# Patient Record
Sex: FEMALE | Race: WHITE | Employment: OTHER | URBAN - METROPOLITAN AREA
[De-identification: names, ages, dates, MRNs, and addresses within clinical notes are randomized per-mention and may not be internally consistent; named-entity substitution may affect disease eponyms.]

---

## 2017-09-11 ENCOUNTER — HOSPITAL ENCOUNTER (OUTPATIENT)
Age: 67
Setting detail: SPECIMEN
Discharge: HOME | End: 2017-09-11
Payer: COMMERCIAL

## 2017-09-11 LAB
ANION GAP SERPL CALCULATED.3IONS-SCNC: 12 MMOL/L (ref 9–17)
BUN BLDV-MCNC: 16 MG/DL (ref 8–23)
BUN/CREAT BLD: ABNORMAL (ref 9–20)
CALCIUM SERPL-MCNC: 8.6 MG/DL (ref 8.6–10.4)
CHLORIDE BLD-SCNC: 98 MMOL/L (ref 98–107)
CO2: 31 MMOL/L (ref 20–31)
CREAT SERPL-MCNC: 0.68 MG/DL (ref 0.5–0.9)
GFR AFRICAN AMERICAN: >60 ML/MIN
GFR NON-AFRICAN AMERICAN: >60 ML/MIN
GFR SERPL CREATININE-BSD FRML MDRD: ABNORMAL ML/MIN/{1.73_M2}
GFR SERPL CREATININE-BSD FRML MDRD: ABNORMAL ML/MIN/{1.73_M2}
GLUCOSE BLD-MCNC: 136 MG/DL (ref 70–99)
HCT VFR BLD CALC: 30.9 % (ref 36–46)
HEMOGLOBIN: 10.2 G/DL (ref 12–16)
MAGNESIUM: 2.3 MG/DL (ref 1.6–2.6)
MCH RBC QN AUTO: 29.7 PG (ref 26–34)
MCHC RBC AUTO-ENTMCNC: 33 G/DL (ref 31–37)
MCV RBC AUTO: 90 FL (ref 80–100)
PDW BLD-RTO: 14.9 % (ref 12.5–15.4)
PLATELET # BLD: 185 K/UL (ref 140–450)
PMV BLD AUTO: 9.4 FL (ref 6–12)
POTASSIUM SERPL-SCNC: 3.4 MMOL/L (ref 3.7–5.3)
PREALBUMIN: 16.1 MG/DL (ref 20–40)
RBC # BLD: 3.43 M/UL (ref 4–5.2)
SODIUM BLD-SCNC: 141 MMOL/L (ref 135–144)
VITAMIN D 25-HYDROXY: 35 NG/ML (ref 30–100)
WBC # BLD: 6.3 K/UL (ref 3.5–11)

## 2017-09-11 PROCEDURE — 84134 ASSAY OF PREALBUMIN: CPT

## 2017-09-11 PROCEDURE — 83735 ASSAY OF MAGNESIUM: CPT

## 2017-09-11 PROCEDURE — 80048 BASIC METABOLIC PNL TOTAL CA: CPT

## 2017-09-11 PROCEDURE — 85027 COMPLETE CBC AUTOMATED: CPT

## 2017-09-11 PROCEDURE — P9603 ONE-WAY ALLOW PRORATED MILES: HCPCS

## 2017-09-11 PROCEDURE — 36415 COLL VENOUS BLD VENIPUNCTURE: CPT

## 2017-09-11 PROCEDURE — 82306 VITAMIN D 25 HYDROXY: CPT

## 2018-06-26 ENCOUNTER — HOSPITAL ENCOUNTER (OUTPATIENT)
Age: 68
Setting detail: SPECIMEN
Discharge: HOME | End: 2018-06-26
Payer: COMMERCIAL

## 2018-06-26 LAB
ALBUMIN SERPL-MCNC: 4.1 G/DL (ref 3.5–5.2)
ALBUMIN/GLOBULIN RATIO: 1.4 (ref 1–2.5)
ALP BLD-CCNC: 70 U/L (ref 35–104)
ALT SERPL-CCNC: 37 U/L (ref 5–33)
ANION GAP SERPL CALCULATED.3IONS-SCNC: 11 MMOL/L (ref 9–17)
AST SERPL-CCNC: 24 U/L
BILIRUB SERPL-MCNC: 0.37 MG/DL (ref 0.3–1.2)
BUN BLDV-MCNC: 22 MG/DL (ref 8–23)
BUN/CREAT BLD: ABNORMAL (ref 9–20)
CALCIUM SERPL-MCNC: 9.1 MG/DL (ref 8.6–10.4)
CHLORIDE BLD-SCNC: 99 MMOL/L (ref 98–107)
CO2: 26 MMOL/L (ref 20–31)
CREAT SERPL-MCNC: 0.8 MG/DL (ref 0.5–0.9)
GFR AFRICAN AMERICAN: >60 ML/MIN
GFR NON-AFRICAN AMERICAN: >60 ML/MIN
GFR SERPL CREATININE-BSD FRML MDRD: ABNORMAL ML/MIN/{1.73_M2}
GFR SERPL CREATININE-BSD FRML MDRD: ABNORMAL ML/MIN/{1.73_M2}
GLUCOSE BLD-MCNC: 230 MG/DL (ref 70–99)
POTASSIUM SERPL-SCNC: 3.8 MMOL/L (ref 3.7–5.3)
SODIUM BLD-SCNC: 136 MMOL/L (ref 135–144)
TOTAL PROTEIN: 7 G/DL (ref 6.4–8.3)

## 2018-06-27 LAB
ESTIMATED AVERAGE GLUCOSE: 235 MG/DL
HBA1C MFR BLD: 9.8 % (ref 4–6)

## 2019-03-20 ENCOUNTER — OFFICE VISIT (OUTPATIENT)
Dept: PRIMARY CARE CLINIC | Age: 69
End: 2019-03-20
Payer: MEDICARE

## 2019-03-20 VITALS
RESPIRATION RATE: 16 BRPM | HEIGHT: 64 IN | OXYGEN SATURATION: 95 % | SYSTOLIC BLOOD PRESSURE: 110 MMHG | BODY MASS INDEX: 34.04 KG/M2 | DIASTOLIC BLOOD PRESSURE: 74 MMHG | HEART RATE: 95 BPM | WEIGHT: 199.4 LBS

## 2019-03-20 DIAGNOSIS — E13.8 OTHER SPECIFIED DIABETES MELLITUS WITH COMPLICATION, WITH LONG-TERM CURRENT USE OF INSULIN: Primary | ICD-10-CM

## 2019-03-20 DIAGNOSIS — Z79.4 OTHER SPECIFIED DIABETES MELLITUS WITH COMPLICATION, WITH LONG-TERM CURRENT USE OF INSULIN: Primary | ICD-10-CM

## 2019-03-20 DIAGNOSIS — E78.5 HYPERLIPIDEMIA, UNSPECIFIED HYPERLIPIDEMIA TYPE: ICD-10-CM

## 2019-03-20 DIAGNOSIS — G62.9 NEUROPATHY: ICD-10-CM

## 2019-03-20 DIAGNOSIS — R42 LIGHTHEADED: ICD-10-CM

## 2019-03-20 DIAGNOSIS — R10.13 EPIGASTRIC PAIN: ICD-10-CM

## 2019-03-20 DIAGNOSIS — K21.9 GASTROESOPHAGEAL REFLUX DISEASE, ESOPHAGITIS PRESENCE NOT SPECIFIED: ICD-10-CM

## 2019-03-20 LAB — HBA1C MFR BLD: 8.4 %

## 2019-03-20 PROCEDURE — 99204 OFFICE O/P NEW MOD 45 MIN: CPT | Performed by: FAMILY MEDICINE

## 2019-03-20 PROCEDURE — 1101F PT FALLS ASSESS-DOCD LE1/YR: CPT | Performed by: FAMILY MEDICINE

## 2019-03-20 PROCEDURE — 83036 HEMOGLOBIN GLYCOSYLATED A1C: CPT | Performed by: FAMILY MEDICINE

## 2019-03-20 PROCEDURE — 3045F PR MOST RECENT HEMOGLOBIN A1C LEVEL 7.0-9.0%: CPT | Performed by: FAMILY MEDICINE

## 2019-03-20 PROCEDURE — G8427 DOCREV CUR MEDS BY ELIG CLIN: HCPCS | Performed by: FAMILY MEDICINE

## 2019-03-20 PROCEDURE — 2022F DILAT RTA XM EVC RTNOPTHY: CPT | Performed by: FAMILY MEDICINE

## 2019-03-20 PROCEDURE — G8400 PT W/DXA NO RESULTS DOC: HCPCS | Performed by: FAMILY MEDICINE

## 2019-03-20 PROCEDURE — 1036F TOBACCO NON-USER: CPT | Performed by: FAMILY MEDICINE

## 2019-03-20 PROCEDURE — 1090F PRES/ABSN URINE INCON ASSESS: CPT | Performed by: FAMILY MEDICINE

## 2019-03-20 PROCEDURE — G8484 FLU IMMUNIZE NO ADMIN: HCPCS | Performed by: FAMILY MEDICINE

## 2019-03-20 PROCEDURE — G8417 CALC BMI ABV UP PARAM F/U: HCPCS | Performed by: FAMILY MEDICINE

## 2019-03-20 PROCEDURE — 4040F PNEUMOC VAC/ADMIN/RCVD: CPT | Performed by: FAMILY MEDICINE

## 2019-03-20 PROCEDURE — 1123F ACP DISCUSS/DSCN MKR DOCD: CPT | Performed by: FAMILY MEDICINE

## 2019-03-20 PROCEDURE — G0444 DEPRESSION SCREEN ANNUAL: HCPCS | Performed by: FAMILY MEDICINE

## 2019-03-20 PROCEDURE — 3017F COLORECTAL CA SCREEN DOC REV: CPT | Performed by: FAMILY MEDICINE

## 2019-03-20 RX ORDER — ATORVASTATIN CALCIUM 40 MG/1
40 TABLET, FILM COATED ORAL DAILY
COMMUNITY
End: 2019-06-19 | Stop reason: SDUPTHER

## 2019-03-20 RX ORDER — AMLODIPINE BESYLATE AND ATORVASTATIN CALCIUM 5; 10 MG/1; MG/1
1 TABLET, FILM COATED ORAL DAILY
COMMUNITY
End: 2020-02-14

## 2019-03-20 RX ORDER — TAMSULOSIN HYDROCHLORIDE 0.4 MG/1
0.4 CAPSULE ORAL DAILY
COMMUNITY
End: 2020-06-10 | Stop reason: SDUPTHER

## 2019-03-20 RX ORDER — DULOXETIN HYDROCHLORIDE 60 MG/1
60 CAPSULE, DELAYED RELEASE ORAL DAILY
COMMUNITY
End: 2019-11-05 | Stop reason: SDUPTHER

## 2019-03-20 RX ORDER — FERROUS SULFATE 325(65) MG
325 TABLET ORAL
COMMUNITY
End: 2022-07-06 | Stop reason: ALTCHOICE

## 2019-03-20 RX ORDER — ALENDRONATE SODIUM 70 MG/1
70 TABLET ORAL
COMMUNITY
End: 2020-04-13 | Stop reason: SDUPTHER

## 2019-03-20 RX ORDER — FEXOFENADINE HYDROCHLORIDE 60 MG/1
60 TABLET, FILM COATED ORAL DAILY
COMMUNITY

## 2019-03-20 RX ORDER — OMEPRAZOLE 20 MG/1
20 CAPSULE, DELAYED RELEASE ORAL DAILY
COMMUNITY
End: 2019-11-05 | Stop reason: SDUPTHER

## 2019-03-20 RX ORDER — POTASSIUM CHLORIDE 7.45 MG/ML
10 INJECTION INTRAVENOUS ONCE
COMMUNITY
End: 2019-04-08

## 2019-03-20 RX ORDER — PHENOL 1.4 %
20 AEROSOL, SPRAY (ML) MUCOUS MEMBRANE DAILY PRN
COMMUNITY
End: 2021-11-09

## 2019-03-20 RX ORDER — MAGNESIUM OXIDE 400 MG/1
400 TABLET ORAL DAILY
COMMUNITY

## 2019-03-20 ASSESSMENT — PATIENT HEALTH QUESTIONNAIRE - PHQ9
SUM OF ALL RESPONSES TO PHQ QUESTIONS 1-9: 13
2. FEELING DOWN, DEPRESSED OR HOPELESS: 2
9. THOUGHTS THAT YOU WOULD BE BETTER OFF DEAD, OR OF HURTING YOURSELF: 0
1. LITTLE INTEREST OR PLEASURE IN DOING THINGS: 2
6. FEELING BAD ABOUT YOURSELF - OR THAT YOU ARE A FAILURE OR HAVE LET YOURSELF OR YOUR FAMILY DOWN: 2
5. POOR APPETITE OR OVEREATING: 3
7. TROUBLE CONCENTRATING ON THINGS, SUCH AS READING THE NEWSPAPER OR WATCHING TELEVISION: 0
8. MOVING OR SPEAKING SO SLOWLY THAT OTHER PEOPLE COULD HAVE NOTICED. OR THE OPPOSITE, BEING SO FIGETY OR RESTLESS THAT YOU HAVE BEEN MOVING AROUND A LOT MORE THAN USUAL: 0
3. TROUBLE FALLING OR STAYING ASLEEP: 1
SUM OF ALL RESPONSES TO PHQ9 QUESTIONS 1 & 2: 4
4. FEELING TIRED OR HAVING LITTLE ENERGY: 3
10. IF YOU CHECKED OFF ANY PROBLEMS, HOW DIFFICULT HAVE THESE PROBLEMS MADE IT FOR YOU TO DO YOUR WORK, TAKE CARE OF THINGS AT HOME, OR GET ALONG WITH OTHER PEOPLE: 1
SUM OF ALL RESPONSES TO PHQ QUESTIONS 1-9: 13

## 2019-03-22 ASSESSMENT — ENCOUNTER SYMPTOMS
CONSTIPATION: 0
DIARRHEA: 0
VOMITING: 0
BLOOD IN STOOL: 0
NAUSEA: 0
ABDOMINAL PAIN: 1
SHORTNESS OF BREATH: 0

## 2019-04-07 LAB
ALBUMIN SERPL-MCNC: 4 G/DL (ref 3.2–5.3)
ALK PHOSPHATASE: 67 U/L (ref 39–130)
ALT SERPL-CCNC: 33 U/L (ref 0–31)
ANION GAP SERPL CALCULATED.3IONS-SCNC: 10 MMOL/L (ref 4–12)
AST SERPL-CCNC: 23 U/L (ref 0–41)
BILIRUB SERPL-MCNC: 0.6 MG/DL (ref 0.3–1.2)
BUN BLDV-MCNC: 17 MG/DL (ref 5–27)
CALCIUM SERPL-MCNC: 9.6 MG/DL (ref 8.5–10.5)
CHLORIDE BLD-SCNC: 101 MMOL/L (ref 98–109)
CHOLESTEROL/HDL RATIO: 3 (ref 1–5)
CHOLESTEROL: 111 MG/DL (ref 150–200)
CO2: 33 MMOL/L (ref 22–32)
CREAT SERPL-MCNC: 0.99 MG/DL (ref 0.4–1)
EGFR AFRICAN AMERICAN: >60 ML/MIN/1.73SQ.M
EGFR IF NONAFRICAN AMERICAN: 56 ML/MIN/1.73SQ.M
GLUCOSE: 104 MG/DL (ref 65–99)
HDLC SERPL-MCNC: 37 MG/DL
LDL CHOLESTEROL CALCULATED: 47 MG/DL
LDL/HDL RATIO: 1.3
POTASSIUM SERPL-SCNC: 3.1 MMOL/L (ref 3.5–5)
SODIUM BLD-SCNC: 144 MMOL/L (ref 134–146)
TOTAL PROTEIN: 6.4 G/DL (ref 6–8)
TRIGL SERPL-MCNC: 137 MG/DL (ref 27–150)
VLDLC SERPL CALC-MCNC: 27 MG/DL (ref 0–30)

## 2019-04-08 ENCOUNTER — TELEPHONE (OUTPATIENT)
Dept: PRIMARY CARE CLINIC | Age: 69
End: 2019-04-08

## 2019-04-08 DIAGNOSIS — E87.6 HYPOKALEMIA: Primary | ICD-10-CM

## 2019-04-08 RX ORDER — POTASSIUM CHLORIDE 20 MEQ/1
20 TABLET, EXTENDED RELEASE ORAL DAILY
Qty: 10 TABLET | Refills: 0 | Status: SHIPPED | OUTPATIENT
Start: 2019-04-08 | End: 2020-02-20

## 2019-04-09 NOTE — TELEPHONE ENCOUNTER
Discussed labs, K low so recommend three days of K 20 meq and then can return to 10 meq. Check bmp again.

## 2019-04-10 ENCOUNTER — TELEPHONE (OUTPATIENT)
Dept: PRIMARY CARE CLINIC | Age: 69
End: 2019-04-10

## 2019-04-10 NOTE — TELEPHONE ENCOUNTER
Patient called this morning, and stated you had sent in a new prescription for potassium, 20mg. She is already taking 20mg. Daily. Please advise.

## 2019-04-12 ENCOUNTER — HOSPITAL ENCOUNTER (OUTPATIENT)
Age: 69
Discharge: HOME | End: 2019-04-12
Payer: MEDICARE

## 2019-04-12 DIAGNOSIS — E87.6 HYPOKALEMIA: ICD-10-CM

## 2019-04-12 LAB
ANION GAP SERPL CALCULATED.3IONS-SCNC: 11 MMOL/L (ref 9–17)
BUN BLDV-MCNC: 17 MG/DL (ref 8–23)
BUN/CREAT BLD: ABNORMAL (ref 9–20)
CALCIUM SERPL-MCNC: 9.1 MG/DL (ref 8.6–10.4)
CHLORIDE BLD-SCNC: 101 MMOL/L (ref 98–107)
CO2: 29 MMOL/L (ref 20–31)
CREAT SERPL-MCNC: 0.91 MG/DL (ref 0.5–0.9)
GFR AFRICAN AMERICAN: >60 ML/MIN
GFR NON-AFRICAN AMERICAN: >60 ML/MIN
GFR SERPL CREATININE-BSD FRML MDRD: ABNORMAL ML/MIN/{1.73_M2}
GFR SERPL CREATININE-BSD FRML MDRD: ABNORMAL ML/MIN/{1.73_M2}
GLUCOSE BLD-MCNC: 253 MG/DL (ref 70–99)
POTASSIUM SERPL-SCNC: 3.6 MMOL/L (ref 3.7–5.3)
SODIUM BLD-SCNC: 141 MMOL/L (ref 135–144)

## 2019-04-12 PROCEDURE — 80048 BASIC METABOLIC PNL TOTAL CA: CPT

## 2019-04-12 PROCEDURE — 36415 COLL VENOUS BLD VENIPUNCTURE: CPT

## 2019-04-17 ENCOUNTER — OFFICE VISIT (OUTPATIENT)
Dept: PRIMARY CARE CLINIC | Age: 69
End: 2019-04-17
Payer: MEDICARE

## 2019-04-17 ENCOUNTER — TELEPHONE (OUTPATIENT)
Dept: GASTROENTEROLOGY | Age: 69
End: 2019-04-17

## 2019-04-17 VITALS
SYSTOLIC BLOOD PRESSURE: 92 MMHG | OXYGEN SATURATION: 98 % | DIASTOLIC BLOOD PRESSURE: 70 MMHG | BODY MASS INDEX: 38.6 KG/M2 | WEIGHT: 226.1 LBS | RESPIRATION RATE: 16 BRPM | HEIGHT: 64 IN | HEART RATE: 84 BPM

## 2019-04-17 DIAGNOSIS — R42 LIGHTHEADEDNESS: ICD-10-CM

## 2019-04-17 DIAGNOSIS — R10.10 PAIN OF UPPER ABDOMEN: ICD-10-CM

## 2019-04-17 DIAGNOSIS — M54.50 BILATERAL LOW BACK PAIN WITHOUT SCIATICA, UNSPECIFIED CHRONICITY: Primary | ICD-10-CM

## 2019-04-17 DIAGNOSIS — E13.8 OTHER SPECIFIED DIABETES MELLITUS WITH COMPLICATION, WITH LONG-TERM CURRENT USE OF INSULIN: ICD-10-CM

## 2019-04-17 DIAGNOSIS — Z79.4 OTHER SPECIFIED DIABETES MELLITUS WITH COMPLICATION, WITH LONG-TERM CURRENT USE OF INSULIN: ICD-10-CM

## 2019-04-17 PROCEDURE — 99214 OFFICE O/P EST MOD 30 MIN: CPT | Performed by: FAMILY MEDICINE

## 2019-04-17 PROCEDURE — 2022F DILAT RTA XM EVC RTNOPTHY: CPT | Performed by: FAMILY MEDICINE

## 2019-04-17 PROCEDURE — 1090F PRES/ABSN URINE INCON ASSESS: CPT | Performed by: FAMILY MEDICINE

## 2019-04-17 PROCEDURE — 3045F PR MOST RECENT HEMOGLOBIN A1C LEVEL 7.0-9.0%: CPT | Performed by: FAMILY MEDICINE

## 2019-04-17 PROCEDURE — G8400 PT W/DXA NO RESULTS DOC: HCPCS | Performed by: FAMILY MEDICINE

## 2019-04-17 PROCEDURE — 1123F ACP DISCUSS/DSCN MKR DOCD: CPT | Performed by: FAMILY MEDICINE

## 2019-04-17 PROCEDURE — 3017F COLORECTAL CA SCREEN DOC REV: CPT | Performed by: FAMILY MEDICINE

## 2019-04-17 PROCEDURE — G8427 DOCREV CUR MEDS BY ELIG CLIN: HCPCS | Performed by: FAMILY MEDICINE

## 2019-04-17 PROCEDURE — G8417 CALC BMI ABV UP PARAM F/U: HCPCS | Performed by: FAMILY MEDICINE

## 2019-04-17 PROCEDURE — 4040F PNEUMOC VAC/ADMIN/RCVD: CPT | Performed by: FAMILY MEDICINE

## 2019-04-17 PROCEDURE — 1036F TOBACCO NON-USER: CPT | Performed by: FAMILY MEDICINE

## 2019-04-17 ASSESSMENT — ENCOUNTER SYMPTOMS
VOMITING: 0
SHORTNESS OF BREATH: 0
NAUSEA: 0
DIARRHEA: 0
ABDOMINAL PAIN: 1
CONSTIPATION: 0

## 2019-04-17 NOTE — PROGRESS NOTES
[facility]  [address] [place] [address] [address]  [address] [facility] [address]  [place] [address]  Dept: [phone]  Dept Fax: [phone]    Alex Jesus is a 80 y.o. female who presents today for her medical conditions/complaints as noted below. Alex Jesus is c/o of  Chief Complaint   Patient presents with    1 Month Follow-Up     needs order for mammogram       HPI:     HPI     Pt is a 79 y/o female here for follow up. Pt injects 55 units lantus nightly and states her fasting sugars range 120-130 range. After meal sugars have been good as well, except was high today. Uses sliding scale of humalog for meals. Sugars sometimes higher at end of day. Pt also diagnosed with sleep apnea and states that she will be getting cpap machine supplies soon. Pt gets dizziness when standing up too fast and sometimes without doing that. States that has been going on for many years. So she takes her time getting up. Bp lower today. Left sided upper abdominal pain that is constantly there over past few months. States it is usually at a 5/10. Takes tylenol but doesn't help. Has mid epigastric discomfort too and takes ppi for  this, has appt in may to see GI for consult. Has low back pain too, hx of compression fracture in 2015 but did not require surgery at that time. States feels like over time has gotten worse and is painful when she stands for some time and has to sit down. No sciatica or weakness, or urinary or bowel incontinence.            Hemoglobin A1C (%)   Date Value   03/20/2019 8.4   06/26/2018 9.8 (H)   06/15/2016 5.4             ( goal A1C is < 7)   No results found for: LABMICR  LDL Cholesterol (mg/dL)   Date Value   11/02/2015 86   10/06/2014 86     LDL Calculated (mg/dL)   Date Value   04/06/2019 47       (goal LDL is <100)   AST (U/L)   Date Value   04/06/2019 23     ALT (U/L)   Date Value   04/06/2019 33 (H)     BUN (mg/dL)   Date Value   04/12/2019 17     BP Readings from Last 3 Encounters:   04/17/19 92/70   03/20/19 110/74   05/07/15 155/82          (goal 120/80)    Past Medical History:   Diagnosis Date    Bowel obstruction (HCC)     Cancer (HCC)     uterine stage 2    Diabetes mellitus ([facility] [address]     History of anesthesia complications 2827'B    was told after gallbladder surgery to never have anesthesia again \"since it was hard for me to come out of it\"    Hypertension     Right arm pain     Right shoulder pain     Sleep apnea     uses CPAP nightly    TIA (transient ischemic attack)       Past Surgical History:   Procedure Laterality Date    APPENDECTOMY      CHOLECYSTECTOMY      HAND SURGERY Right     repair tendon    HYSTERECTOMY  2/2014    chris with bso    KNEE ARTHROSCOPY Right     OTHER SURGICAL HISTORY Right 11/11/2014    shoulder manipulation    SHOULDER ARTHROSCOPY Right 02/03/15    SHOULDER SURGERY  11/2014    manipulation    URETER STENT PLACEMENT Right 4/30/2015    pt has blood clot & abscess in her right kidney       No family history on file.     Social History     Tobacco Use    Smoking status: Never Smoker    Smokeless tobacco: Never Used   Substance Use Topics    Alcohol use: No      Current Outpatient Medications   Medication Sig Dispense Refill    potassium chloride (KLOR-CON M) 20 MEQ extended release tablet Take 1 tablet by mouth daily 10 tablet 0    insulin lispro (HUMALOG) 100 UNIT/ML injection vial Inject into the skin 3 times daily (before meals)      SUPER B COMPLEX/C CAPS Take by mouth      tamsulosin (FLOMAX) 0.4 MG capsule Take 0.4 mg by mouth daily      vitamin A 41910 units capsule Take 10,000 Units by mouth daily      Cholecalciferol (VITAMIN D3) 5000 units TABS Take by mouth      rivaroxaban (XARELTO) 20 MG TABS tablet Take 20 mg by mouth      alendronate (FOSAMAX) 70 MG tablet Take 70 mg by mouth every 7 days      amLODIPine-atorvastatatin (CADUET) 5-10 MG per tablet Take 1 tablet by mouth daily      atorvastatin (LIPITOR) 40 MG tablet Take 40 mg by mouth daily      DULoxetine (CYMBALTA) 60 MG extended release capsule Take 60 mg by mouth daily      ferrous sulfate 325 (65 Fe) MG tablet Take 325 mg by mouth daily (with breakfast)      fexofenadine (ALLEGRA) 60 MG tablet Take 60 mg by mouth daily      magnesium oxide (MAG-OX) 400 MG tablet Take 400 mg by mouth daily      melatonin 3 MG TABS tablet Take 3 mg by mouth daily      Thyroid (LEVOTHYROXINE-LIOTHYRONINE) 90 MG TABS Take by mouth      omeprazole (PRILOSEC) 20 MG delayed release capsule Take 20 mg by mouth daily      LORazepam (ATIVAN) 1 MG tablet Take 1 mg by mouth every 6 hours as needed for Anxiety      gabapentin (NEURONTIN) 300 MG capsule Take 300 mg by mouth nightly      losartan-hydrochlorothiazide (HYZAAR) 100-25 MG per tablet Take 1 tablet by mouth daily.  insulin glargine (LANTUS) 100 UNIT/ML injection vial Inject 40 Units into the skin 2 times daily.  metoprolol (LOPRESSOR) 25 MG tablet Take 50 mg by mouth 2 times daily. No current facility-administered medications for this visit.       Allergies   Allergen Reactions    Aspirin Anaphylaxis     Only thing she can take is tylenol    Benadryl [Diphenhydramine] Other (See Comments)     Dystonic movement    Ibuprofen Anaphylaxis    Lidocaine Anaphylaxis     CAN TOLERATE IV ONLY    Penicillins Anaphylaxis    Morphine Nausea And Vomiting       Health Maintenance   Topic Date Due    Hepatitis C screen  1950    Diabetic foot exam  09/14/1961    Diabetic retinal exam  09/14/1961    Diabetic microalbuminuria test  09/14/1969    DTaP/Tdap/Td vaccine (1 - Tdap) 09/14/1970    Breast cancer screen  09/14/2001    Shingles Vaccine (1 of 2) 09/14/2001    Colon cancer screen colonoscopy  09/14/2001    DEXA (modify frequency per FRAX score)  09/14/2016    Pneumococcal 65+ years Vaccine (1 of 2 - PCV13) 04/17/2023 (Originally 9/14/2016)    Flu vaccine (Season Ended) month.  - XR LUMBAR SPINE (2-3 VIEWS); Future    2. Other specified diabetes mellitus with complication, with long-term current use of insulin ([address]  - continue lantus 55 units nightly and sliding scale. Continue to monitor sugars. 3. Pain of upper abdomen  - pt states left sided abdominal pain has been more uncomfortable over time, no diarrhea, nausea, constipation, or vomiting. Pt has GI appt coming up in may, will try to get in sooner. Continue ppi. 4. Lightheadedness  - recommend cutting dose of metoprolol to 25 mg BID. Continue to stay well hydrated and get up slowly. Plan:      Return in about 1 month (around 5/17/2019) for follow up diabetes, abd pain, back pain. Orders Placed This Encounter   Procedures    XR LUMBAR SPINE (2-3 VIEWS)     Standing Status:   Future     Standing Expiration Date:   4/17/2020     Order Specific Question:   Reason for exam:     Answer:   low back pain, history of compression fracture     No orders of the defined types were placed in this encounter.          Electronically signed by Fredrick Deng DO on 4/17/2019 at 4:50 PM

## 2019-04-18 ENCOUNTER — TELEPHONE (OUTPATIENT)
Dept: PRIMARY CARE CLINIC | Age: 69
End: 2019-04-18

## 2019-04-18 DIAGNOSIS — R10.10 PAIN OF UPPER ABDOMEN: Primary | ICD-10-CM

## 2019-04-18 NOTE — TELEPHONE ENCOUNTER
Patient called stating that you wanted her to get into GI sooner than her May 13th appt. She received a call from GI and they cannot get her in any sooner due to the doctor being on vacation. She states you had another plan if she wasn't able to get in sooner, she wanted to know if you wanted her to wait til May 13th or if you had any other ideas.

## 2019-04-22 ENCOUNTER — TELEPHONE (OUTPATIENT)
Dept: PRIMARY CARE CLINIC | Age: 69
End: 2019-04-22

## 2019-04-22 NOTE — TELEPHONE ENCOUNTER
Call to patient regarding colon cancer screening. Patient states she did a fit test in January, and will bring us the results to her next office visit.

## 2019-04-26 ENCOUNTER — HOSPITAL ENCOUNTER (OUTPATIENT)
Dept: CT IMAGING | Age: 69
Discharge: HOME | End: 2019-04-28
Payer: MEDICARE

## 2019-04-26 ENCOUNTER — HOSPITAL ENCOUNTER (OUTPATIENT)
Dept: GENERAL RADIOLOGY | Age: 69
Discharge: HOME | End: 2019-04-28
Payer: MEDICARE

## 2019-04-26 ENCOUNTER — HOSPITAL ENCOUNTER (OUTPATIENT)
Age: 69
Discharge: HOME | End: 2019-04-28
Payer: MEDICARE

## 2019-04-26 DIAGNOSIS — R10.10 PAIN OF UPPER ABDOMEN: ICD-10-CM

## 2019-04-26 DIAGNOSIS — M54.50 BILATERAL LOW BACK PAIN WITHOUT SCIATICA, UNSPECIFIED CHRONICITY: ICD-10-CM

## 2019-04-26 PROCEDURE — 74177 CT ABD & PELVIS W/CONTRAST: CPT

## 2019-04-26 PROCEDURE — 72100 X-RAY EXAM L-S SPINE 2/3 VWS: CPT

## 2019-04-26 PROCEDURE — 2580000003 HC RX 258: Performed by: FAMILY MEDICINE

## 2019-04-26 PROCEDURE — 6360000004 HC RX CONTRAST MEDICATION: Performed by: FAMILY MEDICINE

## 2019-04-26 RX ORDER — 0.9 % SODIUM CHLORIDE 0.9 %
80 INTRAVENOUS SOLUTION INTRAVENOUS ONCE
Status: COMPLETED | OUTPATIENT
Start: 2019-04-26 | End: 2019-04-26

## 2019-04-26 RX ORDER — SODIUM CHLORIDE 0.9 % (FLUSH) 0.9 %
10 SYRINGE (ML) INJECTION PRN
Status: DISCONTINUED | OUTPATIENT
Start: 2019-04-26 | End: 2019-04-29 | Stop reason: HOSPADM

## 2019-04-26 RX ADMIN — SODIUM CHLORIDE 80 ML: 9 INJECTION, SOLUTION INTRAVENOUS at 13:26

## 2019-04-26 RX ADMIN — IOVERSOL 75 ML: 741 INJECTION INTRA-ARTERIAL; INTRAVENOUS at 13:25

## 2019-04-26 RX ADMIN — Medication 10 ML: at 13:26

## 2019-05-07 ENCOUNTER — OFFICE VISIT (OUTPATIENT)
Dept: ORTHOPEDIC SURGERY | Age: 69
End: 2019-05-07
Payer: MEDICARE

## 2019-05-07 DIAGNOSIS — M54.5 ACUTE LOW BACK PAIN, UNSPECIFIED BACK PAIN LATERALITY, WITH SCIATICA PRESENCE UNSPECIFIED: Primary | ICD-10-CM

## 2019-05-07 DIAGNOSIS — S32.040S CLOSED COMPRESSION FRACTURE OF FOURTH LUMBAR VERTEBRA, SEQUELA: ICD-10-CM

## 2019-05-07 DIAGNOSIS — G89.29 CHRONIC MIDLINE LOW BACK PAIN WITH RIGHT-SIDED SCIATICA: ICD-10-CM

## 2019-05-07 DIAGNOSIS — M54.41 CHRONIC MIDLINE LOW BACK PAIN WITH RIGHT-SIDED SCIATICA: ICD-10-CM

## 2019-05-07 DIAGNOSIS — S32.030S CLOSED COMPRESSION FRACTURE OF THIRD LUMBAR VERTEBRA, SEQUELA: ICD-10-CM

## 2019-05-07 PROCEDURE — 3017F COLORECTAL CA SCREEN DOC REV: CPT | Performed by: ORTHOPAEDIC SURGERY

## 2019-05-07 PROCEDURE — 99203 OFFICE O/P NEW LOW 30 MIN: CPT | Performed by: ORTHOPAEDIC SURGERY

## 2019-05-07 PROCEDURE — G8417 CALC BMI ABV UP PARAM F/U: HCPCS | Performed by: ORTHOPAEDIC SURGERY

## 2019-05-07 PROCEDURE — 1036F TOBACCO NON-USER: CPT | Performed by: ORTHOPAEDIC SURGERY

## 2019-05-07 PROCEDURE — G8427 DOCREV CUR MEDS BY ELIG CLIN: HCPCS | Performed by: ORTHOPAEDIC SURGERY

## 2019-05-07 PROCEDURE — G8400 PT W/DXA NO RESULTS DOC: HCPCS | Performed by: ORTHOPAEDIC SURGERY

## 2019-05-07 PROCEDURE — 1123F ACP DISCUSS/DSCN MKR DOCD: CPT | Performed by: ORTHOPAEDIC SURGERY

## 2019-05-07 PROCEDURE — 4040F PNEUMOC VAC/ADMIN/RCVD: CPT | Performed by: ORTHOPAEDIC SURGERY

## 2019-05-07 PROCEDURE — 1090F PRES/ABSN URINE INCON ASSESS: CPT | Performed by: ORTHOPAEDIC SURGERY

## 2019-05-07 ASSESSMENT — ENCOUNTER SYMPTOMS: BACK PAIN: 1

## 2019-05-07 NOTE — PROGRESS NOTES
Subjective:      Patient ID: Aamir Martel is a 80 y.o. female. Back Pain   This is a chronic problem. The current episode started more than 1 year ago. The problem occurs constantly. The problem is unchanged. The pain is present in the lumbar spine and gluteal. The quality of the pain is described as aching. The pain radiates to the right thigh. The pain is moderate. The pain is the same all the time. The symptoms are aggravated by standing, position and bending. Stiffness is present all day. Risk factors include obesity and sedentary lifestyle. The treatment provided no relief. 72-year-old female with history of ovarian cancer and multiple TIAs. Patient spent extended period time in rehabilitation due to TIAs and left hemiparesis. Patient presents with low back pain some right radicular leg pain post symptoms more highly consistent with neurogenic claudication    Patient with recent CT scan showing a new L4 compression fracture with respect to 2015    Patient reports that she is known about this compression fracture for 2 years. Review of Systems   Musculoskeletal: Positive for back pain. All other systems reviewed and are negative. Objective:   Physical Exam   Constitutional: She is oriented to person, place, and time. She appears well-developed and well-nourished. HENT:   Head: Normocephalic and atraumatic. Eyes: Conjunctivae and EOM are normal.   Neck: Normal range of motion. Pulmonary/Chest: Effort normal. No respiratory distress. Neurological: She is alert and oriented to person, place, and time. She has normal strength. No sensory deficit. Normal gait   Skin: Skin is warm and dry. Psychiatric: Her behavior is normal. Thought content normal.   Nursing note and vitals reviewed.     CT scan reviewed compared with prior CT scan 2015 patient was L4 compression fracture appears quite mild and healed this was not present 2015 very mild compression fracture L3 healed was present in 2015  Assessment:      Encounter Diagnoses   Name Primary?  Acute low back pain, unspecified back pain laterality, with sciatica presence unspecified Yes    Closed compression fracture of third lumbar vertebra, sequela     Closed compression fracture of fourth lumbar vertebra, sequela     Chronic midline low back pain with right-sided sciatica      Predominantly low back pain neurogenic claudication with some right radicular pain    Etiology unclear at this time.   Could quite likely have a more central cause as patient's lumbar spine looks fairly reasonable      Plan:      Physical therapy    Follow-up 6 weeks    Consider MRI lumbar spine on follow-up        Anh Murphy MD

## 2019-05-13 ENCOUNTER — OFFICE VISIT (OUTPATIENT)
Dept: GASTROENTEROLOGY | Age: 69
End: 2019-05-13
Payer: MEDICARE

## 2019-05-13 VITALS
HEART RATE: 64 BPM | DIASTOLIC BLOOD PRESSURE: 78 MMHG | BODY MASS INDEX: 39.14 KG/M2 | WEIGHT: 228 LBS | SYSTOLIC BLOOD PRESSURE: 135 MMHG

## 2019-05-13 DIAGNOSIS — R10.13 EPIGASTRIC PAIN: ICD-10-CM

## 2019-05-13 DIAGNOSIS — K21.9 GASTROESOPHAGEAL REFLUX DISEASE, ESOPHAGITIS PRESENCE NOT SPECIFIED: Primary | ICD-10-CM

## 2019-05-13 DIAGNOSIS — K58.2 IRRITABLE BOWEL SYNDROME WITH BOTH CONSTIPATION AND DIARRHEA: ICD-10-CM

## 2019-05-13 DIAGNOSIS — R10.84 ABDOMINAL PAIN, GENERALIZED: ICD-10-CM

## 2019-05-13 PROCEDURE — G8417 CALC BMI ABV UP PARAM F/U: HCPCS | Performed by: INTERNAL MEDICINE

## 2019-05-13 PROCEDURE — 99204 OFFICE O/P NEW MOD 45 MIN: CPT | Performed by: INTERNAL MEDICINE

## 2019-05-13 PROCEDURE — 1090F PRES/ABSN URINE INCON ASSESS: CPT | Performed by: INTERNAL MEDICINE

## 2019-05-13 PROCEDURE — G8427 DOCREV CUR MEDS BY ELIG CLIN: HCPCS | Performed by: INTERNAL MEDICINE

## 2019-05-13 ASSESSMENT — ENCOUNTER SYMPTOMS
BACK PAIN: 1
VOMITING: 0
BLOOD IN STOOL: 0
COUGH: 0
CONSTIPATION: 0
TROUBLE SWALLOWING: 0
EYES NEGATIVE: 1
ANAL BLEEDING: 0
SORE THROAT: 0
NAUSEA: 1
CHOKING: 0
VOICE CHANGE: 0
DIARRHEA: 0
ABDOMINAL DISTENTION: 1
RECTAL PAIN: 0
ABDOMINAL PAIN: 1
WHEEZING: 0
SINUS PRESSURE: 0
RESPIRATORY NEGATIVE: 1
ALLERGIC/IMMUNOLOGIC NEGATIVE: 1

## 2019-05-13 NOTE — PROGRESS NOTES
Subjective:      Patient ID: Jose Walker is a 80 y.o. female. Dr. Len Herbert,  has requested that I see Jose Walker for a consult for   1. Gastroesophageal reflux disease, esophagitis presence not specified    2. Epigastric pain    3. Abdominal pain, generalized    4. Irritable bowel syndrome with both constipation and diarrhea     . This patient is seen in my office for the first time  She has been having some sig abdominal pains  Has GERD  Has been on PPI  On Neurontin  Negative CT scan  Patient has been complaining of some abdominal pains, off and on cramping  Also complains of abdominal bloating and gas  Has off and on nausea without any sig vomiting  Has some alternating constipation and diarrhea  Has no weight loss  Has some anxiety issues  Has obesity  Has no dysphagia  No smoking  No ETOH   No fam hx of colon cancer  Never had GI w/u       Past Medical, Family, and Social History reviewed and does contribute to the patient presenting condition. patient\"s PMH/PSH,SH,PSYCH hx, MEDs, ALLERGIES, and ROS was all reviewed and updated ion the appropriate sections    Gastroesophageal Reflux   She complains of abdominal pain, chest pain and nausea. She reports no choking, no coughing, no sore throat or no wheezing. Pertinent negatives include no fatigue. Review of Systems   Constitutional: Negative. Negative for appetite change, fatigue and unexpected weight change. HENT: Negative. Negative for dental problem, postnasal drip, sinus pressure, sore throat, trouble swallowing and voice change. Eyes: Negative. Negative for visual disturbance. Respiratory: Negative. Negative for cough, choking and wheezing. Cardiovascular: Positive for chest pain and palpitations (off and on). Negative for leg swelling (off and on). Gastrointestinal: Positive for abdominal distention, abdominal pain and nausea.  Negative for anal bleeding, blood in stool, constipation, diarrhea, rectal pain and vomiting. Endocrine: Negative. Genitourinary: Negative. Negative for difficulty urinating. Musculoskeletal: Positive for arthralgias and back pain. Negative for gait problem and myalgias. Skin: Negative. Allergic/Immunologic: Negative. Negative for environmental allergies and food allergies. Neurological: Negative. Negative for dizziness, weakness, light-headedness, numbness and headaches. Hematological: Bruises/bleeds easily. Psychiatric/Behavioral: Positive for sleep disturbance. The patient is nervous/anxious. Reviewed and agree  Objective:   Physical Exam   Constitutional: She is oriented to person, place, and time. She appears well-developed and well-nourished. Anxious   Obesity     HENT:   Head: Normocephalic and atraumatic. Mouth/Throat: Oropharynx is clear and moist.   Eyes: Pupils are equal, round, and reactive to light. Conjunctivae are normal.   Neck: Normal range of motion. Neck supple. Cardiovascular: Normal heart sounds and intact distal pulses. Pulmonary/Chest: Effort normal and breath sounds normal.   Abdominal: Soft. Bowel sounds are normal.   Mod diffuse  TENDER, NON DISTENTED  LIVER SPLEEN +  HERNIAS ARE NOT  PALPABLE  BOWEL SOUNDS ARE POSITIVE   Obese belly     Musculoskeletal: Normal range of motion. Neurological: She is alert and oriented to person, place, and time. Skin: Skin is warm. Psychiatric: Her behavior is normal.   Vitals reviewed.       Assessment:      Patient Active Problem List   Diagnosis    Ovarian mass    Abdominal pain    Partial bowel obstruction (HCC)    Epigastric pain    GERD (gastroesophageal reflux disease)     IBS      Plan:      Plan EGD and Colonoscopy     The Endoscopic procedure was explained to the patient in detail  The prep and NPO were explained  All the Risks, Benefits, and Alternatives were explained  Risk of Bleeding, Perforation and Cardio Respiratory risks were explained  her questions were answered  The procedure has been scheduled with the  in the office  Patient was asked to give us a call for any questions  The patient has verbalized understanding and agreement to this plan. The patient was instructed to start taking some OTC Probiotics products   These are available over the counter at the Pharmacy stores and Grocery stores  He was explained about the beneficial effects they have in the GI track  They will help to establish the good bacterial jeimy and will help with the digestion and bowel movements  The patient has verbalized understanding and agreement to this plan    Pt was advised in detail about some life style and dietary modifications. She was advised about avoidance of caffeine, nicotine and chocolate. Pt was also told to stay away from any kind of fast foods, soda pops. She was also advised to avoid lots of spices, grease and fried food etc.     Instructions were also given about trying to arrange the timing, quality and quantity of food. Instructions were given about using ample amount of fiber including dietary and supplemental fiber either metamucil, bennafiber or citrucell etc.  Pt was advised about drinking ample amount of water without any colors or chemicals. Stress was given about regular exercise. Pt has verbalized understanding and agreement to these modifications. Pt seems to have signs and symptoms consistent with GERD, acid indigestion and heartburns. She was discussed  in detail about some possible life style and dietary modifications. She was stressed about the maintenance  of appropriate weight and effect of obesity contributing to reflux symptoms. Routine exercise was streesed. Avoidance of Caffeine, nicotine and chocolate were explained. Pt was asked to avoid spices grease and fried food. Advices were also given about avoidance of any kind of fast foods, soda pops and high energy drinks.     Pt was advised to place two small block under the head end of the bed which may help with night time reflux. Was advised not to eat any thin at least 2-3 hrs before going to bed and walk especially after dinner    Pt has verbalized understanding and agreement to this plan.

## 2019-05-14 RX ORDER — SODIUM, POTASSIUM,MAG SULFATES 17.5-3.13G
2 SOLUTION, RECONSTITUTED, ORAL ORAL ONCE
Qty: 1 BOTTLE | Refills: 0 | Status: SHIPPED | OUTPATIENT
Start: 2019-05-14 | End: 2019-05-14

## 2019-05-16 ENCOUNTER — HOSPITAL ENCOUNTER (OUTPATIENT)
Dept: PHYSICAL THERAPY | Facility: CLINIC | Age: 69
Setting detail: THERAPIES SERIES
Discharge: HOME | End: 2019-05-16
Payer: MEDICARE

## 2019-05-16 PROCEDURE — 97110 THERAPEUTIC EXERCISES: CPT

## 2019-05-16 PROCEDURE — 97162 PT EVAL MOD COMPLEX 30 MIN: CPT

## 2019-05-16 NOTE — FLOWSHEET NOTE
[facility] Fall Risk Assessment    Patient Name:  Paul Meyer  : 1950    Risk Factor Scale  Score   History of Falls [] Yes  [x] No- x1 isolated 25  0 0   Secondary Diagnosis [x] Yes- neuropathy  [] No 15  0 15   Ambulatory Aid [] Furniture  [x] Crutches/cane/walker  [] None/bedrest/wheelchair/nurse 30  15  0 15   IV/Heparin Lock [] Yes  [x] No 20  0 0   Gait/Transferring [] Impaired  [x] Weak  [] Normal/bedrest/immobile 20  10  0 10   Mental Status [] Forgets limitations  [x] Oriented to own ability 15  0 0      Total: 40     Based on the Assessment score: check the appropriate box.     []  No intervention needed   Low =   Score of 0-24    [x]  Use standard prevention interventions Moderate =  Score of 24-44   [x] Give patient handout and discuss fall prevention strategies   [x] Establish goal of education for patient/family RE: fall prevention strategies    []  Use high risk prevention interventions High = Score of 45 and higher   [] Give patient handout and discuss fall prevention strategies   [] Establish goal of education for patient/family Re: fall prevention strategies   [] Discuss lifeline / other resources    Electronically signed by:   Florentin Perea PT  Date: 2019

## 2019-05-16 NOTE — CONSULTS
[] [facility] [address]  [address]  [phone]  F: [phone] [] [address]  [facility]\[address]   [address]  P: [phone]  F: [phone] [x] [place]  [address] [facility]  P: [phone]  F: [phone] [] [address]  [facility]   [address]  [place]: [phone]  F: [phone]     Physical Therapy Spine Evaluation    Date:  2019  Patient: Jailyn Mark  :   MRN: [id number]  Physician: Dr. Denia Ortiz. Pratima Mcgee MD  Insurance: [facility] Medicare  Medical Diagnosis: M54.5- acute low back pain; S32.030S- closed compression fracture of third lumbar vertebra, sequela; S32.040S- closed compression fracture of fourth lumbar vertebra, sequela; M54.41, G89.29- chronic midline low back pain with right-sided sciatica  Rehab Codes: M54.5; S32.030S; S32.040S; M54.41; G89.29  Onset Date:   Next 's appt.: TBD    Subjective:   CC:  Reports inc LBP in  without SUYAPA or aggravating factors. Began while a patient within rehab facility for impairments in gait, balance, UE strength d/t complications from chemo therapy [history of ovarian cancer]. Referring MD believes compression fractures d/t osteoporosis. Reports symptoms began within R LE only, but now have travelled across her lumbar spine. No therapy or surgery to lumbar spine up until this point. Desires to prevent surgery. Reports donning lumbar spine support brace for a short period of time, but no relief while donning during ambulation, and also improper fitting- despite having been measured etc.    Reports inc difficulty ambulating, standing- to do dishes, d/t inc lumbar spine pain and fatigue. Utilizes rollator for inc distances of ambulation. Denies LOB, falls as of recent.      HPI: 2015    PMHx: [] Unremarkable [x] Diabetes [x] HTN  [] Pacemaker   [] MI/Heart Problems [x] Cancer- ovarian; chemo therapy; B LE, UE neuropathy [x] Arthritis [x] Other: H/o R RTC repair- then TSA; July 2015 fell out of tub- L humerus ORIF; TIA- 3/2018; stomach problems; gland problems; shingles; cataract surgery of both eyes. [] Refer to full medical chart  In EPIC   Tests: [x] X-Ray: [] MRI:  [] Other: Lumbar spine. Impression   1. Age indeterminate compression deformities involving the superior endplates   of L3 and L4.   2. Otherwise, no acute abnormality identified of the lumbar spine. 3. Mild multilevel degenerative change. 4. Marked osteopenia. Medications: [x] Refer to full medical record [] None [] Other: Gabapentin for neuropathy. Tylenol for this pain and symptoms. Allergies:      [x] Refer to full medical record [] None [x] Other: Penicillin; lidocaine. Function:  Hand Dominance  [] Right  [] Left  Working:  [] Normal Duty  [] Light Duty  [] Off D/T Condition  [x] Retired  [] Not Employed                  []  Disability  [] Other: N/A         Return to work: N/A  Job/ADL Description: N/A    Pain:  [x] Yes  [] No Location: lumbar spine- R > L Pain Rating: (0-10 scale) 5/10  Pain altered Tx:  [] Yes  [x] No  Action: N/A  Symptoms:  [] Improving [] Worsening [x] Same  Better:  [] AM    [] PM    [x] Sit    [] Rise/Sit    []Stand    [] Walk    [] Lying    [] Other:  Worse: [] AM    [] PM    [] Sit    [] Rise/Sit    []Stand- x5 minutes  [] Walk- x10 minutes with rollator; x5 minutes without rollator   [] Lying    [] Bend                             [] Valsalva    [] Other: No pattern with AM VS PM. Hospital bed; rollator; cane; grab bars in shower at home. [facility].     Sleep: [x] OK    [] Disturbed     Recent diagnosis of sleep apnea- just rreceived CPAP- relief in AM.   B LE- to just distal of both knees- B UEs (R better than L) N/T d/t neuropathy- constant- but relieved with Gabapentin. Denies DEXA scan. List of Red Flags:     Cervical Myelopathy Normal (-)/Abnormal (+)   Sensory disturbances in hand -   Wasting of hand muscles -   Unsteady gait -   Longoria's reflex -   Hyper-reflexia -   Bladder and bowel problems- incontinence or constipation of urine or fecal matter +; intermittent of bowels- too few to count, and nothing recent    Multi-segmental weakness and/or sensory disturbances -     Neoplastic Conditions Normal (-)/Abnormal (+)   Age > 48 y.o. +   Previous history of cancer +   Unexplained weight loss- >10lb in 1 week -   Constant pain -   No relief of pain with bed rest -   Night pain- not relieved with change in body position -     Upper Cervical Ligamentous Instabilities Normal (-)/Abnormal (+)   Occipital headache and numbness -   Severe limitation during neck ROM in all directions -   Signs of cervical myelopathy -     Vertebral Artery Insufficiencies- VBI Normal (-)/Abnormal (+)   Drop attack -   Dizziness/light headedness related to neck movement +; intermittent- potential d/t chemo and medications   Dysphasia -   Dysarthria -   Diplopia -   Positive cranial nerve signs -     Objective:   STRENGTH  ROM    Left Right     L1-2 Hip Flex 4- 4-     Hip Abd 4+; seated 4+; seated     L3-4 Knee Ext 4+ 4+      L4 Ankle DF 4+ 4      L5 EHL 4+ 4     S1 Plant.  Flex 4+ 4 Lumbar    Abdominals POOR POOR Flexion 75%; inc P R > L   Erector Spinae Unable; palpated in standing Unable; palpated in standing Extension Resting with inc lumbar lordosis- inc P when attempts further extension   HS 4+ 4 Rotation L < 50%; P R R 75%   IR/ER 4+/4+ 4/4- Sidebend L < 50%; P R R 75%      UE/LE                                               TESTS (+/-) LEFT RIGHT Not Tested   SLR [] sit [] supine   []   Hamstring (SLR) + + []   SKTC   [x]   DKTC   [x]   Slump/Dural   []   SI JT   [x]   JORGE   [x]   Joint Mobility Compression fractures Compression fractures [x] Faustino Tests ? Pain ? Pain No Change Not Tested   RFIS [] [] [] []   STEPAN [] [] [] []   RFIL [] [] [] []   REIL [] [] [] []   Rep Prot [] [] [] []   Rep Retract [] [] [] []      Repeated motions not tested d/t no radicular symptoms, only dec sensation and N/T secondary to B LE neuropathy. OBSERVATION No Deficit Deficit Not Tested Comments   Posture       Forward Head [] [x] []    Rounded Shoulders [] [x] []    Kyphosis [] [x] [] Cervical and thoracic- inc.   Lordosis [] [x] [] Inc.   Lateral Shift [x] [] []    Scoliosis [x] [] []    Genu Valgus [] [x] [] Wide SANDRO; B lateral trunk sway; unsteadiness upon sit<>stand. Genu Varus [x] [] []    Genu Recurvatum [x] [] []    Pronation [] [x] []    Supination [] [] []    Leg Length Discrp [] [] [x]    Slumped Sitting [] [] [x]    Palpation [] [] [x]    Sensation [] [x] [] L5, S1 impaired to light touch. Edema [x] [] []    Neurological [x] [] []      FUNCTION Normal Difficult Unable   Sitting [x] [] []   Standing [] [x] []   Ambulation [] [x] []   Groom/Dress [x] [] []   Lift/Carry [] [] [x]   Stairs [] [] [x]   Bending [] [x] []   OH reach [] [x] []   Sit to Stand [] [x] []     Comments: Modified oswestry is 24%    mCSTIB:    Test Position Result   Condition #1: NBOS, EO, firm GOOD- supervision   Condition #2: NBOS, EC, firm FAIR- CGA PT; MOD sway   Condition #3: NBOS, EO, compliant Not tested d/t safety   Condition #4: NBOS, EC, compliant Not tested d/t safety      B LE sensation testing to light touch:  L5, S1- impaired- unable to sense with both LEs    Assessment:  Problems:    [x] ? Back Pain:    [] ? Cervical Pain:    [x] ? ROM:     [x] ? Strength:   [x] ? Function:  [x] Postural Deviations  [x] Gait Deviations  [] Other:     Patient is a 80 y.o. pleasant female who presents to physical therapy initial evaluation with signs and symptoms consistent with L3-4 lumbar compression fractures and chronic lumbar spine pain.  Patient demonstrates condition; significant past medical and surgical history. Domestic Concerns:  [x] No  [] Yes:    Pt. Education:  [x] Plans/Goals, Risks/Benefits discussed  [] Home exercise program  Method of Education: [x] Verbal  [] Demo  [] Written  Comprehension of Education:  [x] Verbalizes understanding. [] Demonstrates understanding. [] Needs Review. [] Demonstrates/verbalizes understanding of HEP/Ed previously given. Educated as detailed below in grid. Treatment Plan:  [x] Therapeutic Exercise    [] Modalities:  [x] Therapeutic Activity    [] Ultrasound  [] Electrical Stimulation  [x] Gait Training     []Massage       [] Lumbar/Cervical Traction  [x] Neuromuscular Re-education [x] Cold/hotpack [] Iontophoresis: 4 mg/mL  [x] Instruction in HEP             Dexamethasone Sodium  [] Manual Therapy             Phosphate  mAmin  [x] Aquatic Therapy   [] Dry Needling [] Other:    []  Medication allergies reviewed for use of    Dexamethasone Sodium Phosphate 4mg/ml     with iontophoresis treatments. Pt is not allergic. Frequency:  2 x/week for 12 visits    Todays Treatment:  Modalities:   Precautions: L3-4 compression fractures d/t potential osteoporosis; B LE, UE neuropathy d/t h/o chemo therapy for ovarian cancer- only loss of sensation within L5-S1; DM; FALL RISK- uses rollator for longer duration ambulation   Exercises: Aquatic then transition to land treatments if able  Exercise Reps/ Time Weight/ Level Comments   Education   Aquatic physical therapy- rationale, expectations; precautions/contraindications; and issued informational hand out- verbalized understanding to all                            Other:    Specific Instructions for next treatment: Begin aquatic therapy treatments with focus upon inc lumbar spine AROM; core activation; and static, dynamic balance.      Evaluation Complexity:  History (Personal factors, comorbidities) [] 0 [x] 1-2 [] 3+   Exam (limitations, restrictions) [] 1-2 [x] 3 [] 4+ Clinical presentation (progression) [] Stable [x] Evolving  [] Unstable   Decision Making [] Low [x] Moderate [] High    [] Low Complexity [x] Moderate Complexity [] High Complexity       Treatment Charges: Mins Units   [x] Evaluation       []  Low       [x]  Moderate       []  High 40 1   []  Modalities     [x]  Ther Exercise 10 1   []  Manual Therapy     []  Ther Activities     []  Aquatics     []  Vasocompression     []  Other 50 2     TOTAL TREATMENT TIME: 50    Time in: 2:30PM     Time out: 3:20PM    Electronically signed by: Tyler Cranker, PT    Physician Signature:________________________________Date:__________________  By signing above or cosigning this note, I have reviewed this plan of care and certify a need for medically necessary rehabilitation services.      *PLEASE SIGN ABOVE AND FAX BACK ALL PAGES*

## 2019-05-17 ENCOUNTER — OFFICE VISIT (OUTPATIENT)
Dept: PRIMARY CARE CLINIC | Age: 69
End: 2019-05-17
Payer: MEDICARE

## 2019-05-17 VITALS
RESPIRATION RATE: 16 BRPM | HEIGHT: 64 IN | DIASTOLIC BLOOD PRESSURE: 78 MMHG | OXYGEN SATURATION: 95 % | SYSTOLIC BLOOD PRESSURE: 116 MMHG | HEART RATE: 95 BPM | WEIGHT: 226 LBS | BODY MASS INDEX: 38.58 KG/M2

## 2019-05-17 DIAGNOSIS — K21.9 GASTROESOPHAGEAL REFLUX DISEASE, ESOPHAGITIS PRESENCE NOT SPECIFIED: ICD-10-CM

## 2019-05-17 DIAGNOSIS — E03.9 HYPOTHYROIDISM, UNSPECIFIED TYPE: ICD-10-CM

## 2019-05-17 DIAGNOSIS — E13.8 OTHER SPECIFIED DIABETES MELLITUS WITH COMPLICATION, WITH LONG-TERM CURRENT USE OF INSULIN: ICD-10-CM

## 2019-05-17 DIAGNOSIS — S32.030D CLOSED COMPRESSION FRACTURE OF L3 LUMBAR VERTEBRA WITH ROUTINE HEALING, SUBSEQUENT ENCOUNTER: ICD-10-CM

## 2019-05-17 DIAGNOSIS — R10.12 LEFT UPPER QUADRANT ABDOMINAL PAIN OF UNKNOWN ETIOLOGY: ICD-10-CM

## 2019-05-17 DIAGNOSIS — Z79.4 OTHER SPECIFIED DIABETES MELLITUS WITH COMPLICATION, WITH LONG-TERM CURRENT USE OF INSULIN: ICD-10-CM

## 2019-05-17 PROCEDURE — G8427 DOCREV CUR MEDS BY ELIG CLIN: HCPCS | Performed by: FAMILY MEDICINE

## 2019-05-17 PROCEDURE — 99214 OFFICE O/P EST MOD 30 MIN: CPT | Performed by: FAMILY MEDICINE

## 2019-05-17 PROCEDURE — 3017F COLORECTAL CA SCREEN DOC REV: CPT | Performed by: FAMILY MEDICINE

## 2019-05-17 PROCEDURE — 1123F ACP DISCUSS/DSCN MKR DOCD: CPT | Performed by: FAMILY MEDICINE

## 2019-05-17 PROCEDURE — G8417 CALC BMI ABV UP PARAM F/U: HCPCS | Performed by: FAMILY MEDICINE

## 2019-05-17 PROCEDURE — 2022F DILAT RTA XM EVC RTNOPTHY: CPT | Performed by: FAMILY MEDICINE

## 2019-05-17 PROCEDURE — 1090F PRES/ABSN URINE INCON ASSESS: CPT | Performed by: FAMILY MEDICINE

## 2019-05-17 PROCEDURE — 4040F PNEUMOC VAC/ADMIN/RCVD: CPT | Performed by: FAMILY MEDICINE

## 2019-05-17 PROCEDURE — 1036F TOBACCO NON-USER: CPT | Performed by: FAMILY MEDICINE

## 2019-05-17 PROCEDURE — G8400 PT W/DXA NO RESULTS DOC: HCPCS | Performed by: FAMILY MEDICINE

## 2019-05-17 PROCEDURE — 3045F PR MOST RECENT HEMOGLOBIN A1C LEVEL 7.0-9.0%: CPT | Performed by: FAMILY MEDICINE

## 2019-05-17 RX ORDER — LEVOTHYROXINE SODIUM 88 UG/1
88 TABLET ORAL DAILY
Qty: 30 TABLET | Refills: 5 | Status: SHIPPED | OUTPATIENT
Start: 2019-05-17 | End: 2019-09-16 | Stop reason: SDUPTHER

## 2019-05-17 ASSESSMENT — ENCOUNTER SYMPTOMS
ABDOMINAL PAIN: 1
NAUSEA: 1
BACK PAIN: 1
SHORTNESS OF BREATH: 0
BLOOD IN STOOL: 0
VOMITING: 0
CONSTIPATION: 0
DIARRHEA: 0

## 2019-05-17 NOTE — PROGRESS NOTES
[facility]  [address] [place] [address] Dr  [address] [facility] [address]  [facility] [address]  Dept: [phone]  Dept Fax: [phone]    Leon Gupta is a 80 y.o. female who presents today for her medical conditions/complaints as noted below. Leon Gupta is c/o of  Chief Complaint   Patient presents with    1 Month Follow-Up         HPI:     HPI    Pt here for follow up. Blood sugars have improved , this Am was 115. Has not needed to use sliding scale humalog except yesterday when it was 180 at the end of the day. Pt started physical therapy  For low back pain and compression fracture, after seeing orthopedics. Saw Gi for her abdominal pain. CT abd/pelvis neg for any acute pathology. Pain is burning in nature on left side of upper abdomen. Has EGD and scope scheduled in July. Does feel bloated as well. Denies any vomiting but does get nausea at times.       Hemoglobin A1C (%)   Date Value   03/20/2019 8.4   06/26/2018 9.8 (H)   06/15/2016 5.4             ( goal A1C is < 7)   No results found for: LABMICR  LDL Cholesterol (mg/dL)   Date Value   11/02/2015 86   10/06/2014 86     LDL Calculated (mg/dL)   Date Value   04/06/2019 47       (goal LDL is <100)   AST (U/L)   Date Value   04/06/2019 23     ALT (U/L)   Date Value   04/06/2019 33 (H)     BUN (mg/dL)   Date Value   04/12/2019 17     BP Readings from Last 3 Encounters:   05/17/19 116/78   05/13/19 135/78   04/17/19 92/70          (goal 120/80)    Past Medical History:   Diagnosis Date    Bowel obstruction (HCC)     Cancer (HCC)     uterine stage 2    Diabetes mellitus (HCC)     Epigastric pain     GERD (gastroesophageal reflux disease)     History of anesthesia complications 5024'F    was told after gallbladder surgery to never have anesthesia again \"since it was hard for me to come out of it\"    Hypertension     Right arm pain     Right shoulder pain     Sleep apnea     uses CPAP nightly    TIA (transient ischemic attack)       Past Surgical History:   Procedure Laterality Date    APPENDECTOMY      CHOLECYSTECTOMY      HAND SURGERY Right     repair tendon    HYSTERECTOMY  2/2014    chris with bso    KNEE ARTHROSCOPY Right     OTHER SURGICAL HISTORY Right 11/11/2014    shoulder manipulation    SHOULDER ARTHROSCOPY Right 02/03/15    SHOULDER SURGERY  11/2014    manipulation    URETER STENT PLACEMENT Right 4/30/2015    pt has blood clot & abscess in her right kidney       No family history on file.     Social History     Tobacco Use    Smoking status: Never Smoker    Smokeless tobacco: Never Used   Substance Use Topics    Alcohol use: No      Current Outpatient Medications   Medication Sig Dispense Refill    levothyroxine (SYNTHROID) 88 MCG tablet Take 1 tablet by mouth daily 30 tablet 5    potassium chloride (KLOR-CON M) 20 MEQ extended release tablet Take 1 tablet by mouth daily 10 tablet 0    insulin lispro (HUMALOG) 100 UNIT/ML injection vial Inject into the skin 3 times daily (before meals)      SUPER B COMPLEX/C CAPS Take by mouth      tamsulosin (FLOMAX) 0.4 MG capsule Take 0.4 mg by mouth daily      vitamin A 41534 units capsule Take 10,000 Units by mouth daily      Cholecalciferol (VITAMIN D3) 5000 units TABS Take by mouth      rivaroxaban (XARELTO) 20 MG TABS tablet Take 20 mg by mouth      alendronate (FOSAMAX) 70 MG tablet Take 70 mg by mouth every 7 days      amLODIPine-atorvastatatin (CADUET) 5-10 MG per tablet Take 1 tablet by mouth daily      atorvastatin (LIPITOR) 40 MG tablet Take 40 mg by mouth daily      DULoxetine (CYMBALTA) 60 MG extended release capsule Take 60 mg by mouth daily      ferrous sulfate 325 (65 Fe) MG tablet Take 325 mg by mouth daily (with breakfast)      fexofenadine (ALLEGRA) 60 MG tablet Take 60 mg by mouth daily      magnesium oxide (MAG-OX) 400 MG tablet Take 400 mg by mouth daily      melatonin 3 MG TABS tablet Take 3 mg by mouth daily      Thyroid (LEVOTHYROXINE-LIOTHYRONINE) 90 MG TABS Take by mouth      omeprazole (PRILOSEC) 20 MG delayed release capsule Take 20 mg by mouth daily      LORazepam (ATIVAN) 1 MG tablet Take 1 mg by mouth every 6 hours as needed for Anxiety      gabapentin (NEURONTIN) 300 MG capsule Take 300 mg by mouth nightly      losartan-hydrochlorothiazide (HYZAAR) 100-25 MG per tablet Take 1 tablet by mouth daily.  insulin glargine (LANTUS) 100 UNIT/ML injection vial Inject 40 Units into the skin 2 times daily. No current facility-administered medications for this visit. Allergies   Allergen Reactions    Aspirin Anaphylaxis     Only thing she can take is tylenol    Benadryl [Diphenhydramine] Other (See Comments)     Dystonic movement    Ibuprofen Anaphylaxis    Lidocaine Anaphylaxis     CAN TOLERATE IV ONLY    Penicillins Anaphylaxis    Morphine Nausea And Vomiting       Health Maintenance   Topic Date Due    Hepatitis C screen  1950    Diabetic foot exam  09/14/1961    Diabetic retinal exam  09/14/1961    Diabetic microalbuminuria test  09/14/1969    DTaP/Tdap/Td vaccine (1 - Tdap) 09/14/1970    Breast cancer screen  09/14/2001    Shingles Vaccine (1 of 2) 09/14/2001    Colon cancer screen colonoscopy  09/14/2001    DEXA (modify frequency per FRAX score)  09/14/2016    Pneumococcal 65+ years Vaccine (1 of 2 - PCV13) 04/17/2023 (Originally 9/14/2016)    Flu vaccine (Season Ended) 09/01/2019    A1C test (Diabetic or Prediabetic)  03/20/2020    Lipid screen  04/06/2020    Potassium monitoring  04/12/2020    Creatinine monitoring  04/12/2020       Subjective:      Review of Systems   Constitutional: Negative for chills and fever. Respiratory: Negative for shortness of breath. Cardiovascular: Negative for chest pain. Gastrointestinal: Positive for abdominal pain and nausea. Negative for blood in stool, constipation, diarrhea and vomiting.         Bloating     Genitourinary: Negative for dysuria. Musculoskeletal: Positive for back pain. Objective:     Physical Exam   Constitutional: She is oriented to person, place, and time. She appears well-developed and well-nourished. HENT:   Head: Normocephalic and atraumatic. Mouth/Throat: Oropharynx is clear and moist. No oropharyngeal exudate. Eyes: Pupils are equal, round, and reactive to light. Neck: Neck supple. Cardiovascular: Normal rate, regular rhythm and normal heart sounds. No murmur heard. Pulmonary/Chest: Effort normal and breath sounds normal. No stridor. No respiratory distress. Neurological: She is alert and oriented to person, place, and time. Skin: Skin is warm and dry. Psychiatric: She has a normal mood and affect. Her behavior is normal.     /78 (Site: Left Upper Arm, Position: Sitting, Cuff Size: Large Adult)   Pulse 95   Resp 16   Ht 5' 4.02\" (1.626 m)   Wt 226 lb (102.5 kg)   SpO2 95%   Breastfeeding? No   BMI 38.77 kg/m²     Assessment:      1. Other specified diabetes mellitus with complication, with long-term current use of insulin ([facility] [address]  - continue current regimen. Follow up three months. 2. Closed compression fracture of L3 lumbar vertebra with routine healing, subsequent encounter  - seen by ortho and PT recommended and pt to follow up with ortho after. 3. Hypothyroidism, unspecified type  - TSH with Reflex; Future    4. Gastroesophageal reflux disease, esophagitis presence not specified  - continue current medications. Has EGD and colonoscopy scheduled in July for her upper abd pain. 5. Left upper quadrant abdominal pain of unknown etiology  - has EGD and colonoscopy scheduled in July. Recommend gas x for her bloating. Plan:      Return in about 3 months (around 8/17/2019) for follow up.     Orders Placed This Encounter   Procedures    TSH with Reflex     Standing Status:   Future     Standing Expiration Date:   5/17/2020     Orders Placed This Encounter   Medications    levothyroxine (SYNTHROID) 88 MCG tablet     Sig: Take 1 tablet by mouth daily     Dispense:  30 tablet     Refill:  5            Electronically signed by Stephane Murillo DO on 5/17/2019 at 4:42 PM

## 2019-05-21 ENCOUNTER — HOSPITAL ENCOUNTER (OUTPATIENT)
Dept: PHYSICAL THERAPY | Facility: CLINIC | Age: 69
Setting detail: THERAPIES SERIES
Discharge: HOME | End: 2019-05-21
Payer: MEDICARE

## 2019-05-21 PROCEDURE — 97113 AQUATIC THERAPY/EXERCISES: CPT

## 2019-05-21 NOTE — FLOWSHEET NOTE
[] [facility]       Physical Therapy [facility]       [address]        [address]       Phone: [phone]       Fax: [phone] [] [facility]       Promotion at [address] [place] [address]       Phone: [phone]       Fax: [phone] [] [employer] [facility] Promotion  [address] [facility]   Phone: [phone]   Fax:  [phone]     Physical Therapy Daily  Aquatic Treatment Note    Date:  2019  Patient Name:  Jose Pain    :  [birth year]  MRN: [id number]  Physician: Dr. Henrietta Li. Gabriela Rosales MD              Insurance: [facility] Medicare  Medical Diagnosis: M54.5- acute low back pain; S32.030S- closed compression fracture of third lumbar vertebra, sequela; S32.040S- closed compression fracture of fourth lumbar vertebra, sequela; M54.41, G89.29- chronic midline low back pain with right-sided sciatica                        Rehab Codes: M54.5; S32.030S; S32.040S; M54.41; G89.29  Onset Date:                    Next 's appt.: TBD        Visit# / total visits:   Cancels/No Shows: 0    Subjective:    Pain:  [x] Yes  [] No Location: Back   Pain Rating: (0-10 scale) 5/10  Pain altered Tx:  [x] No  [] Yes  Action:  Comments: Pt mentioned that she is always in pain.      Objective:     KEY  B = Belt G = Gloves N = Noodle   C = Cuffs K = Kickboard P = Paddles   CC = Cervical Collar L = Laps T = Theratube   DB = Dumbells M = Minutes W = Weights     Exercises/Activities  Precautions: L3-4 compression fractures d/t potential osteoporosis; B LE, UE neuropathy d/t h/o chemo therapy for ovarian cancer- only loss of sensation within L5-S1; DM; FALL RISK- uses rollator for longer duration ambulation   Warm-up/Amb  Dynamic Exercises    Forward 3 laps March    Sideways 3 laps Squat    Backwards 3 laps Retro HS curls      Retro SLR    Stretches  Braiding    Gastroc/Soleus  Heel to Toe amb    Hamstring  Toe amb    Hip flexor

## 2019-05-23 ENCOUNTER — HOSPITAL ENCOUNTER (OUTPATIENT)
Dept: PHYSICAL THERAPY | Facility: CLINIC | Age: 69
Setting detail: THERAPIES SERIES
Discharge: HOME | End: 2019-05-23
Payer: MEDICARE

## 2019-05-23 PROCEDURE — 97113 AQUATIC THERAPY/EXERCISES: CPT

## 2019-05-23 NOTE — FLOWSHEET NOTE
[] [facility]       Physical Therapy M[facility]       Ingrid [address]        [address]       Phone: [phone]       Fax: [phone] [] [facility]       Promotion at [address] [facility]       Phone: [phone]       Fax: [phone] [] Mercedes. [address] [facility] Promotion  [address]   Phone: [phone]   Fax:  [phone]     Physical Therapy Daily  Aquatic Treatment Note    Date:  2019  Patient Name:  Singh Thomas    :  [birth year]  MRN: [id number]  Physician: Dr. Sophia Ching. Charles Lopez MD              Insurance: [place] [employer]  Medical Diagnosis: M54.5- acute low back pain; S32.030S- closed compression fracture of third lumbar vertebra, sequela; S32.040S- closed compression fracture of fourth lumbar vertebra, sequela; M54.41, G89.29- chronic midline low back pain with right-sided sciatica                        Rehab Codes: M54.5; S32.030S; S32.040S; M54.41; G89.29  Onset Date:                    Next 's appt.: TBD        Visit# / total visits: 3/12  Cancels/No Shows: 0    Subjective:    Pain:  [x] Yes  [] No Location: Lower back   Pain Rating: (0-10 scale) 8/10  Pain altered Tx:  [x] No  [] Yes  Action:  Comments: Pt mentioned that she really wants to be able to stand up for longer than 5 minutes without any pain.      Objective:     KEY  B = Belt G = Gloves N = Noodle   C = Cuffs K = Kickboard P = Paddles   CC = Cervical Collar L = Laps T = Theratube   DB = Dumbells M = Minutes W = Weights     Exercises/Activities  Precautions: L3-4 compression fractures d/t potential osteoporosis; B LE, UE neuropathy d/t h/o chemo therapy for ovarian cancer- only loss of sensation within L5-S1; DM; FALL RISK- uses rollator for longer duration ambulation   Warm-up/Amb  Dynamic Exercises     Forward 3L 3L March  3L    Sideways 3L 3L Squat      Backwards 3L 3L Retro HS curls         Retro SLR Stretches   Braiding      Gastroc/Soleus   Heel to Toe amb      Hamstring   Toe amb      Hip flexor   Heel amb      Piriformis         SKTC         Pec Stretch         Post Deltoid   Static Exercises UE         Shoulder flex/ext 15x 15x    Static Exercises LE   Shoulder abd/add 15x 15x    Heel/toe raises 15x 15x Shoulder H.  abd/add 15x 15x    Marches 15x 15x Shoulder IR/ER      Mini-squats 15x 15x Rowing 15x 15x    3-way hip  15x 15x Arm Circles      Hamstring curls 15x 15x UT shrugs/rolls      Hip Circles/Fig 8   Scap squeezes      Ankle ROM   Diagonals 1/2      Lunges    Elbow flex/ext         Pron/Sup      Functional Exercise   Wrist AROM      Step  15x       Wall Push-ups   Deep H20/      SLS   Bike 3m 3'    Breast Stroke on Noodle   Hip abd/add 3m 3'    Noodle Twist   Hip flex/ext 3m 3'    Noodle Push down   Hip IR/ER      Kickboard push/pull   Knee flex/ext         Push/pull on Rail         Hang 5-10m 5-10'    Other:    Specific Instructions for next treatment: core stability     Treatment Charges: Mins Units   []  Modalities     []  Ther Exercise     []  Manual Therapy     []  Ther Activities     [x]  Aquatics 30 2   []  Other       Assessment: [x] Progressing toward goals. [] No change. [x] Other: Cont w/ aquatic program listed above; added dynamic marching & step-ups w/ no c/o increase in symptoms. Pt reports her pain has decreased to an 8/10 upon exiting pool. STG: (to be met in 6 treatments)  1. ? Pain: Patient will report < 5/10 pain with active movement in order to improve QOL. 2. ? ROM: Patient will demo lumbar spine AROM L rotation, SB equal to R and with < 5/10 pain for improved functional mobility. 3. ? Strength: Patient will demonstrate 4+/5 R LE gross strength in order to better match L LE, and improve sit<>stand transfers, ADLs. 4. ? Function: Patient will demo FAIR conditions 3, 4 of mCTSIB in order to indicate improved static standing balance and prevent falls at home.

## 2019-05-24 ENCOUNTER — TELEPHONE (OUTPATIENT)
Dept: PRIMARY CARE CLINIC | Age: 69
End: 2019-05-24

## 2019-05-24 ENCOUNTER — TELEPHONE (OUTPATIENT)
Dept: GASTROENTEROLOGY | Age: 69
End: 2019-05-24

## 2019-05-24 NOTE — TELEPHONE ENCOUNTER
Writer spoke to pt over phone and advised her we received clearance back from [facility] for [address] and she okayed to stop Xarelto 2 days prior to procedure. Pt was instructed to stop Xarelto starting 7/6/19. Pt verbalizes understanding.

## 2019-05-24 NOTE — TELEPHONE ENCOUNTER
Spoke with pt regarding clearance for egd/colonoscopy and can hold xarelto 2 days before procedure. Pt able to go up 1-2 flights of stairs without SOB or chest pain and can walk block without SOB or chest pain. States did well in previous procedures as well. Form to be faxed for GI .

## 2019-05-26 LAB — TSH SERPL DL<=0.05 MIU/L-ACNC: 1.85 UIU/ML (ref 0.49–4.67)

## 2019-05-28 ENCOUNTER — HOSPITAL ENCOUNTER (OUTPATIENT)
Dept: PHYSICAL THERAPY | Facility: CLINIC | Age: 69
Setting detail: THERAPIES SERIES
Discharge: HOME | End: 2019-05-28
Payer: MEDICARE

## 2019-05-28 PROCEDURE — 97113 AQUATIC THERAPY/EXERCISES: CPT

## 2019-05-28 NOTE — FLOWSHEET NOTE
Sideways 3L 3L 3L Squat      Backwards 3L 3L 3L Retro HS curls          Retro SLR      Stretches    Braiding      Gastroc/Soleus    Heel to Toe amb      Hamstring    Toe amb      Hip flexor    Heel amb      Piriformis          SKTC          Pec Stretch          Post Deltoid    Static Exercises UE          Shoulder flex/ext 15x 15x 15x   Static Exercises LE    Shoulder abd/add 15x 15x 15x   Heel/toe raises 15x 15x 15x Shoulder H.  abd/add 15x 15x 15x   Marches 15x 15x 15x Shoulder IR/ER      Mini-squats 15x 15x 15x Rowing 15x 15x 15x   3-way hip  15x 15x 15x Arm Circles      Hamstring curls 15x 15x 15x UT shrugs/rolls      Hip Circles/Fig 8    Scap squeezes      Ankle ROM    Diagonals 1/2      Lunges     Elbow flex/ext          Pron/Sup      Functional Exercise    Wrist AROM      Step  15x 15x       Wall Push-ups    Deep H20/      SLS    Bike 3m 3' 3'   Breast Stroke on Noodle    Hip abd/add 3m 3' 3'   Noodle Twist    Hip flex/ext 3m 3' 3'   Noodle Push down    Hip IR/ER      Kickboard push/pull   15x Knee flex/ext          Push/pull on Rail          Hang 5-10m 5-10'    Other:    Specific Instructions for next treatment: core stability     Treatment Charges: Mins Units   []  Modalities     []  Ther Exercise     []  Manual Therapy     []  Ther Activities     [x]  Aquatics 30 2   []  Other       Assessment: [x] Progressing toward goals. [] No change. [x] Other: Cont w/ aquatic program listed above. VC provided to correct squat mechanics with patient demonstrating improved technique post cueing. Added kickboard push-pull to increase core strength and stability. STG: (to be met in 6 treatments)  1. ? Pain: Patient will report < 5/10 pain with active movement in order to improve QOL. 2. ? ROM: Patient will demo lumbar spine AROM L rotation, SB equal to R and with < 5/10 pain for improved functional mobility.   3. ? Strength: Patient will demonstrate 4+/5 R LE gross strength in order to better match L LE, and improve sit<>stand transfers, ADLs. 4. ? Function: Patient will demo FAIR conditions 3, 4 of mCTSIB in order to indicate improved static standing balance and prevent falls at home. 5. Independent with Home Exercise Programs  6. Demonstrate Knowledge of fall prevention   LTG: (to be met in 12 treatments)  1. Patient will ambulate 10 minutes without rollator; 15 minutes with rollator; and stand 15 minutes with < 5/10 B LBP in order to improve functional mobility within home. 2. Improve modified oswestry to 12%     Patient goals: \"to stop the pain. \"        Pt. Education:  [x] Yes  [] No  [x] Reviewed Prior HEP/Ed  Method of Education: [x] Verbal  [] Demo  [] Written  Comprehension of Education:  [x] Verbalizes understanding. [] Demonstrates understanding. [] Needs review. [] Demonstrates/verbalizes HEP/Ed previously given. Plan: [x] Continue per plan of care.    [] Other:      Time In: 1430           Time Out: 15:10    Electronically signed by:  Chasity Bronson PTA

## 2019-05-30 ENCOUNTER — HOSPITAL ENCOUNTER (OUTPATIENT)
Dept: PHYSICAL THERAPY | Facility: CLINIC | Age: 69
Setting detail: THERAPIES SERIES
Discharge: HOME | End: 2019-05-30
Payer: MEDICARE

## 2019-05-30 PROCEDURE — 97113 AQUATIC THERAPY/EXERCISES: CPT

## 2019-05-30 NOTE — FLOWSHEET NOTE
[] [facility]       Physical Therapy MOB2       Ingrid [address]        [address]       Phone: [phone]       Fax: [phone] [] [facility]       Promotion at [address] [facility]       Phone: [phone]       Fax: [phone] [] [address] Health Promotion  [address] [facility]   Phone: [phone]   Fax:  [phone]     Physical Therapy Daily  Aquatic Treatment Note    Date:  2019  Patient Name:  Mary Grace Benavides    :    MRN: [id number]  Physician: Dr. Priscilla Young. Danita Miller MD              Insurance: [facility] Medicare  Medical Diagnosis: M54.5- acute low back pain; S32.030S- closed compression fracture of third lumbar vertebra, sequela; S32.040S- closed compression fracture of fourth lumbar vertebra, sequela; M54.41, G89.29- chronic midline low back pain with right-sided sciatica                        Rehab Codes: M54.5; S32.030S; S32.040S; M54.41; G89.29  Onset Date:                    Next 's appt.: TBD        Visit# / total visits:   Cancels/No Shows: 0    Subjective:    Pain:  [x] Yes  [] No Location: Lower back   Pain Rating: (0-10 scale) 6/10  Pain altered Tx:  [x] No  [] Yes  Action:  Comments: Pt arrived early stating she is in pain and she thinks it because she did house work and cleaned the floors.      Objective:     KEY  B = Belt G = Gloves N = Noodle   C = Cuffs K = Kickboard P = Paddles   CC = Cervical Collar L = Laps T = Theratube   DB = Dumbells M = Minutes W = Weights     Exercises/Activities  Precautions: L3-4 compression fractures d/t potential osteoporosis; B LE, UE neuropathy d/t h/o chemo therapy for ovarian cancer- only loss of sensation within L5-S1; DM; FALL RISK- uses rollator for longer duration ambulation   Warm-up/Amb  Dynamic Exercises    Forward 3L 3L 3L 3L March  3L 3L 3L   Sideways 3L 3L 3L 3L Squat       Backwards 3L Patient will demonstrate 4+/5 R LE gross strength in order to better match L LE, and improve sit<>stand transfers, ADLs. 4. ? Function: Patient will demo FAIR conditions 3, 4 of mCTSIB in order to indicate improved static standing balance and prevent falls at home. 5. Independent with Home Exercise Programs  6. Demonstrate Knowledge of fall prevention   LTG: (to be met in 12 treatments)  1. Patient will ambulate 10 minutes without rollator; 15 minutes with rollator; and stand 15 minutes with < 5/10 B LBP in order to improve functional mobility within home. 2. Improve modified oswestry to 12%     Patient goals: \"to stop the pain. \"        Pt. Education:  [x] Yes  [] No  [x] Reviewed Prior HEP/Ed  Method of Education: [x] Verbal  [] Demo  [] Written  Comprehension of Education:  [x] Verbalizes understanding. [] Demonstrates understanding. [] Needs review. [] Demonstrates/verbalizes HEP/Ed previously given. Plan: [x] Continue per plan of care.    [] Other:      Time In: 2:15pm           Time Out: 3:15pm    Electronically signed by:  Miriam Villarreal PTA

## 2019-06-04 ENCOUNTER — HOSPITAL ENCOUNTER (OUTPATIENT)
Dept: PHYSICAL THERAPY | Facility: CLINIC | Age: 69
Setting detail: THERAPIES SERIES
Discharge: HOME | End: 2019-06-04
Payer: MEDICARE

## 2019-06-04 PROCEDURE — 97113 AQUATIC THERAPY/EXERCISES: CPT

## 2019-06-04 NOTE — FLOWSHEET NOTE
[] [facility]       Physical Therapy MOB2       [facility] [address]        [address]       Phone: [phone]       Fax: [phone] [] [facility]       Promotion at [address] [facility]       Phone: [phone]       Fax: [phone] [x] Arabella Cast[facility] for [facility] Promotion  [address]   Phone: [phone]   Fax:  [phone]     Physical Therapy Daily  Aquatic Treatment Note    Date:  2019  Patient Name:  Monica Hayden    :    MRN: [id number]  Physician: Dr. Blair Frank.  Alana Mustafa MD              Insurance: [facility] Medicare  Medical Diagnosis: M54.5- acute low back pain; S32.030S- closed compression fracture of third lumbar vertebra, sequela; S32.040S- closed compression fracture of fourth lumbar vertebra, sequela; M54.41, G89.29- chronic midline low back pain with right-sided sciatica                        Rehab Codes: M54.5; S32.030S; S32.040S; M54.41; G89.29  Onset Date:                    Next 's appt.: TBD        Visit# / total visits:   Cancels/No Shows: 0    Subjective:    Pain:  [x] Yes  [] No Location: Lower back   Pain Rating: (0-10 scale) 5/10  Pain altered Tx:  [x] No  [] Yes  Action:  Comments: Pt arrived early, reports no changes since last vs.     Objective:     KEY  B = Belt G = Gloves N = Noodle   C = Cuffs K = Kickboard P = Paddles   CC = Cervical Collar L = Laps T = Theratube   DB = Dumbells M = Minutes W = Weights     Exercises/Activities  Precautions: L3-4 compression fractures d/t potential osteoporosis; B LE, UE neuropathy d/t h/o chemo therapy for ovarian cancer- only loss of sensation within L5-S1; DM; FALL RISK- uses rollator for longer duration ambulation   Warm-up/Amb   Dynamic Exercises  6/    Forward 3L 3L 3L 3L 3L  March  3L 3L 3L 3L    Sideways 3L 3L 3L 3L 3L  Squat         Backwards 3L 3L 3L 3L 3L  Retro HS curls Retro SLR         Stretches       Braiding         Gastroc/Soleus       Heel to Toe amb         Hamstring       Toe amb         Hip flexor       Heel amb         Piriformis                SKTC                Pec Stretch                Post Deltoid       Static Exercises UE    G G           Shoulder flex/ext 15x 15x 15x 15 15    Static Exercises LE       Shoulder abd/add 15x 15x 15x 15 15    Heel/toe raises 15x 15x 15x 15 15  Shoulder H.  abd/add 15x 15x 15x 15 15    Marches 15x 15x 15x 15 15  Shoulder IR/ER     15    Mini-squats 15x 15x 15x 15 15  Rowing 15x 15x 15x 15 15    3-way hip  15x 15x 15x 15 15  Arm Circles: Fwd/Retro    15F/B 15    Hamstring curls 15x 15x 15x 15 15  UT shrugs/rolls         Hip Circles/Fig 8       Scap squeezes         Ankle ROM       Diagonals 1/2         Lunges     15 15  Elbow flex/ext                Pron/Sup         Functional Exercise       Wrist AROM         Step  15x 15x 15 15           Wall Push-ups       Deep H20/N/SCW         SLS       Bike 3m 3' 3' 3' 3'    Breast Stroke on Noodle       Hip abd/add 3m 3' 3' 3' 3'    Noodle Twist       Hip flex/ext 3m 3' 3' 3' 3'    Noodle Push down       Hip IR/ER         Kickboard push/pull   15x 15 15  Knee flex/ext                Push/pull on Rail                Hang 5-10m 5-10'  5-10' 5-10'    Other:    Specific Instructions for next treatment: core stability     Treatment Charges: Mins Units   []  Modalities     []  Ther Exercise     []  Manual Therapy     []  Ther Activities     [x]  Aquatics 30 2   []  Other       Assessment: [x] Progressing toward goals. [] No change. [x] Other: Added shoulder IR/ER w/ gloves for balance and core stability; added small ankle weights to deep water ex. Pt reports she feels the same after aquatic therapy. STG: (to be met in 6 treatments)  1. ? Pain: Patient will report < 5/10 pain with active movement in order to improve QOL.    2. ? ROM: Patient will demo lumbar spine AROM L rotation, SB equal to R and with < 5/10 pain for improved functional mobility. 3. ? Strength: Patient will demonstrate 4+/5 R LE gross strength in order to better match L LE, and improve sit<>stand transfers, ADLs. 4. ? Function: Patient will demo FAIR conditions 3, 4 of mCTSIB in order to indicate improved static standing balance and prevent falls at home. 5. Independent with Home Exercise Programs  6. Demonstrate Knowledge of fall prevention   LTG: (to be met in 12 treatments)  1. Patient will ambulate 10 minutes without rollator; 15 minutes with rollator; and stand 15 minutes with < 5/10 B LBP in order to improve functional mobility within home. 2. Improve modified oswestry to 12%     Patient goals: \"to stop the pain. \"        Pt. Education:  [x] Yes  [] No  [x] Reviewed Prior HEP/Ed  Method of Education: [x] Verbal  [] Demo  [] Written  Comprehension of Education:  [x] Verbalizes understanding. [] Demonstrates understanding. [] Needs review. [] Demonstrates/verbalizes HEP/Ed previously given. Plan: [x] Continue per plan of care.    [] Other:      Time In: 5187           Time Out: 3790    Electronically signed by:  Angelica Aleman PTA

## 2019-06-06 ENCOUNTER — HOSPITAL ENCOUNTER (OUTPATIENT)
Dept: PHYSICAL THERAPY | Facility: CLINIC | Age: 69
Setting detail: THERAPIES SERIES
Discharge: HOME | End: 2019-06-06
Payer: MEDICARE

## 2019-06-06 PROCEDURE — 97113 AQUATIC THERAPY/EXERCISES: CPT

## 2019-06-06 NOTE — FLOWSHEET NOTE
[] [facility]       Physical Therapy MOB2       [facility] [address] [facility] [address]        [address]       Phone: [phone]       Fax: [phone] [] [facility]       Promotion at [address] [facility]       Phone: [phone]       Fax: [phone] [x] Mercedes. [address] [place] [address] for Health Promotion  [address] [place] [address] Street   Phone: [phone]   Fax:  [phone]     Physical Therapy Daily  Aquatic Treatment Note    Date:  2019  Patient Name:  Monica Hayden    :  [age]  MRN: [id number]  Physician: Dr. Blair Frank.  Alana Mustafa MD              Insurance: [facility] Medicare  Medical Diagnosis: M54.5- acute low back pain; S32.030S- closed compression fracture of third lumbar vertebra, sequela; S32.040S- closed compression fracture of fourth lumbar vertebra, sequela; M54.41, G89.29- chronic midline low back pain with right-sided sciatica                        Rehab Codes: M54.5; S32.030S; S32.040S; M54.41; G89.29  Onset Date:                    Next 's appt.: TBD        Visit# / total visits:   Cancels/No Shows: 0    Subjective:    Pain:  [x] Yes  [] No Location: Lower back   Pain Rating: (0-10 scale) 5/10  Pain altered Tx:  [x] No  [] Yes  Action:  Comments: Pt arrived early, reports no changes since last vs.     Objective:     KEY  B = Belt G = Gloves N = Noodle   C = Cuffs K = Kickboard P = Paddles   CC = Cervical Collar L = Laps T = Theratube   DB = Dumbells M = Minutes W = Weights     Exercises/Activities  Precautions: L3-4 compression fractures d/t potential osteoporosis; B LE, UE neuropathy d/t h/o chemo therapy for ovarian cancer- only loss of sensation within L5-S1; DM; FALL RISK- uses rollator for longer duration ambulation   Warm-up/Amb   Dynamic Exercises 5/    Forward 3L 3L 3L 3L 3L 3L  March  3L 3L 3L 3L 3L    Sideways 3L 3L 3L 3L 3L 3L  Squat          Backwards 3L 3L 3L 3L 3L 3L Retro HS curls                  Retro SLR          Stretches        Braiding          Gastroc/Soleus        Heel to Toe amb          Hamstring        Toe amb          Hip flexor        Heel amb          Piriformis                  SKTC                  Pec Stretch                  Post Deltoid        Static Exercises UE    G G G            Shoulder flex/ext 15x 15x 15x 15 15 30    Static Exercises LE      SCW  Shoulder abd/add 15x 15x 15x 15 15 30    Heel/toe raises 15x 15x 15x 15 15 30  Shoulder H.  abd/add 15x 15x 15x 15 15 30    Marches 15x 15x 15x 15 15 30  Shoulder IR/ER     15 30    Mini-squats 15x 15x 15x 15 15 30  Rowing 15x 15x 15x 15 15 30    3-way hip  15x 15x 15x 15 15 30  Arm Circles: Fwd/Retro    15F/B 15 30    Hamstring curls 15x 15x 15x 15 15 30  UT shrugs/rolls          Hip Circles/Fig 8        Scap squeezes          Ankle ROM        Diagonals 1/2          Lunges     15 15 30  Elbow flex/ext                  Pron/Sup          Functional Exercise        Wrist AROM          Step  15x 15x 15 15 30            Wall Push-ups        Deep H20/N/SCW          SLS        Bike 3m 3' 3' 3' 3' 3'    Breast Stroke on Noodle        Hip abd/add 3m 3' 3' 3' 3' 3'    Noodle Twist        Hip flex/ext 3m 3' 3' 3' 3' 3'    Noodle Push down        Hip IR/ER          Kickboard push/pull   15x 15 15 30  Knee flex/ext                  Push/pull on Rail                  Hang 5-10m 5-10'  5-10' 5-10' 5-10'    Other:    Specific Instructions for next treatment:     Treatment Charges: Mins Units   []  Modalities     []  Ther Exercise     []  Manual Therapy     []  Ther Activities     [x]  Aquatics 30 2   []  Other       Assessment: [x] Progressing toward goals. [] No change. [x] Other: Increased reps today and donned small ankle weights at the start of aquatics this date. Pt had no new c/o symptoms this date.  At end of treatment pt reports she has mild pain and feels a lot better however, anticipates soreness later this evening. STG: (to be met in 6 treatments)  1. ? Pain: Patient will report < 5/10 pain with active movement in order to improve QOL. 2. ? ROM: Patient will demo lumbar spine AROM L rotation, SB equal to R and with < 5/10 pain for improved functional mobility. 3. ? Strength: Patient will demonstrate 4+/5 R LE gross strength in order to better match L LE, and improve sit<>stand transfers, ADLs. 4. ? Function: Patient will demo FAIR conditions 3, 4 of mCTSIB in order to indicate improved static standing balance and prevent falls at home. 5. Independent with Home Exercise Programs  6. Demonstrate Knowledge of fall prevention   LTG: (to be met in 12 treatments)  1. Patient will ambulate 10 minutes without rollator; 15 minutes with rollator; and stand 15 minutes with < 5/10 B LBP in order to improve functional mobility within home. 2. Improve modified oswestry to 12%     Patient goals: \"to stop the pain. \"        Pt. Education:  [x] Yes  [] No  [x] Reviewed Prior HEP/Ed  Method of Education: [x] Verbal  [] Demo  [] Written  Comprehension of Education:  [x] Verbalizes understanding. [] Demonstrates understanding. [] Needs review. [] Demonstrates/verbalizes HEP/Ed previously given. Plan: [x] Continue per plan of care.    [] Other:      Time In: 1500          Time Out: 1600    Electronically signed by:  Pao Silva PTA

## 2019-06-11 ENCOUNTER — HOSPITAL ENCOUNTER (OUTPATIENT)
Dept: PHYSICAL THERAPY | Facility: CLINIC | Age: 69
Setting detail: THERAPIES SERIES
Discharge: HOME | End: 2019-06-11
Payer: MEDICARE

## 2019-06-11 PROCEDURE — 97113 AQUATIC THERAPY/EXERCISES: CPT

## 2019-06-11 NOTE — FLOWSHEET NOTE
[] [facility]       Physical Therapy MOB2       Ingrid [address]        [address]       Phone: [phone]       Fax: [phone] [] [facility]       Promotion at [address]       Phone: [phone]       Fax: [phone] [x] Mercedes. [address] [place] [address] Health Promotion  [address] [place] [address]   Phone: [phone]   Fax:  [phone]     Physical Therapy Daily  Aquatic Treatment Note    Date:  2019  Patient Name:  Aamir Martel    :  37[birth year]  MRN: [id number]  Physician: Dr. Mario Wolf.  Romulo Barron MD              Insurance: [facility] Medicare  Medical Diagnosis: M54.5- acute low back pain; S32.030S- closed compression fracture of third lumbar vertebra, sequela; S32.040S- closed compression fracture of fourth lumbar vertebra, sequela; M54.41, G89.29- chronic midline low back pain with right-sided sciatica                        Rehab Codes: M54.5; S32.030S; S32.040S; M54.41; G89.29  Onset Date:                    Next 's appt.: TBD        Visit# / total visits:   Cancels/No Shows: 0    Subjective:    Pain:  [x] Yes  [] No Location: Lower back   Pain Rating: (0-10 scale) 5/10  Pain altered Tx:  [x] No  [] Yes  Action:  Comments: Pt states she is hurting upon arrival..     Objective:     KEY  B = Belt G = Gloves N = Noodle   C = Cuffs K = Kickboard P = Paddles   CC = Cervical Collar L = Laps T = Theratube   DB = Dumbells M = Minutes W = Weights     Exercises/Activities  Precautions: L3-4 compression fractures d/t potential osteoporosis; B LE, UE neuropathy d/t h/o chemo therapy for ovarian cancer- only loss of sensation within L5-S1; DM; FALL RISK- uses rollator for longer duration ambulation   Warm-up/Amb  Dynamic Exercises    Forward 3L 3L 3L 3L 3L 3L 3L March  3L 3L 3L 3L 3L 3L SW   Sideways 3L 3L 3L 3L 3L 3L 3L Squat          Backwards 3L 3L 3L 3L 3L 3L 3L Retro HS curls                  Retro SLR          Stretches        Braiding          Gastroc/Soleus        Heel to Toe amb          Hamstring        Toe amb          Hip flexor        Heel amb          Piriformis                  SKTC                  Pec Stretch                  Post Deltoid        Static Exercises UE    G G G G           Shoulder flex/ext 15x 15x 15x 15 15 30 30   Static Exercises LE      SCW SCW Shoulder abd/add 15x 15x 15x 15 15 30 30   Heel/toe raises 15x 15x 15x 15 15 30 30 Shoulder H.  abd/add 15x 15x 15x 15 15 30 30   Marches 15x 15x 15x 15 15 30 30 Shoulder IR/ER     15 30 30   Mini-squats 15x 15x 15x 15 15 30 30 Rowing 15x 15x 15x 15 15 30 30   3-way hip  15x 15x 15x 15 15 30 30 Arm Circles: Fwd/Retro    15F/B 15 30 30   Hamstring curls 15x 15x 15x 15 15 30 30 UT shrugs/rolls          Hip Circles/Fig 8        Scap squeezes          Ankle ROM        Diagonals 1/2          Lunges     15 15 30 30 Elbow flex/ext                  Pron/Sup          Functional Exercise        Wrist AROM          Step  15x 15x 15 15 30 30           Wall Push-ups        Deep H20/N/SCW       SW   SLS        Bike 3m 3' 3' 3' 3' 3' 3'   Breast Stroke on Noodle        Hip abd/add 3m 3' 3' 3' 3' 3' 3'   Noodle Twist        Hip flex/ext 3m 3' 3' 3' 3' 3' 3'   Noodle Push down        Hip IR/ER          Kickboard push/pull   15x 15 15 30 30 Knee flex/ext                  Push/pull on Rail                  Hang 5-10m 5-10'  5-10' 5-10' 5-10' 5-10m   Other:    Specific Instructions for next treatment:     Treatment Charges: Mins Units   []  Modalities     []  Ther Exercise     []  Manual Therapy     []  Ther Activities     [x]  Aquatics 30 2   []  Other       Assessment: [x] Progressing toward goals. [] No change. [x] Other: Continued with program as noted with progressions held as weight added and reps increased last session.  Patient continues with good tolerance and had no verbalization of increased pain or onset of symptoms. Patient took extra time in deep water with small ankle weights donned to increase distraction through spine, noted relief post.        STG: (to be met in 6 treatments)  1. ? Pain: Patient will report < 5/10 pain with active movement in order to improve QOL. 2. ? ROM: Patient will demo lumbar spine AROM L rotation, SB equal to R and with < 5/10 pain for improved functional mobility. 3. ? Strength: Patient will demonstrate 4+/5 R LE gross strength in order to better match L LE, and improve sit<>stand transfers, ADLs. 4. ? Function: Patient will demo FAIR conditions 3, 4 of mCTSIB in order to indicate improved static standing balance and prevent falls at home. 5. Independent with Home Exercise Programs  6. Demonstrate Knowledge of fall prevention   LTG: (to be met in 12 treatments)  1. Patient will ambulate 10 minutes without rollator; 15 minutes with rollator; and stand 15 minutes with < 5/10 B LBP in order to improve functional mobility within home. 2. Improve modified oswestry to 12%     Patient goals: \"to stop the pain. \"        Pt. Education:  [x] Yes  [] No  [x] Reviewed Prior HEP/Ed  Method of Education: [x] Verbal  [] Demo  [] Written  Comprehension of Education:  [x] Verbalizes understanding. [] Demonstrates understanding. [] Needs review. [] Demonstrates/verbalizes HEP/Ed previously given. Plan: [x] Continue per plan of care.    [] Other:      Time In: 230 pm          Time Out: 330 pm    Electronically signed by:  Elaina Benton PTA

## 2019-06-13 ENCOUNTER — HOSPITAL ENCOUNTER (OUTPATIENT)
Dept: PHYSICAL THERAPY | Facility: CLINIC | Age: 69
Setting detail: THERAPIES SERIES
Discharge: HOME | End: 2019-06-13
Payer: MEDICARE

## 2019-06-13 PROCEDURE — 97113 AQUATIC THERAPY/EXERCISES: CPT

## 2019-06-13 NOTE — FLOWSHEET NOTE
[] [facility]       Physical Therapy MOB2       Ingrid [address]        [address]       Phone: [phone]       Fax: [phone] [] [facility]       Promotion at [address] [facility]       Phone: [phone]       Fax: [phone] [x] Mercedes. [address] [place] [address] for Health Promotion  [address]   Phone: [phone]   Fax:  [phone]     Physical Therapy Daily  Aquatic Treatment Note    Date:  2019  Patient Name:  Minoo Patel    :    MRN: [id number]  Physician: Dr. Edwar Nicole. Vernon Marquez MD              Insurance: [facility] Medicare  Medical Diagnosis: M54.5- acute low back pain; S32.030S- closed compression fracture of third lumbar vertebra, sequela; S32.040S- closed compression fracture of fourth lumbar vertebra, sequela; M54.41, G89.29- chronic midline low back pain with right-sided sciatica                        Rehab Codes: M54.5; S32.030S; S32.040S; M54.41; G89.29  Onset Date:                    Next 's appt.: TBD        Visit# / total visits:   Cancels/No Shows: 0    Subjective:    Pain:  [x] Yes  [] No Location: Lower back   Pain Rating: (0-10 scale) 5/10  Pain altered Tx:  [x] No  [] Yes  Action:  Comments: Pt states it is'nt her back that is hurting it is her stomach and side of her stomach. She reports having testing coming up to get it checked out on .      Objective:     KEY  B = Belt G = Gloves N = Noodle   C = Cuffs K = Kickboard P = Paddles   CC = Cervical Collar L = Laps T = Theratube   DB = Dumbells M = Minutes W = Weights     Exercises/Activities  Precautions: L3-4 compression fractures d/t potential osteoporosis; B LE, UE neuropathy d/t h/o chemo therapy for ovarian cancer- only loss of sensation within L5-S1; DM; FALL RISK- uses rollator for longer duration ambulation   Warm-up/Amb  Dynamic Exercises  Forward 3L 3L 3L 3L 3L 3L 3L 3L March  3L 3L 3L 3L 3L 3L SW 3LSW   Sideways 3L 3L 3L 3L 3L 3L 3L 3L Squat           Backwards 3L 3L 3L 3L 3L 3L 3L 3L Retro HS curls                    Retro SLR           Stretches         Braiding           Gastroc/Soleus         Heel to Toe amb           Hamstring         Toe amb           Hip flexor         Heel amb           Piriformis                    SKTC                    Pec Stretch                    Post Deltoid         Static Exercises UE    G G G G G            Shoulder flex/ext 15x 15x 15x 15 15 30 30 30   Static Exercises LE      SCW SCW  Shoulder abd/add 15x 15x 15x 15 15 30 30 30   Heel/toe raises 15x 15x 15x 15 15 30 30  Shoulder H.  abd/add 15x 15x 15x 15 15 30 30 30   Marches 15x 15x 15x 15 15 30 30  Shoulder IR/ER     15 30 30 30   Mini-squats 15x 15x 15x 15 15 30 30  Rowing 15x 15x 15x 15 15 30 30 30   3-way hip  15x 15x 15x 15 15 30 30  Arm Circles: Fwd/Retro    15F/B 15 30 30 30   Hamstring curls 15x 15x 15x 15 15 30 30  UT shrugs/rolls           Hip Circles/Fig 8         Scap squeezes           Ankle ROM         Diagonals 1/2           Lunges     15 15 30 30  Elbow flex/ext                    Pron/Sup           Functional Exercise         Wrist AROM           Step  15x 15x 15 15 30 30             Wall Push-ups         Deep H20/N/SCW       SW SW   SLS         Bike 3m 3' 3' 3' 3' 3' 3' 3   Breast Stroke on Noodle         Hip abd/add 3m 3' 3' 3' 3' 3' 3' 3   Noodle Twist         Hip flex/ext 3m 3' 3' 3' 3' 3' 3' 3   Noodle Push down         Hip IR/ER           Kickboard push/pull   15x 15 15 30 30  Knee flex/ext                    Push/pull on Rail                    Hang 5-10m 5-10'  5-10' 5-10' 5-10' 5-10m 5-10   Other:    Specific Instructions for next treatment:     Treatment Charges: Mins Units   []  Modalities     []  Ther Exercise     []  Manual Therapy     []  Ther Activities     [x]  Aquatics 30 2   []  Other       Assessment: [x] Progressing toward goals.    [] No change     [x] Other: Pt requires minimal cueing to improve form this visit, no pain throughout treatment, however, needed to end treatment during deep end exercises due to oncoming soreness and pain. STG: (to be met in 6 treatments)  1. ? Pain: Patient will report < 5/10 pain with active movement in order to improve QOL. 2. ? ROM: Patient will demo lumbar spine AROM L rotation, SB equal to R and with < 5/10 pain for improved functional mobility. 3. ? Strength: Patient will demonstrate 4+/5 R LE gross strength in order to better match L LE, and improve sit<>stand transfers, ADLs. 4. ? Function: Patient will demo FAIR conditions 3, 4 of mCTSIB in order to indicate improved static standing balance and prevent falls at home. 5. Independent with Home Exercise Programs  6. Demonstrate Knowledge of fall prevention   LTG: (to be met in 12 treatments)  1. Patient will ambulate 10 minutes without rollator; 15 minutes with rollator; and stand 15 minutes with < 5/10 B LBP in order to improve functional mobility within home. 2. Improve modified oswestry to 12%     Patient goals: \"to stop the pain. \"        Pt. Education:  [x] Yes  [] No  [x] Reviewed Prior HEP/Ed  Method of Education: [x] Verbal  [] Demo  [] Written  Comprehension of Education:  [x] Verbalizes understanding. [] Demonstrates understanding. [] Needs review. [] Demonstrates/verbalizes HEP/Ed previously given. Plan: [x] Continue per plan of care.    [] Other:      Time In: 230 pm          Time Out: 330 pm    Electronically signed by:  Mary Wolf PTA

## 2019-06-18 ENCOUNTER — HOSPITAL ENCOUNTER (OUTPATIENT)
Dept: PHYSICAL THERAPY | Facility: CLINIC | Age: 69
Setting detail: THERAPIES SERIES
Discharge: HOME | End: 2019-06-18
Payer: MEDICARE

## 2019-06-18 PROCEDURE — 97113 AQUATIC THERAPY/EXERCISES: CPT

## 2019-06-18 NOTE — FLOWSHEET NOTE
[] [facility]       Physical Therapy MOB2       [facility] [address]        [address]       Phone: [phone]       Fax: [phone] [] [facility]       Promotion at [address]       Phone: [phone]       Fax: [phone] [x] Mercedes. [address] [place] [address] Health Promotion  [address] [place] [address]   Phone: [phone]   Fax:  [phone]     Physical Therapy Daily  Aquatic Treatment Note    Date:  2019  Patient Name:  Tami Mccormick    :    MRN: [id number]  Physician: Dr. Óscar Adler. [facility] [place]              Insurance: [facility] Medicare  Medical Diagnosis: M54.5- acute low back pain; S32.030S- closed compression fracture of third lumbar vertebra, sequela; S32.040S- closed compression fracture of fourth lumbar vertebra, sequela; M54.41, G89.29- chronic midline low back pain with right-sided sciatica                        Rehab Codes: M54.5; S32.030S; S32.040S; M54.41; G89.29  Onset Date:                    Next 's appt.: TBD        Visit# / total visits:   Cancels/No Shows: 0    Subjective:    Pain:  [x] Yes  [] No Location: Lower back   Pain Rating: (0-10 scale) 6/10  Pain altered Tx:  [x] No  [] Yes  Action:  Comments: Pt states she has increased pain this date and that her side is still hurting her. Reports she thinks her her side what hurts her back d/t her having to sit a certain way for a period of time.    Objective:     KEY  B = Belt G = Gloves N = Noodle   C = Cuffs K = Kickboard P = Paddles   CC = Cervical Collar L = Laps T = Theratube   DB = Dumbells M = Minutes W = Weights     Exercises/Activities  Precautions: L3-4 compression fractures d/t potential osteoporosis; B LE, UE neuropathy d/t h/o chemo therapy for ovarian cancer- only loss of sensation within L5-S1; DM; FALL RISK- uses rollator for longer duration ambulation   Warm-up/Amb  Dynamic Exercises 5/21 5/23 5/28 5/30 6/4 6/6 6/11 6/13 6/18    Forward 3L 3L 3L 3L 3L 3L 3L 3L 3L March  3L 3L 3L 3L 3L 3L SW 3LSW 3L SW    Sideways 3L 3L 3L 3L 3L 3L 3L 3L 3L Squat             Backwards 3L 3L 3L 3L 3L 3L 3L 3L 3L Retro HS curls                       Retro SLR             Stretches          Braiding             Gastroc/Soleus          Heel to Toe amb             Hamstring          Toe amb             Hip flexor          Heel amb             Piriformis                       SKTC                       Pec Stretch                       Post Deltoid          Static Exercises UE    G G G G G G              Shoulder flex/ext 15x 15x 15x 15 15 30 30 30 30    Static Exercises LE      SCW SCW  SCW Shoulder abd/add 15x 15x 15x 15 15 30 30 30 30    Heel/toe raises 15x 15x 15x 15 15 30 30  30 Shoulder H.  abd/add 15x 15x 15x 15 15 30 30 30 30    Marches 15x 15x 15x 15 15 30 30  30 Shoulder IR/ER     15 30 30 30 30    Mini-squats 15x 15x 15x 15 15 30 30  30 Rowing 15x 15x 15x 15 15 30 30 30 30    3-way hip  15x 15x 15x 15 15 30 30  30 Arm Circles: Fwd/Retro    15F/B 15 30 30 30 30    Hamstring curls 15x 15x 15x 15 15 30 30  30 UT shrugs/rolls             Hip Circles/Fig 8          Scap squeezes             Ankle ROM          Diagonals 1/2             Lunges     15 15 30 30  30 Elbow flex/ext                       Pron/Sup             Functional Exercise          Wrist AROM             Step  15x 15x 15 15 30 30  30              Wall Push-ups          Deep H20/N/SCW       SW SW SCW    SLS          Bike 3m 3' 3' 3' 3' 3' 3' 3 3'    Breast Stroke on Noodle          Hip abd/add 3m 3' 3' 3' 3' 3' 3' 3 3'    Noodle Twist          Hip flex/ext 3m 3' 3' 3' 3' 3' 3' 3 3'    Noodle Push down          Hip IR/ER             Kickboard push/pull   15x 15 15 30 30  30 Knee flex/ext         x20              Push/pull on Rail                       Hang 5-10m 5-10'  5-10' 5-10' 5-10' 5-10m 5-10 5-10'    Other:    Specific Instructions for next treatment:     Treatment Charges: Mins Units   []  Modalities     []  Ther Exercise     []  Manual Therapy     []  Ther Activities     [x]  Aquatics 30 2   []  Other       Assessment: [x] Progressing toward goals. [] No change     [x] Other: Cont w/ POC listed above. Added knee flex + hip flex \"pull-ins\" for core activation and a low back stretch in deep end. Pt reports she feels a lot better upon exiting pool, her side is just really bothering her. STG: (to be met in 6 treatments)  1. ? Pain: Patient will report < 5/10 pain with active movement in order to improve QOL. 2. ? ROM: Patient will demo lumbar spine AROM L rotation, SB equal to R and with < 5/10 pain for improved functional mobility. 3. ? Strength: Patient will demonstrate 4+/5 R LE gross strength in order to better match L LE, and improve sit<>stand transfers, ADLs. 4. ? Function: Patient will demo FAIR conditions 3, 4 of mCTSIB in order to indicate improved static standing balance and prevent falls at home. 5. Independent with Home Exercise Programs  6. Demonstrate Knowledge of fall prevention   LTG: (to be met in 12 treatments)  1. Patient will ambulate 10 minutes without rollator; 15 minutes with rollator; and stand 15 minutes with < 5/10 B LBP in order to improve functional mobility within home. 2. Improve modified oswestry to 12%     Patient goals: \"to stop the pain. \"        Pt. Education:  [x] Yes  [] No  [x] Reviewed Prior HEP/Ed  Method of Education: [x] Verbal  [] Demo  [] Written  Comprehension of Education:  [x] Verbalizes understanding. [] Demonstrates understanding. [] Needs review. [] Demonstrates/verbalizes HEP/Ed previously given. Plan: [x] Continue per plan of care.    [] Other:      Time In: 1400          Time Out: 1500    Electronically signed by:  Jennifer Padron PTA

## 2019-06-19 DIAGNOSIS — F41.9 ANXIETY: Primary | ICD-10-CM

## 2019-06-19 RX ORDER — LORAZEPAM 0.5 MG/1
0.5 TABLET ORAL 3 TIMES DAILY PRN
Qty: 90 TABLET | Refills: 0 | Status: SHIPPED | OUTPATIENT
Start: 2019-06-19 | End: 2019-07-01 | Stop reason: SDUPTHER

## 2019-06-20 ENCOUNTER — HOSPITAL ENCOUNTER (OUTPATIENT)
Dept: PHYSICAL THERAPY | Facility: CLINIC | Age: 69
Setting detail: THERAPIES SERIES
Discharge: HOME | End: 2019-06-20
Payer: MEDICARE

## 2019-06-20 PROCEDURE — 97113 AQUATIC THERAPY/EXERCISES: CPT

## 2019-06-20 NOTE — FLOWSHEET NOTE
3L SW 3L SW    Sideways 3L 3L Squat      Backwards 3L 3L Retro HS curls         Retro SLR      Stretches   Braiding      Gastroc/Soleus   Heel to Toe amb      Hamstring   Toe amb      Hip flexor   Heel amb      Piriformis         SKTC         Pec Stretch         Post Deltoid   Static Exercises UE G G       Shoulder flex/ext 30 30    Static Exercises LE SCW SCW Shoulder abd/add 30 30    Heel/toe raises 30 30 Shoulder H.  abd/add 30 30    Marches 30 30 Shoulder IR/ER 30 30    Mini-squats 30 30 Rowing 30 30    3-way hip  30 30 Arm Circles: Fwd/Retro 30 30    Hamstring curls 30 30 UT shrugs/rolls      Hip Circles/Fig 8   Scap squeezes      Ankle ROM   Diagonals 1/2  15    Lunges  30 30 Elbow flex/ext         Pron/Sup      Functional Exercise   Wrist AROM      Step 30 30       Wall Push-ups   Deep H20/N/SCW SCW SCW    SLS   Bike 3' 3'    Breast Stroke on Noodle   Hip abd/add 3' 3'    Noodle Twist   Hip flex/ext 3' 3'    Noodle Push down   Hip IR/ER      Kickboard push/pull 30 30 Knee flex/ext x20 x20       Push/pull on Rail         Hang 5-10' 5-10'    Other:    Specific Instructions for next treatment:     Treatment Charges: Mins Units   []  Modalities     []  Ther Exercise     []  Manual Therapy     []  Ther Activities     [x]  Aquatics 30 2   []  Other       Assessment: [x] Progressing toward goals. [] No change     [x] Other: Cont w/ POC listed above w/ progressions from last session. No new onset symptoms - pt reports mild relief after session. STG: (to be met in 6 treatments)  1. ? Pain: Patient will report < 5/10 pain with active movement in order to improve QOL. 2. ? ROM: Patient will demo lumbar spine AROM L rotation, SB equal to R and with < 5/10 pain for improved functional mobility. 3. ? Strength: Patient will demonstrate 4+/5 R LE gross strength in order to better match L LE, and improve sit<>stand transfers, ADLs.    4. ? Function: Patient will demo FAIR conditions 3, 4 of mCTSIB in order to indicate improved static standing balance and prevent falls at home. 5. Independent with Home Exercise Programs  6. Demonstrate Knowledge of fall prevention   LTG: (to be met in 12 treatments)  1. Patient will ambulate 10 minutes without rollator; 15 minutes with rollator; and stand 15 minutes with < 5/10 B LBP in order to improve functional mobility within home. 2. Improve modified oswestry to 12%     Patient goals: \"to stop the pain. \"        Pt. Education:  [x] Yes  [] No  [x] Reviewed Prior HEP/Ed  Method of Education: [x] Verbal  [] Demo  [] Written  Comprehension of Education:  [x] Verbalizes understanding. [] Demonstrates understanding. [] Needs review. [] Demonstrates/verbalizes HEP/Ed previously given. Plan: [x] Continue per plan of care.    [] Other:      Time In: 1600          Time Out: 1700    Electronically signed by:  Pao Silva PTA

## 2019-06-24 ENCOUNTER — TELEPHONE (OUTPATIENT)
Dept: PRIMARY CARE CLINIC | Age: 69
End: 2019-06-24

## 2019-06-25 ENCOUNTER — HOSPITAL ENCOUNTER (OUTPATIENT)
Dept: PHYSICAL THERAPY | Facility: CLINIC | Age: 69
Setting detail: THERAPIES SERIES
Discharge: HOME | End: 2019-06-25
Payer: MEDICARE

## 2019-06-25 PROCEDURE — 97113 AQUATIC THERAPY/EXERCISES: CPT

## 2019-06-25 NOTE — FLOWSHEET NOTE
[] [facility]       Physical Therapy MOB2       Ingrid [address]        [address]       Phone: [phone]       Fax: [phone] [] [facility]       Promotion at [address] [facility]       Phone: [phone]       Fax: [phone] [x] Arabella [address] [place] [address] Health Promotion  [address] [facility]   Phone: [phone]   Fax:  [phone]     Physical Therapy Daily  Aquatic Treatment Note    Date:  2019  Patient Name:  Akash Chung    :  [age]  MRN: [id number]  Physician: Dr. Jesus Parisi. Tu Danielle MD              Insurance: [facility] Medicare  Medical Diagnosis: M54.5- acute low back pain; S32.030S- closed compression fracture of third lumbar vertebra, sequela; S32.040S- closed compression fracture of fourth lumbar vertebra, sequela; M54.41, G89.29- chronic midline low back pain with right-sided sciatica                        Rehab Codes: M54.5; S32.030S; S32.040S; M54.41; G89.29  Onset Date:                    Next 's appt.: TBD        Visit# / total visits: 10/12  Cancels/No Shows: 0    Subjective:    Pain:  [x] Yes  [] No Location: Lower back   Pain Rating: (0-10 scale) 3/10  Pain altered Tx:  [x] No  [] Yes  Action:  Comments: Pt arrives w/ no new symptoms to report and states her pain is diminishing.     Objective:     KEY  B = Belt G = Gloves N = Noodle   C = Cuffs K = Kickboard P = Paddles   CC = Cervical Collar L = Laps T = Theratube   DB = Dumbells M = Minutes W = Weights     Exercises/Activities  Precautions: L3-4 compression fractures d/t potential osteoporosis; B LE, UE neuropathy d/t h/o chemo therapy for ovarian cancer- only loss of sensation within L5-S1; DM; FALL RISK- uses rollator for longer duration ambulation   Warm-up/Amb   Dynamic Exercises     Forward 3L 3L 3L  March 3L SW 3L SW 3L SW    Sideways 3L 3L 3L  Squat       Backwards 3L 3L 3L  Retro HS curls LE, and improve sit<>stand transfers, ADLs. 4. ? Function: Patient will demo FAIR conditions 3, 4 of mCTSIB in order to indicate improved static standing balance and prevent falls at home. 5. Independent with Home Exercise Programs  6. Demonstrate Knowledge of fall prevention   LTG: (to be met in 12 treatments)  1. Patient will ambulate 10 minutes without rollator; 15 minutes with rollator; and stand 15 minutes with < 5/10 B LBP in order to improve functional mobility within home. 2. Improve modified oswestry to 12%     Patient goals: \"to stop the pain. \"        Pt. Education:  [x] Yes  [] No  [x] Reviewed Prior HEP/Ed  Method of Education: [x] Verbal  [] Demo  [] Written  Comprehension of Education:  [x] Verbalizes understanding. [] Demonstrates understanding. [] Needs review. [] Demonstrates/verbalizes HEP/Ed previously given. Plan: [x] Continue per plan of care.    [] Other:      Time In: 1400        Time Out: 8195    Electronically signed by:  Radha Meadows PTA

## 2019-06-27 ENCOUNTER — HOSPITAL ENCOUNTER (OUTPATIENT)
Dept: PHYSICAL THERAPY | Facility: CLINIC | Age: 69
Setting detail: THERAPIES SERIES
Discharge: HOME | End: 2019-06-27
Payer: MEDICARE

## 2019-06-27 PROCEDURE — 97113 AQUATIC THERAPY/EXERCISES: CPT

## 2019-06-27 PROCEDURE — 97164 PT RE-EVAL EST PLAN CARE: CPT

## 2019-06-27 NOTE — FLOWSHEET NOTE
[] [facility]       Physical Therapy [facility]       [address]        [address]       Phone: [phone]       Fax: [phone] [] [facility]       Promotion at [address] [facility]       Phone: [phone]       Fax: [phone] [x] [facility]ella [facility] Promotion  [address]   Phone: [phone]   Fax:  [phone]     Physical Therapy Daily  Aquatic Treatment Note    Date:  2019  Patient Name:  Romy East    :    MRN: [id number]  Physician: Dr. Alonzo Singer. Humberto Hughes MD              Insurance: [facility] Medicare  Medical Diagnosis: M54.5- acute low back pain; S32.030S- closed compression fracture of third lumbar vertebra, sequela; S32.040S- closed compression fracture of fourth lumbar vertebra, sequela; M54.41, G89.29- chronic midline low back pain with right-sided sciatica                        Rehab Codes: M54.5; S32.030S; S32.040S; M54.41; G89.29  Onset Date:                    Next 's appt.: TBD        Visit# / total visits:   Cancels/No Shows: 0    Subjective:    Pain:  [x] Yes  [] No Location: Lower back   Pain Rating: (0-10 scale) 3/10  Pain altered Tx:  [x] No  [] Yes  Action:  Comments: Pt arrives stating she able to wash dishes and make a meal without needing to sit down and take a break d/t pain. She also was able to clean her floors without needing to sit down from increased pain.     Objective:     KEY  B = Belt G = Gloves N = Noodle   C = Cuffs K = Kickboard P = Paddles   CC = Cervical Collar L = Laps T = Theratube   DB = Dumbells M = Minutes W = Weights     Exercises/Activities  Precautions: L3-4 compression fractures d/t potential osteoporosis; B LE, UE neuropathy d/t h/o chemo therapy for ovarian cancer- only loss of sensation within L5-S1; DM; FALL RISK- uses rollator for longer duration ambulation   Warm-up/Amb   Dynamic Exercises  6/25 6/27    Forward 3L 3L 3L 3L SW  March 3L SW 3L SW 3L SW 3L SW    Sideways 3L 3L 3L 3L SW  Squat        Backwards 3L 3L 3L 3L SW  Retro HS curls              Retro SLR        Stretches      Braiding        Gastroc/Soleus      Heel to Toe amb        Hamstring      Toe amb        Hip flexor      Heel amb        Piriformis              SKTC              Pec Stretch              Post Deltoid      Static Exercises UE G G G G          Shoulder flex/ext 30 30 30 30    Static Exercises LE SCW SCW SCW SCW  Shoulder abd/add 30 30 30 30    Heel/toe raises 30 30 30 30  Shoulder H.  abd/add 30 30 30 30    Marches 30 30 30 30  Shoulder IR/ER 30 30 30 30    Mini-squats 30 30 30 30  Rowing 30 30 30 30    3-way hip  30 30 30 30  Arm Circles: Fwd/Retro 30 30 30 30    Hamstring curls 30 30 30 30  UT shrugs/rolls        Hip Circles/Fig 8      Scap squeezes        Ankle ROM      Diagonals 1/2  15 15 15    Lunges  30 30 30 30  Elbow flex/ext              Pron/Sup        Functional Exercise      Wrist AROM        Step 30 30 30 30          Wall Push-ups      Deep H20/N/SCW SCW SCW SCW SCW    SLS      Bike 3' 3' 3' 3'    Breast Stroke on Noodle      Hip abd/add 3' 3' 3' 3'    Noodle Twist      Hip flex/ext 3' 3' 3' 3'    Noodle Push down      Hip IR/ER        Kickboard push/pull 30 30 30 30  Knee flex/ext x20 x20 x20 x20          Push/pull on Rail              Hang 5-10' 5-10' 5-10' 5-10'    Other:    Specific Instructions for next treatment:     Treatment Charges: Mins Units   []  Modalities     []  Ther Exercise     []  Manual Therapy     []  Ther Activities     [x]  Aquatics 30 2   []  Other       Assessment: [x] Progressing toward goals. [] No change     [x] Other: Donned small ankle weights prior to entering pool. Cont w/ POC listed above. Pt notes going \"too low\" during squats has created discomfort in her low back.  Pt reports a muscle \"spasm\" during HS curls in her low back & posterior RLE that never fully subsided - instructed pt to do her deep water exercises to decrease tension and come back to standing exercises. After bicycling for 3' pt reports decrease in tension. Pt notes low back pain during hip/knee flexion in deep end this date so she's able to complete 5 reps only. Pt returned to static ex and was able to complete her ex program w/ the exception of UE diagonals d/t pain. Pt to cont to land for re-eval w/ primary PT.     STG: (to be met in 6 treatments)  1. ? Pain: Patient will report < 5/10 pain with active movement in order to improve QOL. 2. ? ROM: Patient will demo lumbar spine AROM L rotation, SB equal to R and with < 5/10 pain for improved functional mobility. 3. ? Strength: Patient will demonstrate 4+/5 R LE gross strength in order to better match L LE, and improve sit<>stand transfers, ADLs. 4. ? Function: Patient will demo FAIR conditions 3, 4 of mCTSIB in order to indicate improved static standing balance and prevent falls at home. 5. Independent with Home Exercise Programs  6. Demonstrate Knowledge of fall prevention   LTG: (to be met in 12 treatments)  1. Patient will ambulate 10 minutes without rollator; 15 minutes with rollator; and stand 15 minutes with < 5/10 B LBP in order to improve functional mobility within home. 2. Improve modified oswestry to 12%     Patient goals: \"to stop the pain. \"        Pt. Education:  [x] Yes  [] No  [x] Reviewed Prior HEP/Ed  Method of Education: [x] Verbal  [] Demo  [] Written  Comprehension of Education:  [x] Verbalizes understanding. [] Demonstrates understanding. [] Needs review. [] Demonstrates/verbalizes HEP/Ed previously given. Plan: [x] Continue per plan of care.    [] Other:      Time In: 1500        Time Out: 1600    Electronically signed by:  Jennifer Padron PTA

## 2019-06-27 NOTE — FLOWSHEET NOTE
[] [facility] [address] [facility]  [address]  [phone]  F: [phone] [] [address]  [facility] [address]   [address]  P: [phone]  F: [phone] [x] [address]  [address] [facility]  P: [phone]  F: [phone] [] [address]  [facility]   [address]  [place]: [phone]  F: [phone]     Physical Therapy Re-evaluation     Date:  2019  Patient Name:  Jaziel Henning    :  [birth year]  MRN: [id number]  Physician: Dr. Rose Sky. MD Fuentes              Insurance: [facility] Medicare  Medical Diagnosis: M54.5- acute low back pain; S32.030S- closed compression fracture of third lumbar vertebra, sequela; S32.040S- closed compression fracture of fourth lumbar vertebra, sequela; M54.41, G89.29- chronic midline low back pain with right-sided sciatica                        Rehab Codes: M54.5; S32.030S; S32.040S; M54.41; G89.29  Onset Date:                    Next 's appt.: TBD  Visit# / total visits:  (added 8 visits 19)                 Cancels/No Shows: 0    Subjective:    Pain:  [x] Yes  [] No Location: R low back Pain Rating: (0-10 scale) 6/10  Pain altered Tx:  [x] No  [] Yes  Action:  Comments: Pt arrived reporting she hurt her back in the pool today and is feeling pain in her R low back that is going down into her buttock region. Objective:  HIP:   Flexion: L: 4+/5 R: 4-/5  Abd: 4+/5  Knee:   Extension: 5/5  DF: R 4+/5, L: 5/5    Specific Instructions for next treatment:    Treatment Charges: Mins Units   []  Modalities     []  Ther Exercise     []  Manual Therapy     []  Ther Activities     []  Aquatics     []  Vasocompression     [x]  Other: re-eval 20 1   Total Treatment time 20 1       Assessment: [] Progressing toward goals. [] No change.      [x] Other: Completed re-eval this session with pt wanting to continue in the pool especially after hurting her back today. 8 visits added this session to help improve pt's pain. Pt also wanted to continue in the pool d/t land exercises being very challenging at the moment. ROM not tested this session d/t pt's pain irritability. Pt wincing and groaning with changing positions. Overall, pt with improved strength in LE and has been having decreased pain until today. STG: (to be met in 6 treatments)  1. ? Pain: Patient will report < 5/10 pain with active movement in order to improve QOL.   2. ? ROM: Patient will demo lumbar spine AROM L rotation, SB equal to R and with < 5/10 pain for improved functional mobility. 3. ? Strength: Patient will demonstrate 4+/5 R LE gross strength in order to better match L LE, and improve sit<>stand transfers, ADLs.    4. ? Function: Patient will demo FAIR conditions 3, 4 of mCTSIB in order to indicate improved static standing balance and prevent falls at home.   5. Independent with Home Exercise Programs  6. Demonstrate Knowledge of fall prevention   LTG: (to be met in 12 treatments)  1. Patient will ambulate 10 minutes without rollator; 15 minutes with rollator; and stand 15 minutes with < 5/10 B LBP in order to improve functional mobility within home.   2. Improve modified oswestry to 12%      Pt. Education:  [x] Yes  [] No  [] Reviewed Prior HEP/Ed  Method of Education: [x] Verbal  [] Demo  [] Written  Comprehension of Education:  [x] Verbalizes understanding. [] Demonstrates understanding. [] Needs review. [] Demonstrates/verbalizes HEP/Ed previously given. Plan: [] Continue per plan of care. [x] Other: added 8 visits this session       Time In: 4:02 pm            Time Out: 4:25pm    Electronically signed by:  DMITRY Knott.  This document has been reviewed by overseeing Physical Therapist, Kim Ghotra PT DPT

## 2019-07-01 DIAGNOSIS — F41.9 ANXIETY: ICD-10-CM

## 2019-07-01 RX ORDER — LORAZEPAM 0.5 MG/1
TABLET ORAL
Qty: 90 TABLET | Refills: 0 | Status: SHIPPED | OUTPATIENT
Start: 2019-07-01 | End: 2019-07-10 | Stop reason: SDUPTHER

## 2019-07-02 ENCOUNTER — HOSPITAL ENCOUNTER (OUTPATIENT)
Dept: PHYSICAL THERAPY | Facility: CLINIC | Age: 69
Setting detail: THERAPIES SERIES
Discharge: HOME | End: 2019-07-02
Payer: MEDICARE

## 2019-07-02 PROCEDURE — 97113 AQUATIC THERAPY/EXERCISES: CPT

## 2019-07-02 NOTE — FLOWSHEET NOTE
Squat     Backwards 3L  Retro HS curls        Retro SLR     Stretches   Braiding     Gastroc/Soleus 3x30''  Heel to Toe amb     Hamstring 3x30''  Toe amb     Hip flexor   Heel amb     Piriformis 3x30''       SKTC        Pec Stretch        Post Deltoid   Static Exercises UE G       Shoulder flex/ext 30    Static Exercises LE   Shoulder abd/add 30    Heel/toe raises 30  Shoulder H.  abd/add 30    Marches 30  Shoulder IR/ER 30    Mini-squats 30  Rowing 30    3-way hip  30  Arm Circles: Fwd/Retro 30    Hamstring curls 30  UT shrugs/rolls     Hip Circles/Fig 8   Scap squeezes     Ankle ROM   Diagonals 1/2 15    Lunges  15  Elbow flex/ext        Pron/Sup     Functional Exercise   Wrist AROM     Step 30       Wall Push-ups   Deep H20/N/SCW     SLS   Bike 3'    Breast Stroke on Noodle   Hip abd/add 3'    Noodle Twist   Hip flex/ext 3'    Noodle Push down 30  Hip IR/ER     Kickboard push/pull   Knee flex/ext x20       Push/pull on [facility] 5-10'    Other:    Specific Instructions for next treatment: cont using SCW, skipped 7/2 d/t soreness    Treatment Charges: Mins Units   []  Modalities     []  Ther Exercise     []  Manual Therapy     []  Ther Activities     [x]  Aquatics 30 2   []  Other       Assessment: [x] Progressing toward goals. [] No change     [x] Other: Deferred ankle weights this date d/t pain level. Cont POC listed above. Pt reports spasm shooting from her low back into her R buttock mid lunge, d/c ex and moved on to other static ex. Pt able to complete rest of POC w/ no other symptoms to report. Upon exiting pool pt reports she feels \"okay\". STG: (to be met in 6 treatments)  1. ? Pain: Patient will report < 5/10 pain with active movement in order to improve QOL. 2. ? ROM: Patient will demo lumbar spine AROM L rotation, SB equal to R and with < 5/10 pain for improved functional mobility.   3. ? Strength: Patient will demonstrate 4+/5 R LE gross strength in order to better match L LE, and improve sit<>stand transfers, ADLs. 4. ? Function: Patient will demo FAIR conditions 3, 4 of mCTSIB in order to indicate improved static standing balance and prevent falls at home. 5. Independent with Home Exercise Programs  6. Demonstrate Knowledge of fall prevention   LTG: (to be met in 12 treatments)  1. Patient will ambulate 10 minutes without rollator; 15 minutes with rollator; and stand 15 minutes with < 5/10 B LBP in order to improve functional mobility within home. 2. Improve modified oswestry to 12%     Patient goals: \"to stop the pain. \"        Pt. Education:  [x] Yes  [] No  [x] Reviewed Prior HEP/Ed  Method of Education: [x] Verbal  [] Demo  [] Written  Comprehension of Education:  [x] Verbalizes understanding. [] Demonstrates understanding. [] Needs review. [] Demonstrates/verbalizes HEP/Ed previously given. Plan: [x] Continue per plan of care.    [] Other:      Time In: 0635      Time Out: 1448    Electronically signed by:  Dariela Montoya PTA

## 2019-07-03 ENCOUNTER — TELEPHONE (OUTPATIENT)
Dept: GASTROENTEROLOGY | Age: 69
End: 2019-07-03

## 2019-07-03 RX ORDER — GABAPENTIN 600 MG/1
TABLET ORAL
Qty: 120 TABLET | Refills: 6 | Status: SHIPPED | OUTPATIENT
Start: 2019-07-03 | End: 2019-07-10 | Stop reason: SDUPTHER

## 2019-07-08 ENCOUNTER — HOSPITAL ENCOUNTER (OUTPATIENT)
Age: 69
Setting detail: OUTPATIENT SURGERY
Discharge: HOME | End: 2019-07-08
Attending: INTERNAL MEDICINE | Admitting: INTERNAL MEDICINE
Payer: MEDICARE

## 2019-07-08 ENCOUNTER — ANESTHESIA EVENT (OUTPATIENT)
Dept: OPERATING ROOM | Age: 69
End: 2019-07-08
Payer: MEDICARE

## 2019-07-08 ENCOUNTER — ANESTHESIA (OUTPATIENT)
Dept: OPERATING ROOM | Age: 69
End: 2019-07-08
Payer: MEDICARE

## 2019-07-08 VITALS
WEIGHT: 224.3 LBS | RESPIRATION RATE: 22 BRPM | TEMPERATURE: 97.7 F | HEIGHT: 64 IN | OXYGEN SATURATION: 100 % | DIASTOLIC BLOOD PRESSURE: 67 MMHG | SYSTOLIC BLOOD PRESSURE: 134 MMHG | BODY MASS INDEX: 38.29 KG/M2 | HEART RATE: 74 BPM

## 2019-07-08 VITALS
OXYGEN SATURATION: 93 % | DIASTOLIC BLOOD PRESSURE: 66 MMHG | SYSTOLIC BLOOD PRESSURE: 115 MMHG | RESPIRATION RATE: 16 BRPM

## 2019-07-08 LAB
GLUCOSE BLD-MCNC: 129 MG/DL (ref 65–105)
GLUCOSE BLD-MCNC: 139 MG/DL (ref 65–105)

## 2019-07-08 PROCEDURE — 2580000003 HC RX 258: Performed by: ANESTHESIOLOGY

## 2019-07-08 PROCEDURE — 7100000010 HC PHASE II RECOVERY - FIRST 15 MIN: Performed by: INTERNAL MEDICINE

## 2019-07-08 PROCEDURE — 2709999900 HC NON-CHARGEABLE SUPPLY: Performed by: INTERNAL MEDICINE

## 2019-07-08 PROCEDURE — 7100000011 HC PHASE II RECOVERY - ADDTL 15 MIN: Performed by: INTERNAL MEDICINE

## 2019-07-08 PROCEDURE — 6360000002 HC RX W HCPCS: Performed by: NURSE ANESTHETIST, CERTIFIED REGISTERED

## 2019-07-08 PROCEDURE — 82947 ASSAY GLUCOSE BLOOD QUANT: CPT

## 2019-07-08 PROCEDURE — 88305 TISSUE EXAM BY PATHOLOGIST: CPT

## 2019-07-08 PROCEDURE — 45380 COLONOSCOPY AND BIOPSY: CPT | Performed by: INTERNAL MEDICINE

## 2019-07-08 PROCEDURE — 3700000000 HC ANESTHESIA ATTENDED CARE: Performed by: INTERNAL MEDICINE

## 2019-07-08 PROCEDURE — 43239 EGD BIOPSY SINGLE/MULTIPLE: CPT | Performed by: INTERNAL MEDICINE

## 2019-07-08 PROCEDURE — 3700000001 HC ADD 15 MINUTES (ANESTHESIA): Performed by: INTERNAL MEDICINE

## 2019-07-08 PROCEDURE — 3609010300 HC COLONOSCOPY W/BIOPSY SINGLE/MULTIPLE: Performed by: INTERNAL MEDICINE

## 2019-07-08 PROCEDURE — 3609012400 HC EGD TRANSORAL BIOPSY SINGLE/MULTIPLE: Performed by: INTERNAL MEDICINE

## 2019-07-08 RX ORDER — SODIUM CHLORIDE, SODIUM LACTATE, POTASSIUM CHLORIDE, CALCIUM CHLORIDE 600; 310; 30; 20 MG/100ML; MG/100ML; MG/100ML; MG/100ML
INJECTION, SOLUTION INTRAVENOUS CONTINUOUS
Status: DISCONTINUED | OUTPATIENT
Start: 2019-07-08 | End: 2019-07-08 | Stop reason: HOSPADM

## 2019-07-08 RX ORDER — ONDANSETRON 2 MG/ML
4 INJECTION INTRAMUSCULAR; INTRAVENOUS
Status: DISCONTINUED | OUTPATIENT
Start: 2019-07-08 | End: 2019-07-08 | Stop reason: HOSPADM

## 2019-07-08 RX ORDER — MIDAZOLAM HYDROCHLORIDE 1 MG/ML
INJECTION INTRAMUSCULAR; INTRAVENOUS
Status: DISCONTINUED
Start: 2019-07-08 | End: 2019-07-08 | Stop reason: HOSPADM

## 2019-07-08 RX ORDER — PROPOFOL 10 MG/ML
INJECTION, EMULSION INTRAVENOUS PRN
Status: DISCONTINUED | OUTPATIENT
Start: 2019-07-08 | End: 2019-07-08 | Stop reason: SDUPTHER

## 2019-07-08 RX ORDER — FENTANYL CITRATE 50 UG/ML
50 INJECTION, SOLUTION INTRAMUSCULAR; INTRAVENOUS EVERY 5 MIN PRN
Status: DISCONTINUED | OUTPATIENT
Start: 2019-07-08 | End: 2019-07-08 | Stop reason: HOSPADM

## 2019-07-08 RX ORDER — FENTANYL CITRATE 50 UG/ML
25 INJECTION, SOLUTION INTRAMUSCULAR; INTRAVENOUS EVERY 5 MIN PRN
Status: DISCONTINUED | OUTPATIENT
Start: 2019-07-08 | End: 2019-07-08 | Stop reason: HOSPADM

## 2019-07-08 RX ADMIN — SODIUM CHLORIDE, POTASSIUM CHLORIDE, SODIUM LACTATE AND CALCIUM CHLORIDE: 600; 310; 30; 20 INJECTION, SOLUTION INTRAVENOUS at 09:54

## 2019-07-08 RX ADMIN — PROPOFOL 40 MG: 10 INJECTION, EMULSION INTRAVENOUS at 10:45

## 2019-07-08 RX ADMIN — PROPOFOL 20 MG: 10 INJECTION, EMULSION INTRAVENOUS at 10:49

## 2019-07-08 RX ADMIN — PROPOFOL 20 MG: 10 INJECTION, EMULSION INTRAVENOUS at 10:29

## 2019-07-08 RX ADMIN — PROPOFOL 30 MG: 10 INJECTION, EMULSION INTRAVENOUS at 10:53

## 2019-07-08 RX ADMIN — PROPOFOL 40 MG: 10 INJECTION, EMULSION INTRAVENOUS at 10:36

## 2019-07-08 RX ADMIN — PROPOFOL 30 MG: 10 INJECTION, EMULSION INTRAVENOUS at 10:28

## 2019-07-08 RX ADMIN — PROPOFOL 40 MG: 10 INJECTION, EMULSION INTRAVENOUS at 10:39

## 2019-07-08 RX ADMIN — PROPOFOL 30 MG: 10 INJECTION, EMULSION INTRAVENOUS at 10:32

## 2019-07-08 RX ADMIN — PROPOFOL 30 MG: 10 INJECTION, EMULSION INTRAVENOUS at 10:26

## 2019-07-08 ASSESSMENT — PULMONARY FUNCTION TESTS
PIF_VALUE: 1

## 2019-07-08 ASSESSMENT — PAIN - FUNCTIONAL ASSESSMENT: PAIN_FUNCTIONAL_ASSESSMENT: 0-10

## 2019-07-08 ASSESSMENT — PAIN DESCRIPTION - DESCRIPTORS
DESCRIPTORS: SORE
DESCRIPTORS: CONSTANT

## 2019-07-08 ASSESSMENT — PAIN DESCRIPTION - LOCATION: LOCATION: ABDOMEN

## 2019-07-08 ASSESSMENT — PAIN SCALES - GENERAL
PAINLEVEL_OUTOF10: 5
PAINLEVEL_OUTOF10: 5

## 2019-07-08 ASSESSMENT — PAIN DESCRIPTION - PAIN TYPE: TYPE: CHRONIC PAIN

## 2019-07-08 NOTE — ANESTHESIA POSTPROCEDURE EVALUATION
Department of Anesthesiology  Postprocedure Note    Patient: Beth Barrera  MRN: [id number]  YOB: 1950  Date of evaluation: 7/8/2019  Time:  11:30 AM     Procedure Summary     Date:  07/08/19 Room / Location:  [place] [address] / [facility]    Anesthesia Start:  1025 Anesthesia Stop:  1104    Procedures:       COLONOSCOPY WITH BIOPSY (N/A )      EGD BIOPSY (N/A ) Diagnosis:  (DX GERD, IBS SCREEN)    Surgeon:  Angelique Dowell MD Responsible Provider:  Honorio Metzger MD    Anesthesia Type:  MAC ASA Status:  3          Anesthesia Type: MAC    Quang Phase I:      Quang Phase II: Quang Score: 8    Last vitals: Reviewed and per EMR flowsheets.        Anesthesia Post Evaluation    Patient location during evaluation: PACU  Level of consciousness: awake and alert  Complications: no

## 2019-07-08 NOTE — ANESTHESIA PRE PROCEDURE
Department of Anesthesiology  Preprocedure Note       Name:  Janina Akhtar   Age:  80 y.o.  :  1950                                          MRN:  [id number]         Date:  2019      Surgeon: Allison Goode):  Jerry Odonnell MD    Procedure: COLORECTAL CANCER SCREENING, NOT HIGH RISK (N/A )  EGD ESOPHAGOGASTRODUODENOSCOPY (N/A )    Medications prior to admission:   Prior to Admission medications    Medication Sig Start Date End Date Taking?  Authorizing Provider   gabapentin (NEURONTIN) 600 MG tablet TAKE (1) TABLET BY MOUTH FOUR TIMES A DAY 7/3/19 8/2/19 Yes Simi Medhkour, DO   LORazepam (ATIVAN) 0.5 MG tablet TAKE 1 TABLET BY MOUTH THREE TIMES DAILY AS NEEDED FOR ANXIETY FOR UP TO 30 DAYS 19 Yes Simi Medhkour, DO   amLODIPine (NORVASC) 5 MG tablet TAKE 1 TABLET BY MOUTH ONCE DAILY 19  Yes Simi Medhkour, DO   insulin glargine (LANTUS) 100 UNIT/ML injection vial Inject 55 units nightly 19  Yes Simi Medhkour, DO   insulin lispro (HUMALOG) 100 UNIT/ML injection vial Inject three times daily as directed by sliding scale 19  Yes Simi Medhkour, DO   atorvastatin (LIPITOR) 40 MG tablet TAKE 1 TABLET BY MOUTH EVERY DAY 19  Yes Simi Medhkour, DO   levothyroxine (SYNTHROID) 88 MCG tablet Take 1 tablet by mouth daily 19  Yes Simi Medhkour, DO   potassium chloride (KLOR-CON M) 20 MEQ extended release tablet Take 1 tablet by mouth daily 19  Yes Simi Medhkour, DO   SUPER B COMPLEX/C CAPS Take by mouth   Yes Historical Provider, MD   tamsulosin (FLOMAX) 0.4 MG capsule Take 0.4 mg by mouth daily   Yes Historical Provider, MD   vitamin A 38418 units capsule Take 10,000 Units by mouth daily   Yes Historical Provider, MD   Cholecalciferol (VITAMIN D3) 5000 units TABS Take by mouth   Yes Historical Provider, MD   alendronate (FOSAMAX) 70 MG tablet Take 70 mg by mouth every 7 days   Yes Historical Provider, MD   DULoxetine (CYMBALTA) 60 MG extended release capsule Take 60 mg History of anesthesia complications 3284'Q    was told after gallbladder surgery to never have anesthesia again \"since it was hard for me to come out of it\"    Hypertension     Right arm pain     Right shoulder pain     Sleep apnea     uses CPAP nightly    TIA (transient ischemic attack)        Past Surgical History:        Procedure Laterality Date    APPENDECTOMY      CHOLECYSTECTOMY      HAND SURGERY Right     repair tendon    HYSTERECTOMY  2/2014    chris with bso    KNEE ARTHROSCOPY Right     OTHER SURGICAL HISTORY Right 11/11/2014    shoulder manipulation    SHOULDER ARTHROSCOPY Right 02/03/15    SHOULDER SURGERY  11/2014    manipulation    URETER STENT PLACEMENT Right 4/30/2015    pt has blood clot & abscess in her right kidney       Social History:    Social History     Tobacco Use    Smoking status: Never Smoker    Smokeless tobacco: Never Used   Substance Use Topics    Alcohol use: No                                Counseling given: Not Answered      Vital Signs (Current):   Vitals:    07/08/19 0926 07/08/19 0929   BP:  (!) 148/88   Pulse:  92   Resp:  18   Temp:  98.2 °F (36.8 °C)   TempSrc:  Oral   SpO2:  98%   Weight: 224 lb 4.8 oz (101.7 kg)    Height: 5' 4\" (1.626 m)                                               BP Readings from Last 3 Encounters:   07/08/19 (!) 148/88   05/17/19 116/78   05/13/19 135/78       NPO Status: Time of last liquid consumption: 2300                        Time of last solid consumption: 1800                        Date of last liquid consumption: 07/07/19                        Date of last solid food consumption: 07/06/19    BMI:   Wt Readings from Last 3 Encounters:   07/08/19 224 lb 4.8 oz (101.7 kg)   05/17/19 226 lb (102.5 kg)   05/13/19 228 lb (103.4 kg)     Body mass index is 38.5 kg/m².     CBC:   Lab Results   Component Value Date    WBC 6.3 09/11/2017    RBC 3.43 09/11/2017    RBC 5.33 01/07/2012    HGB 10.2 09/11/2017    HCT 30.9 09/11/2017    MCV

## 2019-07-09 ENCOUNTER — HOSPITAL ENCOUNTER (OUTPATIENT)
Dept: PHYSICAL THERAPY | Facility: CLINIC | Age: 69
Setting detail: THERAPIES SERIES
Discharge: HOME | End: 2019-07-09
Payer: MEDICARE

## 2019-07-09 LAB — SURGICAL PATHOLOGY REPORT: NORMAL

## 2019-07-09 PROCEDURE — 97113 AQUATIC THERAPY/EXERCISES: CPT

## 2019-07-09 NOTE — FLOWSHEET NOTE
[] [facility]       Physical Therapy M[facility]2       [address]        [address]       Phone: [phone]       Fax: [phone] [] [facility]       Promotion at [address] [facility]       Phone: [phone]       Fax: [phone] [x] [facility]. [facility] Promotion  [address]   Phone: [phone]   Fax:  [phone]     Physical Therapy Daily  Aquatic Treatment Note    Date:  2019  Patient Name:  Sugey Bergman    :    MRN: [id number]  Physician: Dr. Jackson Muniz. Deonna Haas MD              Insurance: [facility] Medicare  Medical Diagnosis: M54.5- acute low back pain; S32.030S- closed compression fracture of third lumbar vertebra, sequela; S32.040S- closed compression fracture of fourth lumbar vertebra, sequela; M54.41, G89.29- chronic midline low back pain with right-sided sciatica                        Rehab Codes: M54.5; S32.030S; S32.040S; M54.41; G89.29  Onset Date:                    Next 's appt.: TBD        Visit# / total visits:   Cancels/No Shows: 0    Subjective:    Pain:  [x] Yes  [] No Location: Lower back   Pain Rating: (0-10 scale) 3/10  Pain altered Tx:  [x] No  [] Yes  Action:  Comments: Pt reports minimal pain and that her back has been feeling really good lately.    Objective:     KEY  B = Belt G = Gloves N = Noodle   C = Cuffs K = Kickboard P = Paddles   CC = Cervical Collar L = Laps T = Theratube   DB = Dumbells M = Minutes W = Weights     Exercises/Activities  LCW donned prior to treatment at stairs  Precautions: L3-4 compression fractures d/t potential osteoporosis; B LE, UE neuropathy d/t h/o chemo therapy for ovarian cancer- only loss of sensation within L5-S1; DM; FALL RISK- uses rollator for longer duration ambulation   Warm-up/Amb   Dynamic Exercises     Forward 3L 3L  March 3L 3L    Sideways 3L 3L  Squat      Backwards 3L 3L  Retro HS curls match L LE, and improve sit<>stand transfers, ADLs. 4. ? Function: Patient will demo FAIR conditions 3, 4 of mCTSIB in order to indicate improved static standing balance and prevent falls at home. 5. Independent with Home Exercise Programs  6. Demonstrate Knowledge of fall prevention   LTG: (to be met in 12 treatments)  1. Patient will ambulate 10 minutes without rollator; 15 minutes with rollator; and stand 15 minutes with < 5/10 B LBP in order to improve functional mobility within home. 2. Improve modified oswestry to 12%     Patient goals: \"to stop the pain. \"        Pt. Education:  [x] Yes  [] No  [x] Reviewed Prior HEP/Ed  Method of Education: [x] Verbal  [] Demo  [] Written  Comprehension of Education:  [x] Verbalizes understanding. [] Demonstrates understanding. [] Needs review. [] Demonstrates/verbalizes HEP/Ed previously given. Plan: [x] Continue per plan of care.    [] Other:      Time In: 1500      Time Out: 0040    Electronically signed by:  Terrence Govea PTA

## 2019-07-10 ENCOUNTER — TELEPHONE (OUTPATIENT)
Dept: PRIMARY CARE CLINIC | Age: 69
End: 2019-07-10

## 2019-07-11 ENCOUNTER — HOSPITAL ENCOUNTER (OUTPATIENT)
Dept: PHYSICAL THERAPY | Facility: CLINIC | Age: 69
Setting detail: THERAPIES SERIES
Discharge: HOME | End: 2019-07-11
Payer: MEDICARE

## 2019-07-11 PROCEDURE — 97113 AQUATIC THERAPY/EXERCISES: CPT

## 2019-07-16 ENCOUNTER — HOSPITAL ENCOUNTER (OUTPATIENT)
Dept: PHYSICAL THERAPY | Facility: CLINIC | Age: 69
Setting detail: THERAPIES SERIES
Discharge: HOME | End: 2019-07-16
Payer: MEDICARE

## 2019-07-16 PROCEDURE — 97113 AQUATIC THERAPY/EXERCISES: CPT

## 2019-07-16 NOTE — FLOWSHEET NOTE
[] [facility]       Physical Therapy MOB2       [address]        [address]       Phone: [phone]       Fax: [phone] [] [facility]       Promotion at [address] [place] [address]       Phone: [phone]       Fax: [phone] [x] [facility] Promotion  [address] [facility]   Phone: [phone]   Fax:  [phone]     Physical Therapy Daily  Aquatic Treatment Note    Date:  2019  Patient Name:  Nguyen Valencia    :    MRN: [id number]  Physician: Dr. Wayne Garsia. Yohana Villalpando MD              Insurance: [place] [employer]  Medical Diagnosis: M54.5- acute low back pain; S32.030S- closed compression fracture of third lumbar vertebra, sequela; S32.040S- closed compression fracture of fourth lumbar vertebra, sequela; M54.41, G89.29- chronic midline low back pain with right-sided sciatica                        Rehab Codes: M54.5; S32.030S; S32.040S; M54.41; G89.29  Onset Date:                    Next 's appt.: TBD        Visit# / total visits:   Cancels/No Shows: 0    Subjective:    Pain:  [x] Yes  [] No Location: Lower back   Pain Rating: (0-10 scale) 3/10  Pain altered Tx:  [x] No  [] Yes  Action:  Comments: Pt arrives reporting mild pain and no change this date.    Objective:     KEY  B = Belt G = Gloves N = Noodle   C = Cuffs K = Kickboard P = Paddles   CC = Cervical Collar L = Laps T = Theratube   DB = Dumbells M = Minutes W = Weights     Exercises/Activities  LCW donned prior to treatment at stairs  Precautions: L3-4 compression fractures d/t potential osteoporosis; B LE, UE neuropathy d/t h/o chemo therapy for ovarian cancer- only loss of sensation within L5-S1; DM; FALL RISK- uses rollator for longer duration ambulation   Warm-up/Amb     Dynamic Exercises       Forward 3L 3L 3L 3L    March 3L 3L 3L 3L      Sideways 3L 3L 3L 3L    Squat          Backwards 3L 3L 3L 3L    Retro HS curls                  Retro SLR          Stretches        Braiding          Gastroc/Soleus 3x30'' 3x30'' 3x30'' 3x30''    Heel to Toe amb          Hamstring 3x30'' 3x30'' 3x30'' 3x30''    Toe amb          Hip flexor        Heel amb          Piriformis 3x30'' 3x30'' 3x30'' 3x30''              SKTC                  Pec Stretch                  Post Deltoid        Static Exercises UE G G G G              Shoulder flex/ext 30 30 30 30      Static Exercises LE        Shoulder abd/add 30 30 30 DB 30 DB      Heel/toe raises 30 30 30 30    Shoulder H.  abd/add 30 30 30 30      Marches 30 30 30 30    Shoulder IR/ER 30 30 30 30      Mini-squats 30 30 30 30    Rowing 30 30 30 30      3-way hip  30 30 30 30    Arm Circles: Fwd/Retro 30 30 30 30      Hamstring curls 30 30 30 30    UT shrugs/rolls          Hip Circles/Fig 8        Scap squeezes          Ankle ROM        Diagonals 1/2 15 30 30 30      Lunges  15 15 15 30    Elbow flex/ext                  Pron/Sup          Functional Exercise        Wrist AROM          Step; Fwd & Lateral 30 30 30 30              Wall Push-ups        Deep H20/N/SCW          SLS        Bike 3' 3' 3' 3'      Breast Stroke on Noodle        Hip abd/add 3' 3' 3' 3'      Noodle Twist   30 30    Hip flex/ext 3' 3' 3' 3'      Noodle Push down 30       Hip IR/ER          Kickboard push/pull  30 30 30    Knee flex/ext x20 x20 x20 x20              Push/pull on Rail                  Hang 5-10' 5-10' 5-10' 5-10'      Other:    Specific Instructions for next treatment:     Treatment Charges: Mins Units   []  Modalities     []  Ther Exercise     []  Manual Therapy     []  Ther Activities     [x]  Aquatics 30 2   []  Other       Assessment: [x] Progressing toward goals. [] No change     [x] Other: Cont POC listed above, donned LCW prior to entire aquatic program. Pt had fair recall of ex program w/ minimal direction provided.  Cues given for deeper lunge and deeper squat w/ appr

## 2019-07-16 NOTE — TELEPHONE ENCOUNTER
Called patient lvm to call us back and reschedule appointment for a earlier day and time, before August to see Dr. Lena Christina.

## 2019-07-18 ENCOUNTER — HOSPITAL ENCOUNTER (OUTPATIENT)
Dept: PHYSICAL THERAPY | Facility: CLINIC | Age: 69
Setting detail: THERAPIES SERIES
Discharge: HOME | End: 2019-07-18
Payer: MEDICARE

## 2019-07-18 PROCEDURE — 97113 AQUATIC THERAPY/EXERCISES: CPT

## 2019-07-22 ENCOUNTER — OFFICE VISIT (OUTPATIENT)
Dept: PRIMARY CARE CLINIC | Age: 69
End: 2019-07-22
Payer: MEDICARE

## 2019-07-22 ENCOUNTER — APPOINTMENT (OUTPATIENT)
Dept: PHYSICAL THERAPY | Facility: CLINIC | Age: 69
End: 2019-07-22
Payer: MEDICARE

## 2019-07-22 VITALS
WEIGHT: 224.21 LBS | BODY MASS INDEX: 38.28 KG/M2 | HEART RATE: 106 BPM | HEIGHT: 64 IN | OXYGEN SATURATION: 97 % | SYSTOLIC BLOOD PRESSURE: 122 MMHG | DIASTOLIC BLOOD PRESSURE: 84 MMHG | RESPIRATION RATE: 18 BRPM

## 2019-07-22 DIAGNOSIS — Z85.43 HX OF OVARIAN CANCER: ICD-10-CM

## 2019-07-22 DIAGNOSIS — R91.1 PULMONARY NODULE: ICD-10-CM

## 2019-07-22 DIAGNOSIS — G62.9 NEUROPATHY: ICD-10-CM

## 2019-07-22 DIAGNOSIS — M54.50 BILATERAL LOW BACK PAIN WITHOUT SCIATICA, UNSPECIFIED CHRONICITY: ICD-10-CM

## 2019-07-22 DIAGNOSIS — R07.81 RIB PAIN ON LEFT SIDE: ICD-10-CM

## 2019-07-22 DIAGNOSIS — Z86.711 HISTORY OF PULMONARY EMBOLISM: ICD-10-CM

## 2019-07-22 PROCEDURE — G8400 PT W/DXA NO RESULTS DOC: HCPCS | Performed by: FAMILY MEDICINE

## 2019-07-22 PROCEDURE — G8427 DOCREV CUR MEDS BY ELIG CLIN: HCPCS | Performed by: FAMILY MEDICINE

## 2019-07-22 PROCEDURE — 3017F COLORECTAL CA SCREEN DOC REV: CPT | Performed by: FAMILY MEDICINE

## 2019-07-22 PROCEDURE — G8417 CALC BMI ABV UP PARAM F/U: HCPCS | Performed by: FAMILY MEDICINE

## 2019-07-22 PROCEDURE — 1123F ACP DISCUSS/DSCN MKR DOCD: CPT | Performed by: FAMILY MEDICINE

## 2019-07-22 PROCEDURE — 1036F TOBACCO NON-USER: CPT | Performed by: FAMILY MEDICINE

## 2019-07-22 PROCEDURE — 99214 OFFICE O/P EST MOD 30 MIN: CPT | Performed by: FAMILY MEDICINE

## 2019-07-22 PROCEDURE — 1090F PRES/ABSN URINE INCON ASSESS: CPT | Performed by: FAMILY MEDICINE

## 2019-07-22 PROCEDURE — 4040F PNEUMOC VAC/ADMIN/RCVD: CPT | Performed by: FAMILY MEDICINE

## 2019-07-23 ENCOUNTER — HOSPITAL ENCOUNTER (OUTPATIENT)
Dept: PHYSICAL THERAPY | Facility: CLINIC | Age: 69
Setting detail: THERAPIES SERIES
Discharge: HOME | End: 2019-07-23
Payer: MEDICARE

## 2019-07-23 PROCEDURE — 97113 AQUATIC THERAPY/EXERCISES: CPT

## 2019-07-23 ASSESSMENT — ENCOUNTER SYMPTOMS
CHEST TIGHTNESS: 0
VOMITING: 0
SHORTNESS OF BREATH: 0
BACK PAIN: 1
NAUSEA: 0

## 2019-07-24 ENCOUNTER — APPOINTMENT (OUTPATIENT)
Dept: PHYSICAL THERAPY | Facility: CLINIC | Age: 69
End: 2019-07-24
Payer: MEDICARE

## 2019-07-25 ENCOUNTER — HOSPITAL ENCOUNTER (OUTPATIENT)
Dept: PHYSICAL THERAPY | Facility: CLINIC | Age: 69
Setting detail: THERAPIES SERIES
Discharge: HOME | End: 2019-07-25
Payer: MEDICARE

## 2019-07-25 PROCEDURE — 97113 AQUATIC THERAPY/EXERCISES: CPT

## 2019-07-26 ENCOUNTER — HOSPITAL ENCOUNTER (OUTPATIENT)
Dept: CT IMAGING | Age: 69
Discharge: HOME | End: 2019-07-28
Payer: MEDICARE

## 2019-07-26 DIAGNOSIS — Z85.43 HX OF OVARIAN CANCER: ICD-10-CM

## 2019-07-26 DIAGNOSIS — R91.1 PULMONARY NODULE: ICD-10-CM

## 2019-07-26 DIAGNOSIS — Z86.711 HISTORY OF PULMONARY EMBOLISM: ICD-10-CM

## 2019-07-26 DIAGNOSIS — R07.81 RIB PAIN ON LEFT SIDE: ICD-10-CM

## 2019-07-26 LAB
CREAT SERPL-MCNC: 0.93 MG/DL (ref 0.5–0.9)
GFR AFRICAN AMERICAN: >60 ML/MIN
GFR NON-AFRICAN AMERICAN: >60 ML/MIN
GFR SERPL CREATININE-BSD FRML MDRD: ABNORMAL ML/MIN/{1.73_M2}
GFR SERPL CREATININE-BSD FRML MDRD: ABNORMAL ML/MIN/{1.73_M2}

## 2019-07-26 PROCEDURE — 82565 ASSAY OF CREATININE: CPT

## 2019-07-26 PROCEDURE — 71260 CT THORAX DX C+: CPT

## 2019-07-26 PROCEDURE — 36415 COLL VENOUS BLD VENIPUNCTURE: CPT

## 2019-07-26 PROCEDURE — 6360000004 HC RX CONTRAST MEDICATION: Performed by: FAMILY MEDICINE

## 2019-07-26 PROCEDURE — 2580000003 HC RX 258: Performed by: FAMILY MEDICINE

## 2019-07-26 RX ORDER — SODIUM CHLORIDE 0.9 % (FLUSH) 0.9 %
10 SYRINGE (ML) INJECTION PRN
Status: DISCONTINUED | OUTPATIENT
Start: 2019-07-26 | End: 2019-07-29 | Stop reason: HOSPADM

## 2019-07-26 RX ORDER — 0.9 % SODIUM CHLORIDE 0.9 %
80 INTRAVENOUS SOLUTION INTRAVENOUS ONCE
Status: COMPLETED | OUTPATIENT
Start: 2019-07-26 | End: 2019-07-26

## 2019-07-26 RX ADMIN — IOVERSOL 100 ML: 741 INJECTION INTRA-ARTERIAL; INTRAVENOUS at 15:04

## 2019-07-26 RX ADMIN — SODIUM CHLORIDE 80 ML: 9 INJECTION, SOLUTION INTRAVENOUS at 15:04

## 2019-07-26 RX ADMIN — Medication 10 ML: at 15:04

## 2019-07-30 ENCOUNTER — APPOINTMENT (OUTPATIENT)
Dept: PHYSICAL THERAPY | Facility: CLINIC | Age: 69
End: 2019-07-30
Payer: MEDICARE

## 2019-07-30 ENCOUNTER — TELEPHONE (OUTPATIENT)
Dept: PRIMARY CARE CLINIC | Age: 69
End: 2019-07-30

## 2019-08-01 ENCOUNTER — HOSPITAL ENCOUNTER (OUTPATIENT)
Dept: PHYSICAL THERAPY | Facility: CLINIC | Age: 69
Setting detail: THERAPIES SERIES
Discharge: HOME | End: 2019-08-01
Payer: MEDICARE

## 2019-08-01 PROCEDURE — 97113 AQUATIC THERAPY/EXERCISES: CPT

## 2019-08-01 NOTE — FLOWSHEET NOTE
7/11 7/16 7/18 7/23 7/25 8/1   Forward 3L 3L 3L 3L 3L 3L 3L 3L March 3L 3L 3L 3L 3L 3L 3L 3L   Sideways 3L 3L 3L 3L 3L 3L 3L 3L Squat           Backwards 3L 3L 3L 3L 3L 3L 3L 3L Retro HS curls                    Retro SLR           Stretches         Braiding           Gastroc/Soleus 3x30'' 3x30'' 3x30'' 3x30'' 3x30'' 3x30'' 3x30''  Heel to Toe amb           Hamstring 3x30'' 3x30'' 3x30'' 3x30'' 3x30'' 3x30'' 3x30''  Toe amb           Hip flexor         Heel amb           Piriformis 3x30'' 3x30'' 3x30'' 3x30'' 3x30'' 3x30'' 3x30''             [facility]                    Pec Stretch                    Post Deltoid         Static Exercises UE G G G G G G  G G            Shoulder flex/ext 30 30 30 30 30 30 30 30   Static Exercises LE         Shoulder abd/add 30 30 30 DB 30 DB 30 DB 30 DB 30 DB 30 DB   Heel/toe raises 30 30 30 30 30 30 30 30 Shoulder H.  abd/add 30 30 30 30 30 30 30 30   Marches 30 30 30 30 30 30 30 30 Shoulder IR/ER 30 30 30 30 30 30 30 30   Mini-squats 30 30 30 30 30 30 30 30 Rowing 30 30 30 30 30 30 30 30   3-way hip  30 30 30 30 30 30 30 30 Arm Circles: Fwd/Retro 30 30 30 30 30 30 30 30   Hamstring curls 30 30 30 30 -- --   UT shrugs/rolls           Hip Circles/Fig 8         Scap squeezes           Ankle ROM         Diagonals 1/2 15 30 30 30 30 30 30 30   Lunges  15 15 15 30 30 30 30 30 Elbow flex/ext                    Pron/Sup           Functional Exercise         Wrist AROM           Step; Fwd & Lateral 30 30 30 30 30 30 30 30            Wall Push-ups         Deep H20/N/SCW           SLS         Bike 3' 3' 3' 3' 3' 3' 3' 3'   Breast Stroke on Noodle         Hip abd/add 3' 3' 3' 3' 3' 3' 3' 3'   Noodle Twist   30 30 30 30 30 30 Hip flex/ext 3' 3' 3' 3' 3' 3' 3' 3'   Noodle Push down 30        Hip IR/ER           Kickboard push/pull  30 30 30 30 30 30 30 Knee flex/ext x20 x20 x20 x20 -- x20 x20 x20            Push/pull on Rail                    Hang 5-10' 5-10' 5-10' 5-10' -- 5-10' 15' 10' Other:    Specific Instructions for next treatment:     Treatment Charges: Mins Units   []  Modalities     []  Ther Exercise     []  Manual Therapy     []  Ther Activities     [x]  Aquatics 30 2   []  Other       Assessment: [x] Progressing toward goals. [] No change     [x] Other: Able to complete charted ex without exacerbated symptoms. Pt at this time independent with current program demonstrating good recall and only needing assist with donning/doffing small ankle weights to complete ex. At this time pt to be d/c to HEP. STG: (to be met in 6 treatments)  1. ? Pain: Patient will report < 5/10 pain with active movement in order to improve QOL. 2. ? ROM: Patient will demo lumbar spine AROM L rotation, SB equal to R and with < 5/10 pain for improved functional mobility. 3. ? Strength: Patient will demonstrate 4+/5 R LE gross strength in order to better match L LE, and improve sit<>stand transfers, ADLs. 4. ? Function: Patient will demo FAIR conditions 3, 4 of mCTSIB in order to indicate improved static standing balance and prevent falls at home. 5. Independent with Home Exercise Programs  6. Demonstrate Knowledge of fall prevention   LTG: (to be met in 12 treatments)  1. Patient will ambulate 10 minutes without rollator; 15 minutes with rollator; and stand 15 minutes with < 5/10 B LBP in order to improve functional mobility within home. 2. Improve modified oswestry to 12%    Patient goals: \"to stop the pain. \"        Pt. Education:  [x] Yes  [] No  [x] Reviewed Prior HEP/Ed  Method of Education: [x] Verbal  [] Demo  [] Written  Comprehension of Education:  [x] Verbalizes understanding. [] Demonstrates understanding. [] Needs review. [] Demonstrates/verbalizes HEP/Ed previously given. Plan: [] Continue per plan of care. [x] Other: D/C pt to HEP.       Time In: 1500     Time Out: 1201    Electronically signed by:  Taylor Altamirano PTA

## 2019-08-02 ENCOUNTER — INITIAL CONSULT (OUTPATIENT)
Dept: PAIN MANAGEMENT | Age: 69
End: 2019-08-02
Payer: MEDICARE

## 2019-08-02 VITALS
HEART RATE: 80 BPM | BODY MASS INDEX: 38.07 KG/M2 | SYSTOLIC BLOOD PRESSURE: 121 MMHG | DIASTOLIC BLOOD PRESSURE: 76 MMHG | HEIGHT: 64 IN | WEIGHT: 223 LBS

## 2019-08-02 DIAGNOSIS — G89.29 CHRONIC THORACIC BACK PAIN, UNSPECIFIED BACK PAIN LATERALITY: ICD-10-CM

## 2019-08-02 DIAGNOSIS — M47.817 LUMBOSACRAL SPONDYLOSIS WITHOUT MYELOPATHY: Primary | ICD-10-CM

## 2019-08-02 DIAGNOSIS — R07.81 RIB PAIN ON LEFT SIDE: ICD-10-CM

## 2019-08-02 DIAGNOSIS — M54.6 CHRONIC THORACIC BACK PAIN, UNSPECIFIED BACK PAIN LATERALITY: ICD-10-CM

## 2019-08-02 PROCEDURE — 99204 OFFICE O/P NEW MOD 45 MIN: CPT | Performed by: PAIN MEDICINE

## 2019-08-02 RX ORDER — POTASSIUM CHLORIDE 750 MG/1
TABLET, FILM COATED, EXTENDED RELEASE ORAL
Refills: 11 | COMMUNITY
Start: 2019-07-30 | End: 2019-08-28 | Stop reason: SDUPTHER

## 2019-08-02 ASSESSMENT — ENCOUNTER SYMPTOMS
NAUSEA: 0
BACK PAIN: 1
BLURRED VISION: 0
COUGH: 0

## 2019-08-05 ENCOUNTER — OFFICE VISIT (OUTPATIENT)
Dept: GASTROENTEROLOGY | Age: 69
End: 2019-08-05
Payer: MEDICARE

## 2019-08-05 VITALS
DIASTOLIC BLOOD PRESSURE: 80 MMHG | BODY MASS INDEX: 38.69 KG/M2 | HEART RATE: 83 BPM | SYSTOLIC BLOOD PRESSURE: 135 MMHG | WEIGHT: 225.4 LBS

## 2019-08-05 DIAGNOSIS — E66.8 MODERATE OBESITY: ICD-10-CM

## 2019-08-05 DIAGNOSIS — K21.9 GASTROESOPHAGEAL REFLUX DISEASE, ESOPHAGITIS PRESENCE NOT SPECIFIED: Primary | ICD-10-CM

## 2019-08-05 DIAGNOSIS — K58.8 OTHER IRRITABLE BOWEL SYNDROME: ICD-10-CM

## 2019-08-05 PROCEDURE — G8427 DOCREV CUR MEDS BY ELIG CLIN: HCPCS | Performed by: INTERNAL MEDICINE

## 2019-08-05 PROCEDURE — 4040F PNEUMOC VAC/ADMIN/RCVD: CPT | Performed by: INTERNAL MEDICINE

## 2019-08-05 PROCEDURE — 3017F COLORECTAL CA SCREEN DOC REV: CPT | Performed by: INTERNAL MEDICINE

## 2019-08-05 PROCEDURE — 99214 OFFICE O/P EST MOD 30 MIN: CPT | Performed by: INTERNAL MEDICINE

## 2019-08-05 PROCEDURE — G8417 CALC BMI ABV UP PARAM F/U: HCPCS | Performed by: INTERNAL MEDICINE

## 2019-08-05 PROCEDURE — 1123F ACP DISCUSS/DSCN MKR DOCD: CPT | Performed by: INTERNAL MEDICINE

## 2019-08-05 PROCEDURE — G8400 PT W/DXA NO RESULTS DOC: HCPCS | Performed by: INTERNAL MEDICINE

## 2019-08-05 PROCEDURE — 1090F PRES/ABSN URINE INCON ASSESS: CPT | Performed by: INTERNAL MEDICINE

## 2019-08-05 PROCEDURE — 1036F TOBACCO NON-USER: CPT | Performed by: INTERNAL MEDICINE

## 2019-08-05 ASSESSMENT — ENCOUNTER SYMPTOMS
CHOKING: 0
COUGH: 0
CONSTIPATION: 0
SINUS PRESSURE: 0
EYES NEGATIVE: 1
GASTROINTESTINAL NEGATIVE: 1
WHEEZING: 0
VOICE CHANGE: 0
RESPIRATORY NEGATIVE: 1
ALLERGIC/IMMUNOLOGIC NEGATIVE: 1
SORE THROAT: 0
VOMITING: 0
TROUBLE SWALLOWING: 0
BLOOD IN STOOL: 0
BACK PAIN: 1
NAUSEA: 0
ANAL BLEEDING: 0
ABDOMINAL PAIN: 0
RECTAL PAIN: 0
DIARRHEA: 0
ABDOMINAL DISTENTION: 0

## 2019-08-12 ENCOUNTER — HOSPITAL ENCOUNTER (OUTPATIENT)
Dept: MRI IMAGING | Age: 69
Discharge: HOME | End: 2019-08-14
Payer: MEDICARE

## 2019-08-12 DIAGNOSIS — M54.6 CHRONIC THORACIC BACK PAIN, UNSPECIFIED BACK PAIN LATERALITY: ICD-10-CM

## 2019-08-12 DIAGNOSIS — M47.817 LUMBOSACRAL SPONDYLOSIS WITHOUT MYELOPATHY: ICD-10-CM

## 2019-08-12 DIAGNOSIS — G89.29 CHRONIC THORACIC BACK PAIN, UNSPECIFIED BACK PAIN LATERALITY: ICD-10-CM

## 2019-08-12 PROCEDURE — 72148 MRI LUMBAR SPINE W/O DYE: CPT

## 2019-08-12 PROCEDURE — 72146 MRI CHEST SPINE W/O DYE: CPT

## 2019-08-16 ENCOUNTER — HOSPITAL ENCOUNTER (OUTPATIENT)
Age: 69
Discharge: HOME | End: 2019-08-18
Payer: MEDICARE

## 2019-08-16 ENCOUNTER — HOSPITAL ENCOUNTER (OUTPATIENT)
Dept: GENERAL RADIOLOGY | Age: 69
Discharge: HOME | End: 2019-08-18
Payer: MEDICARE

## 2019-08-16 DIAGNOSIS — R07.81 RIB PAIN ON LEFT SIDE: ICD-10-CM

## 2019-08-16 PROCEDURE — 71100 X-RAY EXAM RIBS UNI 2 VIEWS: CPT

## 2019-08-22 ENCOUNTER — TELEPHONE (OUTPATIENT)
Dept: SURGERY | Age: 69
End: 2019-08-22

## 2019-08-23 ENCOUNTER — OFFICE VISIT (OUTPATIENT)
Dept: PAIN MANAGEMENT | Age: 69
End: 2019-08-23
Payer: MEDICARE

## 2019-08-23 VITALS
SYSTOLIC BLOOD PRESSURE: 132 MMHG | HEART RATE: 88 BPM | DIASTOLIC BLOOD PRESSURE: 75 MMHG | HEIGHT: 64 IN | BODY MASS INDEX: 38.41 KG/M2 | WEIGHT: 225 LBS

## 2019-08-23 DIAGNOSIS — G58.8 INTERCOSTAL NEURALGIA: ICD-10-CM

## 2019-08-23 DIAGNOSIS — Z79.01 CHRONIC ANTICOAGULATION: ICD-10-CM

## 2019-08-23 DIAGNOSIS — M46.1 SACROILIITIS (HCC): Primary | ICD-10-CM

## 2019-08-23 DIAGNOSIS — M47.817 LUMBOSACRAL SPONDYLOSIS WITHOUT MYELOPATHY: ICD-10-CM

## 2019-08-23 PROCEDURE — 99214 OFFICE O/P EST MOD 30 MIN: CPT | Performed by: PAIN MEDICINE

## 2019-08-23 ASSESSMENT — ENCOUNTER SYMPTOMS
BOWEL INCONTINENCE: 0
BACK PAIN: 1
BLURRED VISION: 1
ABDOMINAL PAIN: 0
SHORTNESS OF BREATH: 0

## 2019-08-23 NOTE — PROGRESS NOTES
Vitals reviewed. Tender over the SI joints  Positive Graciela bilaterally  Tender to palpation over left-sided chest wall    Record/Diagnostics Review:    As above, I did review the imaging    Assessment:  1. Sacroiliitis ([address]    2. Lumbosacral spondylosis without myelopathy    3. Intercostal neuralgia    4. Chronic anticoagulation        Treatment Plan:  DISCUSSION: Treatment options discussed with patient and all questions answered to patient's satisfaction. OARRS Review: Reviewed and acceptable for medications prescribed. TREATMENT OPTIONS:     Discussed the importance of continued physical therapy and exercise program as well. Axial low back pain the worst of complaints, has failed conservative measures and the pain continues, pain over the bilateral sacroiliac joints with positive provocative test, may benefit from bilateral sacroiliac joint injections for both diagnostic and therapeutic purposes. Her diagnostic lumbar facets in the future. She is on Xarelto, okay to continue for SI joint and possible facet blocks. Left-sided chest wall pain, consider left-sided intercostal nerve blocks. Richard Quiñones M.D. I have reviewed the chief complaint and history of present illness (including ROS and PFSH) and vital documentation by my staff and I agree with their documentation and have added where applicable.

## 2019-08-28 ENCOUNTER — ANESTHESIA EVENT (OUTPATIENT)
Dept: OPERATING ROOM | Facility: CLINIC | Age: 69
End: 2019-08-28
Payer: MEDICARE

## 2019-08-28 RX ORDER — SODIUM CHLORIDE, SODIUM LACTATE, POTASSIUM CHLORIDE, CALCIUM CHLORIDE 600; 310; 30; 20 MG/100ML; MG/100ML; MG/100ML; MG/100ML
INJECTION, SOLUTION INTRAVENOUS CONTINUOUS
Status: CANCELLED | OUTPATIENT
Start: 2019-08-28

## 2019-08-28 RX ORDER — FENTANYL CITRATE 50 UG/ML
25 INJECTION, SOLUTION INTRAMUSCULAR; INTRAVENOUS EVERY 5 MIN PRN
Status: CANCELLED | OUTPATIENT
Start: 2019-08-28

## 2019-08-28 RX ORDER — POTASSIUM CHLORIDE 750 MG/1
TABLET, FILM COATED, EXTENDED RELEASE ORAL
Qty: 60 TABLET | Refills: 5 | Status: SHIPPED | OUTPATIENT
Start: 2019-08-28 | End: 2020-01-29

## 2019-08-28 RX ORDER — SODIUM CHLORIDE 0.9 % (FLUSH) 0.9 %
10 SYRINGE (ML) INJECTION PRN
Status: CANCELLED | OUTPATIENT
Start: 2019-08-28

## 2019-08-28 RX ORDER — MIDAZOLAM HYDROCHLORIDE 1 MG/ML
1 INJECTION INTRAMUSCULAR; INTRAVENOUS EVERY 10 MIN PRN
Status: CANCELLED | OUTPATIENT
Start: 2019-08-28

## 2019-08-28 RX ORDER — SODIUM CHLORIDE 0.9 % (FLUSH) 0.9 %
10 SYRINGE (ML) INJECTION EVERY 12 HOURS SCHEDULED
Status: CANCELLED | OUTPATIENT
Start: 2019-08-28

## 2019-08-28 NOTE — ANESTHESIA PRE PROCEDURE
amLODIPine-atorvastatatin (CADUET) 5-10 MG per tablet Take 1 tablet by mouth daily    Historical Provider, MD   DULoxetine (CYMBALTA) 60 MG extended release capsule Take 60 mg by mouth daily    Historical Provider, MD   ferrous sulfate 325 (65 Fe) MG tablet Take 325 mg by mouth daily (with breakfast)    Historical Provider, MD   fexofenadine (ALLEGRA) 60 MG tablet Take 60 mg by mouth daily    Historical Provider, MD   magnesium oxide (MAG-OX) 400 MG tablet Take 400 mg by mouth daily    Historical Provider, MD   melatonin 3 MG TABS tablet Take 3 mg by mouth daily    Historical Provider, MD   Thyroid (LEVOTHYROXINE-LIOTHYRONINE) 90 MG TABS Take by mouth    Historical Provider, MD   omeprazole (PRILOSEC) 20 MG delayed release capsule Take 20 mg by mouth daily    Historical Provider, MD   losartan-hydrochlorothiazide (HYZAAR) 100-25 MG per tablet Take 1 tablet by mouth daily. Historical Provider, MD       Current medications:    No current facility-administered medications for this encounter. Current Outpatient Medications   Medication Sig Dispense Refill    potassium chloride (KLOR-CON) 10 MEQ extended release tablet TAKE ONE (1) TABLET BY MOUTH TWICE DAILY  11    gabapentin (NEURONTIN) 600 MG tablet Take 1 tablet by mouth 4 times daily for 90 days.  TAKE ONE TABLET BY MOUTH FOUR TIMES A  tablet 2    amLODIPine (NORVASC) 5 MG tablet TAKE 1 TABLET BY MOUTH ONCE DAILY 90 tablet 2    insulin glargine (LANTUS) 100 UNIT/ML injection vial Inject 55 units nightly 30 mL 10    insulin lispro (HUMALOG) 100 UNIT/ML injection vial Inject three times daily as directed by sliding scale 10 mL 10    atorvastatin (LIPITOR) 40 MG tablet TAKE 1 TABLET BY MOUTH EVERY DAY 90 tablet 2    levothyroxine (SYNTHROID) 88 MCG tablet Take 1 tablet by mouth daily 30 tablet 5    potassium chloride (KLOR-CON M) 20 MEQ extended release tablet Take 1 tablet by mouth daily 10 tablet 0    SUPER B COMPLEX/C CAPS Take by mouth  tamsulosin (FLOMAX) 0.4 MG capsule Take 0.4 mg by mouth daily      vitamin A 24899 units capsule Take 10,000 Units by mouth daily      Cholecalciferol (VITAMIN D3) 5000 units TABS Take by mouth      rivaroxaban (XARELTO) 20 MG TABS tablet Take 20 mg by mouth      alendronate (FOSAMAX) 70 MG tablet Take 70 mg by mouth every 7 days      amLODIPine-atorvastatatin (CADUET) 5-10 MG per tablet Take 1 tablet by mouth daily      DULoxetine (CYMBALTA) 60 MG extended release capsule Take 60 mg by mouth daily      ferrous sulfate 325 (65 Fe) MG tablet Take 325 mg by mouth daily (with breakfast)      fexofenadine (ALLEGRA) 60 MG tablet Take 60 mg by mouth daily      magnesium oxide (MAG-OX) 400 MG tablet Take 400 mg by mouth daily      melatonin 3 MG TABS tablet Take 3 mg by mouth daily      Thyroid (LEVOTHYROXINE-LIOTHYRONINE) 90 MG TABS Take by mouth      omeprazole (PRILOSEC) 20 MG delayed release capsule Take 20 mg by mouth daily      losartan-hydrochlorothiazide (HYZAAR) 100-25 MG per tablet Take 1 tablet by mouth daily. Allergies:     Allergies   Allergen Reactions    Aspirin Anaphylaxis     Only thing she can take is tylenol    Benadryl [Diphenhydramine] Other (See Comments)     Dystonic movement    Ibuprofen Anaphylaxis    Lidocaine Anaphylaxis     CAN TOLERATE IV ONLY    Penicillins Anaphylaxis    Morphine Nausea And Vomiting       Problem List:    Patient Active Problem List   Diagnosis Code    Ovarian mass N83.9    Abdominal pain R10.9    Partial bowel obstruction (HCC) K56.600    Epigastric pain R10.13    GERD (gastroesophageal reflux disease) K21.9       Past Medical History:        Diagnosis Date    [facility]     Cancer ([facility] 2015    ovarian stage 2    Diabetes mellitus ([facility]     Epigastric pain     GERD (gastroesophageal reflux disease)     History of anesthesia complications 7383'[id number]    was told after gallbladder surgery to never have anesthesia again \"since it was hard for me to come out of it\"    Hypertension     Right arm pain     Right shoulder pain     Sleep apnea     uses CPAP nightly    TIA (transient ischemic attack)        Past Surgical History:        Procedure Laterality Date    APPENDECTOMY      CHOLECYSTECTOMY      COLONOSCOPY  07/08/2019    5 yr recall.  COLONOSCOPY N/A 7/8/2019    COLONOSCOPY WITH BIOPSY performed by Heather Randolph MD at [address] [place] [address] Right     repair tendon    HYSTERECTOMY  2/2014    chris with bso    KNEE ARTHROSCOPY Right     OTHER SURGICAL HISTORY Right 11/11/2014    shoulder manipulation    SHOULDER ARTHROSCOPY Right 02/03/15    SHOULDER SURGERY  11/2014    manipulation    UPPER GASTROINTESTINAL ENDOSCOPY  07/08/2019    UPPER GASTROINTESTINAL ENDOSCOPY N/A 7/8/2019    EGD BIOPSY performed by Heather Randolph MD at [place] [address] Right 4/30/2015    pt has blood clot & abscess in her right kidney       Social History:    Social History     Tobacco Use    Smoking status: Never Smoker    Smokeless tobacco: Never Used   Substance Use Topics    Alcohol use: No                                Counseling given: Not Answered      Vital Signs (Current): There were no vitals filed for this visit.                                            BP Readings from Last 3 Encounters:   08/23/19 132/75   08/05/19 135/80   08/02/19 121/76       NPO Status:                                                                                 BMI:   Wt Readings from Last 3 Encounters:   08/23/19 225 lb (102.1 kg)   08/05/19 225 lb 6.4 oz (102.2 kg)   08/02/19 223 lb (101.2 kg)     There is no height or weight on file to calculate BMI.    CBC:   Lab Results   Component Value Date    WBC 6.3 09/11/2017    RBC 3.43 09/11/2017    RBC 5.33 01/07/2012    HGB 10.2 09/11/2017    HCT 30.9 09/11/2017    MCV 90.0 09/11/2017    RDW 14.9 09/11/2017     09/11/2017     01/07/2012       CMP:   Lab Results   Component

## 2019-08-29 ENCOUNTER — ANESTHESIA (OUTPATIENT)
Dept: OPERATING ROOM | Facility: CLINIC | Age: 69
End: 2019-08-29
Payer: MEDICARE

## 2019-08-29 ENCOUNTER — APPOINTMENT (OUTPATIENT)
Dept: GENERAL RADIOLOGY | Age: 69
End: 2019-08-29
Attending: PAIN MEDICINE
Payer: MEDICARE

## 2019-08-29 ENCOUNTER — HOSPITAL ENCOUNTER (OUTPATIENT)
Facility: CLINIC | Age: 69
Setting detail: OUTPATIENT SURGERY
Discharge: HOME | End: 2019-08-29
Attending: PAIN MEDICINE | Admitting: PAIN MEDICINE
Payer: MEDICARE

## 2019-08-29 VITALS
RESPIRATION RATE: 22 BRPM | OXYGEN SATURATION: 90 % | DIASTOLIC BLOOD PRESSURE: 77 MMHG | SYSTOLIC BLOOD PRESSURE: 122 MMHG

## 2019-08-29 VITALS
WEIGHT: 220 LBS | RESPIRATION RATE: 8 BRPM | OXYGEN SATURATION: 92 % | SYSTOLIC BLOOD PRESSURE: 111 MMHG | BODY MASS INDEX: 37.56 KG/M2 | HEIGHT: 64 IN | TEMPERATURE: 97.3 F | DIASTOLIC BLOOD PRESSURE: 70 MMHG | HEART RATE: 71 BPM

## 2019-08-29 LAB — GLUCOSE BLD-MCNC: 133 MG/DL (ref 65–105)

## 2019-08-29 PROCEDURE — 2709999900 HC NON-CHARGEABLE SUPPLY: Performed by: PAIN MEDICINE

## 2019-08-29 PROCEDURE — 7100000010 HC PHASE II RECOVERY - FIRST 15 MIN: Performed by: PAIN MEDICINE

## 2019-08-29 PROCEDURE — 2580000003 HC RX 258: Performed by: SPECIALIST

## 2019-08-29 PROCEDURE — 27096 INJECT SACROILIAC JOINT: CPT | Performed by: PAIN MEDICINE

## 2019-08-29 PROCEDURE — 3600000002 HC SURGERY LEVEL 2 BASE: Performed by: PAIN MEDICINE

## 2019-08-29 PROCEDURE — 6360000002 HC RX W HCPCS: Performed by: PAIN MEDICINE

## 2019-08-29 PROCEDURE — 82947 ASSAY GLUCOSE BLOOD QUANT: CPT

## 2019-08-29 PROCEDURE — 3700000000 HC ANESTHESIA ATTENDED CARE: Performed by: PAIN MEDICINE

## 2019-08-29 PROCEDURE — 6360000002 HC RX W HCPCS: Performed by: SPECIALIST

## 2019-08-29 PROCEDURE — 7100000011 HC PHASE II RECOVERY - ADDTL 15 MIN: Performed by: PAIN MEDICINE

## 2019-08-29 PROCEDURE — 3209999900 FLUORO FOR SURGICAL PROCEDURES

## 2019-08-29 RX ORDER — 0.9 % SODIUM CHLORIDE 0.9 %
VIAL (ML) INJECTION PRN
Status: DISCONTINUED | OUTPATIENT
Start: 2019-08-29 | End: 2019-08-29 | Stop reason: SDUPTHER

## 2019-08-29 RX ORDER — DEXAMETHASONE SODIUM PHOSPHATE 10 MG/ML
INJECTION INTRAMUSCULAR; INTRAVENOUS PRN
Status: DISCONTINUED | OUTPATIENT
Start: 2019-08-29 | End: 2019-08-29 | Stop reason: ALTCHOICE

## 2019-08-29 RX ORDER — MIDAZOLAM HYDROCHLORIDE 1 MG/ML
INJECTION INTRAMUSCULAR; INTRAVENOUS PRN
Status: DISCONTINUED | OUTPATIENT
Start: 2019-08-29 | End: 2019-08-29 | Stop reason: SDUPTHER

## 2019-08-29 RX ORDER — FENTANYL CITRATE 50 UG/ML
INJECTION, SOLUTION INTRAMUSCULAR; INTRAVENOUS PRN
Status: DISCONTINUED | OUTPATIENT
Start: 2019-08-29 | End: 2019-08-29 | Stop reason: SDUPTHER

## 2019-08-29 RX ADMIN — MIDAZOLAM HYDROCHLORIDE 1 MG: 1 INJECTION, SOLUTION INTRAMUSCULAR; INTRAVENOUS at 10:44

## 2019-08-29 RX ADMIN — FENTANYL CITRATE 50 MCG: 50 INJECTION INTRAMUSCULAR; INTRAVENOUS at 10:54

## 2019-08-29 RX ADMIN — FENTANYL CITRATE 50 MCG: 50 INJECTION INTRAMUSCULAR; INTRAVENOUS at 10:47

## 2019-08-29 RX ADMIN — MIDAZOLAM HYDROCHLORIDE 1 MG: 1 INJECTION, SOLUTION INTRAMUSCULAR; INTRAVENOUS at 10:47

## 2019-08-29 RX ADMIN — Medication 10 ML: at 10:55

## 2019-08-29 ASSESSMENT — PULMONARY FUNCTION TESTS
PIF_VALUE: 0
PIF_VALUE: 1
PIF_VALUE: 0
PIF_VALUE: 1
PIF_VALUE: 0

## 2019-08-29 ASSESSMENT — PAIN - FUNCTIONAL ASSESSMENT: PAIN_FUNCTIONAL_ASSESSMENT: 0-10

## 2019-08-29 ASSESSMENT — PAIN SCALES - GENERAL: PAINLEVEL_OUTOF10: 6

## 2019-08-29 NOTE — OP NOTE
Sacro-Iliac Joint Injection:  SURGEON: Evelia Monsalve    PRE-OP DIAGNOSIS: Sacroiliitis (M46.1)    POST-OP DIAGNOSIS: Same. Procedure performed: Bilateral Sacroiliac Joint Injection. Physician confirmed and marked the surgical site. EBL: minimal    CONSENT: Patient has undergone the educational process with this procedure, is aware and fully understands the risks involved: potential damage to any and all body organs including possible bleeding, infection, and nerve injury, allergic reaction and headache. Patient also understands that the procedure will be undertaken in a safe, controlled and monitored setting. Patient recognizes that the benefits may include relief from pain and reduction in the oral use of medications. Patient agreed to proceed. PREP: The patient's back was prepped with chloroprep and draped appropriately. PROCEDURE NOTE: A 25 gauge 3.5 inch spinal needle was advanced to the  Bilateral SI joint under fluoroscopic guidance. Aspiration was negative for blood, CSF and producing pain. 2ml of  PF normal saline was mixed with 5mg Dexamethasone and slowly injected into the joint(s). The needle was withdrawn by the physician and the nurse applied a sterile dressing. The patient tolerated the procedure well. No complications occurred. Patient transferred to the recovery room in satisfactory condition. Appropriate written discharge instructions given to the patient.       [address] [place] [address]

## 2019-08-29 NOTE — H&P
have anesthesia again \"since it was hard for me to come out of it\"    Hypertension      Right arm pain      Right shoulder pain      Sleep apnea       uses CPAP nightly    TIA (transient ischemic attack)              Past Surgical History         Past Surgical History:   Procedure Laterality Date    APPENDECTOMY        CHOLECYSTECTOMY        COLONOSCOPY   07/08/2019     5 yr recall.  COLONOSCOPY N/A 7/8/2019     COLONOSCOPY WITH BIOPSY performed by Margie Zavaleta MD at [address] Right       repair tendon    HYSTERECTOMY   2/2014     chris with bso    KNEE ARTHROSCOPY Right      OTHER SURGICAL HISTORY Right 11/11/2014     shoulder manipulation    SHOULDER ARTHROSCOPY Right 02/03/15    SHOULDER SURGERY   11/2014     manipulation    UPPER GASTROINTESTINAL ENDOSCOPY   07/08/2019    UPPER GASTROINTESTINAL ENDOSCOPY N/A 7/8/2019     EGD BIOPSY performed by Margie Zavaleta MD at [place] [address] Right 4/30/2015     pt has blood clot & abscess in her right kidney                  Allergies   Allergen Reactions    Aspirin Anaphylaxis       Only thing she can take is tylenol    Benadryl [Diphenhydramine] Other (See Comments)       Dystonic movement    Ibuprofen Anaphylaxis    Lidocaine Anaphylaxis       CAN TOLERATE IV ONLY    Penicillins Anaphylaxis    Morphine Nausea And Vomiting           Current Medication      Current Outpatient Medications:     potassium chloride (KLOR-CON) 10 MEQ extended release tablet, TAKE ONE (1) TABLET BY MOUTH TWICE DAILY, Disp: , Rfl: 11    gabapentin (NEURONTIN) 600 MG tablet, Take 1 tablet by mouth 4 times daily for 90 days.  TAKE ONE TABLET BY MOUTH FOUR TIMES A DAY, Disp: 120 tablet, Rfl: 2    amLODIPine (NORVASC) 5 MG tablet, TAKE 1 TABLET BY MOUTH ONCE DAILY, Disp: 90 tablet, Rfl: 2    insulin glargine (LANTUS) 100 UNIT/ML injection vial, Inject 55 units nightly, Disp: 30 mL, Rfl: 10    insulin lispro (HUMALOG) 100 UNIT/ML injection Highest education level: Not on file   Occupational History    Not on file   Social Needs    Financial resource strain: Not on file    Food insecurity:       Worry: Not on file       Inability: Not on file    Transportation needs:       Medical: Not on file       Non-medical: Not on file   Tobacco Use    Smoking status: Never Smoker    Smokeless tobacco: Never Used   Substance and Sexual Activity    Alcohol use: No    Drug use: No    Sexual activity: Not on file   Lifestyle    Physical activity:       Days per week: Not on file       Minutes per session: Not on file    Stress: Not on file   Relationships    Social connections:       Talks on phone: Not on file       Gets together: Not on file       Attends Orthodoxy service: Not on file       Active member of club or organization: Not on file       Attends meetings of clubs or organizations: Not on file       Relationship status: Not on file    Intimate partner violence:       Fear of current or ex partner: Not on file       Emotionally abused: Not on file       Physically abused: Not on file       Forced sexual activity: Not on file   Other Topics Concern    Not on file   Social History Narrative    Not on file            Review of Systems:  Review of Systems   Constitution: Negative for fever and weight loss. HENT: Negative for ear pain and hearing loss. Eyes: Positive for blurred vision and visual disturbance. Cardiovascular: Negative for chest pain. Respiratory: Negative for shortness of breath. Skin: Positive for dry skin. Negative for rash. Musculoskeletal: Positive for back pain. Gastrointestinal: Negative for abdominal pain and bowel incontinence. Genitourinary: Negative for bladder incontinence. Neurological: Positive for tingling and weakness. Negative for headaches and numbness. Psychiatric/Behavioral: Positive for depression. Negative for hallucinations.  The patient has insomnia.          Physical Exam:  /75

## 2019-09-09 ENCOUNTER — OFFICE VISIT (OUTPATIENT)
Dept: PRIMARY CARE CLINIC | Age: 69
End: 2019-09-09
Payer: MEDICARE

## 2019-09-09 VITALS
SYSTOLIC BLOOD PRESSURE: 108 MMHG | HEART RATE: 72 BPM | BODY MASS INDEX: 39.09 KG/M2 | WEIGHT: 220.6 LBS | DIASTOLIC BLOOD PRESSURE: 62 MMHG | HEIGHT: 63 IN | RESPIRATION RATE: 18 BRPM

## 2019-09-09 DIAGNOSIS — Z23 NEED FOR INFLUENZA VACCINATION: Primary | ICD-10-CM

## 2019-09-09 DIAGNOSIS — Z00.00 ROUTINE GENERAL MEDICAL EXAMINATION AT A HEALTH CARE FACILITY: ICD-10-CM

## 2019-09-09 PROCEDURE — G0008 ADMIN INFLUENZA VIRUS VAC: HCPCS | Performed by: FAMILY MEDICINE

## 2019-09-09 PROCEDURE — G0439 PPPS, SUBSEQ VISIT: HCPCS | Performed by: FAMILY MEDICINE

## 2019-09-09 PROCEDURE — 90653 IIV ADJUVANT VACCINE IM: CPT | Performed by: FAMILY MEDICINE

## 2019-09-09 PROCEDURE — 1123F ACP DISCUSS/DSCN MKR DOCD: CPT | Performed by: FAMILY MEDICINE

## 2019-09-09 PROCEDURE — 4040F PNEUMOC VAC/ADMIN/RCVD: CPT | Performed by: FAMILY MEDICINE

## 2019-09-09 PROCEDURE — 3017F COLORECTAL CA SCREEN DOC REV: CPT | Performed by: FAMILY MEDICINE

## 2019-09-09 ASSESSMENT — LIFESTYLE VARIABLES: HOW OFTEN DO YOU HAVE A DRINK CONTAINING ALCOHOL: 0

## 2019-09-09 ASSESSMENT — PATIENT HEALTH QUESTIONNAIRE - PHQ9
SUM OF ALL RESPONSES TO PHQ QUESTIONS 1-9: 2
SUM OF ALL RESPONSES TO PHQ QUESTIONS 1-9: 2

## 2019-09-09 NOTE — PROGRESS NOTES
Vaccine Information Sheet, \"Influenza - Inactivated\"  given to Perry Osman, or parent/legal guardian of  Perry Osman and verbalized understanding. Patient responses:    Have you ever had a reaction to a flu vaccine? No  Are you able to eat eggs without adverse effects? Yes  Do you have any current illness? No  Have you ever had Guillian Dunlap Syndrome? No    Flu vaccine given per order. Please see immunization tab.
slide on for bathing    ADL:  ADLs  In the past 7 days, did you need help from others to perform any of the following everyday activities? Eating, dressing, grooming, bathing, toileting, or walking/balance?: None  In the past 7 days, did you need help from others to take care of any of the following? Laundry, housekeeping, banking/finances, shopping, telephone use, food preparation, transportation, or taking medications?: (!) Transportation  ADL Interventions:  · patient does not drive, [facility] [employer] [facility] to [facility] providers in [facility] she lives    Personalized Preventive Plan   Current Health Maintenance Status  There is no immunization history for the selected administration types on file for this patient. Health Maintenance   Topic Date Due    Hepatitis C screen  1950    Diabetic foot exam  09/14/1961    Diabetic retinal exam  09/14/1961    Diabetic microalbuminuria test  09/14/1969    DTaP/Tdap/Td vaccine (1 - Tdap) 09/14/1970    Breast cancer screen  09/14/2001    Shingles Vaccine (1 of 2) 09/14/2001    Annual Wellness Visit (AWV)  09/14/2014    DEXA (modify frequency per FRAX score)  09/14/2016    Flu vaccine (1) 09/01/2019    Pneumococcal 65+ years Vaccine (1 of 2 - PCV13) 04/17/2023 (Originally 9/14/2016)    A1C test (Diabetic or Prediabetic)  03/20/2020    Lipid screen  04/06/2020    Potassium monitoring  04/12/2020    Creatinine monitoring  07/26/2020    Colon cancer screen colonoscopy  07/08/2029     Recommendations for Preventive Services Due: see orders and patient instructions/AVS.  . Recommended screening schedule for the next 5-10 years is provided to the patient in written form: see Patient Instructions/AVS.    Nnamdi WILKINSON LPN, 7/9/4196, performed the documented evaluation under the direct supervision of the attending physician.

## 2019-09-16 ENCOUNTER — OFFICE VISIT (OUTPATIENT)
Dept: PRIMARY CARE CLINIC | Age: 69
End: 2019-09-16
Payer: MEDICARE

## 2019-09-16 VITALS
DIASTOLIC BLOOD PRESSURE: 78 MMHG | OXYGEN SATURATION: 98 % | HEIGHT: 63 IN | BODY MASS INDEX: 39.1 KG/M2 | SYSTOLIC BLOOD PRESSURE: 118 MMHG | RESPIRATION RATE: 18 BRPM | HEART RATE: 89 BPM | WEIGHT: 220.68 LBS

## 2019-09-16 DIAGNOSIS — F41.9 ANXIETY: ICD-10-CM

## 2019-09-16 DIAGNOSIS — Z79.4 OTHER SPECIFIED DIABETES MELLITUS WITH COMPLICATION, WITH LONG-TERM CURRENT USE OF INSULIN: Primary | ICD-10-CM

## 2019-09-16 DIAGNOSIS — E13.8 OTHER SPECIFIED DIABETES MELLITUS WITH COMPLICATION, WITH LONG-TERM CURRENT USE OF INSULIN: Primary | ICD-10-CM

## 2019-09-16 DIAGNOSIS — I10 ESSENTIAL HYPERTENSION: ICD-10-CM

## 2019-09-16 DIAGNOSIS — E03.9 HYPOTHYROIDISM, UNSPECIFIED TYPE: ICD-10-CM

## 2019-09-16 LAB — HBA1C MFR BLD: 8.6 %

## 2019-09-16 PROCEDURE — 1123F ACP DISCUSS/DSCN MKR DOCD: CPT | Performed by: FAMILY MEDICINE

## 2019-09-16 PROCEDURE — G8417 CALC BMI ABV UP PARAM F/U: HCPCS | Performed by: FAMILY MEDICINE

## 2019-09-16 PROCEDURE — 1090F PRES/ABSN URINE INCON ASSESS: CPT | Performed by: FAMILY MEDICINE

## 2019-09-16 PROCEDURE — 99214 OFFICE O/P EST MOD 30 MIN: CPT | Performed by: FAMILY MEDICINE

## 2019-09-16 PROCEDURE — 83036 HEMOGLOBIN GLYCOSYLATED A1C: CPT | Performed by: FAMILY MEDICINE

## 2019-09-16 PROCEDURE — G8400 PT W/DXA NO RESULTS DOC: HCPCS | Performed by: FAMILY MEDICINE

## 2019-09-16 PROCEDURE — G8427 DOCREV CUR MEDS BY ELIG CLIN: HCPCS | Performed by: FAMILY MEDICINE

## 2019-09-16 PROCEDURE — 2022F DILAT RTA XM EVC RTNOPTHY: CPT | Performed by: FAMILY MEDICINE

## 2019-09-16 PROCEDURE — 1036F TOBACCO NON-USER: CPT | Performed by: FAMILY MEDICINE

## 2019-09-16 PROCEDURE — 3045F PR MOST RECENT HEMOGLOBIN A1C LEVEL 7.0-9.0%: CPT | Performed by: FAMILY MEDICINE

## 2019-09-16 PROCEDURE — 3017F COLORECTAL CA SCREEN DOC REV: CPT | Performed by: FAMILY MEDICINE

## 2019-09-16 PROCEDURE — 4040F PNEUMOC VAC/ADMIN/RCVD: CPT | Performed by: FAMILY MEDICINE

## 2019-09-16 RX ORDER — LEVOTHYROXINE SODIUM 88 UG/1
88 TABLET ORAL DAILY
Qty: 30 TABLET | Refills: 5 | Status: SHIPPED | OUTPATIENT
Start: 2019-09-16 | End: 2020-01-29

## 2019-09-16 NOTE — PROGRESS NOTES
[facility] PRIMARY CARE  [facility] [address]  [address] [address]  Dept: [phone]  Dept Fax: [phone]    Neeru Ritchie is a 77 y.o. female who presents today for her medical conditions/complaints as noted below. Neeru Ritchie is c/o of  Chief Complaint   Patient presents with    Other     2 mon f/u         HPI:     HPI      Pt here for follow up on diabetes, HTN, pain on left side. Uses 55 insulin nightly and sliding scale usually hasnt needed to use sliding scale in a while. Pre- meal sugars have been in 150's range. Has been trying to eat healthier, quit soda and drinking more water. Pt had sacroiliac joint injection- helped for few days and wore off. Will be following up on Friday with pain management for her back and side pain. HTN controlled. Anxiety well controlled, takes one ativan usually at night.     Hemoglobin A1C (%)   Date Value   09/16/2019 8.6   03/20/2019 8.4   06/26/2018 9.8 (H)             ( goal A1C is < 7)   No results found for: LABMICR  LDL Cholesterol (mg/dL)   Date Value   11/02/2015 86   10/06/2014 86     LDL Calculated (mg/dL)   Date Value   04/06/2019 47       (goal LDL is <100)   AST (U/L)   Date Value   04/06/2019 23     ALT (U/L)   Date Value   04/06/2019 33 (H)     BUN (mg/dL)   Date Value   04/12/2019 17     BP Readings from Last 3 Encounters:   09/16/19 118/78   09/09/19 108/62   08/29/19 122/77          (goal 120/80)    Past Medical History:   Diagnosis Date    Bowel obstruction (HCC)     Cancer ([facility] 2015    ovarian stage 2    Diabetes mellitus ([facility]     Epigastric pain     GERD (gastroesophageal reflux disease)     History of anesthesia complications 5925'U    was told after gallbladder surgery to never have anesthesia again \"since it was hard for me to come out of it\"    Hypertension     Right arm pain     Right shoulder pain     Sleep apnea     uses CPAP nightly    TIA (transient ischemic attack)       Past Surgical History:   Procedure Laterality Date    APPENDECTOMY      CHOLECYSTECTOMY      COLONOSCOPY  07/08/2019    5 yr recall.  COLONOSCOPY N/A 7/8/2019    COLONOSCOPY WITH BIOPSY performed by Mk Gibson MD at [address]     repair tendon    [facility] [place], [facility]. INJECTION PROCEDURE FOR SACROILIAC JOINT Bilateral 8/29/2019    SACROILIAC JOINT INJECTION performed by Arnav Drew MD at [address]  2/2014    chris with bso    KNEE ARTHROSCOPY Right     NERVE BLOCK Bilateral 08/29/2019    SI joint injection    OTHER SURGICAL HISTORY Right 11/11/2014    shoulder manipulation    SHOULDER ARTHROSCOPY Right 02/03/15    SHOULDER SURGERY  11/2014    manipulation    UPPER GASTROINTESTINAL ENDOSCOPY  07/08/2019    UPPER GASTROINTESTINAL ENDOSCOPY N/A 7/8/2019    EGD BIOPSY performed by Mk Gibson MD at [facility] Right 4/30/2015    pt has blood clot & abscess in her right kidney       No family history on file. Social History     Tobacco Use    Smoking status: Never Smoker    Smokeless tobacco: Never Used   Substance Use Topics    Alcohol use: No      Current Outpatient Medications   Medication Sig Dispense Refill    LORazepam (ATIVAN) 0.5 MG tablet Take 1 tablet by mouth daily as needed for Anxiety for up to 30 days. 30 tablet 1    levothyroxine (SYNTHROID) 88 MCG tablet Take 1 tablet by mouth daily 30 tablet 5    potassium chloride (KLOR-CON) 10 MEQ extended release tablet TAKE ONE (1) TABLET BY MOUTH TWICE DAILY 60 tablet 5    gabapentin (NEURONTIN) 600 MG tablet Take 1 tablet by mouth 4 times daily for 90 days.  TAKE ONE TABLET BY MOUTH FOUR TIMES A  tablet 2    amLODIPine (NORVASC) 5 MG tablet TAKE 1 TABLET BY MOUTH ONCE DAILY 90 tablet 2    insulin glargine (LANTUS) 100 UNIT/ML injection vial Inject 55 units nightly 30 mL 10    insulin lispro (HUMALOG) 100 UNIT/ML injection vial Inject three times daily as directed by sliding scale 10 mL 10    atorvastatin (LIPITOR) 40 MG tablet TAKE 1 TABLET BY MOUTH EVERY DAY 90 tablet 2    potassium chloride (KLOR-CON M) 20 MEQ extended release tablet Take 1 tablet by mouth daily 10 tablet 0    SUPER B COMPLEX/C CAPS Take by mouth      tamsulosin (FLOMAX) 0.4 MG capsule Take 0.4 mg by mouth daily      vitamin A 10289 units capsule Take 10,000 Units by mouth daily      Cholecalciferol (VITAMIN D3) 5000 units TABS Take by mouth      rivaroxaban (XARELTO) 20 MG TABS tablet Take 20 mg by mouth      alendronate (FOSAMAX) 70 MG tablet Take 70 mg by mouth every 7 days      amLODIPine-atorvastatatin (CADUET) 5-10 MG per tablet Take 1 tablet by mouth daily      DULoxetine (CYMBALTA) 60 MG extended release capsule Take 60 mg by mouth daily      ferrous sulfate 325 (65 Fe) MG tablet Take 325 mg by mouth daily (with breakfast)      fexofenadine (ALLEGRA) 60 MG tablet Take 60 mg by mouth daily      magnesium oxide (MAG-OX) 400 MG tablet Take 400 mg by mouth daily      melatonin 3 MG TABS tablet Take 3 mg by mouth daily      omeprazole (PRILOSEC) 20 MG delayed release capsule Take 20 mg by mouth daily      losartan-hydrochlorothiazide (HYZAAR) 100-25 MG per tablet Take 1 tablet by mouth daily. No current facility-administered medications for this visit.       Allergies   Allergen Reactions    Aspirin Anaphylaxis     Only thing she can take is tylenol    Benadryl [Diphenhydramine] Other (See Comments)     Dystonic movement    Ibuprofen Anaphylaxis    Lidocaine Anaphylaxis     CAN NOT TOLERATE IV    Penicillins Anaphylaxis    Morphine Nausea And Vomiting       Health Maintenance   Topic Date Due    Hepatitis C screen  1950    Diabetic foot exam  09/14/1961    Diabetic retinal exam  09/14/1961    Diabetic microalbuminuria test  09/14/1969    DTaP/Tdap/Td vaccine (1 - Tdap) 09/14/1970    Breast cancer screen  09/14/2001    Shingles Vaccine (1 of 2) 09/14/2001  DEXA (modify frequency per FRAX score)  09/14/2016    Pneumococcal 65+ years Vaccine (1 of 2 - PCV13) 04/17/2023 (Originally 9/14/2016)    Lipid screen  04/06/2020    Potassium monitoring  04/12/2020    Creatinine monitoring  07/26/2020    Annual Wellness Visit (AWV)  09/08/2020    A1C test (Diabetic or Prediabetic)  09/16/2020    Colon cancer screen colonoscopy  07/08/2029    Flu vaccine  Completed       Subjective:      Review of Systems   Constitutional: Negative for appetite change, chills and fever. HENT: Negative for sore throat. Eyes: Negative for visual disturbance. Respiratory: Negative for chest tightness and shortness of breath. Cardiovascular: Negative for chest pain and palpitations. Gastrointestinal: Negative for nausea and vomiting. Left upper abdominal/side pain       Objective:     Physical Exam   Constitutional: She is oriented to person, place, and time. She appears well-developed and well-nourished. HENT:   Head: Normocephalic and atraumatic. Mouth/Throat: Oropharynx is clear and moist.   Eyes: Pupils are equal, round, and reactive to light. Conjunctivae are normal.   Neck: Neck supple. Cardiovascular: Normal rate, regular rhythm and normal heart sounds. Exam reveals no friction rub. No murmur heard. Pulmonary/Chest: Effort normal and breath sounds normal. No stridor. No respiratory distress. Neurological: She is alert and oriented to person, place, and time. She has normal reflexes. Skin: Skin is warm and dry. Psychiatric: She has a normal mood and affect. Her behavior is normal. Thought content normal.     /78 (Site: Left Upper Arm, Position: Sitting, Cuff Size: Large Adult)   Pulse 89   Resp 18   Ht 5' 3\" (1.6 m)   Wt 220 lb 10.9 oz (100.1 kg)   SpO2 98%   Breastfeeding? No   BMI 39.09 kg/m²     Assessment:      1.  Other specified diabetes mellitus with complication, with long-term current use of insulin ([address]  -Recommend increasing

## 2019-09-17 RX ORDER — LORAZEPAM 0.5 MG/1
0.5 TABLET ORAL DAILY PRN
Qty: 30 TABLET | Refills: 1
Start: 2019-09-17 | End: 2019-10-17

## 2019-09-17 ASSESSMENT — ENCOUNTER SYMPTOMS
NAUSEA: 0
SORE THROAT: 0
VOMITING: 0
SHORTNESS OF BREATH: 0
CHEST TIGHTNESS: 0

## 2019-09-20 ENCOUNTER — OFFICE VISIT (OUTPATIENT)
Dept: PAIN MANAGEMENT | Age: 69
End: 2019-09-20
Payer: MEDICARE

## 2019-09-20 VITALS
HEIGHT: 63 IN | DIASTOLIC BLOOD PRESSURE: 74 MMHG | BODY MASS INDEX: 38.98 KG/M2 | SYSTOLIC BLOOD PRESSURE: 138 MMHG | WEIGHT: 220 LBS

## 2019-09-20 DIAGNOSIS — M46.1 SACROILIITIS (HCC): Primary | ICD-10-CM

## 2019-09-20 DIAGNOSIS — M47.817 LUMBOSACRAL SPONDYLOSIS WITHOUT MYELOPATHY: ICD-10-CM

## 2019-09-20 DIAGNOSIS — Z79.01 CHRONIC ANTICOAGULATION: ICD-10-CM

## 2019-09-20 DIAGNOSIS — M54.14 THORACIC NEURITIS: ICD-10-CM

## 2019-09-20 PROCEDURE — 99214 OFFICE O/P EST MOD 30 MIN: CPT | Performed by: PAIN MEDICINE

## 2019-09-20 ASSESSMENT — ENCOUNTER SYMPTOMS
RESPIRATORY NEGATIVE: 1
BACK PAIN: 1
EYES NEGATIVE: 1

## 2019-09-20 NOTE — PROGRESS NOTES
TABLET BY MOUTH EVERY DAY, Disp: 90 tablet, Rfl: 2    potassium chloride (KLOR-CON M) 20 MEQ extended release tablet, Take 1 tablet by mouth daily, Disp: 10 tablet, Rfl: 0    SUPER B COMPLEX/C CAPS, Take by mouth, Disp: , Rfl:     tamsulosin (FLOMAX) 0.4 MG capsule, Take 0.4 mg by mouth daily, Disp: , Rfl:     vitamin A 35368 units capsule, Take 10,000 Units by mouth daily, Disp: , Rfl:     Cholecalciferol (VITAMIN D3) 5000 units TABS, Take by mouth, Disp: , Rfl:     rivaroxaban (XARELTO) 20 MG TABS tablet, Take 20 mg by mouth, Disp: , Rfl:     alendronate (FOSAMAX) 70 MG tablet, Take 70 mg by mouth every 7 days, Disp: , Rfl:     amLODIPine-atorvastatatin (CADUET) 5-10 MG per tablet, Take 1 tablet by mouth daily, Disp: , Rfl:     DULoxetine (CYMBALTA) 60 MG extended release capsule, Take 60 mg by mouth daily, Disp: , Rfl:     ferrous sulfate 325 (65 Fe) MG tablet, Take 325 mg by mouth daily (with breakfast), Disp: , Rfl:     fexofenadine (ALLEGRA) 60 MG tablet, Take 60 mg by mouth daily, Disp: , Rfl:     magnesium oxide (MAG-OX) 400 MG tablet, Take 400 mg by mouth daily, Disp: , Rfl:     melatonin 3 MG TABS tablet, Take 3 mg by mouth daily, Disp: , Rfl:     omeprazole (PRILOSEC) 20 MG delayed release capsule, Take 20 mg by mouth daily, Disp: , Rfl:     losartan-hydrochlorothiazide (HYZAAR) 100-25 MG per tablet, Take 1 tablet by mouth daily. , Disp: , Rfl:     No family history on file.     Social History     Socioeconomic History    Marital status: Single     Spouse name: Not on file    Number of children: Not on file    Years of education: Not on file    Highest education level: Not on file   Occupational History    Not on file   Social Needs    Financial resource strain: Not on file    Food insecurity:     Worry: Not on file     Inability: Not on file    Transportation needs:     Medical: Not on file     Non-medical: Not on file   Tobacco Use    Smoking status: Never Smoker    Smokeless

## 2019-09-26 ENCOUNTER — TELEPHONE (OUTPATIENT)
Dept: PRIMARY CARE CLINIC | Age: 69
End: 2019-09-26

## 2019-09-26 RX ORDER — HYDROCHLOROTHIAZIDE 25 MG/1
25 TABLET ORAL EVERY MORNING
Qty: 90 TABLET | Refills: 1 | Status: SHIPPED | OUTPATIENT
Start: 2019-09-26 | End: 2020-03-03 | Stop reason: SDUPTHER

## 2019-09-26 RX ORDER — LOSARTAN POTASSIUM 100 MG/1
100 TABLET ORAL DAILY
Qty: 90 TABLET | Refills: 1 | Status: SHIPPED | OUTPATIENT
Start: 2019-09-26 | End: 2020-03-03 | Stop reason: SDUPTHER

## 2019-10-03 ENCOUNTER — ANESTHESIA EVENT (OUTPATIENT)
Dept: OPERATING ROOM | Age: 69
End: 2019-10-03
Payer: MEDICARE

## 2019-10-03 RX ORDER — SODIUM CHLORIDE 0.9 % (FLUSH) 0.9 %
10 SYRINGE (ML) INJECTION PRN
Status: CANCELLED | OUTPATIENT
Start: 2019-10-03

## 2019-10-03 RX ORDER — SODIUM CHLORIDE 0.9 % (FLUSH) 0.9 %
10 SYRINGE (ML) INJECTION EVERY 12 HOURS SCHEDULED
Status: CANCELLED | OUTPATIENT
Start: 2019-10-03

## 2019-10-03 RX ORDER — MIDAZOLAM HYDROCHLORIDE 1 MG/ML
2 INJECTION INTRAMUSCULAR; INTRAVENOUS
Status: CANCELLED | OUTPATIENT
Start: 2019-10-03 | End: 2019-10-03

## 2019-10-10 ENCOUNTER — HOSPITAL ENCOUNTER (OUTPATIENT)
Age: 69
Setting detail: OUTPATIENT SURGERY
Discharge: HOME | End: 2019-10-10
Attending: PAIN MEDICINE | Admitting: PAIN MEDICINE
Payer: MEDICARE

## 2019-10-10 ENCOUNTER — ANESTHESIA (OUTPATIENT)
Dept: OPERATING ROOM | Age: 69
End: 2019-10-10
Payer: MEDICARE

## 2019-10-10 ENCOUNTER — APPOINTMENT (OUTPATIENT)
Dept: GENERAL RADIOLOGY | Age: 69
End: 2019-10-10
Attending: PAIN MEDICINE
Payer: MEDICARE

## 2019-10-10 VITALS
SYSTOLIC BLOOD PRESSURE: 120 MMHG | OXYGEN SATURATION: 91 % | DIASTOLIC BLOOD PRESSURE: 84 MMHG | BODY MASS INDEX: 39.41 KG/M2 | RESPIRATION RATE: 14 BRPM | WEIGHT: 222.4 LBS | HEIGHT: 63 IN | TEMPERATURE: 97.2 F | HEART RATE: 81 BPM

## 2019-10-10 VITALS
DIASTOLIC BLOOD PRESSURE: 61 MMHG | SYSTOLIC BLOOD PRESSURE: 133 MMHG | RESPIRATION RATE: 14 BRPM | OXYGEN SATURATION: 82 %

## 2019-10-10 LAB — GLUCOSE BLD-MCNC: 92 MG/DL (ref 65–105)

## 2019-10-10 PROCEDURE — 3209999900 FLUORO FOR SURGICAL PROCEDURES

## 2019-10-10 PROCEDURE — 6360000002 HC RX W HCPCS: Performed by: PAIN MEDICINE

## 2019-10-10 PROCEDURE — 3700000000 HC ANESTHESIA ATTENDED CARE: Performed by: PAIN MEDICINE

## 2019-10-10 PROCEDURE — 6360000002 HC RX W HCPCS: Performed by: NURSE ANESTHETIST, CERTIFIED REGISTERED

## 2019-10-10 PROCEDURE — 3600000002 HC SURGERY LEVEL 2 BASE: Performed by: PAIN MEDICINE

## 2019-10-10 PROCEDURE — 7100000011 HC PHASE II RECOVERY - ADDTL 15 MIN: Performed by: PAIN MEDICINE

## 2019-10-10 PROCEDURE — 7100000010 HC PHASE II RECOVERY - FIRST 15 MIN: Performed by: PAIN MEDICINE

## 2019-10-10 PROCEDURE — 2709999900 HC NON-CHARGEABLE SUPPLY: Performed by: PAIN MEDICINE

## 2019-10-10 PROCEDURE — 27096 INJECT SACROILIAC JOINT: CPT | Performed by: PAIN MEDICINE

## 2019-10-10 PROCEDURE — 72170 X-RAY EXAM OF PELVIS: CPT

## 2019-10-10 PROCEDURE — 2500000003 HC RX 250 WO HCPCS: Performed by: PAIN MEDICINE

## 2019-10-10 PROCEDURE — 82947 ASSAY GLUCOSE BLOOD QUANT: CPT

## 2019-10-10 RX ORDER — FENTANYL CITRATE 50 UG/ML
25 INJECTION, SOLUTION INTRAMUSCULAR; INTRAVENOUS EVERY 5 MIN PRN
Status: DISCONTINUED | OUTPATIENT
Start: 2019-10-10 | End: 2019-10-10 | Stop reason: HOSPADM

## 2019-10-10 RX ORDER — PROMETHAZINE HYDROCHLORIDE 25 MG/ML
6.25 INJECTION, SOLUTION INTRAMUSCULAR; INTRAVENOUS
Status: DISCONTINUED | OUTPATIENT
Start: 2019-10-10 | End: 2019-10-10 | Stop reason: HOSPADM

## 2019-10-10 RX ORDER — HYDROCODONE BITARTRATE AND ACETAMINOPHEN 5; 325 MG/1; MG/1
2 TABLET ORAL PRN
Status: DISCONTINUED | OUTPATIENT
Start: 2019-10-10 | End: 2019-10-10 | Stop reason: HOSPADM

## 2019-10-10 RX ORDER — DEXAMETHASONE SODIUM PHOSPHATE 10 MG/ML
INJECTION INTRAMUSCULAR; INTRAVENOUS PRN
Status: DISCONTINUED | OUTPATIENT
Start: 2019-10-10 | End: 2019-10-10 | Stop reason: ALTCHOICE

## 2019-10-10 RX ORDER — MORPHINE SULFATE 1 MG/ML
1 INJECTION, SOLUTION EPIDURAL; INTRATHECAL; INTRAVENOUS EVERY 5 MIN PRN
Status: DISCONTINUED | OUTPATIENT
Start: 2019-10-10 | End: 2019-10-10 | Stop reason: HOSPADM

## 2019-10-10 RX ORDER — PROPOFOL 10 MG/ML
INJECTION, EMULSION INTRAVENOUS PRN
Status: DISCONTINUED | OUTPATIENT
Start: 2019-10-10 | End: 2019-10-10 | Stop reason: SDUPTHER

## 2019-10-10 RX ORDER — BUPIVACAINE HYDROCHLORIDE 2.5 MG/ML
INJECTION, SOLUTION EPIDURAL; INFILTRATION; INTRACAUDAL PRN
Status: DISCONTINUED | OUTPATIENT
Start: 2019-10-10 | End: 2019-10-10 | Stop reason: ALTCHOICE

## 2019-10-10 RX ORDER — FENTANYL CITRATE 50 UG/ML
INJECTION, SOLUTION INTRAMUSCULAR; INTRAVENOUS PRN
Status: DISCONTINUED | OUTPATIENT
Start: 2019-10-10 | End: 2019-10-10 | Stop reason: SDUPTHER

## 2019-10-10 RX ORDER — HYDRALAZINE HYDROCHLORIDE 20 MG/ML
5 INJECTION INTRAMUSCULAR; INTRAVENOUS EVERY 10 MIN PRN
Status: DISCONTINUED | OUTPATIENT
Start: 2019-10-10 | End: 2019-10-10 | Stop reason: HOSPADM

## 2019-10-10 RX ORDER — MIDAZOLAM HYDROCHLORIDE 1 MG/ML
INJECTION INTRAMUSCULAR; INTRAVENOUS PRN
Status: DISCONTINUED | OUTPATIENT
Start: 2019-10-10 | End: 2019-10-10 | Stop reason: SDUPTHER

## 2019-10-10 RX ORDER — HYDROCODONE BITARTRATE AND ACETAMINOPHEN 5; 325 MG/1; MG/1
1 TABLET ORAL PRN
Status: DISCONTINUED | OUTPATIENT
Start: 2019-10-10 | End: 2019-10-10 | Stop reason: HOSPADM

## 2019-10-10 RX ORDER — CALCIUM CARBONATE 200(500)MG
1 TABLET,CHEWABLE ORAL DAILY PRN
COMMUNITY

## 2019-10-10 RX ORDER — ONDANSETRON 2 MG/ML
4 INJECTION INTRAMUSCULAR; INTRAVENOUS
Status: DISCONTINUED | OUTPATIENT
Start: 2019-10-10 | End: 2019-10-10 | Stop reason: HOSPADM

## 2019-10-10 RX ADMIN — MIDAZOLAM HYDROCHLORIDE 2 MG: 1 INJECTION, SOLUTION INTRAMUSCULAR; INTRAVENOUS at 09:11

## 2019-10-10 RX ADMIN — PROPOFOL 15 MG: 10 INJECTION, EMULSION INTRAVENOUS at 09:16

## 2019-10-10 RX ADMIN — FENTANYL CITRATE 100 MCG: 50 INJECTION INTRAMUSCULAR; INTRAVENOUS at 09:11

## 2019-10-10 ASSESSMENT — PULMONARY FUNCTION TESTS
PIF_VALUE: 0

## 2019-10-10 ASSESSMENT — PAIN - FUNCTIONAL ASSESSMENT: PAIN_FUNCTIONAL_ASSESSMENT: 0-10

## 2019-10-14 ENCOUNTER — OFFICE VISIT (OUTPATIENT)
Dept: PRIMARY CARE CLINIC | Age: 69
End: 2019-10-14
Payer: MEDICARE

## 2019-10-14 VITALS
WEIGHT: 222.44 LBS | OXYGEN SATURATION: 96 % | RESPIRATION RATE: 18 BRPM | BODY MASS INDEX: 39.41 KG/M2 | SYSTOLIC BLOOD PRESSURE: 94 MMHG | HEART RATE: 91 BPM | DIASTOLIC BLOOD PRESSURE: 78 MMHG | HEIGHT: 63 IN

## 2019-10-14 DIAGNOSIS — Z79.4 TYPE 2 DIABETES MELLITUS WITH DIABETIC NEUROPATHY, WITH LONG-TERM CURRENT USE OF INSULIN (HCC): Primary | ICD-10-CM

## 2019-10-14 DIAGNOSIS — E11.40 TYPE 2 DIABETES MELLITUS WITH DIABETIC NEUROPATHY, WITH LONG-TERM CURRENT USE OF INSULIN (HCC): Primary | ICD-10-CM

## 2019-10-14 DIAGNOSIS — Z12.39 SCREENING FOR BREAST CANCER: ICD-10-CM

## 2019-10-14 DIAGNOSIS — R07.81 RIB PAIN ON LEFT SIDE: ICD-10-CM

## 2019-10-14 DIAGNOSIS — I10 ESSENTIAL HYPERTENSION: ICD-10-CM

## 2019-10-14 DIAGNOSIS — F41.9 ANXIETY: ICD-10-CM

## 2019-10-14 DIAGNOSIS — Z12.31 ENCOUNTER FOR SCREENING MAMMOGRAM FOR MALIGNANT NEOPLASM OF BREAST: ICD-10-CM

## 2019-10-14 PROCEDURE — G8427 DOCREV CUR MEDS BY ELIG CLIN: HCPCS | Performed by: FAMILY MEDICINE

## 2019-10-14 PROCEDURE — G8482 FLU IMMUNIZE ORDER/ADMIN: HCPCS | Performed by: FAMILY MEDICINE

## 2019-10-14 PROCEDURE — G8400 PT W/DXA NO RESULTS DOC: HCPCS | Performed by: FAMILY MEDICINE

## 2019-10-14 PROCEDURE — G8417 CALC BMI ABV UP PARAM F/U: HCPCS | Performed by: FAMILY MEDICINE

## 2019-10-14 PROCEDURE — 1123F ACP DISCUSS/DSCN MKR DOCD: CPT | Performed by: FAMILY MEDICINE

## 2019-10-14 PROCEDURE — 2022F DILAT RTA XM EVC RTNOPTHY: CPT | Performed by: FAMILY MEDICINE

## 2019-10-14 PROCEDURE — 1036F TOBACCO NON-USER: CPT | Performed by: FAMILY MEDICINE

## 2019-10-14 PROCEDURE — 3017F COLORECTAL CA SCREEN DOC REV: CPT | Performed by: FAMILY MEDICINE

## 2019-10-14 PROCEDURE — 99214 OFFICE O/P EST MOD 30 MIN: CPT | Performed by: FAMILY MEDICINE

## 2019-10-14 PROCEDURE — 1090F PRES/ABSN URINE INCON ASSESS: CPT | Performed by: FAMILY MEDICINE

## 2019-10-14 PROCEDURE — 4040F PNEUMOC VAC/ADMIN/RCVD: CPT | Performed by: FAMILY MEDICINE

## 2019-10-15 ASSESSMENT — ENCOUNTER SYMPTOMS
BACK PAIN: 1
CHEST TIGHTNESS: 0
VOMITING: 0
NAUSEA: 0
SORE THROAT: 0
SHORTNESS OF BREATH: 0

## 2019-10-18 ENCOUNTER — OFFICE VISIT (OUTPATIENT)
Dept: PAIN MANAGEMENT | Age: 69
End: 2019-10-18
Payer: MEDICARE

## 2019-10-18 VITALS — HEART RATE: 82 BPM | OXYGEN SATURATION: 93 % | DIASTOLIC BLOOD PRESSURE: 62 MMHG | SYSTOLIC BLOOD PRESSURE: 97 MMHG

## 2019-10-18 DIAGNOSIS — M54.6 CHRONIC THORACIC BACK PAIN, UNSPECIFIED BACK PAIN LATERALITY: Primary | ICD-10-CM

## 2019-10-18 DIAGNOSIS — G89.29 CHRONIC THORACIC BACK PAIN, UNSPECIFIED BACK PAIN LATERALITY: Primary | ICD-10-CM

## 2019-10-18 DIAGNOSIS — M47.817 LUMBOSACRAL SPONDYLOSIS WITHOUT MYELOPATHY: ICD-10-CM

## 2019-10-18 DIAGNOSIS — B02.29 POST HERPETIC NEURALGIA: ICD-10-CM

## 2019-10-18 DIAGNOSIS — G58.8 INTERCOSTAL NEURALGIA: ICD-10-CM

## 2019-10-18 PROCEDURE — 99214 OFFICE O/P EST MOD 30 MIN: CPT | Performed by: PAIN MEDICINE

## 2019-10-18 ASSESSMENT — ENCOUNTER SYMPTOMS
BOWEL INCONTINENCE: 0
BACK PAIN: 1

## 2019-10-21 ENCOUNTER — HOSPITAL ENCOUNTER (OUTPATIENT)
Dept: MAMMOGRAPHY | Age: 69
Discharge: HOME | End: 2019-10-23
Payer: MEDICARE

## 2019-10-21 DIAGNOSIS — Z12.39 SCREENING FOR BREAST CANCER: ICD-10-CM

## 2019-10-21 DIAGNOSIS — Z12.31 ENCOUNTER FOR SCREENING MAMMOGRAM FOR MALIGNANT NEOPLASM OF BREAST: ICD-10-CM

## 2019-10-21 PROCEDURE — 77063 BREAST TOMOSYNTHESIS BI: CPT

## 2019-10-23 ENCOUNTER — HOSPITAL ENCOUNTER (OUTPATIENT)
Dept: PHYSICAL THERAPY | Facility: CLINIC | Age: 69
Setting detail: THERAPIES SERIES
Discharge: HOME | End: 2019-10-23
Payer: MEDICARE

## 2019-10-23 PROCEDURE — 97140 MANUAL THERAPY 1/> REGIONS: CPT

## 2019-10-23 PROCEDURE — G0283 ELEC STIM OTHER THAN WOUND: HCPCS

## 2019-10-23 PROCEDURE — 97161 PT EVAL LOW COMPLEX 20 MIN: CPT

## 2019-10-29 ENCOUNTER — HOSPITAL ENCOUNTER (OUTPATIENT)
Dept: PHYSICAL THERAPY | Facility: CLINIC | Age: 69
Setting detail: THERAPIES SERIES
Discharge: HOME | End: 2019-10-29
Payer: MEDICARE

## 2019-10-29 PROCEDURE — 97110 THERAPEUTIC EXERCISES: CPT

## 2019-10-29 PROCEDURE — 97140 MANUAL THERAPY 1/> REGIONS: CPT

## 2019-10-31 ENCOUNTER — HOSPITAL ENCOUNTER (OUTPATIENT)
Dept: PHYSICAL THERAPY | Facility: CLINIC | Age: 69
Setting detail: THERAPIES SERIES
Discharge: HOME | End: 2019-10-31
Payer: MEDICARE

## 2019-10-31 PROCEDURE — 97140 MANUAL THERAPY 1/> REGIONS: CPT

## 2019-11-05 RX ORDER — OMEPRAZOLE 20 MG/1
20 CAPSULE, DELAYED RELEASE ORAL DAILY
Qty: 90 CAPSULE | Refills: 1 | Status: SHIPPED | OUTPATIENT
Start: 2019-11-05 | End: 2020-04-01

## 2019-11-05 RX ORDER — DULOXETIN HYDROCHLORIDE 60 MG/1
60 CAPSULE, DELAYED RELEASE ORAL DAILY
Qty: 90 CAPSULE | Refills: 1 | Status: SHIPPED | OUTPATIENT
Start: 2019-11-05 | End: 2020-03-11

## 2019-11-07 ENCOUNTER — HOSPITAL ENCOUNTER (OUTPATIENT)
Age: 69
Setting detail: OUTPATIENT SURGERY
Discharge: HOME | End: 2019-11-07
Attending: PAIN MEDICINE | Admitting: PAIN MEDICINE
Payer: MEDICARE

## 2019-11-07 ENCOUNTER — APPOINTMENT (OUTPATIENT)
Dept: GENERAL RADIOLOGY | Age: 69
End: 2019-11-07
Attending: PAIN MEDICINE
Payer: MEDICARE

## 2019-11-07 ENCOUNTER — ANESTHESIA EVENT (OUTPATIENT)
Dept: OPERATING ROOM | Age: 69
End: 2019-11-07
Payer: MEDICARE

## 2019-11-07 ENCOUNTER — ANESTHESIA (OUTPATIENT)
Dept: OPERATING ROOM | Age: 69
End: 2019-11-07
Payer: MEDICARE

## 2019-11-07 VITALS
RESPIRATION RATE: 15 BRPM | HEIGHT: 64 IN | TEMPERATURE: 97.5 F | BODY MASS INDEX: 36.91 KG/M2 | DIASTOLIC BLOOD PRESSURE: 67 MMHG | OXYGEN SATURATION: 99 % | WEIGHT: 216.19 LBS | HEART RATE: 77 BPM | SYSTOLIC BLOOD PRESSURE: 107 MMHG

## 2019-11-07 VITALS
DIASTOLIC BLOOD PRESSURE: 55 MMHG | RESPIRATION RATE: 12 BRPM | OXYGEN SATURATION: 93 % | SYSTOLIC BLOOD PRESSURE: 109 MMHG

## 2019-11-07 LAB
GLUCOSE BLD-MCNC: 144 MG/DL (ref 65–105)
GLUCOSE BLD-MCNC: 156 MG/DL (ref 65–105)

## 2019-11-07 PROCEDURE — 3600000002 HC SURGERY LEVEL 2 BASE: Performed by: PAIN MEDICINE

## 2019-11-07 PROCEDURE — 7100000011 HC PHASE II RECOVERY - ADDTL 15 MIN: Performed by: PAIN MEDICINE

## 2019-11-07 PROCEDURE — 2709999900 HC NON-CHARGEABLE SUPPLY: Performed by: PAIN MEDICINE

## 2019-11-07 PROCEDURE — 7100000010 HC PHASE II RECOVERY - FIRST 15 MIN: Performed by: PAIN MEDICINE

## 2019-11-07 PROCEDURE — 6360000002 HC RX W HCPCS: Performed by: NURSE ANESTHETIST, CERTIFIED REGISTERED

## 2019-11-07 PROCEDURE — 64421 NJX AA&/STRD NTRCOST NRV EA: CPT | Performed by: PAIN MEDICINE

## 2019-11-07 PROCEDURE — 3209999900 FLUORO FOR SURGICAL PROCEDURES

## 2019-11-07 PROCEDURE — 3700000000 HC ANESTHESIA ATTENDED CARE: Performed by: PAIN MEDICINE

## 2019-11-07 PROCEDURE — 82947 ASSAY GLUCOSE BLOOD QUANT: CPT

## 2019-11-07 RX ORDER — FENTANYL CITRATE 50 UG/ML
25 INJECTION, SOLUTION INTRAMUSCULAR; INTRAVENOUS EVERY 5 MIN PRN
Status: DISCONTINUED | OUTPATIENT
Start: 2019-11-07 | End: 2019-11-07 | Stop reason: HOSPADM

## 2019-11-07 RX ORDER — FENTANYL CITRATE 50 UG/ML
INJECTION, SOLUTION INTRAMUSCULAR; INTRAVENOUS PRN
Status: DISCONTINUED | OUTPATIENT
Start: 2019-11-07 | End: 2019-11-07 | Stop reason: SDUPTHER

## 2019-11-07 RX ORDER — HYDRALAZINE HYDROCHLORIDE 20 MG/ML
5 INJECTION INTRAMUSCULAR; INTRAVENOUS EVERY 10 MIN PRN
Status: DISCONTINUED | OUTPATIENT
Start: 2019-11-07 | End: 2019-11-07 | Stop reason: HOSPADM

## 2019-11-07 RX ORDER — METOCLOPRAMIDE HYDROCHLORIDE 5 MG/ML
10 INJECTION INTRAMUSCULAR; INTRAVENOUS
Status: DISCONTINUED | OUTPATIENT
Start: 2019-11-07 | End: 2019-11-07 | Stop reason: HOSPADM

## 2019-11-07 RX ORDER — FENTANYL CITRATE 50 UG/ML
50 INJECTION, SOLUTION INTRAMUSCULAR; INTRAVENOUS EVERY 5 MIN PRN
Status: DISCONTINUED | OUTPATIENT
Start: 2019-11-07 | End: 2019-11-07 | Stop reason: HOSPADM

## 2019-11-07 RX ORDER — ONDANSETRON 2 MG/ML
4 INJECTION INTRAMUSCULAR; INTRAVENOUS
Status: DISCONTINUED | OUTPATIENT
Start: 2019-11-07 | End: 2019-11-07 | Stop reason: HOSPADM

## 2019-11-07 RX ORDER — MIDAZOLAM HYDROCHLORIDE 1 MG/ML
INJECTION INTRAMUSCULAR; INTRAVENOUS PRN
Status: DISCONTINUED | OUTPATIENT
Start: 2019-11-07 | End: 2019-11-07 | Stop reason: SDUPTHER

## 2019-11-07 RX ADMIN — MIDAZOLAM HYDROCHLORIDE 2 MG: 1 INJECTION, SOLUTION INTRAMUSCULAR; INTRAVENOUS at 09:32

## 2019-11-07 RX ADMIN — FENTANYL CITRATE 100 MCG: 50 INJECTION INTRAMUSCULAR; INTRAVENOUS at 09:36

## 2019-11-07 ASSESSMENT — PULMONARY FUNCTION TESTS
PIF_VALUE: 0

## 2019-11-07 ASSESSMENT — PAIN SCALES - GENERAL
PAINLEVEL_OUTOF10: 0

## 2019-11-07 ASSESSMENT — PAIN - FUNCTIONAL ASSESSMENT: PAIN_FUNCTIONAL_ASSESSMENT: 0-10

## 2019-11-12 RX ORDER — GLUCOSAMINE HCL/CHONDROITIN SU 500-400 MG
CAPSULE ORAL
Qty: 300 STRIP | Refills: 3 | Status: SHIPPED | OUTPATIENT
Start: 2019-11-12 | End: 2020-10-28

## 2019-11-14 ENCOUNTER — TELEPHONE (OUTPATIENT)
Dept: PRIMARY CARE CLINIC | Age: 69
End: 2019-11-14

## 2019-11-15 ENCOUNTER — OFFICE VISIT (OUTPATIENT)
Dept: PRIMARY CARE CLINIC | Age: 69
End: 2019-11-15
Payer: MEDICARE

## 2019-11-15 VITALS
DIASTOLIC BLOOD PRESSURE: 68 MMHG | OXYGEN SATURATION: 97 % | WEIGHT: 222 LBS | BODY MASS INDEX: 37.9 KG/M2 | HEIGHT: 64 IN | RESPIRATION RATE: 32 BRPM | HEART RATE: 116 BPM | SYSTOLIC BLOOD PRESSURE: 130 MMHG | TEMPERATURE: 98.7 F

## 2019-11-15 DIAGNOSIS — R05.9 COUGH: ICD-10-CM

## 2019-11-15 DIAGNOSIS — J40 BRONCHITIS: Primary | ICD-10-CM

## 2019-11-15 LAB
INFLUENZA A ANTIBODY: NORMAL
INFLUENZA B ANTIBODY: NORMAL

## 2019-11-15 PROCEDURE — 1090F PRES/ABSN URINE INCON ASSESS: CPT | Performed by: FAMILY MEDICINE

## 2019-11-15 PROCEDURE — 1123F ACP DISCUSS/DSCN MKR DOCD: CPT | Performed by: FAMILY MEDICINE

## 2019-11-15 PROCEDURE — G8417 CALC BMI ABV UP PARAM F/U: HCPCS | Performed by: FAMILY MEDICINE

## 2019-11-15 PROCEDURE — 94640 AIRWAY INHALATION TREATMENT: CPT | Performed by: FAMILY MEDICINE

## 2019-11-15 PROCEDURE — 1036F TOBACCO NON-USER: CPT | Performed by: FAMILY MEDICINE

## 2019-11-15 PROCEDURE — G8427 DOCREV CUR MEDS BY ELIG CLIN: HCPCS | Performed by: FAMILY MEDICINE

## 2019-11-15 PROCEDURE — 4040F PNEUMOC VAC/ADMIN/RCVD: CPT | Performed by: FAMILY MEDICINE

## 2019-11-15 PROCEDURE — 3017F COLORECTAL CA SCREEN DOC REV: CPT | Performed by: FAMILY MEDICINE

## 2019-11-15 PROCEDURE — 87804 INFLUENZA ASSAY W/OPTIC: CPT | Performed by: FAMILY MEDICINE

## 2019-11-15 PROCEDURE — G8482 FLU IMMUNIZE ORDER/ADMIN: HCPCS | Performed by: FAMILY MEDICINE

## 2019-11-15 PROCEDURE — 99214 OFFICE O/P EST MOD 30 MIN: CPT | Performed by: FAMILY MEDICINE

## 2019-11-15 PROCEDURE — G8400 PT W/DXA NO RESULTS DOC: HCPCS | Performed by: FAMILY MEDICINE

## 2019-11-15 RX ORDER — FLUTICASONE PROPIONATE 50 MCG
SPRAY, SUSPENSION (ML) NASAL
Refills: 0 | OUTPATIENT
Start: 2019-11-15

## 2019-11-15 RX ORDER — FLUTICASONE PROPIONATE 50 MCG
2 SPRAY, SUSPENSION (ML) NASAL DAILY
Qty: 1 BOTTLE | Refills: 0 | Status: SHIPPED | OUTPATIENT
Start: 2019-11-15 | End: 2019-11-15

## 2019-11-15 RX ORDER — CODEINE PHOSPHATE AND GUAIFENESIN 10; 100 MG/5ML; MG/5ML
10 SOLUTION ORAL 4 TIMES DAILY PRN
Qty: 180 ML | Refills: 0 | Status: SHIPPED | OUTPATIENT
Start: 2019-11-15 | End: 2019-11-20

## 2019-11-15 RX ORDER — BENZONATATE 100 MG/1
100 CAPSULE ORAL 3 TIMES DAILY PRN
Qty: 30 CAPSULE | Refills: 1 | Status: SHIPPED | OUTPATIENT
Start: 2019-11-15 | End: 2020-03-05 | Stop reason: ALTCHOICE

## 2019-11-15 RX ORDER — DOXYCYCLINE HYCLATE 100 MG
100 TABLET ORAL 2 TIMES DAILY
Qty: 20 TABLET | Refills: 0 | Status: SHIPPED | OUTPATIENT
Start: 2019-11-15 | End: 2019-11-25

## 2019-11-15 RX ORDER — ALBUTEROL SULFATE 2.5 MG/3ML
2.5 SOLUTION RESPIRATORY (INHALATION) ONCE
Status: COMPLETED | OUTPATIENT
Start: 2019-11-15 | End: 2019-11-15

## 2019-11-15 RX ADMIN — ALBUTEROL SULFATE 2.5 MG: 2.5 SOLUTION RESPIRATORY (INHALATION) at 15:08

## 2019-11-17 ASSESSMENT — ENCOUNTER SYMPTOMS
NAUSEA: 0
COUGH: 1
SORE THROAT: 0
VOMITING: 0
WHEEZING: 1

## 2019-11-21 ENCOUNTER — HOSPITAL ENCOUNTER (OUTPATIENT)
Age: 69
Discharge: HOME | End: 2019-11-23
Payer: MEDICARE

## 2019-11-21 ENCOUNTER — HOSPITAL ENCOUNTER (OUTPATIENT)
Dept: GENERAL RADIOLOGY | Age: 69
Discharge: HOME | End: 2019-11-23
Payer: MEDICARE

## 2019-11-21 DIAGNOSIS — R05.9 COUGH: ICD-10-CM

## 2019-11-21 PROCEDURE — 71046 X-RAY EXAM CHEST 2 VIEWS: CPT

## 2019-11-22 ENCOUNTER — OFFICE VISIT (OUTPATIENT)
Dept: PAIN MANAGEMENT | Age: 69
End: 2019-11-22
Payer: MEDICARE

## 2019-11-22 VITALS
SYSTOLIC BLOOD PRESSURE: 117 MMHG | WEIGHT: 221 LBS | HEART RATE: 88 BPM | DIASTOLIC BLOOD PRESSURE: 78 MMHG | BODY MASS INDEX: 37.73 KG/M2 | OXYGEN SATURATION: 97 % | HEIGHT: 64 IN

## 2019-11-22 DIAGNOSIS — Z01.818 PRE-OP TESTING: ICD-10-CM

## 2019-11-22 DIAGNOSIS — G89.29 CHRONIC THORACIC BACK PAIN, UNSPECIFIED BACK PAIN LATERALITY: Primary | ICD-10-CM

## 2019-11-22 DIAGNOSIS — M47.817 LUMBOSACRAL SPONDYLOSIS WITHOUT MYELOPATHY: ICD-10-CM

## 2019-11-22 DIAGNOSIS — R07.81 RIB PAIN ON LEFT SIDE: ICD-10-CM

## 2019-11-22 DIAGNOSIS — B02.29 POSTHERPETIC NEURALGIA: ICD-10-CM

## 2019-11-22 DIAGNOSIS — M54.6 CHRONIC THORACIC BACK PAIN, UNSPECIFIED BACK PAIN LATERALITY: Primary | ICD-10-CM

## 2019-11-22 PROCEDURE — 99214 OFFICE O/P EST MOD 30 MIN: CPT | Performed by: PAIN MEDICINE

## 2019-11-22 ASSESSMENT — ENCOUNTER SYMPTOMS
WHEEZING: 1
COUGH: 1
BACK PAIN: 1

## 2019-12-06 ENCOUNTER — OFFICE VISIT (OUTPATIENT)
Dept: NEUROSURGERY | Age: 69
End: 2019-12-06
Payer: MEDICARE

## 2019-12-06 VITALS
OXYGEN SATURATION: 98 % | SYSTOLIC BLOOD PRESSURE: 114 MMHG | BODY MASS INDEX: 38.21 KG/M2 | WEIGHT: 223.8 LBS | DIASTOLIC BLOOD PRESSURE: 74 MMHG | HEART RATE: 74 BPM | HEIGHT: 64 IN

## 2019-12-06 DIAGNOSIS — M54.14 THORACIC NEURITIS: Primary | ICD-10-CM

## 2019-12-06 DIAGNOSIS — M47.816 LUMBAR SPONDYLOSIS: ICD-10-CM

## 2019-12-06 PROCEDURE — G8482 FLU IMMUNIZE ORDER/ADMIN: HCPCS | Performed by: NURSE PRACTITIONER

## 2019-12-06 PROCEDURE — G8427 DOCREV CUR MEDS BY ELIG CLIN: HCPCS | Performed by: NURSE PRACTITIONER

## 2019-12-06 PROCEDURE — G8417 CALC BMI ABV UP PARAM F/U: HCPCS | Performed by: NURSE PRACTITIONER

## 2019-12-06 PROCEDURE — 4040F PNEUMOC VAC/ADMIN/RCVD: CPT | Performed by: NURSE PRACTITIONER

## 2019-12-06 PROCEDURE — 3017F COLORECTAL CA SCREEN DOC REV: CPT | Performed by: NURSE PRACTITIONER

## 2019-12-06 PROCEDURE — G8400 PT W/DXA NO RESULTS DOC: HCPCS | Performed by: NURSE PRACTITIONER

## 2019-12-06 PROCEDURE — 1123F ACP DISCUSS/DSCN MKR DOCD: CPT | Performed by: NURSE PRACTITIONER

## 2019-12-06 PROCEDURE — 99203 OFFICE O/P NEW LOW 30 MIN: CPT | Performed by: NURSE PRACTITIONER

## 2019-12-06 PROCEDURE — 1090F PRES/ABSN URINE INCON ASSESS: CPT | Performed by: NURSE PRACTITIONER

## 2019-12-06 PROCEDURE — 1036F TOBACCO NON-USER: CPT | Performed by: NURSE PRACTITIONER

## 2019-12-06 RX ORDER — EPINEPHRINE 0.3 MG/.3ML
INJECTION SUBCUTANEOUS
COMMUNITY
End: 2021-09-17 | Stop reason: SDUPTHER

## 2020-01-13 ENCOUNTER — OFFICE VISIT (OUTPATIENT)
Dept: PRIMARY CARE CLINIC | Age: 70
End: 2020-01-13
Payer: MEDICARE

## 2020-01-13 VITALS
HEART RATE: 93 BPM | BODY MASS INDEX: 38.07 KG/M2 | RESPIRATION RATE: 18 BRPM | SYSTOLIC BLOOD PRESSURE: 126 MMHG | WEIGHT: 223 LBS | HEIGHT: 64 IN | DIASTOLIC BLOOD PRESSURE: 84 MMHG | OXYGEN SATURATION: 95 %

## 2020-01-13 LAB — HBA1C MFR BLD: 7.8 %

## 2020-01-13 PROCEDURE — G8400 PT W/DXA NO RESULTS DOC: HCPCS | Performed by: FAMILY MEDICINE

## 2020-01-13 PROCEDURE — G0444 DEPRESSION SCREEN ANNUAL: HCPCS | Performed by: FAMILY MEDICINE

## 2020-01-13 PROCEDURE — 1090F PRES/ABSN URINE INCON ASSESS: CPT | Performed by: FAMILY MEDICINE

## 2020-01-13 PROCEDURE — 83036 HEMOGLOBIN GLYCOSYLATED A1C: CPT | Performed by: FAMILY MEDICINE

## 2020-01-13 PROCEDURE — 1123F ACP DISCUSS/DSCN MKR DOCD: CPT | Performed by: FAMILY MEDICINE

## 2020-01-13 PROCEDURE — 3017F COLORECTAL CA SCREEN DOC REV: CPT | Performed by: FAMILY MEDICINE

## 2020-01-13 PROCEDURE — G8482 FLU IMMUNIZE ORDER/ADMIN: HCPCS | Performed by: FAMILY MEDICINE

## 2020-01-13 PROCEDURE — 1036F TOBACCO NON-USER: CPT | Performed by: FAMILY MEDICINE

## 2020-01-13 PROCEDURE — G8417 CALC BMI ABV UP PARAM F/U: HCPCS | Performed by: FAMILY MEDICINE

## 2020-01-13 PROCEDURE — 4040F PNEUMOC VAC/ADMIN/RCVD: CPT | Performed by: FAMILY MEDICINE

## 2020-01-13 PROCEDURE — G8427 DOCREV CUR MEDS BY ELIG CLIN: HCPCS | Performed by: FAMILY MEDICINE

## 2020-01-13 PROCEDURE — 99214 OFFICE O/P EST MOD 30 MIN: CPT | Performed by: FAMILY MEDICINE

## 2020-01-13 PROCEDURE — 2022F DILAT RTA XM EVC RTNOPTHY: CPT | Performed by: FAMILY MEDICINE

## 2020-01-13 ASSESSMENT — PATIENT HEALTH QUESTIONNAIRE - PHQ9
5. POOR APPETITE OR OVEREATING: 3
2. FEELING DOWN, DEPRESSED OR HOPELESS: 3
SUM OF ALL RESPONSES TO PHQ QUESTIONS 1-9: 19
9. THOUGHTS THAT YOU WOULD BE BETTER OFF DEAD, OR OF HURTING YOURSELF: 0
SUM OF ALL RESPONSES TO PHQ QUESTIONS 1-9: 19
3. TROUBLE FALLING OR STAYING ASLEEP: 3
4. FEELING TIRED OR HAVING LITTLE ENERGY: 3
1. LITTLE INTEREST OR PLEASURE IN DOING THINGS: 3
6. FEELING BAD ABOUT YOURSELF - OR THAT YOU ARE A FAILURE OR HAVE LET YOURSELF OR YOUR FAMILY DOWN: 1
7. TROUBLE CONCENTRATING ON THINGS, SUCH AS READING THE NEWSPAPER OR WATCHING TELEVISION: 3
10. IF YOU CHECKED OFF ANY PROBLEMS, HOW DIFFICULT HAVE THESE PROBLEMS MADE IT FOR YOU TO DO YOUR WORK, TAKE CARE OF THINGS AT HOME, OR GET ALONG WITH OTHER PEOPLE: 2
8. MOVING OR SPEAKING SO SLOWLY THAT OTHER PEOPLE COULD HAVE NOTICED. OR THE OPPOSITE, BEING SO FIGETY OR RESTLESS THAT YOU HAVE BEEN MOVING AROUND A LOT MORE THAN USUAL: 0
SUM OF ALL RESPONSES TO PHQ9 QUESTIONS 1 & 2: 6

## 2020-01-13 NOTE — PROGRESS NOTES
clots     lung     Hypertension     Right arm pain     Right shoulder pain     Sleep apnea     uses CPAP nightly    Thyroid disease     TIA (transient ischemic attack)       Past Surgical History:   Procedure Laterality Date    ANESTHESIA NERVE BLOCK Left 11/7/2019    NERVE BLOCK (DEFINE) - INTERCOSTAL NERVE BLOCK performed by Filomena Gomes MD at [address]  07/08/2019    5 yr recall.  COLONOSCOPY N/A 7/8/2019    COLONOSCOPY WITH BIOPSY performed by Gideon Landaverde MD at [address] Right     [facility]. INJECTION PROCEDURE FOR SACROILIAC JOINT Bilateral 8/29/2019    SACROILIAC JOINT INJECTION performed by Filomena Gomes MD at [address] [facility] Bilateral 10/10/2019    SACROILIAC JOINT INJECTION performed by Filomena Gomes MD at [address] [facility] [address]  2/2014    [facility] with bso    KNEE ARTHROSCOPY Right     NERVE BLOCK Bilateral 08/29/2019    SI joint injection    NERVE BLOCK Bilateral 10/10/2019    SI joint    OTHER SURGICAL HISTORY Right 11/11/2014    shoulder manipulation    SHOULDER ARTHROSCOPY Right 02/03/15    SHOULDER SURGERY  11/2014    manipulation    UPPER GASTROINTESTINAL ENDOSCOPY  07/08/2019    UPPER GASTROINTESTINAL ENDOSCOPY N/A 7/8/2019    EGD BIOPSY performed by Gideon Landaverde MD at [facility] [address] Right 4/30/2015    pt has blood clot & abscess in her right kidney    VASCULAR SURGERY      Alesha filter in & removed 1 year later       No family history on file.     Social History     Tobacco Use    Smoking status: Never Smoker    Smokeless tobacco: Never Used   Substance Use Topics    Alcohol use: No      Current Outpatient Medications   Medication Sig Dispense Refill    LORazepam (ATIVAN) 0.5 MG tablet TAKE 1 TABLET BY MOUTH THREE TIMES DAILY AS NEEDED FOR ANXIETY 90 tablet 0    EPINEPHrine (EPIPEN 2-LISA) 0.3 MG/0.3ML SOAJ injection EpiPen 2-Lisa 0.3 mg/0.3 mL injection, auto-injector      benzonatate (TESSALON PERLES) 100 MG capsule Take 1 capsule by mouth 3 times daily as needed for Cough 30 capsule 1    Insulin Syringes, Disposable, U-100 1 ML MISC 1 each by Does not apply route 4 times daily Dx: E11.9 300 each 3    blood glucose monitor strips Test tid times a day & as needed for symptoms of irregular blood glucose.  Dx E11.9 Please fill with compatible strips to glucometer 300 strip 3    omeprazole (PRILOSEC) 20 MG delayed release capsule Take 1 capsule by mouth daily 90 capsule 1    DULoxetine (CYMBALTA) 60 MG extended release capsule Take 1 capsule by mouth daily 90 capsule 1    calcium carbonate (TUMS) 500 MG chewable tablet Take 1 tablet by mouth daily as needed for Heartburn      rivaroxaban (XARELTO) 20 MG TABS tablet Take 1 tablet by mouth daily 90 tablet 3    losartan (COZAAR) 100 MG tablet Take 1 tablet by mouth daily 90 tablet 1    hydrochlorothiazide (HYDRODIURIL) 25 MG tablet Take 1 tablet by mouth every morning 90 tablet 1    levothyroxine (SYNTHROID) 88 MCG tablet Take 1 tablet by mouth daily 30 tablet 5    potassium chloride (KLOR-CON) 10 MEQ extended release tablet TAKE ONE (1) TABLET BY MOUTH TWICE DAILY 60 tablet 5    amLODIPine (NORVASC) 5 MG tablet TAKE 1 TABLET BY MOUTH ONCE DAILY 90 tablet 2    insulin glargine (LANTUS) 100 UNIT/ML injection vial Inject 55 units nightly 30 mL 10    insulin lispro (HUMALOG) 100 UNIT/ML injection vial Inject three times daily as directed by sliding scale 10 mL 10    atorvastatin (LIPITOR) 40 MG tablet TAKE 1 TABLET BY MOUTH EVERY DAY 90 tablet 2    potassium chloride (KLOR-CON M) 20 MEQ extended release tablet Take 1 tablet by mouth daily 10 tablet 0    SUPER B COMPLEX/C CAPS Take by mouth      tamsulosin (FLOMAX) 0.4 MG capsule Take 0.4 mg by mouth daily      vitamin A 08852 units capsule Take 10,000 Units by mouth daily      Negative for chest pain. Gastrointestinal: Negative for nausea and vomiting. Musculoskeletal: Positive for back pain. Psychiatric/Behavioral: Positive for dysphoric mood. Negative for suicidal ideas. Objective:     Physical Exam  Constitutional:       Appearance: She is well-developed. HENT:      Head: Normocephalic and atraumatic. Mouth/Throat:      Mouth: Mucous membranes are moist.      Pharynx: Oropharynx is clear. No oropharyngeal exudate. Eyes:      Conjunctiva/sclera: Conjunctivae normal.      Pupils: Pupils are equal, round, and reactive to light. Neck:      Musculoskeletal: Neck supple. Cardiovascular:      Rate and Rhythm: Normal rate and regular rhythm. Heart sounds: Normal heart sounds. Pulmonary:      Effort: Pulmonary effort is normal.      Breath sounds: Normal breath sounds. Skin:     General: Skin is warm and dry. Neurological:      Mental Status: She is alert and oriented to person, place, and time. Psychiatric:         Mood and Affect: Mood normal.         Behavior: Behavior normal.         Thought Content: Thought content normal.       /84 (Site: Left Upper Arm, Position: Sitting, Cuff Size: Large Adult)   Pulse 93   Resp 18   Ht 5' 4\" (1.626 m)   Wt 223 lb (101.2 kg)   SpO2 95%   BMI 38.28 kg/m²     Assessment:      1. Type 2 diabetes mellitus with diabetic neuropathy, with long-term current use of insulin ([facility])  - A1c improved, recommend increase basal insulin to 65 units and continue sliding scale, continue to monitor. Recommend eat healthy diet as well. F/u 3 months  - POCT glycosylated hemoglobin (Hb A1C)    2. Chronic back pain, unspecified back location, unspecified back pain laterality  - feeling down due to her back pain and family issues as well. In process of getting spinal stimulator to get approved to help with pain. 3. Essential hypertension  - Bp controlled, continue current medication.      4. Anxiety and depression  - denies

## 2020-01-14 ASSESSMENT — ENCOUNTER SYMPTOMS
BACK PAIN: 1
NAUSEA: 0
VOMITING: 0
SHORTNESS OF BREATH: 0

## 2020-01-23 ENCOUNTER — OFFICE VISIT (OUTPATIENT)
Dept: PAIN MANAGEMENT | Age: 70
End: 2020-01-23
Payer: MEDICARE

## 2020-01-23 VITALS
HEART RATE: 93 BPM | HEIGHT: 64 IN | BODY MASS INDEX: 38.07 KG/M2 | SYSTOLIC BLOOD PRESSURE: 115 MMHG | DIASTOLIC BLOOD PRESSURE: 55 MMHG | WEIGHT: 223 LBS

## 2020-01-23 PROCEDURE — 99213 OFFICE O/P EST LOW 20 MIN: CPT | Performed by: PAIN MEDICINE

## 2020-01-23 ASSESSMENT — ENCOUNTER SYMPTOMS
BOWEL INCONTINENCE: 0
ABDOMINAL PAIN: 0
EYE PAIN: 0
SHORTNESS OF BREATH: 0
BACK PAIN: 1

## 2020-01-23 NOTE — PROGRESS NOTES
DAILY, Disp: 90 tablet, Rfl: 2    insulin glargine (LANTUS) 100 UNIT/ML injection vial, Inject 55 units nightly, Disp: 30 mL, Rfl: 10    insulin lispro (HUMALOG) 100 UNIT/ML injection vial, Inject three times daily as directed by sliding scale, Disp: 10 mL, Rfl: 10    atorvastatin (LIPITOR) 40 MG tablet, TAKE 1 TABLET BY MOUTH EVERY DAY, Disp: 90 tablet, Rfl: 2    potassium chloride (KLOR-CON M) 20 MEQ extended release tablet, Take 1 tablet by mouth daily, Disp: 10 tablet, Rfl: 0    SUPER B COMPLEX/C CAPS, Take by mouth, Disp: , Rfl:     tamsulosin (FLOMAX) 0.4 MG capsule, Take 0.4 mg by mouth daily, Disp: , Rfl:     vitamin A 94121 units capsule, Take 10,000 Units by mouth daily, Disp: , Rfl:     Cholecalciferol (VITAMIN D3) 5000 units TABS, Take by mouth, Disp: , Rfl:     alendronate (FOSAMAX) 70 MG tablet, Take 70 mg by mouth every 7 days, Disp: , Rfl:     amLODIPine-atorvastatatin (CADUET) 5-10 MG per tablet, Take 1 tablet by mouth daily, Disp: , Rfl:     ferrous sulfate 325 (65 Fe) MG tablet, Take 325 mg by mouth daily (with breakfast), Disp: , Rfl:     fexofenadine (ALLEGRA) 60 MG tablet, Take 60 mg by mouth daily, Disp: , Rfl:     magnesium oxide (MAG-OX) 400 MG tablet, Take 400 mg by mouth daily, Disp: , Rfl:     melatonin 3 MG TABS tablet, Take 3 mg by mouth daily, Disp: , Rfl:     gabapentin (NEURONTIN) 600 MG tablet, Take 1 tablet by mouth 4 times daily for 90 days. TAKE ONE TABLET BY MOUTH FOUR TIMES A DAY, Disp: 120 tablet, Rfl: 2    No family history on file.     Social History     Socioeconomic History    Marital status: Single     Spouse name: Not on file    Number of children: Not on file    Years of education: Not on file    Highest education level: Not on file   Occupational History    Not on file   Social Needs    Financial resource strain: Not on file    Food insecurity:     Worry: Not on file     Inability: Not on file    Transportation needs:     Medical: Not on file Non-medical: Not on file   Tobacco Use    Smoking status: Never Smoker    Smokeless tobacco: Never Used   Substance and Sexual Activity    Alcohol use: No    Drug use: No    Sexual activity: Not on file   Lifestyle    Physical activity:     Days per week: Not on file     Minutes per session: Not on file    Stress: Not on file   Relationships    Social connections:     Talks on phone: Not on file     Gets together: Not on file     Attends Scientologist service: Not on file     Active member of club or organization: Not on file     Attends meetings of clubs or organizations: Not on file     Relationship status: Not on file    Intimate partner violence:     Fear of current or ex partner: Not on file     Emotionally abused: Not on file     Physically abused: Not on file     Forced sexual activity: Not on file   Other Topics Concern    Not on file   Social History Narrative    Not on file       Review of Systems:  Review of Systems   Constitution: Negative for fever. HENT: Negative for nosebleeds. Eyes: Negative for pain. Cardiovascular: Negative for chest pain. Respiratory: Negative for shortness of breath. Endocrine: Positive for cold intolerance. Hematologic/Lymphatic: Negative for bleeding problem. Skin: Negative for rash. Musculoskeletal: Positive for back pain. Gastrointestinal: Negative for abdominal pain and bowel incontinence. Genitourinary: Negative for bladder incontinence, dysuria and pelvic pain. Neurological: Positive for headaches, numbness, tingling and weakness. Negative for paresthesias. Psychiatric/Behavioral: Positive for depression. Allergic/Immunologic: Positive for environmental allergies. Physical Exam:  BP (!) 115/55   Pulse 93   Ht 5' 4\" (1.626 m)   Wt 223 lb (101.2 kg)   BMI 38.28 kg/m²     Physical Exam  Vitals signs reviewed. Musculoskeletal:      Lumbar back: She exhibits tenderness. She exhibits no edema and no deformity.    Neurological: Mental Status: She is alert and oriented to person, place, and time. Gait: Gait normal.         Record/Diagnostics Review:    As above, I did review the imaging      Assessment:  1. Lumbosacral spondylosis without myelopathy    2. Other chronic pain    3. Chronic bilateral thoracic back pain    4. Chronic anticoagulation        Treatment Plan:  DISCUSSION: Treatment options discussed with patient and all questions answered to patient's satisfaction. OARRS Review: Reviewed and acceptable for medications prescribed. TREATMENT OPTIONS:     Discussed the importance of continued physical therapy and exercise program as well. She wishes to proceed with spinal cord stimulation, I think that is reasonable option. We will go ahead and set her up with spinal cord stimulator trial.  She is on Xarelto has been cleared to hold for trial.  Routine pre-procedure bloodwork. Lobo Kelley M.D. I have reviewed the chief complaint and history of present illness (including ROS and PFSH) and vital documentation by my staff and I agree with their documentation and have added where applicable.

## 2020-01-28 NOTE — TELEPHONE ENCOUNTER
Last OV 1/13/2020  Health Maintenance   Topic Date Due    Hepatitis C screen  1950    Diabetic retinal exam  09/14/1961    DTaP/Tdap/Td vaccine (1 - Tdap) 09/14/1962    Diabetic microalbuminuria test  09/14/1969    Shingles Vaccine (1 of 2) 09/14/2001    DEXA (modify frequency per FRAX score)  09/14/2016    Pneumococcal 65+ years Vaccine (1 of 1 - PPSV23) 04/17/2023 (Originally 9/14/2016)    Lipid screen  04/06/2020    Potassium monitoring  04/12/2020    Creatinine monitoring  07/26/2020    Annual Wellness Visit (AWV)  09/08/2020    Diabetic foot exam  10/15/2020    A1C test (Diabetic or Prediabetic)  01/13/2021    Breast cancer screen  10/21/2021    Colon cancer screen colonoscopy  07/08/2029    Flu vaccine  Completed             (applicable per patient's age: Cancer Screenings, Depression Screening, Fall Risk Screening, Immunizations)    Hemoglobin A1C (%)   Date Value   01/13/2020 7.8   09/16/2019 8.6   03/20/2019 8.4     LDL Cholesterol (mg/dL)   Date Value   11/02/2015 86     LDL Calculated (mg/dL)   Date Value   04/06/2019 47     AST (U/L)   Date Value   04/06/2019 23     ALT (U/L)   Date Value   04/06/2019 33 (H)     BUN (mg/dL)   Date Value   04/12/2019 17      (goal A1C is < 7)   (goal LDL is <100) need 30-50% reduction from baseline     BP Readings from Last 3 Encounters:   01/23/20 (!) 115/55   01/13/20 126/84   12/06/19 114/74    (goal /80)      All Future Testing planned in [facility]:  Lab Frequency Next Occurrence   EGD Once 08/12/2019   COLONOSCOPY W/ OR W/O BIOPSY Once 08/13/2019   CBC Once 11/22/2019   Protime-INR Once 11/22/2019       Next Visit Date:  Future Appointments   Date Time Provider Rodolfo Hyman   2/18/2020 10:00 AM [address] [place] [address], APRN - CNP [facility]  [facility]   4/13/2020  3:00 PM DO Bruce Mancillaurg MARCIN Lee            Patient Active Problem List:     Ovarian mass     Abdominal pain     Partial bowel obstruction (HCC)     Epigastric pain     GERD (gastroesophageal reflux disease)

## 2020-01-29 RX ORDER — LEVOTHYROXINE SODIUM 88 UG/1
TABLET ORAL
Qty: 30 TABLET | Refills: 10 | Status: SHIPPED | OUTPATIENT
Start: 2020-01-29 | End: 2020-12-24

## 2020-01-29 RX ORDER — POTASSIUM CHLORIDE 750 MG/1
TABLET, FILM COATED, EXTENDED RELEASE ORAL
Qty: 60 TABLET | Refills: 10 | Status: SHIPPED | OUTPATIENT
Start: 2020-01-29 | End: 2020-02-20

## 2020-01-29 RX ORDER — GABAPENTIN 600 MG/1
TABLET ORAL
Qty: 120 TABLET | Refills: 10 | Status: SHIPPED | OUTPATIENT
Start: 2020-01-29 | End: 2020-12-24

## 2020-01-29 RX ORDER — LORAZEPAM 0.5 MG/1
TABLET ORAL
Qty: 90 TABLET | Refills: 10 | OUTPATIENT
Start: 2020-01-29 | End: 2020-02-28

## 2020-02-07 RX ORDER — LORAZEPAM 0.5 MG/1
TABLET ORAL
Qty: 90 TABLET | Refills: 2 | Status: SHIPPED | OUTPATIENT
Start: 2020-02-07 | End: 2020-05-01

## 2020-02-12 ENCOUNTER — HOSPITAL ENCOUNTER (OUTPATIENT)
Age: 70
Discharge: HOME | End: 2020-02-12
Payer: MEDICARE

## 2020-02-12 LAB
HCT VFR BLD CALC: 39.1 % (ref 36.3–47.1)
HEMOGLOBIN: 11.8 G/DL (ref 11.9–15.1)
INR BLD: 0.9
MCH RBC QN AUTO: 27.2 PG (ref 25.2–33.5)
MCHC RBC AUTO-ENTMCNC: 30.2 G/DL (ref 28.4–34.8)
MCV RBC AUTO: 90.1 FL (ref 82.6–102.9)
NRBC AUTOMATED: 0 PER 100 WBC
PDW BLD-RTO: 14.2 % (ref 11.8–14.4)
PLATELET # BLD: 245 K/UL (ref 138–453)
PMV BLD AUTO: 12.6 FL (ref 8.1–13.5)
PROTHROMBIN TIME: 9.9 SEC (ref 9.4–12.6)
RBC # BLD: 4.34 M/UL (ref 3.95–5.11)
WBC # BLD: 9.7 K/UL (ref 3.5–11.3)

## 2020-02-12 PROCEDURE — 36415 COLL VENOUS BLD VENIPUNCTURE: CPT

## 2020-02-12 PROCEDURE — 85027 COMPLETE CBC AUTOMATED: CPT

## 2020-02-12 PROCEDURE — 85610 PROTHROMBIN TIME: CPT

## 2020-02-14 ENCOUNTER — APPOINTMENT (OUTPATIENT)
Dept: ULTRASOUND IMAGING | Age: 70
End: 2020-02-14
Payer: MEDICARE

## 2020-02-14 ENCOUNTER — HOSPITAL ENCOUNTER (EMERGENCY)
Age: 70
Discharge: HOME | End: 2020-02-14
Attending: SPECIALIST
Payer: MEDICARE

## 2020-02-14 VITALS
HEIGHT: 64 IN | RESPIRATION RATE: 18 BRPM | OXYGEN SATURATION: 98 % | WEIGHT: 223 LBS | DIASTOLIC BLOOD PRESSURE: 85 MMHG | TEMPERATURE: 98.6 F | SYSTOLIC BLOOD PRESSURE: 138 MMHG | BODY MASS INDEX: 38.07 KG/M2 | HEART RATE: 75 BPM

## 2020-02-14 LAB
ANION GAP SERPL CALCULATED.3IONS-SCNC: 12 MMOL/L (ref 9–17)
BUN BLDV-MCNC: 19 MG/DL (ref 8–23)
BUN/CREAT BLD: ABNORMAL (ref 9–20)
CALCIUM SERPL-MCNC: 8.9 MG/DL (ref 8.6–10.4)
CHLORIDE BLD-SCNC: 100 MMOL/L (ref 98–107)
CO2: 30 MMOL/L (ref 20–31)
CREAT SERPL-MCNC: 0.94 MG/DL (ref 0.5–0.9)
GFR AFRICAN AMERICAN: >60 ML/MIN
GFR NON-AFRICAN AMERICAN: 59 ML/MIN
GFR SERPL CREATININE-BSD FRML MDRD: ABNORMAL ML/MIN/{1.73_M2}
GFR SERPL CREATININE-BSD FRML MDRD: ABNORMAL ML/MIN/{1.73_M2}
GLUCOSE BLD-MCNC: 75 MG/DL (ref 70–99)
MAGNESIUM: 1.9 MG/DL (ref 1.6–2.6)
POTASSIUM SERPL-SCNC: 3.3 MMOL/L (ref 3.7–5.3)
SODIUM BLD-SCNC: 142 MMOL/L (ref 135–144)

## 2020-02-14 PROCEDURE — 6370000000 HC RX 637 (ALT 250 FOR IP): Performed by: SPECIALIST

## 2020-02-14 PROCEDURE — 93971 EXTREMITY STUDY: CPT

## 2020-02-14 PROCEDURE — 83735 ASSAY OF MAGNESIUM: CPT

## 2020-02-14 PROCEDURE — 80048 BASIC METABOLIC PNL TOTAL CA: CPT

## 2020-02-14 PROCEDURE — 36415 COLL VENOUS BLD VENIPUNCTURE: CPT

## 2020-02-14 PROCEDURE — 99283 EMERGENCY DEPT VISIT LOW MDM: CPT

## 2020-02-14 RX ORDER — POTASSIUM CHLORIDE 20 MEQ/1
40 TABLET, EXTENDED RELEASE ORAL ONCE
Status: COMPLETED | OUTPATIENT
Start: 2020-02-14 | End: 2020-02-14

## 2020-02-14 RX ADMIN — POTASSIUM CHLORIDE 40 MEQ: 1500 TABLET, EXTENDED RELEASE ORAL at 17:38

## 2020-02-14 ASSESSMENT — PAIN SCALES - GENERAL: PAINLEVEL_OUTOF10: 6

## 2020-02-14 ASSESSMENT — PAIN DESCRIPTION - DESCRIPTORS: DESCRIPTORS: ACHING;SORE

## 2020-02-14 ASSESSMENT — ENCOUNTER SYMPTOMS
BACK PAIN: 0
COUGH: 0
SHORTNESS OF BREATH: 0
WHEEZING: 0
COLOR CHANGE: 0

## 2020-02-14 ASSESSMENT — PAIN DESCRIPTION - ORIENTATION: ORIENTATION: RIGHT;LEFT

## 2020-02-14 ASSESSMENT — PAIN DESCRIPTION - LOCATION: LOCATION: LEG

## 2020-02-14 ASSESSMENT — PAIN DESCRIPTION - PAIN TYPE: TYPE: ACUTE PAIN

## 2020-02-14 NOTE — ED PROVIDER NOTES
History of anesthesia complications, History of blood transfusion, Hx of blood clots, Hypertension, Right arm pain, Right shoulder pain, Sleep apnea, Thyroid disease, and TIA (transient ischemic attack). SURGICAL HISTORY      has a past surgical history that includes Knee arthroscopy (Right); Hand surgery (Right); Cholecystectomy; other surgical history (Right, 11/11/2014); shoulder surgery (11/2014); Shoulder arthroscopy (Right, 02/03/15); Appendectomy; Ureter stent placement (Right, 4/30/2015); Hysterectomy (2/2014); Colonoscopy (07/08/2019); Upper gastrointestinal endoscopy (07/08/2019); Colonoscopy (N/A, 7/8/2019); Upper gastrointestinal endoscopy (N/A, 7/8/2019); Nerve Block (Bilateral, 08/29/2019); Injection Procedure For Sacroiliac Joint (Bilateral, 8/29/2019); vascular surgery; Nerve Block (Bilateral, 10/10/2019); Injection Procedure For Sacroiliac Joint (Bilateral, 10/10/2019); and Anesthesia Nerve Block (Left, 11/7/2019). CURRENT MEDICATIONS       Previous Medications    ALENDRONATE (FOSAMAX) 70 MG TABLET    Take 70 mg by mouth every 7 days    AMLODIPINE (NORVASC) 5 MG TABLET    TAKE 1 TABLET BY MOUTH ONCE DAILY    ATORVASTATIN (LIPITOR) 40 MG TABLET    TAKE 1 TABLET BY MOUTH EVERY DAY    BENZONATATE (TESSALON PERLES) 100 MG CAPSULE    Take 1 capsule by mouth 3 times daily as needed for Cough    BLOOD GLUCOSE MONITOR STRIPS    Test tid times a day & as needed for symptoms of irregular blood glucose.  Dx E11.9 Please fill with compatible strips to glucometer    CALCIUM CARBONATE (TUMS) 500 MG CHEWABLE TABLET    Take 1 tablet by mouth daily as needed for Heartburn    CHOLECALCIFEROL (VITAMIN D3) 5000 UNITS TABS    Take by mouth    DULOXETINE (CYMBALTA) 60 MG EXTENDED RELEASE CAPSULE    Take 1 capsule by mouth daily    EPINEPHRINE (EPIPEN 2-LISA) 0.3 MG/0.3ML SOAJ INJECTION    EpiPen 2-Lisa 0.3 mg/0.3 mL injection, auto-injector    FERROUS SULFATE 325 (65 FE) MG TABLET    Take 325 mg by mouth daily (with breakfast)    FEXOFENADINE (ALLEGRA) 60 MG TABLET    Take 60 mg by mouth daily    GABAPENTIN (NEURONTIN) 600 MG TABLET    TAKE (1) TABLET BY MOUTH FOUR TIMES A DAY    HYDROCHLOROTHIAZIDE (HYDRODIURIL) 25 MG TABLET    Take 1 tablet by mouth every morning    INSULIN GLARGINE (LANTUS) 100 UNIT/ML INJECTION VIAL    Inject 55 units nightly    INSULIN LISPRO (HUMALOG) 100 UNIT/ML INJECTION VIAL    Inject three times daily as directed by sliding scale    INSULIN SYRINGES, DISPOSABLE, U-100 1 ML MISC    1 each by Does not apply route 4 times daily Dx: E11.9    LEVOTHYROXINE (SYNTHROID) 88 MCG TABLET    TAKE (1) TABLET BY MOUTH DAILY    LORAZEPAM (ATIVAN) 0.5 MG TABLET    TAKE 1 TABLET BY MOUTH THREE TIMES DAILY AS NEEDED FOR ANXIETY    LOSARTAN (COZAAR) 100 MG TABLET    Take 1 tablet by mouth daily    MAGNESIUM OXIDE (MAG-OX) 400 MG TABLET    Take 400 mg by mouth daily    MELATONIN 10 MG TABS    Take 10 mg by mouth daily as needed     OMEPRAZOLE (PRILOSEC) 20 MG DELAYED RELEASE CAPSULE    Take 1 capsule by mouth daily    POTASSIUM CHLORIDE (KLOR-CON M) 20 MEQ EXTENDED RELEASE TABLET    Take 1 tablet by mouth daily    POTASSIUM CHLORIDE (KLOR-CON) 10 MEQ EXTENDED RELEASE TABLET    TAKE 1 TABLET BY MOUTH TWICE DAILY    RIVAROXABAN (XARELTO) 20 MG TABS TABLET    Take 1 tablet by mouth daily    SUPER B COMPLEX/C CAPS    Take by mouth    TAMSULOSIN (FLOMAX) 0.4 MG CAPSULE    Take 0.4 mg by mouth daily    VITAMIN A 05881 UNITS CAPSULE    Take 10,000 Units by mouth daily       ALLERGIES     is allergic to aspirin; benadryl [diphenhydramine]; ibuprofen; lidocaine; penicillins; and morphine. FAMILY HISTORY     has no family status information on file. family history is not on file. SOCIAL HISTORY      reports that she has never smoked. She has never used smokeless tobacco. She reports that she does not drink alcohol or use drugs.     PHYSICAL EXAM     INITIAL VITALS:  height is 5' 4\" (1.626 m) and weight is 101.2 kg ([address]) extended release tablet 40 mEq         During the ED course, patient was given potassium chloride 40 mEq orally. She has been resting comfortably and does not appear to be in any pain or distress. Plan is to discharge the patient with instructions drink plenty of oral fluids, continue current medications, restart Xarelto, take Tylenol and ibuprofen as needed for the pain, follow-up with PCP, return if worse. CONSULTS:  None    PROCEDURES:  None    FINAL IMPRESSION      1. Bilateral leg pain    2. Hypokalemia          DISPOSITION/PLAN       PATIENT REFERRED TO:  Rea Yang, [address] [place] [address].  [phone]    Call in 2 days  For reevaluation of current symptoms    [facility] ED  [address] [facility]. [address] [place], [place] [id number]  [phone]    If symptoms worsen      DISCHARGE MEDICATIONS:  New Prescriptions    No medications on file       (Please note that portions of this note were completed with a voice recognition program.  Efforts were made to edit the dictations but occasionally words are mis-transcribed.)    Lynn MD, F.A.C.E.P.   Attending Emergency Medicine Physician     Teodoro Anguiano MD  02/14/20 [id number]

## 2020-02-14 NOTE — ED NOTES
Pt to room 6 via W/c. C/o bilat lower leg pain. Pt reports onset of bilat lower leg pain a couple days ago with increased muscle spasms. Pt sts she was to have a spinal cord stimulator placed today but when she spoke with physician to tell him of her s/s and he found out pt has been or xarelto x 10 days and was only supposed to be off for 3 prior to procedure he sent her to ED for eval of legs. Pt sts she is on xarelto for bilat PE and has been on med x 5 years. Pt alert and oriented speaking sentences no distress noted. Pt denies any chest pain or SOB with s/s. Pt legs visualized no redness or swelling noted.       Veronica Nunez RN  02/14/20 [id number]

## 2020-02-14 NOTE — ED NOTES
Pt cleared for discharge per MD. Pt discharge instructions explained, No Rx Given. Pt Verbalized understanding of all instructions and all patient questions answered to their satisfaction. Pt departs from ED in stable condition.         Bhavik Ryan RN  02/14/20 [id number]

## 2020-02-18 ENCOUNTER — OFFICE VISIT (OUTPATIENT)
Dept: PAIN MANAGEMENT | Age: 70
End: 2020-02-18
Payer: MEDICARE

## 2020-02-18 VITALS
DIASTOLIC BLOOD PRESSURE: 68 MMHG | SYSTOLIC BLOOD PRESSURE: 123 MMHG | BODY MASS INDEX: 38.66 KG/M2 | WEIGHT: 225.2 LBS | HEART RATE: 93 BPM

## 2020-02-18 PROBLEM — Z79.01 CHRONIC ANTICOAGULATION: Chronic | Status: ACTIVE | Noted: 2020-02-18

## 2020-02-18 PROBLEM — G89.29 CHRONIC BILATERAL THORACIC BACK PAIN: Chronic | Status: ACTIVE | Noted: 2020-02-18

## 2020-02-18 PROBLEM — M54.6 CHRONIC BILATERAL THORACIC BACK PAIN: Chronic | Status: ACTIVE | Noted: 2020-02-18

## 2020-02-18 PROBLEM — M47.817 LUMBOSACRAL SPONDYLOSIS WITHOUT MYELOPATHY: Chronic | Status: ACTIVE | Noted: 2020-02-18

## 2020-02-18 PROCEDURE — 99213 OFFICE O/P EST LOW 20 MIN: CPT | Performed by: NURSE PRACTITIONER

## 2020-02-18 ASSESSMENT — ENCOUNTER SYMPTOMS
CONSTIPATION: 0
COUGH: 0
BACK PAIN: 1
SHORTNESS OF BREATH: 0

## 2020-02-18 NOTE — PROGRESS NOTES
Patient is here today for office visit    Chief Complaint: back pain    PMH: Pt complains of low and mid back pain. She was scheduled for SCS trial last week but procedure was canceled. She had been holding her Xarelto for 10 days and was having muscle cramps in her legs. Dr. Castro Santos was concerned due to hx of DVT and sent her to the ED where US was completed on BLE with no evidence of DVT. She was found to be hypokalemic and given potassium po which relieved her muscle cramps. She would like to reschedule the SCS trial. Discussed with Dr. Castro Santos and he would like her to remain on Xarelto for one month before trial is completed. She should then hold Xarelto for only three days prior to procedure. Back Pain   This is a chronic problem. The current episode started more than 1 year ago. The problem occurs intermittently (when standing and walking). The problem has been gradually worsening since onset. The pain is present in the lumbar spine and thoracic spine. The quality of the pain is described as aching. Radiates to: into ribs on the left. The pain is at a severity of 8/10 (when standing and walking 2/10 when sitting). The pain is moderate. The symptoms are aggravated by position and standing (walking). Pertinent negatives include no chest pain or fever. Pelvic pain:   She has tried analgesics and bed rest for the symptoms. The treatment provided mild relief. Patient denies any new neurological symptoms. No bowel or bladder incontinence, no weakness, and no falling.       Past Medical History:   Diagnosis Date    Arthritis     Bowel obstruction ([address]     Cancer ([address] 2015    ovarian stage 2    Cerebral artery occlusion with cerebral infarction ([address] 03/2018    Mini strokes \"Tia's\"    Diabetes mellitus ([address]     Epigastric pain     GERD (gastroesophageal reflux disease)     History of anesthesia complications 7641'T    was told after gallbladder surgery to never have anesthesia again \"since it was hard for me to come out of it\"    History of blood transfusion     Hx of blood clots     lung     Hypertension     Right arm pain     Right shoulder pain     Sleep apnea     uses CPAP nightly    Thyroid disease     TIA (transient ischemic attack)        Past Surgical History:   Procedure Laterality Date    ANESTHESIA NERVE BLOCK Left 11/7/2019    NERVE BLOCK (DEFINE) - INTERCOSTAL NERVE BLOCK performed by Demetra Snow MD at [address]  07/08/2019    5 yr recall.     COLONOSCOPY N/A 7/8/2019    COLONOSCOPY WITH BIOPSY performed by Jamin Mckinney MD at [address] Right     repair tendon    [facility] [employer] [facility]. INJECTION PROCEDURE FOR SACROILIAC JOINT Bilateral 8/29/2019    SACROILIAC JOINT INJECTION performed by Demetra Snow MD at [address] [facility] Bilateral 10/10/2019    SACROILIAC JOINT INJECTION performed by Demetra Snow MD at [address]  2/2014    [facility] with bso    KNEE ARTHROSCOPY Right     NERVE BLOCK Bilateral 08/29/2019    SI joint injection    NERVE BLOCK Bilateral 10/10/2019    SI joint    OTHER SURGICAL HISTORY Right 11/11/2014    shoulder manipulation    SHOULDER ARTHROSCOPY Right 02/03/15    SHOULDER SURGERY  11/2014    manipulation    UPPER GASTROINTESTINAL ENDOSCOPY  07/08/2019    UPPER GASTROINTESTINAL ENDOSCOPY N/A 7/8/2019    EGD BIOPSY performed by Jamin Mckinney MD at [facility] Right 4/30/2015    pt has blood clot & abscess in her right kidney    VASCULAR SURGERY      [place] filter in & removed 1 year later       Allergies   Allergen Reactions    Aspirin Anaphylaxis     Only thing she can take is tylenol    Benadryl [Diphenhydramine] Other (See Comments)     Dystonic movement    Ibuprofen Anaphylaxis    Lidocaine Anaphylaxis     CAN NOT TOLERATE IV    Penicillins Anaphylaxis    Morphine Nausea And differently: 65 Units Inject 55 units nightly), Disp: 30 mL, Rfl: 10    insulin lispro (HUMALOG) 100 UNIT/ML injection vial, Inject three times daily as directed by sliding scale, Disp: 10 mL, Rfl: 10    atorvastatin (LIPITOR) 40 MG tablet, TAKE 1 TABLET BY MOUTH EVERY DAY, Disp: 90 tablet, Rfl: 2    potassium chloride (KLOR-CON M) 20 MEQ extended release tablet, Take 1 tablet by mouth daily, Disp: 10 tablet, Rfl: 0    SUPER B COMPLEX/C CAPS, Take by mouth, Disp: , Rfl:     tamsulosin (FLOMAX) 0.4 MG capsule, Take 0.4 mg by mouth daily, Disp: , Rfl:     vitamin A 86497 units capsule, Take 10,000 Units by mouth daily, Disp: , Rfl:     Cholecalciferol (VITAMIN D3) 5000 units TABS, Take by mouth, Disp: , Rfl:     alendronate (FOSAMAX) 70 MG tablet, Take 70 mg by mouth every 7 days, Disp: , Rfl:     ferrous sulfate 325 (65 Fe) MG tablet, Take 325 mg by mouth daily (with breakfast), Disp: , Rfl:     fexofenadine (ALLEGRA) 60 MG tablet, Take 60 mg by mouth daily, Disp: , Rfl:     magnesium oxide (MAG-OX) 400 MG tablet, Take 400 mg by mouth daily, Disp: , Rfl:     Melatonin 10 MG TABS, Take 10 mg by mouth daily as needed , Disp: , Rfl:     History reviewed. No pertinent family history.     Social History     Socioeconomic History    Marital status: Single     Spouse name: Not on file    Number of children: Not on file    Years of education: Not on file    Highest education level: Not on file   Occupational History    Not on file   Social Needs    Financial resource strain: Not on file    Food insecurity:     Worry: Not on file     Inability: Not on file    Transportation needs:     Medical: Not on file     Non-medical: Not on file   Tobacco Use    Smoking status: Never Smoker    Smokeless tobacco: Never Used   Substance and Sexual Activity    Alcohol use: No    Drug use: No    Sexual activity: Not on file   Lifestyle    Physical activity:     Days per week: Not on file     Minutes per session: Not on file    Stress: Not on file   Relationships    Social connections:     Talks on phone: Not on file     Gets together: Not on file     Attends Restorationist service: Not on file     Active member of club or organization: Not on file     Attends meetings of clubs or organizations: Not on file     Relationship status: Not on file    Intimate partner violence:     Fear of current or ex partner: Not on file     Emotionally abused: Not on file     Physically abused: Not on file     Forced sexual activity: Not on file   Other Topics Concern    Not on file   Social History Narrative    Not on file       Review of Systems:  Review of Systems   Constitution: Negative for chills and fever. Cardiovascular: Negative for chest pain and palpitations. Respiratory: Negative for cough and shortness of breath. Musculoskeletal: Positive for back pain. Gastrointestinal: Negative for constipation. Genitourinary: Pelvic pain:     Neurological: Negative for disturbances in coordination and loss of balance. Physical Exam:  /68   Pulse 93   Wt 225 lb 3.2 oz (102.2 kg)   BMI 38.66 kg/m²     Physical Exam  HENT:      Head: Normocephalic. Neck:      Musculoskeletal: Normal range of motion. Pulmonary:      Effort: Pulmonary effort is normal.   Musculoskeletal: Normal range of motion. Lumbar back: She exhibits tenderness and pain. Skin:     General: Skin is warm and dry. Neurological:      Mental Status: She is alert and oriented to person, place, and time.          Record/Diagnostics Review:    Mild disc protrusions in the lower lumbar spine without significant spinal   canal stenosis or significant neural foraminal narrowing         Assessment:  Problem List Items Addressed This Visit     Lumbosacral spondylosis without myelopathy (Chronic)    Chronic bilateral thoracic back pain (Chronic)    Chronic anticoagulation (Chronic)             Treatment Plan:  Remain on Xarelto for one month  SCS trial to be

## 2020-02-20 ENCOUNTER — TELEPHONE (OUTPATIENT)
Dept: PRIMARY CARE CLINIC | Age: 70
End: 2020-02-20

## 2020-02-20 ENCOUNTER — OFFICE VISIT (OUTPATIENT)
Dept: PRIMARY CARE CLINIC | Age: 70
End: 2020-02-20
Payer: MEDICARE

## 2020-02-20 VITALS
SYSTOLIC BLOOD PRESSURE: 120 MMHG | RESPIRATION RATE: 16 BRPM | BODY MASS INDEX: 38.48 KG/M2 | DIASTOLIC BLOOD PRESSURE: 82 MMHG | WEIGHT: 225.4 LBS | HEIGHT: 64 IN | HEART RATE: 86 BPM | OXYGEN SATURATION: 96 %

## 2020-02-20 PROCEDURE — 99213 OFFICE O/P EST LOW 20 MIN: CPT | Performed by: NURSE PRACTITIONER

## 2020-02-20 PROCEDURE — G8427 DOCREV CUR MEDS BY ELIG CLIN: HCPCS | Performed by: NURSE PRACTITIONER

## 2020-02-20 PROCEDURE — 1123F ACP DISCUSS/DSCN MKR DOCD: CPT | Performed by: NURSE PRACTITIONER

## 2020-02-20 PROCEDURE — G8417 CALC BMI ABV UP PARAM F/U: HCPCS | Performed by: NURSE PRACTITIONER

## 2020-02-20 PROCEDURE — 4040F PNEUMOC VAC/ADMIN/RCVD: CPT | Performed by: NURSE PRACTITIONER

## 2020-02-20 PROCEDURE — G8482 FLU IMMUNIZE ORDER/ADMIN: HCPCS | Performed by: NURSE PRACTITIONER

## 2020-02-20 PROCEDURE — G8400 PT W/DXA NO RESULTS DOC: HCPCS | Performed by: NURSE PRACTITIONER

## 2020-02-20 PROCEDURE — 3017F COLORECTAL CA SCREEN DOC REV: CPT | Performed by: NURSE PRACTITIONER

## 2020-02-20 PROCEDURE — 1090F PRES/ABSN URINE INCON ASSESS: CPT | Performed by: NURSE PRACTITIONER

## 2020-02-20 PROCEDURE — 1036F TOBACCO NON-USER: CPT | Performed by: NURSE PRACTITIONER

## 2020-02-20 RX ORDER — POTASSIUM CHLORIDE 20 MEQ/1
20 TABLET, EXTENDED RELEASE ORAL 2 TIMES DAILY
Qty: 180 TABLET | Refills: 1 | Status: SHIPPED | OUTPATIENT
Start: 2020-02-20 | End: 2020-07-28

## 2020-02-20 ASSESSMENT — ENCOUNTER SYMPTOMS
CHEST TIGHTNESS: 0
VOMITING: 0
COLOR CHANGE: 0
ABDOMINAL PAIN: 0
SHORTNESS OF BREATH: 0
DIARRHEA: 0
SORE THROAT: 0
NAUSEA: 0
BACK PAIN: 1
RHINORRHEA: 0

## 2020-02-20 NOTE — PROGRESS NOTES
[address] PRIMARY CARE  [address] [facility] [address]  [address] [address]  Dept: [phone]  Dept Fax: [phone]    Joseph Jordan is a 77 y.o. female who presentstoday for her medical conditions/complaints as noted below.   Joseph Jordan is c/o of  Chief Complaint   Patient presents with    [facility] Leg Pain     Bilateral; had leg cramps last night    Other     Hypokalemia; was given potassium by hospital; possible blood work          HPI:     Here to follow up from ED 2020  Was off xarelto for 10 days prior to scheduled spinal cord stimulator 2020, was only supposed to be off for 3 prior (10 days total over duration of procedure)  Was also having leg cramps so she was sent to ED to evaluate for DVT, hx clots  Workup negative but was found to have low potassium, she has history of this, reports that she has required IV potassium at times in past  Is unsure what her potassium dose she is taking daily at home, meds sent through mail   Continues to have some leg discomfort and mild cramping  Is to continue xarelto for 1 month and then reschedule for SCS trial, only to be off xarelto for 3 days prior to procedure          Hemoglobin A1C (%)   Date Value   2020 7.8   2019 8.6   2019 8.4             ( goal A1C is < 7)   No results found for: LABMICR  LDL Cholesterol (mg/dL)   Date Value   2015 86   10/06/2014 86     LDL Calculated (mg/dL)   Date Value   2019 47       (goal LDL is <100)   AST (U/L)   Date Value   2019 23     ALT (U/L)   Date Value   2019 33 (H)     BUN (mg/dL)   Date Value   2020 19     BP Readings from Last 3 Encounters:   20 120/82   20 123/68   20 138/85          ([id number])    Past Medical History:   Diagnosis Date    Arthritis     Bowel obstruction ([address]     Cancer ([address]     ovarian stage 2    Cerebral artery occlusion with cerebral infarction ([address] 03/2018    Mini strokes \"Tia's\"    Diabetes mellitus ([address]     Epigastric pain     GERD (gastroesophageal reflux disease)     History of anesthesia complications 0081'E    was told after gallbladder surgery to never have anesthesia again \"since it was hard for me to come out of it\"    History of blood transfusion     Hx of blood clots     lung     Hypertension     Right arm pain     Right shoulder pain     Sleep apnea     uses CPAP nightly    Thyroid disease     TIA (transient ischemic attack)       Past Surgical History:   Procedure Laterality Date    ANESTHESIA NERVE BLOCK Left 11/7/2019    NERVE BLOCK (DEFINE) - INTERCOSTAL NERVE BLOCK performed by Yani Martínez MD at [address]  07/08/2019    5 yr recall.  COLONOSCOPY N/A 7/8/2019    COLONOSCOPY WITH BIOPSY performed by Lilli Mcneil MD at [address] Right     [facility] INJECTION PROCEDURE FOR SACROILIAC JOINT Bilateral 8/29/2019    SACROILIAC JOINT INJECTION performed by Yani Martínez MD at [address] [facility] Bilateral 10/10/2019    SACROILIAC JOINT INJECTION performed by Yani Martínez MD at [address] [facility] [address]  2/2014    chris with bso    KNEE ARTHROSCOPY Right     NERVE BLOCK Bilateral 08/29/2019    SI joint injection    NERVE BLOCK Bilateral 10/10/2019    SI joint    OTHER SURGICAL HISTORY Right 11/11/2014    shoulder manipulation    SHOULDER ARTHROSCOPY Right 02/03/15    SHOULDER SURGERY  11/2014    manipulation    UPPER GASTROINTESTINAL ENDOSCOPY  07/08/2019    UPPER GASTROINTESTINAL ENDOSCOPY N/A 7/8/2019    EGD BIOPSY performed by Lilli Mcneil MD at [facility] [address] Right 4/30/2015    pt has blood clot & abscess in her right kidney    VASCULAR SURGERY      [place] filter in & removed 1 year later       No family history on file.     Social History Tobacco Use    Smoking status: Never Smoker    Smokeless tobacco: Never Used   Substance Use Topics    Alcohol use: No      Current Outpatient Medications   Medication Sig Dispense Refill    LORazepam (ATIVAN) 0.5 MG tablet TAKE 1 TABLET BY MOUTH THREE TIMES DAILY AS NEEDED FOR ANXIETY 90 tablet 2    potassium chloride (KLOR-CON) 10 MEQ extended release tablet TAKE 1 TABLET BY MOUTH TWICE DAILY 60 tablet 10    gabapentin (NEURONTIN) 600 MG tablet TAKE (1) TABLET BY MOUTH FOUR TIMES A  tablet 10    levothyroxine (SYNTHROID) 88 MCG tablet TAKE (1) TABLET BY MOUTH DAILY 30 tablet 10    EPINEPHrine (EPIPEN 2-LISA) 0.3 MG/0.3ML SOAJ injection EpiPen 2-Lisa 0.3 mg/0.3 mL injection, auto-injector      benzonatate (TESSALON PERLES) 100 MG capsule Take 1 capsule by mouth 3 times daily as needed for Cough 30 capsule 1    Insulin Syringes, Disposable, U-100 1 ML MISC 1 each by Does not apply route 4 times daily Dx: E11.9 300 each 3    blood glucose monitor strips Test tid times a day & as needed for symptoms of irregular blood glucose.  Dx E11.9 Please fill with compatible strips to glucometer 300 strip 3    omeprazole (PRILOSEC) 20 MG delayed release capsule Take 1 capsule by mouth daily 90 capsule 1    DULoxetine (CYMBALTA) 60 MG extended release capsule Take 1 capsule by mouth daily 90 capsule 1    calcium carbonate (TUMS) 500 MG chewable tablet Take 1 tablet by mouth daily as needed for Heartburn      rivaroxaban (XARELTO) 20 MG TABS tablet Take 1 tablet by mouth daily 90 tablet 3    losartan (COZAAR) 100 MG tablet Take 1 tablet by mouth daily 90 tablet 1    hydrochlorothiazide (HYDRODIURIL) 25 MG tablet Take 1 tablet by mouth every morning 90 tablet 1    amLODIPine (NORVASC) 5 MG tablet TAKE 1 TABLET BY MOUTH ONCE DAILY 90 tablet 2    insulin glargine (LANTUS) 100 UNIT/ML injection vial Inject 55 units nightly (Patient taking differently: 65 Units Inject 55 units nightly) 30 mL 10    insulin Creatinine monitoring  02/14/2021    Breast cancer screen  10/21/2021    Colon cancer screen colonoscopy  07/08/2029    Flu vaccine  Completed    Hepatitis A vaccine  Aged Out    Hib vaccine  Aged Out    Meningococcal (ACWY) vaccine  Aged Out       Subjective:      Review of Systems   Constitutional: Negative for activity change, fatigue and fever. HENT: Negative for congestion, rhinorrhea and sore throat. Eyes: Negative for visual disturbance. Respiratory: Negative for chest tightness and shortness of breath. Cardiovascular: Negative for chest pain and palpitations. Gastrointestinal: Negative for abdominal pain, diarrhea, nausea and vomiting. Endocrine: Negative for polydipsia. Genitourinary: Negative for difficulty urinating. Musculoskeletal: Positive for back pain and myalgias. Negative for arthralgias. Leg cramping, bilateral   Skin: Negative for color change. Neurological: Negative for weakness and headaches. Psychiatric/Behavioral: Negative for behavioral problems. The patient is not nervous/anxious. Objective:   /82   Pulse 86   Resp 16   Ht 5' 4\" (1.626 m)   Wt 225 lb 6.4 oz (102.2 kg)   SpO2 96%   Breastfeeding No   BMI 38.69 kg/m²   Physical Exam  Vitals signs reviewed. Constitutional:       General: She is not in acute distress. Appearance: Normal appearance. HENT:      Head: Normocephalic. Eyes:      Pupils: Pupils are equal, round, and reactive to light. Neck:      Musculoskeletal: Normal range of motion. Cardiovascular:      Rate and Rhythm: Normal rate and regular rhythm. Pulses: Normal pulses. Heart sounds: Normal heart sounds. Pulmonary:      Effort: Pulmonary effort is normal.      Breath sounds: Normal breath sounds. Abdominal:      General: Bowel sounds are normal.   Musculoskeletal: Normal range of motion. Right lower leg: She exhibits no swelling. No edema. Left lower leg: She exhibits no swelling.  No

## 2020-03-03 RX ORDER — ATORVASTATIN CALCIUM 40 MG/1
TABLET, FILM COATED ORAL
Qty: 90 TABLET | Refills: 10 | Status: SHIPPED | OUTPATIENT
Start: 2020-03-03 | End: 2021-02-09

## 2020-03-03 RX ORDER — LOSARTAN POTASSIUM 100 MG/1
TABLET ORAL
Qty: 90 TABLET | Refills: 10 | Status: SHIPPED | OUTPATIENT
Start: 2020-03-03 | End: 2021-03-22

## 2020-03-03 RX ORDER — AMLODIPINE BESYLATE 5 MG/1
TABLET ORAL
Qty: 90 TABLET | Refills: 10 | Status: SHIPPED | OUTPATIENT
Start: 2020-03-03 | End: 2021-02-09

## 2020-03-03 RX ORDER — HYDROCHLOROTHIAZIDE 25 MG/1
25 TABLET ORAL EVERY MORNING
Qty: 90 TABLET | Refills: 10 | Status: SHIPPED | OUTPATIENT
Start: 2020-03-03 | End: 2021-02-09

## 2020-03-03 NOTE — TELEPHONE ENCOUNTER
LAST OV 2/20/20    Health Maintenance   Topic Date Due    Hepatitis C screen  1950    Diabetic retinal exam  09/14/1961    DTaP/Tdap/Td vaccine (1 - Tdap) 09/14/1962    Diabetic microalbuminuria test  09/14/1969    Hepatitis B vaccine (1 of 3 - Risk 3-dose series) 09/14/1970    Shingles Vaccine (1 of 2) 09/14/2001    DEXA (modify frequency per FRAX score)  09/14/2016    Pneumococcal 65+ years Vaccine (1 of 1 - PPSV23) 04/17/2023 (Originally 9/14/2016)    Lipid screen  04/06/2020    Annual Wellness Visit (AWV)  09/09/2020    Diabetic foot exam  10/15/2020    A1C test (Diabetic or Prediabetic)  01/13/2021    Potassium monitoring  02/14/2021    Creatinine monitoring  02/14/2021    Breast cancer screen  10/21/2021    Colon cancer screen colonoscopy  07/08/2029    Flu vaccine  Completed    Hepatitis A vaccine  Aged Out    Hib vaccine  Aged Out    Meningococcal (ACWY) vaccine  Aged Out             (applicable per patient's age: Cancer Screenings, Depression Screening, Fall Risk Screening, Immunizations)    Hemoglobin A1C (%)   Date Value   01/13/2020 7.8   09/16/2019 8.6   03/20/2019 8.4     LDL Cholesterol (mg/dL)   Date Value   11/02/2015 86     LDL Calculated (mg/dL)   Date Value   04/06/2019 47     AST (U/L)   Date Value   04/06/2019 23     ALT (U/L)   Date Value   04/06/2019 33 (H)     BUN (mg/dL)   Date Value   02/14/2020 19      (goal A1C is < 7)   (goal LDL is <100) need 30-50% reduction from baseline     BP Readings from Last 3 Encounters:   02/20/20 120/82   02/18/20 123/68   02/14/20 138/85    (goal /80)      All Future Testing planned in [facility]:  Lab Frequency Next Occurrence   EGD Once 08/12/2019   COLONOSCOPY W/ OR W/O BIOPSY Once 08/13/2019   Comprehensive Metabolic Panel Once 38/53/3986       Next Visit Date:  Future Appointments   Date Time Provider Rodolfo Hyman   3/4/2020  3:00 PM DO Raul Martinez [facility]   4/13/2020  3:00 PM Simi Zavala DO [place]            Patient Active Problem List:     Ovarian mass     Abdominal pain     Partial bowel obstruction (HCC)     Epigastric pain     GERD (gastroesophageal reflux disease)     Lumbosacral spondylosis without myelopathy     Chronic bilateral thoracic back pain     Chronic anticoagulation

## 2020-03-04 ENCOUNTER — OFFICE VISIT (OUTPATIENT)
Dept: PRIMARY CARE CLINIC | Age: 70
End: 2020-03-04
Payer: MEDICARE

## 2020-03-04 VITALS
BODY MASS INDEX: 38.86 KG/M2 | OXYGEN SATURATION: 98 % | WEIGHT: 226.4 LBS | SYSTOLIC BLOOD PRESSURE: 112 MMHG | DIASTOLIC BLOOD PRESSURE: 80 MMHG | HEART RATE: 70 BPM | RESPIRATION RATE: 16 BRPM

## 2020-03-04 PROCEDURE — 1090F PRES/ABSN URINE INCON ASSESS: CPT | Performed by: FAMILY MEDICINE

## 2020-03-04 PROCEDURE — 4040F PNEUMOC VAC/ADMIN/RCVD: CPT | Performed by: FAMILY MEDICINE

## 2020-03-04 PROCEDURE — G8417 CALC BMI ABV UP PARAM F/U: HCPCS | Performed by: FAMILY MEDICINE

## 2020-03-04 PROCEDURE — G8427 DOCREV CUR MEDS BY ELIG CLIN: HCPCS | Performed by: FAMILY MEDICINE

## 2020-03-04 PROCEDURE — 3017F COLORECTAL CA SCREEN DOC REV: CPT | Performed by: FAMILY MEDICINE

## 2020-03-04 PROCEDURE — 1036F TOBACCO NON-USER: CPT | Performed by: FAMILY MEDICINE

## 2020-03-04 PROCEDURE — G8482 FLU IMMUNIZE ORDER/ADMIN: HCPCS | Performed by: FAMILY MEDICINE

## 2020-03-04 PROCEDURE — G8400 PT W/DXA NO RESULTS DOC: HCPCS | Performed by: FAMILY MEDICINE

## 2020-03-04 PROCEDURE — 99213 OFFICE O/P EST LOW 20 MIN: CPT | Performed by: FAMILY MEDICINE

## 2020-03-04 PROCEDURE — 1123F ACP DISCUSS/DSCN MKR DOCD: CPT | Performed by: FAMILY MEDICINE

## 2020-03-04 ASSESSMENT — ENCOUNTER SYMPTOMS
VOMITING: 0
BACK PAIN: 1
NAUSEA: 0

## 2020-03-04 NOTE — PROGRESS NOTES
Date    ANESTHESIA NERVE BLOCK Left 11/7/2019    NERVE BLOCK (DEFINE) - INTERCOSTAL NERVE BLOCK performed by Isak Hayden MD at [address]  07/08/2019    5 yr recall.  COLONOSCOPY N/A 7/8/2019    COLONOSCOPY WITH BIOPSY performed by Rip Kelley MD at [address] Right     repair tendon    [facility] ([place], [place]) OF [place], [facility]. INJECTION PROCEDURE FOR SACROILIAC JOINT Bilateral 8/29/2019    SACROILIAC JOINT INJECTION performed by Isak Hayden MD at [address] [facility] Bilateral 10/10/2019    SACROILIAC JOINT INJECTION performed by Isak Hayden MD at [address]  2/2014    chris with bso    KNEE ARTHROSCOPY Right     NERVE BLOCK Bilateral 08/29/2019    SI joint injection    NERVE BLOCK Bilateral 10/10/2019    SI joint    OTHER SURGICAL HISTORY Right 11/11/2014    shoulder manipulation    SHOULDER ARTHROSCOPY Right 02/03/15    SHOULDER SURGERY  11/2014    manipulation    UPPER GASTROINTESTINAL ENDOSCOPY  07/08/2019    UPPER GASTROINTESTINAL ENDOSCOPY N/A 7/8/2019    EGD BIOPSY [facility] Rip Kelley MD at [facility] 4/30/2015    pt has blood clot & abscess in her right kidney    VASCULAR SURGERY      Alesha filter in & removed 1 year later       History reviewed. No pertinent family history.     Social History     Tobacco Use    Smoking status: Never Smoker    Smokeless tobacco: Never Used   Substance Use Topics    Alcohol use: No      Current Outpatient Medications   Medication Sig Dispense Refill    atorvastatin (LIPITOR) 40 MG tablet TAKE (1) TABLET BY MOUTH EVERY DAY 90 tablet 10    amLODIPine (NORVASC) 5 MG tablet TAKE (1) TABLET BY MOUTH ONCE DAILY 90 tablet 10    hydroCHLOROthiazide (HYDRODIURIL) 25 MG tablet TAKE 1 TABLET BY MOUTH EVERY MORNING 90 tablet 10    losartan (COZAAR) 100 MG tablet TAKE 1 TABLET BY MOUTH EVERY DAY 90 tablet 10    potassium chloride (KLOR-CON M) 20 MEQ extended release tablet Take 1 tablet by mouth 2 times daily 180 tablet 1    LORazepam (ATIVAN) 0.5 MG tablet TAKE 1 TABLET BY MOUTH THREE TIMES DAILY AS NEEDED FOR ANXIETY 90 tablet 2    levothyroxine (SYNTHROID) 88 MCG tablet TAKE (1) TABLET BY MOUTH DAILY 30 tablet 10    EPINEPHrine (EPIPEN 2-LISA) 0.3 MG/0.3ML SOAJ injection EpiPen 2-Lisa 0.3 mg/0.3 mL injection, auto-injector      benzonatate (TESSALON PERLES) 100 MG capsule Take 1 capsule by mouth 3 times daily as needed for Cough 30 capsule 1    Insulin Syringes, Disposable, U-100 1 ML MISC 1 each by Does not apply route 4 times daily Dx: E11.9 300 each 3    blood glucose monitor strips Test tid times a day & as needed for symptoms of irregular blood glucose.  Dx E11.9 Please fill with compatible strips to glucometer 300 strip 3    omeprazole (PRILOSEC) 20 MG delayed release capsule Take 1 capsule by mouth daily 90 capsule 1    DULoxetine (CYMBALTA) 60 MG extended release capsule Take 1 capsule by mouth daily 90 capsule 1    calcium carbonate (TUMS) 500 MG chewable tablet Take 1 tablet by mouth daily as needed for Heartburn      rivaroxaban (XARELTO) 20 MG TABS tablet Take 1 tablet by mouth daily 90 tablet 3    insulin glargine (LANTUS) 100 UNIT/ML injection vial Inject 55 units nightly (Patient taking differently: 65 Units Inject 55 units nightly) 30 mL 10    insulin lispro (HUMALOG) 100 UNIT/ML injection vial Inject three times daily as directed by sliding scale 10 mL 10    SUPER B COMPLEX/C CAPS Take by mouth      tamsulosin (FLOMAX) 0.4 MG capsule Take 0.4 mg by mouth daily      vitamin A 07554 units capsule Take 10,000 Units by mouth daily      Cholecalciferol (VITAMIN D3) 5000 units TABS Take by mouth      alendronate (FOSAMAX) 70 MG tablet Take 70 mg by mouth every 7 days      ferrous sulfate 325 (65 Fe) MG tablet Take 325 mg by mouth daily (with breakfast)      fexofenadine (ALLEGRA)

## 2020-03-05 ENCOUNTER — OFFICE VISIT (OUTPATIENT)
Dept: DERMATOLOGY | Age: 70
End: 2020-03-05
Payer: MEDICARE

## 2020-03-05 VITALS
BODY MASS INDEX: 38.68 KG/M2 | SYSTOLIC BLOOD PRESSURE: 107 MMHG | WEIGHT: 226.6 LBS | HEART RATE: 87 BPM | DIASTOLIC BLOOD PRESSURE: 59 MMHG | HEIGHT: 64 IN | OXYGEN SATURATION: 97 %

## 2020-03-05 PROCEDURE — 99202 OFFICE O/P NEW SF 15 MIN: CPT | Performed by: DERMATOLOGY

## 2020-03-05 PROCEDURE — G8400 PT W/DXA NO RESULTS DOC: HCPCS | Performed by: DERMATOLOGY

## 2020-03-05 PROCEDURE — G8482 FLU IMMUNIZE ORDER/ADMIN: HCPCS | Performed by: DERMATOLOGY

## 2020-03-05 PROCEDURE — 1036F TOBACCO NON-USER: CPT | Performed by: DERMATOLOGY

## 2020-03-05 PROCEDURE — 3017F COLORECTAL CA SCREEN DOC REV: CPT | Performed by: DERMATOLOGY

## 2020-03-05 PROCEDURE — 1123F ACP DISCUSS/DSCN MKR DOCD: CPT | Performed by: DERMATOLOGY

## 2020-03-05 PROCEDURE — 4040F PNEUMOC VAC/ADMIN/RCVD: CPT | Performed by: DERMATOLOGY

## 2020-03-05 PROCEDURE — G8427 DOCREV CUR MEDS BY ELIG CLIN: HCPCS | Performed by: DERMATOLOGY

## 2020-03-05 PROCEDURE — 1090F PRES/ABSN URINE INCON ASSESS: CPT | Performed by: DERMATOLOGY

## 2020-03-05 PROCEDURE — G8417 CALC BMI ABV UP PARAM F/U: HCPCS | Performed by: DERMATOLOGY

## 2020-03-05 NOTE — PROGRESS NOTES
3    omeprazole (PRILOSEC) 20 MG delayed release capsule Take 1 capsule by mouth daily 90 capsule 1    DULoxetine (CYMBALTA) 60 MG extended release capsule Take 1 capsule by mouth daily 90 capsule 1    calcium carbonate (TUMS) 500 MG chewable tablet Take 1 tablet by mouth daily as needed for Heartburn      rivaroxaban (XARELTO) 20 MG TABS tablet Take 1 tablet by mouth daily 90 tablet 3    insulin glargine (LANTUS) 100 UNIT/ML injection vial Inject 55 units nightly (Patient taking differently: 65 Units Inject 55 units nightly) 30 mL 10    insulin lispro (HUMALOG) 100 UNIT/ML injection vial Inject three times daily as directed by sliding scale 10 mL 10    SUPER B COMPLEX/C CAPS Take by mouth      tamsulosin (FLOMAX) 0.4 MG capsule Take 0.4 mg by mouth daily      vitamin A 59237 units capsule Take 10,000 Units by mouth daily      Cholecalciferol (VITAMIN D3) 5000 units TABS Take by mouth      alendronate (FOSAMAX) 70 MG tablet Take 70 mg by mouth every 7 days      ferrous sulfate 325 (65 Fe) MG tablet Take 325 mg by mouth daily (with breakfast)      fexofenadine (ALLEGRA) 60 MG tablet Take 60 mg by mouth daily      magnesium oxide (MAG-OX) 400 MG tablet Take 400 mg by mouth daily      Melatonin 10 MG TABS Take 10 mg by mouth daily as needed        No current facility-administered medications for this visit.         ALLERGIES:   Allergies   Allergen Reactions    Aspirin Anaphylaxis     Only thing she can take is tylenol    Benadryl [Diphenhydramine] Other (See Comments)     Dystonic movement    Ibuprofen Anaphylaxis    Lidocaine Anaphylaxis     CAN NOT TOLERATE IV    Penicillins Anaphylaxis    Morphine Nausea And Vomiting       SOCIAL HISTORY:  Social History     Tobacco Use    Smoking status: Never Smoker    Smokeless tobacco: Never Used   Substance Use Topics    Alcohol use: No       REVIEW OF SYSTEMS:  Review of Systems  Skin: Denies any new changing, growing orbleeding lesions or rashes except as described in the HPI   Constitutional: Denies fevers, chills, and malaise. PHYSICAL EXAM:   BP (!) 107/59 (Site: Left Upper Arm, Position: Sitting, Cuff Size: Large Adult)   Pulse 87   Ht 5' 4\" (1.626 m)   Wt 226 lb 9.6 oz (102.8 kg)   SpO2 97%   BMI 38.90 kg/m²     General Exam:  General Appearance: No acute distress, Well nourished     Neuro: Alert and oriented to person, place and time  Psych: Normal affect   Lymph Node: Not performed    Cutaneous Exam: Performed as documented in clinic note below. Waist-up skin, which includes the head/face,neck, both arms, chest, back, abdomen, digits and/or nails, was examined. Pertinent Physical Exam Findings:  Physical Exam  Back with 5 dilated pores with dry keratin, one is tender  Scattered tan to brown homogenous macules and thin papules with regular borders on the face, trunk and extremities    Photo surveillance performed: No    Medical Necessity of Exam Performed:   Distribution of patient concerns    Additional Diagnostic Testing performed during exam: Not performed ,  Not performed    ASSESSMENT:   Diagnosis Orders   1. Dilated pore of Padmini     2. Multiple benign nevi         Plan of Action is as Follows:  Assessment   1. Dilated pores of Padmini  - extraction and curettage with intent of resolution of the most painful one today (mid back)  - the remainder were extracted painlessly with only a comedone extractor  - Therapeutic extraction: after numbing with lidocaine 1% with epinephrine, an 11 blade was used to puncture, then a comedone extractor used to remove the contents of the dilated pore. A curette was used to remove epithelialized lining and aluminum chloride applied for hemostasis. Vaseline and a bandaid were applied and wound care instructions given    2.  Multiple benign nevi  - Clinically and Dermatoscopically Benign on Exam Today  - Reviewed ABCDE of moles and melanoma  - Discussed sunscreen and sun protection - recommend SPF 30 or greater

## 2020-03-10 ENCOUNTER — TELEPHONE (OUTPATIENT)
Dept: PRIMARY CARE CLINIC | Age: 70
End: 2020-03-10

## 2020-03-10 ENCOUNTER — TELEPHONE (OUTPATIENT)
Dept: PAIN MANAGEMENT | Age: 70
End: 2020-03-10

## 2020-03-10 RX ORDER — BLOOD-GLUCOSE METER
1 KIT MISCELLANEOUS DAILY
Qty: 1 KIT | Refills: 0 | Status: SHIPPED | OUTPATIENT
Start: 2020-03-10

## 2020-03-10 NOTE — TELEPHONE ENCOUNTER
PT called and said her meter that detects her sugar 3x a day has stopped working, would like a new one. Please advice, thank you.  Brand is [id number]

## 2020-03-11 RX ORDER — DULOXETIN HYDROCHLORIDE 60 MG/1
60 CAPSULE, DELAYED RELEASE ORAL DAILY
Qty: 90 CAPSULE | Refills: 10 | Status: SHIPPED | OUTPATIENT
Start: 2020-03-11 | End: 2021-03-22

## 2020-03-18 ENCOUNTER — TELEPHONE (OUTPATIENT)
Dept: FAMILY MEDICINE CLINIC | Age: 70
End: 2020-03-18

## 2020-03-18 RX ORDER — ACETAMINOPHEN AND CODEINE PHOSPHATE 300; 30 MG/1; MG/1
1 TABLET ORAL EVERY 8 HOURS PRN
Qty: 15 TABLET | Refills: 0 | Status: SHIPPED | OUTPATIENT
Start: 2020-03-18 | End: 2020-03-23

## 2020-03-18 NOTE — TELEPHONE ENCOUNTER
Patient calling stating that she sees pain management, she was okay without pain medications until now, since Friday she says shes only been able to sleep 2 hours a night due to pain. Pain management cancelled her appointment she had this week with them, and wont give her medication until she is seen. She is asking what she should do in the mean time.

## 2020-03-23 ENCOUNTER — TELEPHONE (OUTPATIENT)
Dept: PRIMARY CARE CLINIC | Age: 70
End: 2020-03-23

## 2020-03-23 RX ORDER — TRAMADOL HYDROCHLORIDE 50 MG/1
50 TABLET ORAL EVERY 8 HOURS PRN
Qty: 21 TABLET | Refills: 0 | Status: SHIPPED | OUTPATIENT
Start: 2020-03-23 | End: 2020-04-01 | Stop reason: SDUPTHER

## 2020-03-23 NOTE — TELEPHONE ENCOUNTER
Patient was prescribed a pain medication for back pain last week (Acetaminophem/Codene Number 3) still feeling back pain as if medication isn't working for her. Wondering if something else can be prescribed.

## 2020-04-01 ENCOUNTER — OFFICE VISIT (OUTPATIENT)
Dept: PRIMARY CARE CLINIC | Age: 70
End: 2020-04-01
Payer: MEDICARE

## 2020-04-01 VITALS
OXYGEN SATURATION: 97 % | HEART RATE: 93 BPM | SYSTOLIC BLOOD PRESSURE: 108 MMHG | DIASTOLIC BLOOD PRESSURE: 68 MMHG | RESPIRATION RATE: 16 BRPM | BODY MASS INDEX: 38.9 KG/M2 | HEIGHT: 64 IN

## 2020-04-01 PROCEDURE — 2022F DILAT RTA XM EVC RTNOPTHY: CPT | Performed by: FAMILY MEDICINE

## 2020-04-01 PROCEDURE — 4040F PNEUMOC VAC/ADMIN/RCVD: CPT | Performed by: FAMILY MEDICINE

## 2020-04-01 PROCEDURE — 3051F HG A1C>EQUAL 7.0%<8.0%: CPT | Performed by: FAMILY MEDICINE

## 2020-04-01 PROCEDURE — 1036F TOBACCO NON-USER: CPT | Performed by: FAMILY MEDICINE

## 2020-04-01 PROCEDURE — 1123F ACP DISCUSS/DSCN MKR DOCD: CPT | Performed by: FAMILY MEDICINE

## 2020-04-01 PROCEDURE — 3017F COLORECTAL CA SCREEN DOC REV: CPT | Performed by: FAMILY MEDICINE

## 2020-04-01 PROCEDURE — G8417 CALC BMI ABV UP PARAM F/U: HCPCS | Performed by: FAMILY MEDICINE

## 2020-04-01 PROCEDURE — 1090F PRES/ABSN URINE INCON ASSESS: CPT | Performed by: FAMILY MEDICINE

## 2020-04-01 PROCEDURE — 99214 OFFICE O/P EST MOD 30 MIN: CPT | Performed by: FAMILY MEDICINE

## 2020-04-01 PROCEDURE — G8400 PT W/DXA NO RESULTS DOC: HCPCS | Performed by: FAMILY MEDICINE

## 2020-04-01 PROCEDURE — G8427 DOCREV CUR MEDS BY ELIG CLIN: HCPCS | Performed by: FAMILY MEDICINE

## 2020-04-01 RX ORDER — MECLIZINE HCL 12.5 MG/1
12.5 TABLET ORAL 3 TIMES DAILY PRN
Qty: 30 TABLET | Refills: 0 | Status: SHIPPED | OUTPATIENT
Start: 2020-04-01 | End: 2020-04-13 | Stop reason: SDUPTHER

## 2020-04-01 RX ORDER — OMEPRAZOLE 20 MG/1
20 CAPSULE, DELAYED RELEASE ORAL DAILY
Qty: 90 CAPSULE | Refills: 1 | Status: SHIPPED | OUTPATIENT
Start: 2020-04-01 | End: 2020-09-22 | Stop reason: SDUPTHER

## 2020-04-01 RX ORDER — TRAMADOL HYDROCHLORIDE 50 MG/1
50 TABLET ORAL 2 TIMES DAILY PRN
Qty: 60 TABLET | Refills: 0 | Status: SHIPPED | OUTPATIENT
Start: 2020-04-01 | End: 2020-05-01

## 2020-04-01 ASSESSMENT — ENCOUNTER SYMPTOMS
VOMITING: 0
NAUSEA: 1
SHORTNESS OF BREATH: 0
BACK PAIN: 1

## 2020-04-01 NOTE — PATIENT INSTRUCTIONS
Patient Education        Vertigo: Exercises  Introduction  Here are some examples of exercises for you to try. The exercises may be suggested for a condition or for rehabilitation. Start each exercise slowly. Ease off the exercises if you start to have pain. You will be told when to start these exercises and which ones will work best for you. How to do the exercises  Exercise 1   1. Stand with a chair in front of you and a wall behind you. If you begin to fall, you may use them for support. 2. Stand with your feet together and your arms at your sides. 3. Move your head up and down 10 times. Exercise 2   1. Move your head side to side 10 times. Exercise 3   1. Move your head diagonally up and down 10 times. Exercise 4   1. Move your head diagonally up and down 10 times on the other side. Follow-up care is a key part of your treatment and safety. Be sure to make and go to all appointments, and call your doctor if you are having problems. It's also a good idea to know your test results and keep a list of the medicines you take. Where can you learn more? Go to [email or web address] org and sign in to your [employer] account. Enter F349 in the [employer] box to learn more about \"Vertigo: Exercises. \"     If you do not have an account, please click on the \"Sign Up Now\" link. Current as of: July 28, 2019Content Version: 12.4  © 0459-9363 Healthwise, Incorporated. Care instructions adapted under license by [employer]. If you have questions about a medical condition or this instruction, always ask your healthcare professional. [address] any warranty or liability for your use of this information.

## 2020-04-01 NOTE — TELEPHONE ENCOUNTER
Health Maintenance   Topic Date Due    Hepatitis C screen  1950    Diabetic retinal exam  09/14/1961    Diabetic microalbuminuria test  09/14/1969    DTaP/Tdap/Td vaccine (1 - Tdap) 09/14/1970    Shingles Vaccine (1 of 2) 09/14/2001    DEXA (modify frequency per FRAX score)  09/14/2006    Lipid screen  04/06/2020    Pneumococcal 65+ years Vaccine (1 of 1 - PPSV23) 04/17/2023 (Originally 9/14/2016)    Annual Wellness Visit (AWV)  09/09/2020    Diabetic foot exam  10/15/2020    A1C test (Diabetic or Prediabetic)  01/13/2021    Potassium monitoring  02/14/2021    Creatinine monitoring  02/14/2021    Breast cancer screen  10/21/2021    Colon cancer screen colonoscopy  07/08/2029    Flu vaccine  Completed    Hepatitis A vaccine  Aged Out    Hib vaccine  Aged Out    Meningococcal (ACWY) vaccine  Aged Out             (applicable per patient's age: Cancer Screenings, Depression Screening, Fall Risk Screening, Immunizations)    Hemoglobin A1C (%)   Date Value   01/13/2020 7.8   09/16/2019 8.6   03/20/2019 8.4     LDL Cholesterol (mg/dL)   Date Value   11/02/2015 86     LDL Calculated (mg/dL)   Date Value   04/06/2019 47     AST (U/L)   Date Value   04/06/2019 23     ALT (U/L)   Date Value   04/06/2019 33 (H)     BUN (mg/dL)   Date Value   02/14/2020 19      (goal A1C is < 7)   (goal LDL is <100) need 30-50% reduction from baseline     BP Readings from Last 3 Encounters:   04/01/20 108/68   03/05/20 (!) 107/59   03/04/20 112/80    (goal /80)      All Future Testing planned in [facility]:  Lab Frequency Next Occurrence   EGD Once 08/12/2019   COLONOSCOPY W/ OR W/O BIOPSY Once 08/13/2019   Comprehensive Metabolic Panel Once 68/99/9190       Next Visit Date:  Future Appointments   Date Time Provider Rodolfo Hyman   4/14/2020  3:40 PM Flor Adler APRN - [facility]   5/15/2020  8:40 AM DO [facility]   6/9/2020 10:00 AM LUPE Isabel - CNP [facility]        Last Visit: 4/1/2020    Patient Active Problem List:     Ovarian mass     Abdominal pain     Partial bowel obstruction (HCC)     Epigastric pain     GERD (gastroesophageal reflux disease)     Lumbosacral spondylosis without myelopathy     Chronic bilateral thoracic back pain     Chronic anticoagulation

## 2020-04-01 NOTE — PROGRESS NOTES
tobacco: Never Used   Substance Use Topics    Alcohol use: No      Current Outpatient Medications   Medication Sig Dispense Refill    traMADol (ULTRAM) 50 MG tablet Take 1 tablet by mouth 2 times daily as needed for Pain for up to 30 days. 60 tablet 0    meclizine (ANTIVERT) 12.5 MG tablet Take 1 tablet by mouth 3 times daily as needed for Dizziness 30 tablet 0    DULoxetine (CYMBALTA) 60 MG extended release capsule TAKE 1 CAPSULE BY MOUTH DAILY 90 capsule 10    glucose monitoring kit (FREESTYLE) monitoring kit 1 kit by Does not apply route daily Please provide accuchek meter for patient, not freestyle 1 kit 0    atorvastatin (LIPITOR) 40 MG tablet TAKE (1) TABLET BY MOUTH EVERY DAY 90 tablet 10    amLODIPine (NORVASC) 5 MG tablet TAKE (1) TABLET BY MOUTH ONCE DAILY 90 tablet 10    hydroCHLOROthiazide (HYDRODIURIL) 25 MG tablet TAKE 1 TABLET BY MOUTH EVERY MORNING 90 tablet 10    losartan (COZAAR) 100 MG tablet TAKE 1 TABLET BY MOUTH EVERY DAY 90 tablet 10    potassium chloride (KLOR-CON M) 20 MEQ extended release tablet Take 1 tablet by mouth 2 times daily 180 tablet 1    gabapentin (NEURONTIN) 600 MG tablet TAKE (1) TABLET BY MOUTH FOUR TIMES A  tablet 10    levothyroxine (SYNTHROID) 88 MCG tablet TAKE (1) TABLET BY MOUTH DAILY 30 tablet 10    EPINEPHrine (EPIPEN 2-LISA) 0.3 MG/0.3ML SOAJ injection EpiPen 2-Lisa 0.3 mg/0.3 mL injection, auto-injector      Insulin Syringes, Disposable, U-100 1 ML MISC 1 each by Does not apply route 4 times daily Dx: E11.9 300 each 3    blood glucose monitor strips Test tid times a day & as needed for symptoms of irregular blood glucose.  Dx E11.9 Please fill with compatible strips to glucometer 300 strip 3    omeprazole (PRILOSEC) 20 MG delayed release capsule Take 1 capsule by mouth daily 90 capsule 1    calcium carbonate (TUMS) 500 MG chewable tablet Take 1 tablet by mouth daily as needed for Heartburn      rivaroxaban (XARELTO) 20 MG TABS tablet Take 1 tablet by mouth daily 90 tablet 3    insulin glargine (LANTUS) 100 UNIT/ML injection vial Inject 55 units nightly (Patient taking differently: 65 Units Inject 55 units nightly) 30 mL 10    insulin lispro (HUMALOG) 100 UNIT/ML injection vial Inject three times daily as directed by sliding scale 10 mL 10    SUPER B COMPLEX/C CAPS Take by mouth      tamsulosin (FLOMAX) 0.4 MG capsule Take 0.4 mg by mouth daily      vitamin A 09336 units capsule Take 10,000 Units by mouth daily      Cholecalciferol (VITAMIN D3) 5000 units TABS Take by mouth      alendronate (FOSAMAX) 70 MG tablet Take 70 mg by mouth every 7 days      ferrous sulfate 325 (65 Fe) MG tablet Take 325 mg by mouth daily (with breakfast)      fexofenadine (ALLEGRA) 60 MG tablet Take 60 mg by mouth daily      magnesium oxide (MAG-OX) 400 MG tablet Take 400 mg by mouth daily      Melatonin 10 MG TABS Take 10 mg by mouth daily as needed        No current facility-administered medications for this visit.       Allergies   Allergen Reactions    Aspirin Anaphylaxis     Only thing she can take is tylenol    Benadryl [Diphenhydramine] Other (See Comments)     Dystonic movement    Ibuprofen Anaphylaxis    Lidocaine Anaphylaxis     CAN NOT TOLERATE IV    Penicillins Anaphylaxis    Morphine Nausea And Vomiting       Health Maintenance   Topic Date Due    Hepatitis C screen  1950    Diabetic retinal exam  09/14/1961    Diabetic microalbuminuria test  09/14/1969    DTaP/Tdap/Td vaccine (1 - Tdap) 09/14/1970    Shingles Vaccine (1 of 2) 09/14/2001    DEXA (modify frequency per FRAX score)  09/14/2006    Lipid screen  04/06/2020    Pneumococcal 65+ years Vaccine (1 of 1 - PPSV23) 04/17/2023 (Originally 9/14/2016)    Annual Wellness Visit (AWV)  09/09/2020    Diabetic foot exam  10/15/2020    A1C test (Diabetic or Prediabetic)  01/13/2021    Potassium monitoring  02/14/2021    Creatinine monitoring  02/14/2021    Breast cancer screen

## 2020-04-13 RX ORDER — ALENDRONATE SODIUM 70 MG/1
70 TABLET ORAL
Qty: 12 TABLET | Refills: 1 | Status: SHIPPED | OUTPATIENT
Start: 2020-04-13 | End: 2020-08-25 | Stop reason: SDUPTHER

## 2020-04-13 RX ORDER — MECLIZINE HCL 12.5 MG/1
12.5 TABLET ORAL 3 TIMES DAILY PRN
Qty: 30 TABLET | Refills: 3 | Status: SHIPPED | OUTPATIENT
Start: 2020-04-13 | End: 2020-04-23

## 2020-04-23 ENCOUNTER — VIRTUAL VISIT (OUTPATIENT)
Dept: PAIN MANAGEMENT | Age: 70
End: 2020-04-23
Payer: MEDICARE

## 2020-04-23 VITALS — WEIGHT: 220 LBS | HEIGHT: 64 IN | BODY MASS INDEX: 37.56 KG/M2

## 2020-04-23 PROCEDURE — 99214 OFFICE O/P EST MOD 30 MIN: CPT | Performed by: PAIN MEDICINE

## 2020-04-23 ASSESSMENT — ENCOUNTER SYMPTOMS
SHORTNESS OF BREATH: 0
BACK PAIN: 1
RESPIRATORY NEGATIVE: 1
CHANGE IN BOWEL HABIT: 0

## 2020-04-23 NOTE — PROGRESS NOTES
HPI:     Back Pain   This is a chronic problem. The current episode started more than 1 year ago. The problem occurs constantly. The problem has been gradually worsening since onset. The pain is present in the sacro-iliac, lumbar spine and gluteal. The quality of the pain is described as aching, cramping, shooting, stabbing and burning. The pain is at a severity of 9/10. The pain is moderate. The pain is worse during the day. The symptoms are aggravated by standing. Risk factors include history of cancer. She has tried analgesics and home exercises for the symptoms. The treatment provided moderate relief. Having significant left-sided chest wall pain that wraps around from her back to the anterior chest below her breast.  CT scan of her chest negative. MRI of the wrist mild multilevel degenerative changes. Also with significant low back pain. Multilevel degenerative changes. She has had SI joint injections as well as intercostal nerve blocks with no improvement. We were considering spinal cord stimulation but rescheduled due to codependent. She feels the pain is been getting worse. Taking Neurontin with mild benefit. Takes tramadol which helps her low back pain but does not seem to help the left-sided chest wall pain. She does have history of shingles. Feels there is pain even with light touch to the chest wall. Has seen a surgeon, nothing surgical.    Patient denies any new neurological symptoms. No bowel or bladder incontinence, no weakness, and no falling. Review of OARRS does not show any aberrant prescription behavior. Medication is helping the patient stay active. Patient denies any side effects and reports adequate analgesia. No sign of misuse/abuse.     Past Medical History:   Diagnosis Date    Arthritis     [facility] obstruction ([facility] [address]     Cancer ([facility] [address] 2015    ovarian stage 2    Cerebral artery occlusion with cerebral infarction ([facility] [address] 03/2018    Mini strokes \"Tia's\"    Diabetes mellitus Test tid times a day & as needed for symptoms of irregular blood glucose. Dx E11.9 Please fill with compatible strips to glucometer, Disp: 300 strip, Rfl: 3    calcium carbonate (TUMS) 500 MG chewable tablet, Take 1 tablet by mouth daily as needed for Heartburn, Disp: , Rfl:     rivaroxaban (XARELTO) 20 MG TABS tablet, Take 1 tablet by mouth daily, Disp: 90 tablet, Rfl: 3    insulin glargine (LANTUS) 100 UNIT/ML injection vial, Inject 55 units nightly (Patient taking differently: 65 Units Inject 55 units nightly), Disp: 30 mL, Rfl: 10    insulin lispro (HUMALOG) 100 UNIT/ML injection vial, Inject three times daily as directed by sliding scale, Disp: 10 mL, Rfl: 10    SUPER B COMPLEX/C CAPS, Take by mouth, Disp: , Rfl:     tamsulosin (FLOMAX) 0.4 MG capsule, Take 0.4 mg by mouth daily, Disp: , Rfl:     vitamin A 69658 units capsule, Take 10,000 Units by mouth daily, Disp: , Rfl:     Cholecalciferol (VITAMIN D3) 5000 units TABS, Take by mouth, Disp: , Rfl:     ferrous sulfate 325 (65 Fe) MG tablet, Take 325 mg by mouth daily (with breakfast), Disp: , Rfl:     fexofenadine (ALLEGRA) 60 MG tablet, Take 60 mg by mouth daily, Disp: , Rfl:     magnesium oxide (MAG-OX) 400 MG tablet, Take 400 mg by mouth daily, Disp: , Rfl:     Melatonin 10 MG TABS, Take 10 mg by mouth daily as needed , Disp: , Rfl:     gabapentin (NEURONTIN) 600 MG tablet, TAKE (1) TABLET BY MOUTH FOUR TIMES A DAY, Disp: 120 tablet, Rfl: 10    No family history on file.     Social History     Socioeconomic History    Marital status: Single     Spouse name: Not on file    Number of children: Not on file    Years of education: Not on file    Highest education level: Not on file   Occupational History    Not on file   Social Needs    Financial resource strain: Not on file    Food insecurity     Worry: Not on file     Inability: Not on file    Transportation needs     Medical: Not on file     Non-medical: Not on file   Tobacco Use    legal guardian's) consent, to reduce the patient's risk of exposure to COVID-19 and provide necessary medical care. The patient (and/or legal guardian) has also been advised to contact this office for worsening conditions or problems, and seek emergency medical treatment and/or call 911 if deemed necessary. Services were provided through a video synchronous discussion virtually to substitute for in-person clinic visit. Patient and provider were located at their individual homes. --Brittney Tai MD on 4/23/2020 at 1:26 PM    An electronic signature was used to authenticate this note.

## 2020-05-01 RX ORDER — LORAZEPAM 0.5 MG/1
TABLET ORAL
Qty: 90 TABLET | Refills: 2 | Status: SHIPPED | OUTPATIENT
Start: 2020-05-01 | End: 2020-07-28

## 2020-05-05 ENCOUNTER — TELEPHONE (OUTPATIENT)
Dept: PAIN MANAGEMENT | Age: 70
End: 2020-05-05

## 2020-05-08 ENCOUNTER — VIRTUAL VISIT (OUTPATIENT)
Dept: PAIN MANAGEMENT | Age: 70
End: 2020-05-08
Payer: MEDICARE

## 2020-05-08 VITALS — BODY MASS INDEX: 38.07 KG/M2 | WEIGHT: 223 LBS | HEIGHT: 64 IN

## 2020-05-08 PROCEDURE — 99213 OFFICE O/P EST LOW 20 MIN: CPT | Performed by: PAIN MEDICINE

## 2020-05-08 ASSESSMENT — ENCOUNTER SYMPTOMS: COUGH: 0

## 2020-05-08 NOTE — PROGRESS NOTES
SCALE. MAX DAILY UNITS OF 40, Disp: 10 mL, Rfl: 2    LORazepam (ATIVAN) 0.5 MG tablet, TAKE 1 TABLET BY MOUTH THREE TIMES DAILY AS NEEDED FOR ANXIETY, Disp: 90 tablet, Rfl: 2    alendronate (FOSAMAX) 70 MG tablet, Take 1 tablet by mouth every 7 days, Disp: 12 tablet, Rfl: 1    omeprazole (PRILOSEC) 20 MG delayed release capsule, TAKE 1 CAPSULE BY MOUTH DAILY, Disp: 90 capsule, Rfl: 1    DULoxetine (CYMBALTA) 60 MG extended release capsule, TAKE 1 CAPSULE BY MOUTH DAILY, Disp: 90 capsule, Rfl: 10    glucose monitoring kit (FREESTYLE) monitoring kit, 1 kit by Does not apply route daily Please provide accuchek meter for patient, not freestyle, Disp: 1 kit, Rfl: 0    atorvastatin (LIPITOR) 40 MG tablet, TAKE (1) TABLET BY MOUTH EVERY DAY, Disp: 90 tablet, Rfl: 10    amLODIPine (NORVASC) 5 MG tablet, TAKE (1) TABLET BY MOUTH ONCE DAILY, Disp: 90 tablet, Rfl: 10    hydroCHLOROthiazide (HYDRODIURIL) 25 MG tablet, TAKE 1 TABLET BY MOUTH EVERY MORNING, Disp: 90 tablet, Rfl: 10    losartan (COZAAR) 100 MG tablet, TAKE 1 TABLET BY MOUTH EVERY DAY, Disp: 90 tablet, Rfl: 10    potassium chloride (KLOR-CON M) 20 MEQ extended release tablet, Take 1 tablet by mouth 2 times daily, Disp: 180 tablet, Rfl: 1    levothyroxine (SYNTHROID) 88 MCG tablet, TAKE (1) TABLET BY MOUTH DAILY, Disp: 30 tablet, Rfl: 10    EPINEPHrine (EPIPEN 2-LISA) 0.3 MG/0.3ML SOAJ injection, EpiPen 2-Lisa 0.3 mg/0.3 mL injection, auto-injector, Disp: , Rfl:     Insulin Syringes, Disposable, U-100 1 ML MISC, 1 each by Does not apply route 4 times daily Dx: E11.9, Disp: 300 each, Rfl: 3    blood glucose monitor strips, Test tid times a day & as needed for symptoms of irregular blood glucose.  Dx E11.9 Please fill with compatible strips to glucometer, Disp: 300 strip, Rfl: 3    calcium carbonate (TUMS) 500 MG chewable tablet, Take 1 tablet by mouth daily as needed for Heartburn, Disp: , Rfl:     rivaroxaban (XARELTO) 20 MG TABS tablet, Take 1 tablet

## 2020-05-11 RX ORDER — TRAMADOL HYDROCHLORIDE 50 MG/1
50 TABLET ORAL 2 TIMES DAILY PRN
Qty: 60 TABLET | Refills: 0 | Status: SHIPPED | OUTPATIENT
Start: 2020-05-11 | End: 2020-06-10

## 2020-05-11 RX ORDER — TRAMADOL HYDROCHLORIDE 50 MG/1
50 TABLET ORAL 2 TIMES DAILY PRN
COMMUNITY
End: 2020-05-11 | Stop reason: SDUPTHER

## 2020-05-11 NOTE — TELEPHONE ENCOUNTER
9:30 AM [facility] [place] [facility]   5/29/2020 10:30 AM [facility] [place] [facility]   6/9/2020 10:00 AM LUPE Santizo - CNP [facility]        Last Visit: 4/1/2020    Patient Active Problem List:     Ovarian mass     Abdominal pain     Partial bowel obstruction (HCC)     Epigastric pain     GERD (gastroesophageal reflux disease)     Lumbosacral spondylosis without myelopathy     Chronic bilateral thoracic back pain     Chronic anticoagulation

## 2020-05-11 NOTE — PROGRESS NOTES
Instructed to take Prilosec, Norvasc,Neurotin, synthroid,Cozaar the morning of surgery with sip of water. Pt states she has to take meds with food and will take that morning.     Xarelto she will be stopping Tuesday evening 5/12 Gricel Winslow's office instructed her to stop it 3 days prior to procedure (5/15)

## 2020-05-13 ENCOUNTER — HOSPITAL ENCOUNTER (OUTPATIENT)
Dept: PREADMISSION TESTING | Age: 70
Setting detail: SPECIMEN
Discharge: HOME | End: 2020-05-17
Payer: MEDICARE

## 2020-05-13 PROCEDURE — U0004 COV-19 TEST NON-CDC HGH THRU: HCPCS

## 2020-05-14 LAB
SARS-COV-2, PCR: NOT DETECTED
SARS-COV-2, RAPID: NORMAL
SARS-COV-2: NORMAL
SOURCE: NORMAL

## 2020-05-15 ENCOUNTER — TELEPHONE (OUTPATIENT)
Dept: PRIMARY CARE CLINIC | Age: 70
End: 2020-05-15

## 2020-05-15 ENCOUNTER — APPOINTMENT (OUTPATIENT)
Dept: GENERAL RADIOLOGY | Age: 70
End: 2020-05-15
Attending: PAIN MEDICINE
Payer: MEDICARE

## 2020-05-15 ENCOUNTER — ANESTHESIA EVENT (OUTPATIENT)
Dept: OPERATING ROOM | Age: 70
End: 2020-05-15
Payer: MEDICARE

## 2020-05-15 ENCOUNTER — ANESTHESIA (OUTPATIENT)
Dept: OPERATING ROOM | Age: 70
End: 2020-05-15
Payer: MEDICARE

## 2020-05-15 ENCOUNTER — HOSPITAL ENCOUNTER (OUTPATIENT)
Age: 70
Setting detail: OUTPATIENT SURGERY
Discharge: HOME | End: 2020-05-15
Attending: PAIN MEDICINE | Admitting: PAIN MEDICINE
Payer: MEDICARE

## 2020-05-15 VITALS
WEIGHT: 220 LBS | OXYGEN SATURATION: 92 % | HEIGHT: 63 IN | RESPIRATION RATE: 16 BRPM | BODY MASS INDEX: 38.98 KG/M2 | SYSTOLIC BLOOD PRESSURE: 141 MMHG | TEMPERATURE: 97.6 F | HEART RATE: 79 BPM | DIASTOLIC BLOOD PRESSURE: 78 MMHG

## 2020-05-15 VITALS — DIASTOLIC BLOOD PRESSURE: 83 MMHG | OXYGEN SATURATION: 95 % | SYSTOLIC BLOOD PRESSURE: 146 MMHG

## 2020-05-15 LAB — GLUCOSE BLD-MCNC: 99 MG/DL (ref 65–105)

## 2020-05-15 PROCEDURE — 3209999900 FLUORO FOR SURGICAL PROCEDURES

## 2020-05-15 PROCEDURE — 3700000000 HC ANESTHESIA ATTENDED CARE: Performed by: PAIN MEDICINE

## 2020-05-15 PROCEDURE — 6360000002 HC RX W HCPCS: Performed by: NURSE ANESTHETIST, CERTIFIED REGISTERED

## 2020-05-15 PROCEDURE — 3600000012 HC SURGERY LEVEL 2 ADDTL 15MIN: Performed by: PAIN MEDICINE

## 2020-05-15 PROCEDURE — 7100000010 HC PHASE II RECOVERY - FIRST 15 MIN: Performed by: PAIN MEDICINE

## 2020-05-15 PROCEDURE — 7100000011 HC PHASE II RECOVERY - ADDTL 15 MIN: Performed by: PAIN MEDICINE

## 2020-05-15 PROCEDURE — 2500000003 HC RX 250 WO HCPCS: Performed by: PAIN MEDICINE

## 2020-05-15 PROCEDURE — 64494 INJ PARAVERT F JNT L/S 2 LEV: CPT | Performed by: PAIN MEDICINE

## 2020-05-15 PROCEDURE — 64493 INJ PARAVERT F JNT L/S 1 LEV: CPT | Performed by: PAIN MEDICINE

## 2020-05-15 PROCEDURE — 2709999900 HC NON-CHARGEABLE SUPPLY: Performed by: PAIN MEDICINE

## 2020-05-15 PROCEDURE — 3600000002 HC SURGERY LEVEL 2 BASE: Performed by: PAIN MEDICINE

## 2020-05-15 PROCEDURE — 82947 ASSAY GLUCOSE BLOOD QUANT: CPT

## 2020-05-15 PROCEDURE — 3700000001 HC ADD 15 MINUTES (ANESTHESIA): Performed by: PAIN MEDICINE

## 2020-05-15 RX ORDER — BUPIVACAINE HYDROCHLORIDE 2.5 MG/ML
INJECTION, SOLUTION INFILTRATION; PERINEURAL PRN
Status: DISCONTINUED | OUTPATIENT
Start: 2020-05-15 | End: 2020-05-15 | Stop reason: ALTCHOICE

## 2020-05-15 RX ORDER — MIDAZOLAM HYDROCHLORIDE 1 MG/ML
INJECTION INTRAMUSCULAR; INTRAVENOUS PRN
Status: DISCONTINUED | OUTPATIENT
Start: 2020-05-15 | End: 2020-05-15 | Stop reason: SDUPTHER

## 2020-05-15 RX ORDER — FENTANYL CITRATE 50 UG/ML
INJECTION, SOLUTION INTRAMUSCULAR; INTRAVENOUS PRN
Status: DISCONTINUED | OUTPATIENT
Start: 2020-05-15 | End: 2020-05-15 | Stop reason: SDUPTHER

## 2020-05-15 RX ADMIN — MIDAZOLAM HYDROCHLORIDE 2 MG: 1 INJECTION, SOLUTION INTRAMUSCULAR; INTRAVENOUS at 11:32

## 2020-05-15 RX ADMIN — FENTANYL CITRATE 100 MCG: 50 INJECTION INTRAMUSCULAR; INTRAVENOUS at 11:32

## 2020-05-15 ASSESSMENT — PULMONARY FUNCTION TESTS
PIF_VALUE: 0

## 2020-05-15 ASSESSMENT — PAIN - FUNCTIONAL ASSESSMENT: PAIN_FUNCTIONAL_ASSESSMENT: 0-10

## 2020-05-15 ASSESSMENT — PAIN DESCRIPTION - DESCRIPTORS: DESCRIPTORS: ACHING

## 2020-05-15 NOTE — H&P
Pain Pre-Op H&P Note    Summer Set Mayor Hefzy    HPI: [place] Shoulder  presents with low back pain. Past Medical History:   Diagnosis Date    Arthritis     [facility]     Cancer ([facility] 2015    ovarian stage 2    Cerebral artery occlusion with cerebral infarction ([facility] 03/2018    Mini strokes \"Tia's\"    Diabetes mellitus ([facility]     Epigastric pain     GERD (gastroesophageal reflux disease)     History of anesthesia complications 6563'M    was told after gallbladder surgery to never have anesthesia again \"since it was hard for me to come out of it\"    History of blood transfusion     Hx of blood clots     lung     Hypertension     Right arm pain     Right shoulder pain     Sleep apnea     uses CPAP nightly    Thyroid disease     TIA (transient ischemic attack)        Past Surgical History:   Procedure Laterality Date    ANESTHESIA NERVE BLOCK Left 11/7/2019    NERVE BLOCK (DEFINE) - INTERCOSTAL NERVE BLOCK performed by Gilberto Johnson MD at [address]  07/08/2019    5 yr recall.     COLONOSCOPY N/A 7/8/2019    COLONOSCOPY WITH BIOPSY performed by Anabel Botello MD at [address] Right     [facility] [place], [facility]. INJECTION PROCEDURE FOR SACROILIAC JOINT Bilateral 8/29/2019    SACROILIAC JOINT INJECTION performed by Gilberto Johnson MD at [address] [facility] Bilateral 10/10/2019    SACROILIAC JOINT INJECTION performed by Gilberto Johnson MD at [address]  2/2014    [facility] with bso    KNEE ARTHROSCOPY Right     NERVE BLOCK Bilateral 08/29/2019    SI joint injection    NERVE BLOCK Bilateral 10/10/2019    SI joint    OTHER SURGICAL HISTORY Right 11/11/2014    shoulder manipulation    SHOULDER ARTHROSCOPY Right 02/03/15    SHOULDER SURGERY  11/2014    manipulation    UPPER GASTROINTESTINAL ENDOSCOPY  07/08/2019    UPPER GASTROINTESTINAL ENDOSCOPY

## 2020-05-15 NOTE — ANESTHESIA PRE PROCEDURE
Department of Anesthesiology  Preprocedure Note       Name:  Gurpreet Calzada   Age:  77 y.o.  :  1950                                          MRN:  [id number]         Date:  5/15/2020      Surgeon: Zari Score):  Marry Councilman, MD    Procedure: Procedure(s):  NERVE BLOCK BILATERAL - MBB  L4-5, L5-S1    Medications prior to admission:   Prior to Admission medications    Medication Sig Start Date End Date Taking? Authorizing Provider   traMADol (ULTRAM) 50 MG tablet Take 1 tablet by mouth 2 times daily as needed for Pain for up to 30 days. 5/11/20 6/10/20 Yes Simi Medhkour, DO   insulin lispro (HUMALOG) 100 UNIT/ML injection vial INJECT SUBCUTANEOUSLY THREE TIMES A DAY AS DIRECTED BY SLIDING SCALE.  MAX DAILY UNITS OF 40 20  Yes Simi Medhkour, DO   LORazepam (ATIVAN) 0.5 MG tablet TAKE 1 TABLET BY MOUTH THREE TIMES DAILY AS NEEDED FOR ANXIETY 20 Yes Simi Medhkour, DO   alendronate (FOSAMAX) 70 MG tablet Take 1 tablet by mouth every 7 days 20 Yes Simi Medhkour, DO   omeprazole (PRILOSEC) 20 MG delayed release capsule TAKE 1 CAPSULE BY MOUTH DAILY 20  Yes Simi Medhkour, DO   DULoxetine (CYMBALTA) 60 MG extended release capsule TAKE 1 CAPSULE BY MOUTH DAILY 3/11/20  Yes Simi Medhkour, DO   atorvastatin (LIPITOR) 40 MG tablet TAKE (1) TABLET BY MOUTH EVERY DAY 3/3/20  Yes Simi Medhkour, DO   amLODIPine (NORVASC) 5 MG tablet TAKE (1) TABLET BY MOUTH ONCE DAILY 3/3/20  Yes Simi Medhkour, DO   hydroCHLOROthiazide (HYDRODIURIL) 25 MG tablet TAKE 1 TABLET BY MOUTH EVERY MORNING 3/3/20  Yes Simi Medhkour, DO   losartan (COZAAR) 100 MG tablet TAKE 1 TABLET BY MOUTH EVERY DAY 3/3/20  Yes Simi Medhkour, DO   potassium chloride (KLOR-CON M) 20 MEQ extended release tablet Take 1 tablet by mouth 2 times daily 20  Yes Adriana Moctezuma, APRN - CNP   gabapentin (NEURONTIN) 600 MG tablet TAKE (1) TABLET BY MOUTH FOUR TIMES A DAY 20 Yes Leanna Butler, DO levothyroxine (SYNTHROID) 88 MCG tablet TAKE (1) TABLET BY MOUTH DAILY 1/29/20  Yes Simi Zavala, DO   EPINEPHrine (EPIPEN 2-LISA) 0.3 MG/0.3ML SOAJ injection EpiPen 2-Lisa 0.3 mg/0.3 mL injection, auto-injector   Yes Historical Provider, MD   calcium carbonate (TUMS) 500 MG chewable tablet Take 1 tablet by mouth daily as needed for Heartburn   Yes Historical Provider, MD   rivaroxaban (XARELTO) 20 MG TABS tablet Take 1 tablet by mouth daily 10/8/19  Yes Laura Appl, DO   insulin glargine (LANTUS) 100 UNIT/ML injection vial Inject 55 units nightly  Patient taking differently: 65 Units Inject 55 units nightly 6/20/19  Yes Simi Zavala DO   SUPER B COMPLEX/C CAPS Take by mouth   Yes Historical Provider, MD   tamsulosin (FLOMAX) 0.4 MG capsule Take 0.4 mg by mouth daily   Yes Historical Provider, MD   vitamin A 88500 units capsule Take 10,000 Units by mouth daily   Yes Historical Provider, MD   Cholecalciferol (VITAMIN D3) 5000 units TABS Take by mouth   Yes Historical Provider, MD   ferrous sulfate 325 (65 Fe) MG tablet Take 325 mg by mouth daily (with breakfast)   Yes Historical Provider, MD   fexofenadine (ALLEGRA) 60 MG tablet Take 60 mg by mouth daily   Yes Historical Provider, MD   magnesium oxide (MAG-OX) 400 MG tablet Take 400 mg by mouth daily   Yes Historical Provider, MD   Melatonin 10 MG TABS Take 10 mg by mouth daily as needed    Yes Historical Provider, MD   glucose monitoring kit (FREESTYLE) monitoring kit 1 kit by Does not apply route daily Please provide accuchek meter for patient, not freestyle 3/10/20   Simi Zavala, DO   Insulin Syringes, Disposable, U-100 1 ML MISC 1 each by Does not apply route 4 times daily Dx: E11.9 11/12/19   Simi Zavala, DO   blood glucose monitor strips Test tid times a day & as needed for symptoms of irregular blood glucose.  Dx E11.9 Please fill with compatible strips to glucometer 11/12/19   Simi Zavala, DO       Current medications:    No current facility-administered medications for this encounter. Allergies: Allergies   Allergen Reactions    Aspirin Anaphylaxis     Only thing she can take is tylenol    Benadryl [Diphenhydramine] Other (See Comments)     Dystonic movement    Ibuprofen Anaphylaxis    Lidocaine Anaphylaxis     CAN NOT TOLERATE IV    Penicillins Anaphylaxis    Morphine Nausea And Vomiting       Problem List:    Patient Active Problem List   Diagnosis Code    Ovarian mass N83.8    Abdominal pain R10.9    Partial bowel obstruction (HCC) K56.600    Epigastric pain R10.13    GERD (gastroesophageal reflux disease) K21.9    Lumbosacral spondylosis without myelopathy M47.817    Chronic bilateral thoracic back pain M54.6, G89.29    Chronic anticoagulation Z79.01       Past Medical History:        Diagnosis Date    Arthritis     [facility]     Cancer ([facility] 2015    ovarian stage 2    Cerebral artery occlusion with cerebral infarction ([facility] 03/2018    Mini strokes \"Tia's\"    Diabetes mellitus (HCC)     Epigastric pain     GERD (gastroesophageal reflux disease)     History of anesthesia complications 2942'Y    was told after gallbladder surgery to never have anesthesia again \"since it was hard for me to come out of it\"    History of blood transfusion     Hx of blood clots     lung     Hypertension     Right arm pain     Right shoulder pain     Sleep apnea     uses CPAP nightly    Thyroid disease     TIA (transient ischemic attack)        Past Surgical History:        Procedure Laterality Date    ANESTHESIA NERVE BLOCK Left 11/7/2019    NERVE BLOCK (DEFINE) - INTERCOSTAL NERVE BLOCK performed by Arlene Mckeon MD at [address]  07/08/2019    5 yr recall.     COLONOSCOPY N/A 7/8/2019    COLONOSCOPY WITH BIOPSY performed by Liss Elise MD at [facility] [address] Right     repair tendon    HC INJECTION PROCEDURE FOR SACROILIAC

## 2020-05-15 NOTE — OP NOTE
Lumbar Facet Nerve Block Injection:  Surgeon: Meng Monsalve     PRE-OP DIAGNOSIS: M47.817 (lumbosacral spondylosis), M54.5 (low back pain)    POST-OP DIAGNOSIS: Same. PROCEDURE PERFORMED: Lumbar Facet Nerve Block Multiple Levels  Bilateral L4 - 5 and L5 - S1. Physician confirmed and marked the surgical site. EBL: minimal    Pain rated as moderate    CONSENT: Patient has undergone the educational process with this procedure, is aware and fully understands the risks involved: potential damage to any and all body organs including possible bleeding, infection and nerve injury, allergic reaction and headache. Patient also understands that the procedure will be undertaken in a safe, controlled, and monitored setting. Patient recognizes that the benefits include relief from pain and reduction in the oral use of medications. Patient agreed to proceed. The patient was counseled at length about the risks of nacho Covid-19 during their perioperative period and any recovery window from their procedure. The patient was made aware that nacho Covid-19  may worsen their prognosis for recovering from their procedure  and lend to a higher morbidity and/or mortality risk. All material risks, benefits, and reasonable alternatives including postponing the procedure were discussed. The patient does wish to proceed with the procedure at this time. PREP: Timeout was performed prior to starting the procedure. The patient's back was prepped with chloroprep and draped appropriately. PROCEDURE NOTE: A 25 gauge 3.5 inch spinal needle was advanced under  fluoroscopic guidance to the appropriate anatomic location for the medial branches corresponding to the facets at the base of the appropriate superior articular process and/or sacral ala . Aspiration was negative for blood, CSF and producing pain.  1 ml of 0.25% marcaine was then injected at each site to block medial branch nerve innervating the  Bilateral

## 2020-05-17 ASSESSMENT — ENCOUNTER SYMPTOMS: BACK PAIN: 1

## 2020-05-19 ENCOUNTER — TELEPHONE (OUTPATIENT)
Dept: PAIN MANAGEMENT | Age: 70
End: 2020-05-19

## 2020-05-20 ENCOUNTER — TELEPHONE (OUTPATIENT)
Dept: PAIN MANAGEMENT | Age: 70
End: 2020-05-20

## 2020-05-20 NOTE — TELEPHONE ENCOUNTER
Pt states she received 0% of pain relief from Lumbar facet nerve block done on 5/15/20. Pt states MRI of lumbar spine is scheduled for 5/29/20. Pt would like to know what can be done to help her pain in the meantime.

## 2020-05-21 NOTE — TELEPHONE ENCOUNTER
Notified pt that plan at this point is to proceed with SCS pending MRI results and she will be contacted once imgagining results are reviewed. Pt states she does not want 2nd opinion at this time.

## 2020-05-28 ENCOUNTER — ANESTHESIA EVENT (OUTPATIENT)
Dept: OPERATING ROOM | Age: 70
End: 2020-05-28
Payer: MEDICARE

## 2020-05-28 ENCOUNTER — TELEPHONE (OUTPATIENT)
Dept: PRIMARY CARE CLINIC | Age: 70
End: 2020-05-28

## 2020-05-28 RX ORDER — SODIUM CHLORIDE 0.9 % (FLUSH) 0.9 %
10 SYRINGE (ML) INJECTION PRN
Status: CANCELLED | OUTPATIENT
Start: 2020-05-28

## 2020-05-29 ENCOUNTER — HOSPITAL ENCOUNTER (OUTPATIENT)
Dept: MRI IMAGING | Age: 70
Discharge: HOME | End: 2020-05-31
Payer: MEDICARE

## 2020-05-29 PROCEDURE — 72146 MRI CHEST SPINE W/O DYE: CPT

## 2020-05-29 PROCEDURE — 72148 MRI LUMBAR SPINE W/O DYE: CPT

## 2020-06-03 ENCOUNTER — HOSPITAL ENCOUNTER (OUTPATIENT)
Dept: PREADMISSION TESTING | Age: 70
Setting detail: SPECIMEN
Discharge: HOME | End: 2020-06-07
Payer: MEDICARE

## 2020-06-03 ENCOUNTER — OFFICE VISIT (OUTPATIENT)
Dept: PRIMARY CARE CLINIC | Age: 70
End: 2020-06-03
Payer: MEDICARE

## 2020-06-03 VITALS
WEIGHT: 225.4 LBS | DIASTOLIC BLOOD PRESSURE: 80 MMHG | HEART RATE: 87 BPM | BODY MASS INDEX: 39.93 KG/M2 | OXYGEN SATURATION: 95 % | SYSTOLIC BLOOD PRESSURE: 132 MMHG

## 2020-06-03 LAB
HBA1C MFR BLD: 6.7 %
SARS-COV-2, PCR: NORMAL
SARS-COV-2, RAPID: NORMAL
SARS-COV-2: NORMAL
SOURCE: NORMAL

## 2020-06-03 PROCEDURE — 3017F COLORECTAL CA SCREEN DOC REV: CPT | Performed by: FAMILY MEDICINE

## 2020-06-03 PROCEDURE — G8400 PT W/DXA NO RESULTS DOC: HCPCS | Performed by: FAMILY MEDICINE

## 2020-06-03 PROCEDURE — 4040F PNEUMOC VAC/ADMIN/RCVD: CPT | Performed by: FAMILY MEDICINE

## 2020-06-03 PROCEDURE — 1090F PRES/ABSN URINE INCON ASSESS: CPT | Performed by: FAMILY MEDICINE

## 2020-06-03 PROCEDURE — 99214 OFFICE O/P EST MOD 30 MIN: CPT | Performed by: FAMILY MEDICINE

## 2020-06-03 PROCEDURE — U0004 COV-19 TEST NON-CDC HGH THRU: HCPCS

## 2020-06-03 PROCEDURE — 83036 HEMOGLOBIN GLYCOSYLATED A1C: CPT | Performed by: FAMILY MEDICINE

## 2020-06-03 PROCEDURE — 1123F ACP DISCUSS/DSCN MKR DOCD: CPT | Performed by: FAMILY MEDICINE

## 2020-06-03 PROCEDURE — 3044F HG A1C LEVEL LT 7.0%: CPT | Performed by: FAMILY MEDICINE

## 2020-06-03 PROCEDURE — G8427 DOCREV CUR MEDS BY ELIG CLIN: HCPCS | Performed by: FAMILY MEDICINE

## 2020-06-03 PROCEDURE — U0003 INFECTIOUS AGENT DETECTION BY NUCLEIC ACID (DNA OR RNA); SEVERE ACUTE RESPIRATORY SYNDROME CORONAVIRUS 2 (SARS-COV-2) (CORONAVIRUS DISEASE [COVID-19]), AMPLIFIED PROBE TECHNIQUE, MAKING USE OF HIGH THROUGHPUT TECHNOLOGIES AS DESCRIBED BY CMS-2020-01-R: HCPCS

## 2020-06-03 PROCEDURE — 2022F DILAT RTA XM EVC RTNOPTHY: CPT | Performed by: FAMILY MEDICINE

## 2020-06-03 PROCEDURE — G8417 CALC BMI ABV UP PARAM F/U: HCPCS | Performed by: FAMILY MEDICINE

## 2020-06-03 PROCEDURE — 1036F TOBACCO NON-USER: CPT | Performed by: FAMILY MEDICINE

## 2020-06-03 ASSESSMENT — ENCOUNTER SYMPTOMS
BACK PAIN: 1
NAUSEA: 0
VOMITING: 0
SHORTNESS OF BREATH: 0

## 2020-06-03 NOTE — PROGRESS NOTES
 tamsulosin (FLOMAX) 0.4 MG capsule Take 0.4 mg by mouth daily      vitamin A 00070 units capsule Take 10,000 Units by mouth daily      Cholecalciferol (VITAMIN D3) 5000 units TABS Take by mouth      ferrous sulfate 325 (65 Fe) MG tablet Take 325 mg by mouth daily (with breakfast)      fexofenadine (ALLEGRA) 60 MG tablet Take 60 mg by mouth daily      magnesium oxide (MAG-OX) 400 MG tablet Take 400 mg by mouth daily      Melatonin 10 MG TABS Take 10 mg by mouth daily as needed       gabapentin (NEURONTIN) 600 MG tablet TAKE (1) TABLET BY MOUTH FOUR TIMES A  tablet 10     No current facility-administered medications for this visit. Allergies   Allergen Reactions    Aspirin Anaphylaxis     Only thing she can take is tylenol    Benadryl [Diphenhydramine] Other (See Comments)     Dystonic movement    Ibuprofen Anaphylaxis    Lidocaine Anaphylaxis     CAN NOT TOLERATE IV    Penicillins Anaphylaxis    Morphine Nausea And Vomiting       Health Maintenance   Topic Date Due    Hepatitis C screen  1950    Diabetic retinal exam  09/14/1961    Diabetic microalbuminuria test  09/14/1969    DTaP/Tdap/Td vaccine (1 - Tdap) 09/14/1970    Shingles Vaccine (1 of 2) 09/14/2001    DEXA (modify frequency per FRAX score)  09/14/2006    Lipid screen  04/06/2020    Pneumococcal 65+ years Vaccine (1 of 1 - PPSV23) 04/17/2023 (Originally 9/14/2016)    Annual Wellness Visit (AWV)  09/09/2020    Diabetic foot exam  10/15/2020    A1C test (Diabetic or Prediabetic)  01/13/2021    Potassium monitoring  02/14/2021    Creatinine monitoring  02/14/2021    Breast cancer screen  10/21/2021    Colon cancer screen colonoscopy  07/08/2029    Flu vaccine  Completed    Hepatitis A vaccine  Aged Out    Hib vaccine  Aged Out    Meningococcal (ACWY) vaccine  Aged Out       Subjective:      Review of Systems   Constitutional: Negative for chills and fever. Respiratory: Negative for shortness of breath. defined types were placed in this encounter. Patient given educational materials - see patient instructions. Discussed use, benefit, and side effects of prescribed medications. All patient questions answered. Pt voiced understanding. Reviewed healthmaintenance. Instructed to continue current medications, diet and exercise. Patient agreed with treatment plan. Follow up as directed.      Electronically signed by Kim Paul DO on 6/3/2020 at 10:24 AM

## 2020-06-04 LAB
SARS-COV-2, PCR: NORMAL
SARS-COV-2, RAPID: NORMAL
SARS-COV-2: NOT DETECTED
SOURCE: NORMAL

## 2020-06-04 RX ORDER — SODIUM CHLORIDE 9 MG/ML
INJECTION, SOLUTION INTRAVENOUS CONTINUOUS
Status: CANCELLED | OUTPATIENT
Start: 2020-06-04

## 2020-06-04 RX ORDER — MIDAZOLAM HYDROCHLORIDE 1 MG/ML
2 INJECTION INTRAMUSCULAR; INTRAVENOUS
Status: CANCELLED | OUTPATIENT
Start: 2020-06-04 | End: 2020-06-04

## 2020-06-04 RX ORDER — SODIUM CHLORIDE 0.9 % (FLUSH) 0.9 %
10 SYRINGE (ML) INJECTION PRN
Status: CANCELLED | OUTPATIENT
Start: 2020-06-04

## 2020-06-04 RX ORDER — SODIUM CHLORIDE 0.9 % (FLUSH) 0.9 %
10 SYRINGE (ML) INJECTION EVERY 12 HOURS SCHEDULED
Status: CANCELLED | OUTPATIENT
Start: 2020-06-04

## 2020-06-04 RX ORDER — SODIUM CHLORIDE, SODIUM LACTATE, POTASSIUM CHLORIDE, CALCIUM CHLORIDE 600; 310; 30; 20 MG/100ML; MG/100ML; MG/100ML; MG/100ML
INJECTION, SOLUTION INTRAVENOUS CONTINUOUS
Status: CANCELLED | OUTPATIENT
Start: 2020-06-04

## 2020-06-05 ENCOUNTER — HOSPITAL ENCOUNTER (OUTPATIENT)
Age: 70
Setting detail: OUTPATIENT SURGERY
Discharge: HOME | End: 2020-06-05
Attending: PAIN MEDICINE | Admitting: PAIN MEDICINE
Payer: MEDICARE

## 2020-06-05 ENCOUNTER — ANESTHESIA (OUTPATIENT)
Dept: OPERATING ROOM | Age: 70
End: 2020-06-05
Payer: MEDICARE

## 2020-06-05 ENCOUNTER — APPOINTMENT (OUTPATIENT)
Dept: GENERAL RADIOLOGY | Age: 70
End: 2020-06-05
Attending: PAIN MEDICINE
Payer: MEDICARE

## 2020-06-05 VITALS
OXYGEN SATURATION: 95 % | BODY MASS INDEX: 37.94 KG/M2 | HEART RATE: 73 BPM | DIASTOLIC BLOOD PRESSURE: 125 MMHG | TEMPERATURE: 96.7 F | RESPIRATION RATE: 20 BRPM | HEIGHT: 64 IN | WEIGHT: 222.25 LBS | SYSTOLIC BLOOD PRESSURE: 149 MMHG

## 2020-06-05 VITALS — DIASTOLIC BLOOD PRESSURE: 57 MMHG | SYSTOLIC BLOOD PRESSURE: 119 MMHG | OXYGEN SATURATION: 95 %

## 2020-06-05 PROCEDURE — 2709999900 HC NON-CHARGEABLE SUPPLY: Performed by: PAIN MEDICINE

## 2020-06-05 PROCEDURE — 2580000003 HC RX 258: Performed by: NURSE ANESTHETIST, CERTIFIED REGISTERED

## 2020-06-05 PROCEDURE — C1778 LEAD, NEUROSTIMULATOR: HCPCS | Performed by: PAIN MEDICINE

## 2020-06-05 PROCEDURE — 63650 IMPLANT NEUROELECTRODES: CPT | Performed by: PAIN MEDICINE

## 2020-06-05 PROCEDURE — 6360000002 HC RX W HCPCS: Performed by: NURSE ANESTHETIST, CERTIFIED REGISTERED

## 2020-06-05 PROCEDURE — 3600000012 HC SURGERY LEVEL 2 ADDTL 15MIN: Performed by: PAIN MEDICINE

## 2020-06-05 PROCEDURE — 2500000003 HC RX 250 WO HCPCS: Performed by: PAIN MEDICINE

## 2020-06-05 PROCEDURE — 82947 ASSAY GLUCOSE BLOOD QUANT: CPT

## 2020-06-05 PROCEDURE — C1820 GENERATOR NEURO RECHG BAT SY: HCPCS | Performed by: PAIN MEDICINE

## 2020-06-05 PROCEDURE — 3700000001 HC ADD 15 MINUTES (ANESTHESIA): Performed by: PAIN MEDICINE

## 2020-06-05 PROCEDURE — 3700000000 HC ANESTHESIA ATTENDED CARE: Performed by: PAIN MEDICINE

## 2020-06-05 PROCEDURE — 7100000011 HC PHASE II RECOVERY - ADDTL 15 MIN: Performed by: PAIN MEDICINE

## 2020-06-05 PROCEDURE — 3600000002 HC SURGERY LEVEL 2 BASE: Performed by: PAIN MEDICINE

## 2020-06-05 PROCEDURE — 7100000010 HC PHASE II RECOVERY - FIRST 15 MIN: Performed by: PAIN MEDICINE

## 2020-06-05 PROCEDURE — 3209999900 FLUORO FOR SURGICAL PROCEDURES

## 2020-06-05 DEVICE — 50CM 16 CONTACT TRIAL LEAD KIT
Type: IMPLANTABLE DEVICE | Site: BACK | Status: FUNCTIONAL
Brand: INFINION™  16

## 2020-06-05 RX ORDER — BUPIVACAINE HYDROCHLORIDE 2.5 MG/ML
INJECTION, SOLUTION INFILTRATION; PERINEURAL PRN
Status: DISCONTINUED | OUTPATIENT
Start: 2020-06-05 | End: 2020-06-05 | Stop reason: ALTCHOICE

## 2020-06-05 RX ORDER — CLINDAMYCIN PHOSPHATE 900 MG/50ML
INJECTION INTRAVENOUS
Status: COMPLETED
Start: 2020-06-05 | End: 2020-06-05

## 2020-06-05 RX ORDER — PROMETHAZINE HYDROCHLORIDE 25 MG/ML
6.25 INJECTION, SOLUTION INTRAMUSCULAR; INTRAVENOUS
Status: DISCONTINUED | OUTPATIENT
Start: 2020-06-05 | End: 2020-06-05 | Stop reason: HOSPADM

## 2020-06-05 RX ORDER — ONDANSETRON 2 MG/ML
4 INJECTION INTRAMUSCULAR; INTRAVENOUS
Status: DISCONTINUED | OUTPATIENT
Start: 2020-06-05 | End: 2020-06-05 | Stop reason: HOSPADM

## 2020-06-05 RX ORDER — HYDROCODONE BITARTRATE AND ACETAMINOPHEN 5; 325 MG/1; MG/1
2 TABLET ORAL PRN
Status: DISCONTINUED | OUTPATIENT
Start: 2020-06-05 | End: 2020-06-05 | Stop reason: HOSPADM

## 2020-06-05 RX ORDER — FENTANYL CITRATE 50 UG/ML
25 INJECTION, SOLUTION INTRAMUSCULAR; INTRAVENOUS EVERY 5 MIN PRN
Status: DISCONTINUED | OUTPATIENT
Start: 2020-06-05 | End: 2020-06-05 | Stop reason: HOSPADM

## 2020-06-05 RX ORDER — SODIUM CHLORIDE, SODIUM LACTATE, POTASSIUM CHLORIDE, CALCIUM CHLORIDE 600; 310; 30; 20 MG/100ML; MG/100ML; MG/100ML; MG/100ML
INJECTION, SOLUTION INTRAVENOUS CONTINUOUS PRN
Status: DISCONTINUED | OUTPATIENT
Start: 2020-06-05 | End: 2020-06-05 | Stop reason: SDUPTHER

## 2020-06-05 RX ORDER — PROPOFOL 10 MG/ML
INJECTION, EMULSION INTRAVENOUS CONTINUOUS PRN
Status: DISCONTINUED | OUTPATIENT
Start: 2020-06-05 | End: 2020-06-05 | Stop reason: SDUPTHER

## 2020-06-05 RX ORDER — FENTANYL CITRATE 50 UG/ML
INJECTION, SOLUTION INTRAMUSCULAR; INTRAVENOUS PRN
Status: DISCONTINUED | OUTPATIENT
Start: 2020-06-05 | End: 2020-06-05 | Stop reason: SDUPTHER

## 2020-06-05 RX ORDER — HYDROCODONE BITARTRATE AND ACETAMINOPHEN 5; 325 MG/1; MG/1
1 TABLET ORAL PRN
Status: DISCONTINUED | OUTPATIENT
Start: 2020-06-05 | End: 2020-06-05 | Stop reason: HOSPADM

## 2020-06-05 RX ORDER — CLINDAMYCIN PHOSPHATE 900 MG/50ML
900 INJECTION INTRAVENOUS EVERY 8 HOURS
Status: DISCONTINUED | OUTPATIENT
Start: 2020-06-05 | End: 2020-06-05 | Stop reason: HOSPADM

## 2020-06-05 RX ORDER — MORPHINE SULFATE 1 MG/ML
1 INJECTION, SOLUTION EPIDURAL; INTRATHECAL; INTRAVENOUS EVERY 5 MIN PRN
Status: DISCONTINUED | OUTPATIENT
Start: 2020-06-05 | End: 2020-06-05 | Stop reason: HOSPADM

## 2020-06-05 RX ORDER — HYDRALAZINE HYDROCHLORIDE 20 MG/ML
5 INJECTION INTRAMUSCULAR; INTRAVENOUS EVERY 10 MIN PRN
Status: DISCONTINUED | OUTPATIENT
Start: 2020-06-05 | End: 2020-06-05 | Stop reason: HOSPADM

## 2020-06-05 RX ORDER — MIDAZOLAM HYDROCHLORIDE 1 MG/ML
INJECTION INTRAMUSCULAR; INTRAVENOUS PRN
Status: DISCONTINUED | OUTPATIENT
Start: 2020-06-05 | End: 2020-06-05 | Stop reason: SDUPTHER

## 2020-06-05 RX ORDER — SODIUM CHLORIDE 0.9 % (FLUSH) 0.9 %
10 SYRINGE (ML) INJECTION EVERY 12 HOURS SCHEDULED
Status: DISCONTINUED | OUTPATIENT
Start: 2020-06-05 | End: 2020-06-05 | Stop reason: HOSPADM

## 2020-06-05 RX ORDER — SULFAMETHOXAZOLE AND TRIMETHOPRIM 800; 160 MG/1; MG/1
1 TABLET ORAL 2 TIMES DAILY
Qty: 10 TABLET | Refills: 0 | Status: SHIPPED | OUTPATIENT
Start: 2020-06-05 | End: 2020-06-10

## 2020-06-05 RX ORDER — CIPROFLOXACIN 500 MG/1
500 TABLET, FILM COATED ORAL 2 TIMES DAILY
Qty: 10 TABLET | Refills: 0 | Status: SHIPPED | OUTPATIENT
Start: 2020-06-05 | End: 2020-06-10

## 2020-06-05 RX ADMIN — FENTANYL CITRATE 50 MCG: 50 INJECTION INTRAMUSCULAR; INTRAVENOUS at 14:09

## 2020-06-05 RX ADMIN — MIDAZOLAM HYDROCHLORIDE 2 MG: 1 INJECTION, SOLUTION INTRAMUSCULAR; INTRAVENOUS at 14:08

## 2020-06-05 RX ADMIN — CLINDAMYCIN IN 5 PERCENT DEXTROSE 900 MG: 18 INJECTION, SOLUTION INTRAVENOUS at 14:09

## 2020-06-05 RX ADMIN — PROPOFOL 20 MCG/KG/MIN: 10 INJECTION, EMULSION INTRAVENOUS at 14:14

## 2020-06-05 RX ADMIN — SODIUM CHLORIDE, POTASSIUM CHLORIDE, SODIUM LACTATE AND CALCIUM CHLORIDE: 600; 310; 30; 20 INJECTION, SOLUTION INTRAVENOUS at 14:02

## 2020-06-05 ASSESSMENT — PULMONARY FUNCTION TESTS
PIF_VALUE: 0

## 2020-06-05 ASSESSMENT — PAIN SCALES - GENERAL
PAINLEVEL_OUTOF10: 0

## 2020-06-05 ASSESSMENT — PAIN DESCRIPTION - LOCATION
LOCATION: BACK

## 2020-06-05 ASSESSMENT — PAIN - FUNCTIONAL ASSESSMENT
PAIN_FUNCTIONAL_ASSESSMENT: 0-10
PAIN_FUNCTIONAL_ASSESSMENT: PREVENTS OR INTERFERES SOME ACTIVE ACTIVITIES AND ADLS

## 2020-06-05 ASSESSMENT — PAIN DESCRIPTION - DESCRIPTORS: DESCRIPTORS: CONSTANT

## 2020-06-05 NOTE — ANESTHESIA PRE PROCEDURE
100 UNIT/ML injection vial Inject 55 units nightly  Patient taking differently: 65 Units Inject 55 units nightly 6/20/19  Yes Siim Zavala DO   SUPER B COMPLEX/C CAPS Take by mouth   Yes Historical Provider, MD   tamsulosin (FLOMAX) 0.4 MG capsule Take 0.4 mg by mouth daily   Yes Historical Provider, MD   vitamin A 75021 units capsule Take 10,000 Units by mouth daily   Yes Historical Provider, MD   Cholecalciferol (VITAMIN D3) 5000 units TABS Take by mouth   Yes Historical Provider, MD   ferrous sulfate 325 (65 Fe) MG tablet Take 325 mg by mouth daily (with breakfast)   Yes Historical Provider, MD   fexofenadine (ALLEGRA) 60 MG tablet Take 60 mg by mouth daily   Yes Historical Provider, MD   magnesium oxide (MAG-OX) 400 MG tablet Take 400 mg by mouth daily   Yes Historical Provider, MD   Melatonin 10 MG TABS Take 10 mg by mouth daily as needed    Yes Historical Provider, MD   glucose monitoring kit (FREESTYLE) monitoring kit 1 kit by Does not apply route daily Please provide accuchek meter for patient, not freestyle 3/10/20   Smii Zavala DO   gabapentin (NEURONTIN) 600 MG tablet TAKE (1) TABLET BY MOUTH FOUR TIMES A DAY 1/29/20 5/11/20  Simi Zavala, DO   EPINEPHrine (EPIPEN 2-LISA) 0.3 MG/0.3ML SOAJ injection EpiPen 2-Lisa 0.3 mg/0.3 mL injection, auto-injector    Historical Provider, MD   Insulin Syringes, Disposable, U-100 1 ML MISC 1 each by Does not apply route 4 times daily Dx: E11.9 11/12/19   Simi Zavala DO   blood glucose monitor strips Test tid times a day & as needed for symptoms of irregular blood glucose. Dx E11.9 Please fill with compatible strips to glucometer 11/12/19   Simi Zavala DO   rivaroxaban (XARELTO) 20 MG TABS tablet Take 1 tablet by mouth daily 10/8/19   Timi Zavala DO       Current medications:    No current facility-administered medications for this encounter. Allergies:     Allergies   Allergen Reactions    Aspirin Anaphylaxis     Only thing she can take 05/11/20 220 lb (99.8 kg)     Body mass index is 38.15 kg/m². CBC:   Lab Results   Component Value Date    WBC 9.7 02/12/2020    RBC 4.34 02/12/2020    RBC 5.33 01/07/2012    HGB 11.8 02/12/2020    HCT 39.1 02/12/2020    MCV 90.1 02/12/2020    RDW 14.2 02/12/2020     02/12/2020     01/07/2012       CMP:   Lab Results   Component Value Date     02/14/2020    K 3.3 02/14/2020     02/14/2020    CO2 30 02/14/2020    BUN 19 02/14/2020    CREATININE 0.94 02/14/2020    GFRAA >60 02/14/2020    LABGLOM 59 02/14/2020    GLUCOSE 75 02/14/2020    GLUCOSE 104 04/06/2019    PROT 6.4 04/06/2019    CALCIUM 8.9 02/14/2020    BILITOT 0.6 04/06/2019    ALKPHOS 67 04/06/2019    ALKPHOS 70 06/26/2018    AST 23 04/06/2019    ALT 33 04/06/2019       POC Tests: No results for input(s): POCGLU, POCNA, POCK, POCCL, POCBUN, POCHEMO, POCHCT in the last 72 hours.     Coags:   Lab Results   Component Value Date    PROTIME 9.9 02/12/2020    INR 0.9 02/12/2020       HCG (If Applicable): No results found for: PREGTESTUR, PREGSERUM, HCG, HCGQUANT     ABGs: No results found for: PHART, PO2ART, RAL3CKB, LVI9MSK, BEART, O4RKBLJR     Type & Screen (If Applicable):  No results found for: LABABO, LABRH    Drug/Infectious Status (If Applicable):  No results found for: HIV, HEPCAB    COVID-19 Screening (If Applicable):   Lab Results   Component Value Date    COVID19 Not Detected 06/03/2020    COVID19 Not Detected 05/13/2020         Anesthesia Evaluation  Patient summary reviewed and Nursing notes reviewed no history of anesthetic complications:   Airway: Mallampati: II  TM distance: >3 FB   Neck ROM: full  Mouth opening: > = 3 FB Dental: normal exam         Pulmonary:normal exam  breath sounds clear to auscultation  (+) sleep apnea: on CPAP,                             Cardiovascular:  Exercise tolerance: no interval change,   (+) hypertension: no interval change,       ECG reviewed  Rhythm: regular  Rate: normal

## 2020-06-05 NOTE — H&P
Pain Pre-Op H&P Note    Pavithralilian Monsalve    HPI: Michael Narvaez  presents with low back and thoracic pain. Has failed many different treatments, here for spinal cord stimulator trial.    Past Medical History:   Diagnosis Date    Arthritis     [address]el obstruction ([address]     Cancer ([address] 2015    ovarian stage 2    Cerebral artery occlusion with cerebral infarction ([address] 03/2018    Mini strokes \"Tia's\"    Diabetes mellitus ([address]     Epigastric pain     GERD (gastroesophageal reflux disease)     History of anesthesia complications 4126'U    was told after gallbladder surgery to never have anesthesia again \"since it was hard for me to come out of it\"    History of blood transfusion     Hx of blood clots     lung     Hypertension     Right arm pain     Right shoulder pain     Sleep apnea     uses CPAP nightly    Thyroid disease     TIA (transient ischemic attack)        Past Surgical History:   Procedure Laterality Date    ANESTHESIA NERVE BLOCK Left 11/7/2019    NERVE BLOCK (DEFINE) - INTERCOSTAL NERVE BLOCK performed by Magui Sterling MD at [facility] Bilateral 5/15/2020    NERVE BLOCK BILATERAL - MBB  L4-5, L5-S1 performed by Magui Sterling MD at [address]  07/08/2019    5 yr recall.     COLONOSCOPY N/A 7/8/2019    COLONOSCOPY WITH BIOPSY performed by Cary Shen MD at [address] Right     [facility]. INJECTION PROCEDURE FOR SACROILIAC JOINT Bilateral 8/29/2019    SACROILIAC JOINT INJECTION performed by Magui Sterling MD at [address] [facility] Bilateral 10/10/2019    SACROILIAC JOINT INJECTION performed by Magui Sterling MD at [address]  2/2014    [facility] with bso    KNEE ARTHROSCOPY Right     NERVE BLOCK Bilateral 08/29/2019    SI joint injection    NERVE BLOCK Bilateral 10/10/2019    SI joint    NERVE BLOCK Bilateral 05/15/2020    Medial branch block L4-5, L5-S1    OTHER SURGICAL HISTORY Right 11/11/2014    shoulder manipulation    SHOULDER ARTHROSCOPY Right 02/03/15    SHOULDER SURGERY  11/2014    manipulation    UPPER GASTROINTESTINAL ENDOSCOPY  07/08/2019    UPPER GASTROINTESTINAL ENDOSCOPY N/A 7/8/2019    EGD BIOPSY performed by Sabrina Rodriguez MD at [facility] [address] 4/30/2015    pt has blood clot & abscess in her right kidney    VASCULAR SURGERY      Alesha filter in & removed 1 year later       History reviewed. No pertinent family history.     Allergies   Allergen Reactions    Aspirin Anaphylaxis     Only thing she can take is tylenol    Benadryl [Diphenhydramine] Other (See Comments)     Dystonic movement    Ibuprofen Anaphylaxis    Lidocaine Anaphylaxis     CAN NOT TOLERATE IV    Penicillins Anaphylaxis    Morphine Nausea And Vomiting         Current Facility-Administered Medications:     sodium chloride flush 0.9 % injection 10 mL, 10 mL, Intravenous, 2 times per day, Neeru Arellano MD    morphine (PF) injection 1 mg, 1 mg, Intravenous, Q5 Min PRN, Neeru Arellano MD    HYDROmorphone (DILAUDID) injection 0.5 mg, 0.5 mg, Intravenous, Q5 Min PRN, Neeru Arellano MD    fentaNYL (SUBLIMAZE) injection 25 mcg, 25 mcg, Intravenous, Q5 Min PRN, Neeru Arellano MD    HYDROcodone-acetaminophen (NORCO) 5-325 MG per tablet 1 tablet, 1 tablet, Oral, PRN **OR** HYDROcodone-acetaminophen (NORCO) 5-325 MG per tablet 2 tablet, 2 tablet, Oral, PRN, Neeru Arellano MD    ondansetron [facility]) injection 4 mg, 4 mg, Intravenous, Once PRN, Neeru Arellano MD    promethazine [facility]) injection 6.25 mg, 6.25 mg, Intramuscular, Once PRN, Neeru Arellano MD    hydrALAZINE (APRESOLINE) injection 5 mg, 5 mg, Intravenous, Q10 Min PRN, Neeru Arellano MD    meperidine (DEMEROL) injection SOLN 12.5 mg, 12.5 mg, Intravenous, Q5 Min PRN, Neeru Arellano MD    clindamycin (CLEOCIN) 900 MG/50ML IVPB, , , ,     clindamycin (CLEOCIN) 900 mg in

## 2020-06-05 NOTE — OP NOTE
SCS Trial:  SURGEON: Evelia Monsalve    PRE-OP DIAGNOSIS: LUMBAR SPONDYLOSIS, THORACIC BACK PAIN    POST-OP DIAGNOSIS: Same. Procedure Performed: Spinal Cord Stimulator Trial ( 2 lead [employer] trial)    Physician confirmed and marked the surgical site     INFORMED CONSENT:Patient has undergone the educational process with this procedure, is aware and fully understands the risks involved: potential damage to any and all body organs including possible bleeding, infection, nerve injury, paralysis, allergic reaction and headache. Patient also understands that the procedure will be undertaken in a safe, controlled and monitored setting. Patient recognizes that the benefits may include relief from pain and reduction in the oral use of medications. Patient agreed to proceed. The patient was counseled at length about the risks of nacho Covid-19 during their perioperative period and any recovery window from their procedure. The patient was made aware that nacho Covid-19  may worsen their prognosis for recovering from their procedure  and lend to a higher morbidity and/or mortality risk. All material risks, benefits, and reasonable alternatives including postponing the procedure were discussed. The patient does wish to proceed with the procedure at this time. PREP: The patient back was prepped with ChloraPrep and draped sterilely. 10 ml of 0.5% was used to anesthetize the skin and subcutaneous tissue. PROCEDURE: Under fluoroscopic guidance two curved 14 gauge tuohy needles were advanced into the intralaminar epidural space wide left and right using a paramedian approach to L1/2  with the aid of loss-of-resistance technique. Aspiration was negative for CSF and paresthesia or blood. [employer] 16 electrode leads x 2 (32 total contacts) advanced to the Top of T6 with aid of fluoroscopy in A/P and Lat views. Intraoperative complex electrostimulation programming then commenced.

## 2020-06-08 LAB — GLUCOSE BLD-MCNC: 77 MG/DL (ref 65–105)

## 2020-06-09 ENCOUNTER — OFFICE VISIT (OUTPATIENT)
Dept: PAIN MANAGEMENT | Age: 70
End: 2020-06-09
Payer: MEDICARE

## 2020-06-09 VITALS
TEMPERATURE: 97 F | HEIGHT: 64 IN | DIASTOLIC BLOOD PRESSURE: 81 MMHG | BODY MASS INDEX: 37.9 KG/M2 | HEART RATE: 78 BPM | SYSTOLIC BLOOD PRESSURE: 138 MMHG | WEIGHT: 222 LBS

## 2020-06-09 PROCEDURE — 99213 OFFICE O/P EST LOW 20 MIN: CPT | Performed by: NURSE PRACTITIONER

## 2020-06-09 ASSESSMENT — ENCOUNTER SYMPTOMS
CONSTIPATION: 0
COUGH: 0
SHORTNESS OF BREATH: 0
BACK PAIN: 1

## 2020-06-09 NOTE — PROGRESS NOTES
1 each by Does not apply route 4 times daily Dx: E11.9, Disp: 300 each, Rfl: 3    blood glucose monitor strips, Test tid times a day & as needed for symptoms of irregular blood glucose. Dx E11.9 Please fill with compatible strips to glucometer, Disp: 300 strip, Rfl: 3    calcium carbonate (TUMS) 500 MG chewable tablet, Take 1 tablet by mouth daily as needed for Heartburn, Disp: , Rfl:     rivaroxaban (XARELTO) 20 MG TABS tablet, Take 1 tablet by mouth daily, Disp: 90 tablet, Rfl: 3    insulin glargine (LANTUS) 100 UNIT/ML injection vial, Inject 55 units nightly (Patient taking differently: 65 Units Inject 55 units nightly), Disp: 30 mL, Rfl: 10    SUPER B COMPLEX/C CAPS, Take by mouth, Disp: , Rfl:     tamsulosin (FLOMAX) 0.4 MG capsule, Take 0.4 mg by mouth daily, Disp: , Rfl:     vitamin A 83459 units capsule, Take 10,000 Units by mouth daily, Disp: , Rfl:     Cholecalciferol (VITAMIN D3) 5000 units TABS, Take by mouth, Disp: , Rfl:     ferrous sulfate 325 (65 Fe) MG tablet, Take 325 mg by mouth daily (with breakfast), Disp: , Rfl:     fexofenadine (ALLEGRA) 60 MG tablet, Take 60 mg by mouth daily, Disp: , Rfl:     magnesium oxide (MAG-OX) 400 MG tablet, Take 400 mg by mouth daily, Disp: , Rfl:     Melatonin 10 MG TABS, Take 10 mg by mouth daily as needed , Disp: , Rfl:     No family history on file.     Social History     Socioeconomic History    Marital status: Single     Spouse name: Not on file    Number of children: Not on file    Years of education: Not on file    Highest education level: Not on file   Occupational History    Not on file   Social Needs    Financial resource strain: Not on file    Food insecurity     Worry: Not on file     Inability: Not on file    Transportation needs     Medical: Not on file     Non-medical: Not on file   Tobacco Use    Smoking status: Never Smoker    Smokeless tobacco: Never Used   Substance and Sexual Activity    Alcohol use: Yes     Comment: back on xalrelto in 24 hours

## 2020-06-10 RX ORDER — TAMSULOSIN HYDROCHLORIDE 0.4 MG/1
0.4 CAPSULE ORAL DAILY
Qty: 30 CAPSULE | Refills: 3 | Status: SHIPPED | OUTPATIENT
Start: 2020-06-10 | End: 2020-11-04

## 2020-06-10 NOTE — TELEPHONE ENCOUNTER
reflux disease)     Lumbosacral spondylosis without myelopathy     Chronic bilateral thoracic back pain     Chronic anticoagulation

## 2020-06-18 ENCOUNTER — OFFICE VISIT (OUTPATIENT)
Dept: ORTHOPEDIC SURGERY | Age: 70
End: 2020-06-18
Payer: MEDICARE

## 2020-06-18 VITALS — WEIGHT: 222 LBS | BODY MASS INDEX: 37.9 KG/M2 | HEIGHT: 64 IN

## 2020-06-18 PROCEDURE — G8427 DOCREV CUR MEDS BY ELIG CLIN: HCPCS | Performed by: ORTHOPAEDIC SURGERY

## 2020-06-18 PROCEDURE — 1090F PRES/ABSN URINE INCON ASSESS: CPT | Performed by: ORTHOPAEDIC SURGERY

## 2020-06-18 PROCEDURE — 4040F PNEUMOC VAC/ADMIN/RCVD: CPT | Performed by: ORTHOPAEDIC SURGERY

## 2020-06-18 PROCEDURE — 1036F TOBACCO NON-USER: CPT | Performed by: ORTHOPAEDIC SURGERY

## 2020-06-18 PROCEDURE — G8417 CALC BMI ABV UP PARAM F/U: HCPCS | Performed by: ORTHOPAEDIC SURGERY

## 2020-06-18 PROCEDURE — 1123F ACP DISCUSS/DSCN MKR DOCD: CPT | Performed by: ORTHOPAEDIC SURGERY

## 2020-06-18 PROCEDURE — 3017F COLORECTAL CA SCREEN DOC REV: CPT | Performed by: ORTHOPAEDIC SURGERY

## 2020-06-18 PROCEDURE — G8400 PT W/DXA NO RESULTS DOC: HCPCS | Performed by: ORTHOPAEDIC SURGERY

## 2020-06-18 PROCEDURE — 99213 OFFICE O/P EST LOW 20 MIN: CPT | Performed by: ORTHOPAEDIC SURGERY

## 2020-06-25 ENCOUNTER — TELEPHONE (OUTPATIENT)
Dept: PRIMARY CARE CLINIC | Age: 70
End: 2020-06-25

## 2020-06-30 RX ORDER — INSULIN GLARGINE 100 [IU]/ML
INJECTION, SOLUTION SUBCUTANEOUS
Qty: 30 ML | Refills: 3 | Status: SHIPPED | OUTPATIENT
Start: 2020-06-30 | End: 2020-08-10 | Stop reason: SDUPTHER

## 2020-07-13 ENCOUNTER — HOSPITAL ENCOUNTER (OUTPATIENT)
Dept: PREADMISSION TESTING | Age: 70
Discharge: HOME | End: 2020-07-17
Payer: MEDICARE

## 2020-07-13 VITALS
SYSTOLIC BLOOD PRESSURE: 131 MMHG | HEIGHT: 64 IN | TEMPERATURE: 97.6 F | BODY MASS INDEX: 38.07 KG/M2 | HEART RATE: 66 BPM | OXYGEN SATURATION: 99 % | DIASTOLIC BLOOD PRESSURE: 73 MMHG | RESPIRATION RATE: 18 BRPM | WEIGHT: 223 LBS

## 2020-07-13 LAB
ABSOLUTE EOS #: 0.3 K/UL (ref 0–0.4)
ABSOLUTE IMMATURE GRANULOCYTE: ABNORMAL K/UL (ref 0–0.3)
ABSOLUTE LYMPH #: 3.1 K/UL (ref 1–4.8)
ABSOLUTE MONO #: 0.5 K/UL (ref 0.1–1.3)
ANION GAP SERPL CALCULATED.3IONS-SCNC: 8 MMOL/L (ref 9–17)
BASOPHILS # BLD: 1 % (ref 0–2)
BASOPHILS ABSOLUTE: 0.1 K/UL (ref 0–0.2)
BUN BLDV-MCNC: 18 MG/DL (ref 8–23)
BUN/CREAT BLD: ABNORMAL (ref 9–20)
CALCIUM SERPL-MCNC: 8.7 MG/DL (ref 8.6–10.4)
CHLORIDE BLD-SCNC: 104 MMOL/L (ref 98–107)
CO2: 28 MMOL/L (ref 20–31)
CREAT SERPL-MCNC: 0.88 MG/DL (ref 0.5–0.9)
DIFFERENTIAL TYPE: ABNORMAL
EOSINOPHILS RELATIVE PERCENT: 4 % (ref 0–4)
GFR AFRICAN AMERICAN: >60 ML/MIN
GFR NON-AFRICAN AMERICAN: >60 ML/MIN
GFR SERPL CREATININE-BSD FRML MDRD: ABNORMAL ML/MIN/{1.73_M2}
GFR SERPL CREATININE-BSD FRML MDRD: ABNORMAL ML/MIN/{1.73_M2}
GLUCOSE BLD-MCNC: 145 MG/DL (ref 70–99)
HCT VFR BLD CALC: 35.3 % (ref 36–46)
HEMOGLOBIN: 11.5 G/DL (ref 12–16)
IMMATURE GRANULOCYTES: ABNORMAL %
LYMPHOCYTES # BLD: 35 % (ref 24–44)
MCH RBC QN AUTO: 26.8 PG (ref 26–34)
MCHC RBC AUTO-ENTMCNC: 32.7 G/DL (ref 31–37)
MCV RBC AUTO: 82 FL (ref 80–100)
MONOCYTES # BLD: 5 % (ref 1–7)
NRBC AUTOMATED: ABNORMAL PER 100 WBC
PDW BLD-RTO: 16.5 % (ref 11.5–14.9)
PLATELET # BLD: 201 K/UL (ref 150–450)
PLATELET ESTIMATE: ABNORMAL
PMV BLD AUTO: 9.8 FL (ref 6–12)
POTASSIUM SERPL-SCNC: 4 MMOL/L (ref 3.7–5.3)
RBC # BLD: 4.3 M/UL (ref 4–5.2)
RBC # BLD: ABNORMAL 10*6/UL
SEG NEUTROPHILS: 55 % (ref 36–66)
SEGMENTED NEUTROPHILS ABSOLUTE COUNT: 4.8 K/UL (ref 1.3–9.1)
SODIUM BLD-SCNC: 140 MMOL/L (ref 135–144)
WBC # BLD: 8.7 K/UL (ref 3.5–11)
WBC # BLD: ABNORMAL 10*3/UL

## 2020-07-13 PROCEDURE — 85025 COMPLETE CBC W/AUTO DIFF WBC: CPT

## 2020-07-13 PROCEDURE — 93005 ELECTROCARDIOGRAM TRACING: CPT | Performed by: ANESTHESIOLOGY

## 2020-07-13 PROCEDURE — 36415 COLL VENOUS BLD VENIPUNCTURE: CPT

## 2020-07-13 PROCEDURE — 80048 BASIC METABOLIC PNL TOTAL CA: CPT

## 2020-07-13 RX ORDER — ASCORBIC ACID 500 MG
500 TABLET ORAL DAILY
COMMUNITY

## 2020-07-13 RX ORDER — TRAMADOL HYDROCHLORIDE 50 MG/1
50 TABLET ORAL EVERY 6 HOURS PRN
Status: ON HOLD | COMMUNITY
End: 2020-07-27 | Stop reason: HOSPADM

## 2020-07-13 NOTE — H&P
HISTORY and Treinta IW[facility] [address]       NAME:  Andres Garnica  MRN: [id number]   YOB: 1950   Date: 7/13/2020   Age: 77 y.o. Gender: female       COMPLAINT AND PRESENT HISTORY:     Andres Garnica is 77 y.o.,  female, undergoing preadmission testing for post laminectomy syndrome with scheduled spinal cord stimulator implant. Pt c/o right lower back pain and left side rib pain. The symptoms started 4 year ago. Denies any injury or trauma to back. Pt does have a history of ovarian cancer with chemotherapy treatment that patient reports exacerbated pain. Completed chemotherapy in September 2015. Pt has history of neuropathy to bilateral lower legs from below knees down to feet/toes. The pain has gradually worsened since onset. Describes pain as aching. Right back pain does radiate into buttocks and sciatic pain but not down leg. Rating back pain 9/10. Rating left rib pain 8-9/10. Takes tramadol with mild relief. Denies any radiation of pain. Standing too long, bending, twisting makes pain worse. Sitting or resting helps alleviate symptoms. Pt had trial spinal cord stimulator in June with significant relief. Stimulator has subsequently been removed. Has tried accupuncture, injections, PT, and NSAIDS with minimal relief. No redness, swelling or rashes. Pt feeling well today. Denies any other somatic complaints including recent URI, nausea, vomiting, diarrhea, constipation, fever or chills. PMHx  PE: On xarelto. HTN: Well controlled. Takes amlodipine, losartan and HCTZ. Denies any chest pain/pressure, palpitations, SOB, headache and dizziness. Anesthesia complications: in 4489'I. Denies any complications since then with anesthesia. SHANNAN with CPAP use. No supplemental O2. TIA: no residual symptoms.      PAST MEDICAL HISTORY     Past Medical History:   Diagnosis Date    Arthritis     [facility]     Cancer ([facility] 2015    ovarian stage 2    Cerebral artery occlusion with cerebral infarction ([facility] 03/2018    Mini strokes \"Tia's\"    Diabetes mellitus ([facility]     Epigastric pain     GERD (gastroesophageal reflux disease)     History of anesthesia complications 9315'D    was told after gallbladder surgery to never have anesthesia again \"since it was hard for me to come out of it\"    History of blood transfusion     Hx of blood clots     lung     Hypertension     Right arm pain     Right shoulder pain     Sleep apnea     uses CPAP nightly    Tachycardia     hx of    Thyroid disease     TIA (transient ischemic attack)      Pt denies any history of heart disease, COPD, Asthma, GERD, HLD, Seizures, Kidney Disease, Hepatitis, TB, Psychiatric Disorders or Substance abuse. SURGICAL HISTORY       Past Surgical History:   Procedure Laterality Date    ANESTHESIA NERVE BLOCK Left 11/7/2019    NERVE BLOCK (DEFINE) - INTERCOSTAL NERVE BLOCK performed by [address], MD at [facility] Bilateral 5/15/2020    NERVE BLOCK BILATERAL - MBB  L4-5, L5-S1 performed by Jovanni Lau MD at [address] Left     ORIF    CHOLECYSTECTOMY      COLONOSCOPY  07/08/2019    5 yr recall.     COLONOSCOPY N/A 7/8/2019    COLONOSCOPY WITH BIOPSY performed by Elias Abraham MD at [address] Right     [facility]. INJECTION PROCEDURE FOR SACROILIAC JOINT Bilateral 8/29/2019    SACROILIAC JOINT INJECTION performed by [address], MD at [address] [place] [address] Bilateral 10/10/2019    SACROILIAC JOINT INJECTION performed by Jovanni Lau MD at [address]  2/2014    chris with bso    JOINT REPLACEMENT Right     shoulder    KNEE ARTHROSCOPY Right     NERVE BLOCK Bilateral 08/29/2019    SI joint injection    NERVE BLOCK Bilateral 10/10/2019    SI joint    NERVE BLOCK Bilateral 05/15/2020    Medial branch block L4-5, L5-S1    NERVE BLOCK  06/05/2020    SPINAL CORD STIMULATOR IMPLANT TRIAL (N/A )    OTHER SURGICAL HISTORY Right 11/11/2014    shoulder manipulation    SHOULDER ARTHROSCOPY Right 02/03/15    SHOULDER SURGERY  11/2014    manipulation    SPINAL CORD STIMULATOR SURGERY N/A 6/5/2020    SPINAL CORD STIMULATOR IMPLANT TRIAL performed by Britton Hsieh MD at [address] [place] [address] ENDOSCOPY  07/08/2019    UPPER GASTROINTESTINAL ENDOSCOPY N/A 7/8/2019    EGD BIOPSY performed by Shanika Joseph MD at [place] [address] Right 4/30/2015    pt has blood clot & abscess in her right kidney    VASCULAR SURGERY      A[place] filter in & removed 1 year later       FAMILY HISTORY     History reviewed. No pertinent family history.     SOCIAL HISTORY       Social History     Socioeconomic History    Marital status: Single     Spouse name: None    Number of children: None    Years of education: None    Highest education level: None   Occupational History    None   Social Needs    Financial resource strain: None    Food insecurity     Worry: None     Inability: None    Transportation needs     Medical: None     Non-medical: None   Tobacco Use    Smoking status: Never Smoker    Smokeless tobacco: Never Used   Substance and Sexual Activity    Alcohol use: Yes     Comment: rare    Drug use: No    Sexual activity: Yes   Lifestyle    Physical activity     Days per week: None     Minutes per session: None    Stress: None   Relationships    Social connections     Talks on phone: None     Gets together: None     Attends Mandaen service: None     Active member of club or organization: None     Attends meetings of clubs or organizations: None     Relationship status: None    Intimate partner violence     Fear of current or ex partner: None     Emotionally abused: None     Physically abused: None     Forced sexual activity: None   Other Topics Concern    None Social History Narrative    None        REVIEW OF SYSTEMS      Allergies   Allergen Reactions    Aspirin Anaphylaxis     Only thing she can take is tylenol    Benadryl [Diphenhydramine] Other (See Comments)     Dystonic movement    Ibuprofen Anaphylaxis    Lidocaine Anaphylaxis     CAN NOT TOLERATE IV    Penicillins Anaphylaxis    Morphine Nausea And Vomiting       Current Outpatient Medications on File Prior to Encounter   Medication Sig Dispense Refill    vitamin C (ASCORBIC ACID) 500 MG tablet Take 500 mg by mouth daily      traMADol (ULTRAM) 50 MG tablet Take 50 mg by mouth every 6 hours as needed for Pain.  insulin glargine (LANTUS) 100 UNIT/ML injection vial Inject 65 units nightly 30 mL 3    tamsulosin (FLOMAX) 0.4 MG capsule Take 1 capsule by mouth daily Take 0.4 mg by mouth daily 30 capsule 3    insulin lispro (HUMALOG) 100 UNIT/ML injection vial INJECT SUBCUTANEOUSLY THREE TIMES A DAY AS DIRECTED BY SLIDING SCALE.  MAX DAILY UNITS OF 40 10 mL 2    alendronate (FOSAMAX) 70 MG tablet Take 1 tablet by mouth every 7 days 12 tablet 1    omeprazole (PRILOSEC) 20 MG delayed release capsule TAKE 1 CAPSULE BY MOUTH DAILY 90 capsule 1    DULoxetine (CYMBALTA) 60 MG extended release capsule TAKE 1 CAPSULE BY MOUTH DAILY 90 capsule 10    atorvastatin (LIPITOR) 40 MG tablet TAKE (1) TABLET BY MOUTH EVERY DAY 90 tablet 10    amLODIPine (NORVASC) 5 MG tablet TAKE (1) TABLET BY MOUTH ONCE DAILY 90 tablet 10    hydroCHLOROthiazide (HYDRODIURIL) 25 MG tablet TAKE 1 TABLET BY MOUTH EVERY MORNING 90 tablet 10    losartan (COZAAR) 100 MG tablet TAKE 1 TABLET BY MOUTH EVERY DAY 90 tablet 10    potassium chloride (KLOR-CON M) 20 MEQ extended release tablet Take 1 tablet by mouth 2 times daily 180 tablet 1    gabapentin (NEURONTIN) 600 MG tablet TAKE (1) TABLET BY MOUTH FOUR TIMES A  tablet 10    levothyroxine (SYNTHROID) 88 MCG tablet TAKE (1) TABLET BY MOUTH DAILY 30 tablet 10    EPINEPHrine (EPIPEN 2-LISA) 0.3 MG/0.3ML SOAJ injection EpiPen 2-Lisa 0.3 mg/0.3 mL injection, auto-injector      calcium carbonate (TUMS) 500 MG chewable tablet Take 1 tablet by mouth daily as needed for Heartburn      rivaroxaban (XARELTO) 20 MG TABS tablet Take 1 tablet by mouth daily 90 tablet 3    SUPER B COMPLEX/C CAPS Take by mouth      Cholecalciferol (VITAMIN D3) 5000 units TABS Take by mouth      ferrous sulfate 325 (65 Fe) MG tablet Take 325 mg by mouth daily (with breakfast)      fexofenadine (ALLEGRA) 60 MG tablet Take 60 mg by mouth daily      magnesium oxide (MAG-OX) 400 MG tablet Take 400 mg by mouth daily      Melatonin 10 MG TABS Take 10 mg by mouth daily as needed       glucose monitoring kit (FREESTYLE) monitoring kit 1 kit by Does not apply route daily Please provide accuchek meter for patient, not freestyle 1 kit 0    Insulin Syringes, Disposable, U-100 1 ML MISC 1 each by Does not apply route 4 times daily Dx: E11.9 300 each 3    blood glucose monitor strips Test tid times a day & as needed for symptoms of irregular blood glucose. Dx E11.9 Please fill with compatible strips to glucometer 300 strip 3     No current facility-administered medications on file prior to encounter. General health:  Fairly good. No fever or chills. Skin:  No itching, redness or rash. HEENT:  No headache, epistaxis or sore throat. Neck:  No pain, stiffness or masses. Cardiovascular/Respiratory system:  No chest pain, palpitation or shortness of breath. Gastrointestinal tract: No abdominal pain, Dysphagia, nausea, vomiting, diarrhea or constipation. Genitourinary:  No burning on micturition. No hesitancy, urgency, frequency or discoloration of urine. Locomotor:  See HPI. Neuropsychiatric:  No referable complaints.                    GENERAL PHYSICAL EXAM:     Vitals: /73   Pulse 66 Temp 97.6 °F (36.4 °C)   Resp 18   Ht 5' 4\" (1.626 m)   Wt 223 lb (101.2 kg)   SpO2 99%   BMI 38.28 kg/m²  Body mass index is 38.28 kg/m². GENERAL APPEARANCE:   Alireza Mitchell is 77 y.o.,  female, moderately obese, nourished, conscious, alert. Does not appear to be in any distress or pain at this time. SKIN:  Warm, dry, no cyanosis or jaundice. HEAD:  Normocephalic, atraumatic. EYES:  Pupils equal, reactive to light. EARS:  No discharge, no marked hearing loss. NOSE:  No rhinorrhea, epistaxis or septal deformity. THROAT:  Mucus membranes moist, no erythema or exudate. NECK:  No stiffness, trachea central.  No palpable masses or L.N.                 CHEST:  Symmetrical and equal on expansion. HEART:  RRR S1 > S2. No audible murmurs or gallops. LUNGS:  Equal on expansion, normal breath sounds. No wheezing, rhonchi or rales. ABDOMEN:  NABS x 4 quads. Soft on palpation. No localized tenderness. No guarding or rigidity. LYMPHATICS:  No palpable cervical lymphadenopathy. LOCOMOTOR, BACK AND SPINE:  Tenderness to right lower back, left ribs. No deformities. EXTREMITIES:  Symmetrical, no pretibial edema. No calf tenderness. No discoloration or ulcerations. NEUROLOGIC:  The patient is conscious, alert, oriented. Speech clear, no facial drooping. No apparent focal sensory or motor deficits.                                                                                      PROVISIONAL DIAGNOSES / SURGERY:      POST LAMINECTOMY SYNDROME    SPINAL CORD STIMULATOR IMPLANT    Patient Active Problem List    Diagnosis Date Noted    BMI 38.0-38.9,adult 06/18/2020    Lumbosacral spondylosis without myelopathy 02/18/2020    Chronic bilateral thoracic back pain 02/18/2020    Chronic anticoagulation 02/18/2020   

## 2020-07-13 NOTE — PROGRESS NOTES
Medical clearance requested. Surgery scheduling will notify Dr. Phillips Beam office who will be responsible for making sure the clearance is obtained and is in the chart for surgery.

## 2020-07-13 NOTE — H&P (VIEW-ONLY)
HISTORY and Treedgar [facility]       NAME:  Josue Salazar  MRN: [id number]   YOB: 1950   Date: 7/13/2020   Age: 77 y.o. Gender: female       COMPLAINT AND PRESENT HISTORY:     Josue Salazar is 77 y.o.,  female, undergoing preadmission testing for post laminectomy syndrome with scheduled spinal cord stimulator implant. Pt c/o right lower back pain and left side rib pain. The symptoms started 4 year ago. Denies any injury or trauma to back. Pt does have a history of ovarian cancer with chemotherapy treatment that patient reports exacerbated pain. Completed chemotherapy in September 2015. Pt has history of neuropathy to bilateral lower legs from below knees down to feet/toes. The pain has gradually worsened since onset. Describes pain as aching. Right back pain does radiate into buttocks and sciatic pain but not down leg. Rating back pain 9/10. Rating left rib pain 8-9/10. Takes tramadol with mild relief. Denies any radiation of pain. Standing too long, bending, twisting makes pain worse. Sitting or resting helps alleviate symptoms. Pt had trial spinal cord stimulator in June with significant relief. Stimulator has subsequently been removed. Has tried accupuncture, injections, PT, and NSAIDS with minimal relief. No redness, swelling or rashes. Pt feeling well today. Denies any other somatic complaints including recent URI, nausea, vomiting, diarrhea, constipation, fever or chills. PMHx  PE: On xarelto. HTN: Well controlled. Takes amlodipine, losartan and HCTZ. Denies any chest pain/pressure, palpitations, SOB, headache and dizziness. Anesthesia complications: in 7125'K. Denies any complications since then with anesthesia. SHANNAN with CPAP use. No supplemental O2. TIA: no residual symptoms.      PAST MEDICAL HISTORY     Past Medical History:   Diagnosis Date    Arthritis     [facility]     Cancer ([facility] 2015    ovarian stage 2    Cerebral artery occlusion with cerebral infarction ([facility] 03/2018    Mini strokes \"Tia's\"    Diabetes mellitus ([facility]     Epigastric pain     GERD (gastroesophageal reflux disease)     History of anesthesia complications 7331'C    was told after gallbladder surgery to never have anesthesia again \"since it was hard for me to come out of it\"    History of blood transfusion     Hx of blood clots     lung     Hypertension     Right arm pain     Right shoulder pain     Sleep apnea     uses CPAP nightly    Tachycardia     hx of    Thyroid disease     TIA (transient ischemic attack)      Pt denies any history of heart disease, COPD, Asthma, GERD, HLD, Seizures, Kidney Disease, Hepatitis, TB, Psychiatric Disorders or Substance abuse. SURGICAL HISTORY       Past Surgical History:   Procedure Laterality Date    ANESTHESIA NERVE BLOCK Left 11/7/2019    NERVE BLOCK (DEFINE) - INTERCOSTAL NERVE BLOCK performed by Carlos Elizondo MD at [facility] Bilateral 5/15/2020    NERVE BLOCK BILATERAL - MBB  L4-5, L5-S1 performed by Carlos Elizondo MD at [address] Left     ORIF    CHOLECYSTECTOMY      COLONOSCOPY  07/08/2019    5 yr recall.     COLONOSCOPY N/A 7/8/2019    COLONOSCOPY WITH BIOPSY performed by Clarisse Rubalcava MD at [address] INJECTION PROCEDURE FOR SACROILIAC JOINT Bilateral 8/29/2019    SACROILIAC JOINT INJECTION performed by Carlos Elizondo MD at [address] Bilateral 10/10/2019    SACROILIAC JOINT INJECTION performed by Carlos Elizondo MD at [address]  2/2014    chris with bso    JOINT REPLACEMENT Right     shoulder    KNEE ARTHROSCOPY Right     NERVE BLOCK Bilateral 08/29/2019    SI joint injection    NERVE BLOCK Bilateral 10/10/2019    SI joint    NERVE BLOCK Bilateral 05/15/2020    Medial branch block L4-5, L5-S1    NERVE BLOCK  06/05/2020    SPINAL CORD STIMULATOR IMPLANT TRIAL (N/A )    OTHER SURGICAL HISTORY Right 11/11/2014    shoulder manipulation    SHOULDER ARTHROSCOPY Right 02/03/15    SHOULDER SURGERY  11/2014    manipulation    SPINAL CORD STIMULATOR SURGERY N/A 6/5/2020    SPINAL CORD STIMULATOR IMPLANT TRIAL performed by Eddie Garvey MD at [address] [place] [address] ENDOSCOPY  07/08/2019    UPPER GASTROINTESTINAL ENDOSCOPY N/A 7/8/2019    EGD BIOPSY performed by Morgan Beaver MD at [place] [address] Right 4/30/2015    pt has blood clot & abscess in her right kidney    VASCULAR SURGERY      [place] filter in & removed 1 year later       FAMILY HISTORY     History reviewed. No pertinent family history.     SOCIAL HISTORY       Social History     Socioeconomic History    Marital status: Single     Spouse name: None    Number of children: None    Years of education: None    Highest education level: None   Occupational History    None   Social Needs    Financial resource strain: None    Food insecurity     Worry: None     Inability: None    Transportation needs     Medical: None     Non-medical: None   Tobacco Use    Smoking status: Never Smoker    Smokeless tobacco: Never Used   Substance and Sexual Activity    Alcohol use: Yes     Comment: rare    Drug use: No    Sexual activity: Yes   Lifestyle    Physical activity     Days per week: None     Minutes per session: None    Stress: None   Relationships    Social connections     Talks on phone: None     Gets together: None     Attends Zoroastrianism service: None     Active member of club or organization: None     Attends meetings of clubs or organizations: None     Relationship status: None    Intimate partner violence     Fear of current or ex partner: None     Emotionally abused: None     Physically abused: None     Forced sexual activity: None   Other Topics Concern    None (EPIPEN 2-LISA) 0.3 MG/0.3ML SOAJ injection EpiPen 2-Lisa 0.3 mg/0.3 mL injection, auto-injector      calcium carbonate (TUMS) 500 MG chewable tablet Take 1 tablet by mouth daily as needed for Heartburn      rivaroxaban (XARELTO) 20 MG TABS tablet Take 1 tablet by mouth daily 90 tablet 3    SUPER B COMPLEX/C CAPS Take by mouth      Cholecalciferol (VITAMIN D3) 5000 units TABS Take by mouth      ferrous sulfate 325 (65 Fe) MG tablet Take 325 mg by mouth daily (with breakfast)      fexofenadine (ALLEGRA) 60 MG tablet Take 60 mg by mouth daily      magnesium oxide (MAG-OX) 400 MG tablet Take 400 mg by mouth daily      Melatonin 10 MG TABS Take 10 mg by mouth daily as needed       glucose monitoring kit (FREESTYLE) monitoring kit 1 kit by Does not apply route daily Please provide accuchek meter for patient, not freestyle 1 kit 0    Insulin Syringes, Disposable, U-100 1 ML MISC 1 each by Does not apply route 4 times daily Dx: E11.9 300 each 3    blood glucose monitor strips Test tid times a day & as needed for symptoms of irregular blood glucose. Dx E11.9 Please fill with compatible strips to glucometer 300 strip 3     No current facility-administered medications on file prior to encounter. General health:  Fairly good. No fever or chills. Skin:  No itching, redness or rash. HEENT:  No headache, epistaxis or sore throat. Neck:  No pain, stiffness or masses. Cardiovascular/Respiratory system:  No chest pain, palpitation or shortness of breath. Gastrointestinal tract: No abdominal pain, Dysphagia, nausea, vomiting, diarrhea or constipation. Genitourinary:  No burning on micturition. No hesitancy, urgency, frequency or discoloration of urine. Locomotor:  See HPI. Neuropsychiatric:  No referable complaints.                    GENERAL PHYSICAL EXAM:     Vitals: /73   Pulse 66 Temp 97.6 °F (36.4 °C)   Resp 18   Ht 5' 4\" (1.626 m)   Wt 223 lb (101.2 kg)   SpO2 99%   BMI 38.28 kg/m²  Body mass index is 38.28 kg/m². GENERAL APPEARANCE:   Aubree Burleson is 77 y.o.,  female, moderately obese, nourished, conscious, alert. Does not appear to be in any distress or pain at this time. SKIN:  Warm, dry, no cyanosis or jaundice. HEAD:  Normocephalic, atraumatic. EYES:  Pupils equal, reactive to light. EARS:  No discharge, no marked hearing loss. NOSE:  No rhinorrhea, epistaxis or septal deformity. THROAT:  Mucus membranes moist, no erythema or exudate. NECK:  No stiffness, trachea central.  No palpable masses or L.N.                 CHEST:  Symmetrical and equal on expansion. HEART:  RRR S1 > S2. No audible murmurs or gallops. LUNGS:  Equal on expansion, normal breath sounds. No wheezing, rhonchi or rales. ABDOMEN:  NABS x 4 quads. Soft on palpation. No localized tenderness. No guarding or rigidity. LYMPHATICS:  No palpable cervical lymphadenopathy. LOCOMOTOR, BACK AND SPINE:  Tenderness to right lower back, left ribs. No deformities. EXTREMITIES:  Symmetrical, no pretibial edema. No calf tenderness. No discoloration or ulcerations. NEUROLOGIC:  The patient is conscious, alert, oriented. Speech clear, no facial drooping. No apparent focal sensory or motor deficits.                                                                                      PROVISIONAL DIAGNOSES / SURGERY:      POST LAMINECTOMY SYNDROME    SPINAL CORD STIMULATOR IMPLANT    Patient Active Problem List    Diagnosis Date Noted    BMI 38.0-38.9,adult 06/18/2020    Lumbosacral spondylosis without myelopathy 02/18/2020    Chronic bilateral thoracic back pain 02/18/2020    Chronic anticoagulation 02/18/2020    Epigastric pain     GERD (gastroesophageal reflux disease)     Ovarian mass     Abdominal pain     Partial bowel obstruction ([facility] [address]            Danita Anna, APRN - CNP on 7/13/2020 at 2:47 PM

## 2020-07-14 ENCOUNTER — ANESTHESIA EVENT (OUTPATIENT)
Dept: OPERATING ROOM | Age: 70
End: 2020-07-14
Payer: MEDICARE

## 2020-07-14 LAB
EKG ATRIAL RATE: 64 BPM
EKG P AXIS: -24 DEGREES
EKG P-R INTERVAL: 140 MS
EKG Q-T INTERVAL: 450 MS
EKG QRS DURATION: 84 MS
EKG QTC CALCULATION (BAZETT): 464 MS
EKG R AXIS: 4 DEGREES
EKG T AXIS: 41 DEGREES
EKG VENTRICULAR RATE: 64 BPM

## 2020-07-14 PROCEDURE — 93010 ELECTROCARDIOGRAM REPORT: CPT | Performed by: INTERNAL MEDICINE

## 2020-07-14 RX ORDER — SODIUM CHLORIDE 0.9 % (FLUSH) 0.9 %
10 SYRINGE (ML) INJECTION EVERY 12 HOURS SCHEDULED
Status: CANCELLED | OUTPATIENT
Start: 2020-07-14

## 2020-07-14 RX ORDER — SODIUM CHLORIDE, SODIUM LACTATE, POTASSIUM CHLORIDE, CALCIUM CHLORIDE 600; 310; 30; 20 MG/100ML; MG/100ML; MG/100ML; MG/100ML
INJECTION, SOLUTION INTRAVENOUS CONTINUOUS
Status: CANCELLED | OUTPATIENT
Start: 2020-07-14

## 2020-07-14 RX ORDER — SODIUM CHLORIDE 0.9 % (FLUSH) 0.9 %
10 SYRINGE (ML) INJECTION PRN
Status: CANCELLED | OUTPATIENT
Start: 2020-07-14

## 2020-07-15 ENCOUNTER — TELEPHONE (OUTPATIENT)
Dept: PRIMARY CARE CLINIC | Age: 70
End: 2020-07-15

## 2020-07-15 NOTE — LETTER
[facility]  [address]  [facility] [address]  Phone: [phone]  Fax: [phone],         July 15, 2020     Patient: Yoselin Barillas   YOB: 1950           To Whom it May Concern:    Lore Bender is cleared to  have her surgery without further workup needed. If you have any questions or concerns, please don't hesitate to call.     Sincerely,         [place]-Wynn Squibb, DO

## 2020-07-15 NOTE — TELEPHONE ENCOUNTER
Spoke with pt regarding upcoming surgery. States has tolerated previous surgeries in the past. Will be holding xarelto five days before procedure. Denies any SOB Or chest pain when walking 1-2 blocks or going up flight of stairs. Letter signed for medical clearance.

## 2020-07-23 ENCOUNTER — HOSPITAL ENCOUNTER (OUTPATIENT)
Dept: PREADMISSION TESTING | Age: 70
Setting detail: SPECIMEN
Discharge: HOME | End: 2020-07-27
Payer: MEDICARE

## 2020-07-23 LAB
SARS-COV-2, PCR: NORMAL
SARS-COV-2, RAPID: NORMAL
SARS-COV-2: NOT DETECTED
SOURCE: NORMAL

## 2020-07-23 PROCEDURE — U0003 INFECTIOUS AGENT DETECTION BY NUCLEIC ACID (DNA OR RNA); SEVERE ACUTE RESPIRATORY SYNDROME CORONAVIRUS 2 (SARS-COV-2) (CORONAVIRUS DISEASE [COVID-19]), AMPLIFIED PROBE TECHNIQUE, MAKING USE OF HIGH THROUGHPUT TECHNOLOGIES AS DESCRIBED BY CMS-2020-01-R: HCPCS

## 2020-07-27 ENCOUNTER — APPOINTMENT (OUTPATIENT)
Dept: GENERAL RADIOLOGY | Age: 70
End: 2020-07-27
Attending: ORTHOPAEDIC SURGERY
Payer: MEDICARE

## 2020-07-27 ENCOUNTER — HOSPITAL ENCOUNTER (OUTPATIENT)
Age: 70
Setting detail: OUTPATIENT SURGERY
Discharge: HOME | End: 2020-07-27
Attending: ORTHOPAEDIC SURGERY | Admitting: ORTHOPAEDIC SURGERY
Payer: MEDICARE

## 2020-07-27 ENCOUNTER — ANESTHESIA (OUTPATIENT)
Dept: OPERATING ROOM | Age: 70
End: 2020-07-27
Payer: MEDICARE

## 2020-07-27 VITALS
OXYGEN SATURATION: 97 % | DIASTOLIC BLOOD PRESSURE: 48 MMHG | WEIGHT: 223 LBS | HEART RATE: 87 BPM | HEIGHT: 64 IN | RESPIRATION RATE: 18 BRPM | TEMPERATURE: 97.8 F | SYSTOLIC BLOOD PRESSURE: 127 MMHG | BODY MASS INDEX: 38.07 KG/M2

## 2020-07-27 VITALS
SYSTOLIC BLOOD PRESSURE: 115 MMHG | RESPIRATION RATE: 17 BRPM | DIASTOLIC BLOOD PRESSURE: 57 MMHG | OXYGEN SATURATION: 100 % | TEMPERATURE: 95.2 F

## 2020-07-27 LAB
GLUCOSE BLD-MCNC: 185 MG/DL (ref 65–105)
GLUCOSE BLD-MCNC: 195 MG/DL (ref 65–105)

## 2020-07-27 PROCEDURE — 2500000003 HC RX 250 WO HCPCS: Performed by: NURSE ANESTHETIST, CERTIFIED REGISTERED

## 2020-07-27 PROCEDURE — 6360000002 HC RX W HCPCS: Performed by: NURSE ANESTHETIST, CERTIFIED REGISTERED

## 2020-07-27 PROCEDURE — 76000 FLUOROSCOPY <1 HR PHYS/QHP: CPT

## 2020-07-27 PROCEDURE — 6360000002 HC RX W HCPCS: Performed by: ANESTHESIOLOGY

## 2020-07-27 PROCEDURE — 82947 ASSAY GLUCOSE BLOOD QUANT: CPT

## 2020-07-27 PROCEDURE — 7100000001 HC PACU RECOVERY - ADDTL 15 MIN: Performed by: ORTHOPAEDIC SURGERY

## 2020-07-27 PROCEDURE — 7100000031 HC ASPR PHASE II RECOVERY - ADDTL 15 MIN: Performed by: ORTHOPAEDIC SURGERY

## 2020-07-27 PROCEDURE — 2580000003 HC RX 258: Performed by: ANESTHESIOLOGY

## 2020-07-27 PROCEDURE — 6360000002 HC RX W HCPCS: Performed by: ORTHOPAEDIC SURGERY

## 2020-07-27 PROCEDURE — 2709999900 HC NON-CHARGEABLE SUPPLY: Performed by: ORTHOPAEDIC SURGERY

## 2020-07-27 PROCEDURE — C1820 GENERATOR NEURO RECHG BAT SY: HCPCS | Performed by: ORTHOPAEDIC SURGERY

## 2020-07-27 PROCEDURE — 72070 X-RAY EXAM THORAC SPINE 2VWS: CPT

## 2020-07-27 PROCEDURE — 3600000012 HC SURGERY LEVEL 2 ADDTL 15MIN: Performed by: ORTHOPAEDIC SURGERY

## 2020-07-27 PROCEDURE — 2720000010 HC SURG SUPPLY STERILE: Performed by: ORTHOPAEDIC SURGERY

## 2020-07-27 PROCEDURE — 7100000010 HC PHASE II RECOVERY - FIRST 15 MIN: Performed by: ORTHOPAEDIC SURGERY

## 2020-07-27 PROCEDURE — 7100000011 HC PHASE II RECOVERY - ADDTL 15 MIN: Performed by: ORTHOPAEDIC SURGERY

## 2020-07-27 PROCEDURE — 7100000030 HC ASPR PHASE II RECOVERY - FIRST 15 MIN: Performed by: ORTHOPAEDIC SURGERY

## 2020-07-27 PROCEDURE — 3700000001 HC ADD 15 MINUTES (ANESTHESIA): Performed by: ORTHOPAEDIC SURGERY

## 2020-07-27 PROCEDURE — 7100000000 HC PACU RECOVERY - FIRST 15 MIN: Performed by: ORTHOPAEDIC SURGERY

## 2020-07-27 PROCEDURE — 2500000003 HC RX 250 WO HCPCS: Performed by: ORTHOPAEDIC SURGERY

## 2020-07-27 PROCEDURE — 3700000000 HC ANESTHESIA ATTENDED CARE: Performed by: ORTHOPAEDIC SURGERY

## 2020-07-27 PROCEDURE — 3600000002 HC SURGERY LEVEL 2 BASE: Performed by: ORTHOPAEDIC SURGERY

## 2020-07-27 PROCEDURE — C1787 PATIENT PROGR, NEUROSTIM: HCPCS | Performed by: ORTHOPAEDIC SURGERY

## 2020-07-27 PROCEDURE — C1778 LEAD, NEUROSTIMULATOR: HCPCS | Performed by: ORTHOPAEDIC SURGERY

## 2020-07-27 DEVICE — IMPLANTABLE PULSE GENERATOR KIT
Type: IMPLANTABLE DEVICE | Site: SPINE LUMBAR | Status: FUNCTIONAL
Brand: PRECISION™ MONTAGE™ MRI

## 2020-07-27 DEVICE — Z DUP USE 2679261 LEAD NEUROSTIMULATOR SPNL CRD PADDLE STIM SYS SURG EQUIP: Type: IMPLANTABLE DEVICE | Site: SPINE LUMBAR | Status: FUNCTIONAL

## 2020-07-27 RX ORDER — SODIUM CHLORIDE 0.9 % (FLUSH) 0.9 %
10 SYRINGE (ML) INJECTION EVERY 12 HOURS SCHEDULED
Status: DISCONTINUED | OUTPATIENT
Start: 2020-07-27 | End: 2020-07-27 | Stop reason: HOSPADM

## 2020-07-27 RX ORDER — SODIUM CHLORIDE, SODIUM LACTATE, POTASSIUM CHLORIDE, CALCIUM CHLORIDE 600; 310; 30; 20 MG/100ML; MG/100ML; MG/100ML; MG/100ML
INJECTION, SOLUTION INTRAVENOUS CONTINUOUS
Status: DISCONTINUED | OUTPATIENT
Start: 2020-07-27 | End: 2020-07-27

## 2020-07-27 RX ORDER — PROPOFOL 10 MG/ML
INJECTION, EMULSION INTRAVENOUS PRN
Status: DISCONTINUED | OUTPATIENT
Start: 2020-07-27 | End: 2020-07-27 | Stop reason: SDUPTHER

## 2020-07-27 RX ORDER — TRANEXAMIC ACID 100 MG/ML
INJECTION, SOLUTION INTRAVENOUS PRN
Status: DISCONTINUED | OUTPATIENT
Start: 2020-07-27 | End: 2020-07-27 | Stop reason: SDUPTHER

## 2020-07-27 RX ORDER — ROCURONIUM BROMIDE 10 MG/ML
INJECTION, SOLUTION INTRAVENOUS PRN
Status: DISCONTINUED | OUTPATIENT
Start: 2020-07-27 | End: 2020-07-27 | Stop reason: SDUPTHER

## 2020-07-27 RX ORDER — FENTANYL CITRATE 50 UG/ML
INJECTION, SOLUTION INTRAMUSCULAR; INTRAVENOUS PRN
Status: DISCONTINUED | OUTPATIENT
Start: 2020-07-27 | End: 2020-07-27 | Stop reason: SDUPTHER

## 2020-07-27 RX ORDER — ONDANSETRON 2 MG/ML
4 INJECTION INTRAMUSCULAR; INTRAVENOUS
Status: COMPLETED | OUTPATIENT
Start: 2020-07-27 | End: 2020-07-27

## 2020-07-27 RX ORDER — OXYCODONE HYDROCHLORIDE AND ACETAMINOPHEN 5; 325 MG/1; MG/1
2 TABLET ORAL PRN
Status: DISCONTINUED | OUTPATIENT
Start: 2020-07-27 | End: 2020-07-27 | Stop reason: HOSPADM

## 2020-07-27 RX ORDER — SODIUM CHLORIDE 0.9 % (FLUSH) 0.9 %
10 SYRINGE (ML) INJECTION PRN
Status: DISCONTINUED | OUTPATIENT
Start: 2020-07-27 | End: 2020-07-27 | Stop reason: HOSPADM

## 2020-07-27 RX ORDER — LABETALOL 20 MG/4 ML (5 MG/ML) INTRAVENOUS SYRINGE
5 EVERY 10 MIN PRN
Status: DISCONTINUED | OUTPATIENT
Start: 2020-07-27 | End: 2020-07-27 | Stop reason: HOSPADM

## 2020-07-27 RX ORDER — ONDANSETRON 2 MG/ML
INJECTION INTRAMUSCULAR; INTRAVENOUS PRN
Status: DISCONTINUED | OUTPATIENT
Start: 2020-07-27 | End: 2020-07-27 | Stop reason: SDUPTHER

## 2020-07-27 RX ORDER — OXYCODONE HYDROCHLORIDE AND ACETAMINOPHEN 5; 325 MG/1; MG/1
1 TABLET ORAL EVERY 6 HOURS PRN
Qty: 28 TABLET | Refills: 0 | Status: SHIPPED | OUTPATIENT
Start: 2020-07-27 | End: 2020-08-03

## 2020-07-27 RX ORDER — DEXAMETHASONE SODIUM PHOSPHATE 4 MG/ML
INJECTION, SOLUTION INTRA-ARTICULAR; INTRALESIONAL; INTRAMUSCULAR; INTRAVENOUS; SOFT TISSUE PRN
Status: DISCONTINUED | OUTPATIENT
Start: 2020-07-27 | End: 2020-07-27 | Stop reason: SDUPTHER

## 2020-07-27 RX ORDER — GLYCOPYRROLATE 1 MG/5 ML
SYRINGE (ML) INTRAVENOUS PRN
Status: DISCONTINUED | OUTPATIENT
Start: 2020-07-27 | End: 2020-07-27 | Stop reason: SDUPTHER

## 2020-07-27 RX ORDER — SODIUM CHLORIDE 9 MG/ML
INJECTION, SOLUTION INTRAVENOUS CONTINUOUS
Status: DISCONTINUED | OUTPATIENT
Start: 2020-07-27 | End: 2020-07-27 | Stop reason: HOSPADM

## 2020-07-27 RX ORDER — BUPIVACAINE HYDROCHLORIDE AND EPINEPHRINE 2.5; 5 MG/ML; UG/ML
INJECTION, SOLUTION EPIDURAL; INFILTRATION; INTRACAUDAL; PERINEURAL PRN
Status: DISCONTINUED | OUTPATIENT
Start: 2020-07-27 | End: 2020-07-27 | Stop reason: ALTCHOICE

## 2020-07-27 RX ORDER — OXYCODONE HYDROCHLORIDE AND ACETAMINOPHEN 5; 325 MG/1; MG/1
1 TABLET ORAL PRN
Status: DISCONTINUED | OUTPATIENT
Start: 2020-07-27 | End: 2020-07-27 | Stop reason: HOSPADM

## 2020-07-27 RX ADMIN — HYDROMORPHONE HYDROCHLORIDE 0.5 MG: 1 INJECTION, SOLUTION INTRAMUSCULAR; INTRAVENOUS; SUBCUTANEOUS at 14:40

## 2020-07-27 RX ADMIN — DEXAMETHASONE SODIUM PHOSPHATE 4 MG: 4 INJECTION, SOLUTION INTRA-ARTICULAR; INTRALESIONAL; INTRAMUSCULAR; INTRAVENOUS; SOFT TISSUE at 13:33

## 2020-07-27 RX ADMIN — PROPOFOL 200 MG: 10 INJECTION, EMULSION INTRAVENOUS at 12:02

## 2020-07-27 RX ADMIN — ROCURONIUM BROMIDE 40 MG: 10 INJECTION INTRAVENOUS at 12:03

## 2020-07-27 RX ADMIN — SODIUM CHLORIDE: 9 INJECTION, SOLUTION INTRAVENOUS at 11:24

## 2020-07-27 RX ADMIN — FENTANYL CITRATE 50 MCG: 50 INJECTION, SOLUTION INTRAMUSCULAR; INTRAVENOUS at 13:52

## 2020-07-27 RX ADMIN — SODIUM CHLORIDE: 9 INJECTION, SOLUTION INTRAVENOUS at 13:09

## 2020-07-27 RX ADMIN — HYDROMORPHONE HYDROCHLORIDE 0.5 MG: 1 INJECTION, SOLUTION INTRAMUSCULAR; INTRAVENOUS; SUBCUTANEOUS at 15:00

## 2020-07-27 RX ADMIN — HYDROMORPHONE HYDROCHLORIDE 0.5 MG: 1 INJECTION, SOLUTION INTRAMUSCULAR; INTRAVENOUS; SUBCUTANEOUS at 14:50

## 2020-07-27 RX ADMIN — CEFAZOLIN 2 G: 10 INJECTION, POWDER, FOR SOLUTION INTRAVENOUS at 11:58

## 2020-07-27 RX ADMIN — Medication 0.2 MG: at 13:33

## 2020-07-27 RX ADMIN — FENTANYL CITRATE 50 MCG: 50 INJECTION, SOLUTION INTRAMUSCULAR; INTRAVENOUS at 12:02

## 2020-07-27 RX ADMIN — HYDROMORPHONE HYDROCHLORIDE 0.5 MG: 1 INJECTION, SOLUTION INTRAMUSCULAR; INTRAVENOUS; SUBCUTANEOUS at 14:28

## 2020-07-27 RX ADMIN — ONDANSETRON 4 MG: 2 INJECTION INTRAMUSCULAR; INTRAVENOUS at 13:33

## 2020-07-27 RX ADMIN — ONDANSETRON 4 MG: 2 INJECTION INTRAMUSCULAR; INTRAVENOUS at 14:28

## 2020-07-27 RX ADMIN — SUGAMMADEX 400 MG: 100 INJECTION, SOLUTION INTRAVENOUS at 13:50

## 2020-07-27 RX ADMIN — TRANEXAMIC ACID 1000 MG: 100 INJECTION, SOLUTION INTRAVENOUS at 12:45

## 2020-07-27 ASSESSMENT — PULMONARY FUNCTION TESTS
PIF_VALUE: 20
PIF_VALUE: 19
PIF_VALUE: 19
PIF_VALUE: 17
PIF_VALUE: 19
PIF_VALUE: 18
PIF_VALUE: 18
PIF_VALUE: 19
PIF_VALUE: 18
PIF_VALUE: 18
PIF_VALUE: 19
PIF_VALUE: 2
PIF_VALUE: 19
PIF_VALUE: 19
PIF_VALUE: 2
PIF_VALUE: 0
PIF_VALUE: 19
PIF_VALUE: 10
PIF_VALUE: 27
PIF_VALUE: 10
PIF_VALUE: 21
PIF_VALUE: 19
PIF_VALUE: 18
PIF_VALUE: 10
PIF_VALUE: 19
PIF_VALUE: 14
PIF_VALUE: 17
PIF_VALUE: 19
PIF_VALUE: 20
PIF_VALUE: 0
PIF_VALUE: 17
PIF_VALUE: 19
PIF_VALUE: 19
PIF_VALUE: 18
PIF_VALUE: 10
PIF_VALUE: 18
PIF_VALUE: 19
PIF_VALUE: 18
PIF_VALUE: 10
PIF_VALUE: 18
PIF_VALUE: 19
PIF_VALUE: 18
PIF_VALUE: 10
PIF_VALUE: 3
PIF_VALUE: 18
PIF_VALUE: 19
PIF_VALUE: 18
PIF_VALUE: 10
PIF_VALUE: 18
PIF_VALUE: 19
PIF_VALUE: 18
PIF_VALUE: 14
PIF_VALUE: 18
PIF_VALUE: 22
PIF_VALUE: 19
PIF_VALUE: 19
PIF_VALUE: 18
PIF_VALUE: 29
PIF_VALUE: 19
PIF_VALUE: 2
PIF_VALUE: 17
PIF_VALUE: 3
PIF_VALUE: 2
PIF_VALUE: 2
PIF_VALUE: 17
PIF_VALUE: 19
PIF_VALUE: 10
PIF_VALUE: 17
PIF_VALUE: 0
PIF_VALUE: 10
PIF_VALUE: 20
PIF_VALUE: 10
PIF_VALUE: 19
PIF_VALUE: 18
PIF_VALUE: 19
PIF_VALUE: 32
PIF_VALUE: 18
PIF_VALUE: 14
PIF_VALUE: 17
PIF_VALUE: 21
PIF_VALUE: 15
PIF_VALUE: 10
PIF_VALUE: 5
PIF_VALUE: 17
PIF_VALUE: 19
PIF_VALUE: 18
PIF_VALUE: 17
PIF_VALUE: 18
PIF_VALUE: 19
PIF_VALUE: 18
PIF_VALUE: 20
PIF_VALUE: 10
PIF_VALUE: 19
PIF_VALUE: 19
PIF_VALUE: 18
PIF_VALUE: 19
PIF_VALUE: 10
PIF_VALUE: 19
PIF_VALUE: 10
PIF_VALUE: 17
PIF_VALUE: 19
PIF_VALUE: 18
PIF_VALUE: 19
PIF_VALUE: 17
PIF_VALUE: 10
PIF_VALUE: 19
PIF_VALUE: 18
PIF_VALUE: 20
PIF_VALUE: 18
PIF_VALUE: 19
PIF_VALUE: 39
PIF_VALUE: 19
PIF_VALUE: 18
PIF_VALUE: 19
PIF_VALUE: 19
PIF_VALUE: 10

## 2020-07-27 ASSESSMENT — PAIN DESCRIPTION - PAIN TYPE
TYPE: SURGICAL PAIN

## 2020-07-27 ASSESSMENT — PAIN DESCRIPTION - ORIENTATION
ORIENTATION: LOWER

## 2020-07-27 ASSESSMENT — PAIN SCALES - GENERAL
PAINLEVEL_OUTOF10: 5
PAINLEVEL_OUTOF10: 4
PAINLEVEL_OUTOF10: 6
PAINLEVEL_OUTOF10: 10
PAINLEVEL_OUTOF10: 7
PAINLEVEL_OUTOF10: 10
PAINLEVEL_OUTOF10: 7
PAINLEVEL_OUTOF10: 4

## 2020-07-27 ASSESSMENT — PAIN DESCRIPTION - LOCATION
LOCATION: BACK

## 2020-07-27 ASSESSMENT — PAIN DESCRIPTION - DESCRIPTORS
DESCRIPTORS: THROBBING
DESCRIPTORS: ACHING;DISCOMFORT
DESCRIPTORS: THROBBING

## 2020-07-27 ASSESSMENT — PAIN - FUNCTIONAL ASSESSMENT: PAIN_FUNCTIONAL_ASSESSMENT: 0-10

## 2020-07-27 NOTE — ANESTHESIA POSTPROCEDURE EVALUATION
POST- ANESTHESIA EVALUATION       Pt Name: Aubree Burleson  MRN: [id number]  YOB: 1950  Date of evaluation: 7/27/2020  Time:  4:04 PM      BP (!) 154/95   Pulse 94   Temp 97.8 °F (36.6 °C)   Resp 20   Ht 5' 4\" (1.626 m)   Wt 223 lb (101.2 kg)   SpO2 100%   BMI 38.28 kg/m²      Consciousness Level  Awake  Cardiopulmonary Status  Stable  Pain Adequately Treated YES  Nausea / Vomiting  NO  Adequate Hydration  YES  Anesthesia Related Complications NONE      Electronically signed by Deanne Braxton MD on 7/27/2020 at 4:04 PM       Department of Anesthesiology  Postprocedure Note    Patient: Aubree Burleson  MRN: [id number]  YOB: 1950  Date of evaluation: 7/27/2020  Time:  4:04 PM     Procedure Summary     Date:  07/27/20 Room / Location:  [address] [place] [address]  01 / [address] [facility]     Anesthesia Start:  7681 Anesthesia Stop:  6273    Procedure:  SPINAL CORD STIMULATOR IMPLANT WITH THORACIC LAMINOTOMY (N/A Spine Lumbar) Diagnosis:  (POST LAMINECTOMY SYNDROME)    Surgeon:  Kori Bobo MD Responsible Provider:  Lena Frederick MD    Anesthesia Type:  MAC, general ASA Status:  3          Anesthesia Type: MAC, general    Quang Phase I: Quang Score: 10    Quang Phase II: Quang Score: 10    Last vitals: Reviewed and per EMR flowsheets.        Anesthesia Post Evaluation

## 2020-07-27 NOTE — ANESTHESIA PRE PROCEDURE
Department of Anesthesiology  Preprocedure Note       Name:  Maria Teresa Wray   Age:  77 y.o.  :  1950                                          MRN:  [id number]         Date:  2020      Surgeon: Kenna Gordon):  Martir Carrasco MD    Procedure: Procedure(s):  SPINAL CORD STIMULATOR IMPLANT     Medications prior to admission:   Prior to Admission medications    Medication Sig Start Date End Date Taking? Authorizing Provider   vitamin C (ASCORBIC ACID) 500 MG tablet Take 500 mg by mouth daily    Historical Provider, MD   traMADol (ULTRAM) 50 MG tablet Take 50 mg by mouth every 6 hours as needed for Pain. Historical Provider, MD   insulin glargine (LANTUS) 100 UNIT/ML injection vial Inject 65 units nightly 20   Simi Medhkour, DO   tamsulosin (FLOMAX) 0.4 MG capsule Take 1 capsule by mouth daily Take 0.4 mg by mouth daily 6/10/20   Simi Medhkour, DO   insulin lispro (HUMALOG) 100 UNIT/ML injection vial INJECT SUBCUTANEOUSLY THREE TIMES A DAY AS DIRECTED BY SLIDING SCALE.  MAX DAILY UNITS OF 40 20   Simi Medhkour, DO   alendronate (FOSAMAX) 70 MG tablet Take 1 tablet by mouth every 7 days 20  Simi Medhkour, DO   omeprazole (PRILOSEC) 20 MG delayed release capsule TAKE 1 CAPSULE BY MOUTH DAILY 20   Simi Medhkour, DO   DULoxetine (CYMBALTA) 60 MG extended release capsule TAKE 1 CAPSULE BY MOUTH DAILY 3/11/20   Simi Medhkour, DO   glucose monitoring kit (FREESTYLE) monitoring kit 1 kit by Does not apply route daily Please provide accuchek meter for patient, not freestyle 3/10/20   Simi Medhkour, DO   atorvastatin (LIPITOR) 40 MG tablet TAKE (1) TABLET BY MOUTH EVERY DAY 3/3/20   Simi Medhkour, DO   amLODIPine (NORVASC) 5 MG tablet TAKE (1) TABLET BY MOUTH ONCE DAILY 3/3/20   Simi Medhkour, DO   hydroCHLOROthiazide (HYDRODIURIL) 25 MG tablet TAKE 1 TABLET BY MOUTH EVERY MORNING 3/3/20   Simi Medhkour, DO   losartan (COZAAR) 100 MG tablet TAKE 1 TABLET BY MOUTH EVERY DAY 3/3/20   [facility]   potassium chloride (KLOR-CON M) 20 MEQ extended release tablet Take 1 tablet by mouth 2 times daily 2/20/20   Adriana Moctezuma, APRN - CNP   gabapentin (NEURONTIN) 600 MG tablet TAKE (1) TABLET BY MOUTH FOUR TIMES A DAY 1/29/20 7/27/20  Simi\[facility]\""molly, DO   levothyroxine (SYNTHROID) 88 MCG tablet TAKE (1) TABLET BY MOUTH DAILY 1/29/20   [facility], DO   EPINEPHrine (EPIPEN 2-FERNANDO) 0.3 MG/0.3ML SOAJ injection EpiPen 2-Fernando 0.3 mg/0.3 mL injection, auto-injector    Historical Provider, MD   Insulin Syringes, Disposable, U-100 1 ML MISC 1 each by Does not apply route 4 times daily Dx: E11.9 11/12/19   [facility], DO   blood glucose monitor strips Test tid times a day & as needed for symptoms of irregular blood glucose. Dx E11.9 Please fill with compatible strips to glucometer 11/12/19   [facility], DO   calcium carbonate (TUMS) 500 MG chewable tablet Take 1 tablet by mouth daily as needed for Heartburn    Historical Provider, MD   rivaroxaban (XARELTO) 20 MG TABS tablet Take 1 tablet by mouth daily 10/8/19   [facility], DO   SUPER B COMPLEX/C CAPS Take by mouth    Historical Provider, MD   Cholecalciferol (VITAMIN D3) 5000 units TABS Take by mouth    Historical Provider, MD   ferrous sulfate 325 (65 Fe) MG tablet Take 325 mg by mouth daily (with breakfast)    Historical Provider, MD   fexofenadine (ALLEGRA) 60 MG tablet Take 60 mg by mouth daily    Historical Provider, MD   magnesium oxide (MAG-OX) 400 MG tablet Take 400 mg by mouth daily    Historical Provider, MD   Melatonin 10 MG TABS Take 10 mg by mouth daily as needed     Historical Provider, MD       Current medications:    No current facility-administered medications for this visit. No current outpatient medications on file.      Facility-Administered Medications Ordered in Other Visits   Medication Dose Route Frequency Provider Last Rate Last Dose    sodium chloride flush 0.9 % injection 10 mL  10 mL Intravenous 2 times per day Lex Rodriguez MD        sodium chloride flush 0.9 % injection 10 mL  10 mL Intravenous PRN Lex Rodriguez MD        0.9 % sodium chloride infusion   Intravenous Continuous Chino MD Astrid 100 mL/hr at 07/27/20 1124      ceFAZolin (ANCEF) 2 g in dextrose 5 % 50 mL IVPB  2 g Intravenous Once Phylicia Chahal MD           Allergies:     Allergies   Allergen Reactions    Aspirin Anaphylaxis     Only thing she can take is tylenol    Benadryl [Diphenhydramine] Other (See Comments)     Dystonic movement    Ibuprofen Anaphylaxis    Lidocaine Anaphylaxis     CAN NOT TOLERATE IV    Penicillins Anaphylaxis    Morphine Nausea And Vomiting       Problem List:    Patient Active Problem List   Diagnosis Code    Ovarian mass N83.8    Abdominal pain R10.9    Partial bowel obstruction (HCC) K56.600    Epigastric pain R10.13    GERD (gastroesophageal reflux disease) K21.9    Lumbosacral spondylosis without myelopathy M47.817    Chronic bilateral thoracic back pain M54.6, G89.29    Chronic anticoagulation Z79.01    BMI 38.0-38.9,adult Z68.38       Past Medical History:        Diagnosis Date    Arthritis     [facility]     Cancer ([facility] 2015    ovarian stage 2    Cerebral artery occlusion with cerebral infarction ([facility] 03/2018    Mini strokes \"Tia's\"    Diabetes mellitus (HCC)     Epigastric pain     GERD (gastroesophageal reflux disease)     History of anesthesia complications 1487'X    was told after gallbladder surgery to never have anesthesia again \"since it was hard for me to come out of it\"    History of blood transfusion     Hx of blood clots     lung     Hypertension     Right arm pain     Right shoulder pain     Sleep apnea     uses CPAP nightly    Tachycardia     hx of    Thyroid disease     TIA (transient ischemic attack)        Past Surgical History:        Procedure Laterality Date    ANESTHESIA NERVE BLOCK Left 11/7/2019    NERVE BLOCK (DEFINE) - use: Yes     Comment: rare                                Counseling given: Not Answered      Vital Signs (Current): There were no vitals filed for this visit.                                            BP Readings from Last 3 Encounters:   07/27/20 (!) 152/78   07/13/20 131/73   06/09/20 138/81       NPO Status:                                                                                 BMI:   Wt Readings from Last 3 Encounters:   07/27/20 223 lb (101.2 kg)   07/13/20 223 lb (101.2 kg)   06/18/20 222 lb 0.1 oz (100.7 kg)     There is no height or weight on file to calculate BMI.    CBC:   Lab Results   Component Value Date    WBC 8.7 07/13/2020    RBC 4.30 07/13/2020    RBC 5.33 01/07/2012    HGB 11.5 07/13/2020    HCT 35.3 07/13/2020    MCV 82.0 07/13/2020    RDW 16.5 07/13/2020     07/13/2020     01/07/2012       CMP:   Lab Results   Component Value Date     07/13/2020    K 4.0 07/13/2020     07/13/2020    CO2 28 07/13/2020    BUN 18 07/13/2020    CREATININE 0.88 07/13/2020    GFRAA >60 07/13/2020    LABGLOM >60 07/13/2020    GLUCOSE 145 07/13/2020    GLUCOSE 104 04/06/2019    PROT 6.4 04/06/2019    CALCIUM 8.7 07/13/2020    BILITOT 0.6 04/06/2019    ALKPHOS 67 04/06/2019    ALKPHOS 70 06/26/2018    AST 23 04/06/2019    ALT 33 04/06/2019       POC Tests:   Recent Labs     07/27/20  1118   POCGLU 195*       Coags:   Lab Results   Component Value Date    PROTIME 9.9 02/12/2020    INR 0.9 02/12/2020       HCG (If Applicable): No results found for: PREGTESTUR, PREGSERUM, HCG, HCGQUANT     ABGs: No results found for: PHART, PO2ART, YGB3ULN, EJG8BDE, BEART, Q7BPAXYB     Type & Screen (If Applicable):  No results found for: LABABO, LABRH    Drug/Infectious Status (If Applicable):  No results found for: HIV, HEPCAB    COVID-19 Screening (If Applicable):   Lab Results   Component Value Date    COVID19 Not Detected 07/23/2020    COVID19 Not Detected 05/13/2020         Anesthesia Evaluation  Patient summary reviewed and Nursing notes reviewed history of anesthetic complications (prolonged emergence): Airway: Mallampati: III  TM distance: >3 FB   Neck ROM: full  Mouth opening: > = 3 FB Dental: normal exam         Pulmonary:normal exam  breath sounds clear to auscultation  (+) sleep apnea: on CPAP,                             Cardiovascular:  Exercise tolerance: no interval change,   (+) hypertension: no interval change,       ECG reviewed  Rhythm: regular  Rate: normal                    Neuro/Psych:   (+) CVA: no interval change, TIA,              ROS comment: SPONDYLOSIS GI/Hepatic/Renal:   (+) GERD: no interval change, morbid obesity          Endo/Other:    (+) DiabetesType II DM, no interval change, , hypothyroidism::., malignancy/cancer (Ovarian cancer ). Abdominal:           Vascular: negative vascular ROS. Anesthesia Plan      MAC and general     ASA 3       Induction: intravenous. MIPS: Postoperative opioids intended and Prophylactic antiemetics administered. Anesthetic plan and risks discussed with patient. Plan discussed with CRNA.           NOTE - LIDOCAINE ALLERGY!!!    Pooja Olvera MD   7/27/2020

## 2020-07-27 NOTE — H&P
----- Message from Shalini Fletcher sent at 8/21/2017 11:05 AM CDT -----  Contact: pre op  Po   Still holding for Clearance   Surgery 08 28 2017  Call back    Fax     attack)        SURGICAL HISTORY       Past Surgical History:   Procedure Laterality Date    ANESTHESIA NERVE BLOCK Left 11/7/2019    NERVE BLOCK (DEFINE) - INTERCOSTAL NERVE BLOCK performed by Jamila Stiles MD at [facility] Bilateral 5/15/2020    NERVE BLOCK BILATERAL - MBB  L4-5, L5-S1 performed by Jamila Stiles MD at [address] Left     ORIF    CHOLECYSTECTOMY      COLONOSCOPY  07/08/2019    5 yr recall.     COLONOSCOPY N/A 7/8/2019    COLONOSCOPY WITH BIOPSY performed by Brittney Kilpatrick MD at [address] Right     repair tendon    [facility] [employer]. INJECTION PROCEDURE FOR SACROILIAC JOINT Bilateral 8/29/2019    SACROILIAC JOINT INJECTION performed by Jamila Stiles MD at [facility] Bilateral 10/10/2019    SACROILIAC JOINT INJECTION performed by Jamila Stiles MD at [address] [facility]  2/2014    [facility] with bso    JOINT REPLACEMENT Right     shoulder    KNEE ARTHROSCOPY Right     NERVE BLOCK Bilateral 08/29/2019    SI joint injection    NERVE BLOCK Bilateral 10/10/2019    SI joint    NERVE BLOCK Bilateral 05/15/2020    Medial branch block L4-5, L5-S1    NERVE BLOCK  06/05/2020    SPINAL CORD STIMULATOR IMPLANT TRIAL (N/A )    OTHER SURGICAL HISTORY Right 11/11/2014    shoulder manipulation    SHOULDER ARTHROSCOPY Right 02/03/15    SHOULDER SURGERY  11/2014    manipulation    SPINAL CORD STIMULATOR SURGERY N/A 6/5/2020    SPINAL CORD STIMULATOR IMPLANT TRIAL performed [address] Jamila Stiles MD [address]  07/08/2019    UPPER GASTROINTESTINAL ENDOSCOPY N/A 7/8/2019    EGD BIOPSY performed by Brittney Kilpatrick MD at [place] [address] Right 4/30/2015    pt has blood clot & abscess in her right kidney    VASCULAR SURGERY      Alesha filter in & removed 1 year later FAMILY HISTORY     History reviewed. No pertinent family history. SOCIAL HISTORY       Social History     Socioeconomic History    Marital status: Single     Spouse name: None    Number of children: None    Years of education: None    Highest education level: None   Occupational History    None   Social Needs    Financial resource strain: None    Food insecurity     Worry: None     Inability: None    Transportation needs     Medical: None     Non-medical: None   Tobacco Use    Smoking status: Never Smoker    Smokeless tobacco: Never Used   Substance and Sexual Activity    Alcohol use: Yes     Comment: rare    Drug use: No    Sexual activity: Yes   Lifestyle    Physical activity     Days per week: None     Minutes per session: None    Stress: None   Relationships    Social connections     Talks on phone: None     Gets together: None     Attends Yazidi service: None     Active member of club or organization: None     Attends meetings of clubs or organizations: None     Relationship status: None    Intimate partner violence     Fear of current or ex partner: None     Emotionally abused: None     Physically abused: None     Forced sexual activity: None   Other Topics Concern    None   Social History Narrative    None           REVIEW OF SYSTEMS      Allergies   Allergen Reactions    Aspirin Anaphylaxis     Only thing she can take is tylenol    Benadryl [Diphenhydramine] Other (See Comments)     Dystonic movement    Ibuprofen Anaphylaxis    Lidocaine Anaphylaxis     CAN NOT TOLERATE IV    Penicillins Anaphylaxis    Morphine Nausea And Vomiting       No current facility-administered medications on file prior to encounter.       Current Outpatient Medications on File Prior to Encounter   Medication Sig Dispense Refill    tamsulosin (FLOMAX) 0.4 MG capsule Take 1 capsule by mouth daily Take 0.4 mg by mouth daily 30 capsule 3    insulin lispro (HUMALOG) 100 UNIT/ML injection vial INJECT SUBCUTANEOUSLY THREE TIMES A DAY AS DIRECTED BY SLIDING SCALE. MAX DAILY UNITS OF 40 10 mL 2    alendronate (FOSAMAX) 70 MG tablet Take 1 tablet by mouth every 7 days 12 tablet 1    omeprazole (PRILOSEC) 20 MG delayed release capsule TAKE 1 CAPSULE BY MOUTH DAILY 90 capsule 1    DULoxetine (CYMBALTA) 60 MG extended release capsule TAKE 1 CAPSULE BY MOUTH DAILY 90 capsule 10    atorvastatin (LIPITOR) 40 MG tablet TAKE (1) TABLET BY MOUTH EVERY DAY 90 tablet 10    amLODIPine (NORVASC) 5 MG tablet TAKE (1) TABLET BY MOUTH ONCE DAILY 90 tablet 10    hydroCHLOROthiazide (HYDRODIURIL) 25 MG tablet TAKE 1 TABLET BY MOUTH EVERY MORNING 90 tablet 10    losartan (COZAAR) 100 MG tablet TAKE 1 TABLET BY MOUTH EVERY DAY 90 tablet 10    potassium chloride (KLOR-CON M) 20 MEQ extended release tablet Take 1 tablet by mouth 2 times daily 180 tablet 1    gabapentin (NEURONTIN) 600 MG tablet TAKE (1) TABLET BY MOUTH FOUR TIMES A  tablet 10    levothyroxine (SYNTHROID) 88 MCG tablet TAKE (1) TABLET BY MOUTH DAILY 30 tablet 10    Cholecalciferol (VITAMIN D3) 5000 units TABS Take by mouth      ferrous sulfate 325 (65 Fe) MG tablet Take 325 mg by mouth daily (with breakfast)      fexofenadine (ALLEGRA) 60 MG tablet Take 60 mg by mouth daily      magnesium oxide (MAG-OX) 400 MG tablet Take 400 mg by mouth daily      Melatonin 10 MG TABS Take 10 mg by mouth daily as needed       glucose monitoring kit (FREESTYLE) monitoring kit 1 kit by Does not apply route daily Please provide accuchek meter for patient, not freestyle 1 kit 0    EPINEPHrine (EPIPEN 2-LISA) 0.3 MG/0.3ML SOAJ injection EpiPen 2-Lisa 0.3 mg/0.3 mL injection, auto-injector      Insulin Syringes, Disposable, U-100 1 ML MISC 1 each by Does not apply route 4 times daily Dx: E11.9 300 each 3    blood glucose monitor strips Test tid times a day & as needed for symptoms of irregular blood glucose.  Dx E11.9 Please fill with compatible strips to glucometer 300 strip 3    calcium carbonate (TUMS) 500 MG chewable tablet Take 1 tablet by mouth daily as needed for Heartburn      rivaroxaban (XARELTO) 20 MG TABS tablet Take 1 tablet by mouth daily 90 tablet 3    SUPER B COMPLEX/C CAPS Take by mouth         Negative except for what is mentioned in the HPI. GENERAL PHYSICAL EXAM     Vitals: BP (!) 152/78   Pulse 78   Temp 97.8 °F (36.6 °C) (Infrared)   Resp 16   Ht 5' 4\" (1.626 m)   Wt 223 lb (101.2 kg)   SpO2 100%   BMI 38.28 kg/m²  Body mass index is 38.28 kg/m². GENERAL APPEARANCE:   Vinnie Lechuga is 77 y.o.,  female, mildly obese, nourished, conscious, alert. Does not appear to be distress or pain at this time. SKIN:  Warm, dry, no cyanosis or jaundice. HEAD:  Normocephalic, atraumatic, no swelling or tenderness. EYES:  Pupils equal, reactive to light. EARS:  No discharge, no marked hearing loss. NOSE:  No rhinorrhea, epistaxis or septal deformity. THROAT:  Not congested. No ulceration bleeding or discharge. NECK:  No stiffness, trachea central.  No palpable masses or L.N.                 CHEST:  Symmetrical and equal on expansion. HEART:  RRR S1 > S2. No audible murmurs or gallops. LUNGS:  Equal on expansion, normal breath sounds. No adventitious sounds. ABDOMEN:  Obese. Soft on palpation. No dysphagia, No localized tenderness. No guarding or rigidity. No palpable hepatosplenomegaly. LYMPHATICS:  No palpable cervical lymphadenopathy. LOCOMOTOR, BACK AND SPINE:  No tenderness or deformities. Normal gait                 EXTREMITIES:  Symmetrical, no pretibial edema. Enocs sign negative. No discoloration or ulcerations. NEUROLOGIC:  The patient is conscious, alert, oriented,Cranial nerve II-XII intact, taste and smell were not examined.  No apparent focal sensory or motor deficits.              PROVISIONAL DIAGNOSES / SURGERY:      LUMBOSACRAL SPONDYLOSIS WITHOUT MYELOPATHY  CHRONIC BILATERAL THORACIC BACK PAIN   BMI 38.0-38.9, ADULT  SPINAL CORD STIMULATOR IMPLANT       Patient Active Problem List    Diagnosis Date Noted    BMI 38.0-38.9,adult 06/18/2020    Lumbosacral spondylosis without myelopathy 02/18/2020    Chronic bilateral thoracic back pain 02/18/2020    Chronic anticoagulation 02/18/2020    Epigastric pain     GERD (gastroesophageal reflux disease)     Ovarian mass     Abdominal pain     Partial bowel obstruction ([facility] [address]            LUPE Melgar CNP on 7/27/2020 at 11:55 AM

## 2020-07-27 NOTE — INTERVAL H&P NOTE
Update History & Physical    The patient's History and Physical of July 13, 2020 was reviewed with the patient and I examined the patient. There was no change. The surgical site was confirmed by the patient and me. Plan: The risks, benefits, expected outcome, and alternative to the recommended procedure have been discussed with the patient. Patient understands and wants to proceed with the procedure. Pt NPO since past midnight, the last time she took  xarelta  5 days ago. No BP am medication today. Electronically signed by LUPE Steven CNP on 7/27/2020 at 10:14 AM       Johnie Mccord [id number]         NAME:  Janay Malcolm  MRN: [id number]   YOB: 1950   Date: 7/13/2020   Age: 77 y.o. Gender: female        COMPLAINT AND PRESENT HISTORY:      Janay Malcolm is 77 y.o.,  female, undergoing preadmission testing for post laminectomy syndrome with scheduled spinal cord stimulator implant. Pt c/o right lower back pain and left side rib pain. The symptoms started 4 year ago. Denies any injury or trauma to back. Pt does have a history of ovarian cancer with chemotherapy treatment that patient reports exacerbated pain. Completed chemotherapy in September 2015. Pt has history of neuropathy to bilateral lower legs from below knees down to feet/toes. The pain has gradually worsened since onset. Describes pain as aching. Right back pain does radiate into buttocks and sciatic pain but not down leg. Rating back pain 9/10. Rating left rib pain 8-9/10. Takes tramadol with mild relief. Denies any radiation of pain. Standing too long, bending, twisting makes pain worse. Sitting or resting helps alleviate symptoms. Pt had trial spinal cord stimulator in June with significant relief. Stimulator has subsequently been removed. Has tried accupuncture, injections, PT, and NSAIDS with minimal relief. No redness, swelling or rashes.     Pt feeling well today.  Denies any other somatic complaints including recent URI, nausea, vomiting, diarrhea, constipation, fever or chills.      PMHx  PE: On xarelto. HTN: Well controlled. Takes amlodipine, losartan and HCTZ. Denies any chest pain/pressure, palpitations, SOB, headache and dizziness. Anesthesia complications: in 0632'R. Denies any complications since then with anesthesia. SHANNAN with CPAP use. No supplemental O2. TIA: no residual symptoms.      PAST MEDICAL HISTORY      Past Medical History        Past Medical History:   Diagnosis Date    Arthritis      Bowel obstruction ([facility]      Cancer ([facility] 2015     ovarian stage 2    Cerebral artery occlusion with cerebral infarction ([facility] 03/2018     Mini strokes \"Tia's\"    Diabetes mellitus (HCC)      Epigastric pain      GERD (gastroesophageal reflux disease)      History of anesthesia complications 9259'W     was told after gallbladder surgery to never have anesthesia again \"since it was hard for me to come out of it\"    History of blood transfusion      Hx of blood clots       lung     Hypertension      Right arm pain      Right shoulder pain      Sleep apnea       uses CPAP nightly    Tachycardia       hx of    Thyroid disease      TIA (transient ischemic attack)          Pt denies any history of heart disease, COPD, Asthma, GERD, HLD, Seizures, Kidney Disease, Hepatitis, TB, Psychiatric Disorders or Substance abuse.     SURGICAL HISTORY        Past Surgical History         Past Surgical History:   Procedure Laterality Date    ANESTHESIA NERVE BLOCK Left 11/7/2019     NERVE BLOCK (DEFINE) - INTERCOSTAL NERVE BLOCK performed by Randal Avalos MD at [facility] Bilateral 5/15/2020     NERVE BLOCK BILATERAL - MBB  L4-5, L5-S1 performed by Randal Avalos MD at [address] [place] [address] Left       ORIF    CHOLECYSTECTOMY        COLONOSCOPY   07/08/2019     5 yr recall.     COLONOSCOPY N/A 7/8/2019     COLONOSCOPY WITH BIOPSY performed by Heber Segovia MD at [address] Right       repair tendon    HC INJECTION PROCEDURE FOR SACROILIAC JOINT Bilateral 8/29/2019     SACROILIAC JOINT INJECTION performed by Marilia Duke MD at [address] [facility] Bilateral 10/10/2019     SACROILIAC JOINT INJECTION performed by Marilia Duke MD at [email or web address]   2/2014     chris with bso    JOINT REPLACEMENT Right       shoulder    KNEE ARTHROSCOPY Right      NERVE BLOCK Bilateral 08/29/2019     SI joint injection    NERVE BLOCK Bilateral 10/10/2019     SI joint    NERVE BLOCK Bilateral 05/15/2020     Medial branch block L4-5, L5-S1    NERVE BLOCK   06/05/2020     SPINAL CORD STIMULATOR IMPLANT TRIAL (N/A )    OTHER SURGICAL HISTORY Right 11/11/2014     shoulder manipulation    SHOULDER ARTHROSCOPY Right 02/03/15    SHOULDER SURGERY   11/2014     manipulation    SPINAL CORD STIMULATOR SURGERY N/A 6/5/2020     SPINAL CORD STIMULATOR IMPLANT TRIAL performed by Marilia Duke MD at [facility] ENDOSCOPY   07/08/2019    UPPER GASTROINTESTINAL ENDOSCOPY N/A 7/8/2019     EGD BIOPSY performed by Heber Segovia MD at [place] [address] Right 4/30/2015     pt has blood clot & abscess in her right kidney    VASCULAR SURGERY         Alesha filter in & removed 1 year later           FAMILY HISTORY     Family History   History reviewed.  No pertinent family history.        SOCIAL HISTORY        Social History   Social History            Socioeconomic History    Marital status: Single       Spouse name: None    Number of children: None    Years of education: None    Highest education level: None   Occupational History    None   Social Needs    Financial resource strain: None    Food insecurity       Worry: None       Inability: None    Transportation needs       Medical: None       Non-medical: None   Tobacco Use    Smoking status: Never Smoker    Smokeless tobacco: Never Used   Substance and Sexual Activity    Alcohol use: Yes       Comment: rare    Drug use: No    Sexual activity: Yes   Lifestyle    Physical activity       Days per week: None       Minutes per session: None    Stress: None   Relationships    Social connections       Talks on phone: None       Gets together: None       Attends Church service: None       Active member of club or organization: None       Attends meetings of clubs or organizations: None       Relationship status: None    Intimate partner violence       Fear of current or ex partner: None       Emotionally abused: None       Physically abused: None       Forced sexual activity: None   Other Topics Concern    None   Social History Narrative    None           REVIEW OF SYSTEMS             Allergies   Allergen Reactions    Aspirin Anaphylaxis       Only thing she can take is tylenol    Benadryl [Diphenhydramine] Other (See Comments)       Dystonic movement    Ibuprofen Anaphylaxis    Lidocaine Anaphylaxis       CAN NOT TOLERATE IV    Penicillins Anaphylaxis    Morphine Nausea And Vomiting               Current Outpatient Medications on File Prior to Encounter   Medication Sig Dispense Refill    vitamin C (ASCORBIC ACID) 500 MG tablet Take 500 mg by mouth daily        traMADol (ULTRAM) 50 MG tablet Take 50 mg by mouth every 6 hours as needed for Pain.        insulin glargine (LANTUS) 100 UNIT/ML injection vial Inject 65 units nightly 30 mL 3    tamsulosin (FLOMAX) 0.4 MG capsule Take 1 capsule by mouth daily Take 0.4 mg by mouth daily 30 capsule 3    insulin lispro (HUMALOG) 100 UNIT/ML injection vial INJECT SUBCUTANEOUSLY THREE TIMES A DAY AS DIRECTED BY SLIDING SCALE.  MAX DAILY UNITS OF 40 10 mL 2    alendronate (FOSAMAX) 70 MG tablet Take 1 tablet by mouth every 7 days 12 tablet 1    omeprazole (PRILOSEC) 20 MG delayed release capsule TAKE 1 CAPSULE BY MOUTH DAILY 90 capsule 1    DULoxetine (CYMBALTA) 60 MG extended release capsule TAKE 1 CAPSULE BY MOUTH DAILY 90 capsule 10    atorvastatin (LIPITOR) 40 MG tablet TAKE (1) TABLET BY MOUTH EVERY DAY 90 tablet 10    amLODIPine (NORVASC) 5 MG tablet TAKE (1) TABLET BY MOUTH ONCE DAILY 90 tablet 10    hydroCHLOROthiazide (HYDRODIURIL) 25 MG tablet TAKE 1 TABLET BY MOUTH EVERY MORNING 90 tablet 10    losartan (COZAAR) 100 MG tablet TAKE 1 TABLET BY MOUTH EVERY DAY 90 tablet 10    potassium chloride (KLOR-CON M) 20 MEQ extended release tablet Take 1 tablet by mouth 2 times daily 180 tablet 1    gabapentin (NEURONTIN) 600 MG tablet TAKE (1) TABLET BY MOUTH FOUR TIMES A  tablet 10    levothyroxine (SYNTHROID) 88 MCG tablet TAKE (1) TABLET BY MOUTH DAILY 30 tablet 10    EPINEPHrine (EPIPEN 2-LISA) 0.3 MG/0.3ML SOAJ injection EpiPen 2-Lisa 0.3 mg/0.3 mL injection, auto-injector        calcium carbonate (TUMS) 500 MG chewable tablet Take 1 tablet by mouth daily as needed for Heartburn        rivaroxaban (XARELTO) 20 MG TABS tablet Take 1 tablet by mouth daily 90 tablet 3    SUPER B COMPLEX/C CAPS Take by mouth        Cholecalciferol (VITAMIN D3) 5000 units TABS Take by mouth        ferrous sulfate 325 (65 Fe) MG tablet Take 325 mg by mouth daily (with breakfast)        fexofenadine (ALLEGRA) 60 MG tablet Take 60 mg by mouth daily        magnesium oxide (MAG-OX) 400 MG tablet Take 400 mg by mouth daily        Melatonin 10 MG TABS Take 10 mg by mouth daily as needed         glucose monitoring kit (FREESTYLE) monitoring kit 1 kit by Does not apply route daily Please provide accuchek meter for patient, not freestyle 1 kit 0    Insulin Syringes, Disposable, U-100 1 ML MISC 1 each by Does not apply route 4 times daily Dx: E11.9 300 each 3    blood glucose monitor strips Test tid times a day & as needed for symptoms of irregular blood glucose.  Dx E11.9 Please fill with compatible strips to glucometer 300 strip 3      No current facility-administered medications on file prior to encounter. General health:  Fairly good. No fever or chills. Skin:  No itching, redness or rash. HEENT:  No headache, epistaxis or sore throat. Neck:  No pain, stiffness or masses. Cardiovascular/Respiratory system:  No chest pain, palpitation or shortness of breath. Gastrointestinal tract: No abdominal pain, Dysphagia, nausea, vomiting, diarrhea or constipation. Genitourinary:  No burning on micturition. No hesitancy, urgency, frequency or discoloration of urine. Locomotor:  See HPI. Neuropsychiatric:  No referable complaints.                     GENERAL PHYSICAL EXAM:      Vitals: /73   Pulse 66   Temp 97.6 °F (36.4 °C)   Resp 18   Ht 5' 4\" (1.626 m)   Wt 223 lb (101.2 kg)   SpO2 99%   BMI 38.28 kg/m²  Body mass index is 38.28 kg/m².         GENERAL APPEARANCE:   Josue Salazar is 77 y.o.,  female, moderately obese, nourished, conscious, alert. Does not appear to be in any distress or pain at this time. SKIN:  Warm, dry, no cyanosis or jaundice. HEAD:  Normocephalic, atraumatic. EYES:  Pupils equal, reactive to light. EARS:  No discharge, no marked hearing loss. NOSE:  No rhinorrhea, epistaxis or septal deformity. THROAT:  Mucus membranes moist, no erythema or exudate. NECK:  No stiffness, trachea central.  No palpable masses or L.N.                 CHEST:  Symmetrical and equal on expansion. HEART:  RRR S1 > S2. No audible murmurs or gallops. LUNGS:  Equal on expansion, normal breath sounds. No wheezing, rhonchi or rales. ABDOMEN:  NABS x 4 quads. Soft on palpation. No localized tenderness. No guarding or rigidity. LYMPHATICS:  No palpable cervical lymphadenopathy.      LOCOMOTOR, BACK AND SPINE:  Tenderness to right lower back, left ribs. No deformities. EXTREMITIES:  Symmetrical, no pretibial edema. No calf tenderness. No discoloration or ulcerations.     NEUROLOGIC:  The patient is conscious, alert, oriented. Speech clear, no facial drooping. No apparent focal sensory or motor deficits.                                                                                      PROVISIONAL DIAGNOSES / SURGERY:       POST LAMINECTOMY SYNDROME     SPINAL CORD STIMULATOR IMPLANT          Patient Active Problem List     Diagnosis Date Noted    BMI 38.0-38.9,adult 06/18/2020    Lumbosacral spondylosis without myelopathy 02/18/2020    Chronic bilateral thoracic back pain 02/18/2020    Chronic anticoagulation 02/18/2020    Epigastric pain      GERD (gastroesophageal reflux disease)      Ovarian mass      Abdominal pain      Partial bowel obstruction [facility])              LUPE White - CNP on 7/13/2020 at 2:47 PM               Cosigned by:  Fe Garcia MD at 7/14/2020  5:21 PM

## 2020-07-28 RX ORDER — POTASSIUM CHLORIDE 20 MEQ/1
20 TABLET, EXTENDED RELEASE ORAL 2 TIMES DAILY
Qty: 180 TABLET | Refills: 2 | Status: SHIPPED | OUTPATIENT
Start: 2020-07-28 | End: 2021-04-19

## 2020-07-28 RX ORDER — LORAZEPAM 0.5 MG/1
TABLET ORAL
Qty: 90 TABLET | Refills: 2 | Status: SHIPPED | OUTPATIENT
Start: 2020-07-28 | End: 2020-10-28

## 2020-07-28 NOTE — OP NOTE
[address]                 [address] [facility], [address]                                OPERATIVE REPORT    PATIENT NAME: Richmond Nance                     :        1950  MED REC NO:   [id number]                              ROOM:  ACCOUNT NO:   [de-identified]                           ADMIT DATE: 2020  PROVIDER:     Zaynab Bowers    DATE OF PROCEDURE:  2020    PREOPERATIVE DIAGNOSIS:  Chronic thoracic back and rib pain. POSTOPERATIVE DIAGNOSIS:  Chronic thoracic back and rib pain. PROCEDURE PERFORMED:  Spinal cord stimulator implant but with thoracic  laminotomy x2 with fluoroscopic assistance. OPERATING SURGEON:  Zaynab Bowers MD    ASSISTANT:  None. ANESTHESIA:  General.    ESTIMATED BLOOD LOSS:  50 mL. COMPLICATIONS RAISED DURING OPERATION:  None noted. IMPLANTS:  [employer] spinal cord stimulator. DRAINS:  None. FINDINGS:  1. The patient with significantly elevated BMI, necessitating general  anesthetic and preventing us from performing complex intraoperative  programming. 2.  [employer] spinal cord stimulator. 3.  The patient with adhesion under the T8 lamina, creating the  necessity of performing a T8 laminotomy in addition to a T9 laminotomy  in order to have the spinal cord stimulator paddle lead remain midline. PROCEDURE IN DETAIL:  The patient was taken to operating room, placed  under general anesthesia, transferred to the Mohamud table, checked for  padding and positioning. Back was prepped and draped in the usual  sterile fashion. Localizing x-rays were made with the plan to put about the second to  third lead over the T6-7 interspace and then fully cover T7 with the  remainder of the paddle. Localizing x-rays were made. At this time, skin incision was made, carried down through skin and  subcutaneous tissue. Paraspinal musculature was elevated laterally.   A  laminotomy of T8 was performed. The patient with significant adhesions within the spinal canal, despite  initially passing the spatula, actually the spatula initially wanted to  track to the right side. Multiple additional passes and then it would track to the left or the  right. We did attempt placing the paddle lead several times and  demonstrated radiographically that it really wanted to go to the left or  the right underneath the T8 lamina. A T8 laminotomy was then performed. At this juncture, with a little bit of minimal control of the paddle, as  it crossed under T8, we were able to maintain the paddle lead midline  and then advanced it quite nicely, so about the second lead was over the  T6-7 disc space with the remainder of the paddle really covering over  the body of T7. This was reviewed with the patient's preoperative imaging and appeared  to be from her spinal cord stimulator trial, found to be very acceptable  in position. Anchors were then placed bilaterally and again fluoroscopic images  verified placement of the paddle lead. The pocket for the IPG was then created. A wire passer was advanced  from the IPG pocket to the thoracic incision and the wire was passed  distally. IPG was connected, checked for impedance. Torx screws were torqued. The IPG was placed in the pocket and again checked for impedance, found  to be acceptable. It should be noted that AP and lateral thoracic x-rays have been placed,  post anchoring showing the position of the laminotomy lead. Deep fascia was now re-approximated with #2 Vicryl suture, subcuticular  layer with 2-0 Vicryl followed by 3-0 nylon skin closure. It should be  noted that prior to fully closing the thoracic wound, a small pocket was  created for the stress-relieving wire loop. Some extra wire was also  behind the IPG in the buttocks wound. Buttocks wound was also re-approximated with 2-0 Vicryl suture and 3-0  nylon skin closure.   Aquacel dressings were applied. The patient was  awakened from anesthesia, taken to recovery room in stable condition.         ANIKA Sidhu    D: 07/27/2020 14:39:12       T: 07/27/2020 14:43:39     [email or web address]  Job#: [id number]     Doc#: [id number]    CC:

## 2020-08-03 ENCOUNTER — TELEPHONE (OUTPATIENT)
Dept: ORTHOPEDIC SURGERY | Age: 70
End: 2020-08-03

## 2020-08-03 NOTE — TELEPHONE ENCOUNTER
Patient called to request a phone call from clinical staff. She states that since having surgery with  [facility], her legs and feet are starting to swell. She'd like to speak to one of the nursing staff about what she's experiencing so she can know if it's normal or if she should go to the ER. Please advise.

## 2020-08-04 ENCOUNTER — APPOINTMENT (OUTPATIENT)
Dept: ULTRASOUND IMAGING | Age: 70
End: 2020-08-04
Payer: MEDICARE

## 2020-08-04 ENCOUNTER — HOSPITAL ENCOUNTER (EMERGENCY)
Age: 70
Discharge: HOME | End: 2020-08-04
Attending: EMERGENCY MEDICINE
Payer: MEDICARE

## 2020-08-04 ENCOUNTER — APPOINTMENT (OUTPATIENT)
Dept: GENERAL RADIOLOGY | Age: 70
End: 2020-08-04
Payer: MEDICARE

## 2020-08-04 VITALS
DIASTOLIC BLOOD PRESSURE: 85 MMHG | RESPIRATION RATE: 24 BRPM | WEIGHT: 223 LBS | OXYGEN SATURATION: 98 % | SYSTOLIC BLOOD PRESSURE: 148 MMHG | HEART RATE: 79 BPM | BODY MASS INDEX: 38.07 KG/M2 | TEMPERATURE: 99 F | HEIGHT: 64 IN

## 2020-08-04 LAB
ABSOLUTE EOS #: 0.2 K/UL (ref 0–0.4)
ABSOLUTE IMMATURE GRANULOCYTE: ABNORMAL K/UL (ref 0–0.3)
ABSOLUTE LYMPH #: 1.8 K/UL (ref 1–4.8)
ABSOLUTE MONO #: 0.4 K/UL (ref 0.1–1.2)
ANION GAP SERPL CALCULATED.3IONS-SCNC: 12 MMOL/L (ref 9–17)
BASOPHILS # BLD: 1 % (ref 0–2)
BASOPHILS ABSOLUTE: 0.1 K/UL (ref 0–0.2)
BNP INTERPRETATION: NORMAL
BUN BLDV-MCNC: 18 MG/DL (ref 8–23)
BUN/CREAT BLD: ABNORMAL (ref 9–20)
CALCIUM SERPL-MCNC: 8.7 MG/DL (ref 8.6–10.4)
CHLORIDE BLD-SCNC: 103 MMOL/L (ref 98–107)
CO2: 25 MMOL/L (ref 20–31)
CREAT SERPL-MCNC: 0.83 MG/DL (ref 0.5–0.9)
D-DIMER QUANTITATIVE: 1.1 MG/L FEU
DIFFERENTIAL TYPE: ABNORMAL
EOSINOPHILS RELATIVE PERCENT: 3 % (ref 1–4)
GFR AFRICAN AMERICAN: >60 ML/MIN
GFR NON-AFRICAN AMERICAN: >60 ML/MIN
GFR SERPL CREATININE-BSD FRML MDRD: ABNORMAL ML/MIN/{1.73_M2}
GFR SERPL CREATININE-BSD FRML MDRD: ABNORMAL ML/MIN/{1.73_M2}
GLUCOSE BLD-MCNC: 386 MG/DL (ref 70–99)
HCT VFR BLD CALC: 33.2 % (ref 36–46)
HEMOGLOBIN: 10.6 G/DL (ref 12–16)
IMMATURE GRANULOCYTES: ABNORMAL %
LYMPHOCYTES # BLD: 25 % (ref 24–44)
MCH RBC QN AUTO: 26.2 PG (ref 26–34)
MCHC RBC AUTO-ENTMCNC: 32.1 G/DL (ref 31–37)
MCV RBC AUTO: 81.7 FL (ref 80–100)
MONOCYTES # BLD: 5 % (ref 2–11)
NRBC AUTOMATED: ABNORMAL PER 100 WBC
PDW BLD-RTO: 16.3 % (ref 12.5–15.4)
PLATELET # BLD: 262 K/UL (ref 140–450)
PLATELET ESTIMATE: ABNORMAL
PMV BLD AUTO: 9.4 FL (ref 6–12)
POC TROPONIN I: 0 NG/ML (ref 0–0.1)
POC TROPONIN INTERP: NORMAL
POTASSIUM SERPL-SCNC: 3.9 MMOL/L (ref 3.7–5.3)
PRO-BNP: 194 PG/ML
RBC # BLD: 4.06 M/UL (ref 4–5.2)
RBC # BLD: ABNORMAL 10*6/UL
SEG NEUTROPHILS: 66 % (ref 36–66)
SEGMENTED NEUTROPHILS ABSOLUTE COUNT: 4.8 K/UL (ref 1.8–7.7)
SODIUM BLD-SCNC: 140 MMOL/L (ref 135–144)
WBC # BLD: 7.3 K/UL (ref 3.5–11)
WBC # BLD: ABNORMAL 10*3/UL

## 2020-08-04 PROCEDURE — 85025 COMPLETE CBC W/AUTO DIFF WBC: CPT

## 2020-08-04 PROCEDURE — 71045 X-RAY EXAM CHEST 1 VIEW: CPT

## 2020-08-04 PROCEDURE — 93005 ELECTROCARDIOGRAM TRACING: CPT | Performed by: EMERGENCY MEDICINE

## 2020-08-04 PROCEDURE — 83880 ASSAY OF NATRIURETIC PEPTIDE: CPT

## 2020-08-04 PROCEDURE — 84484 ASSAY OF TROPONIN QUANT: CPT

## 2020-08-04 PROCEDURE — 36415 COLL VENOUS BLD VENIPUNCTURE: CPT

## 2020-08-04 PROCEDURE — 99284 EMERGENCY DEPT VISIT MOD MDM: CPT

## 2020-08-04 PROCEDURE — 93971 EXTREMITY STUDY: CPT

## 2020-08-04 PROCEDURE — 85379 FIBRIN DEGRADATION QUANT: CPT

## 2020-08-04 PROCEDURE — 80048 BASIC METABOLIC PNL TOTAL CA: CPT

## 2020-08-04 RX ORDER — FUROSEMIDE 20 MG/1
20 TABLET ORAL DAILY
Qty: 5 TABLET | Refills: 0 | Status: SHIPPED | OUTPATIENT
Start: 2020-08-04 | End: 2020-08-10 | Stop reason: SDUPTHER

## 2020-08-04 NOTE — ED NOTES
Pt to exam room 6 via wheelchair with c/o bilateral lower leg swelling and bilateral hand swelling. Pt had low back surgery 7/27/2020 by dr Mike Bustillo at [facility] [place]. Friday she began to notice that her legs and hands were swollen, pt reported that she has been sleeping a lot since the surgery. She denies chest pain or SOB. She denies taking a diuretic or CHF.      Shane Brooks RN  08/04/20 1430

## 2020-08-04 NOTE — ED PROVIDER NOTES
[id number] [facility], [facility] ED  [address] [facility] [place] [address]  Phone: [phone]  Fax: [phone]        Pt Name: Prasad Akhtar  MRN: [id number]  [place] 1950  Date of evaluation: 8/4/20      CHIEF COMPLAINT     Chief Complaint   Patient presents with    Leg Swelling     bilateral          HISTORY OF PRESENT ILLNESS  (Location/Symptom, Timing/Onset, Context/Setting, Quality, Duration, Modifying Factors, Severity.)    Prasad Akhtar is a 77 y.o. female who presents with bilateral leg swelling. The patient states that approximately 1 week ago she had surgery on her back where she had a stimulator placed the patient states that since the surgery she is noted she has swelling of her bilateral lower extremities denies any chest pain no shortness of breath no fever no chills no cough no vomiting no diarrhea she is attempted to elevate her legs but without relief patient also has noted some slight redness on her incision to her left buttocks but denies any fever or chills no drainage      REVIEW OF SYSTEMS    (2-9 systems for level 4, 10 or more for level 5)     Review of Systems   Cardiovascular: Positive for leg swelling. All other systems reviewed and are negative. PAST MEDICAL HISTORY    has a past medical history of Arthritis, Bowel obstruction ([address] Cancer ([address] Cerebral artery occlusion with cerebral infarction ([address] Diabetes mellitus ([address] Epigastric pain, GERD (gastroesophageal reflux disease), History of anesthesia complications, History of blood transfusion, Hx of blood clots, Hypertension, Right arm pain, Right shoulder pain, Sleep apnea, Tachycardia, Thyroid disease, and TIA (transient ischemic attack). SURGICAL HISTORY      has a past surgical history that includes Knee arthroscopy (Right); Hand surgery (Right); Cholecystectomy; other surgical history (Right, 11/11/2014); shoulder surgery (11/2014); Shoulder arthroscopy (Right, 02/03/15);  Appendectomy; Ureter stent placement (Right, 4/30/2015); Hysterectomy (2/2014); Colonoscopy (07/08/2019); Upper gastrointestinal endoscopy (07/08/2019); Colonoscopy (N/A, 7/8/2019); Upper gastrointestinal endoscopy (N/A, 7/8/2019); Nerve Block (Bilateral, 08/29/2019); Injection Procedure For Sacroiliac Joint (Bilateral, 8/29/2019); Nerve Block (Bilateral, 10/10/2019); Injection Procedure For Sacroiliac Joint (Bilateral, 10/10/2019); Anesthesia Nerve Block (Left, 11/7/2019); Nerve Block (Bilateral, 05/15/2020); Anesthesia Nerve Block (Bilateral, 5/15/2020); Nerve Block (06/05/2020); Spinal Cord Stimulator Surgery (N/A, 6/5/2020); vascular surgery; Arm Surgery (Left); joint replacement (Right); Tonsillectomy; and Spinal Cord Stimulator Surgery (N/A, 7/27/2020). CURRENTMEDICATIONS       Previous Medications    ALENDRONATE (FOSAMAX) 70 MG TABLET    Take 1 tablet by mouth every 7 days    AMLODIPINE (NORVASC) 5 MG TABLET    TAKE (1) TABLET BY MOUTH ONCE DAILY    ATORVASTATIN (LIPITOR) 40 MG TABLET    TAKE (1) TABLET BY MOUTH EVERY DAY    BLOOD GLUCOSE MONITOR STRIPS    Test tid times a day & as needed for symptoms of irregular blood glucose.  Dx E11.9 Please fill with compatible strips to glucometer    CALCIUM CARBONATE (TUMS) 500 MG CHEWABLE TABLET    Take 1 tablet by mouth daily as needed for Heartburn    CHOLECALCIFEROL (VITAMIN D3) 5000 UNITS TABS    Take by mouth    DULOXETINE (CYMBALTA) 60 MG EXTENDED RELEASE CAPSULE    TAKE 1 CAPSULE BY MOUTH DAILY    EPINEPHRINE (EPIPEN 2-LISA) 0.3 MG/0.3ML SOAJ INJECTION    EpiPen 2-Lisa 0.3 mg/0.3 mL injection, auto-injector    FERROUS SULFATE 325 (65 FE) MG TABLET    Take 325 mg by mouth daily (with breakfast)    FEXOFENADINE (ALLEGRA) 60 MG TABLET    Take 60 mg by mouth daily    GABAPENTIN (NEURONTIN) 600 MG TABLET    TAKE (1) TABLET BY MOUTH FOUR TIMES A DAY    GLUCOSE MONITORING KIT (FREESTYLE) MONITORING KIT    1 kit by Does not apply route daily Please provide accuchek meter for patient, not freestyle    HYDROCHLOROTHIAZIDE (HYDRODIURIL) 25 MG TABLET    TAKE 1 TABLET BY MOUTH EVERY MORNING    INSULIN GLARGINE (LANTUS) 100 UNIT/ML INJECTION VIAL    Inject 65 units nightly    INSULIN LISPRO (HUMALOG) 100 UNIT/ML INJECTION VIAL    INJECT SUBCUTANEOUSLY THREE TIMES A DAY AS DIRECTED BY SLIDING SCALE *MAX DAILY DOSE 40 UNITS*    INSULIN SYRINGES, DISPOSABLE, U-100 1 ML MISC    1 each by Does not apply route 4 times daily Dx: E11.9    LEVOTHYROXINE (SYNTHROID) 88 MCG TABLET    TAKE (1) TABLET BY MOUTH DAILY    LORAZEPAM (ATIVAN) 0.5 MG TABLET    TAKE 1 TABLET BY MOUTH THREE TIMES DAILY AS NEEDED FOR ANXIETY    LOSARTAN (COZAAR) 100 MG TABLET    TAKE 1 TABLET BY MOUTH EVERY DAY    MAGNESIUM OXIDE (MAG-OX) 400 MG TABLET    Take 400 mg by mouth daily    MELATONIN 10 MG TABS    Take 10 mg by mouth daily as needed     OMEPRAZOLE (PRILOSEC) 20 MG DELAYED RELEASE CAPSULE    TAKE 1 CAPSULE BY MOUTH DAILY    POTASSIUM CHLORIDE (KLOR-CON M) 20 MEQ EXTENDED RELEASE TABLET    TAKE 1 TABLET BY MOUTH 2 TIMES DAILY    RIVAROXABAN (XARELTO) 20 MG TABS TABLET    Take 1 tablet by mouth daily    SUPER B COMPLEX/C CAPS    Take by mouth    TAMSULOSIN (FLOMAX) 0.4 MG CAPSULE    Take 1 capsule by mouth daily Take 0.4 mg by mouth daily    VITAMIN C (ASCORBIC ACID) 500 MG TABLET    Take 500 mg by mouth daily       ALLERGIES     is allergic to aspirin; benadryl [diphenhydramine]; ibuprofen; lidocaine; penicillins; and morphine. FAMILY HISTORY     has no family status information on file. family history is not on file. SOCIAL HISTORY      reports that she has never smoked. She has never used smokeless tobacco. She reports current alcohol use. She reports that she does not use drugs. PHYSICAL EXAM    (up to 7 for level 4, 8 or more for level 5)   INITIAL VITALS:  height is 5' 4\" (1.626 m) and weight is 101.2 kg (223 lb). Her oral temperature is 99 °F (37.2 °C). Her blood pressure is 143/69 (abnormal) and her pulse is 76. Her respiration is 22 and oxygen saturation is 98%. Physical Exam  Vitals signs and nursing note reviewed. Constitutional:       Appearance: Normal appearance. HENT:      Head: Normocephalic and atraumatic. Eyes:      Conjunctiva/sclera: Conjunctivae normal.   Neck:      Musculoskeletal: Normal range of motion and neck supple. Cardiovascular:      Rate and Rhythm: Normal rate and regular rhythm. Pulses: Normal pulses. Heart sounds: Normal heart sounds. Pulmonary:      Effort: Pulmonary effort is normal. No respiratory distress. Breath sounds: Normal breath sounds. No stridor. No wheezing, rhonchi or rales. Abdominal:      General: There is no distension. Palpations: Abdomen is soft. Tenderness: There is no abdominal tenderness. There is no guarding. Musculoskeletal: Normal range of motion. Comments: 2+ edema of the bilateral lower extremities no calf tenderness appreciated   Skin:     Comments: Patient is noted to have a well-healing laceration to the upper back on the left buttocks there is some slight erythema associated with just the incision site itself this appears to be more reactionary than it does to be a cellulitis   Neurological:      General: No focal deficit present. Mental Status: She is alert. DIFFERENTIAL DIAGNOSIS/ MDM:     Patient relates that she has had stopped her Eliquis for the surgery and just recently restarted this I will go ahead and check labs a chest x-ray and EKG    DIAGNOSTIC RESULTS     EKG: All EKG's are interpreted by the Emergency Department Physician who either signs or Co-signs this chart in the [address] a cardiologist.      Interpreted by Juanito Burleson MD     Rhythm: normal sinus   Rate:81  Axis: 1  Ectopy: none  Conduction: normal  ST Segments: no acute change  T Waves: no acute change  Q Waves: none    Clinical Impression: normal sinus rhythm with no acute changes/normal EKG.   No acute infarction/ischemia noted.      RADIOLOGY:  Non-plain film images such as CT, Ultrasound and MRI are read by the radiologist. Xiomy Velez radiographic images are visualized and the radiologist interpretations are reviewed as follows:     US DUP LOWER EXTREMITY RIGHT PAO (Final result)   Result time 08/04/20 13:47:23   Final result by Madhu Mckenna MD (08/04/20 13:47:23)                 Impression:     No evidence of DVT in either lower extremity. Narrative:     EXAMINATION:   DUPLEX VENOUS ULTRASOUND OF THE BILATERAL LOWER EXTREMITIES, 8/4/2020 1:19 pm     TECHNIQUE:   Duplex ultrasound and Doppler images were obtained of the bilateral lower   extremities     COMPARISON:   None. HISTORY:   ORDERING SYSTEM PROVIDED HISTORY: swelling, elevated d-dimer     FINDINGS:   The visualized veins of the bilateral lower extremities are patent and free   of echogenic thrombus. The veins are normally compressible and have normal   phasic flow.                       US DUP LOWER EXTREMITY LEFT PAO (Final result)   Result time 08/04/20 13:47:23   Final result by Madhu Mckenna MD (08/04/20 13:47:23)                 Impression:     No evidence of DVT in either lower extremity. Narrative:     EXAMINATION:   DUPLEX VENOUS ULTRASOUND OF THE BILATERAL LOWER EXTREMITIES, 8/4/2020 1:19 pm     TECHNIQUE:   Duplex ultrasound and Doppler images were obtained of the bilateral lower   extremities     COMPARISON:   None. HISTORY:   ORDERING SYSTEM PROVIDED HISTORY: swelling, elevated d-dimer     FINDINGS:   The visualized veins of the bilateral lower extremities are patent and free   of echogenic thrombus. The veins are normally compressible and have normal   phasic flow.                    Interpretation per the Radiologist below, if available at the time of this note:    XR CHEST PORTABLE (Final result)   Result time 08/04/20 12:54:54   Final result by Tracy Taylor MD (08/04/20 12:54:54)                 Impression:     Mild pulmonary vascular congestion. Narrative:     EXAMINATION:   ONE XRAY VIEW OF THE CHEST     8/4/2020 12:34 pm     COMPARISON:   11/21/2019     HISTORY:   ORDERING SYSTEM PROVIDED HISTORY: leg swelling   TECHNOLOGIST PROVIDED HISTORY:   leg swelling   Reason for Exam: bilateral hands and leg swelling   Acuity: Acute   Type of Exam: Initial     FINDINGS:   Poor inspiration.  Normal heart size.  Mild vascular congestion.  No focal   consolidation, pleural effusion, or pneumothorax.                    LABS:  Results for orders placed or performed during the hospital encounter of 08/04/20   CBC Auto Differential   Result Value Ref Range    WBC 7.3 3.5 - 11.0 k/uL    RBC 4.06 4.0 - 5.2 m/uL    Hemoglobin 10.6 (L) 12.0 - 16.0 g/dL    Hematocrit 33.2 (L) 36 - 46 %    MCV 81.7 80 - 100 fL    MCH 26.2 26 - 34 pg    MCHC 32.1 31 - 37 g/dL    RDW 16.3 (H) 12.5 - 15.4 %    Platelets 089 451 - 501 k/uL    MPV 9.4 6.0 - 12.0 fL    NRBC Automated NOT REPORTED per 100 WBC    Differential Type NOT REPORTED     Seg Neutrophils 66 36 - 66 %    Lymphocytes 25 24 - 44 %    Monocytes 5 2 - 11 %    Eosinophils % 3 1 - 4 %    Basophils 1 0 - 2 %    Immature Granulocytes NOT REPORTED 0 %    Segs Absolute 4.80 1.8 - 7.7 k/uL    Absolute Lymph # 1.80 1.0 - 4.8 k/uL    Absolute Mono # 0.40 0.1 - 1.2 k/uL    Absolute Eos # 0.20 0.0 - 0.4 k/uL    Basophils Absolute 0.10 0.0 - 0.2 k/uL    Absolute Immature Granulocyte NOT REPORTED 0.00 - 0.30 k/uL    WBC Morphology NOT REPORTED     RBC Morphology NOT REPORTED     Platelet Estimate NOT REPORTED    Basic Metabolic Panel   Result Value Ref Range    Glucose 386 (H) 70 - 99 mg/dL    BUN 18 8 - 23 mg/dL    CREATININE 0.83 0.50 - 0.90 mg/dL    Bun/Cre Ratio NOT REPORTED 9 - 20    Calcium 8.7 8.6 - 10.4 mg/dL    Sodium 140 135 - 144 mmol/L    Potassium 3.9 3.7 - 5.3 mmol/L    Chloride 103 98 - 107 mmol/L    CO2 25 20 - 31 mmol/L    Anion Gap 12 9 - 17 mmol/L    GFR Non-African American >60 >60 mL/min GFR African American >60 >60 mL/min    GFR Comment          GFR Staging NOT REPORTED    D-Dimer, Quantitative   Result Value Ref Range    D-Dimer, Quant 1.10 mg/L FEU   Brain Natriuretic Peptide   Result Value Ref Range    Pro- <300 pg/mL    BNP Interpretation Pro-BNP Reference Range:    POCT troponin   Result Value Ref Range    POC Troponin I 0.00 0.00 - 0.10 ng/mL    POC Troponin Interp         EKG 12 Lead   Result Value Ref Range    Ventricular Rate 81 BPM    Atrial Rate 81 BPM    P-R Interval 142 ms    QRS Duration 80 ms    Q-T Interval 394 ms    QTc Calculation (Bazett) 457 ms    P Axis -6 degrees    R Axis 1 degrees    T Axis 40 degrees           EMERGENCY DEPARTMENT COURSE:   Vitals:    Vitals:    08/04/20 1134 08/04/20 1200 08/04/20 1300   BP: (!) 162/87 (!) 157/76 (!) 143/69   Pulse: 87  76   Resp: 16  22   Temp: 99 °F (37.2 °C)     TempSrc: Oral     SpO2: 99% 98%    Weight: 101.2 kg (223 lb)     Height: 5' 4\" (1.626 m)       -------------------------  BP: (!) 143/69, Temp: 99 °F (37.2 °C), Pulse: 76, Resp: 22      RE-EVALUATION:  The patient presents with bilateral lower extremity swelling after having surgery ultrasound showed no DVT chest x-ray did show some mild pulmonary vascular congestion cardiac enzymes and BNP were within normal limits. I will go ahead and write for a low-dose Lasix for a few days to help with the patient's peripheral edema I am recommending that she return to the ER for any further issues or concerns any development of chest pain shortness of breath further wound check and recommending that she return to the ER for increased redness fever drainage otherwise she is to follow-up with her family doctor or with her surgeon within the next few days  At this time the patient is without objective evidence of an acute process requiring hospitalization or inpatient management.  They have remained hemodynamically stable throughout their entire ED visit and are stable for discharge with outpatient follow-up. The patient understands that at this time there is no evidence for a more malignant underlying process, but the patient also understands that early in the process of an illness or injury, an emergency department workup can be falsely reassuring. Routine discharge counseling was given, and the patient understands that worsening, changing or persistent symptoms should prompt an immediate call or follow up with their primary physician or return to the emergency department. The importance of appropriate follow up was also discussed. I have reviewed the disposition diagnosis with the patient and or their family/guardian. I have answered their questions and given discharge instructions. They voiced understanding of these instructions and did not have any further questions or complaints. PROCEDURES:  None    FINAL IMPRESSION      1. Peripheral edema          DISPOSITION/PLAN   DISPOSITION        CONDITION ON DISPOSITION:   Stable    PATIENT REFERRED TO:  DO Jad Vegaben   [phone]    Call in 1 day        DISCHARGE MEDICATIONS:  New Prescriptions    FUROSEMIDE (LASIX) 20 MG TABLET    Take 1 tablet by mouth daily       (Please note that portions of this note were completed with a voicerecognition program.  Efforts were made to edit the dictations but occasionally words are mis-transcribed.)    Oshea MD, F.A.C.E.P.   Attending Emergency Medicine Physician       Nena Bowers MD  08/04/20 [id number]

## 2020-08-05 ENCOUNTER — TELEPHONE (OUTPATIENT)
Dept: PRIMARY CARE CLINIC | Age: 70
End: 2020-08-05

## 2020-08-05 NOTE — TELEPHONE ENCOUNTER
Patient had went to [facility] ED yesterday for hand and feel swelling, testing turned our okay and was told when she had her surgery on 7/27/2020 she may have received too much fluids. Was given Lasix and swelling is better. She was told to follow up with PCP but she already has appt on 9/4/2020 and is asking if okay to keep that appt? Please advise. Patient only needs a return call if she needs to be seen sooner.

## 2020-08-06 LAB
EKG ATRIAL RATE: 81 BPM
EKG P AXIS: -6 DEGREES
EKG P-R INTERVAL: 142 MS
EKG Q-T INTERVAL: 394 MS
EKG QRS DURATION: 80 MS
EKG QTC CALCULATION (BAZETT): 457 MS
EKG R AXIS: 1 DEGREES
EKG T AXIS: 40 DEGREES
EKG VENTRICULAR RATE: 81 BPM

## 2020-08-07 ENCOUNTER — TELEPHONE (OUTPATIENT)
Dept: PRIMARY CARE CLINIC | Age: 70
End: 2020-08-07

## 2020-08-10 ENCOUNTER — OFFICE VISIT (OUTPATIENT)
Dept: PRIMARY CARE CLINIC | Age: 70
End: 2020-08-10
Payer: MEDICARE

## 2020-08-10 VITALS — DIASTOLIC BLOOD PRESSURE: 82 MMHG | HEART RATE: 86 BPM | SYSTOLIC BLOOD PRESSURE: 128 MMHG | OXYGEN SATURATION: 98 %

## 2020-08-10 PROCEDURE — 99214 OFFICE O/P EST MOD 30 MIN: CPT | Performed by: NURSE PRACTITIONER

## 2020-08-10 RX ORDER — FUROSEMIDE 20 MG/1
20 TABLET ORAL DAILY
Qty: 30 TABLET | Refills: 0 | Status: SHIPPED | OUTPATIENT
Start: 2020-08-10 | End: 2021-11-08

## 2020-08-10 RX ORDER — INSULIN GLARGINE 100 [IU]/ML
INJECTION, SOLUTION SUBCUTANEOUS
Qty: 30 ML | Refills: 3 | Status: SHIPPED | OUTPATIENT
Start: 2020-08-10 | End: 2020-12-24

## 2020-08-10 ASSESSMENT — PATIENT HEALTH QUESTIONNAIRE - PHQ9
1. LITTLE INTEREST OR PLEASURE IN DOING THINGS: 3
SUM OF ALL RESPONSES TO PHQ QUESTIONS 1-9: 6
2. FEELING DOWN, DEPRESSED OR HOPELESS: 3
SUM OF ALL RESPONSES TO PHQ QUESTIONS 1-9: 6
SUM OF ALL RESPONSES TO PHQ9 QUESTIONS 1 & 2: 6

## 2020-08-10 ASSESSMENT — ENCOUNTER SYMPTOMS
BACK PAIN: 1
TROUBLE SWALLOWING: 0
EYE REDNESS: 0
VOMITING: 0
CHEST TIGHTNESS: 0
ABDOMINAL PAIN: 0
SHORTNESS OF BREATH: 0
COLOR CHANGE: 1
DIARRHEA: 0
NAUSEA: 0
EYE ITCHING: 0
WHEEZING: 0
SORE THROAT: 0
COUGH: 0
EYE DISCHARGE: 0
SINUS PAIN: 0
SINUS PRESSURE: 0

## 2020-08-10 NOTE — PROGRESS NOTES
[address] PRIMARY CARE  [address] [facility] [address]  [address] [address]  Dept: [phone]  Dept Fax: [phone]    Jay Gu is a 77 y.o. female who presents today for her medical conditions/complaintsas noted below. Jay Gu is c/o of [place] [facility] [place] edema ) and Other (Check stitches on back)        HPI:     Pt presents for an office visit. Pt of dr. Elaina Quick  bp stable      Pt presents with c/o lower leg swelling. She recently had a spinal stimulator placed for her chronic back pain 2 weeks ago. She has noticed bilateral leg swelling since the surgery. After about a week, she went to the ER for evaluation on 20. ER record reviewed. bnp 194. BLE us for DVT negative. ekg unremarkable. Chest xray unremarkable. Dimer lower. She was placed on lasix 20mg daily for 5 days. She felt like it did seem to help with the swelling but then once she stopped it came right back. She continues to deny any cp, sob or palpitations. She has a F/u on Thursday with pain management to get stimulator set up and evaluated. She is also concerned for redness around the insertion site on her L buttocks. No other questions or concerns.        Past Medical History:   Diagnosis Date    Arthritis     Bowel obstruction ([address]     Cancer [facility]     ovarian stage 2    Cerebral artery occlusion with cerebral infarction ([facility] 2018    Mini strokes \"Tia's\"    Diabetes mellitus ([facility]     Epigastric pain     GERD (gastroesophageal reflux disease)     History of anesthesia complications 6150'A    was told after gallbladder surgery to never have anesthesia again \"since it was hard for me to come out of it\"    History of blood transfusion     Hx of blood clots     lung     Hypertension     Right arm pain     Right shoulder pain     Sleep apnea     uses CPAP nightly    Tachycardia     hx of    Thyroid disease     TIA (transient ischemic attack) Past Surgical History:   Procedure Laterality Date    ANESTHESIA NERVE BLOCK Left 11/7/2019    NERVE BLOCK (DEFINE) - INTERCOSTAL NERVE BLOCK performed by Dick Lofton MD at [facility] Bilateral 5/15/2020    NERVE BLOCK BILATERAL - MBB  L4-5, L5-S1 performed by Dick Lofton MD at [address] Left     ORIF    CHOLECYSTECTOMY      COLONOSCOPY  07/08/2019    5 yr recall.     COLONOSCOPY N/A 7/8/2019    COLONOSCOPY WITH BIOPSY performed by Julien Ricci MD at [address] Right     repair tendon    [facility]. INJECTION PROCEDURE FOR SACROILIAC JOINT Bilateral 8/29/2019    SACROILIAC JOINT INJECTION performed by Dick Lofton MD at [address] [facility] Bilateral 10/10/2019    SACROILIAC JOINT INJE[address] Dick Lofton MD [address]  2/2014    chris with bso    JOINT REPLACEMENT Right     shoulder    KNEE ARTHROSCOPY Right     NERVE BLOCK Bilateral 08/29/2019    SI joint injection    NERVE BLOCK Bilateral 10/10/2019    SI joint    NERVE BLOCK Bilateral 05/15/2020    Medial branch block L4-5, L5-S1    NERVE BLOCK  06/05/2020    SPINAL CORD STIMULATOR IMPLANT TRIAL (N/A )    OTHER SURGICAL HISTORY Right 11/11/2014    shoulder manipulation    SHOULDER ARTHROSCOPY Right 02/03/15    SHOULDER SURGERY  11/2014    manipulation    SPINAL CORD STIMULATOR SURGERY N/A 6/5/2020    SPINAL CORD STIMULATOR IMPLANT TRIAL performed by Dick Lofton MD at [facility] N/A 7/27/2020    SPINAL CORD STIMULATOR IMPLANT WITH THORACIC LAMINOTOMY performed by Norman Cronin MD at [address] ENDOSCOPY  07/08/2019    UPPER GASTROINTESTINAL ENDOSCOPY N/A 7/8/2019    EGD BIOPSY performed by Julien Ricci MD at [place] [address] Right 4/30/2015 EPINEPHrine (EPIPEN 2-LISA) 0.3 MG/0.3ML SOAJ injection EpiPen 2-Lisa 0.3 mg/0.3 mL injection, auto-injector      Insulin Syringes, Disposable, U-100 1 ML MISC 1 each by Does not apply route 4 times daily Dx: E11.9 300 each 3    blood glucose monitor strips Test tid times a day & as needed for symptoms of irregular blood glucose. Dx E11.9 Please fill with compatible strips to glucometer 300 strip 3    calcium carbonate (TUMS) 500 MG chewable tablet Take 1 tablet by mouth daily as needed for Heartburn      rivaroxaban (XARELTO) 20 MG TABS tablet Take 1 tablet by mouth daily 90 tablet 3    SUPER B COMPLEX/C CAPS Take by mouth      Cholecalciferol (VITAMIN D3) 5000 units TABS Take by mouth      ferrous sulfate 325 (65 Fe) MG tablet Take 325 mg by mouth daily (with breakfast)      fexofenadine (ALLEGRA) 60 MG tablet Take 60 mg by mouth daily      magnesium oxide (MAG-OX) 400 MG tablet Take 400 mg by mouth daily      Melatonin 10 MG TABS Take 10 mg by mouth daily as needed       alendronate (FOSAMAX) 70 MG tablet Take 1 tablet by mouth every 7 days 12 tablet 1    gabapentin (NEURONTIN) 600 MG tablet TAKE (1) TABLET BY MOUTH FOUR TIMES A  tablet 10     No current facility-administered medications for this visit.       Allergies   Allergen Reactions    Aspirin Anaphylaxis     Only thing she can take is tylenol    Benadryl [Diphenhydramine] Other (See Comments)     Dystonic movement    Ibuprofen Anaphylaxis    Lidocaine Anaphylaxis     CAN NOT TOLERATE IV    Penicillins Anaphylaxis    Morphine Nausea And Vomiting       Health Maintenance   Topic Date Due    Hepatitis C screen  1950    Diabetic retinal exam  09/14/1961    Diabetic microalbuminuria test  09/14/1969    DTaP/Tdap/Td vaccine (1 - Tdap) 09/14/1970    Shingles Vaccine (1 of 2) 09/14/2001    DEXA (modify frequency per FRAX score)  09/14/2006    Lipid screen  04/06/2020    Annual Wellness Visit (AWV)  09/09/2020    Pneumococcal 65+ years Vaccine (1 of 1 - PPSV23) 04/17/2023 (Originally 9/14/2016)    Flu vaccine (1) 09/01/2020    Diabetic foot exam  10/15/2020    A1C test (Diabetic or Prediabetic)  06/03/2021    Potassium monitoring  08/04/2021    Creatinine monitoring  08/04/2021    Breast cancer screen  10/21/2021    Colon cancer screen colonoscopy  07/08/2029    Hepatitis A vaccine  Aged Out    Hib vaccine  Aged Out    Meningococcal (ACWY) vaccine  Aged Out       :     Review of Systems   Constitutional: Negative for chills, fatigue and fever. HENT: Negative for ear discharge, ear pain, sinus pressure, sinus pain, sore throat and trouble swallowing. Eyes: Negative for discharge, redness and itching. Respiratory: Negative for cough, chest tightness, shortness of breath and wheezing. Cardiovascular: Positive for leg swelling. Negative for chest pain and palpitations. Gastrointestinal: Negative for abdominal pain, diarrhea, nausea and vomiting. Genitourinary: Negative for difficulty urinating. Musculoskeletal: Positive for back pain (chronic). Negative for arthralgias and neck pain. Skin: Positive for color change (redness along insertion site to L buttock ). Negative for rash. Neurological: Negative for dizziness, weakness, light-headedness and headaches. All other systems reviewed and are negative. Objective:     Physical Exam  Constitutional:       Appearance: Normal appearance. She is normal weight. HENT:      Head: Normocephalic and atraumatic. Nose: Nose normal.   Eyes:      Extraocular Movements: Extraocular movements intact. Conjunctiva/sclera: Conjunctivae normal.      Pupils: Pupils are equal, round, and reactive to light. Neck:      Musculoskeletal: Neck supple. Cardiovascular:      Rate and Rhythm: Normal rate and regular rhythm. Pulses: Normal pulses. Heart sounds: Normal heart sounds.    Pulmonary:      Effort: Pulmonary effort is normal.      Breath sounds: Normal diet and exercise. Patient agreedwith treatment plan. Follow up as directed.       Electronically signed by LUPE Engle CNP on 8/10/2020at 11:26 AM

## 2020-08-10 NOTE — PATIENT INSTRUCTIONS
Patient Education        Leg and Ankle Edema: Care Instructions  Your Care Instructions  Swelling in the legs, ankles, and feet is called edema. It is common after you sit or stand for a while. Long plane flights or car rides often cause swelling in the legs and feet. You may also have swelling if you have to stand for long periods of time at your job. Problems with the veins in the legs (varicose veins) and changes in hormones can also cause swelling. Sometimes the swelling in the ankles and feet is caused by a more serious problem, such as heart failure, infection, blood clots, or liver or kidney disease. Follow-up care is a key part of your treatment and safety. Be sure to make and go to all appointments, and call your doctor if you are having problems. It's also a good idea to know your test results and keep a list of the medicines you take. How can you care for yourself at home? · If your doctor gave you medicine, take it as prescribed. Call your doctor if you think you are having a problem with your medicine. · Whenever you are resting, raise your legs up. Try to keep the swollen area higher than the level of your heart. · Take breaks from standing or sitting in one position. ? Walk around to increase the blood flow in your lower legs. ? Move your feet and ankles often while you stand, or tighten and relax your leg muscles. · Wear support stockings. Put them on in the morning, before swelling gets worse. · Eat a balanced diet. Lose weight if you need to. · Limit the amount of salt (sodium) in your diet. Salt holds fluid in the body and may increase swelling. When should you call for help? [id number] anytime you think you may need emergency care. For example, call if:  · You have symptoms of a blood clot in your lung (called a pulmonary embolism). These may include:  ? Sudden chest pain. ? Trouble breathing. ? Coughing up blood.   Call your doctor now or seek immediate medical care if:  · You have signs of a blood clot, such as:  ? Pain in your calf, back of the knee, thigh, or groin. ? Redness and swelling in your leg or groin. · You have symptoms of infection, such as:  ? Increased pain, swelling, warmth, or redness. ? Red streaks or pus. ? A fever. Watch closely for changes in your health, and be sure to contact your doctor if:  · Your swelling is getting worse. · You have new or worsening pain in your legs. · You do not get better as expected. Where can you learn more? Go to https://[employer]. org and sign in to your [facility] account. Enter E932 in the Scoreoid box to learn more about \"Leg and Ankle Edema: Care Instructions. \"     If you do not have an account, please click on the \"Sign Up Now\" link. Current as of: June 26, 2019               Content Version: 12.5  © 4541-5733 [employer]wise, Incorporated. Care instructions adapted under license by [facility] ([facility]). If you have questions about a medical condition or this instruction, always ask your healthcare professional. [place] [address] any warranty or liability for your use of this information.

## 2020-08-13 ENCOUNTER — OFFICE VISIT (OUTPATIENT)
Dept: ORTHOPEDIC SURGERY | Age: 70
End: 2020-08-13
Payer: MEDICARE

## 2020-08-13 VITALS — BODY MASS INDEX: 38.28 KG/M2 | WEIGHT: 223 LBS

## 2020-08-13 PROCEDURE — 1036F TOBACCO NON-USER: CPT | Performed by: ORTHOPAEDIC SURGERY

## 2020-08-13 PROCEDURE — G8417 CALC BMI ABV UP PARAM F/U: HCPCS | Performed by: ORTHOPAEDIC SURGERY

## 2020-08-13 PROCEDURE — G8427 DOCREV CUR MEDS BY ELIG CLIN: HCPCS | Performed by: ORTHOPAEDIC SURGERY

## 2020-08-13 PROCEDURE — 3017F COLORECTAL CA SCREEN DOC REV: CPT | Performed by: ORTHOPAEDIC SURGERY

## 2020-08-13 PROCEDURE — G8400 PT W/DXA NO RESULTS DOC: HCPCS | Performed by: ORTHOPAEDIC SURGERY

## 2020-08-13 PROCEDURE — 1090F PRES/ABSN URINE INCON ASSESS: CPT | Performed by: ORTHOPAEDIC SURGERY

## 2020-08-13 PROCEDURE — 4040F PNEUMOC VAC/ADMIN/RCVD: CPT | Performed by: ORTHOPAEDIC SURGERY

## 2020-08-13 PROCEDURE — 99213 OFFICE O/P EST LOW 20 MIN: CPT | Performed by: ORTHOPAEDIC SURGERY

## 2020-08-13 PROCEDURE — 1123F ACP DISCUSS/DSCN MKR DOCD: CPT | Performed by: ORTHOPAEDIC SURGERY

## 2020-08-13 NOTE — PROGRESS NOTES
Patient ID: Isabella Shahid is a 77 y.o. female. Chief Complaint   Patient presents with    Post-Op Check     Spinal coed stim implant DOS 7/27/2020        HPI     2 weeks post spinal cord stimulator placement. Patient reports her back is doing well. Patient reports her back pain is significantly improved with a spinal cord stimulator but she is not getting as much rib relief as she would like    Complex intraoperative programming was performed in clinic today    Past Medical History:   Diagnosis Date    Arthritis     [facility]     Cancer ([facility] 2015    ovarian stage 2    Cerebral artery occlusion with cerebral infarction ([facility] 03/2018    Mini strokes \"Tia's\"    Diabetes mellitus ([facility]     Epigastric pain     GERD (gastroesophageal reflux disease)     History of anesthesia complications 5205'Z    was told after gallbladder surgery to never have anesthesia again \"since it was hard for me to come out of it\"    History of blood transfusion     Hx of blood clots     lung     Hypertension     Right arm pain     Right shoulder pain     Sleep apnea     uses CPAP nightly    Tachycardia     hx of    Thyroid disease     TIA (transient ischemic attack)      Past Surgical History:   Procedure Laterality Date    ANESTHESIA NERVE BLOCK Left 11/7/2019    NERVE BLOCK (DEFINE) - INTERCOSTAL NERVE BLOCK [facility] Vaughn Aldridge [facility] Bilateral 5/15/2020    NERVE BLOCK BILATERAL - MBB  L4-5, L5-S1 performed by Vaughn Aldridge MD at [address] [place] [address] Left     ORIF    CHOLECYSTECTOMY      COLONOSCOPY  07/08/2019    5 yr recall.     COLONOSCOPY N/A 7/8/2019    COLONOSCOPY WITH BIOPSY performed by Mayte Flowers MD at [facility] [address] [place], [facility]. INJECTION PROCEDURE FOR SACROILIAC JOINT Bilateral 8/29/2019    SACROILIAC JOINT INJECTION performed by Vaughn Aldridge MD at [facility]    HC INJECTION PROCEDURE FOR SACROILIAC JOINT Bilateral 10/10/2019    SACROILIAC JOINT INJECTION performed by Jovanni Lau MD at [address]  2/2014    chris with bso    JOINT REPLACEMENT Right     shoulder    KNEE ARTHROSCOPY Right     NERVE BLOCK Bilateral 08/29/2019    SI joint injection    NERVE BLOCK Bilateral 10/10/2019    SI joint    NERVE BLOCK Bilateral 05/15/2020    Medial branch block L4-5, L5-S1    NERVE BLOCK  06/05/2020    SPINAL CORD STIMULATOR IMPLANT TRIAL (N/A )    OTHER SURGICAL HISTORY Right 11/11/2014    shoulder manipulation    SHOULDER ARTHROSCOPY Right 02/03/15    SHOULDER SURGERY  11/2014    manipulation    SPINAL CORD STIMULATOR SURGERY N/A 6/5/2020    SPINAL CORD STIMULATOR IMPLANT TRIAL performed by [address], MD at [address] [facility] N/A 7/27/2020    SPINAL CORD STIMULATOR IMPLANT WITH THORACIC LAMINOTOMY performed by Luther Ries MD at [facility]  ENDOSCOPY  07/08/2019    UPPER GASTROINTESTINAL ENDOSCOPY N/A 7/8/2019    EGD BIOPSY performed by Bishop Too MD at [facility] Right 4/30/2015    pt has blood clot & abscess in her right kidney    VASCULAR SURGERY      Olema filter in & removed 1 year later     No family history on file. Social History     Occupational History    Not on file   Tobacco Use    Smoking status: Never Smoker    Smokeless tobacco: Never Used   Substance and Sexual Activity    Alcohol use: Yes     Comment: rare    Drug use: No    Sexual activity: Yes        Review of Systems         Physical Exam  Vitals signs and nursing note reviewed. Constitutional:       Appearance: She is well-developed. HENT:      Head: Normocephalic and atraumatic.       Nose: Nose normal.   Eyes:      Conjunctiva/sclera: Conjunctivae normal.   Neck:      Musculoskeletal: Normal range of motion and neck supple. Pulmonary:      Effort: Pulmonary effort is normal. No respiratory distress. Musculoskeletal:      Comments: Normal gait     Skin:     General: Skin is warm and dry. Neurological:      Mental Status: She is alert and oriented to person, place, and time. Sensory: No sensory deficit. Psychiatric:         Behavior: Behavior normal.         Thought Content: Thought content normal.       Incisions healing well sutures discontinued  Assessment:     Patient with very good relief of back pain less than ideal relief of rib pain at this point    1. Lumbosacral spondylosis without myelopathy    2. Chronic bilateral thoracic back pain    3. BMI 38.0-38.9,adult        Plan:     Follow-up 6 weeks    No orders of the defined types were placed in this encounter. Martir Carrasco MD    Please note that this chart was generated using voicerecognition Dragon dictation software. Although every effort was made to ensurethe accuracy of this automated transcription, some errors in transcription may haveoccurred.

## 2020-08-25 RX ORDER — RIVAROXABAN 20 MG/1
TABLET, FILM COATED ORAL
Qty: 90 TABLET | Refills: 10 | Status: SHIPPED | OUTPATIENT
Start: 2020-08-25 | End: 2021-08-26

## 2020-08-25 RX ORDER — ALENDRONATE SODIUM 70 MG/1
TABLET ORAL
Qty: 12 TABLET | Refills: 10 | Status: SHIPPED | OUTPATIENT
Start: 2020-08-25 | End: 2021-08-09

## 2020-08-25 NOTE — TELEPHONE ENCOUNTER
Last OV 08/10/2020    Next OV 09/10/2020    Health Maintenance   Topic Date Due    Hepatitis C screen  1950    Diabetic retinal exam  09/14/1961    Diabetic microalbuminuria test  09/14/1969    DTaP/Tdap/Td vaccine (1 - Tdap) 09/14/1970    Shingles Vaccine (1 of 2) 09/14/2001    DEXA (modify frequency per FRAX score)  09/14/2006    Lipid screen  04/06/2020    Annual Wellness Visit (AWV)  09/09/2020    Pneumococcal 65+ years Vaccine (1 of 1 - PPSV23) 04/17/2023 (Originally 9/14/2016)    Flu vaccine (1) 09/01/2020    Diabetic foot exam  10/15/2020    A1C test (Diabetic or Prediabetic)  06/03/2021    Potassium monitoring  08/04/2021    Creatinine monitoring  08/04/2021    Breast cancer screen  10/21/2021    Colon cancer screen colonoscopy  07/08/2029    Hepatitis A vaccine  Aged Out    Hib vaccine  Aged Out    Meningococcal (ACWY) vaccine  Aged Out             (applicable per patient's age: Cancer Screenings, Depression Screening, Fall Risk Screening, Immunizations)    Hemoglobin A1C (%)   Date Value   06/03/2020 6.7   01/13/2020 7.8   09/16/2019 8.6     LDL Cholesterol (mg/dL)   Date Value   11/02/2015 86     LDL Calculated (mg/dL)   Date Value   04/06/2019 47     AST (U/L)   Date Value   04/06/2019 23     ALT (U/L)   Date Value   04/06/2019 33 (H)     BUN (mg/dL)   Date Value   08/04/2020 18      (goal A1C is < 7)   (goal LDL is <100) need 30-50% reduction from baseline     BP Readings from Last 3 Encounters:   08/10/20 128/82   08/04/20 (!) 148/85   07/27/20 (!) 127/48    (goal /80)      All Future Testing planned in [facility]:  Lab Frequency Next Occurrence   Comprehensive Metabolic Panel Once 36/77/7457       Next Visit Date:  Future Appointments   Date Time Provider Rodolfo Hyman   9/10/2020  9:20 AM DO Raul Sanders [facility] [address]   9/24/2020  9:40 AM MD RAUL Santoyo ORT [facility] [address]            Patient Active Problem List:     Ovarian mass     Abdominal pain Partial bowel obstruction (HCC)     Epigastric pain     GERD (gastroesophageal reflux disease)     Lumbosacral spondylosis without myelopathy     Chronic bilateral thoracic back pain     Chronic anticoagulation     BMI 38.0-38.9,adult

## 2020-09-09 ENCOUNTER — TELEPHONE (OUTPATIENT)
Dept: PAIN MANAGEMENT | Age: 70
End: 2020-09-09

## 2020-09-10 ENCOUNTER — OFFICE VISIT (OUTPATIENT)
Dept: PRIMARY CARE CLINIC | Age: 70
End: 2020-09-10
Payer: MEDICARE

## 2020-09-10 ENCOUNTER — OFFICE VISIT (OUTPATIENT)
Dept: PAIN MANAGEMENT | Age: 70
End: 2020-09-10
Payer: MEDICARE

## 2020-09-10 VITALS
SYSTOLIC BLOOD PRESSURE: 128 MMHG | HEIGHT: 64 IN | BODY MASS INDEX: 38.07 KG/M2 | HEART RATE: 88 BPM | DIASTOLIC BLOOD PRESSURE: 80 MMHG | OXYGEN SATURATION: 98 % | WEIGHT: 223 LBS

## 2020-09-10 VITALS
DIASTOLIC BLOOD PRESSURE: 46 MMHG | HEIGHT: 64 IN | TEMPERATURE: 97.3 F | HEART RATE: 78 BPM | WEIGHT: 229 LBS | BODY MASS INDEX: 39.09 KG/M2 | SYSTOLIC BLOOD PRESSURE: 108 MMHG

## 2020-09-10 PROCEDURE — 99214 OFFICE O/P EST MOD 30 MIN: CPT | Performed by: FAMILY MEDICINE

## 2020-09-10 PROCEDURE — G8400 PT W/DXA NO RESULTS DOC: HCPCS | Performed by: FAMILY MEDICINE

## 2020-09-10 PROCEDURE — 1123F ACP DISCUSS/DSCN MKR DOCD: CPT | Performed by: FAMILY MEDICINE

## 2020-09-10 PROCEDURE — 2022F DILAT RTA XM EVC RTNOPTHY: CPT | Performed by: FAMILY MEDICINE

## 2020-09-10 PROCEDURE — G8417 CALC BMI ABV UP PARAM F/U: HCPCS | Performed by: FAMILY MEDICINE

## 2020-09-10 PROCEDURE — 1090F PRES/ABSN URINE INCON ASSESS: CPT | Performed by: FAMILY MEDICINE

## 2020-09-10 PROCEDURE — G0008 ADMIN INFLUENZA VIRUS VAC: HCPCS | Performed by: FAMILY MEDICINE

## 2020-09-10 PROCEDURE — 1036F TOBACCO NON-USER: CPT | Performed by: FAMILY MEDICINE

## 2020-09-10 PROCEDURE — 3044F HG A1C LEVEL LT 7.0%: CPT | Performed by: FAMILY MEDICINE

## 2020-09-10 PROCEDURE — 90694 VACC AIIV4 NO PRSRV 0.5ML IM: CPT | Performed by: FAMILY MEDICINE

## 2020-09-10 PROCEDURE — 99213 OFFICE O/P EST LOW 20 MIN: CPT | Performed by: PAIN MEDICINE

## 2020-09-10 PROCEDURE — G8427 DOCREV CUR MEDS BY ELIG CLIN: HCPCS | Performed by: FAMILY MEDICINE

## 2020-09-10 PROCEDURE — 3017F COLORECTAL CA SCREEN DOC REV: CPT | Performed by: FAMILY MEDICINE

## 2020-09-10 PROCEDURE — 4040F PNEUMOC VAC/ADMIN/RCVD: CPT | Performed by: FAMILY MEDICINE

## 2020-09-10 RX ORDER — TRAZODONE HYDROCHLORIDE 50 MG/1
50 TABLET ORAL NIGHTLY
Qty: 30 TABLET | Refills: 5 | Status: SHIPPED | OUTPATIENT
Start: 2020-09-10 | End: 2021-02-09

## 2020-09-10 RX ORDER — TRAZODONE HYDROCHLORIDE 50 MG/1
50 TABLET ORAL NIGHTLY
Qty: 30 TABLET | Refills: 5 | Status: SHIPPED | OUTPATIENT
Start: 2020-09-10 | End: 2020-09-10 | Stop reason: SDUPTHER

## 2020-09-10 ASSESSMENT — ENCOUNTER SYMPTOMS
SHORTNESS OF BREATH: 0
VOMITING: 0
COUGH: 0
NAUSEA: 0
BOWEL INCONTINENCE: 0
BACK PAIN: 1

## 2020-09-10 NOTE — PROGRESS NOTES
HPI:     Back Pain   This is a chronic problem. The current episode started more than 1 year ago. The problem occurs constantly. The problem is unchanged. The pain is present in the lumbar spine. The quality of the pain is described as aching. The pain is moderate. The pain is the same all the time. The symptoms are aggravated by position. Pertinent negatives include no bowel incontinence, chest pain or fever. Chronic low back and left-sided rib/neuropathic pain is been ongoing for some time. Multiple interventions not benefit. She has done physical therapy without benefit. Was not interested in surgery. Had spinal cord stimulator trial which went very well. She has not had very good success with the implant thus far. She is here today to discuss options and consider reprogramming. Patient denies any new neurological symptoms. Nobowel or bladder incontinence, no weakness, and no falling. Review of OARRS does not show any aberrant prescription behavior.      Past Medical History:   Diagnosis Date    Arthritis     [address]el obstruction ([address]     Cancer ([facility] 2015    ovarian stage 2    Cerebral artery occlusion with cerebral infarction ([address] 03/2018    Mini strokes \"Tia's\"    Diabetes mellitus ([address]     Epigastric pain     GERD (gastroesophageal reflux disease)     History of anesthesia complications 1806'L    was told after gallbladder surgery to never have anesthesia again \"since it was hard for me to come out of it\"    History of blood transfusion     Hx of blood clots     lung     Hypertension     Right arm pain     Right shoulder pain     Sleep apnea     uses CPAP nightly    Tachycardia     hx of    Thyroid disease     TIA (transient ischemic attack)        Past Surgical History:   Procedure Laterality Date    ANESTHESIA NERVE BLOCK Left 11/7/2019    NERVE BLOCK (DEFINE) - INTERCOSTAL NERVE BLOCK performed by [facility] [address], MD at [facility] [Diphenhydramine] Other (See Comments)     Dystonic movement    Ibuprofen Anaphylaxis    Lidocaine Anaphylaxis     CAN NOT TOLERATE IV    Penicillins Anaphylaxis    Hydrocodone-Acetaminophen Other (See Comments)    Oxycodone-Acetaminophen Other (See Comments)    Tramadol Other (See Comments)    Morphine Nausea And Vomiting       Medications: reviewed in EMR    No family history on file. Social History     Socioeconomic History    Marital status: Single     Spouse name: Not on file    Number of children: Not on file    Years of education: Not on file    Highest education level: Not on file   Occupational History    Not on file   Social Needs    Financial resource strain: Not on file    Food insecurity     Worry: Not on file     Inability: Not on file    Transportation needs     Medical: Not on file     Non-medical: Not on file   Tobacco Use    Smoking status: Never Smoker    Smokeless tobacco: Never Used   Substance and Sexual Activity    Alcohol use: Yes     Comment: rare    Drug use: No    Sexual activity: Yes   Lifestyle    Physical activity     Days per week: Not on file     Minutes per session: Not on file    Stress: Not on file   Relationships    Social connections     Talks on phone: Not on file     Gets together: Not on file     Attends [employer] service: Not on file     Active member of club or organization: Not on file     Attends meetings of clubs or organizations: Not on file     Relationship status: Not on file    Intimate partner violence     Fear of current or ex partner: Not on file     Emotionally abused: Not on file     Physically abused: Not on file     Forced sexual activity: Not on file   Other Topics Concern    Not on file   Social History Narrative    Not on file       Review of Systems:  Review of Systems   Constitution: Negative for fever. Cardiovascular: Negative for chest pain. Respiratory: Negative for cough. Musculoskeletal: Positive for back pain.

## 2020-09-10 NOTE — PROGRESS NOTES
[FreeTextEntry1] : Patient is a 75 year year old female with a past medical history as above who presents for non-fasting blood work and general follow-up.\par  of anesthesia complications 3366'T    was told after gallbladder surgery to never have anesthesia again \"since it was hard for me to come out of it\"    History of blood transfusion     Hx of blood clots     lung     Hypertension     Right arm pain     Right shoulder pain     Sleep apnea     uses CPAP nightly    Tachycardia     hx of    Thyroid disease     TIA (transient ischemic attack)       Past Surgical History:   Procedure Laterality Date    ANESTHESIA NERVE BLOCK Left 11/7/2019    NERVE BLOCK (DEFINE) - INTERCOSTAL NERVE BLOCK performed by Damion Moreira MD at [facility] Bilateral 5/15/2020    NERVE BLOCK BILATERAL - MBB  L4-5, L5-S1 performed by Damion Moreira MD at [address] Left     ORIF    CHOLECYSTECTOMY      COLONOSCOPY  07/08/2019    5 yr recall.     COLONOSCOPY N/A 7/8/2019    COLONOSCOPY WITH BIOPSY performed by Oizel Somers MD at [address] OF [place], [facility]. INJECTION PROCEDURE FOR SACROILIAC JOINT Bilateral 8/29/2019    SACROILIAC JOINT INJECTION performed by Damion Moreira MD at [address] Bilateral 10/10/2019    SACROILIAC JOINT INJECTION performed by Damion Moreira MD at [address] [facility]  2/2014    chris with bso    JOINT REPLACEMENT Right     shoulder    KNEE ARTHROSCOPY Right     NERVE BLOCK Bilateral 08/29/2019    SI joint injection    NERVE BLOCK Bilateral 10/10/2019    SI joint    NERVE BLOCK Bilateral 05/15/2020    Medial branch block L4-5, L5-S1    NERVE BLOCK  06/05/2020    SPINAL CORD STIMULATOR IMPLANT TRIAL (N/A )    OTHER SURGICAL HISTORY Right 11/11/2014    shoulder manipulation    SHOULDER ARTHROSCOPY Right 02/03/15    SHOULDER SURGERY  11/2014    manipulation    SPINAL CORD STIMULATOR SURGERY N/A 6/5/2020    SPINAL CORD STIMULATOR IMPLANT TRIAL performed by Gonzales Somers MD G[place] at [facility] N/A 7/27/2020    SPINAL CORD STIMULATOR IMPLANT WITH THORACIC LAMINOTOMY performed by Bimal Ceja MD at [address] ENDOSCOPY  07/08/2019    UPPER GASTROINTESTINAL ENDOSCOPY N/A 7/8/2019    EGD BIOPSY performed by Yony Rosas MD at [address] Right 4/30/2015    pt has blood clot & abscess in her right kidney    VASCULAR SURGERY      Spencer filter in & removed 1 year later       No family history on file.     Social History     Tobacco Use    Smoking status: Never Smoker    Smokeless tobacco: Never Used   Substance Use Topics    Alcohol use: Yes     Comment: rare        Allergies   Allergen Reactions    Aspirin Anaphylaxis     Only thing she can take is tylenol    Benadryl [Diphenhydramine] Other (See Comments)     Dystonic movement    Ibuprofen Anaphylaxis    Lidocaine Anaphylaxis     CAN NOT TOLERATE IV    Penicillins Anaphylaxis    Hydrocodone-Acetaminophen Other (See Comments)    Oxycodone-Acetaminophen Other (See Comments)    Tramadol Other (See Comments)    Morphine Nausea And Vomiting       Health Maintenance   Topic Date Due    Hepatitis C screen  1950    Diabetic retinal exam  09/14/1961    Diabetic microalbuminuria test  09/14/1969    DTaP/Tdap/Td vaccine (1 - Tdap) 09/14/1970    Shingles Vaccine (1 of 2) 09/14/2001    DEXA (modify frequency per FRAX score)  09/14/2006    Annual Wellness Visit (AWV)  05/29/2019    Lipid screen  04/06/2020    Pneumococcal 65+ years Vaccine (1 of 1 - PPSV23) 04/17/2023 (Originally 9/14/2016)    Diabetic foot exam  10/15/2020    A1C test (Diabetic or Prediabetic)  06/03/2021    Potassium monitoring  08/04/2021    Creatinine monitoring  08/04/2021    Breast cancer screen  10/21/2021    Colon cancer screen colonoscopy  07/08/2029    Flu vaccine  Completed    Hepatitis A vaccine  Aged Out    Hib vaccine  Aged Out    Meningococcal (ACWY) vaccine  Aged Out       Subjective:      Review of Systems   Constitutional: Negative for chills and fever. Respiratory: Negative for shortness of breath. Cardiovascular: Negative for chest pain and leg swelling. Gastrointestinal: Negative for nausea and vomiting. Musculoskeletal:        Left rib/side pain       Objective:     Physical Exam  Constitutional:       Appearance: She is well-developed. HENT:      Head: Normocephalic and atraumatic. Eyes:      Conjunctiva/sclera: Conjunctivae normal.      Pupils: Pupils are equal, round, and reactive to light. Neck:      Musculoskeletal: Neck supple. Cardiovascular:      Rate and Rhythm: Normal rate and regular rhythm. Heart sounds: Normal heart sounds. Pulmonary:      Effort: Pulmonary effort is normal.      Breath sounds: Normal breath sounds. Musculoskeletal:      Right lower leg: No edema. Left lower leg: No edema. Skin:     General: Skin is warm and dry. Neurological:      Mental Status: She is alert and oriented to person, place, and time. Psychiatric:         Behavior: Behavior normal.         Thought Content: Thought content normal.       /80   Pulse 88   Ht 5' 4\" (1.626 m)   Wt 223 lb (101.2 kg)   SpO2 98%   BMI 38.28 kg/m²     Assessment:      1. Type 2 diabetes mellitus with diabetic neuropathy, with long-term current use of insulin ([facility])  - sugars have been in good range per pt. Recommend continuing current dose of lantus 65 units nightly and sliding scale  - Lipid Panel; Future  - AST; Future  - ALT; Future  - Hemoglobin A1C; Future    2. Hypothyroidism, unspecified type  - continue levothyroxine.   - TSH with Reflex; Future    3. Insomnia, unspecified type  - recommend trial of trazodone at night. - traZODone (DESYREL) 50 MG tablet; Take 1 tablet by mouth nightly  Dispense: 30 tablet; Refill: 5    4.  Need for influenza vaccination  - INFLUENZA, QUADV, ADJUVANTED, 65 YRS =, IM, PF, PREFILL SYR, 0.5ML (FLUAD)    5. Essential hypertension  - BP controlled, continue current medication.   - leg swelling resolved         Plan:      Return in about 3 months (around 12/10/2020) for follow up. Orders Placed This Encounter   Procedures    INFLUENZA, QUADV, ADJUVANTED, 72 YRS =, IM, PF, PREFILL SYR, 0.5ML (FLUAD)    Lipid Panel     Standing Status:   Future     Standing Expiration Date:   9/10/2021     Order Specific Question:   Is Patient Fasting?/# of Hours     Answer:   10 hours    AST     Standing Status:   Future     Standing Expiration Date:   9/10/2021    ALT     Standing Status:   Future     Standing Expiration Date:   9/10/2021    Hemoglobin A1C     Standing Status:   Future     Standing Expiration Date:   9/10/2021    TSH with Reflex     Standing Status:   Future     Standing Expiration Date:   9/10/2021     Orders Placed This Encounter   Medications    traZODone (DESYREL) 50 MG tablet     Sig: Take 1 tablet by mouth nightly     Dispense:  30 tablet     Refill:  5        Patient given educational materials - see patient instructions. Discussed use, benefit, and side effects of prescribed medications. All patient questions answered. Pt voiced understanding. Reviewed healthmaintenance. Instructed to continue current medications, diet and exercise. Patient agreed with treatment plan. Follow up as directed.      Electronically signed by Rusty Lomax DO on 9/10/2020 at 11:26 AM

## 2020-09-10 NOTE — PROGRESS NOTES
Vaccine Information Sheet, \"Influenza - Inactivated\"  given to Lillie Shirley, or parent/legal guardian of  Lillie Shirley and verbalized understanding. Patient responses:    Have you ever had a reaction to a flu vaccine? No  Are you able to eat eggs without adverse effects? Yes  Do you have any current illness? No  Have you ever had [place] Syndrome? No    Flu vaccine given per order. Please see immunization tab.

## 2020-09-22 RX ORDER — OMEPRAZOLE 20 MG/1
20 CAPSULE, DELAYED RELEASE ORAL DAILY
Qty: 90 CAPSULE | Refills: 3 | Status: SHIPPED | OUTPATIENT
Start: 2020-09-22 | End: 2021-01-25

## 2020-09-22 NOTE — TELEPHONE ENCOUNTER
Mirian Rosa, APRN - CNP [facility]   12/10/2020  8:40 AM Simi Zavala DO [facility]            Patient Active Problem List:     Ovarian mass     Abdominal pain     Partial bowel obstruction (HCC)     Epigastric pain     GERD (gastroesophageal reflux disease)     Lumbosacral spondylosis without myelopathy     Chronic bilateral thoracic back pain     Chronic anticoagulation     BMI 38.0-38.9,adult

## 2020-09-24 ENCOUNTER — OFFICE VISIT (OUTPATIENT)
Dept: ORTHOPEDIC SURGERY | Age: 70
End: 2020-09-24

## 2020-09-24 ENCOUNTER — OFFICE VISIT (OUTPATIENT)
Dept: PAIN MANAGEMENT | Age: 70
End: 2020-09-24
Payer: MEDICARE

## 2020-09-24 VITALS — WEIGHT: 223.6 LBS | BODY MASS INDEX: 38.17 KG/M2 | TEMPERATURE: 97 F | HEIGHT: 64 IN

## 2020-09-24 PROCEDURE — 99212 OFFICE O/P EST SF 10 MIN: CPT | Performed by: NURSE PRACTITIONER

## 2020-09-24 PROCEDURE — 99024 POSTOP FOLLOW-UP VISIT: CPT | Performed by: ORTHOPAEDIC SURGERY

## 2020-09-24 ASSESSMENT — ENCOUNTER SYMPTOMS
COUGH: 0
BACK PAIN: 1
CONSTIPATION: 0
SHORTNESS OF BREATH: 0

## 2020-09-24 NOTE — PROGRESS NOTES
Patient ID: Sammy Menezes is a 72 y.o. female. Chief Complaint   Patient presents with    Post-Op Check     lumbar spinal cord verenice         HPI     Please refer to all of my previous clinic notes    Patient here for second postop follow-up visit post spinal cord stimulator    Initially patient thought she had relief of her back pain. At this point she is thinking that she still has getting some but fairly minimal    Past Medical History:   Diagnosis Date    Arthritis     [facility]     Cancer ([facility] 2015    ovarian stage 2    Cerebral artery occlusion with cerebral infarction ([facility] 03/2018    Mini strokes \"Tia's\"    Diabetes mellitus ([facility]     Epigastric pain     GERD (gastroesophageal reflux disease)     History of anesthesia complications 4514'O    was told after gallbladder surgery to never have anesthesia again \"since it was hard for me to come out of it\"    History of blood transfusion     Hx of blood clots     lung     Hypertension     Right arm pain     Right shoulder pain     Sleep apnea     uses CPAP nightly    Tachycardia     hx of    Thyroid disease     TIA (transient ischemic attack)      Past Surgical History:   Procedure Laterality Date    ANESTHESIA NERVE BLOCK Left 11/7/2019    NERVE BLOCK (DEFINE) - INTERCOSTAL NERVE BLOCK performed by Zora Howell MD at [facility] Bilateral 5/15/2020    NERVE BLOCK BILATERAL - MBB  L4-5, L5-S1 performed by Zora Howell MD at [address] [place] [address] Left     ORIF    CHOLECYSTECTOMY      COLONOSCOPY  07/08/2019    5 yr recall.     COLONOSCOPY N/A 7/8/2019    COLONOSCOPY WITH BIOPSY performed by Kandis Wolf MD at [address] [place] [address], [facility]. INJECTION PROCEDURE FOR SACROILIAC JOINT Bilateral 8/29/2019    SACROILIAC JOINT INJECTION performed by Zora Howell MD at [place] [address] SACROILIAC JOINT Bilateral 10/10/2019    SACROILIAC JOINT INJECTION performed by Jovanni Lau MD at [address]  2/2014    chris with bso    JOINT REPLACEMENT Right     shoulder    KNEE ARTHROSCOPY Right     NERVE BLOCK Bilateral 08/29/2019    SI joint injection    NERVE BLOCK Bilateral 10/10/2019    SI joint    NERVE BLOCK Bilateral 05/15/2020    Medial branch block L4-5, L5-S1    NERVE BLOCK  06/05/2020    SPINAL CORD STIMULATOR IMPLANT TRIAL (N/A )    OTHER SURGICAL HISTORY Right 11/11/2014    shoulder manipulation    SHOULDER ARTHROSCOPY Right 02/03/15    SHOULDER SURGERY  11/2014    manipulation    SPINAL CORD STIMULATOR SURGERY N/A 6/5/2020    SPINAL CORD STIMULATOR IMPLANT TRIAL performed by [address], MD at [address] N/A 7/27/2020    SPINAL CORD STIMULATOR IMPLANT WITH THORACIC LAMINOTOMY performed by Maynor Bustos MD at [address] [facility]  ENDOSCOPY  07/08/2019    UPPER GASTROINTESTINAL ENDOSCOPY N/A 7/8/2019    EGD BIOPSY performed by Heber Segovia MD at [address] Right 4/30/2015    pt has blood clot & abscess in her right kidney    VASCULAR SURGERY      [place] filter in & removed 1 year later     No family history on file. Social History     Occupational History    Not on file   Tobacco Use    Smoking status: Never Smoker    Smokeless tobacco: Never Used   Substance and Sexual Activity    Alcohol use: Yes     Comment: rare    Drug use: No    Sexual activity: Yes        Review of Systems         Physical Exam  Vitals signs and nursing note reviewed. Constitutional:       Appearance: She is well-developed. HENT:      Head: Normocephalic and atraumatic. Nose: Nose normal.   Eyes:      Conjunctiva/sclera: Conjunctivae normal.   Neck:      Musculoskeletal: Normal range of motion and neck supple.    Pulmonary:      Effort: Pulmonary effort is normal. No respiratory distress. Musculoskeletal:      Comments: Normal gait     Skin:     General: Skin is warm and dry. Neurological:      Mental Status: She is alert and oriented to person, place, and time. Sensory: No sensory deficit. Psychiatric:         Behavior: Behavior normal.         Thought Content: Thought content normal.     Surgical sites all of healed up very well    Assessment:          1. Lumbosacral spondylosis without myelopathy    2. Chronic bilateral thoracic back pain    3. BMI 38.0-38.9,adult      Fairly modest relief of back pain post spinal cord stimulator  Plan: Fu 3 months    No orders of the defined types were placed in this encounter. Mónica Solo MD    Please note that this chart was generated using voicerecognition Dragon dictation software. Although every effort was made to ensurethe accuracy of this automated transcription, some errors in transcription may haveoccurred.

## 2020-09-24 NOTE — PROGRESS NOTES
Chief Complaint: thoracic back pain, left rib pain    [facility]  Patient in today for follow up to assess progress with Spinal cord stimulator. Patient had SCS implant on 7/27/20 and initially had good relief. She states she still has moderate relief on the right side however she is no longer having any relief on the left side. Complains of pain in thoracic area radiating to the left ribs. [address] rep is here today and attempts to adjust the programming to relieve the pain on the left side with no success. Will have patient complete thoracic and lumbar XR to check placement of the paddles. Patient is requesting a referral to weight management. Back Pain   This is a chronic problem. The current episode started more than 1 year ago. The problem occurs daily. The problem has been gradually worsening since onset. The pain is present in the thoracic spine. The quality of the pain is described as aching and burning. Radiates to: into left ribs. The pain is moderate. The symptoms are aggravated by standing, sitting and position (walking). Associated symptoms include paresthesias. Pertinent negatives include no chest pain, fever, numbness, tingling or weakness. Risk factors include obesity. She has tried home exercises, bed rest and analgesics (SCS implant) for the symptoms. The treatment provided mild relief. Patient denies any new neurological symptoms. No bowel or bladder incontinence, no weakness, and no falling.       Past Medical History:   Diagnosis Date    Arthritis     Bowel obstruction ([address]     Cancer ([facility] 2015    ovarian stage 2    Cerebral artery occlusion with cerebral infarction ([facility] 03/2018    Mini strokes \"Tia's\"    Diabetes mellitus ([address]     Epigastric pain     GERD (gastroesophageal reflux disease)     History of anesthesia complications 6124'C    was told after gallbladder surgery to never have anesthesia again \"since it was hard for me to come out of it\"    Bimal Jennings MD at [address] ENDOSCOPY  07/08/2019    UPPER GASTROINTESTINAL ENDOSCOPY N/A 7/8/2019    EGD BIOPSY performed by eJsus Piña MD at [address] Right 4/30/2015    pt has blood clot & abscess in her right kidney    VASCULAR SURGERY      [place] filter in & removed 1 year later       Allergies   Allergen Reactions    Aspirin Anaphylaxis     Only thing she can take is tylenol    Benadryl [Diphenhydramine] Other (See Comments)     Dystonic movement    Ibuprofen Anaphylaxis    Lidocaine Anaphylaxis     CAN NOT TOLERATE IV    Penicillins Anaphylaxis    Hydrocodone-Acetaminophen Other (See Comments)    Oxycodone-Acetaminophen Other (See Comments)    Tramadol Other (See Comments)    Morphine Nausea And Vomiting         No family history on file.     Social History     Socioeconomic History    Marital status: Single     Spouse name: Not on file    Number of children: Not on file    Years of education: Not on file    Highest education level: Not on file   Occupational History    Not on file   Social Needs    Financial resource strain: Not on file    Food insecurity     Worry: Not on file     Inability: Not on file    Transportation needs     Medical: Not on file     Non-medical: Not on file   Tobacco Use    Smoking status: Never Smoker    Smokeless tobacco: Never Used   Substance and Sexual Activity    Alcohol use: Yes     Comment: rare    Drug use: No    Sexual activity: Yes   Lifestyle    Physical activity     Days per week: Not on file     Minutes per session: Not on file    Stress: Not on file   Relationships    Social connections     Talks on phone: Not on file     Gets together: Not on file     Attends [employer] service: Not on file     Active member of club or organization: Not on file     Attends meetings of clubs or organizations: Not on file     Relationship status: Not on file    Intimate partner violence     Fear of current or ex partner: Not on file     Emotionally abused: Not on file     Physically abused: Not on file     Forced sexual activity: Not on file   Other Topics Concern    Not on file   Social History Narrative    Not on file       Review of Systems:  Review of Systems   Constitution: Negative for chills and fever. Cardiovascular: Negative for chest pain and palpitations. Respiratory: Negative for cough and shortness of breath. Musculoskeletal: Positive for back pain and myalgias. Gastrointestinal: Negative for constipation. Neurological: Positive for paresthesias. Negative for disturbances in coordination, loss of balance, numbness, sensory change, tingling and weakness. Physical Exam:  Temp 97 °F (36.1 °C) (Temporal)   Ht 5' 4\" (1.626 m)   Wt 223 lb 9.6 oz (101.4 kg)   BMI 38.38 kg/m²     Physical Exam  HENT:      Head: Normocephalic. Neck:      Musculoskeletal: Normal range of motion. Pulmonary:      Effort: Pulmonary effort is normal.   Musculoskeletal: Normal range of motion. Thoracic back: She exhibits pain. Back:    Skin:     General: Skin is warm and dry. Neurological:      Mental Status: She is alert and oriented to person, place, and time. Record/Diagnostics Review:    Multilevel disc degeneration.         Mild bilateral neural foraminal narrowing in midthoracic spine due to disc    bulges.         No critical canal or neural foraminal stenosis.           Assessment:  Problem List Items Addressed This Visit     Lumbosacral spondylosis without myelopathy (Chronic)    Chronic bilateral thoracic back pain - Primary (Chronic)    Relevant Orders    XR [facility]AC[facility] SP[facility] (3 VIEWS)    [facility]      Other Visit Diagnoses     Lumbar pain        Relevant Orders    XR LUMBAR SPINE (2-3 VIEWS)    [facility]             Treatment Plan:  XR Lumbar and Thoracic spine to assess SCS

## 2020-09-25 ENCOUNTER — HOSPITAL ENCOUNTER (OUTPATIENT)
Age: 70
Discharge: HOME | End: 2020-09-27
Payer: MEDICARE

## 2020-09-25 ENCOUNTER — HOSPITAL ENCOUNTER (OUTPATIENT)
Dept: GENERAL RADIOLOGY | Age: 70
Discharge: HOME | End: 2020-09-27
Payer: MEDICARE

## 2020-09-25 PROCEDURE — 72100 X-RAY EXAM L-S SPINE 2/3 VWS: CPT

## 2020-09-25 PROCEDURE — 72072 X-RAY EXAM THORAC SPINE 3VWS: CPT

## 2020-09-29 ENCOUNTER — HOSPITAL ENCOUNTER (OUTPATIENT)
Age: 70
Discharge: HOME | End: 2020-09-29
Payer: MEDICARE

## 2020-09-29 LAB
ALT SERPL-CCNC: 29 U/L (ref 5–33)
AST SERPL-CCNC: 20 U/L
CHOLESTEROL/HDL RATIO: 2.9
CHOLESTEROL: 107 MG/DL
HDLC SERPL-MCNC: 37 MG/DL
LDL CHOLESTEROL: 44 MG/DL (ref 0–130)
TRIGL SERPL-MCNC: 129 MG/DL
TSH SERPL DL<=0.05 MIU/L-ACNC: 1.02 MIU/L (ref 0.3–5)
VLDLC SERPL CALC-MCNC: ABNORMAL MG/DL (ref 1–30)

## 2020-09-29 PROCEDURE — 83036 HEMOGLOBIN GLYCOSYLATED A1C: CPT

## 2020-09-29 PROCEDURE — 36415 COLL VENOUS BLD VENIPUNCTURE: CPT

## 2020-09-29 PROCEDURE — 84450 TRANSFERASE (AST) (SGOT): CPT

## 2020-09-29 PROCEDURE — 80061 LIPID PANEL: CPT

## 2020-09-29 PROCEDURE — 84460 ALANINE AMINO (ALT) (SGPT): CPT

## 2020-09-29 PROCEDURE — 84443 ASSAY THYROID STIM HORMONE: CPT

## 2020-09-30 LAB
ESTIMATED AVERAGE GLUCOSE: 246 MG/DL
HBA1C MFR BLD: 10.2 % (ref 4–6)

## 2020-10-01 ENCOUNTER — OFFICE VISIT (OUTPATIENT)
Dept: PAIN MANAGEMENT | Age: 70
End: 2020-10-01
Payer: MEDICARE

## 2020-10-01 VITALS
WEIGHT: 223 LBS | HEART RATE: 85 BPM | SYSTOLIC BLOOD PRESSURE: 124 MMHG | DIASTOLIC BLOOD PRESSURE: 79 MMHG | HEIGHT: 64 IN | BODY MASS INDEX: 38.07 KG/M2 | TEMPERATURE: 97.4 F

## 2020-10-01 PROCEDURE — 99214 OFFICE O/P EST MOD 30 MIN: CPT | Performed by: NURSE PRACTITIONER

## 2020-10-01 ASSESSMENT — ENCOUNTER SYMPTOMS
COUGH: 0
SHORTNESS OF BREATH: 0
CONSTIPATION: 0
BACK PAIN: 1

## 2020-10-01 NOTE — PROGRESS NOTES
Patient is here today for follow up     Chief Complaint: back pain    [facility] Patient in today for follow up to assess progress with Spinal cord stimulator. Patient had SCS implant on 7/27/20 and initially had good relief. She states she still has moderate relief on the right side however she is no longer having any relief on the left side. Complains of pain in thoracic area radiating to the left ribs. [employer] rep was at appt last week and attempted to adjust the programming to relieve the pain on the left side with no success. She completed thoracic and lumbar XR that show SCS is in correct position. Patient will be going to weight loss clinic this 10/7    Back Pain   This is a chronic problem. The problem occurs constantly. The problem is unchanged. The pain is present in the lumbar spine and thoracic spine. The quality of the pain is described as aching and burning. The pain does not radiate. The pain is at a severity of 10/10. The pain is severe. The symptoms are aggravated by position and standing. Associated symptoms include paresthesias. Pertinent negatives include no chest pain, fever or numbness. She has tried bed rest for the symptoms. Patient denies any new neurological symptoms. No bowel or bladder incontinence, no weakness, and no falling.          Past Medical History:   Diagnosis Date    Arthritis     Bowel obstruction ([address]     Cancer ([address] 2015    ovarian stage 2    Cerebral artery occlusion with cerebral infarction ([address] 03/2018    Mini strokes \"Tia's\"    Diabetes mellitus ([address]     Epigastric pain     GERD (gastroesophageal reflux disease)     History of anesthesia complications 7983'Y    was told after gallbladder surgery to never have anesthesia again \"since it was hard for me to come out of it\"    History of blood transfusion     Hx of blood clots     lung     Hypertension     Right arm pain     Right shoulder pain     Sleep apnea     uses CPAP nightly    Tachycardia     hx of    Thyroid disease     TIA (transient ischemic attack)        Past Surgical History:   Procedure Laterality Date    ANESTHESIA NERVE BLOCK Left 11/7/2019    NERVE BLOCK (DEFINE) - INTERCOSTAL NERVE BLOCK performed by Abi Erickson MD at [facility] Bilateral 5/15/2020    NERVE BLOCK BILATERAL - MBB  L4-5, L5-S1 performed by Abi Erickson MD at [address] [facility] Left     ORIF    CHOLECYSTECTOMY      COLONOSCOPY  07/08/2019    5 yr recall.     COLONOSCOPY N/A 7/8/2019    COLONOSCOPY WITH BIOPSY performed by Dudley Loyola MD at [address] Right     repair tendon    [place] INJECTION PROCEDURE FOR SACROILIAC JOINT Bilateral 8/29/2019    SACROILIAC JOINT INJECTION performed by Abi Erickson MD at [address] [facility] Bilateral 10/10/2019    SACROILIAC JOINT INJECTION performed by Abi Erickson MD at [address]  2/2014    [facility] with [facility]so    JOINT REPLACEMENT Right     shoulder    KNEE ARTHROSCOPY Right     NERVE BLOCK Bilateral 08/29/2019    SI joint injection    NERVE BLOCK Bilateral 10/10/2019    SI joint    NERVE BLOCK Bilateral 05/15/2020    Medial branch block L4-5, L5-S1    NERVE BLOCK  06/05/2020    SPINAL CORD STIMULATOR IMPLANT TRIAL (N/A )    OTHER SURGICAL HISTORY Right 11/11/2014    shoulder manipulation    SHOULDER ARTHROSCOPY Right 02/03/15    SHOULDER SURGERY  11/2014    manipulation    SPINAL CORD STIMULATOR SURGERY N/A 6/5/2020    SPINAL CORD STIMULATOR IMPLANT TRIAL performed by Abi Erickson MD at [address] [facility] N/A 7/27/2020    SPINAL CORD STIMULATOR IMPLANT WITH THORACIC LAMINOTOMY performed by Myrna Saldivar MD at [address] ENDOSCOPY  07/08/2019    UPPER GASTROINTESTINAL ENDOSCOPY N/A 7/8/2019    EGD BIOPSY performed by Hal Romberg, MD at [facility] [address] Right 4/30/2015    pt has blood clot & abscess in her right kidney    VASCULAR SURGERY      [place] filter in & removed 1 year later       Allergies   Allergen Reactions    Aspirin Anaphylaxis     Only thing she can take is tylenol    Benadryl [Diphenhydramine] Other (See Comments)     Dystonic movement    Ibuprofen Anaphylaxis    Lidocaine Anaphylaxis     CAN NOT TOLERATE IV    Penicillins Anaphylaxis    Hydrocodone-Acetaminophen Other (See Comments)    Oxycodone-Acetaminophen Other (See Comments)    Tramadol Other (See Comments)    Morphine Nausea And Vomiting         No family history on file.     Social History     Socioeconomic History    Marital status: Single     Spouse name: Not on file    Number of children: Not on file    Years of education: Not on file    Highest education level: Not on file   Occupational History    Not on file   Social Needs    Financial resource strain: Not on file    Food insecurity     Worry: Not on file     Inability: Not on file    Transportation needs     Medical: Not on file     Non-medical: Not on file   Tobacco Use    Smoking status: Never Smoker    Smokeless tobacco: Never Used   Substance and Sexual Activity    Alcohol use: Yes     Comment: rare    Drug use: No    Sexual activity: Yes   Lifestyle    Physical activity     Days per week: Not on file     Minutes per session: Not on file    Stress: Not on file   Relationships    Social connections     Talks on phone: Not on file     Gets together: Not on file     Attends Worship service: Not on file     Active member of club or organization: Not on file     Attends meetings of clubs or organizations: Not on file     Relationship status: Not on file    Intimate partner violence     Fear of current or ex partner: Not on file     Emotionally abused: Not on file     Physically abused: Not on file     Forced sexual activity: Not on file Other Topics Concern    Not on file   Social History Narrative    Not on file       Review of Systems:  Review of Systems   Constitution: Negative for chills and fever. Cardiovascular: Negative for chest pain and palpitations. Respiratory: Negative for cough and shortness of breath. Musculoskeletal: Positive for back pain. Gastrointestinal: Negative for constipation. Neurological: Positive for paresthesias. Negative for disturbances in coordination and numbness. Physical Exam:  /79   Pulse 85   Temp 97.4 °F (36.3 °C) (Infrared)   Ht 5' 4\" (1.626 m)   Wt 223 lb (101.2 kg)   BMI 38.28 kg/m²     Physical Exam  Constitutional:        HENT:      Head: Normocephalic. Neck:      Musculoskeletal: Normal range of motion. Pulmonary:      Effort: Pulmonary effort is normal.   Musculoskeletal: Normal range of motion. Skin:     General: Skin is warm and dry. Neurological:      Mental Status: She is alert and oriented to person, place, and time.          Record/Diagnostics Review:    FINDINGS:    Intact neural stimulator wires enter the posterior spinal canal at the T8-9    level.  Upper aspect of the neural stimulator is seen within the posterior    spinal canal at the upper T7 level         There is mild T8-9 joint space compromise.  The remaining disc spaces and    vertical heights of the vertebral bodies are maintained         There is moderate anterior and right lateral spurring at the T 7 to T11 levels         There is no pedicular defect, fracture, dislocation or paraspinal mass seen          Assessment:  Problem List Items Addressed This Visit     Lumbosacral spondylosis without myelopathy - Primary (Chronic)    Relevant Orders    [facility] [place]    Chronic bilateral thoracic back pain (Chronic)    Relevant Orders    [facility] [place]      Other Visit Diagnoses     Postherpetic neuralgia        Relevant Orders    [facility] [place]    Intercostal neuralgia        Relevant Orders    [facility] [place]             Treatment Plan:  Discussed different treatment options including continued conservative care such as physical therapy, chiropractic care, acupuncture. Discussed different interventional options such as epidural steroids or medial branch blocks. Also discussed surgical evaluation.   Aquatic therapy two times a week for six weeks  Consider stresscare counseling in the future - pt related that she has been through a lot of stressful life events in the last few years  Pt will start weight management program this month  Follow up in five weeks

## 2020-10-07 ENCOUNTER — HOSPITAL ENCOUNTER (OUTPATIENT)
Dept: NUTRITION | Age: 70
Discharge: HOME | End: 2020-10-07
Payer: MEDICARE

## 2020-10-07 PROCEDURE — 97802 MEDICAL NUTRITION INDIV IN: CPT

## 2020-10-07 NOTE — PROGRESS NOTES
with bso    JOINT REPLACEMENT Right     shoulder    KNEE ARTHROSCOPY Right     NERVE BLOCK Bilateral 08/29/2019    SI joint injection    NERVE BLOCK Bilateral 10/10/2019    SI joint    NERVE BLOCK Bilateral 05/15/2020    Medial branch block L4-5, L5-S1    NERVE BLOCK  06/05/2020    SPINAL CORD STIMULATOR IMPLANT TRIAL (N/A )    OTHER SURGICAL HISTORY Right 11/11/2014    shoulder manipulation    SHOULDER ARTHROSCOPY Right 02/03/15    SHOULDER SURGERY  11/2014    manipulation    SPINAL CORD STIMULATOR SURGERY N/A 6/5/2020    SPINAL CORD STIMULATOR IMPLANT TRIAL performed by Celi Ricci MD at [address] [facility] N/A 7/27/2020    SPINAL CORD STIMULATOR IMPLANT WITH THORACIC LAMINOTOMY performed by Jovon Grimes MD at [address] [facility] ENDOSCOPY  07/08/2019    UPPER GASTROINTESTINAL ENDOSCOPY N/A 7/8/2019    EGD BIOPSY performed by Gary Estrada MD at [place] [address] Right 4/30/2015    pt has blood clot & abscess in her right kidney    VASCULAR SURGERY      [place] filter in & removed 1 year later       Labs:    Chemistry CBC/Coags Misc. No results for input(s): NA, K, CL, CO2, BUN, CREATININE, GLUCOSE in the last 72 hours. Invalid input(s): CA  No results for input(s): PHOS in the last 72 hours. No results for input(s): WBC, RBC, HGB, HCT, MCV, MCH, MCHC, RDW, PLT, MPV in the last 72 hours. No results for input(s): LABALBU in the last 72 hours.     Invalid input(s):  PREALBUMIN  Lab Results   Component Value Date    LABA1C 10.2 09/29/2020            Anthropometric Measures:  Height: 64\"  Weight: 223 lbs  Ideal Body Weight: 120 lbs/54.5 kg  Ideal Body Weight %: 185%  There is no height or weight on file to calculate BMI. which is classified as Class 2 obesity BMI 38.2    Less than 18.5 Underweight  18.5-24.9 Normal Weight  25-29.9 Overweight  30-34.9 Obese Class I  35-39.9 Obese Class II  Greater than or equal to 40 Obese Class III. Wt Readings from Last 20 Encounters:   10/01/20 223 lb (101.2 kg)   09/24/20 223 lb 9.6 oz (101.4 kg)   09/10/20 229 lb (103.9 kg)   09/10/20 223 lb (101.2 kg)   08/13/20 223 lb (101.2 kg)   08/04/20 223 lb (101.2 kg)   07/27/20 223 lb (101.2 kg)   07/13/20 223 lb (101.2 kg)   06/18/20 222 lb 0.1 oz (100.7 kg)   06/09/20 222 lb (100.7 kg)   06/05/20 222 lb 4 oz (100.8 kg)   06/03/20 225 lb 6.4 oz (102.2 kg)   05/11/20 220 lb (99.8 kg)   05/08/20 223 lb (101.2 kg)   04/23/20 220 lb (99.8 kg)   03/05/20 226 lb 9.6 oz (102.8 kg)   03/04/20 226 lb 6.4 oz (102.7 kg)   02/20/20 225 lb 6.4 oz (102.2 kg)   02/18/20 225 lb 3.2 oz (102.2 kg)   02/14/20 223 lb (101.2 kg)       Assessment and Plan:   1. Patient educated and given handouts on Carbohydrate counting for diabetes, reading labels for carbohydrates, what's carbohydrate foods and their serving sizes, meal planning, weight loss and cooking tips. 2. Educated on goal setting for weight loss and blood sugar management. 3. Options of different weight loss programs available in the area. 4. Patient weight goal 160 lbs, blood sugar goal HgbA1C less than 7.0        NUTRITION RECOMMENDATIONS / MONITORING / EVALUATION  1. Name and Office phone number given for reference.         Kristine Downing RD, LD  Office phone [phone]

## 2020-10-09 ENCOUNTER — HOSPITAL ENCOUNTER (OUTPATIENT)
Dept: PHYSICAL THERAPY | Facility: CLINIC | Age: 70
Setting detail: THERAPIES SERIES
Discharge: HOME | End: 2020-10-09
Payer: MEDICARE

## 2020-10-09 PROCEDURE — 97162 PT EVAL MOD COMPLEX 30 MIN: CPT

## 2020-10-09 PROCEDURE — 97113 AQUATIC THERAPY/EXERCISES: CPT

## 2020-10-09 NOTE — CONSULTS
[] [facility]) - [facility] &  Therapy  [address]  [phone]  F: [phone] [] [address]  [facility]   [address]  P: [phone]  F: [phone] [x] [address] &  Therapy  [address]  P: [phone]  F: [phone] [] [address]  P: [phone]  F: [phone] [] [address]  [facility]   [address]   [place]: [phone]  F: [phone]      Physical Therapy Spine Evaluation    Date:  10/9/2020  Patient: Carolina Aldridge  :   MRN: [id number]  Physician: GABBIE Hernandez   Insurance: [facility] medicare  Medical Diagnosis: Lumbosacral spondylosis, intercostal neuralgia, chronic kathy T back pain, postherpetic neuralgia  Rehab Codes: M54.5  Onset Date: 2015  Next 's appt.: 11/3/20    Subjective:   CC: L sided rib pain, LBP  HPI: Since the last PT last year, she had spinal cord stimulator in T spine in July and has not been right since. The pain is no better with it. \"I refuse to take pain meds\". has had ovarian cancer, chemo and was in [address] [facility] home for 1.5 years and had to learn how to walk again. She notes that her ribs are in constant pain. Her back hurts if she walks or stands too long. Needs to sit down for relief. PMHx: [] Unremarkable [x] Diabetes-not controlled 125 this am, A1C 9.?, if anything she will know if she goes up. She either gets dizzy or a really bad HA.    [x] HTN-controlled  Pacemaker   [] MI/Heart Problems [] Cancer [] Arthritis [x] Other: spinal cord stimulator implant, TIA last one was in 2018, she has had 5 , R total shoulder,               [x] Refer to full medical chart  In EPIC       Comorbidities:   [x] Obesity [] Dialysis  [] Other:   [] Asthma/COPD [] Dementia [] Other:   [x] Stroke [] Sleep apnea [] Other:   [] Vascular disease [] Rheumatic disease [] Other:     Tests: [x] X-Ray: [] MRI:  [] Other:    Medications: [x] Refer to full medical record [] None [] Other:  Allergies:      [x] Refer to full medical record [] None [] Other:    Function:  Hand Dominance  [] Right  [] Left  Job Status []  Normal duty   [] Light duty   [] Off due to condition    [x]  Retired   [] Not employed   [] Disability  [] Other:  []  Return to work: Work activities/duties      Pain:  [x] Yes  [] No Location:  LB and L lateral torso Pain Rating: (0-10 scale) 7/10  Pain altered Tx:  [] Yes  [x] No  Action:    Symptoms:  [] Improving [] Worsening [x] Same  Better:  [] AM    [] PM    [x] Sit    [] Rise/Sit    []Stand    [] Walk    [] Lying    [] Other:  Worse: [] AM    [] PM    [] Sit    [] Rise/Sit    [x]Stand    [x] Walk    [] Lying    [] Bend                      [] Valsalva    [] Other:  Sleep: [] OK    [x] Disturbed    Objective:   STRENGTH  ROM    Left Right     L1-2 Hip Flex 5 5     Hip Abd 3- 3-     L3-4 Knee Ext 5 5      L4 Ankle DF 5 5      L5 EHL 5 5     S1 Plant. Flex 5 5 Lumbar    Abdominals   Flexion Wnl; to floor   Erector Spinae   Extension 90% ltd      Rotation L ltd R ltd      Sidebend L pn R wnl      LE        Hips         flexion wnl wnl      ext 0 0      ER wnl wnl             TESTS (+/-) LEFT RIGHT Not Tested   SLR [] sit [x] supine - - []   Hamstring (SLR)   []   SKTC - - []   DKTC   []   Slump/Dural   []   SI JT   []   JORGE - - []     OBSERVATION No Deficit Deficit Not Tested Comments   Posture    Overall slouched;     Forward Head [] [x] []    Rounded Shoulders [] [x] []    Kyphosis [] [x] []    Lordosis [] [] []    Lateral Shift [] [] []    Scoliosis [] [] []    Iliac Crest [] [] []    PSIS [] [] []    ASIS [] [] []    Genu Valgus [] [] []    Genu Varus [] [] []    Genu Recurvatum [] [] []    Pronation [] [] []    Supination [] [] []    Leg Length Discrp [] [] []    Slumped Sitting [] [] []    Palpation [] [] []    Sensation [] [] []    Edema [] [] []    Neurological [] [] []        Functional Test: Modified Oswestry Score: 36% functionally impaired     Comments:    Assessment:  Patient would benefit from skilled physical therapy services in order to: improve LE strength and improve endurance to exercise    Problems:    [x] ? Pain:  [x] ? ROM:  [x] ? Strength:  [x] ? Function:  [] Other:      STG: (to be met in 6 treatments)  1. ? Pain:<5/10   2. ? ROM: hip ext 10 deg  3. ? Strength:able to perform 1 squat/sit to stand from chair  4. ? Function: <25% interference with Modified Oswestry  5. Patient to be independent with home exercise program as demonstrated by performance with correct form without cues. 6. Demonstrate Knowledge of fall prevention  LTG: (to be met in 12 treatments)  1. Able to perfom 10 squats/sit to stand from chair  2. Improve Modified Oswestry <20% interference  3. Pain <4/10      Patient goals:\"lose pain\"    Rehab Potential:  [] Good  [x] Fair pt does not expect this to work as she did aquatic PT last year. [] Poor   Suggested Professional Referral:  [x] No  [] Yes:  Barriers to Goal Achievement:  [x] No  [] Yes:  Domestic Concerns:  [x] No  [] Yes:    Pt. Education:  [x] Plans/Goals, Risks/Benefits discussed  [] Home exercise program  Method of Education: [x] Verbal  [] Demo  [] Written  Comprehension of Education:  [x] Verbalizes understanding. [] Demonstrates understanding. [] Needs Review. [] Demonstrates/verbalizes understanding of HEP/Ed previously given.     Treatment Plan:  [x] Therapeutic Exercise   77253  [] Iontophoresis: 4 mg/mL Dexamethasone Sodium Phosphate  mAmin  37684   [] Therapeutic Activity  65072 [] Vasopneumatic cold with compression  66790    [] Gait Training   41949 [] Ultrasound   04015   [] Neuromuscular Re-education  79593 [] Electrical Stimulation Unattended  83710   [x] Manual Therapy  22706 [] Electrical Stimulation Attended  [id number]   [x] Instruction in HEP  [] Lumbar/Cervical Traction  [id number]   [x] Aquatic Therapy   [id number] [] Cold/hotpack    [] Massage   [id number]      [] Dry Needling, 1 or 2 muscles  13464   [] Biofeedback, first 15 minutes   36252  [] Biofeedback, additional 15 minutes   20588 [] Dry Needling, 3 or more muscles  31642   . Frequency:  2x/week for 12visits    Todays Treatment:  Modalities: none  Precautions: R total shoulder, past TIA, uncontrolled T2DM, tends to run elevated blood sugar and will get dizzy/headache  Exercises:  Exercise Reps/ Time Weight/ Level Comments                                 Other:    Specific Instructions for next treatment:focus on LE strengthening and endurance ex. Notified PTA for today's aquatic session      Evaluation Complexity:  History (Personal factors, comorbidities) [] 0 [x] 1-2 [] 3+   Exam (limitations, restrictions) [] 1-2 [x] 3 [] 4+   Clinical presentation (progression) [] Stable [x] Evolving  [] Unstable   Decision Making [] Low [x] Moderate [] High    [] Low Complexity [x] Moderate Complexity [] High Complexity       Treatment Charges: Mins Units   [x] Evaluation       []  Low       [x]  Moderate       []  High  1   []  Modalities     []  Ther Exercise     []  Manual Therapy     []  Ther Activities     []  Aquatics     []  Vasocompression     []  Other       TOTAL TREATMENT TIME: 30    Time in: 1000      Time out: 1030    Electronically signed by: Tarsha Ramirez, PT        Physician Signature:________________________________Date:__________________  By signing above or cosigning this note, I have reviewed this plan of care and certify a need for medically necessary rehabilitation services.      *PLEASE SIGN ABOVE AND FAX BACK ALL PAGES*

## 2020-10-09 NOTE — FLOWSHEET NOTE
[] [facility]       Physical Therapy M[facility]2       [facility]        [address]       Phone: [phone]       Fax: [phone] [] [facility]       Promotion at [address]       Phone: [phone]       Fax: [phone] [x] Mercedes. [facility]5 [facility] Promotion  [address] [place] [address]   Phone: [phone]   Fax:  [phone]     Physical Therapy Daily  Aquatic Treatment Note    Date:  10/9/2020  Patient Name:  Shraddha Mcclain    :  1950  MRN: [id number]  Physician: GABBIE Smith                              Insurance: [facility] medicare  Medical Diagnosis: Lumbosacral spondylosis, intercostal neuralgia, chronic kathy T back pain, postherpetic neuralgia              Rehab Codes: M54.5  Onset Date: 2015             Next 's appt.: 11/3/20    Visit# / total visits:   Cancels/No Shows: 0/0    Subjective:    Pain:  [x] Yes  [] No Location: LB and L lateral torso         Pain Rating: (0-10 scale) 8/10  Pain altered Tx:  [] No  [] Yes  Action:  Comments: Pt stated that she has done aquatics before, about a year ago, and it didn't really help so she got a spinal stimulator put in. Says she is feeling pain standing still on deck on both the low back and the ribs.     Objective:      KEY  B = Belt G = Gloves N = Noodle   C = Cuffs K = Kickboard P = Paddles   CC = Cervical Collar L = Laps T = Theratube   DB = Dumbells M = Minutes W = Weights     Exercises/Activities Precautions: R total shoulder, past TIA, uncontrolled T2DM, tends to run elevated blood sugar and will get dizzy/headache  Warm-up/Amb 10/9  Dynamic Exercises 10/9    Forward 3L  March 3L    Sideways 3L  Squat     Backwards 3L  Retro HS curls        Retro SLR     Stretches   Braiding     Gastroc/Soleus   Heel to Toe amb     Hamstring   Toe amb     Hip flexor   Heel amb     Piriformis        SKTC        Pec Stretch        Post Deltoid   Static Exercises UE        Shoulder flex/ext 15    Static Exercises LE   Shoulder abd/add 15    Heel/toe raises 15  Shoulder H.  abd/add 15    Marches 15  Shoulder IR/ER 15    Mini-squats 15  Rowing 15    4-way hip  15  Arm Circles     Hamstring curls 15  UT shrugs/rolls     Hip Circles/Fig 8   Scap squeezes     Ankle ROM   Diagonals 1/2     Lunges    Elbow flex/ext        Pron/Sup     Functional Exercise   Wrist AROM     Step        Wall Push-ups   Deep H20/     SLS   Bike 3m    Breast Stroke on Noodle   Hip abd/add 3m    Noodle Twist   Hip flex/ext     Noodle Push down   Hip IR/ER     Kickboard push/pull   Knee flex/ext        Push/pull on [facility] 5m    Other:    Specific Instructions for next treatment:    Treatment Charges: Mins Units   []  Modalities     []  Ther Exercise     []  Manual Therapy     []  Ther Activities     []  Aquatics 30 2   []  Other       Assessment: [] Progressing toward goals. [] No change. [x] Other: Started pt for first session. Educated pt on how to properly perform exercises with good technique, maintaining an upright posture and activating the correct muscles for each activity. Advised pt on lessening 4-way hip movements to a comfortable range when causing an increase in symptoms. Worked on strengthening LE as well as stability muscles. Scheduled pt for more sessions. STG: (to be met in 6 treatments)  1. ? Pain:<5/10   2. ? ROM: hip ext 10 deg  3. ? Strength:able to perform 1 squat/sit to stand from chair  4. ? Function: <25% interference with Modified Oswestry  5. Patient to be independent with home exercise program as demonstrated by performance with correct form without cues. 6. Demonstrate Knowledge of fall prevention  LTG: (to be met in 12 treatments)  1. Able to perfom 10 squats/sit to stand from chair  2. Improve Modified Oswestry <20% interference  3. Pain <4/10        Patient goals:\"lose pain\"    Pt.  Education:  [x] Yes  [] No  [] Reviewed Prior HEP/Ed  Method of Education: [x] Verbal  [x] Demo  [] Written  Comprehension of Education:  [] Verbalizes understanding. [x] Demonstrates understanding. [] Needs review. [] Demonstrates/verbalizes HEP/Ed previously given. Plan: [x] Continue per plan of care. [] Other:      Time In: 10:37am            Time Out: 11:18    Electronically signed by:  AMY Santos  The above documentation completed by Charisse Osorio , Student Physical Therapist Assistant, was reviewed and accepted by supervising Clinical Instructor Graeme Falcon PTA.

## 2020-10-12 ENCOUNTER — HOSPITAL ENCOUNTER (OUTPATIENT)
Dept: PHYSICAL THERAPY | Facility: CLINIC | Age: 70
Setting detail: THERAPIES SERIES
Discharge: HOME | End: 2020-10-12
Payer: MEDICARE

## 2020-10-12 PROCEDURE — 97113 AQUATIC THERAPY/EXERCISES: CPT

## 2020-10-12 NOTE — FLOWSHEET NOTE
[] [facility]       Physical Therapy M[facility]2       [facility] [address]        [address]       Phone: [phone]       Fax: [phone] [] [facility]       Promotion at [address]       Phone: [phone]       Fax: [phone] [x] Mercedes. [facility] Promotion  [address]   Phone: [phone]   Fax:  [phone]     Physical Therapy Daily  Aquatic Treatment Note    Date:  10/12/2020  Patient Name:  Allen Bettencourt    :  [birth year]  MRN: [id number]  Physician: GABBIE Capone                              Insurance: [facility] medicare  Medical Diagnosis: Lumbosacral spondylosis, intercostal neuralgia, chronic kathy T back pain, postherpetic neuralgia              Rehab Codes: M54.5  Onset Date: 2015             Next 's appt.: 11/3/20    Visit# / total visits: 2/12  Cancels/No Shows: 0/0    Subjective:    Pain:  [x] Yes  [] No Location: LB and L lateral torso         Pain Rating: (0-10 scale) 5/10  Pain altered Tx:  [] No  [] Yes  Action:  Comments: Pt reports was painful the next day after last session and unable to get OOB due to pain. Pt feels she tried to lift her LE too far in the water.     Objective:      KEY  B = Belt G = Gloves N = Noodle   C = Cuffs K = Kickboard P = Paddles   CC = Cervical Collar L = Laps T = Theratube   DB = Dumbells M = Minutes W = Weights     Exercises/Activities Precautions: R total shoulder, past TIA, uncontrolled T2DM, tends to run elevated blood sugar and will get dizzy/headache  Warm-up/Amb 10/9 10/12 Dynamic Exercises 10/9 10/12   Forward 3L 3L March 3L 3L   Sideways 3L 3L Squat     Backwards 3L 3L Retro HS curls        Retro SLR     Stretches   Braiding     Gastroc/Soleus   Heel to Toe amb     Hamstring   Toe amb     Hip flexor   Heel amb     Piriformis        SKTC        Pec Stretch        Post Deltoid   Static Exercises UE        Shoulder flex/ext 15 15 Static Exercises LE   Shoulder abd/add 15 15   Heel/toe raises 15 15 Shoulder H.  abd/add 15 15   Marches 15 15 Shoulder IR/ER 15 15   Mini-squats 15 15 Rowing 15 15   4-way hip  15 15 Arm Circles     Hamstring curls 15 15 Post sh rolls     Hip Circles/Fig 8   Scap squeezes     Ankle ROM   Diagonals 1/2     Lunges    Elbow flex/ext        Pron/Sup     Functional Exercise   Wrist AROM     Step        Wall Push-ups   Deep H20/     SLS   Bike 3m 3m   Breast Stroke on Noodle   Hip abd/add 3m 3m   Noodle Twist   Hip flex/ext     Noodle Push down   Hip IR/ER     Kickboard push/pull   Knee flex/ext        Push/pull on [employer] 5m   Other:    Specific Instructions for next treatment:    Treatment Charges: Mins Units   []  Modalities     []  Ther Exercise     []  Manual Therapy     []  Ther Activities     []  Aquatics 30 2   []  Other       Assessment: [] Progressing toward goals. [] No change. [x] Other: VC to keep all ex in comfortable range today deewy LE 3 way hip ex. Pt with good understanding and completed ex with fair mahsa. No progressions due to increased pain after the last session. Cues for TA contr throughout tx, and upright posture. Pt feels okay after ex. Will cont to monitor sx and progress as mahsa. STG: (to be met in 6 treatments)  1. ? Pain:<5/10   2. ? ROM: hip ext 10 deg  3. ? Strength:able to perform 1 squat/sit to stand from chair  4. ? Function: <25% interference with Modified Oswestry  5. Patient to be independent with home exercise program as demonstrated by performance with correct form without cues. 6. Demonstrate Knowledge of fall prevention  LTG: (to be met in 12 treatments)  1. Able to perfom 10 squats/sit to stand from chair  2. Improve Modified Oswestry <20% interference  3. Pain <4/10        Patient goals:\"lose pain\"    Pt.  Education:  [x] Yes  [] No  [] Reviewed Prior HEP/Ed  Method of Education: [x] Verbal  [x] Demo  [] Written  Comprehension of Education:  [] Verbalizes understanding. [x] Demonstrates understanding. [] Needs review. [] Demonstrates/verbalizes HEP/Ed previously given. Plan: [x] Continue per plan of care.    [] Other:      Time In: 2:00 pm           Time Out: 2:30 pm    Electronically signed by:  Tyree Santana PTA

## 2020-10-16 ENCOUNTER — HOSPITAL ENCOUNTER (OUTPATIENT)
Dept: PHYSICAL THERAPY | Facility: CLINIC | Age: 70
Setting detail: THERAPIES SERIES
Discharge: HOME | End: 2020-10-16
Payer: MEDICARE

## 2020-10-16 PROCEDURE — 97113 AQUATIC THERAPY/EXERCISES: CPT

## 2020-10-16 NOTE — FLOWSHEET NOTE
[] [address] [facility]       Physical Therapy MOB2       [facility] [address]        [address]       Phone: [phone]       Fax: [phone] [] [facility]       Promotion at [address] [facility]       Phone: [phone]       Fax: [phone] [x] Mercedes. [facility]5 [facility] Promotion  [address]   Phone: [phone]   Fax:  [phone]     Physical Therapy Daily  Aquatic Treatment Note    Date:  10/16/2020  Patient Name:  Abbie Taylor    :    MRN: [id number]  Physician: GABBIE Becker                              Insurance: [facility] medicare  Medical Diagnosis: Lumbosacral spondylosis, intercostal neuralgia, chronic kathy T back pain, postherpetic neuralgia              Rehab Codes: M54.5  Onset Date: 2015             Next 's appt.: 11/3/20    Visit# / total visits: 3/12  Cancels/No Shows: 0/0    Subjective:    Pain:  [x] Yes  [] No Location: LB and L lateral torso         Pain Rating: (0-10 scale) 9/10  Pain altered Tx:  [] No  [] Yes  Action:  Comments: Pt reports she is really hurting today with pain across LB.     Objective:      KEY  B = Belt G = Gloves N = Noodle   C = Cuffs K = Kickboard P = Paddles   CC = Cervical Collar L = Laps T = Theratube   DB = Dumbells M = Minutes W = Weights     Exercises/Activities Precautions: R total shoulder, past TIA, uncontrolled T2DM, tends to run elevated blood sugar and will get dizzy/headache  Warm-up/Amb 10/9 10/12 10/16 Dynamic Exercises 10/9 10/12 10/16   Forward 3L 3L 3L March 3L 3L 3L   Sideways 3L 3L 3L Squat      Backwards 3L 3L 3L Retro HS curls          Retro SLR      Stretches    Braiding      Gastroc/Soleus    Heel to Toe amb      Hamstring    Toe amb      Hip flexor    Heel amb      Piriformis          SKTC          Pec Stretch          Post Deltoid    Static Exercises UE          Shoulder flex/ext 15 15 15   Static Exercises LE    Shoulder abd/add 15 15 15   Heel/toe raises 15 15 15 Shoulder H.  abd/add 15 15 15   Marches 15 15 15 Shoulder IR/ER 15 15 15   Mini-squats 15 15 15 Rowing 15 15 15   4-way hip  15 15 15 Arm Circles      Hamstring curls 15 15 15 Post sh rolls      Hip Circles/Fig 8    Scap squeezes      Ankle ROM    Diagonals 1/2      Lunges     Elbow flex/ext          Pron/Sup      Functional Exercise    Wrist AROM      Step          Wall Push-ups    Deep H20/      SLS    Bike 3m 3m 3m   Breast Stroke on Noodle    Hip abd/add 3m 3m 3m   Noodle Twist    Hip flex/ext      Noodle Push down    Hip IR/ER      Kickboard push/pull    Knee flex/ext          Push/pull on [employer] 5m 5m 5m   Other:    Specific Instructions for next treatment:    Treatment Charges: Mins Units   []  Modalities     []  Ther Exercise     []  Manual Therapy     []  Ther Activities     []  Aquatics 30 2   []  Other       Assessment: [] Progressing toward goals. [x] No change. [x] Other: VC to keep all ex in comfortable range today dewey LE 3 way hip ex. Pt notes having a \"catch in LB\" during L hip abd. Pt needed short rest break. Completed all other ex with good mahsa. Pt notes feeling less pain while in the water, but increases once she is out. Will cont to progress ex as pt mahsa. STG: (to be met in 6 treatments)  1. ? Pain:<5/10   2. ? ROM: hip ext 10 deg  3. ? Strength:able to perform 1 squat/sit to stand from chair  4. ? Function: <25% interference with Modified Oswestry  5. Patient to be independent with home exercise program as demonstrated by performance with correct form without cues. 6. Demonstrate Knowledge of fall prevention  LTG: (to be met in 12 treatments)  1. Able to perfom 10 squats/sit to stand from chair  2. Improve Modified Oswestry <20% interference  3. Pain <4/10        Patient goals:\"lose pain\"    Pt.  Education:  [x] Yes  [] No  [] Reviewed Prior HEP/Ed  Method of Education: [x] Verbal  [x] Demo  [] Written  Comprehension of Education:  [] Verbalizes understanding. [x] Demonstrates understanding. [] Needs review. [] Demonstrates/verbalizes HEP/Ed previously given. Plan: [x] Continue per plan of care.    [] Other:      Time In: 1:25 pm           Time Out: 2:00 pm    Electronically signed by:  Pa White PTA

## 2020-10-19 ENCOUNTER — HOSPITAL ENCOUNTER (OUTPATIENT)
Dept: PHYSICAL THERAPY | Facility: CLINIC | Age: 70
Setting detail: THERAPIES SERIES
Discharge: HOME | End: 2020-10-19
Payer: MEDICARE

## 2020-10-19 PROCEDURE — 97113 AQUATIC THERAPY/EXERCISES: CPT

## 2020-10-19 NOTE — FLOWSHEET NOTE
[] [facility]       Physical Therapy MOB2       [place] [address]        [address]       Phone: [phone]       Fax: [phone] [] [facility]       Promotion at [address] [facility]       Phone: [phone]       Fax: [phone] [x] Mercedes. [facility] for Health Promotion  [address] [facility]   Phone: [phone]   Fax:  [phone]     Physical Therapy Daily  Aquatic Treatment Note    Date:  10/19/2020  Patient Name:  Rashmi Tyler    :  [id number]  MRN: [id number]  Physician: GABBIE Munoz                              Insurance: [facility] medicare  Medical Diagnosis: Lumbosacral spondylosis, intercostal neuralgia, chronic kathy T back pain, postherpetic neuralgia              Rehab Codes: M54.5  Onset Date: 2015             Next 's appt.: 11/3/20    Visit# / total visits: 4/12  Cancels/No Shows: 0/0    Subjective:    Pain:  [x] Yes  [] No Location: LB and L lateral torso         Pain Rating: (0-10 scale) 9/10  Pain altered Tx:  [] No  [] Yes  Action:  Comments: Pt arrives to aquatics with antalgic gait and reports having high pain level again today.     Objective:      KEY  B = Belt G = Gloves N = Noodle   C = Cuffs K = Kickboard P = Paddles   CC = Cervical Collar L = Laps T = Theratube   DB = Dumbells M = Minutes W = Weights     Exercises/Activities Precautions: R total shoulder, past TIA, uncontrolled T2DM, tends to run elevated blood sugar and will get dizzy/headache  Warm-up/Amb 10/16 10/19 Dynamic Exercises 10/16 10/19   Forward 3L 3L March 3L 3L   Sideways 3L 3L Squat     Backwards 3L 3L Retro HS curls        Retro SLR     Stretches   Braiding     Gastroc/Soleus   Heel to Toe amb     Hamstring   Toe amb     Hip flexor   Heel amb     Piriformis        SKTC        Pec Stretch        Post Deltoid   Static Exercises UE        Shoulder flex/ext 15 15   Static Exercises LE   Shoulder abd/add 15 15 Heel/toe raises 15 15 Shoulder H.  abd/add 15 15   Marches 15 15 Shoulder IR/ER 15 15   Mini-squats 15 15 Rowing 15 15   4-way hip  15 15 Arm Circles     Hamstring curls 15 15 Post sh rolls     Hip Circles/Fig 8   Scap squeezes     Ankle ROM   Diagonals 1/2     Lunges    Elbow flex/ext        Pron/Sup     Functional Exercise   Wrist AROM     Step  10F      Wall Push-ups   Deep H20/     SLS   Bike 3m 3m   Breast Stroke on Noodle   Hip abd/add 3m 3m   Noodle Twist   Hip flex/ext     Noodle Push down   Hip IR/ER     Kickboard push/pull   Knee flex/ext        Push/pull on [employer] 5m   Other:    Specific Instructions for next treatment:    Treatment Charges: Mins Units   []  Modalities     []  Ther Exercise     []  Manual Therapy     []  Ther Activities     []  Aquatics 30 2   []  Other       Assessment: [] Progressing toward goals. [x] No change. In pain. [x] Other: Cont with aquatic ex per flow sheet and able to add step ups. Pt cont to feel sore in LB but feels less pain in the water. After tx when asked how pt is feeling pt responded \"crappy\". Will cont to monitor sx. STG: (to be met in 6 treatments)  1. ? Pain:<5/10   2. ? ROM: hip ext 10 deg  3. ? Strength:able to perform 1 squat/sit to stand from chair  4. ? Function: <25% interference with Modified Oswestry  5. Patient to be independent with home exercise program as demonstrated by performance with correct form without cues. 6. Demonstrate Knowledge of fall prevention  LTG: (to be met in 12 treatments)  1. Able to perfom 10 squats/sit to stand from chair  2. Improve Modified Oswestry <20% interference  3. Pain <4/10        Patient goals:\"lose pain\"    Pt. Education:  [x] Yes  [] No  [] Reviewed Prior HEP/Ed  Method of Education: [x] Verbal  [x] Demo  [] Written  Comprehension of Education:  [] Verbalizes understanding. [x] Demonstrates understanding. [] Needs review. [] Demonstrates/verbalizes HEP/Ed previously given.      Plan: [x] Continue per plan of care.    [] Other:      Time In: 1:25 pm           Time Out: 2:07 pm    Electronically signed by:  Meño Rosado PTA

## 2020-10-21 ENCOUNTER — HOSPITAL ENCOUNTER (OUTPATIENT)
Dept: MAMMOGRAPHY | Age: 70
Discharge: HOME | End: 2020-10-23
Payer: MEDICARE

## 2020-10-21 PROCEDURE — 77063 BREAST TOMOSYNTHESIS BI: CPT

## 2020-10-23 ENCOUNTER — HOSPITAL ENCOUNTER (OUTPATIENT)
Dept: PHYSICAL THERAPY | Facility: CLINIC | Age: 70
Setting detail: THERAPIES SERIES
Discharge: HOME | End: 2020-10-23
Payer: MEDICARE

## 2020-10-23 PROCEDURE — 97113 AQUATIC THERAPY/EXERCISES: CPT

## 2020-10-26 ENCOUNTER — HOSPITAL ENCOUNTER (OUTPATIENT)
Dept: PHYSICAL THERAPY | Facility: CLINIC | Age: 70
Setting detail: THERAPIES SERIES
Discharge: HOME | End: 2020-10-26
Payer: MEDICARE

## 2020-10-26 PROCEDURE — 97113 AQUATIC THERAPY/EXERCISES: CPT

## 2020-10-26 NOTE — FLOWSHEET NOTE
Demonstrates understanding. [] Needs review. [] Demonstrates/verbalizes HEP/Ed previously given. Plan: [x] Continue per plan of care.    [] Other:      Time In: 3:30 pm           Time Out: 4:02  pm    Electronically signed by:  Tao Mahoney PTA

## 2020-10-28 RX ORDER — SYRINGE AND NEEDLE,INSULIN,1ML 31 GX5/16"
SYRINGE, EMPTY DISPOSABLE MISCELLANEOUS
Qty: 100 EACH | Refills: 10 | Status: SHIPPED | OUTPATIENT
Start: 2020-10-28 | End: 2021-09-23

## 2020-10-28 RX ORDER — LORAZEPAM 0.5 MG/1
TABLET ORAL
Qty: 90 TABLET | Refills: 2 | Status: SHIPPED | OUTPATIENT
Start: 2020-10-28 | End: 2020-11-28

## 2020-10-28 RX ORDER — BLOOD SUGAR DIAGNOSTIC
STRIP MISCELLANEOUS
Qty: 100 STRIP | Refills: 10 | Status: SHIPPED | OUTPATIENT
Start: 2020-10-28 | End: 2021-09-23

## 2020-10-30 ENCOUNTER — HOSPITAL ENCOUNTER (OUTPATIENT)
Dept: PHYSICAL THERAPY | Facility: CLINIC | Age: 70
Setting detail: THERAPIES SERIES
Discharge: HOME | End: 2020-10-30
Payer: MEDICARE

## 2020-10-30 PROCEDURE — 97113 AQUATIC THERAPY/EXERCISES: CPT

## 2020-10-30 NOTE — FLOWSHEET NOTE
[] [address]       Physical Therapy MOB2       [place] [address]        [address]       Phone: [phone]       Fax: [phone] [] [facility]       Promotion at [address] [facility]       Phone: [phone]       Fax: [phone] [x] Mercedes. [address] [place] [address] for Health Promotion  [address] [facility] [address]   Phone: [phone]   Fax:  [phone]     Physical Therapy Daily  Aquatic Treatment Note    Date:  10/30/2020  Patient Name:  Dudley Kumar    :    MRN: [id number]  Physician: GABBIE Mayen                              Insurance: [facility] medicare  Medical Diagnosis: Lumbosacral spondylosis, intercostal neuralgia, chronic kathy T back pain, postherpetic neuralgia              Rehab Codes: M54.5  Onset Date: 2015             Next 's appt.: 11/3/20    Visit# / total visits: 12  Cancels/No Shows: 0/0    Subjective:    Pain:  [x] Yes  [] No Location: LB and L lateral torso         Pain Rating: (0-10 scale) 9/10  Pain altered Tx:  [] No  [] Yes  Action:  Comments: Pt reports cont LBP and pain down R LE today. Pt also with pain L lower ribs that are tender to the touch. Pt notes this pain has been ongoing.     Objective:      KEY  B = Belt G = Gloves N = Noodle   C = Cuffs K = Kickboard P = Paddles   CC = Cervical Collar L = Laps T = Theratube   DB = Dumbells M = Minutes W = Weights     Exercises/Activities Precautions: R total shoulder, past TIA, uncontrolled T2DM, tends to run elevated blood sugar and will get dizzy/headache  Warm-up/Amb 10/30 Dynamic Exercises 10/30   Forward 3L March 3L   Sideways 3L Squat    Backwards 3L Retro HS curls      Retro SLR    Stretches  Braiding    Gastroc/Soleus  Heel to Toe amb    Hamstring  Toe amb    Hip flexor  Heel amb    Piriformis      SKTC      Pec Stretch      Post Deltoid  Static Exercises UE      Shoulder flex/ext 20   Static Exercises LE  Shoulder abd/add 20 Heel/toe raises 20 Shoulder H.  abd/add 20   Marches 20 Shoulder IR/ER 20   Mini-squats 20 Rowing 20   4-way hip  20 Arm Circles    Hamstring curls 20 Post sh rolls    Hip Circles/Fig 8  Scap squeezes    Ankle ROM  Diagonals 1/2    Lunges   Elbow flex/ext      Pron/Sup    Functional Exercise  Wrist AROM    Step 15F     Wall Push-ups  Deep H20/    SLS  Bike 3m   Breast Stroke on Noodle  Hip abd/add 3m   Noodle Twist  Hip flex/ext    Noodle Push down  Hip IR/ER    Kickboard push/pull  Knee flex/ext      Push/pull on [employer] 5m   Other:    Specific Instructions for next treatment:    Treatment Charges: Mins Units   []  Modalities     []  Ther Exercise     []  Manual Therapy     []  Ther Activities     [x]  Aquatics 30 2   []  Other       Assessment: [] Progressing toward goals. [x] No change. In pain. [x] Other: Continued aquatic ex as noted above, increased reps with static ex. Pt with less pain in the water. Pt has pain down R LE while walking on land today. Pt to see her dr next week and to hold therapy until then. Pt is to call and let us know if to cont therapy. STG: (to be met in 6 treatments)  1. ? Pain:<5/10   2. ? ROM: hip ext 10 deg  3. ? Strength:able to perform 1 squat/sit to stand from chair  4. ? Function: <25% interference with Modified Oswestry  5. Patient to be independent with home exercise program as demonstrated by performance with correct form without cues. 6. Demonstrate Knowledge of fall prevention  LTG: (to be met in 12 treatments)  1. Able to perfom 10 squats/sit to stand from chair  2. Improve Modified Oswestry <20% interference  3. Pain <4/10        Patient goals:\"lose pain\"    Pt. Education:  [x] Yes  [] No  [] Reviewed Prior HEP/Ed  Method of Education: [x] Verbal  [x] Demo  [] Written  Comprehension of Education:  [] Verbalizes understanding. [x] Demonstrates understanding. [] Needs review. [] Demonstrates/verbalizes HEP/Ed previously given.      Plan: [x] Continue per plan of care. [x] Other:Hold therapy until she has follow up with dr 11/3/20.       Time In: 2:30 pm           Time Out: 3:00  pm    Electronically signed by:  Ethel Salinas PTA

## 2020-11-02 ENCOUNTER — OFFICE VISIT (OUTPATIENT)
Dept: PAIN MANAGEMENT | Age: 70
End: 2020-11-02
Payer: MEDICARE

## 2020-11-02 VITALS
BODY MASS INDEX: 37.56 KG/M2 | SYSTOLIC BLOOD PRESSURE: 138 MMHG | DIASTOLIC BLOOD PRESSURE: 77 MMHG | HEIGHT: 64 IN | TEMPERATURE: 97.2 F | HEART RATE: 55 BPM | WEIGHT: 220 LBS

## 2020-11-02 PROCEDURE — 99213 OFFICE O/P EST LOW 20 MIN: CPT | Performed by: NURSE PRACTITIONER

## 2020-11-02 ASSESSMENT — ENCOUNTER SYMPTOMS
CONSTIPATION: 0
BACK PAIN: 1
COUGH: 0
SHORTNESS OF BREATH: 0

## 2020-11-02 NOTE — PROGRESS NOTES
Patient is here today for follow up    Chief Complaint: back pain    [facility] Patient in today for follow up to assess progress with Spinal cord stimulator. Patient had SCS implant on 7/27/20 and initially had good relief. She states she still has moderate relief on the right side however she is no longer having any relief on the left side. Complains of pain in thoracic area radiating to the left ribs. [employer] rep was at appt recently and attempted to adjust the programming to relieve the pain on the left side with no success. She completed thoracic and lumbar XR that show SCS is in correct position. Pt started aquatic therapy which exacerbates her pain. She has completed 7 sessions with no benefit.      Patient had consult at weight loss clinic 10/7 - she was given a nutritional guide. HPI: Back Pain   This is a chronic problem. The current episode started more than 1 year ago. The problem occurs constantly. The problem has been gradually worsening since onset. The pain is present in the lumbar spine, thoracic spine, sacro-iliac and gluteal. The quality of the pain is described as cramping, burning and shooting. The pain is at a severity of 8/10. The pain is moderate. The pain is worse during the night. The symptoms are aggravated by lying down, position, bending and standing. Pertinent negatives include no chest pain or fever. She has tried heat, ice and home exercises for the symptoms. The treatment provided no relief. Patient denies any new neurological symptoms. No bowel or bladder incontinence, no weakness, and no falling.     Past Medical History:   Diagnosis Date    Arthritis     Bowel obstruction ([address]     Cancer ([address] 2015    ovarian stage 2    Cerebral artery occlusion with cerebral infarction ([address] 03/2018    Mini strokes \"Tia's\"    Diabetes mellitus ([address]     Epigastric pain     GERD (gastroesophageal reflux disease)     History of anesthesia complications 4393'[id number]    was told after gallbladder surgery to never have anesthesia again \"since it was hard for me to come out of it\"    History of blood transfusion     Hx of blood clots     lung     Hypertension     Right arm pain     Right shoulder pain     Sleep apnea     uses CPAP nightly    Tachycardia     hx of    Thyroid disease     TIA (transient ischemic attack)        Past Surgical History:   Procedure Laterality Date    ANESTHESIA NERVE BLOCK Left 11/7/2019    NERVE BLOCK (DEFINE) - INTERCOSTAL NERVE BLOCK performed by Tara James MD at [facility] Bilateral 5/15/2020    NERVE BLOCK BILATERAL - MBB  L4-5, L5-S1 performed by Tara James MD at [address] Left     ORIF    CHOLECYSTECTOMY      COLONOSCOPY  07/08/2019    5 yr recall.     COLONOSCOPY N/A 7/8/2019    COLONOSCOPY WITH BIOPSY performed by Stefan Brewster MD at [address] Right     [facility]. INJECTION PROCEDURE FOR SACROILIAC JOINT Bilateral 8/29/2019    SACROILIAC JOINT INJECTION performed by Tara James MD at [address] [facility] Bilateral 10/10/2019    SACROILIAC JOINT INJECTION performed by Tara James MD at [address]  2/2014    chris with bso    JOINT REPLACEMENT Right     shoulder    KNEE ARTHROSCOPY Right     NERVE BLOCK Bilateral 08/29/2019    SI joint injection    NERVE BLOCK Bilateral 10/10/2019    SI joint    NERVE BLOCK Bilateral 05/15/2020    Medial branch block L4-5, L5-S1    NERVE BLOCK  06/05/2020    SPINAL CORD STIMULATOR IMPLANT TRIAL (N/A )    OTHER SURGICAL HISTORY Right 11/11/2014    shoulder manipulation    SHOULDER ARTHROSCOPY Right 02/03/15    SHOULDER SURGERY  11/2014    manipulation    SPINAL CORD STIMULATOR SURGERY N/A 6/5/2020    SPINAL CORD STIMULATOR IMPLANT TRIAL performed by Tara James MD at [facility] meetings of clubs or organizations: Not on file     Relationship status: Not on file    Intimate partner violence     Fear of current or ex partner: Not on file     Emotionally abused: Not on file     Physically abused: Not on file     Forced sexual activity: Not on file   Other Topics Concern    Not on file   Social History Narrative    Not on file       Review of Systems:  Review of Systems   Constitution: Negative for chills and fever. Cardiovascular: Negative for chest pain and palpitations. Respiratory: Negative for cough and shortness of breath. Musculoskeletal: Positive for back pain. Gastrointestinal: Negative for constipation. Neurological: Negative for disturbances in coordination and loss of balance. Physical Exam:  Temp 97.2 °F (36.2 °C)     Physical Exam  HENT:      Head: Normocephalic. Neck:      Musculoskeletal: Normal range of motion. Pulmonary:      Effort: Pulmonary effort is normal.   Musculoskeletal: Normal range of motion. Lumbar back: She exhibits tenderness and pain. Skin:     General: Skin is warm and dry. Neurological:      Mental Status: She is alert and oriented to person, place, and time. Record/Diagnostics Review:    Right foraminal disc bulge at L3-L4 effacing exiting right L3 and descending    right L4 nerve root.         Disc bulges otherwise as above without critical canal or neural foraminal    stenosis.           Assessment:  Problem List Items Addressed This Visit     Lumbosacral spondylosis without myelopathy (Chronic)    Chronic bilateral thoracic back pain - Primary (Chronic)             Treatment Plan:  Patient has failed conservative treatments - most recently attempted aquatic therapy which exacerbated her pain  SCS with no relief - adjustment with [employer] have failed to provide any change  She was seen by weight management and given a weight loss guide and is working on losing weight  She is not interested in surgical options  Offered referral to [facility] but patient declines  She has not been interested in pain medication but states she is to the point where she would like to discuss it with Dr. Nick Cruz.   Will schedule appt with him as soon as available

## 2020-11-04 RX ORDER — TAMSULOSIN HYDROCHLORIDE 0.4 MG/1
CAPSULE ORAL
Qty: 30 CAPSULE | Refills: 10 | Status: SHIPPED | OUTPATIENT
Start: 2020-11-04 | End: 2021-09-23

## 2020-11-04 ASSESSMENT — ENCOUNTER SYMPTOMS
RESPIRATORY NEGATIVE: 1
BOWEL INCONTINENCE: 1
BACK PAIN: 1

## 2020-11-04 NOTE — PROGRESS NOTES
HPI:     Back Pain   This is a chronic problem. The current episode started more than 1 year ago. The problem occurs daily. The problem is unchanged. The pain is present in the lumbar spine. The quality of the pain is described as stabbing, shooting, cramping, burning and aching. The pain is at a severity of 9/10. The pain is severe. The pain is the same all the time. The symptoms are aggravated by standing, position, bending and twisting. Stiffness is present all day. Associated symptoms include bowel incontinence. Pertinent negatives include no bladder incontinence, numbness, tingling or weakness. Risk factors include history of cancer. She has tried ice and heat for the symptoms. The treatment provided no relief. Chronic constant aching low back pain. Also having significant rib/thoracic pain has been ongoing for some time. Multiple treat modalities including medial branch blocks SI joints with no benefit. Intercostal nerve blocks no benefit. Spinal cord stimulator implant with no benefit. She is on Neurontin. Could not tolerate Lyrica. Also takes Cymbalta. Continues to significant pain. Trauma level help. She is reluctant to consider opioid therapy. She is on Xarelto for history of DVT. Patient denies any new neurological symptoms. Nobowel or bladder incontinence, no weakness, and no falling. Review of OARRS does not show any aberrant prescription behavior.      Past Medical History:   Diagnosis Date    Arthritis     [facility]     Cancer ([facility] 2015    ovarian stage 2    Cerebral artery occlusion with cerebral infarction ([facility] 03/2018    Mini strokes \"Tia's\"    Diabetes mellitus ([facility]     Epigastric pain     GERD (gastroesophageal reflux disease)     History of anesthesia complications 1585'G    was told after gallbladder surgery to never have anesthesia again \"since it was hard for me to come out of it\"    History of blood transfusion     Hx of blood clots     lung  Hypertension     Right arm pain     Right shoulder pain     Sleep apnea     uses CPAP nightly    Tachycardia     hx of    Thyroid disease     TIA (transient ischemic attack)        Past Surgical History:   Procedure Laterality Date    ANESTHESIA NERVE BLOCK Left 11/7/2019    NERVE BLOCK (DEFINE) - INTERCOSTAL NERVE BLOCK [facility]ela [facility]as [email or web address] Bilateral 5/15/2020    NERVE BLOCK BILATERAL - MBB  L4-5, L5-S1 performed by Estela Arias MD at [address] Left     ORIF    CHOLECYSTECTOMY      COLONOSCOPY  07/08/2019    5 yr recall.     COLONOSCOPY N/A 7/8/2019    COLONOSCOPY WITH BIOPSY performed by Hal Romberg, MD at [address] Right     repair tendon    [facility]. INJECTION PROCEDURE FOR SACROILIAC JOINT Bilateral 8/29/2019    SACROILIAC JOINT INJECTION performed by Estela Arias MD at [facility] Bilateral 10/10/2019    SACROILIAC JOINT INJECTION performed by Estela Arias MD at [address]  2/2014    chris with bso    JOINT REPLACEMENT Right     shoulder    KNEE ARTHROSCOPY Right     NERVE BLOCK Bilateral 08/29/2019    SI joint injection    NERVE BLOCK Bilateral 10/10/2019    SI joint    NERVE BLOCK Bilateral 05/15/2020    Medial branch block L4-5, L5-S1    NERVE BLOCK  06/05/2020    SPINAL CORD STIMULATOR IMPLANT TRIAL (N/A )    OTHER SURGICAL HISTORY Right 11/11/2014    shoulder manipulation    SHOULDER ARTHROSCOPY Right 02/03/15    SHOULDER SURGERY  11/2014    manipulation    SPINAL CORD STIMULATOR SURGERY N/A 6/5/2020    SPINAL CORD STIMULATOR IMPLANT TRIAL performed by Estela Arias MD at [facility] N/A 7/27/2020    SPINAL CORD STIMULATOR IMPLANT WITH THORACIC LAMINOTOMY performed by Sandie Martinez MD at [address] [place] [address] GASTROINTESTINAL ENDOSCOPY  07/08/2019    UPPER GASTROINTESTINAL ENDOSCOPY N/A 7/8/2019    [facility]SY [facility] [address] Right 4/30/2015    pt has blood clot & abscess in her right kidney    VASCULAR SURGERY      Alesha filter in & removed 1 year later       Allergies   Allergen Reactions    Aspirin Anaphylaxis     Only thing she can take is tylenol    Benadryl [Diphenhydramine] Other (See Comments)     Dystonic movement    Ibuprofen Anaphylaxis    Lidocaine Anaphylaxis     CAN NOT TOLERATE IV    Penicillins Anaphylaxis    Hydrocodone-Acetaminophen Other (See Comments)    Oxycodone-Acetaminophen Other (See Comments)    Tramadol Other (See Comments)    Morphine Nausea And Vomiting       Medications: reviewed in the EMR    Family History   Problem Relation Age of Onset    Elevated Lipids Paternal Grandmother        Social History     Socioeconomic History    Marital status: Single     Spouse name: Not on file    Number of children: Not on file    Years of education: Not on file    Highest education level: Not on file   Occupational History    Not on file   Social Needs    Financial resource strain: Not on file    Food insecurity     Worry: Not on file     Inability: Not on file    Transportation needs     Medical: Not on file     Non-medical: Not on file   Tobacco Use    Smoking status: Never Smoker    Smokeless tobacco: Never Used   Substance and Sexual Activity    Alcohol use: Yes     Comment: rare    Drug use: No    Sexual activity: Yes   Lifestyle    Physical activity     Days per week: Not on file     Minutes per session: Not on file    Stress: Not on file   Relationships    Social connections     Talks on phone: Not on file     Gets together: Not on file     Attends Confucianist service: Not on file     Active member of club or organization: Not on file     Attends meetings of clubs or organizations: Not on file     Relationship status: Not on file    Intimate partner violence     Fear of current or ex partner: Not on file     Emotionally abused: Not on file     Physically abused: Not on file     Forced sexual activity: Not on file   Other Topics Concern    Not on file   Social History Narrative    Not on file       Review of Systems:  Review of Systems   Constitution: Negative. Cardiovascular: Negative. Respiratory: Negative. Musculoskeletal: Positive for back pain. Gastrointestinal: Positive for bowel incontinence. Genitourinary: Negative for bladder incontinence. Neurological: Negative. Negative for numbness, tingling and weakness. Physical Exam:  BP (!) 146/86   Pulse 86   Temp 97.8 °F (36.6 °C)   Ht 5' 4\" (1.626 m)   Wt 226 lb (102.5 kg)   BMI 38.79 kg/m²     Physical Exam  Constitutional:       Appearance: Normal appearance. HENT:      Head: Normocephalic and atraumatic. Eyes:      General: Lids are normal.   Neck:      Musculoskeletal: Normal range of motion. Pulmonary:      Effort: Pulmonary effort is normal.   Skin:     Comments: Visualized skin with no rash   Neurological:      Mental Status: She is alert. Psychiatric:         Attention and Perception: Attention and perception normal.         Mood and Affect: Mood and affect normal.         Record/Diagnostics Review:    As above, I did review the imaging    Orders:  Orders Placed This Encounter   Procedures    External Referral To Pain Clinic         Assessment:  1. Chronic bilateral thoracic back pain    2. Lumbosacral spondylosis without myelopathy    3. Other chronic pain    4. DDD (degenerative disc disease), lumbar    5. Chronic anticoagulation        Treatment Plan:  DISCUSSION: Treatment options discussed with patient and all questions answered to patient's satisfaction. OARRS Review: Reviewed and acceptable for medications prescribed.   TREATMENT OPTIONS:     Continues to have significant pain despite multiple treatment modalities including therapy injections and nonopioid medication regimens. We did discuss the starting of as Norco prn, however she is very reluctant to consider oral opioids at this point. We will make a referral to the [facility] for second opinion. May be a candidate for neuromodulation in the form of intrathecal pump,  She is interested in intrathecal pump and will look into this further. She is on Xarelto history of DVT, would need clearance to hold for intrathecal pump placement. Kassy Garcia M.D. I have reviewed the chief complaint and history of present illness (including ROS and PFSH) and vital documentation by my staff and I agree with their documentation and have added where applicable.

## 2020-11-05 ENCOUNTER — OFFICE VISIT (OUTPATIENT)
Dept: PAIN MANAGEMENT | Age: 70
End: 2020-11-05
Payer: MEDICARE

## 2020-11-05 VITALS
DIASTOLIC BLOOD PRESSURE: 86 MMHG | BODY MASS INDEX: 38.58 KG/M2 | TEMPERATURE: 97.8 F | HEIGHT: 64 IN | WEIGHT: 226 LBS | HEART RATE: 86 BPM | SYSTOLIC BLOOD PRESSURE: 146 MMHG

## 2020-11-05 PROCEDURE — 99214 OFFICE O/P EST MOD 30 MIN: CPT | Performed by: PAIN MEDICINE

## 2020-11-16 ASSESSMENT — ENCOUNTER SYMPTOMS
RESPIRATORY NEGATIVE: 1
BOWEL INCONTINENCE: 1
BACK PAIN: 1

## 2020-11-16 NOTE — PROGRESS NOTES
HPI:     Back Pain   This is a chronic problem. The current episode started more than 1 year ago. The problem occurs constantly. The problem has been gradually worsening since onset. The pain is present in the lumbar spine. The quality of the pain is described as shooting, stabbing, cramping, burning and aching. The pain radiates to the right thigh. The pain is at a severity of 9/10. The pain is severe. The pain is the same all the time. The symptoms are aggravated by standing, bending and twisting. Associated symptoms include bowel incontinence. Pertinent negatives include no bladder incontinence, numbness, tingling or weakness. Risk factors include history of cancer. She has tried heat, ice and NSAIDs for the symptoms. The treatment provided mild relief. Chronic low back pain as well as thoracic pain. Multiple interventions not benefit. Spinal cord stimulator without benefit. Multiple medications not benefit. She has been reluctant to consider opioid therapy. She had appointment at the [facility] however they rescheduled and she does not wish to try again. Patient denies any new neurological symptoms. Nobowel or bladder incontinence, no weakness, and no falling. Review of OARRS does not show any aberrant prescription behavior. Medication is helping the patient stay active. Patient denies any side effects and reports adequate analgesia. No sign of misuse/abuse.     Past Medical History:   Diagnosis Date    Arthritis     Bowel obstruction ([address]     Cancer ([facility] 2015    ovarian stage 2    Cerebral artery occlusion with cerebral infarction ([facility] 03/2018    Mini strokes \"Tia's\"    Diabetes mellitus ([facility]     Epigastric pain     GERD (gastroesophageal reflux disease)     History of anesthesia complications [address]    was told after gallbladder surgery to never have anesthesia again \"since it was hard for me to come out of it\"    History of blood transfusion     Hx of blood clots     lung  Hypertension     Right arm pain     Right shoulder pain     Sleep apnea     uses CPAP nightly    Tachycardia     hx of    Thyroid disease     TIA (transient ischemic attack)        Past Surgical History:   Procedure Laterality Date    ANESTHESIA NERVE BLOCK Left 11/7/2019    NERVE BLOCK (DEFINE) - INTERCOSTAL NERVE BLOCK performed by Vicky Romo MD at [facility] Bilateral 5/15/2020    NERVE BLOCK BILATERAL - MBB  L4-5, L5-S1 performed by Vicky Romo MD at [address] Left     ORIF    CHOLECYSTECTOMY      COLONOSCOPY  07/08/2019    5 yr recall.     COLONOSCOPY N/A 7/8/2019    COLONOSCOPY WITH BIOPSY performed by Ketty Ontiveros MD at [address] Right     [facility]. INJECTION PROCEDURE FOR SACROILIAC JOINT Bilateral 8/29/2019    SACROILIAC JOINT INJECTION performed by Vicky Romo MD at [address] Bilateral 10/10/2019    SACROILIAC JOINT INJECTION performed by Vicky Romo MD at [address]  2/2014    chris with bso    JOINT REPLACEMENT Right     shoulder    KNEE ARTHROSCOPY Right     NERVE BLOCK Bilateral 08/29/2019    SI joint injection    NERVE BLOCK Bilateral 10/10/2019    SI joint    NERVE BLOCK Bilateral 05/15/2020    Medial branch block L4-5, L5-S1    NERVE BLOCK  06/05/2020    SPINAL CORD STIMULATOR IMPLANT TRIAL (N/A )    OTHER SURGICAL HISTORY Right 11/11/2014    shoulder manipulation    SHOULDER ARTHROSCOPY Right 02/03/15    SHOULDER SURGERY  11/2014    manipulation    SPINAL CORD STIMULATOR SURGERY N/A 6/5/2020    SPINAL CORD STIMULATOR IMPLANT TRIAL performed by Vicky Romo MD at [address] [facility] N/A 7/27/2020    SPINAL CORD STIMULATOR IMPLANT WITH THORACIC LAMINOTOMY performed by Long Mcfarlane MD at [address] [place] [address] GASTROINTESTINAL ENDOSCOPY  07/08/2019    UPPER GASTROINTESTINAL ENDOSCOPY N/A 7/8/2019    EGD BIOPSY performed by Reza Wagoner MD at [facility] [address] Right 4/30/2015    pt has blood clot & abscess in her right kidney    VASCULAR SURGERY      Alesha filter in & removed 1 year later       Allergies   Allergen Reactions    Aspirin Anaphylaxis     Only thing she can take is tylenol    Benadryl [Diphenhydramine] Other (See Comments)     Dystonic movement    Ibuprofen Anaphylaxis    Lidocaine Anaphylaxis     CAN NOT TOLERATE IV    Penicillins Anaphylaxis    Hydrocodone-Acetaminophen Other (See Comments)    Oxycodone-Acetaminophen Other (See Comments)    Tramadol Other (See Comments)    Morphine Nausea And Vomiting       Meds: reviewed in the EMR    Family History   Problem Relation Age of Onset    Elevated Lipids Paternal Grandmother        Social History     Socioeconomic History    Marital status: Single     Spouse name: Not on file    Number of children: Not on file    Years of education: Not on file    Highest education level: Not on file   Occupational History    Not on file   Social Needs    Financial resource strain: Not on file    Food insecurity     Worry: Not on file     Inability: Not on file    Transportation needs     Medical: Not on file     Non-medical: Not on file   Tobacco Use    Smoking status: Never Smoker    Smokeless tobacco: Never Used   Substance and Sexual Activity    Alcohol use: Yes     Comment: rare    Drug use: No    Sexual activity: Yes   Lifestyle    Physical activity     Days per week: Not on file     Minutes per session: Not on file    Stress: Not on file   Relationships    Social connections     Talks on phone: Not on file     Gets together: Not on file     Attends Jewish service: Not on file     Active member of club or organization: Not on file     Attends meetings of clubs or organizations: Not on file     Relationship status: Not on file    Intimate partner violence     Fear of current or ex partner: Not on file     Emotionally abused: Not on file     Physically abused: Not on file     Forced sexual activity: Not on file   Other Topics Concern    Not on file   Social History Narrative    Not on file       Review of Systems:  Review of Systems   Constitution: Negative. Cardiovascular: Negative. Respiratory: Negative. Musculoskeletal: Positive for back pain. Gastrointestinal: Positive for bowel incontinence. Genitourinary: Negative for bladder incontinence. Neurological: Negative. Negative for numbness, tingling and weakness. Physical Exam:    Physical Exam  Constitutional:       Appearance: Normal appearance. HENT:      Head: Normocephalic and atraumatic. Eyes:      General: Lids are normal.   Neck:      Musculoskeletal: Normal range of motion. Pulmonary:      Effort: Pulmonary effort is normal.   Skin:     Comments: Visualized skin with no rash   Neurological:      Mental Status: She is alert. Psychiatric:         Attention and Perception: Attention and perception normal.         Mood and Affect: Mood and affect normal.         Assessment:  1. Postherpetic neuralgia    2. Chronic anticoagulation    3. Chronic bilateral thoracic back pain    4. Lumbosacral spondylosis without myelopathy        Treatment Plan:  DISCUSSION: Treatment options discussed with patient and all questions answered to patient's satisfaction. OARRS Review: Reviewed and acceptable for medications prescribed. TREATMENT OPTIONS:     She is decided [facility]. She is considering intrathecal pump. We will have her touch base with surgeon once again. If nothing surgical will go ahead with intrathecal pump. On Xarelto, will need clearance to hold for intrathecal pump.   She had appointment set up with the [facility] clinic however they rescheduled and she does not wish to try

## 2020-11-19 ENCOUNTER — OFFICE VISIT (OUTPATIENT)
Dept: PAIN MANAGEMENT | Age: 70
End: 2020-11-19
Payer: MEDICARE

## 2020-11-19 PROCEDURE — 99213 OFFICE O/P EST LOW 20 MIN: CPT | Performed by: PAIN MEDICINE

## 2020-11-20 ENCOUNTER — TELEPHONE (OUTPATIENT)
Dept: NEUROSURGERY | Age: 70
End: 2020-11-20

## 2020-11-25 ENCOUNTER — OFFICE VISIT (OUTPATIENT)
Dept: NEUROSURGERY | Age: 70
End: 2020-11-25
Payer: MEDICARE

## 2020-11-25 VITALS
DIASTOLIC BLOOD PRESSURE: 76 MMHG | HEART RATE: 94 BPM | BODY MASS INDEX: 38.28 KG/M2 | WEIGHT: 223 LBS | SYSTOLIC BLOOD PRESSURE: 139 MMHG

## 2020-11-25 PROCEDURE — 99203 OFFICE O/P NEW LOW 30 MIN: CPT | Performed by: NEUROLOGICAL SURGERY

## 2020-11-25 PROCEDURE — G8400 PT W/DXA NO RESULTS DOC: HCPCS | Performed by: NEUROLOGICAL SURGERY

## 2020-11-25 PROCEDURE — 4040F PNEUMOC VAC/ADMIN/RCVD: CPT | Performed by: NEUROLOGICAL SURGERY

## 2020-11-25 PROCEDURE — G8417 CALC BMI ABV UP PARAM F/U: HCPCS | Performed by: NEUROLOGICAL SURGERY

## 2020-11-25 PROCEDURE — 1123F ACP DISCUSS/DSCN MKR DOCD: CPT | Performed by: NEUROLOGICAL SURGERY

## 2020-11-25 PROCEDURE — G8427 DOCREV CUR MEDS BY ELIG CLIN: HCPCS | Performed by: NEUROLOGICAL SURGERY

## 2020-11-25 PROCEDURE — G8484 FLU IMMUNIZE NO ADMIN: HCPCS | Performed by: NEUROLOGICAL SURGERY

## 2020-11-25 PROCEDURE — 3017F COLORECTAL CA SCREEN DOC REV: CPT | Performed by: NEUROLOGICAL SURGERY

## 2020-11-25 PROCEDURE — 1036F TOBACCO NON-USER: CPT | Performed by: NEUROLOGICAL SURGERY

## 2020-11-25 PROCEDURE — 1090F PRES/ABSN URINE INCON ASSESS: CPT | Performed by: NEUROLOGICAL SURGERY

## 2020-11-25 NOTE — PROGRESS NOTES
[facility] PHYS[facility]  [facility] CHEYENNE Owens  [facility] [address]  [address]  Dept: [phone]    Patient:  Jelani Zamorano  YOB: 1950  Date: 11/25/20    The patient is a 72 y.o. female who presents today for consult of the following problems:     Chief Complaint   Patient presents with    Consultation     Surgical             HPI:     Jelani Zamorano is a 72 y.o. female on whom neurosurgical consultation was requested by Rashad Arriaza DO for management of significant bilateral paraspinal back pain mainly on the right side. No radiation to the buttock or lower extremities no numbness tingling or weakness in the lower legs. Patient was tried multimodal therapy including ice heat and cream regularly at home as well as multiple rounds of physical therapy as well as acupuncture. She is had injections ranged from epidural to facet blocks to bilateral SI joint injections over the course of the last year and a half. She has had no significant relief with any of the above measures. In addition she also had a spinal cord stimulator trial that was successful but unfortunately a subsequent paddle implantation did not provide her any relief. Currently she is being evaluated for further aggressive options. No significant palliating factors or provocation with changes in position ambulating for long periods.       History:     Past Medical History:   Diagnosis Date    Arthritis     Bowel obstruction ([address]     Cancer ([address] 2015    ovarian stage 2    Cerebral artery occlusion with cerebral infarction ([address] 03/2018    Mini strokes \"Tia's\"    Diabetes mellitus ([address]     Epigastric pain     GERD (gastroesophageal reflux disease)     History of anesthesia complications [address]    was told after gallbladder surgery to never have anesthesia again \"since it was hard for me to come out of it\"    History of blood transfusion     Hx of blood clots     lung     Hypertension     Right arm pain     Right shoulder pain     Sleep apnea     uses CPAP nightly    Tachycardia     hx of    Thyroid disease     TIA (transient ischemic attack)      Past Surgical History:   Procedure Laterality Date    ANESTHESIA NERVE BLOCK Left 11/7/2019    NERVE BLOCK (DEFINE) - INTERCOSTAL NERVE BLOCK performed by Francisco J Austin MD at [facility] Bilateral 5/15/2020    NERVE BLOCK BILATERAL - MBB  L4-5, L5-S1 performed by Francisco J Austin MD at [address] Left     ORIF    CHOLECYSTECTOMY      COLONOSCOPY  07/08/2019    5 yr recall.     COLONOSCOPY N/A 7/8/2019    COLONOSCOPY WITH BIOPSY performed by Rosa Figueroa MD at [address] Right     [facility]. INJECTION PROCEDURE FOR SACROILIAC JOINT Bilateral 8/29/2019    SACROILIAC JOINT INJECTION performed by Francisco J Austin MD at [facility] Bilateral 10/10/2019    SACROILIAC JOINT INJECTION performed by Francisco J Austin MD at [address]  2/2014    chris with bso    JOINT REPLACEMENT Right     shoulder    KNEE ARTHROSCOPY Right     NERVE BLOCK Bilateral 08/29/2019    SI joint injection    NERVE BLOCK Bilateral 10/10/2019    SI joint    NERVE BLOCK Bilateral 05/15/2020    Medial branch block L4-5, L5-S1    NERVE BLOCK  06/05/2020    SPINAL CORD STIMULATOR IMPLANT TRIAL (N/A )    OTHER SURGICAL HISTORY Right 11/11/2014    shoulder manipulation    SHOULDER ARTHROSCOPY Right 02/03/15    SHOULDER SURGERY  11/2014    manipulation    SPINAL CORD STIMULATOR SURGERY N/A 6/5/2020    SPINAL CORD STIMULATOR IMPLANT TRIAL performed by Francisco J Austin MD at [address] [facility] N/A 7/27/2020    SPINAL CORD STIMULATOR IMPLANT WITH THORACIC LAMINOTOMY performed by Kaylen Ayers MD at [facility] Physical Exam:      BP (!) 150/85 (Site: Right Upper Arm, Position: Sitting, Cuff Size: Medium Adult)   Pulse 69   Wt 223 lb (101.2 kg) Comment: verbal  BMI 38.28 kg/m²   Estimated body mass index is 38.28 kg/m² as calculated from the following:    Height as of 11/5/20: 5' 4\" (1.626 m). Weight as of this encounter: 223 lb (101.2 kg). General:  Jonh Latif is a 72y.o. year old female who appears her stated age. HEENT: Normocephalic atraumatic. Neck supple. Chest: regular rate; pulses equal  Abdomen: Soft nontender nondistended. Normoactive bowel sounds. Ext: DP and PT pulses 2+, good cap refill  Neuro    Mentation  Appropriate affect  Registration intact  Orientation intact  3 item recall intact  Judgement intact to situation    Cranial Nerves:   Pupils equal and reactive to light  Extraocular motion intact  Face and shrug symmetric  Tongue midline  No dysarthria  v1-3 sensation symmetric, masseter tone symmetric  Hearing symmetric and intact to finger rub    Sensation:   Intact    Motor  L deltoid 5/5; R deltoid 5/5  L biceps 5/5; R biceps 5/5  L triceps 5/5; R triceps 5/5  L wrist extension 5/5; R wrist extension 5/5  L intrinsics 5/5; R intrinsics 5/5     L iliopsoas 5/5 , R iliopsoas 5/5  L quadriceps 5/5; R quadriceps 5/5  L Dorsiflexion 5/5; R dorsiflexion 5/5  L Plantarflexion 5/5; R plantarflexion 5/5  L EHL 5/5; R EHL 5/5    Reflexes  L Brachioradialis 2+/4; R brachioradialis 2+/4  L Biceps 2+/4; R Biceps 2+/4  L Triceps 2+/4; R Triceps 2+/4  L Patellar 2+/4: R Patellar 2+/4  L Achilles 2+/4; R Achilles 2+/4    hoffmans L: neg  hoffmans R: neg  Clonus L: neg  Clonus R: neg  Babinski L: up  Babinski R; up    Tenderness over bilateral trochanters. Tenderness over the right SI joint. Studies Review:     MRI lumbar spine with multilevel spondylosis and maintained lordosis no canal compromise or bowel disease.     Spinal cord stimulator appears to be at the inferior T7 to superior T9 midline    Assessment and Plan:      1. Lumbar spondylosis    2. Thoracic radiculopathy          Plan: Patient unfortunately has failed multiple measures at pain control including comprehensive conservative management as well as spinal cord stimulator. Unfortunate I do not see any focality in terms of surgical pathology. She does have multiple multilevel mild spondylotic disease but no evidence that any particular approach of fusion or instability could offer her axial pain relief. Considering severe unremitting axial discomfort I do believe that she is a candidate for escalation of measures either to intrathecal opioid pump or tertiary care referral for further options. Followup: No follow-ups on file. Prescriptions Ordered:  No orders of the defined types were placed in this encounter. Orders Placed:  No orders of the defined types were placed in this encounter. Electronically signed by Eliza Dumont DO on 11/25/2020 at 1:36 PM    Please note that this chart was generated using voice recognition Dragon dictation software. Although every effort was made to ensure the accuracy of this automated transcription, some errors in transcription may have occurred.

## 2020-11-27 ENCOUNTER — TELEPHONE (OUTPATIENT)
Dept: PAIN MANAGEMENT | Age: 70
End: 2020-11-27

## 2020-11-27 NOTE — TELEPHONE ENCOUNTER
Pt called asking to schedule VV to speak with Dr Inez Zuniga because he had asked her to get a second opinion which she did and now needs to discuss with him having the pain pump procedure-Advised pt of first available appt in mid December and she said she cannot wait that long and is also worried about Elective procedures not being done soon-Pt is requesting a call back to discuss with Dr Inez Zuniga or sooner appt-Please contact her at [phone]

## 2020-12-02 ASSESSMENT — ENCOUNTER SYMPTOMS
BACK PAIN: 1
BOWEL INCONTINENCE: 1
WHEEZING: 0
EYE PAIN: 0
SLEEP DISTURBANCES DUE TO BREATHING: 0

## 2020-12-03 ENCOUNTER — VIRTUAL VISIT (OUTPATIENT)
Dept: PAIN MANAGEMENT | Age: 70
End: 2020-12-03
Payer: MEDICARE

## 2020-12-03 PROCEDURE — 4040F PNEUMOC VAC/ADMIN/RCVD: CPT | Performed by: PAIN MEDICINE

## 2020-12-03 PROCEDURE — 3017F COLORECTAL CA SCREEN DOC REV: CPT | Performed by: PAIN MEDICINE

## 2020-12-03 PROCEDURE — 1123F ACP DISCUSS/DSCN MKR DOCD: CPT | Performed by: PAIN MEDICINE

## 2020-12-03 PROCEDURE — 1090F PRES/ABSN URINE INCON ASSESS: CPT | Performed by: PAIN MEDICINE

## 2020-12-03 PROCEDURE — G8400 PT W/DXA NO RESULTS DOC: HCPCS | Performed by: PAIN MEDICINE

## 2020-12-03 PROCEDURE — G8427 DOCREV CUR MEDS BY ELIG CLIN: HCPCS | Performed by: PAIN MEDICINE

## 2020-12-03 PROCEDURE — 99214 OFFICE O/P EST MOD 30 MIN: CPT | Performed by: PAIN MEDICINE

## 2020-12-03 RX ORDER — HYDROCODONE BITARTRATE AND ACETAMINOPHEN 5; 325 MG/1; MG/1
1 TABLET ORAL 2 TIMES DAILY PRN
Qty: 14 TABLET | Refills: 0 | Status: SHIPPED | OUTPATIENT
Start: 2020-12-03 | End: 2020-12-07 | Stop reason: SDUPTHER

## 2020-12-04 ENCOUNTER — TELEPHONE (OUTPATIENT)
Dept: PAIN MANAGEMENT | Age: 70
End: 2020-12-04

## 2020-12-07 ENCOUNTER — TELEPHONE (OUTPATIENT)
Dept: PAIN MANAGEMENT | Age: 70
End: 2020-12-07

## 2020-12-07 RX ORDER — HYDROCODONE BITARTRATE AND ACETAMINOPHEN 5; 325 MG/1; MG/1
1 TABLET ORAL 2 TIMES DAILY PRN
Qty: 14 TABLET | Refills: 0 | Status: SHIPPED | OUTPATIENT
Start: 2020-12-07 | End: 2020-12-10

## 2020-12-07 NOTE — TELEPHONE ENCOUNTER
Patient came into office and she stated that the Meghana Free was sent to [facility] and it was supposed to go to [facility].       Writer called [facility] pharmacy and cancelled the Rx for Meghana Free.

## 2020-12-09 ENCOUNTER — HOSPITAL ENCOUNTER (OUTPATIENT)
Age: 70
Setting detail: SPECIMEN
Discharge: HOME | End: 2020-12-09
Payer: MEDICARE

## 2020-12-10 ENCOUNTER — OFFICE VISIT (OUTPATIENT)
Dept: PRIMARY CARE CLINIC | Age: 70
End: 2020-12-10
Payer: MEDICARE

## 2020-12-10 VITALS
BODY MASS INDEX: 39.23 KG/M2 | HEART RATE: 58 BPM | SYSTOLIC BLOOD PRESSURE: 136 MMHG | WEIGHT: 229.8 LBS | DIASTOLIC BLOOD PRESSURE: 82 MMHG | OXYGEN SATURATION: 97 % | HEIGHT: 64 IN

## 2020-12-10 PROCEDURE — 4040F PNEUMOC VAC/ADMIN/RCVD: CPT | Performed by: FAMILY MEDICINE

## 2020-12-10 PROCEDURE — 3017F COLORECTAL CA SCREEN DOC REV: CPT | Performed by: FAMILY MEDICINE

## 2020-12-10 PROCEDURE — 3046F HEMOGLOBIN A1C LEVEL >9.0%: CPT | Performed by: FAMILY MEDICINE

## 2020-12-10 PROCEDURE — 1123F ACP DISCUSS/DSCN MKR DOCD: CPT | Performed by: FAMILY MEDICINE

## 2020-12-10 PROCEDURE — G0439 PPPS, SUBSEQ VISIT: HCPCS | Performed by: FAMILY MEDICINE

## 2020-12-10 PROCEDURE — G8484 FLU IMMUNIZE NO ADMIN: HCPCS | Performed by: FAMILY MEDICINE

## 2020-12-10 RX ORDER — PHENTERMINE HYDROCHLORIDE 37.5 MG/1
37.5 TABLET ORAL
Qty: 30 TABLET | Refills: 0 | Status: SHIPPED | OUTPATIENT
Start: 2020-12-10 | End: 2021-01-25 | Stop reason: SDUPTHER

## 2020-12-10 RX ORDER — OXYCODONE HYDROCHLORIDE AND ACETAMINOPHEN 5; 325 MG/1; MG/1
1 TABLET ORAL EVERY 4 HOURS PRN
COMMUNITY
End: 2021-11-09

## 2020-12-10 ASSESSMENT — PATIENT HEALTH QUESTIONNAIRE - PHQ9
SUM OF ALL RESPONSES TO PHQ QUESTIONS 1-9: 0
SUM OF ALL RESPONSES TO PHQ9 QUESTIONS 1 & 2: 0
SUM OF ALL RESPONSES TO PHQ QUESTIONS 1-9: 0
2. FEELING DOWN, DEPRESSED OR HOPELESS: 0
1. LITTLE INTEREST OR PLEASURE IN DOING THINGS: 0
SUM OF ALL RESPONSES TO PHQ QUESTIONS 1-9: 0

## 2020-12-10 ASSESSMENT — LIFESTYLE VARIABLES
HOW OFTEN DO YOU HAVE A DRINK CONTAINING ALCOHOL: 1
HOW MANY STANDARD DRINKS CONTAINING ALCOHOL DO YOU HAVE ON A TYPICAL DAY: 0
HAS A RELATIVE, FRIEND, DOCTOR, OR ANOTHER HEALTH PROFESSIONAL EXPRESSED CONCERN ABOUT YOUR DRINKING OR SUGGESTED YOU CUT DOWN: 0
AUDIT TOTAL SCORE: 1
HOW OFTEN DURING THE LAST YEAR HAVE YOU BEEN UNABLE TO REMEMBER WHAT HAPPENED THE NIGHT BEFORE BECAUSE YOU HAD BEEN DRINKING: 0
HOW OFTEN DURING THE LAST YEAR HAVE YOU FOUND THAT YOU WERE NOT ABLE TO STOP DRINKING ONCE YOU HAD STARTED: 0
AUDIT-C TOTAL SCORE: 1
HOW OFTEN DURING THE LAST YEAR HAVE YOU HAD A FEELING OF GUILT OR REMORSE AFTER DRINKING: 0
HOW OFTEN DURING THE LAST YEAR HAVE YOU FAILED TO DO WHAT WAS NORMALLY EXPECTED FROM YOU BECAUSE OF DRINKING: 0
HAVE YOU OR SOMEONE ELSE BEEN INJURED AS A RESULT OF YOUR DRINKING: 0
HOW OFTEN DO YOU HAVE SIX OR MORE DRINKS ON ONE OCCASION: 0
HOW OFTEN DURING THE LAST YEAR HAVE YOU NEEDED AN ALCOHOLIC DRINK FIRST THING IN THE MORNING TO GET YOURSELF GOING AFTER A NIGHT OF HEAVY DRINKING: 0

## 2020-12-10 NOTE — PATIENT INSTRUCTIONS
Personalized Preventive Plan for [facility] - 12/10/2020  Medicare offers a range of preventive health benefits. Some of the tests and screenings are paid in full while other may be subject to a deductible, co-insurance, and/or copay. Some of these benefits include a comprehensive review of your medical history including lifestyle, illnesses that may run in your family, and various assessments and screenings as appropriate. After reviewing your medical record and screening and assessments performed today your provider may have ordered immunizations, labs, imaging, and/or referrals for you. A list of these orders (if applicable) as well as your Preventive Care list are included within your After Visit Summary for your review. Other Preventive Recommendations:    · A preventive eye exam performed by an eye specialist is recommended every 1-2 years to screen for glaucoma; cataracts, macular degeneration, and other eye disorders. · A preventive dental visit is recommended every 6 months. · Try to get at least 150 minutes of exercise per week or 10,000 steps per day on a pedometer . · Order or download the FREE \"Exercise & Physical Activity: Your Everyday Guide\" from [employer]. Call 8-377.136.3555 or search [employer] online. · You need 5336-4879 mg of calcium and 7293-9806 IU of vitamin D per day. It is possible to meet your calcium requirement with diet alone, but a vitamin D supplement is usually necessary to meet this goal.  · When exposed to the sun, use a sunscreen that protects against both UVA and UVB radiation with an SPF of 30 or greater. Reapply every 2 to 3 hours or after sweating, drying off with a towel, or swimming. · Always wear a seat belt when traveling in a car. Always wear a helmet when riding a bicycle or motorcycle. Personalized Preventive Plan for [facility] 12/10/2020  Medicare offers a range of preventive health benefits.  Some of the tests and screenings are paid in full while other may be subject to a deductible, co-insurance, and/or copay. Some of these benefits include a comprehensive review of your medical history including lifestyle, illnesses that may run in your family, and various assessments and screenings as appropriate. After reviewing your medical record and screening and assessments performed today your provider may have ordered immunizations, labs, imaging, and/or referrals for you. A list of these orders (if applicable) as well as your Preventive Care list are included within your After Visit Summary for your review. Other Preventive Recommendations:    A preventive eye exam performed by an eye specialist is recommended every 1-2 years to screen for glaucoma; cataracts, macular degeneration, and other eye disorders. A preventive dental visit is recommended every 6 months. Try to get at least 150 minutes of exercise per week or 10,000 steps per day on a pedometer . Order or download the FREE \"Exercise & Physical Activity: Your Everyday Guide\" from [employer]. Call 6-742.942.8283 or search [employer] online. You need 8498-3574 mg of calcium and 6780-3845 IU of vitamin D per day. It is possible to meet your calcium requirement with diet alone, but a vitamin D supplement is usually necessary to meet this goal.  When exposed to the sun, use a sunscreen that protects against both UVA and UVB radiation with an SPF of 30 or greater. Reapply every 2 to 3 hours or after sweating, drying off with a towel, or swimming. Always wear a seat belt when traveling in a car. Always wear a helmet when riding a bicycle or motorcycle.

## 2020-12-10 NOTE — PROGRESS NOTES
Medicare Annual Wellness Visit  Name: Aiden Maharaj Date: 12/10/2020   MRN: [id number] Sex: Female   Age: 72 y.o. Ethnicity: Non-/Non    : 1950 Race: Jazmine Jane is here for Medicare AWV; Medication Check (discontinue lorazapam); and Nutrition Counseling (would like to lose weight)    Screenings for behavioral, psychosocial and functional/safety risks, and cognitive dysfunction are all negative except as indicated below. These results, as well as other patient data from the [address] [facility] [address] form, are documented in Flowsheets linked to this Encounter. Allergies   Allergen Reactions    Aspirin Anaphylaxis     Only thing she can take is tylenol    Benadryl [Diphenhydramine] Other (See Comments)     Dystonic movement    Ibuprofen Anaphylaxis    Lidocaine Anaphylaxis     CAN NOT TOLERATE IV    Penicillins Anaphylaxis    Hydrocodone-Acetaminophen Other (See Comments)     Unsure of allergy    Oxycodone-Acetaminophen Other (See Comments)     Unsure of allergy    Tramadol Other (See Comments)     Unsure of allergy    Morphine Nausea And Vomiting     Unsure of allergy         Prior to Visit Medications    Medication Sig Taking? Authorizing Provider   oxyCODONE-acetaminophen (PERCOCET) 5-325 MG per tablet Take 1 tablet by mouth every 4 hours as needed for Pain. Yes Historical Provider, MD   phentermine (ADIPEX-P) 37.5 MG tablet Take 1 tablet by mouth every morning (before breakfast) for 30 days.  Yes Simi Lucas, DO   insulin lispro (HUMALOG) 100 UNIT/ML injection vial INJECT SUBCUTANEOUSLY THREE TIMES A DAY AS DIRECTED BY SLIDING SCALE *MAX DAILY DOSE 40 UNITS* Yes Simi Medhkour, DO   tamsulosin (FLOMAX) 0.4 MG capsule TAKE 1 CAPSULE BY MOUTH NIGHTLY *EMERGENCY REFILL* Yes Simi Medhkour, DO   blood glucose test strips (ACCU-CHEK TED PLUS) strip USE TO TEST BLOOD SUGAR THREE TIMES A DAY AND AS NEEDED FOR SYMPTOMS OF IRREGULAR BLOOD GLUCOSE Yes Lupis Yepez Lucas, DO   SURE COMFORT INSULIN SYRINGE 31G X 5/16\" 1 ML MISC USE AS DIRECTED FOUR TIMES A DAY Yes Simi Zavala, DO   omeprazole (PRILOSEC) 20 MG delayed release capsule TAKE 1 CAPSULE BY MOUTH DAILY Yes Simi Lucas, DO   traZODone (DESYREL) 50 MG tablet Take 1 tablet by mouth nightly Yes Simi Zavala, DO   alendronate (FOSAMAX) 70 MG tablet TAKE 1 TABLET BY MOUTH EVERY 7 DAYS Yes Simi Zavala, DO   XARELTO 20 MG TABS tablet TAKE 1 TABLET BY MOUTH EVERY DAY Yes Simi Zavala, DO   insulin glargine (LANTUS) 100 UNIT/ML injection vial Inject 65 units nightly Yes LUPE Hope CNP   furosemide (LASIX) 20 MG tablet Take 1 tablet by mouth daily Yes LUPE Hope CNP   potassium chloride (KLOR-CON M) 20 MEQ extended release tablet TAKE 1 TABLET BY MOUTH 2 TIMES DAILY Yes Simi Zavala, DO   vitamin C (ASCORBIC ACID) 500 MG tablet Take 500 mg by mouth daily Yes Historical Provider, MD   DULoxetine (CYMBALTA) 60 MG extended release capsule TAKE 1 CAPSULE BY MOUTH DAILY Yes Simi Zavala, DO   glucose monitoring kit (FREESTYLE) monitoring kit 1 kit by Does not apply route daily Please provide accuchek meter for patient, not freestyle Yes Simi Zavala, DO   atorvastatin (LIPITOR) 40 MG tablet TAKE (1) TABLET BY MOUTH EVERY DAY Yes Simi Zavala, DO   amLODIPine (NORVASC) 5 MG tablet TAKE (1) TABLET BY MOUTH ONCE DAILY Yes Simisho Zavala, DO   hydroCHLOROthiazide (HYDRODIURIL) 25 MG tablet TAKE 1 TABLET BY MOUTH EVERY MORNING Yes Simitameka Zavala, DO   losartan (COZAAR) 100 MG tablet TAKE 1 TABLET BY MOUTH EVERY DAY Yes Simi Lucas, DO   levothyroxine (SYNTHROID) 88 MCG tablet TAKE (1) TABLET BY MOUTH DAILY Yes Simitameka Zavala, DO   EPINEPHrine (EPIPEN 2-FERNANDO) 0.3 MG/0.3ML SOAJ injection EpiPen 2-Fernando 0.3 mg/0.3 mL injection, auto-injector Yes Historical Provider, MD   calcium carbonate (TUMS) 500 MG chewable tablet Take 1 tablet by mouth daily as needed for Heartburn Yes Historical Provider, MD   SUPER B COMPLEX/C CAPS Take by mouth Yes Historical Provider, MD   Cholecalciferol (VITAMIN D3) 5000 units TABS Take by mouth Yes Historical Provider, MD   ferrous sulfate 325 (65 Fe) MG tablet Take 325 mg by mouth daily (with breakfast) Yes Historical Provider, MD   fexofenadine (ALLEGRA) 60 MG tablet Take 60 mg by mouth daily Yes Historical Provider, MD   magnesium oxide (MAG-OX) 400 MG tablet Take 400 mg by mouth daily Yes Historical Provider, MD   Melatonin 10 MG TABS Take 10 mg by mouth daily as needed  Yes Historical Provider, MD   gabapentin (NEURONTIN) 600 MG tablet TAKE (1) TABLET BY MOUTH FOUR TIMES A DAY  Simi Zavala DO         Past Medical History:   Diagnosis Date    Arthritis     [facility]     Cancer ([facility] 2015    ovarian stage 2    Cerebral artery occlusion with cerebral infarction ([facility] 03/2018    Mini strokes \"Tia's\"    Diabetes mellitus ([facility]     Epigastric pain     GERD (gastroesophageal reflux disease)     History of anesthesia complications 2693'E    was told after gallbladder surgery to never have anesthesia again \"since it was hard for me to come out of it\"    History of blood transfusion     Hx of blood clots     lung     Hypertension     Right arm pain     Right shoulder pain     Sleep apnea     uses CPAP nightly    Tachycardia     hx of    Thyroid disease     TIA (transient ischemic attack)        Past Surgical History:   Procedure Laterality Date    ANESTHESIA NERVE BLOCK Left 11/7/2019    NERVE BLOCK (DEFINE) - INTERCOSTAL NERVE BLOCK performed by Misbah Christensen MD at [facility] Bilateral 5/15/2020    NERVE BLOCK BILATERAL - MBB  L4-5, L5-S1 performed by Misbah Christensen MD at [address] [place] [address] Left     ORI    CHOLECYSTECTOMY      COLONOSCOPY  07/08/2019    5 yr recall.     COLONOSCOPY N/A 7/8/2019    COLONOSCOPY WITH BIOPSY performed by Latrell Weber Rolando Li MD at [address] Right     repair tendon    [facility] INJECTION PROCEDURE FOR SACROILIAC JOINT Bilateral 8/29/2019    SACROILIAC JOINT INJECTION performed by Danis Holliday MD at [address] [facility] [address] Bilateral 10/10/2019    SACROILIAC JOINT INJECTION performed by Danis Holliday MD at [address]  2/2014    chris with bso    JOINT REPLACEMENT Right     shoulder    KNEE ARTHROSCOPY Right     NERVE BLOCK Bilateral 08/29/2019    SI joint injection    NERVE BLOCK Bilateral 10/10/2019    SI joint    NERVE BLOCK Bilateral 05/15/2020    Medial branch block L4-5, L5-S1    NERVE BLOCK  06/05/2020    SPINAL CORD STIMULATOR IMPLANT TRIAL (N/A )    OTHER SURGICAL HISTORY Right 11/11/2014    shoulder manipulation    SHOULDER ARTHROSCOPY Right 02/03/15    SHOULDER SURGERY  11/2014    manipulation    SPINAL CORD STIMULATOR SURGERY N/A 6/5/2020    SPINAL CORD STIMULATOR IMPLANT TRIAL performed by Danis Holliday MD at [facility] N/A 7/27/2020    SPINAL CORD STIMULATOR IMPLANT WITH THORACIC LAMINOTOMY performed by Hayder Pope MD at [address] [facility] ENDOSCOPY  07/08/2019    UPPER GASTROINTESTINAL ENDOSCOPY N/A 7/8/2019    EGD BIOPSY performed by Sol Loyola MD at [address] [place] [address] Right 4/30/2015    pt has blood clot & abscess in her right kidney    VASCULAR SURGERY      [place] filter in & removed 1 year later         Family History   Problem Relation Age of Onset    Elevated Lipids Paternal Grandmother        CareTeam (Including outside providers/suppliers regularly involved in providing care):   Patient Care Team:  Makayla Aguilar DO as PCP - General (Family Medicine)  Makayla Aguilar DO as PCP - [facility] Empaneled Provider  Sol Loyola MD as Consulting Physician (Gastroenterology)    Wt Readings from Last 3 Encounters: 12/10/20 229 lb 12.8 oz (104.2 kg)   11/25/20 223 lb (101.2 kg)   11/05/20 226 lb (102.5 kg)     Vitals:    12/10/20 0833   BP: 136/82   Pulse: 58   SpO2: 97%   Weight: 229 lb 12.8 oz (104.2 kg)   Height: 5' 4\" (1.626 m)     Body mass index is 39.45 kg/m². Based upon direct observation of the patient, evaluation of cognition reveals recent and remote memory intact. General Appearance: alert and oriented to person, place and time, well developed and well- nourished, in no acute distress  Skin: warm and dry, no rash or erythema  Head: normocephalic and atraumatic  Eyes: pupils equal, round, and reactive to light, extraocular eye movements intact, conjunctivae normal  Neck: supple   Pulmonary/Chest: clear to auscultation bilaterally- no wheezes, rales or rhonchi, normal air movement, no respiratory distress  Cardiovascular: normal rate, regular rhythm, normal S1 and S2, no murmurs,   Neurologic: speech normal    Patient's complete Health Risk Assessment and screening values have been reviewed and are found in Flowsheets. The following problems were reviewed today and where indicated follow up appointments were made and/or referrals ordered. Positive Risk Factor Screenings with Interventions:       General Health and ACP:  General  In general, how would you say your health is?: Fair  In the past 7 days, have you experienced any of the following? New or Increased Pain, New or Increased Fatigue, Loneliness, Social Isolation, Stress or Anger?: None of These  Do you get the social and emotional support that you need?: Yes  Do you have a Living Will?: (!) No  Advance Directives     Power of [address] [place] [address] Will ACP-Advance Directive ACP-Power of     Not on File Not on File Filed [address] [facility] Risk Interventions:  Recommend pt discuss living will with family.    Pt following with pain management, taking percocet to help with pain , but states may be getting pain pump as her pain has not been controlled. Health Habits/Nutrition:  Health Habits/Nutrition  Do you exercise for at least 20 minutes 2-3 times per week?: Yes  Have you lost any weight without trying in the past 3 months?: No  Do you eat fewer than 2 meals per day?: No  Have you seen a dentist within the past year?: Yes  Body mass index: (!) 39.44  Health Habits/Nutrition Interventions:  Went to nutrition class- was counting carbs and states has been hard time losing weight . I did discuss we could try adipex to help in addition to healthy diet. Pt would like to try this. Pt also states was told to consider dc lorazepam, pt states she just takes it at night for sleep. I discussed she can cut it in half for few days at night and then come off of it. If we need to can always increase trazodone in future for her sleep. Hearing/Vision:  No exam data present  Hearing/Vision  Do you or your family notice any trouble with your hearing?: (!) Yes  Do you have difficulty driving, watching TV, or doing any of your daily activities because of your eyesight?: No  Have you had an eye exam within the past year?: Yes  Hearing/Vision Interventions:  Hearing has been fine. Denies any issues with this    ADL:  ADLs  In the past 7 days, did you need help from others to perform any of the following everyday activities? Eating, dressing, grooming, bathing, toileting, or walking/balance?: None  In the past 7 days, did you need help from others to take care of any of the following? Laundry, housekeeping, banking/finances, shopping, telephone use, food preparation, transportation, or taking medications?: (!) Transportation  ADL Interventions:    Her fiance has been helping with her transportation.      Personalized Preventive Plan   Current Health Maintenance Status  Immunization History   Administered Date(s) Administered    Influenza, Quadv, adjuvanted, 65 yrs +, IM, PF (Fluad) 09/10/2020    Influenza, Triv, inactivated, subunit, adjuvanted, IM (Fluad 65 (ADIPEX-P) 37.5 MG tablet; Take 1 tablet by mouth every morning (before breakfast) for 30 days. Anxiety  - will dc ativan, pt states takes it at night mainly, but was recommended to come off of it so to taper half tab for few days and then off. Can increase trazodone if needed for her sleep next visit  If needed.

## 2020-12-13 LAB
6-ACETYLMORPHINE, UR: NOT DETECTED
7-AMINOCLONAZEPAM, URINE: NOT DETECTED
ALPHA-OH-ALPRAZ, URINE: NOT DETECTED
ALPRAZOLAM, URINE: NOT DETECTED
AMPHETAMINES, URINE: NOT DETECTED
BARBITURATES, URINE: NOT DETECTED
BENZOYLECGONINE, UR: NOT DETECTED
BUPRENORPHINE URINE: NOT DETECTED
CARISOPRODOL, UR: NOT DETECTED
CLONAZEPAM, URINE: NOT DETECTED
CODEINE, URINE: NOT DETECTED
CREATININE URINE: 133.6 MG/DL (ref 20–400)
DIAZEPAM, URINE: NOT DETECTED
DRUGS EXPECTED, UR: NORMAL
EER HI RES INTERP UR: NORMAL
ETHYL GLUCURONIDE UR: NOT DETECTED
FENTANYL URINE: NOT DETECTED
HYDROCODONE, URINE: NOT DETECTED
HYDROMORPHONE, URINE: NOT DETECTED
LORAZEPAM, URINE: PRESENT
MARIJUANA METAB, UR: NOT DETECTED
MDA, UR: NOT DETECTED
MDEA, EVE, UR: NOT DETECTED
MDMA URINE: NOT DETECTED
MEPERIDINE METAB, UR: NOT DETECTED
METHADONE, URINE: NOT DETECTED
METHAMPHETAMINE, URINE: NOT DETECTED
METHYLPHENIDATE: NOT DETECTED
MIDAZOLAM, URINE: NOT DETECTED
MORPHINE URINE: PRESENT
NORBUPRENORPHINE, URINE: NOT DETECTED
NORDIAZEPAM, URINE: NOT DETECTED
NORFENTANYL, URINE: NOT DETECTED
NORHYDROCODONE, URINE: NOT DETECTED
NOROXYCODONE, URINE: NOT DETECTED
NOROXYMORPHONE, URINE: NOT DETECTED
OXAZEPAM, URINE: NOT DETECTED
OXYCODONE URINE: NOT DETECTED
OXYMORPHONE, URINE: NOT DETECTED
PAIN MANAGEMENT DRUG PANEL INTERP, URINE: NORMAL
PAIN MGT DRUG PANEL, HI RES, UR: NORMAL
PCP,URINE: NOT DETECTED
PHENTERMINE, UR: NOT DETECTED
PROPOXYPHENE, URINE: NOT DETECTED
TAPENTADOL, URINE: NOT DETECTED
TAPENTADOL-O-SULFATE, URINE: NOT DETECTED
TEMAZEPAM, URINE: NOT DETECTED
TRAMADOL, URINE: NOT DETECTED
ZOLPIDEM, URINE: NOT DETECTED

## 2020-12-17 ENCOUNTER — VIRTUAL VISIT (OUTPATIENT)
Dept: PAIN MANAGEMENT | Age: 70
End: 2020-12-17
Payer: MEDICARE

## 2020-12-17 PROCEDURE — 1090F PRES/ABSN URINE INCON ASSESS: CPT | Performed by: PAIN MEDICINE

## 2020-12-17 PROCEDURE — G8400 PT W/DXA NO RESULTS DOC: HCPCS | Performed by: PAIN MEDICINE

## 2020-12-17 PROCEDURE — G8428 CUR MEDS NOT DOCUMENT: HCPCS | Performed by: PAIN MEDICINE

## 2020-12-17 PROCEDURE — 3017F COLORECTAL CA SCREEN DOC REV: CPT | Performed by: PAIN MEDICINE

## 2020-12-17 PROCEDURE — 4040F PNEUMOC VAC/ADMIN/RCVD: CPT | Performed by: PAIN MEDICINE

## 2020-12-17 PROCEDURE — 1123F ACP DISCUSS/DSCN MKR DOCD: CPT | Performed by: PAIN MEDICINE

## 2020-12-17 PROCEDURE — 99213 OFFICE O/P EST LOW 20 MIN: CPT | Performed by: PAIN MEDICINE

## 2020-12-17 RX ORDER — OXYCODONE HYDROCHLORIDE AND ACETAMINOPHEN 5; 325 MG/1; MG/1
1 TABLET ORAL 2 TIMES DAILY PRN
Qty: 28 TABLET | Refills: 0 | Status: SHIPPED | OUTPATIENT
Start: 2020-12-17 | End: 2020-12-31

## 2020-12-17 ASSESSMENT — ENCOUNTER SYMPTOMS
BACK PAIN: 1
COUGH: 0
BOWEL INCONTINENCE: 0

## 2020-12-17 NOTE — PROGRESS NOTES
HPI:     Back Pain  This is a chronic problem. The current episode started more than 1 year ago. The problem occurs constantly. The problem is unchanged. The pain is present in the lumbar spine. The quality of the pain is described as aching. The pain is moderate. The pain is the same all the time. The symptoms are aggravated by position. Pertinent negatives include no bowel incontinence, chest pain or fever. Chronic low back as well as chest wall pain has been ongoing for some time. Multiple treat modalities including injections as well as spinal cord stimulation continues to have lingering pain. She has seen a surgeon nothing surgical at this time. Norco without benefit. Patient denies any new neurological symptoms. Nobowel or bladder incontinence, no weakness, and no falling. Review of OARRS does not show any aberrant prescription behavior. Medication is helping the patient stay active. Patient denies any side effects and reports adequate analgesia. No sign of misuse/abuse.     Past Medical History:   Diagnosis Date    Arthritis     [facility] ([facility]     Cancer ([facility] 2015    ovarian stage 2    Cerebral artery occlusion with cerebral infarction ([facility] 03/2018    Mini strokes \"Tia's\"    Diabetes mellitus ([facility]     Epigastric pain     GERD (gastroesophageal reflux disease)     History of anesthesia complications 4022'R    was told after gallbladder surgery to never have anesthesia again \"since it was hard for me to come out of it\"    History of blood transfusion     Hx of blood clots     lung     Hypertension     Right arm pain     Right shoulder pain     Sleep apnea     uses CPAP nightly    Tachycardia     hx of    Thyroid disease     TIA (transient ischemic attack)        Past Surgical History:   Procedure Laterality Date    ANESTHESIA NERVE BLOCK Left 11/7/2019    NERVE BLOCK (DEFINE) - INTERCOSTAL [facility]  ANESTHESIA NERVE BLOCK Bilateral 5/15/2020    NERVE BLOCK BILATERAL - MBB  L4-5, L5-S1 performed by Layne Winters MD at [address] Left     ORIF    CHOLECYSTECTOMY      COLONOSCOPY  07/08/2019    5 yr recall.     COLONOSCOPY N/A 7/8/2019    COLONOSCOPY WITH BIOPSY performed by Jaimie Hanson MD at [address] Right     repair tendon    [facility] INJECTION PROCEDURE FOR SACROILIAC JOINT Bilateral 8/29/2019    SACROILIAC JOINT INJECTION performed by Layne Winters MD at [address] Bilateral 10/10/2019    SACROILIAC JOINT INJECTION performed by Layne Winters MD at [address]  2/2014    chris with bso    JOINT REPLACEMENT Right     shoulder    KNEE ARTHROSCOPY Right     NERVE BLOCK Bilateral 08/29/2019    SI joint injection    NERVE BLOCK Bilateral 10/10/2019    SI joint    NERVE BLOCK Bilateral 05/15/2020    Medial branch block L4-5, L5-S1    NERVE BLOCK  06/05/2020    SPINAL CORD STIMULATOR IMPLANT TRIAL (N/A )    OTHER SURGICAL HISTORY Right 11/11/2014    shoulder manipulation    SHOULDER ARTHROSCOPY Right 02/03/15    SHOULDER SURGERY  11/2014    manipulation    SPINAL CORD STIMULATOR SURGERY N/A 6/5/2020    SPINAL CORD STIMULATOR IMPLANT TRIAL performed by Layne Winters MD at [facility] N/A 7/27/2020    SPINAL CORD STIMULATOR IMPLANT WITH THORACIC LAMINOTOMY performed by Reuben Garcia MD at [address] [facility]  ENDOSCOPY  07/08/2019    UPPER GASTROINTESTINAL ENDOSCOPY N/A 7/8/2019    EGD BIOPSY performed by Jaimie Hanson MD at [address] Right 4/30/2015    pt has blood clot & abscess in her right kidney    VASCULAR SURGERY      [place] filter in & removed 1 year later       Allergies   Allergen Reactions    Aspirin Anaphylaxis Only thing she can take is tylenol    Benadryl [Diphenhydramine] Other (See Comments)     Dystonic movement    Ibuprofen Anaphylaxis    Lidocaine Anaphylaxis     CAN NOT TOLERATE IV    Penicillins Anaphylaxis    Hydrocodone-Acetaminophen Other (See Comments)     Unsure of allergy    Oxycodone-Acetaminophen Other (See Comments)     Unsure of allergy    Tramadol Other (See Comments)     Unsure of allergy    Morphine Nausea And Vomiting     Unsure of allergy       Meds reviewed in EMR    Family History   Problem Relation Age of Onset    Elevated Lipids Paternal Grandmother        Social History     Socioeconomic History    Marital status: Single     Spouse name: Not on file    Number of children: Not on file    Years of education: Not on file    Highest education level: Not on file   Occupational History    Not on file   Social Needs    Financial resource strain: Not on file    Food insecurity     Worry: Not on file     Inability: Not on file    Transportation needs     Medical: Not on file     Non-medical: Not on file   Tobacco Use    Smoking status: Never Smoker    Smokeless tobacco: Never Used   Substance and Sexual Activity    Alcohol use: Yes     Comment: rare    Drug use: No    Sexual activity: Yes   Lifestyle    Physical activity     Days per week: Not on file     Minutes per session: Not on file    Stress: Not on file   Relationships    Social connections     Talks on phone: Not on file     Gets together: Not on file     Attends Hinduism service: Not on file     Active member of club or organization: Not on file     Attends meetings of clubs or organizations: Not on file     Relationship status: Not on file    Intimate partner violence     Fear of current or ex partner: Not on file     Emotionally abused: Not on file     Physically abused: Not on file     Forced sexual activity: Not on file   Other Topics Concern    Not on file   Social History Narrative    Not on file I have reviewed the chief complaint and history of present illness (including ROS and PFSH) and vital documentation by my staff and I agree with their documentation and have added where applicable. Kamran Chester is a 72 y.o. female being evaluated by a Virtual Visit (video visit) encounter to address concerns as mentioned above. A caregiver was present when appropriate. Due to this being a TeleHealth encounter ([facility]), evaluation of the following organ systems was limited: Vitals/Constitutional/EENT/Resp/CV/GI//MS/Neuro/Skin/Heme-Lymph-Imm. Pursuant to the emergency declaration under the [address] [place] [address], [address] [place] [address] authority and the [employer] Act, this Virtual Visit was conducted with patient's (and/or legal guardian's) consent, to reduce the patient's risk of exposure to COVID-19 and provide necessary medical care. The patient (and/or legal guardian) has also been advised to contact this office for worsening conditions or problems, and seek emergency medical treatment and/or call 911 if deemed necessary. Patient identification was verified at the start of the visit: Yes    Total time spent for this encounter: Not billed by time    Services were provided through a video synchronous discussion virtually to substitute for in-person clinic visit. Patient and provider were located at their individual homes. --Arely Blackman MD on 12/17/2020 at 10:57 AM    An electronic signature was used to authenticate this note.

## 2020-12-24 RX ORDER — INSULIN GLARGINE 100 [IU]/ML
INJECTION, SOLUTION SUBCUTANEOUS
Qty: 20 ML | Refills: 10 | Status: SHIPPED | OUTPATIENT
Start: 2020-12-24 | End: 2021-01-25 | Stop reason: SDUPTHER

## 2020-12-24 RX ORDER — GABAPENTIN 600 MG/1
TABLET ORAL
Qty: 120 TABLET | Refills: 10 | Status: SHIPPED | OUTPATIENT
Start: 2020-12-24 | End: 2021-11-18

## 2020-12-24 RX ORDER — LEVOTHYROXINE SODIUM 88 UG/1
TABLET ORAL
Qty: 30 TABLET | Refills: 10 | Status: SHIPPED | OUTPATIENT
Start: 2020-12-24 | End: 2021-11-18

## 2020-12-24 NOTE — TELEPHONE ENCOUNTER
LOV 12/10/20  NOV 12/31/20    Health Maintenance   Topic Date Due    Hepatitis C screen  1950    Diabetic retinal exam  09/14/1961    Diabetic microalbuminuria test  09/14/1969    DTaP/Tdap/Td vaccine (1 - Tdap) 09/14/1970    DEXA (modify frequency per FRAX score)  09/14/2006    Diabetic foot exam  10/15/2020    Shingles Vaccine (2 of 2) 12/18/2020    A1C test (Diabetic or Prediabetic)  12/29/2020    Pneumococcal 65+ years Vaccine (1 of 1 - PPSV23) 04/17/2023 (Originally 9/14/2016)    Potassium monitoring  08/04/2021    Creatinine monitoring  08/04/2021    Lipid screen  09/29/2021    Annual Wellness Visit (AWV)  12/11/2021    Breast cancer screen  10/21/2022    Colon cancer screen colonoscopy  07/08/2029    Flu vaccine  Completed    Hepatitis A vaccine  Aged Out    Hib vaccine  Aged Out    Meningococcal (ACWY) vaccine  Aged Out             (applicable per patient's age: Cancer Screenings, Depression Screening, Fall Risk Screening, Immunizations)    Hemoglobin A1C (%)   Date Value   09/29/2020 10.2 (H)   06/03/2020 6.7   01/13/2020 7.8     LDL Cholesterol (mg/dL)   Date Value   09/29/2020 44     LDL Calculated (mg/dL)   Date Value   04/06/2019 47     AST (U/L)   Date Value   09/29/2020 20     ALT (U/L)   Date Value   09/29/2020 29     BUN (mg/dL)   Date Value   08/04/2020 18      (goal A1C is < 7)   (goal LDL is <100) need 30-50% reduction from baseline     BP Readings from Last 3 Encounters:   12/10/20 136/82   11/25/20 139/76   11/05/20 (!) 146/86    (goal /80)      All Future Testing planned in Care[facility]:  Lab Frequency Next Occurrence   Comprehensive Metabolic Panel Once 51/00/4487       Next Visit Date:  Future Appointments   Date Time Provider Rodolfo Hyman   12/31/2020  9:40 AM Siegfried Severs, MD [facility] ORT SP [facility]   1/4/2021  3:20 PM Juventino Ormond Roque Grammes, APRN - CNP [facility]   1/13/2021 10:40 AM DO Raul Mancilla [facility]            Patient Active

## 2020-12-24 NOTE — TELEPHONE ENCOUNTER
LOV 12/10/20  NOV 12/31/20    Health Maintenance   Topic Date Due    Hepatitis C screen  1950    Diabetic retinal exam  09/14/1961    Diabetic microalbuminuria test  09/14/1969    DTaP/Tdap/Td vaccine (1 - Tdap) 09/14/1970    DEXA (modify frequency per FRAX score)  09/14/2006    Diabetic foot exam  10/15/2020    Shingles Vaccine (2 of 2) 12/18/2020    A1C test (Diabetic or Prediabetic)  12/29/2020    Pneumococcal 65+ years Vaccine (1 of 1 - PPSV23) 04/17/2023 (Originally 9/14/2016)    Potassium monitoring  08/04/2021    Creatinine monitoring  08/04/2021    Lipid screen  09/29/2021    Annual Wellness Visit (AWV)  12/11/2021    Breast cancer screen  10/21/2022    Colon cancer screen colonoscopy  07/08/2029    Flu vaccine  Completed    Hepatitis A vaccine  Aged Out    Hib vaccine  Aged Out    Meningococcal (ACWY) vaccine  Aged Out             (applicable per patient's age: Cancer Screenings, Depression Screening, Fall Risk Screening, Immunizations)    Hemoglobin A1C (%)   Date Value   09/29/2020 10.2 (H)   06/03/2020 6.7   01/13/2020 7.8     LDL Cholesterol (mg/dL)   Date Value   09/29/2020 44     LDL Calculated (mg/dL)   Date Value   04/06/2019 47     AST (U/L)   Date Value   09/29/2020 20     ALT (U/L)   Date Value   09/29/2020 29     BUN (mg/dL)   Date Value   08/04/2020 18      (goal A1C is < 7)   (goal LDL is <100) need 30-50% reduction from baseline     BP Readings from Last 3 Encounters:   12/10/20 136/82   11/25/20 139/76   11/05/20 (!) 146/86    (goal /80)      All Future Testing planned in [facility]:  Lab Frequency Next Occurrence   Comprehensive Metabolic Panel Once 90/78/2398       Next Visit Date:  Future Appointments   Date Time Provider Rodolfo Hyman   12/31/2020  9:40 AM MD SEE [facility]   1/4/2021  3:20 PM Fatou Simons APRN - CNP [facility]   1/13/2021 10:40 AM DO Raul Mancilla [facility]            Patient Active Problem List:     Ovarian mass     Abdominal pain     Partial bowel obstruction (HCC)     Epigastric pain     GERD (gastroesophageal reflux disease)     Lumbosacral spondylosis without myelopathy     Chronic bilateral thoracic back pain     Chronic anticoagulation     BMI 38.0-38.9,adult  ,h

## 2021-01-04 ENCOUNTER — VIRTUAL VISIT (OUTPATIENT)
Dept: PAIN MANAGEMENT | Age: 71
End: 2021-01-04
Payer: MEDICARE

## 2021-01-04 DIAGNOSIS — M54.6 CHRONIC BILATERAL THORACIC BACK PAIN: Chronic | ICD-10-CM

## 2021-01-04 DIAGNOSIS — M47.817 LUMBOSACRAL SPONDYLOSIS WITHOUT MYELOPATHY: Primary | Chronic | ICD-10-CM

## 2021-01-04 DIAGNOSIS — G89.29 CHRONIC BILATERAL THORACIC BACK PAIN: Chronic | ICD-10-CM

## 2021-01-04 PROCEDURE — 99213 OFFICE O/P EST LOW 20 MIN: CPT | Performed by: NURSE PRACTITIONER

## 2021-01-04 RX ORDER — OXYCODONE HYDROCHLORIDE AND ACETAMINOPHEN 5; 325 MG/1; MG/1
1 TABLET ORAL EVERY 4 HOURS PRN
Status: CANCELLED | OUTPATIENT
Start: 2021-01-04

## 2021-01-04 ASSESSMENT — ENCOUNTER SYMPTOMS
BACK PAIN: 1
RESPIRATORY NEGATIVE: 1
BOWEL INCONTINENCE: 0

## 2021-01-04 NOTE — PROGRESS NOTES
Patient completed a video visit today     Chief Complaint: back and chest wall pain    [facility]   Patient complains of low back pain. Patient had SCS implant on 7/27/20 and initially had good relief. She states she still has moderate relief on the right side however she is no longer having any relief on the left side. Complains of pain in thoracic area radiating to the left ribs. [email or web address] rep attempted to adjust the programming to relieve the pain on the left side with no success. She completed thoracic and lumbar XR that show SCS is in correct position. Patient was taking norco without benefit. Was switched to percocet at last visit and reports no relief. She would like to pursue intrathecal pain pump.      Pt started aquatic therapy which exacerbates her pain. She has completed 7 sessions with no benefit.      Patient had consult at weight loss clinic 10/7 - she was given a nutritional guide. Back Pain  This is a chronic problem. The current episode started more than 1 year ago. The problem occurs constantly. The problem has been gradually worsening since onset. The pain is present in the lumbar spine. The quality of the pain is described as burning, aching, cramping, shooting and stabbing. The pain does not radiate. The pain is at a severity of 9/10. The pain is severe. The pain is the same all the time. The symptoms are aggravated by standing, twisting, bending and position. Pertinent negatives include no bladder incontinence, bowel incontinence, chest pain, numbness, tingling or weakness. The treatment provided no relief. Patient denies any new neurological symptoms. No bowel or bladder incontinence, no weakness, and no falling.         Past Medical History:   Diagnosis Date    Arthritis     [facility]el [facility]     Cancer ([facility] 2015    ovarian stage 2    Cerebral artery occlusion with cerebral infarction ([facility] 03/2018    Mini strokes \"Tia's\"    Diabetes mellitus ([facility]     Epigastric pain     GERD (gastroesophageal reflux disease)     History of anesthesia complications 1458'M    was told after gallbladder surgery to never have anesthesia again \"since it was hard for me to come out of it\"    History of blood transfusion     Hx of blood clots     lung     Hypertension     Right arm pain     Right shoulder pain     Sleep apnea     uses CPAP nightly    Tachycardia     hx of    Thyroid disease     TIA (transient ischemic attack)        Past Surgical History:   Procedure Laterality Date    ANESTHESIA NERVE BLOCK Left 11/7/2019    NERVE BLOCK (DEFINE) - INTERCOSTAL NERVE BLOCK performed by Glenn De León MD at [facility] Bilateral 5/15/2020    NERVE BLOCK BILATERAL - MBB  L4-5, L5-S1 performed by Glenn De León MD at [address] Left     ORIF    CHOLECYSTECTOMY      COLONOSCOPY  07/08/2019    5 yr recall.     COLONOSCOPY N/A 7/8/2019    COLONOSCOPY WITH BIOPSY performed by Simi Mast MD at [address] OF Left of the [employer], [facility]. INJECTION PROCEDURE FOR SACROILIAC JOINT Bilateral 8/29/2019    SACROILIAC JOINT INJECTION performed by Glenn De León MD at [address] [facility] Bilateral 10/10/2019    SACROILIAC JOINT INJECTION performed by Glenn De León MD at [address] [facility] [address] [facility] [address]  2/2014    chris with bso    JOINT REPLACEMENT Right     shoulder    KNEE ARTHROSCOPY Right     NERVE BLOCK Bilateral 08/29/2019    SI joint injection    NERVE BLOCK Bilateral 10/10/2019    SI joint    NERVE BLOCK Bilateral 05/15/2020    Medial branch block L4-5, L5-S1    NERVE BLOCK  06/05/2020    SPINAL CORD STIMULATOR IMPLANT TRIAL (N/A )    OTHER SURGICAL HISTORY Right 11/11/2014    shoulder manipulation    SHOULDER ARTHROSCOPY Right 02/03/15    SHOULDER SURGERY  11/2014    manipulation    SPINAL CORD STIMULATOR SURGERY N/A 6/5/2020    SPINAL CORD STIMULATOR IMPLANT TRIAL performed by Angelina De La Cruz MD at [address] [facility] N/A 7/27/2020    SPINAL CORD STIMULATOR IMPLANT WITH THORACIC LAMINOTOMY performed by Katelin Sosa MD at [address] ENDOSCOPY  07/08/2019    UPPER GASTROINTESTINAL ENDOSCOPY N/A 7/8/2019    EGD BIOPSY performed by Fabian Diop MD at [address] Right 4/30/2015    pt has blood clot & abscess in her right kidney    VASCULAR SURGERY      [place] filter in & removed 1 year later       Allergies   Allergen Reactions    Aspirin Anaphylaxis     Only thing she can take is tylenol    Benadryl [Diphenhydramine] Other (See Comments)     Dystonic movement    Ibuprofen Anaphylaxis    Lidocaine Anaphylaxis     CAN NOT TOLERATE IV    Penicillins Anaphylaxis    Hydrocodone-Acetaminophen Other (See Comments)     Unsure of allergy    Oxycodone-Acetaminophen Other (See Comments)     Unsure of allergy    Tramadol Other (See Comments)     Unsure of allergy    Morphine Nausea And Vomiting     Unsure of allergy           Family History   Problem Relation Age of Onset    Elevated Lipids Paternal Grandmother        Social History     Socioeconomic History    Marital status: Single     Spouse name: Not on file    Number of children: Not on file    Years of education: Not on file    Highest education level: Not on file   Occupational History    Not on file   Social Needs    Financial resource strain: Not on file    Food insecurity     Worry: Not on file     Inability: Not on file    Transportation needs     Medical: Not on file     Non-medical: Not on file   Tobacco Use    Smoking status: Never Smoker    Smokeless tobacco: Never Used   Substance and Sexual Activity    Alcohol use: Yes     Comment: rare    Drug use: No    Sexual activity: Yes   Lifestyle    Physical activity     Days per week: Not on file Minutes per session: Not on file    Stress: Not on file   Relationships    Social connections     Talks on phone: Not on file     Gets together: Not on file     Attends Confucianist service: Not on file     Active member of club or organization: Not on file     Attends meetings of clubs or organizations: Not on file     Relationship status: Not on file    Intimate partner violence     Fear of current or ex partner: Not on file     Emotionally abused: Not on file     Physically abused: Not on file     Forced sexual activity: Not on file   Other Topics Concern    Not on file   Social History Narrative    Not on file       Review of Systems:  Review of Systems   Constitution: Negative. Cardiovascular: Negative. Negative for chest pain and palpitations. Respiratory: Negative. Musculoskeletal: Positive for back pain. Gastrointestinal: Negative for bowel incontinence. Genitourinary: Negative for bladder incontinence. Neurological: Negative for disturbances in coordination, loss of balance, numbness, tingling and weakness. Physical Exam:  There were no vitals taken for this visit. Physical Exam  HENT:      Head: Normocephalic. Pulmonary:      Effort: Pulmonary effort is normal.   Neurological:      Mental Status: She is alert. Psychiatric:         Mood and Affect: Mood normal.         Behavior: Behavior normal.         Record/Diagnostics Review:    Right foraminal disc bulge at L3-L4 effacing exiting right L3 and descending   right L4 nerve root.       Disc bulges otherwise as above without critical canal or neural foraminal   stenosis. Assessment:  Problem List Items Addressed This Visit     Lumbosacral spondylosis without myelopathy - Primary (Chronic)    Chronic bilateral thoracic back pain (Chronic)           Treatment Plan:  No benefit with norco  Tried percocet with no relief.    SCS with no benefit  PT and injections with no benefit  Pt would like to pursue intrathecal pain pump  Will send referral    Delfino Morataya is a 72 y.o. female being evaluated by a Virtual Visit (video visit) encounter to address concerns as mentioned above. A caregiver was present when appropriate. Due to this being a TeleHealth encounter (During RYAGW-95 public health emergency), evaluation of the following organ systems was limited: Vitals/Constitutional/EENT/Resp/CV/GI//MS/Neuro/Skin/Heme-Lymph-Imm. Pursuant to the emergency declaration under the [address] [place] [address], [address] [place] [address] and the [employer] Act, this Virtual Visit was conducted with patient's (and/or legal guardian's) consent, to reduce the patient's risk of exposure to COVID-19 and provide necessary medical care. The patient (and/or legal guardian) has also been advised to contact this office for worsening conditions or problems, and seek emergency medical treatment and/or call 911 if deemed necessary. Patient identification was verified at the start of the visit: Yes    Total time spent for this encounter: Not billed by time    Services were provided through a video synchronous discussion virtually to substitute for in-person clinic visit. Patient and provider were located at their individual homes. --LUPE Wang CNP on 1/4/2021 at 4:03 PM    An electronic signature was used to authenticate this note.

## 2021-01-06 ENCOUNTER — TELEPHONE (OUTPATIENT)
Dept: PAIN MANAGEMENT | Age: 71
End: 2021-01-06

## 2021-01-06 NOTE — TELEPHONE ENCOUNTER
Patient called stating increased pain in her back. Having difficulty sleeping or doing \"anything\". States the pain has been going on for years, but is now significantly worse. States it is hard to ambulate.

## 2021-01-06 NOTE — TELEPHONE ENCOUNTER
Called pt to let her know that if the pain is severe, she is advised to go to the ER.     OV scheduled.

## 2021-01-15 ASSESSMENT — ENCOUNTER SYMPTOMS
BOWEL INCONTINENCE: 0
RESPIRATORY NEGATIVE: 1
BACK PAIN: 1

## 2021-01-15 NOTE — PROGRESS NOTES
Patient completed a telephone visit today to review medication contract. Chief Complaint: back and chest wall pain    [facility] Patient complains of low back pain. Patient had SCS implant on 7/27/20 and initially had good relief. She states she still has moderate relief on the right side however she is no longer having any relief on the left side. Complains of pain in thoracic area radiating to the left ribs. [employer] rep attempted to adjust the programming to relieve the pain on the left side with no success. She completed thoracic and lumbar XR that show SCS is in correct position. Patient was taking norco without benefit. Was switched to percocet at last visit and reports no relief. She would like to pursue intrathecal pain pump.      Pt started aquatic therapy which exacerbates her pain. She has completed 7 sessions with no benefit.      Patient had consult at weight loss clinic 10/7 - she was given a nutritional guide.         Back Pain  This is a chronic problem. The current episode started more than 1 year ago. The problem occurs constantly. The problem is unchanged. The pain is present in the lumbar spine. The quality of the pain is described as aching, burning, cramping, shooting and stabbing. The pain radiates to the right thigh. The pain is at a severity of 9/10. The pain is severe. The pain is the same all the time. The symptoms are aggravated by standing. Stiffness is present all day. Associated symptoms include numbness, tingling and weakness. Pertinent negatives include no bladder incontinence, bowel incontinence, chest pain or fever. The treatment provided no relief. Patient denies any new neurological symptoms. No bowel or bladder incontinence, no weakness, and no falling.     Past Medical History:   Diagnosis Date    Arthritis     Bowel obstruction ([address]     Cancer ([address] 2015    ovarian stage 2    Cerebral artery occlusion with cerebral infarction ([facility] 03/2018    Mini strokes \"Tia's\"    Diabetes mellitus ([address]     Epigastric pain     GERD (gastroesophageal reflux disease)     History of anesthesia complications 5041'K    was told after gallbladder surgery to never have anesthesia again \"since it was hard for me to come out of it\"    History of blood transfusion     Hx of blood clots     lung     Hypertension     Right arm pain     Right shoulder pain     Sleep apnea     uses CPAP nightly    Tachycardia     hx of    Thyroid disease     TIA (transient ischemic attack)        Past Surgical History:   Procedure Laterality Date    ANESTHESIA NERVE BLOCK Left 11/7/2019    NERVE BLOCK (DEFINE) - INTERCOSTAL NERVE BLOCK performed by Maren Mccray MD at [facility] Bilateral 5/15/2020    NERVE BLOCK BILATERAL - MBB  L4-5, L5-S1 performed by Maren Mccray MD at [address] Left     ORIF    CHOLECYSTECTOMY      COLONOSCOPY  07/08/2019    5 yr recall.     COLONOSCOPY N/A 7/8/2019    COLONOSCOPY WITH BIOPSY performed by Anne Marie Garcia MD at [address] Right     [facility] OF [place], [facility]. INJECTION PROCEDURE FOR SACROILIAC JOINT Bilateral 8/29/2019    SACROILIAC JOINT INJECTION performed by Maren Mccray MD at [address] [facility] Bilateral 10/10/2019    SACROILIAC JOINT INJECTION performed by Maren Mccray MD at [address] [facility] [address]  2/2014    chris with bso    JOINT REPLACEMENT Right     shoulder    KNEE ARTHROSCOPY Right     NERVE BLOCK Bilateral 08/29/2019    SI joint injection    NERVE BLOCK Bilateral 10/10/2019    SI joint    NERVE BLOCK Bilateral 05/15/2020    Medial branch block L4-5, L5-S1    NERVE BLOCK  06/05/2020    SPINAL CORD STIMULATOR IMPLANT TRIAL (N/A )    OTHER SURGICAL HISTORY Right 11/11/2014    shoulder manipulation    SHOULDER ARTHROSCOPY Right 02/03/15    SHOULDER SURGERY  11/2014    manipulation  SPINAL CORD STIMULATOR SURGERY N/A 6/5/2020    SPINAL CORD STIMULATOR IMPLANT TRIAL performed by Chrystal Meehan MD at [facility] N/A 7/27/2020    SPINAL CORD STIMULATOR IMPLANT WITH THORACIC LAMINOTOMY performed by Sunitha Abernathy MD at [address] [facility] ENDOSCOPY  07/08/2019    UPPER GASTROINTESTINAL ENDOSCOPY N/A 7/8/2019    EGD BIOPSY performed by Jennifer Vickers MD at [address] Right 4/30/2015    pt has blood clot & abscess in her right kidney    VASCULAR SURGERY      [place] filter in & removed 1 year later       Allergies   Allergen Reactions    Aspirin Anaphylaxis     Only thing she can take is tylenol    Benadryl [Diphenhydramine] Other (See Comments)     Dystonic movement    Ibuprofen Anaphylaxis    Lidocaine Anaphylaxis     CAN NOT TOLERATE IV    Penicillins Anaphylaxis    Hydrocodone-Acetaminophen Other (See Comments)     Unsure of allergy    Oxycodone-Acetaminophen Other (See Comments)     Unsure of allergy    Tramadol Other (See Comments)     Unsure of allergy    Morphine Nausea And Vomiting     Unsure of allergy           Family History   Problem Relation Age of Onset    Elevated Lipids Paternal Grandmother        Social History     Socioeconomic History    Marital status: Single     Spouse name: Not on file    Number of children: Not on file    Years of education: Not on file    Highest education level: Not on file   Occupational History    Not on file   Social Needs    Financial resource strain: Not on file    Food insecurity     Worry: Not on file     Inability: Not on file    Transportation needs     Medical: Not on file     Non-medical: Not on file   Tobacco Use    Smoking status: Never Smoker    Smokeless tobacco: Never Used   Substance and Sexual Activity    Alcohol use: Yes     Comment: rare    Drug use: No    Sexual activity: Yes   Lifestyle    Physical activity     Days per week: Not on file     Minutes per session: Not on file    Stress: Not on file   Relationships    Social connections     Talks on phone: Not on file     Gets together: Not on file     Attends Christian service: Not on file     Active member of club or organization: Not on file     Attends meetings of clubs or organizations: Not on file     Relationship status: Not on file    Intimate partner violence     Fear of current or ex partner: Not on file     Emotionally abused: Not on file     Physically abused: Not on file     Forced sexual activity: Not on file   Other Topics Concern    Not on file   Social History Narrative    Not on file       Review of Systems:  Review of Systems   Constitution: Negative. Negative for chills and fever. Cardiovascular: Negative for chest pain and palpitations. Respiratory: Negative. Negative for cough and shortness of breath. Musculoskeletal: Positive for back pain. Gastrointestinal: Negative for bowel incontinence and constipation. Genitourinary: Negative for bladder incontinence. Neurological: Positive for numbness, tingling and weakness. Negative for disturbances in coordination and loss of balance. Physical Exam:  There were no vitals taken for this visit. Physical Exam  Neurological:      Mental Status: She is alert. Psychiatric:         Mood and Affect: Mood normal.         Record/Diagnostics Review:     There are stable mild L3 and L4 compression fractures.  The vertical heights   of the remaining vertebral bodies are maintained       There is mild L4-5 facet hypertrophy       There is no pedicular, pars interarticularis defect, acute fracture or   dislocation seen         Assessment:  Problem List Items Addressed This Visit     Lumbosacral spondylosis without myelopathy (Chronic)    Chronic bilateral thoracic back pain (Chronic)      Other Visit Diagnoses     DDD (degenerative disc disease), lumbar    -  Primary    Relevant Orders    Amb External Referral To Psychology             Treatment Plan:  Referral to [facility] for evaluation for pain pump  Please schedule for OV after stresscare evaluation    Gely Ridmarshal is a 72 y.o. female being evaluated by a Virtual Visit (telephone visit) encounter to address concerns as mentioned above. A caregiver was present when appropriate. Due to this being a TeleHealth encounter (During KZTGX-54 public health emergency), evaluation of the following organ systems was limited: Vitals/Constitutional/EENT/Resp/CV/GI//MS/Neuro/Skin/Heme-Lymph-Imm. Pursuant to the emergency declaration under the [facility], [address] [place] [address] [facility] authority and the [employer] Act, this Virtual Visit was conducted with patient's (and/or legal guardian's) consent, to reduce the patient's risk of exposure to COVID-19 and provide necessary medical care. The patient (and/or legal guardian) has also been advised to contact this office for worsening conditions or problems, and seek emergency medical treatment and/or call 911 if deemed necessary. Patient identification was verified at the start of the visit: Yes    Total time spent for this encounter: 15 minutes    Services were provided through a telephone discussion virtually to substitute for in-person clinic visit. Patient and provider were located at their individual homes. --LUPE Watkins - CNP on 1/18/2021 at 11:23 AM    An electronic signature was used to authenticate this note.

## 2021-01-18 ENCOUNTER — VIRTUAL VISIT (OUTPATIENT)
Dept: PAIN MANAGEMENT | Age: 71
End: 2021-01-18
Payer: MEDICARE

## 2021-01-18 DIAGNOSIS — G89.29 CHRONIC BILATERAL THORACIC BACK PAIN: Chronic | ICD-10-CM

## 2021-01-18 DIAGNOSIS — M51.36 DDD (DEGENERATIVE DISC DISEASE), LUMBAR: Primary | ICD-10-CM

## 2021-01-18 DIAGNOSIS — M54.6 CHRONIC BILATERAL THORACIC BACK PAIN: Chronic | ICD-10-CM

## 2021-01-18 DIAGNOSIS — M47.817 LUMBOSACRAL SPONDYLOSIS WITHOUT MYELOPATHY: Chronic | ICD-10-CM

## 2021-01-18 PROCEDURE — 99442 PR PHYS/QHP TELEPHONE EVALUATION 11-20 MIN: CPT | Performed by: NURSE PRACTITIONER

## 2021-01-18 ASSESSMENT — ENCOUNTER SYMPTOMS
SHORTNESS OF BREATH: 0
COUGH: 0
CONSTIPATION: 0

## 2021-01-25 ENCOUNTER — TELEPHONE (OUTPATIENT)
Dept: PRIMARY CARE CLINIC | Age: 71
End: 2021-01-25

## 2021-01-25 ENCOUNTER — HOSPITAL ENCOUNTER (OUTPATIENT)
Age: 71
Setting detail: SPECIMEN
Discharge: HOME | End: 2021-01-25
Payer: MEDICARE

## 2021-01-25 ENCOUNTER — OFFICE VISIT (OUTPATIENT)
Dept: PRIMARY CARE CLINIC | Age: 71
End: 2021-01-25
Payer: MEDICARE

## 2021-01-25 VITALS
HEART RATE: 76 BPM | TEMPERATURE: 98.4 F | OXYGEN SATURATION: 98 % | HEIGHT: 64 IN | WEIGHT: 225.4 LBS | SYSTOLIC BLOOD PRESSURE: 134 MMHG | BODY MASS INDEX: 38.48 KG/M2 | DIASTOLIC BLOOD PRESSURE: 82 MMHG

## 2021-01-25 DIAGNOSIS — E11.40 TYPE 2 DIABETES MELLITUS WITH DIABETIC NEUROPATHY, WITH LONG-TERM CURRENT USE OF INSULIN (HCC): ICD-10-CM

## 2021-01-25 DIAGNOSIS — I10 ESSENTIAL HYPERTENSION: ICD-10-CM

## 2021-01-25 DIAGNOSIS — E66.9 CLASS 2 OBESITY WITH BODY MASS INDEX (BMI) OF 38.0 TO 38.9 IN ADULT, UNSPECIFIED OBESITY TYPE, UNSPECIFIED WHETHER SERIOUS COMORBIDITY PRESENT: ICD-10-CM

## 2021-01-25 DIAGNOSIS — Z79.4 TYPE 2 DIABETES MELLITUS WITH DIABETIC NEUROPATHY, WITH LONG-TERM CURRENT USE OF INSULIN (HCC): ICD-10-CM

## 2021-01-25 DIAGNOSIS — R82.998 DARK URINE: ICD-10-CM

## 2021-01-25 DIAGNOSIS — K21.9 GASTROESOPHAGEAL REFLUX DISEASE WITHOUT ESOPHAGITIS: ICD-10-CM

## 2021-01-25 LAB
BILIRUBIN, POC: NORMAL
BLOOD URINE, POC: NORMAL
CLARITY, POC: CLEAR
COLOR, POC: YELLOW
CREATININE URINE POCT: 300
GLUCOSE URINE, POC: 1000
HBA1C MFR BLD: 8.6 %
KETONES, POC: NORMAL
LEUKOCYTE EST, POC: NORMAL
MICROALBUMIN/CREAT 24H UR: 150 MG/G{CREAT}
MICROALBUMIN/CREAT UR-RTO: NORMAL
NITRITE, POC: NORMAL
PH, POC: 5.5
PROTEIN, POC: 30
SPECIFIC GRAVITY, POC: 1.03
UROBILINOGEN, POC: 0.2

## 2021-01-25 PROCEDURE — 3052F HG A1C>EQUAL 8.0%<EQUAL 9.0%: CPT | Performed by: FAMILY MEDICINE

## 2021-01-25 PROCEDURE — 1090F PRES/ABSN URINE INCON ASSESS: CPT | Performed by: FAMILY MEDICINE

## 2021-01-25 PROCEDURE — 1036F TOBACCO NON-USER: CPT | Performed by: FAMILY MEDICINE

## 2021-01-25 PROCEDURE — 1123F ACP DISCUSS/DSCN MKR DOCD: CPT | Performed by: FAMILY MEDICINE

## 2021-01-25 PROCEDURE — 4040F PNEUMOC VAC/ADMIN/RCVD: CPT | Performed by: FAMILY MEDICINE

## 2021-01-25 PROCEDURE — 2022F DILAT RTA XM EVC RTNOPTHY: CPT | Performed by: FAMILY MEDICINE

## 2021-01-25 PROCEDURE — G8400 PT W/DXA NO RESULTS DOC: HCPCS | Performed by: FAMILY MEDICINE

## 2021-01-25 PROCEDURE — G8427 DOCREV CUR MEDS BY ELIG CLIN: HCPCS | Performed by: FAMILY MEDICINE

## 2021-01-25 PROCEDURE — 99214 OFFICE O/P EST MOD 30 MIN: CPT | Performed by: FAMILY MEDICINE

## 2021-01-25 PROCEDURE — G8484 FLU IMMUNIZE NO ADMIN: HCPCS | Performed by: FAMILY MEDICINE

## 2021-01-25 PROCEDURE — 83036 HEMOGLOBIN GLYCOSYLATED A1C: CPT | Performed by: FAMILY MEDICINE

## 2021-01-25 PROCEDURE — 81002 URINALYSIS NONAUTO W/O SCOPE: CPT | Performed by: FAMILY MEDICINE

## 2021-01-25 PROCEDURE — 82044 UR ALBUMIN SEMIQUANTITATIVE: CPT | Performed by: FAMILY MEDICINE

## 2021-01-25 PROCEDURE — 3017F COLORECTAL CA SCREEN DOC REV: CPT | Performed by: FAMILY MEDICINE

## 2021-01-25 PROCEDURE — G8417 CALC BMI ABV UP PARAM F/U: HCPCS | Performed by: FAMILY MEDICINE

## 2021-01-25 RX ORDER — OMEPRAZOLE 40 MG/1
40 CAPSULE, DELAYED RELEASE ORAL
Qty: 90 CAPSULE | Refills: 1 | Status: SHIPPED | OUTPATIENT
Start: 2021-01-25 | End: 2021-06-17

## 2021-01-25 RX ORDER — PHENTERMINE HYDROCHLORIDE 37.5 MG/1
37.5 TABLET ORAL
Qty: 30 TABLET | Refills: 0 | Status: SHIPPED | OUTPATIENT
Start: 2021-01-25 | End: 2021-02-24

## 2021-01-25 RX ORDER — PHENTERMINE HYDROCHLORIDE 37.5 MG/1
37.5 TABLET ORAL
Qty: 30 TABLET | Refills: 0 | Status: SHIPPED | OUTPATIENT
Start: 2021-01-25 | End: 2021-01-25 | Stop reason: SDUPTHER

## 2021-01-25 RX ORDER — INSULIN GLARGINE 100 [IU]/ML
INJECTION, SOLUTION SUBCUTANEOUS
Qty: 3 VIAL | Refills: 10 | Status: SHIPPED | OUTPATIENT
Start: 2021-01-25 | End: 2021-12-15

## 2021-01-25 ASSESSMENT — PATIENT HEALTH QUESTIONNAIRE - PHQ9
SUM OF ALL RESPONSES TO PHQ QUESTIONS 1-9: 0
2. FEELING DOWN, DEPRESSED OR HOPELESS: 0
SUM OF ALL RESPONSES TO PHQ9 QUESTIONS 1 & 2: 0

## 2021-01-25 ASSESSMENT — ENCOUNTER SYMPTOMS
BACK PAIN: 1
SHORTNESS OF BREATH: 0
VOMITING: 0
NAUSEA: 1

## 2021-01-25 NOTE — TELEPHONE ENCOUNTER
Medication was accidentally sent to [facility] instead of [place] [address]. I called [facility] in [place] to see if they could send the script to [place] [address] but due to it being a controlled substance it has to be recent. I've pended medication to be resent.

## 2021-01-25 NOTE — TELEPHONE ENCOUNTER
Andree from [place] called due to Adipex refill, states cannot have a gap in refilling this type of rx so needs documentation   Spoke with patient and she states she was in quarantine due to having covid. Notified Andree at pharmacy of this.

## 2021-01-25 NOTE — PROGRESS NOTES
[address] PRIMARY CARE  [address] [facility] 1560  [address] [address]  Dept: [phone]  Dept Fax: [phone]    Óscar Calabrese is a 72 y.o. female who presents today for her medical conditions/complaints as noted below. Óscar Calabrese is c/o of  Chief Complaint   Patient presents with    Diabetes    Obesity         HPI:     HPI      Pt here for diabetes follow up and weight follow up      Lost about 4 lbs on adipex, tolerating it well. Denies any side effects from it. Pt delayed in getting second dose of adipex due to covid quarantine. Diabetes- Sugars have been elevated but notes that she did eat lots of sweets during the holidays. She has eye exam coming Tuesday for her glaucoma as well. Urine has been really dark lately past few days as well even when she has been drinking more water. GERD acting up lately as well and has some midepigastric pain/discomfort.        Hemoglobin A1C (%)   Date Value   2021 8.6   2020 10.2 (H)   2020 6.7             ( goal A1C is < 7)   No results found for: LABMICR  LDL Cholesterol (mg/dL)   Date Value   2020 44   2015 86   10/06/2014 86     LDL Calculated (mg/dL)   Date Value   2019 47       (goal LDL is <100)   AST (U/L)   Date Value   2020 20     ALT (U/L)   Date Value   2020 29     BUN (mg/dL)   Date Value   2020 18     BP Readings from Last 3 Encounters:   21 134/82   12/10/20 136/82   20 139/76          (goal 120/80)    Past Medical History:   Diagnosis Date    Arthritis     Bowel obstruction ([address]     Cancer ([address]     ovarian stage 2    Cerebral artery occlusion with cerebral infarction ([address] 2018    Mini strokes \"Tia's\"    Diabetes mellitus ([address]     Epigastric pain     GERD (gastroesophageal reflux disease)     History of anesthesia complications 5672'G    was told after gallbladder surgery to never have anesthesia again \"since it was hard for me to come out of it\"    History of blood transfusion     Hx of blood clots     lung     Hypertension     Right arm pain     Right shoulder pain     Sleep apnea     uses CPAP nightly    Tachycardia     hx of    Thyroid disease     TIA (transient ischemic attack)       Past Surgical History:   Procedure Laterality Date    ANESTHESIA NERVE BLOCK Left 11/7/2019    NERVE BLOCK (DEFINE) - INTERCOSTAL NERVE BLOCK performed by Marquita Hernandez MD at [facility] Bilateral 5/15/2020    NERVE BLOCK BILATERAL - MBB  L4-5, L5-S1 performed by Marquita Hernandez MD at [address] Left     ORIF    CHOLECYSTECTOMY      COLONOSCOPY  07/08/2019    5 yr recall.     COLONOSCOPY N/A 7/8/2019    COLONOSCOPY WITH BIOPSY performed by Yair Morin MD at [address] Right     [facility] [place]. INJECTION PROCEDURE FOR SACROILIAC JOINT Bilateral 8/29/2019    SACROILIAC JOINT INJECTION performed by Marquita Hernandez MD at [address] [facility] Bilateral 10/10/2019    SACROILIAC JOINT INJECTION performed by Marquita Hernandez MD at [address]  2/2014    chris with bso    JOINT REPLACEMENT Right     shoulder    KNEE ARTHROSCOPY Right     NERVE BLOCK Bilateral 08/29/2019    SI joint injection    NERVE BLOCK Bilateral 10/10/2019    SI joint    NERVE BLOCK Bilateral 05/15/2020    Medial branch block L4-5, L5-S1    NERVE BLOCK  06/05/2020    SPINAL CORD STIMULATOR IMPLANT TRIAL (N/A )    OTHER SURGICAL HISTORY Right 11/11/2014    shoulder manipulation    SHOULDER ARTHROSCOPY Right 02/03/15    SHOULDER SURGERY  11/2014    manipulation    SPINAL CORD STIMULATOR SURGERY N/A 6/5/2020    SPINAL CORD STIMULATOR IMPLANT TRIAL performed by Marquita Hernandez MD at [facility] N/A 7/27/2020    SPINAL CORD STIMULATOR IMPLANT WITH THORACIC LAMINOTOMY performed by Augustin Moritz, MD at [address] ENDOSCOPY  07/08/2019    UPPER GASTROINTESTINAL ENDOSCOPY N/A 7/8/2019    EGD BIOPSY performed by Anne Marie Garcia MD at [address] Right 4/30/2015    pt has blood clot & abscess in her right kidney    VASCULAR SURGERY      [place] filter in & removed 1 year later       Family History   Problem Relation Age of Onset    Elevated Lipids Paternal Grandmother        Social History     Tobacco Use    Smoking status: Never Smoker    Smokeless tobacco: Never Used   Substance Use Topics    Alcohol use: Yes     Comment: rare      Current Outpatient Medications   Medication Sig Dispense Refill    omeprazole (PRILOSEC) 40 MG delayed release capsule Take 1 capsule by mouth every morning (before breakfast) 90 capsule 1    phentermine (ADIPEX-P) 37.5 MG tablet Take 1 tablet by mouth every morning (before breakfast) for 30 days. 30 tablet 0    insulin glargine (LANTUS) 100 UNIT/ML injection vial INJECT 75 UNITS SUBCUTANEOUSLY NIGHTLY 3 vial 10    levothyroxine (SYNTHROID) 88 MCG tablet TAKE (1) TABLET BY MOUTH DAILY 30 tablet 10    oxyCODONE-acetaminophen (PERCOCET) 5-325 MG per tablet Take 1 tablet by mouth every 4 hours as needed for Pain.       insulin lispro (HUMALOG) 100 UNIT/ML injection vial INJECT SUBCUTANEOUSLY THREE TIMES A DAY AS DIRECTED BY SLIDING SCALE  10 mL 3    tamsulosin (FLOMAX) 0.4 MG capsule TAKE 1 CAPSULE BY MOUTH NIGHTLY  30 capsule 10    blood glucose test strips (ACCU-CHEK TED PLUS) strip USE TO TEST BLOOD SUGAR THREE TIMES A DAY AND AS NEEDED FOR SYMPTOMS OF IRREGULAR BLOOD GLUCOSE 100 strip 10    SURE COMFORT INSULIN SYRINGE 31G X 5/16\" 1 ML MISC USE AS DIRECTED FOUR TIMES A  each 10    traZODone (DESYREL) 50 MG tablet Take 1 tablet by mouth nightly 30 tablet 5    alendronate (FOSAMAX) 70 MG tablet TAKE 1 TABLET BY MOUTH EVERY 7 DAYS 12 tablet 10  XARELTO 20 MG TABS tablet TAKE 1 TABLET BY MOUTH EVERY DAY 90 tablet 10    furosemide (LASIX) 20 MG tablet Take 1 tablet by mouth daily 30 tablet 0    potassium chloride (KLOR-CON M) 20 MEQ extended release tablet TAKE 1 TABLET BY MOUTH 2 TIMES DAILY 180 tablet 2    vitamin C (ASCORBIC ACID) 500 MG tablet Take 500 mg by mouth daily      DULoxetine (CYMBALTA) 60 MG extended release capsule TAKE 1 CAPSULE BY MOUTH DAILY 90 capsule 10    glucose monitoring kit (FREESTYLE) monitoring kit 1 kit by Does not apply route daily Please provide accuchek meter for patient, not freestyle 1 kit 0    atorvastatin (LIPITOR) 40 MG tablet TAKE (1) TABLET BY MOUTH EVERY DAY 90 tablet 10    amLODIPine (NORVASC) 5 MG tablet TAKE (1) TABLET BY MOUTH ONCE DAILY 90 tablet 10    hydroCHLOROthiazide (HYDRODIURIL) 25 MG tablet TAKE 1 TABLET BY MOUTH EVERY MORNING 90 tablet 10    losartan (COZAAR) 100 MG tablet TAKE 1 TABLET BY MOUTH EVERY DAY 90 tablet 10    EPINEPHrine (EPIPEN 2-LISA) 0.3 MG/0.3ML SOAJ injection EpiPen 2-Lisa 0.3 mg/0.3 mL injection, auto-injector      calcium carbonate (TUMS) 500 MG chewable tablet Take 1 tablet by mouth daily as needed for Heartburn      SUPER B COMPLEX/C CAPS Take by mouth      Cholecalciferol (VITAMIN D3) 5000 units TABS Take by mouth      ferrous sulfate 325 (65 Fe) MG tablet Take 325 mg by mouth daily (with breakfast)      fexofenadine (ALLEGRA) 60 MG tablet Take 60 mg by mouth daily      magnesium oxide (MAG-OX) 400 MG tablet Take 400 mg by mouth daily      Melatonin 10 MG TABS Take 10 mg by mouth daily as needed       gabapentin (NEURONTIN) 600 MG tablet TAKE 1 TABLET BY MOUTH FOUR TIMES DAILY 120 tablet 10     No current facility-administered medications for this visit.       Allergies   Allergen Reactions    Aspirin Anaphylaxis     Only thing she can take is tylenol    Benadryl [Diphenhydramine] Other (See Comments)     Dystonic movement    Ibuprofen Anaphylaxis    Lidocaine Anaphylaxis     CAN NOT TOLERATE IV    Penicillins Anaphylaxis    Hydrocodone-Acetaminophen Other (See Comments)     Unsure of allergy    Oxycodone-Acetaminophen Other (See Comments)     Unsure of allergy    Tramadol Other (See Comments)     Unsure of allergy    Morphine Nausea And Vomiting     Unsure of allergy       Health Maintenance   Topic Date Due    Hepatitis C screen  1950    Diabetic retinal exam  09/14/1961    DTaP/Tdap/Td vaccine (1 - Tdap) 09/14/1970    DEXA (modify frequency per FRAX score)  09/14/2006    Diabetic foot exam  10/15/2020    Shingles Vaccine (2 of 2) 12/18/2020    Pneumococcal 65+ years Vaccine (1 of 1 - PPSV23) 04/17/2023 (Originally 9/14/2016)    Potassium monitoring  08/04/2021    Creatinine monitoring  08/04/2021    Lipid screen  09/29/2021    Annual Wellness Visit (AWV)  12/11/2021    A1C test (Diabetic or Prediabetic)  01/25/2022    Diabetic microalbuminuria test  01/25/2022    Breast cancer screen  10/21/2022    Colon cancer screen colonoscopy  07/08/2029    Flu vaccine  Completed    Hepatitis A vaccine  Aged Out    Hib vaccine  Aged Out    Meningococcal (ACWY) vaccine  Aged Out       Subjective:      Review of Systems   Constitutional: Negative for chills and fever. Respiratory: Negative for shortness of breath. Gastrointestinal: Positive for nausea. Negative for vomiting. Midepigastric discomfort/pain   Genitourinary:        Dark urine past few days, drinking plenty of water   Musculoskeletal: Positive for back pain. Objective:     Physical Exam  Constitutional:       Appearance: She is well-developed. HENT:      Head: Normocephalic and atraumatic. Eyes:      Conjunctiva/sclera: Conjunctivae normal.      Pupils: Pupils are equal, round, and reactive to light. Neck:      Musculoskeletal: Neck supple. Cardiovascular:      Rate and Rhythm: Normal rate and regular rhythm. Heart sounds: Normal heart sounds.    Pulmonary: Effort: Pulmonary effort is normal.      Breath sounds: Normal breath sounds. Abdominal:      General: Bowel sounds are normal. There is no distension. Palpations: Abdomen is soft. Tenderness: There is abdominal tenderness (some midepigastric ttp). Skin:     General: Skin is warm and dry. Neurological:      Mental Status: She is alert and oriented to person, place, and time. Psychiatric:         Mood and Affect: Mood normal.         Behavior: Behavior normal.         Thought Content: Thought content normal.       /82   Pulse 76   Temp 98.4 °F (36.9 °C)   Ht 5' 4\" (1.626 m)   Wt 225 lb 6.4 oz (102.2 kg)   SpO2 98%   BMI 38.69 kg/m²     Assessment:      1. Type 2 diabetes mellitus with diabetic neuropathy, with long-term current use of insulin (HCC)  - pt has been using 69 units of lantus, I recommend we increase to 75 over next few days. Continue sliding scale as well. - +micoralbumin, pt on losartan, will continue. - phentermine (ADIPEX-P) 37.5 MG tablet; Take 1 tablet by mouth every morning (before breakfast) for 30 days. Dispense: 30 tablet; Refill: 0    2. Dark urine  - UA showed some leukocytes, will send for culture. - POCT Urinalysis no Micro  - Culture, Urine; Future    3. Class 2 obesity with body mass index (BMI) of 38.0 to 38.9 in adult, unspecified obesity type, unspecified whether serious comorbidity present  - phentermine (ADIPEX-P) 37.5 MG tablet; Take 1 tablet by mouth every morning (before breakfast) for 30 days. Dispense: 30 tablet; Refill: 0    4. Gastroesophageal reflux disease without esophagitis  - recommend we increase to 40  Mg , if still symptomatic or worsens I recommend she see GI doctor. - omeprazole (PRILOSEC) 40 MG delayed release capsule; Take 1 capsule by mouth every morning (before breakfast)  Dispense: 90 capsule; Refill: 1    5.  Essential hypertension  - BP controlled           Plan:      Return in about 1 month (around 2/25/2021) for follow up weight and diabetes. Orders Placed This Encounter   Procedures    Culture, Urine     Standing Status:   Future     Standing Expiration Date:   1/25/2022     Order Specific Question:   Specify (ex-cath, midstream, cysto, etc)? Answer:   midstream    POCT glycosylated hemoglobin (Hb A1C)    POCT microalbumin    POCT Urinalysis no Micro     Orders Placed This Encounter   Medications    omeprazole (PRILOSEC) 40 MG delayed release capsule     Sig: Take 1 capsule by mouth every morning (before breakfast)     Dispense:  90 capsule     Refill:  1    phentermine (ADIPEX-P) 37.5 MG tablet     Sig: Take 1 tablet by mouth every morning (before breakfast) for 30 days. Dispense:  30 tablet     Refill:  0    insulin glargine (LANTUS) 100 UNIT/ML injection vial     Sig: INJECT 75 UNITS SUBCUTANEOUSLY NIGHTLY     Dispense:  3 vial     Refill:  10        Patient given educational materials - see patient instructions. Discussed use, benefit, and side effects of prescribed medications. All patient questions answered. Pt voiced understanding. Reviewed healthmaintenance. Instructed to continue current medications, diet and exercise. Patient agreed with treatment plan. Follow up as directed.      Electronically signed by Zoey Carter DO on 1/25/2021 at 11:29 AM

## 2021-01-26 ENCOUNTER — TELEPHONE (OUTPATIENT)
Dept: PRIMARY CARE CLINIC | Age: 71
End: 2021-01-26

## 2021-01-26 LAB
CULTURE: NORMAL
Lab: NORMAL
SPECIMEN DESCRIPTION: NORMAL

## 2021-01-26 NOTE — TELEPHONE ENCOUNTER
Pt called stating that per last OV she is to double up on her Omeprazole 20 mg until she runs out, pt just received medication from mail order pharmacy today and the omeprazole that she received was 20mg and her next shipment won't be until next month. Pt asking if PCP can send in 15 day supply of omeprazole 40 mg to be sent to [facility] in [facility].     Please advise

## 2021-01-27 RX ORDER — OMEPRAZOLE 20 MG/1
20 CAPSULE, DELAYED RELEASE ORAL DAILY
Qty: 30 CAPSULE | Refills: 0 | Status: SHIPPED | OUTPATIENT
Start: 2021-01-27 | End: 2021-02-25

## 2021-01-27 NOTE — TELEPHONE ENCOUNTER
Please send over a month supply of the Prilosec 20 mg so she can start to double the medication, her mail order will send them at the first of the month prepackaged from her.

## 2021-01-29 ENCOUNTER — TELEPHONE (OUTPATIENT)
Dept: PAIN MANAGEMENT | Age: 71
End: 2021-01-29

## 2021-01-29 NOTE — TELEPHONE ENCOUNTER
Patient calls stating that she is having increased pain, stating that it is hard to move. States that when you takes a deep breath or sneezes, she feels like \"her back is breaking in two\". States that the pain is in a new area of the back. Please advise.

## 2021-02-01 ENCOUNTER — VIRTUAL VISIT (OUTPATIENT)
Dept: PAIN MANAGEMENT | Age: 71
End: 2021-02-01
Payer: MEDICARE

## 2021-02-01 DIAGNOSIS — M47.817 LUMBOSACRAL SPONDYLOSIS WITHOUT MYELOPATHY: Primary | Chronic | ICD-10-CM

## 2021-02-01 DIAGNOSIS — M54.6 CHRONIC BILATERAL THORACIC BACK PAIN: Chronic | ICD-10-CM

## 2021-02-01 DIAGNOSIS — G89.29 CHRONIC BILATERAL THORACIC BACK PAIN: Chronic | ICD-10-CM

## 2021-02-01 PROCEDURE — 99212 OFFICE O/P EST SF 10 MIN: CPT | Performed by: NURSE PRACTITIONER

## 2021-02-01 ASSESSMENT — ENCOUNTER SYMPTOMS
BACK PAIN: 1
COUGH: 0
CONSTIPATION: 0
SHORTNESS OF BREATH: 0

## 2021-02-01 NOTE — PROGRESS NOTES
Patient completed a video visit today to review medication contract. Chief Complaint: back and chest wall pain    [facility] Patient complains of low back pain.  Patient had SCS implant on 7/27/20 and initially had good relief. She states she still has moderate relief on the right side however she is no longer having any relief on the left side. Complains of pain in thoracic area radiating to the left ribs. [employer] rep attempted to adjust the programming to relieve the pain on the left side with no success. She completed thoracic and lumbar XR that show SCS is in correct position. Patient was taking norco without benefit. Was switched to percocet at last visit and reports no relief. She would like to pursue intrathecal pain pump.      Pt started aquatic therapy which exacerbates her pain. She has completed 7 sessions with no benefit.      Patient had consult at weight loss clinic 10/7 - she was given a nutritional guide.     She had [facility] appointment for pain pump 1/28 and demonstrated appropriate knowledge and expectations for the implantable pump. Back Pain  This is a chronic problem. The current episode started more than 1 year ago. The problem occurs constantly. The problem is unchanged. The pain is present in the lumbar spine and thoracic spine. The quality of the pain is described as aching, stabbing and burning. The pain does not radiate. The pain is at a severity of 10/10. The pain is moderate. The symptoms are aggravated by position, standing and sitting. Associated symptoms include numbness. Pertinent negatives include no chest pain, fever, paresthesias or tingling. She has tried analgesics and bed rest for the symptoms. The treatment provided mild relief. Patient denies any new neurological symptoms. No bowel or bladder incontinence, no weakness, and no falling.     Past Medical History:   Diagnosis Date    Arthritis     [facility]     Cancer ([facility] 2015    ovarian stage 2    Cerebral artery occlusion with cerebral infarction ([facility] 03/2018    Mini strokes \"Tia's\"    Diabetes mellitus ([facility]     Epigastric pain     GERD (gastroesophageal reflux disease)     History of anesthesia complications 0821'F    was told after gallbladder surgery to never have anesthesia again \"since it was hard for me to come out of it\"    History of blood transfusion     Hx of blood clots     lung     Hypertension     Right arm pain     Right shoulder pain     Sleep apnea     uses CPAP nightly    Tachycardia     hx of    Thyroid disease     TIA (transient ischemic attack)        Past Surgical History:   Procedure Laterality Date    ANESTHESIA NERVE BLOCK Left 11/7/2019    NERVE BLOCK (DEFINE) - INTERCOSTAL NERVE BLOCK performed by Edwina Trevino MD at [facility] Bilateral 5/15/2020    NERVE BLOCK BILATERAL - MBB  L4-5, L5-S1 performed by Edwina Trevino MD at [address] Left     ORIF    CHOLECYSTECTOMY      COLONOSCOPY  07/08/2019    5 yr recall.     COLONOSCOPY N/A 7/8/2019    COLONOSCOPY WITH BIOPSY performed by Rupert Coates MD at [address] Right     [facility] [place]. INJECTION PROCEDURE FOR SACROILIAC JOINT Bilateral 8/29/2019    SACROILIAC JOINT INJECTION performed by Edwina Trevino MD at [address] [facility] Bilateral 10/10/2019    SACROILIAC JOINT INJECTION performed by Edwina Trevino MD at [address]  2/2014    [facility] with bso    JOINT REPLACEMENT Right     shoulder    KNEE ARTHROSCOPY Right     NERVE BLOCK Bilateral 08/29/2019    SI joint injection    NERVE BLOCK Bilateral 10/10/2019    SI joint    NERVE BLOCK Bilateral 05/15/2020    Medial branch block L4-5, L5-S1    NERVE BLOCK  06/05/2020    SPINAL CORD STIMULATOR IMPLANT TRIAL (N/A )    OTHER SURGICAL HISTORY Right 11/11/2014    shoulder Alcohol use: Yes     Comment: rare    Drug use: No    Sexual activity: Yes   Lifestyle    Physical activity     Days per week: Not on file     Minutes per session: Not on file    Stress: Not on file   Relationships    Social connections     Talks on phone: Not on file     Gets together: Not on file     Attends Taoism service: Not on file     Active member of club or organization: Not on file     Attends meetings of clubs or organizations: Not on file     Relationship status: Not on file    Intimate partner violence     Fear of current or ex partner: Not on file     Emotionally abused: Not on file     Physically abused: Not on file     Forced sexual activity: Not on file   Other Topics Concern    Not on file   Social History Narrative    Not on file       Review of Systems:  Review of Systems   Constitution: Negative for chills and fever. Cardiovascular: Negative for chest pain and palpitations. Respiratory: Negative for cough and shortness of breath. Musculoskeletal: Positive for back pain. Gastrointestinal: Negative for constipation. Neurological: Positive for numbness. Negative for disturbances in coordination, loss of balance, paresthesias and tingling. Physical Exam:  There were no vitals taken for this visit. Physical Exam  HENT:      Head: Normocephalic. Pulmonary:      Effort: Pulmonary effort is normal.   Neurological:      Mental Status: She is alert.    Psychiatric:         Mood and Affect: Mood normal.         Behavior: Behavior normal.         Record/Diagnostics Review:    Last edel 12/2020 and was appropriate     Assessment:  Problem List Items Addressed This Visit     Lumbosacral spondylosis without myelopathy - Primary (Chronic)    Chronic bilateral thoracic back pain (Chronic)             Treatment Plan:  Patient has completed stresscare evaluation for pain pump  Will have her touch base with Dr. Snehal Monge for referral for implant    Lisa Marie is a 72 y.o. female being evaluated by a Virtual Visit (video visit) encounter to address concerns as mentioned above. A caregiver was present when appropriate. Due to this being a TeleHealth encounter (During [facility]-16 public health emergency), evaluation of the following organ systems was limited: Vitals/Constitutional/EENT/Resp/CV/GI//MS/Neuro/Skin/Heme-Lymph-Imm. Pursuant to the emergency declaration under the [address] [place] [address] and the [employer] and [employer] Act, this Virtual Visit was conducted with patient's (and/or legal guardian's) consent, to reduce the patient's risk of exposure to COVID-19 and provide necessary medical care. The patient (and/or legal guardian) has also been advised to contact this office for worsening conditions or problems, and seek emergency medical treatment and/or call 911 if deemed necessary. Patient identification was verified at the start of the visit: Yes    Total time spent for this encounter: Not billed by time    Services were provided through a video synchronous discussion virtually to substitute for in-person clinic visit. Patient and provider were located at their individual homes. --LUPE Dial - CNP on 2/1/2021 at 4:22 PM    An electronic signature was used to authenticate this note.

## 2021-02-02 ENCOUNTER — TELEPHONE (OUTPATIENT)
Dept: PAIN MANAGEMENT | Age: 71
End: 2021-02-02

## 2021-02-02 NOTE — TELEPHONE ENCOUNTER
Pt called to reschedule appt to come in this Thursday or vv with the provider, pt stats that she is in a lot pain, please call pt back to advise

## 2021-02-04 ENCOUNTER — VIRTUAL VISIT (OUTPATIENT)
Dept: PAIN MANAGEMENT | Age: 71
End: 2021-02-04
Payer: MEDICARE

## 2021-02-04 DIAGNOSIS — M54.6 CHRONIC BILATERAL THORACIC BACK PAIN: Primary | Chronic | ICD-10-CM

## 2021-02-04 DIAGNOSIS — M47.817 LUMBOSACRAL SPONDYLOSIS WITHOUT MYELOPATHY: Chronic | ICD-10-CM

## 2021-02-04 DIAGNOSIS — G89.29 CHRONIC BILATERAL THORACIC BACK PAIN: Primary | Chronic | ICD-10-CM

## 2021-02-04 PROCEDURE — 99442 PR PHYS/QHP TELEPHONE EVALUATION 11-20 MIN: CPT | Performed by: NURSE PRACTITIONER

## 2021-02-04 ASSESSMENT — ENCOUNTER SYMPTOMS
SHORTNESS OF BREATH: 0
CONSTIPATION: 0
BACK PAIN: 1
COUGH: 0

## 2021-02-04 NOTE — PROGRESS NOTES
Patient completed a telephone visit today     Chief Complaint: back pain, chest wall pain    [facility]   Patient complains of low back pain.  Patient had SCS implant on 7/27/20 and initially had good relief. She states she still has moderate relief on the right side however she is no longer having any relief on the left side. Complains of pain in thoracic area radiating to the left ribs. [employer] rep attempted to adjust the programming to relieve the pain on the left side with no success. She completed thoracic and lumbar XR that show SCS is in correct position. Patient was taking norco without benefit. Was switched to percocet at last visit and reports no relief. She would like to pursue intrathecal pain pump.   Pt started aquatic therapy which exacerbates her pain. She has completed 7 sessions with no benefit.   Patient had consult at weight loss clinic 10/7 - she was given a nutritional guide.   She had [facility] appointment for pain pump 1/28 and demonstrated appropriate knowledge and expectations for the implantable pump. Discussed with Dr. Tyrese Robison and will refer patient to Dr. Juan Braun at [facility] for evaluation for pain pump. Back Pain  This is a chronic problem. The current episode started more than 1 year ago. The problem occurs constantly. The problem is unchanged. The pain is present in the lumbar spine. The quality of the pain is described as aching. The pain is at a severity of 10/10. The pain is severe. The symptoms are aggravated by position and standing. Pertinent negatives include no chest pain or fever. She has tried analgesics and bed rest for the symptoms. The treatment provided mild relief. Patient denies any new neurological symptoms. No bowel or bladder incontinence, no weakness, and no falling.       Past Medical History:   Diagnosis Date    Arthritis     [facility]el obstruction ([facility]     Cancer ([facility] [facility] 2015    ovarian stage 2    Cerebral artery occlusion with cerebral infarction ([facility] 03/2018    Mini strokes \"Tia's\"    Diabetes mellitus ([facility] [facility]     Epigastric pain     GERD (gastroesophageal reflux disease)     History of anesthesia complications 6913'H    was told after gallbladder surgery to never have anesthesia again \"since it was hard for me to come out of it\"    History of blood transfusion     Hx of blood clots     lung     Hypertension     Right arm pain     Right shoulder pain     Sleep apnea     uses CPAP nightly    Tachycardia     hx of    Thyroid disease     TIA (transient ischemic attack)        Past Surgical History:   Procedure Laterality Date    ANESTHESIA NERVE BLOCK Left 11/7/2019    NERVE BLOCK (DEFINE) - INTERCOSTAL NERVE BLOCK performed by Noris Liu MD at [facility] Bilateral 5/15/2020    NERVE BLOCK BILATERAL - MBB  L4-5, L5-S1 performed by Noris Liu MD at [address] Left     ORIF    CHOLECYSTECTOMY      COLONOSCOPY  07/08/2019    5 yr recall.     COLONOSCOPY N/A 7/8/2019    COLONOSCOPY WITH BIOPSY performed by Catrachita Benoit MD at [address] Right     [facility] [place]. INJECTION PROCEDURE FOR SACROILIAC JOINT Bilateral 8/29/2019    SACROILIAC JOINT INJECTION performed by Noris Liu MD at [facility] Bilateral 10/10/2019    SACROILIAC JOINT INJECTION performed by Noris Liu MD at [facility]  2/2014    [facility] with bso    JOINT REPLACEMENT Right     shoulder    KNEE ARTHROSCOPY Right     NERVE BLOCK Bilateral 08/29/2019    SI joint injection    NERVE BLOCK Bilateral 10/10/2019    SI joint    NERVE BLOCK Bilateral 05/15/2020    Medial branch block L4-5, L5-S1    NERVE BLOCK  06/05/2020    SPINAL CORD STIMULATOR IMPLANT TRIAL (N/A )    OTHER SURGICAL HISTORY Right 11/11/2014    shoulder manipulation    SHOULDER ARTHROSCOPY Right 02/03/15    SHOULDER SURGERY  11/2014    manipulation    SPINAL CORD STIMULATOR SURGERY N/A 6/5/2020    SPINAL CORD STIMULATOR IMPLANT TRIAL performed by Jigar Valenzuela MD at [address] [facility] N/A 7/27/2020    SPINAL CORD STIMULATOR IMPLANT WITH THORACIC LAMINOTOMY performed by Nahed Ferreira MD at [facility] ENDOSCOPY  07/08/2019    UPPER GASTROINTESTINAL ENDOSCOPY N/A 7/8/2019    [facility] 4/30/2015    pt has blood clot & abscess in her right kidney    VASCULAR SURGERY      [place] filter in & removed 1 year later       Allergies   Allergen Reactions    Aspirin Anaphylaxis     Only thing she can take is tylenol    Benadryl [Diphenhydramine] Other (See Comments)     Dystonic movement    Ibuprofen Anaphylaxis    Lidocaine Anaphylaxis     CAN NOT TOLERATE IV    Penicillins Anaphylaxis    Hydrocodone-Acetaminophen Other (See Comments)     Unsure of allergy    Oxycodone-Acetaminophen Other (See Comments)     Unsure of allergy    Tramadol Other (See Comments)     Unsure of allergy    Morphine Nausea And Vomiting     Unsure of allergy           Family History   Problem Relation Age of Onset    Elevated Lipids Paternal Grandmother        Social History     Socioeconomic History    Marital status: Single     Spouse name: Not on file    Number of children: Not on file    Years of education: Not on file    Highest education level: Not on file   Occupational History    Not on file   Social Needs    Financial resource strain: Not on file    Food insecurity     Worry: Not on file     Inability: Not on file    Transportation needs     Medical: Not on file     Non-medical: Not on file   Tobacco Use    Smoking status: Never Smoker    Smokeless tobacco: Never Used   Substance and Sexual Activity    Alcohol use: Yes     Comment: rare    Drug use: No    Sexual activity: Yes   Lifestyle    Physical activity     Days per week: Not on file     Minutes per session: Not on file    Stress: Not on file   Relationships    Social connections     Talks on phone: Not on file     Gets together: Not on file     Attends Latter day service: Not on file     Active member of club or organization: Not on file     Attends meetings of clubs or organizations: Not on file     Relationship status: Not on file    Intimate partner violence     Fear of current or ex partner: Not on file     Emotionally abused: Not on file     Physically abused: Not on file     Forced sexual activity: Not on file   Other Topics Concern    Not on file   Social History Narrative    Not on file       Review of Systems:  Review of Systems   Constitution: Negative for chills and fever. Cardiovascular: Negative for chest pain and palpitations. Respiratory: Negative for cough and shortness of breath. Musculoskeletal: Positive for back pain. Gastrointestinal: Negative for constipation. Neurological: Negative for disturbances in coordination and loss of balance. Physical Exam:  There were no vitals taken for this visit. Physical Exam  Neurological:      Mental Status: She is alert. Psychiatric:         Mood and Affect: Mood normal.         Record/Diagnostics Review:    Right foraminal disc bulge at L3-L4 effacing exiting right L3 and descending   right L4 nerve root.       Disc bulges otherwise as above without critical canal or neural foraminal   stenosis.          Assessment:  Problem List Items Addressed This Visit     Lumbosacral spondylosis without myelopathy (Chronic)    Chronic bilateral thoracic back pain - Primary (Chronic)             Treatment Plan:  Referral to Dr. Gabriel Jay for evaluation for intrathecal pain pump    Sxito Fuentes is a 72 y.o. female being evaluated by a Virtual Visit (video visit) encounter to address concerns as mentioned above.  A caregiver was present when appropriate. Due to this being a TeleHealth encounter (During IVFPY-38 public health emergency), evaluation of the following organ systems was limited: Vitals/Constitutional/EENT/Resp/CV/GI//MS/Neuro/Skin/Heme-Lymph-Imm. Pursuant to the emergency declaration under the [address] [place] [address], [address] [place] [address] and the [employer] and [employer] Act, this Virtual Visit was conducted with patient's (and/or legal guardian's) consent, to reduce the patient's risk of exposure to COVID-19 and provide necessary medical care. The patient (and/or legal guardian) has also been advised to contact this office for worsening conditions or problems, and seek emergency medical treatment and/or call 911 if deemed necessary. Patient identification was verified at the start of the visit: Yes    Total time spent for this encounter: 15 minutes    Services were provided through a video synchronous discussion virtually to substitute for in-person clinic visit. Patient and provider were located at their individual homes. --LUPE Dial CNP on 2/5/2021 at 3:40 PM    An electronic signature was used to authenticate this note.

## 2021-02-05 DIAGNOSIS — M47.817 LUMBOSACRAL SPONDYLOSIS WITHOUT MYELOPATHY: Primary | ICD-10-CM

## 2021-02-09 DIAGNOSIS — G47.00 INSOMNIA, UNSPECIFIED TYPE: ICD-10-CM

## 2021-02-09 RX ORDER — ATORVASTATIN CALCIUM 40 MG/1
TABLET, FILM COATED ORAL
Qty: 30 TABLET | Refills: 11 | Status: SHIPPED | OUTPATIENT
Start: 2021-02-09

## 2021-02-09 RX ORDER — TRAZODONE HYDROCHLORIDE 50 MG/1
TABLET ORAL
Qty: 30 TABLET | Refills: 11 | Status: SHIPPED | OUTPATIENT
Start: 2021-02-09 | End: 2021-11-03

## 2021-02-09 RX ORDER — HYDROCHLOROTHIAZIDE 25 MG/1
25 TABLET ORAL EVERY MORNING
Qty: 30 TABLET | Refills: 11 | Status: SHIPPED | OUTPATIENT
Start: 2021-02-09

## 2021-02-09 RX ORDER — AMLODIPINE BESYLATE 5 MG/1
TABLET ORAL
Qty: 30 TABLET | Refills: 11 | Status: SHIPPED | OUTPATIENT
Start: 2021-02-09 | End: 2022-02-09

## 2021-02-09 NOTE — TELEPHONE ENCOUNTER
LOV 1/25/2021    Next appt 2/25/2021    Health Maintenance   Topic Date Due    Hepatitis C screen  1950    Diabetic retinal exam  09/14/1961    COVID-19 Vaccine (1 of 2) 09/14/1967    DTaP/Tdap/Td vaccine (1 - Tdap) 09/14/1970    DEXA (modify frequency per FRAX score)  09/14/2006    Diabetic foot exam  10/15/2020    Shingles Vaccine (2 of 2) 12/18/2020    Pneumococcal 65+ years Vaccine (1 of 1 - PPSV23) 04/17/2023 (Originally 9/14/2016)    Potassium monitoring  08/04/2021    Creatinine monitoring  08/04/2021    Lipid screen  09/29/2021    Annual Wellness Visit (AWV)  12/11/2021    A1C test (Diabetic or Prediabetic)  01/25/2022    Diabetic microalbuminuria test  01/25/2022    Breast cancer screen  10/21/2022    Colon cancer screen colonoscopy  07/08/2029    Flu vaccine  Completed    Hepatitis A vaccine  Aged Out    Hib vaccine  Aged Out    Meningococcal (ACWY) vaccine  Aged Out             (applicable per patient's age: Cancer Screenings, Depression Screening, Fall Risk Screening, Immunizations)    Hemoglobin A1C (%)   Date Value   01/25/2021 8.6   09/29/2020 10.2 (H)   06/03/2020 6.7     LDL Cholesterol (mg/dL)   Date Value   09/29/2020 44     LDL Calculated (mg/dL)   Date Value   04/06/2019 47     AST (U/L)   Date Value   09/29/2020 20     ALT (U/L)   Date Value   09/29/2020 29     BUN (mg/dL)   Date Value   08/04/2020 18      (goal A1C is < 7)   (goal LDL is <100) need 30-50% reduction from baseline     BP Readings from Last 3 Encounters:   01/25/21 134/82   12/10/20 136/82   11/25/20 139/76    (goal /80)      All Future Testing planned in [facility]:  Lab Frequency Next Occurrence   Comprehensive Metabolic Panel Once 39/04/2010       Next Visit Date:  Future Appointments   Date Time Provider Rodolfo Hyman   2/25/2021 11:00 AM [facility]            Patient Active Problem List:     Ovarian mass     Abdominal pain     Partial bowel obstruction ([address] Epigastric pain     GERD (gastroesophageal reflux disease)     Lumbosacral spondylosis without myelopathy     Chronic bilateral thoracic back pain     Chronic anticoagulation     BMI 38.0-38.9,adult

## 2021-02-12 ENCOUNTER — TELEPHONE (OUTPATIENT)
Dept: PAIN MANAGEMENT | Age: 71
End: 2021-02-12

## 2021-02-12 NOTE — TELEPHONE ENCOUNTER
Patient states that she seen Dr. Roland Horne last Wednesday and they want a CT Scan done and that is being done this Tuesday.  She also says that when she was prescribed the pain medication it was at a low dose and didn't do anything for her

## 2021-02-12 NOTE — TELEPHONE ENCOUNTER
Patient called stating that she was up all night in pain and was crying that the pain is way above a 10/10 and was wondering if we could send her in something to help.  Patient did want to talk to Chito Farooq or  but was told that they are not in the office

## 2021-02-15 NOTE — TELEPHONE ENCOUNTER
Writer let her know that Dr. Darcus Cabot wants her to see the doctor he referred her to. Patient states she has seen the doctor in [place] and she will not go al they way to [place] to get a pain pump. She states she need to talk to Dr. Darcus Cabot or Vinny Harrington right away. Writer informed Cecilia Alas is seeing patient and Dr. Darcus Cabot is not in today. Patient states she has to see or speak to someone ASAP.

## 2021-02-16 ENCOUNTER — HOSPITAL ENCOUNTER (OUTPATIENT)
Dept: CT IMAGING | Age: 71
Discharge: HOME | End: 2021-02-18
Payer: MEDICARE

## 2021-02-16 DIAGNOSIS — M54.9 SPINE PAIN: ICD-10-CM

## 2021-02-16 PROCEDURE — 72128 CT CHEST SPINE W/O DYE: CPT

## 2021-02-18 ENCOUNTER — VIRTUAL VISIT (OUTPATIENT)
Dept: PAIN MANAGEMENT | Age: 71
End: 2021-02-18
Payer: MEDICARE

## 2021-02-18 DIAGNOSIS — G89.29 CHRONIC BILATERAL THORACIC BACK PAIN: Primary | ICD-10-CM

## 2021-02-18 DIAGNOSIS — G89.29 CHRONIC BILATERAL LOW BACK PAIN, UNSPECIFIED WHETHER SCIATICA PRESENT: ICD-10-CM

## 2021-02-18 DIAGNOSIS — M54.6 CHRONIC BILATERAL THORACIC BACK PAIN: Primary | ICD-10-CM

## 2021-02-18 DIAGNOSIS — G89.29 OTHER CHRONIC PAIN: ICD-10-CM

## 2021-02-18 DIAGNOSIS — M54.50 CHRONIC BILATERAL LOW BACK PAIN, UNSPECIFIED WHETHER SCIATICA PRESENT: ICD-10-CM

## 2021-02-18 PROCEDURE — 3017F COLORECTAL CA SCREEN DOC REV: CPT | Performed by: PAIN MEDICINE

## 2021-02-18 PROCEDURE — 4040F PNEUMOC VAC/ADMIN/RCVD: CPT | Performed by: PAIN MEDICINE

## 2021-02-18 PROCEDURE — G8400 PT W/DXA NO RESULTS DOC: HCPCS | Performed by: PAIN MEDICINE

## 2021-02-18 PROCEDURE — G8427 DOCREV CUR MEDS BY ELIG CLIN: HCPCS | Performed by: PAIN MEDICINE

## 2021-02-18 PROCEDURE — 1090F PRES/ABSN URINE INCON ASSESS: CPT | Performed by: PAIN MEDICINE

## 2021-02-18 PROCEDURE — 1123F ACP DISCUSS/DSCN MKR DOCD: CPT | Performed by: PAIN MEDICINE

## 2021-02-18 PROCEDURE — 99214 OFFICE O/P EST MOD 30 MIN: CPT | Performed by: PAIN MEDICINE

## 2021-02-18 ASSESSMENT — ENCOUNTER SYMPTOMS
BOWEL INCONTINENCE: 0
ABDOMINAL PAIN: 1
BACK PAIN: 1

## 2021-02-18 NOTE — PROGRESS NOTES
HPI:     Back Pain  The current episode started more than 1 year ago. The problem occurs constantly. The problem has been rapidly worsening since onset. The pain is present in the lumbar spine and thoracic spine. The quality of the pain is described as aching, burning, cramping, shooting and stabbing (sharp). The pain does not radiate. The pain is at a severity of 10/10. The pain is severe. The pain is the same all the time. The symptoms are aggravated by lying down, sitting, standing and bending (walking \). Stiffness is present all day. Associated symptoms include abdominal pain, headaches, numbness and tingling. Pertinent negatives include no bladder incontinence, bowel incontinence, chest pain, dysuria, fever, leg pain, paresis, paresthesias, pelvic pain, perianal numbness, weakness or weight loss. Risk factors include history of cancer and history of osteoporosis. She has tried muscle relaxant, NSAIDs, chiropractic manipulation, heat, ice and analgesics (physical therapy ) for the symptoms. The treatment provided no relief. Pain complaints including low back pain mid back pain and rib pain. Multiple treatment options with no sustained benefit. She has had injections with no benefit. Spinal cord stimulator implant with minimal benefit. Low-dose opioid therapy with minimal benefit. She is seen Dr. Aayush Baron in [place] for consideration for intrathecal pump. She has recently had a CT scan which I did independently review and this shows spinal cord stimulator placement with no acute pathology. Patient denies any new neurological symptoms. Nobowel or bladder incontinence, no weakness, and no falling. Review of OARRS does not show any aberrant prescription behavior. Medication is helping the patient stay active. Patient denies any side effects and reports adequate analgesia. No sign of misuse/abuse.     Past Medical History:   Diagnosis Date    Arthritis     [facility] ([facility] [address]     Cancer 11/11/2014    shoulder manipulation    SHOULDER ARTHROSCOPY Right 02/03/15    SHOULDER SURGERY  11/2014    manipulation    SPINAL CORD STIMULATOR SURGERY N/A 6/5/2020    SPINAL CORD STIMULATOR IMPLANT TRIAL performed by Pallavi Elliott MD at [facility] N/A 7/27/2020    SPINAL CORD STIMULATOR IMPLANT WITH THORACIC LAMINOTOMY performed by Heber Whittaker MD at [address] ENDOSCOPY  07/08/2019    UPPER GASTROINTESTINAL ENDOSCOPY N/A 7/8/2019    EGD BIOPSY performed by Blas Vital MD at [address] Right 4/30/2015    pt has blood clot & abscess in her right kidney    VASCULAR SURGERY      [place] filter in & removed 1 year later       Allergies   Allergen Reactions    Aspirin Anaphylaxis     Only thing she can take is tylenol    Benadryl [Diphenhydramine] Other (See Comments)     Dystonic movement    Ibuprofen Anaphylaxis    Lidocaine Anaphylaxis     CAN NOT TOLERATE IV    Penicillins Anaphylaxis    Hydrocodone-Acetaminophen Other (See Comments)     Unsure of allergy    Tramadol Other (See Comments)     Unsure of allergy    Morphine Nausea And Vomiting     Unsure of allergy           Family History   Problem Relation Age of Onset    Elevated Lipids Paternal Grandmother        Social History     Socioeconomic History    Marital status: Single     Spouse name: Not on file    Number of children: Not on file    Years of education: Not on file    Highest education level: Not on file   Occupational History    Not on file   Social Needs    Financial resource strain: Not on file    Food insecurity     Worry: Not on file     Inability: Not on file    Transportation needs     Medical: Not on file     Non-medical: Not on file   Tobacco Use    Smoking status: Never Smoker    Smokeless tobacco: Never Used   Substance and Sexual Activity    Alcohol use: Yes     Comment: rare    Drug use:  No  Sexual activity: Yes   Lifestyle    Physical activity     Days per week: Not on file     Minutes per session: Not on file    Stress: Not on file   Relationships    Social connections     Talks on phone: Not on file     Gets together: Not on file     Attends Moravian service: Not on file     Active member of club or organization: Not on file     Attends meetings of clubs or organizations: Not on file     Relationship status: Not on file    Intimate partner violence     Fear of current or ex partner: Not on file     Emotionally abused: Not on file     Physically abused: Not on file     Forced sexual activity: Not on file   Other Topics Concern    Not on file   Social History Narrative    Not on file       Review of Systems:  Review of Systems   Constitution: Negative for fever and weight loss. Cardiovascular: Negative for chest pain. Musculoskeletal: Positive for back pain. Gastrointestinal: Positive for abdominal pain. Negative for bowel incontinence. Genitourinary: Negative for bladder incontinence, dysuria and pelvic pain. Neurological: Positive for headaches, numbness and tingling. Negative for paresthesias and weakness. Physical Exam:      Physical Exam  Constitutional:       Appearance: Normal appearance. Pulmonary:      Effort: Pulmonary effort is normal.   Neurological:      Mental Status: She is alert. Psychiatric:         Attention and Perception: Attention and perception normal.         Mood and Affect: Mood and affect normal.         Record/Diagnostics Review:    As above, I did review the imaging    Assessment:  1. Chronic bilateral thoracic back pain    2. Other chronic pain    3. Chronic bilateral low back pain, unspecified whether sciatica present        Treatment Plan:  DISCUSSION: Treatment options discussed with patient and all questions answered to patient's satisfaction. OARRS Review: Reviewed and acceptable for medications prescribed.   TREATMENT OPTIONS: Worsening chronic pain. She has failed multiple treatment options including physical therapy, injections, nonopioid medications, low-dose opioid therapy, spinal cord stimulator. At this point I believe she may be a candidate for intrathecal pump and [facility]. Jamin Munoz M.D. I have reviewed the chief complaint and history of present illness (including ROS and PFSH) and vital documentation by my staff and I agree with their documentation and have added where applicable. Gely Butler is a 72 y.o. female being evaluated by a Virtual Visit (video visit) encounter to address concerns as mentioned above. A caregiver was present when appropriate. Due to this being a TeleHealth encounter (During ORB[facility] emergency), evaluation of the following organ systems was limited: Vitals/Constitutional/EENT/Resp/CV/GI//MS/Neuro/Skin/Heme-Lymph-Imm. Pursuant to the emergency declaration under the [address] [place] [address] and the [employer] and [employer] Act, this Virtual Visit was conducted with patient's (and/or legal guardian's) consent, to reduce the patient's risk of exposure to COVID-19 and provide necessary medical care. The patient (and/or legal guardian) has also been advised to contact this office for worsening conditions or problems, and seek emergency medical treatment and/or call 911 if deemed necessary. Patient identification was verified at the start of the visit: Yes    Total time spent for this encounter: Not billed by time    Services were provided through a video synchronous discussion virtually to substitute for in-person clinic visit. Patient and provider were located at their individual homes. --Magy Irvin MD on 2/18/2021 at 12:33 PM    An electronic signature was used to authenticate this note.

## 2021-02-25 ENCOUNTER — OFFICE VISIT (OUTPATIENT)
Dept: PRIMARY CARE CLINIC | Age: 71
End: 2021-02-25
Payer: MEDICARE

## 2021-02-25 VITALS
BODY MASS INDEX: 38.93 KG/M2 | SYSTOLIC BLOOD PRESSURE: 128 MMHG | WEIGHT: 228 LBS | HEART RATE: 77 BPM | HEIGHT: 64 IN | OXYGEN SATURATION: 96 % | DIASTOLIC BLOOD PRESSURE: 82 MMHG

## 2021-02-25 DIAGNOSIS — G89.29 CHRONIC BACK PAIN, UNSPECIFIED BACK LOCATION, UNSPECIFIED BACK PAIN LATERALITY: ICD-10-CM

## 2021-02-25 DIAGNOSIS — L03.011 CELLULITIS OF FINGER OF RIGHT HAND: ICD-10-CM

## 2021-02-25 DIAGNOSIS — Z78.0 POST-MENOPAUSAL: ICD-10-CM

## 2021-02-25 DIAGNOSIS — M54.9 CHRONIC BACK PAIN, UNSPECIFIED BACK LOCATION, UNSPECIFIED BACK PAIN LATERALITY: ICD-10-CM

## 2021-02-25 DIAGNOSIS — E11.40 TYPE 2 DIABETES MELLITUS WITH DIABETIC NEUROPATHY, WITH LONG-TERM CURRENT USE OF INSULIN (HCC): Primary | ICD-10-CM

## 2021-02-25 DIAGNOSIS — E66.9 CLASS 2 OBESITY WITH BODY MASS INDEX (BMI) OF 39.0 TO 39.9 IN ADULT, UNSPECIFIED OBESITY TYPE, UNSPECIFIED WHETHER SERIOUS COMORBIDITY PRESENT: ICD-10-CM

## 2021-02-25 DIAGNOSIS — Z79.4 TYPE 2 DIABETES MELLITUS WITH DIABETIC NEUROPATHY, WITH LONG-TERM CURRENT USE OF INSULIN (HCC): Primary | ICD-10-CM

## 2021-02-25 DIAGNOSIS — M81.0 OSTEOPOROSIS, UNSPECIFIED OSTEOPOROSIS TYPE, UNSPECIFIED PATHOLOGICAL FRACTURE PRESENCE: ICD-10-CM

## 2021-02-25 PROCEDURE — 3017F COLORECTAL CA SCREEN DOC REV: CPT | Performed by: FAMILY MEDICINE

## 2021-02-25 PROCEDURE — G8484 FLU IMMUNIZE NO ADMIN: HCPCS | Performed by: FAMILY MEDICINE

## 2021-02-25 PROCEDURE — 1123F ACP DISCUSS/DSCN MKR DOCD: CPT | Performed by: FAMILY MEDICINE

## 2021-02-25 PROCEDURE — 4040F PNEUMOC VAC/ADMIN/RCVD: CPT | Performed by: FAMILY MEDICINE

## 2021-02-25 PROCEDURE — G8427 DOCREV CUR MEDS BY ELIG CLIN: HCPCS | Performed by: FAMILY MEDICINE

## 2021-02-25 PROCEDURE — 1036F TOBACCO NON-USER: CPT | Performed by: FAMILY MEDICINE

## 2021-02-25 PROCEDURE — G8417 CALC BMI ABV UP PARAM F/U: HCPCS | Performed by: FAMILY MEDICINE

## 2021-02-25 PROCEDURE — 2022F DILAT RTA XM EVC RTNOPTHY: CPT | Performed by: FAMILY MEDICINE

## 2021-02-25 PROCEDURE — G8400 PT W/DXA NO RESULTS DOC: HCPCS | Performed by: FAMILY MEDICINE

## 2021-02-25 PROCEDURE — 3052F HG A1C>EQUAL 8.0%<EQUAL 9.0%: CPT | Performed by: FAMILY MEDICINE

## 2021-02-25 PROCEDURE — 1090F PRES/ABSN URINE INCON ASSESS: CPT | Performed by: FAMILY MEDICINE

## 2021-02-25 PROCEDURE — 99214 OFFICE O/P EST MOD 30 MIN: CPT | Performed by: FAMILY MEDICINE

## 2021-02-25 RX ORDER — DOXYCYCLINE HYCLATE 100 MG
100 TABLET ORAL 2 TIMES DAILY
Qty: 14 TABLET | Refills: 0 | Status: SHIPPED | OUTPATIENT
Start: 2021-02-25 | End: 2021-03-04

## 2021-02-25 ASSESSMENT — PATIENT HEALTH QUESTIONNAIRE - PHQ9
SUM OF ALL RESPONSES TO PHQ9 QUESTIONS 1 & 2: 2
2. FEELING DOWN, DEPRESSED OR HOPELESS: 1
SUM OF ALL RESPONSES TO PHQ QUESTIONS 1-9: 2
SUM OF ALL RESPONSES TO PHQ QUESTIONS 1-9: 2

## 2021-02-25 ASSESSMENT — ENCOUNTER SYMPTOMS
VOMITING: 0
SHORTNESS OF BREATH: 0
NAUSEA: 0
SORE THROAT: 0
BACK PAIN: 1

## 2021-02-25 NOTE — PROGRESS NOTES
120/80)    Past Medical History:   Diagnosis Date    Arthritis     Bowel obstruction ([facility]     Cancer ([facility] 2015    ovarian stage 2    Cerebral artery occlusion with cerebral infarction ([facility] 03/2018    Mini strokes \"Tia's\"    Diabetes mellitus ([facility]     Epigastric pain     GERD (gastroesophageal reflux disease)     History of anesthesia complications 2407'K    was told after gallbladder surgery to never have anesthesia again \"since it was hard for me to come out of it\"    History of blood transfusion     Hx of blood clots     lung     Hypertension     Right arm pain     Right shoulder pain     Sleep apnea     uses CPAP nightly    Tachycardia     hx of    Thyroid disease     TIA (transient ischemic attack)       Past Surgical History:   Procedure Laterality Date    ANESTHESIA NERVE BLOCK Left 11/7/2019    NERVE BLOCK (DEFINE) - INTERCOSTAL NERVE BLOCK performed by Angelina De La Cruz MD at [facility] Bilateral 5/15/2020    NERVE BLOCK BILATERAL - MBB  L4-5, L5-S1 performed by Angelina De La Cruz MD at [address] Left     ORIF    CHOLECYSTECTOMY      COLONOSCOPY  07/08/2019    5 yr recall.     COLONOSCOPY N/A 7/8/2019    COLONOSCOPY WITH BIOPSY performed by Fabian Diop MD at [address] OF [place], [facility]. INJECTION PROCEDURE FOR SACROILIAC JOINT Bilateral 8/29/2019    SACROILIAC JOINT INJECTION performed by Angelina De La Cruz MD at [address] [facility] Bilateral 10/10/2019    SACROILIAC JOINT INJECTION performed by Angelina De La Cruz MD at [address]  2/2014    chris with bso    JOINT REPLACEMENT Right     shoulder    KNEE ARTHROSCOPY Right     NERVE BLOCK Bilateral 08/29/2019    SI joint injection    NERVE BLOCK Bilateral 10/10/2019    SI joint    NERVE BLOCK Bilateral 05/15/2020    Medial branch block L4-5, L5-S1    NERVE BLOCK  06/05/2020    SPINAL CORD STIMULATOR IMPLANT TRIAL (N/A )    OTHER SURGICAL HISTORY Right 11/11/2014    shoulder manipulation    SHOULDER ARTHROSCOPY Right 02/03/15    SHOULDER SURGERY  11/2014    manipulation    SPINAL CORD STIMULATOR SURGERY N/A 6/5/2020    SPINAL CORD STIMULATOR IMPLANT TRIAL performed by [address], MD at [address] [facility] N/A 7/27/2020    SPINAL CORD STIMULATOR IMPLANT WITH THORACIC LAMINOTOMY performed by Michelle Castro MD at [address] ENDOSCOPY  07/08/2019    UPPER GASTROINTESTINAL ENDOSCOPY N/A 7/8/2019    EGD BIOPSY performed by Rehan Adams MD at [address] Right 4/30/2015    pt has blood clot & abscess in her right kidney    VASCULAR SURGERY      Alesha filter in & removed 1 year later       Family History   Problem Relation Age of Onset    Elevated Lipids Paternal Grandmother        Social History     Tobacco Use    Smoking status: Never Smoker    Smokeless tobacco: Never Used   Substance Use Topics    Alcohol use: Yes     Comment: rare      Current Outpatient Medications   Medication Sig Dispense Refill    doxycycline hyclate (VIBRA-TABS) 100 MG tablet Take 1 tablet by mouth 2 times daily for 7 days 14 tablet 0    amLODIPine (NORVASC) 5 MG tablet TAKE (1) TABLET BY MOUTH ONCE DAILY 30 tablet 11    atorvastatin (LIPITOR) 40 MG tablet TAKE (1) TABLET BY MOUTH EVERY DAY 30 tablet 11    hydroCHLOROthiazide (HYDRODIURIL) 25 MG tablet TAKE 1 TABLET BY MOUTH EVERY MORNING 30 tablet 11    traZODone (DESYREL) 50 MG tablet TAKE (1) TABLET BY MOUTH NIGHTLY 30 tablet 11    omeprazole (PRILOSEC) 40 MG delayed release capsule Take 1 capsule by mouth every morning (before breakfast) 90 capsule 1    insulin glargine (LANTUS) 100 UNIT/ML injection vial INJECT 75 UNITS SUBCUTANEOUSLY NIGHTLY 3 vial 10    gabapentin (NEURONTIN) 600 MG tablet TAKE 1 TABLET BY MOUTH Take 325 mg by mouth daily (with breakfast)      fexofenadine (ALLEGRA) 60 MG tablet Take 60 mg by mouth daily      magnesium oxide (MAG-OX) 400 MG tablet Take 400 mg by mouth daily      Melatonin 10 MG TABS Take 10 mg by mouth daily as needed        No current facility-administered medications for this visit. Allergies   Allergen Reactions    Aspirin Anaphylaxis     Only thing she can take is tylenol    Benadryl [Diphenhydramine] Other (See Comments)     Dystonic movement    Ibuprofen Anaphylaxis    Lidocaine Anaphylaxis     CAN NOT TOLERATE IV    Penicillins Anaphylaxis    Hydrocodone-Acetaminophen Other (See Comments)     Unsure of allergy    Tramadol Other (See Comments)     Unsure of allergy    Morphine Nausea And Vomiting     Unsure of allergy       Health Maintenance   Topic Date Due    Hepatitis C screen  1950    Diabetic retinal exam  09/14/1961    DTaP/Tdap/Td vaccine (1 - Tdap) 09/14/1970    DEXA (modify frequency per FRAX score)  09/14/2006    Shingles Vaccine (2 of 2) 12/18/2020    Pneumococcal 65+ years Vaccine (1 of 1 - PPSV23) 04/17/2023 (Originally 9/14/2016)    COVID-19 Vaccine (2 of 2 - Moderna series) 03/15/2021    Potassium monitoring  08/04/2021    Creatinine monitoring  08/04/2021    Lipid screen  09/29/2021    Annual Wellness Visit (AWV)  12/11/2021    A1C test (Diabetic or Prediabetic)  01/25/2022    Diabetic microalbuminuria test  01/25/2022    Diabetic foot exam  02/25/2022    Breast cancer screen  10/21/2022    Colon cancer screen colonoscopy  07/08/2029    Flu vaccine  Completed    Hepatitis A vaccine  Aged Out    Hib vaccine  Aged Out    Meningococcal (ACWY) vaccine  Aged Out       Subjective:      Review of Systems   Constitutional: Negative for chills and fever. HENT: Negative for sore throat. Respiratory: Negative for shortness of breath. Cardiovascular: Negative for chest pain. Gastrointestinal: Negative for nausea and vomiting. Musculoskeletal: Positive for back pain. Skin:        Redness/ slight pain in R thumb       Objective:     Physical Exam  Constitutional:       Appearance: She is well-developed. HENT:      Head: Normocephalic and atraumatic. Right Ear: External ear normal.      Left Ear: External ear normal.   Eyes:      Conjunctiva/sclera: Conjunctivae normal.      Pupils: Pupils are equal, round, and reactive to light. Neck:      Musculoskeletal: Neck supple. Cardiovascular:      Rate and Rhythm: Normal rate and regular rhythm. Heart sounds: Normal heart sounds. Pulmonary:      Effort: Pulmonary effort is normal.      Breath sounds: Normal breath sounds. Skin:     General: Skin is warm and dry. Comments: Visual inspection:  Deformity/amputation: absent  Skin lesions/pre-ulcerative calluses: absent  Edema: right- negative, left- negative    Sensory exam:  Monofilament sensation: abnormal - pt unable to feel monofilament test on her feet bl    Plus at least one of the following:  Pulses: normal,         r thumb with stitches present, slight erythema and swelling noted   Neurological:      Mental Status: She is alert and oriented to person, place, and time. Psychiatric:         Mood and Affect: Mood normal.         Behavior: Behavior normal.         Thought Content: Thought content normal.       /82   Pulse 77   Ht 5' 4\" (1.626 m)   Wt 228 lb (103.4 kg)   SpO2 96%   BMI 39.14 kg/m²     Assessment:      1. Type 2 diabetes mellitus with diabetic neuropathy, with long-term current use of insulin ([facility]  - pt sees eye doctor regularly, recommend getting records - having them fax it to us. - continue insulin, monitor glucose. Follow up 2 months for recheck a1c.   -  DIABETES FOOT EXAM    2. Class 2 obesity with body mass index (BMI) of 39.0 to 39.9 in adult, unspecified obesity type, unspecified whether serious comorbidity present  - pt gained weight since last visit while on adipex.    - recommend discontinue as it has not been helping her with weight loss. 3. Cellulitis of finger of right hand  -pt with some slight erythema and swelling where stitches are, recommend doxycyline and can continue topical abx. States will be getting stitches removed saturday. 4. Osteoporosis, unspecified osteoporosis type, unspecified pathological fracture presence  - continue fosamax, check dexa  - DEXA BONE DENSITY 2 SITES; Future    5. Post-menopausal  - DEXA BONE DENSITY 2 SITES; Future    6. Chronic back pain, unspecified back location, unspecified back pain laterality  - in process of getting approval for intrathecal pain pump. Plan:      Return in about 2 months (around 4/25/2021) for follow up diabetes. Orders Placed This Encounter   Procedures    DEXA BONE DENSITY 2 SITES     Standing Status:   Future     Standing Expiration Date:   2/25/2022     Order Specific Question:   Reason for exam:     Answer:   osteoporosis    HM DIABETES FOOT EXAM     Orders Placed This Encounter   Medications    doxycycline hyclate (VIBRA-TABS) 100 MG tablet     Sig: Take 1 tablet by mouth 2 times daily for 7 days     Dispense:  14 tablet     Refill:  0        Patient given educational materials - see patient instructions. Discussed use, benefit, and side effects of prescribed medications. All patient questions answered. Pt voiced understanding. Reviewed healthmaintenance. Instructed to continue current medications, diet and exercise. Patient agreed with treatment plan. Follow up as directed.      Electronically signed by Justina Apley, DO on 2/25/2021 at 12:29 PM

## 2021-03-04 ENCOUNTER — TELEPHONE (OUTPATIENT)
Dept: PAIN MANAGEMENT | Age: 71
End: 2021-03-04

## 2021-03-08 LAB — SARS-COV-2: NOT DETECTED

## 2021-03-22 ENCOUNTER — TELEPHONE (OUTPATIENT)
Dept: PAIN MANAGEMENT | Age: 71
End: 2021-03-22

## 2021-03-22 DIAGNOSIS — M47.817 LUMBOSACRAL SPONDYLOSIS WITHOUT MYELOPATHY: Primary | ICD-10-CM

## 2021-03-22 RX ORDER — LOSARTAN POTASSIUM 100 MG/1
TABLET ORAL
Qty: 30 TABLET | Refills: 10 | Status: SHIPPED | OUTPATIENT
Start: 2021-03-22

## 2021-03-22 RX ORDER — DULOXETIN HYDROCHLORIDE 60 MG/1
60 CAPSULE, DELAYED RELEASE ORAL DAILY
Qty: 30 CAPSULE | Refills: 10 | Status: SHIPPED | OUTPATIENT
Start: 2021-03-22

## 2021-03-22 NOTE — TELEPHONE ENCOUNTER
Patient is calling to  if she can get an order for a covid test her procedure is 4/9 for pain pump at [address] [place] [address]

## 2021-03-28 ENCOUNTER — APPOINTMENT (OUTPATIENT)
Dept: ULTRASOUND IMAGING | Age: 71
End: 2021-03-28
Payer: MEDICARE

## 2021-03-28 ENCOUNTER — HOSPITAL ENCOUNTER (EMERGENCY)
Age: 71
Discharge: HOME | End: 2021-03-28
Attending: EMERGENCY MEDICINE
Payer: MEDICARE

## 2021-03-28 VITALS
WEIGHT: 220 LBS | DIASTOLIC BLOOD PRESSURE: 74 MMHG | HEART RATE: 74 BPM | OXYGEN SATURATION: 97 % | TEMPERATURE: 98.1 F | RESPIRATION RATE: 16 BRPM | HEIGHT: 64 IN | BODY MASS INDEX: 37.56 KG/M2 | SYSTOLIC BLOOD PRESSURE: 154 MMHG

## 2021-03-28 DIAGNOSIS — D17.23 LIPOMA OF RIGHT LOWER EXTREMITY: Primary | ICD-10-CM

## 2021-03-28 PROCEDURE — 93971 EXTREMITY STUDY: CPT

## 2021-03-28 PROCEDURE — 99285 EMERGENCY DEPT VISIT HI MDM: CPT

## 2021-03-28 NOTE — ED NOTES
Pt arrives to ED with c/o \"lump behind right knee. \"  She states that she noticed it this morning. She denies injury/trauma. She states that she has a hx of DVT in the past.  Pt resting in bed, comfort offered, no concerns no s/s of distress.       Vivian Clifton RN  03/28/21 4557

## 2021-03-28 NOTE — ED PROVIDER NOTES
[address]      Pt Name: Erik Isaac  MRN: [id number]  [place] 1950  Date of evaluation: 3/28/2021      CHIEF COMPLAINT       Chief Complaint   Patient presents with    Leg Pain     Right         HISTORY OF PRESENT ILLNESS      The patient presents with pain and swelling in her right leg. The patient has noted a spot behind her knee that is swollen. She just noticed it today. She has a history of DVT. Her last DVT was about 4 years ago. She is currently on Xarelto but will be stopping it at the beginning of April to have a pain pump put in her back. The patient reports no chest pain or shortness of breath. She has not had any hemoptysis. Nothing makes her symptoms better or worse otherwise. REVIEW OF SYSTEMS       All systems reviewed and negative unless noted in HPI. The patient denies fever or constitutional symptoms. Denies any neck pain or stiffness. Denies chest pain or shortness of breath. Denies any dysuria. Denies urinary frequency or hematuria. Right leg swelling and pain as noted in HPI. Denies any weakness, numbness or focal neurologic deficit. Denies any skin rash or edema. No recent psychiatric issues. Takes Xarelto. History of DM. PAST MEDICAL HISTORY    has a past medical history of Arthritis, Bowel obstruction ([address] Cancer ([address] Cerebral artery occlusion with cerebral infarction ([address] Diabetes mellitus ([address] Epigastric pain, GERD (gastroesophageal reflux disease), History of anesthesia complications, History of blood transfusion, Hx of blood clots, Hypertension, Right arm pain, Right shoulder pain, Sleep apnea, Tachycardia, Thyroid disease, and TIA (transient ischemic attack). SURGICAL HISTORY      has a past surgical history that includes Knee arthroscopy (Right); Hand surgery (Right); Cholecystectomy; other surgical history (Right, 11/11/2014); shoulder surgery (11/2014);  Shoulder arthroscopy (Right, 02/03/15); Appendectomy; Ureter stent placement (Right, 4/30/2015); Hysterectomy (2/2014); Colonoscopy (07/08/2019); Upper gastrointestinal endoscopy (07/08/2019); Colonoscopy (N/A, 7/8/2019); Upper gastrointestinal endoscopy (N/A, 7/8/2019); Nerve Block (Bilateral, 08/29/2019); Injection Procedure For Sacroiliac Joint (Bilateral, 8/29/2019); Nerve Block (Bilateral, 10/10/2019); Injection Procedure For Sacroiliac Joint (Bilateral, 10/10/2019); Anesthesia Nerve Block (Left, 11/7/2019); Nerve Block (Bilateral, 05/15/2020); Anesthesia Nerve Block (Bilateral, 5/15/2020); Nerve Block (06/05/2020); Spinal Cord Stimulator Surgery (N/A, 6/5/2020); vascular surgery; Arm Surgery (Left); joint replacement (Right); Tonsillectomy; and Spinal Cord Stimulator Surgery (N/A, 7/27/2020). CURRENT MEDICATIONS       Per nurse's notes    ALLERGIES     is allergic to aspirin; benadryl [diphenhydramine]; ibuprofen; lidocaine; penicillins; hydrocodone-acetaminophen; tramadol; and morphine. FAMILY HISTORY     She indicated that the status of her paternal grandmother is unknown.     family history includes Elevated Lipids in her paternal grandmother. SOCIAL HISTORY      reports that she has never smoked. She has never used smokeless tobacco. She reports current alcohol use. She reports that she does not use drugs. PHYSICAL EXAM     INITIAL VITALS:  height is 5' 4\" (1.626 m) and weight is 99.8 kg (220 lb). Her temporal temperature is 98.1 °F (36.7 °C). Her blood pressure is 154/74 (abnormal) and her pulse is 74. Her respiration is 16 and oxygen saturation is 97%. The patient is alert and oriented, in no apparent distress. HEENT is atraumatic. Pupils are PERRL at 4 mm with normal extraocular motion. Mucous membranes moist.    Neck is supple. Heart sounds regular rate and rhythm with no gallops, murmurs, or rubs. Lungs clear, no wheezes, rales or rhonchi.     Abdomen: soft, nontender with no pain to palpation. Musculoskeletal exam: Mild swelling noted in the popliteal fossa measuring approximately 1 cm across. A raised, scaly lesion is noted on the posterior knee as well. This appears to be a seborrheic keratosis. No knee swelling. No calf or thigh pain. No redness or warmth. Normal distal pulses. Skin: no rash or edema. Neurological exam reveals cranial nerves 2 through 12 grossly intact. Patient has equal  and normal deep tendon reflexes. DIFFERENTIAL DIAGNOSIS/ MDM:     DVT, Baker's cyst, swelling    DIAGNOSTIC RESULTS       RADIOLOGY:   I reviewed the radiologist interpretations:  US DUP LOWER EXTREMITY RIGHT PAO   Preliminary Result   No evidence of DVT in the right lower extremity. Small benign soft tissue   mass in the popliteal fossa, characteristics favoring a small lipoma. RECOMMENDATIONS:   Follow-up ultrasound of the popliteal fossa can be considered in 6-12 months. US DUP LOWER EXTREMITY RIGHT PAO (Preliminary result)  Result time 03/28/21 17:03:27  Preliminary result by Yves Abel MD (03/28/21 17:03:27)                Impression:    No evidence of DVT in the right lower extremity.  Small benign soft tissue   mass in the popliteal fossa, characteristics favoring a small lipoma. RECOMMENDATIONS:   Follow-up ultrasound of the popliteal fossa can be considered in 6-12 months. Narrative:    EXAMINATION:   DUPLEX VENOUS ULTRASOUND OF THE RIGHT LOWER EXTREMITY, 3/28/2021 4:18 pm     TECHNIQUE:   Duplex ultrasound using B-mode/gray scaled imaging and Doppler spectral   analysis and color flow was obtained of the right lower extremity. COMPARISON:   None     HISTORY:   ORDERING SYSTEM PROVIDED HISTORY: swelling; history of dvt   TECHNOLOGIST PROVIDED HISTORY:   swelling; history of dvt     FINDINGS:   The visualized veins of the right lower extremity are patent and free of   echogenic thrombus.  The veins demonstrate good compressibility with normal color flow study and spectral analysis. Ovoid uniform soft tissue mass of 1.59 x 0.37 x 1.69 cm is noted in the   popliteal fossa.  This may represent a lipoma.  Benign appearance.                 Preliminary result by Sindy Agarwal MD (03/28/21 17:01:28)                Impression:    No evidence of DVT in the right lower extremity.  Small benign soft tissue   mass in the popliteal fossa, characteristics favoring a small lipoma. RECOMMENDATIONS:   Follow-up ultrasound of the popliteal fossa can be considered in 6-12 months.                       EMERGENCY DEPARTMENT COURSE:   Vitals:    Vitals:    03/28/21 1508   BP: (!) 154/74   Pulse: 74   Resp: 16   Temp: 98.1 °F (36.7 °C)   TempSrc: Temporal   SpO2: 97%   Weight: 99.8 kg (220 lb)   Height: 5' 4\" (1.626 m)     -------------------------  BP: (!) 154/74, Temp: 98.1 °F (36.7 °C), Pulse: 74, Resp: 16      Re-evaluation Notes    The patient presents with pain behind her knee. It has the appearance of a lipoma. There is no evidence of DVT. She can continue her medications as directed. She is discharged in good condition. FINAL IMPRESSION      1.  Lipoma of right lower extremity          DISPOSITION/PLAN   DISPOSITION        Condition on Disposition    good    PATIENT REFERRED TO:  DO Sarah Donald 51  [phone]    In 1 week        DISCHARGE MEDICATIONS:  New Prescriptions    No medications on file       (Please note that portions of this note were completed with a voice recognition program.  Efforts were made to edit the dictations but occasionally words are mis-transcribed.)    Johnson MD   Attending Emergency Physician         Isidoro Ames MD  03/29/21 [id number]

## 2021-04-01 ENCOUNTER — TELEPHONE (OUTPATIENT)
Dept: PRIMARY CARE CLINIC | Age: 71
End: 2021-04-01

## 2021-04-01 ENCOUNTER — NURSE TRIAGE (OUTPATIENT)
Dept: OTHER | Facility: CLINIC | Age: 71
End: 2021-04-01

## 2021-04-01 ENCOUNTER — TELEMEDICINE (OUTPATIENT)
Dept: PRIMARY CARE CLINIC | Age: 71
End: 2021-04-01
Payer: MEDICARE

## 2021-04-01 DIAGNOSIS — J02.9 SORE THROAT: Primary | ICD-10-CM

## 2021-04-01 PROCEDURE — 99442 PR PHYS/QHP TELEPHONE EVALUATION 11-20 MIN: CPT | Performed by: NURSE PRACTITIONER

## 2021-04-01 RX ORDER — AZITHROMYCIN 250 MG/1
TABLET, FILM COATED ORAL
Qty: 1 PACKET | Refills: 0 | Status: SHIPPED | OUTPATIENT
Start: 2021-04-01 | End: 2021-04-11

## 2021-04-01 NOTE — TELEPHONE ENCOUNTER
Denies    5. FEVER: \"Do you have a fever? \" If so, ask: \"What is your temperature, how was it measured, and when did it start? \"      Denies    6. CAUSE: \"Have you been swimming recently? \", \"How often do you use Q-TIPS? \", \"Have you had any recent air travel or scuba diving? \"      Denies    7. OTHER SYMPTOMS: \"Do you have any other symptoms? \" (e.g., headache, stiff neck, dizziness, vomiting, runny nose, decreased hearing)      Sore throat    8. PREGNANCY: \"Is there any chance you are pregnant? \" \"When was your last menstrual period? \"      n/a    Protocols used: EARACHE-ADULT-OH, SORE THROAT-ADULT-OH    [facility] with [employer]. Brief description of triage: see above    Triage indicates for patient to be seen today or tomorrow. Pt is scheduled for surgery 4/9/2021 and wants to ensure no illness prior to surgery. Care advice provided, patient verbalizes understanding; denies any other questions or concerns; instructed to call back for any new or worsening symptoms. Writer provided warm transfer to [facility] for appointment scheduling. Attention Provider: Thank you for allowing me to participate in the care of your patient. The patient was connected to triage in response to information provided to the [facility]. Please do not respond through this encounter as the response is not directed to a shared pool.

## 2021-04-01 NOTE — TELEPHONE ENCOUNTER
Pt and nurse called in from nurse triage to get scheduled for a VV due to pts symptoms of sore throat and ear pain. Pt mentioned that she has been having issues with her MyChart and is not sure if it will work or not. Says it is OK to contact her at number listed in chart incase issues arise. No appts available for phone visits.

## 2021-04-01 NOTE — PROGRESS NOTES
Josie Tong is a 72 y.o. female evaluated via telephone on 4/1/2021. Consent:  She and/or health care decision maker is aware that that she may receive a bill for this telephone service, depending on her insurance coverage, and has provided verbal consent to proceed: Yes      Documentation:  I communicated with the patient and/or health care decision maker about their health. Details of this discussion including any medical advice provided: to the patient    Patient presents for a telephone visit    Patient presents for telephone visit. She is a patient of [address] [place], [place] [address] a sore throat. She was having ear pain which has improved. Onset 4 days ago. Hurts to swallow. No drooling or voice changes. No runny nose or congestion. Denies any sick contacts. No cough or cold symptoms. No fever. She does have seasonal allergies and takes allegra. She rates her pain a 3/10 to her ear and throat is a 7/10     She took some tylenol and cough drops. Which seems to help. She is due to have a pain pump placed next Friday in [address] [place] [address] and is worried this could delay it. Her pain improved this morning but came back. ROS: negative, except for as stated in HPI. Exam: CECILIA d/t telephone visit    A/p  -sore throat  Discussed warm salt water gargles, Cepacol or Chloraseptic spray. Discussed this can be seasonal allergy in nature. If sx get worse, I sent in a zpack for her. She is unable to take PCN. She states her understanding. I affirm this is a Patient Initiated Episode with a Patient who has not had a related appointment within my department in the past 7 days or scheduled within the next 24 hours.     Patient identification was verified at the start of the visit: Yes    Total Time: minutes: 11-20 minutes    The visit was conducted pursuant to the emergency declaration under the [facility] waiver authority and the [place] [facility] Appropriations Act. Patient identification was verified, and a caregiver was present when appropriate. The patient was located in a state where the provider was credentialed to provide care.     Note: not billable if this call serves to triage the patient into an appointment for the relevant concern      Shavon Meyers

## 2021-04-12 ENCOUNTER — TELEPHONE (OUTPATIENT)
Dept: PAIN MANAGEMENT | Age: 71
End: 2021-04-12

## 2021-04-12 NOTE — TELEPHONE ENCOUNTER
Pt would [facility] to know [facility] or if she can return to Dr. Brittney Alvarado for the pain pump refills

## 2021-04-15 ENCOUNTER — TELEPHONE (OUTPATIENT)
Dept: PAIN MANAGEMENT | Age: 71
End: 2021-04-15

## 2021-04-15 NOTE — TELEPHONE ENCOUNTER
Called patient to discuss pain pump refills. She states she followed up with Dr. Deann Colvin and was told a visiting nurse would be filling her pump for her. Pt says she is still healing from procedure. She states she had excellent relief during the trial but does not feel she is having as much relief now. She states she does not like having to use a bolus. Explained to the patient that it is a process to get the right program for dosing and she should work with the visiting nurse to adjust the settings/dose over the next few months. Patient verbalizes understanding.

## 2021-04-15 NOTE — TELEPHONE ENCOUNTER
Patient had called stating that she went to the [facility] on 4/14/21 and Dr Hector Lyle increased her pain pump and she was wondering if you or Delmy Houston was going to be in charge and refill  Her pain pump when it was time or when it needed to be increased

## 2021-04-19 RX ORDER — POTASSIUM CHLORIDE 20 MEQ/1
TABLET, EXTENDED RELEASE ORAL
Qty: 60 TABLET | Refills: 2 | Status: SHIPPED | OUTPATIENT
Start: 2021-04-19 | End: 2021-07-21

## 2021-04-29 ENCOUNTER — OFFICE VISIT (OUTPATIENT)
Dept: PRIMARY CARE CLINIC | Age: 71
End: 2021-04-29
Payer: MEDICARE

## 2021-04-29 VITALS
HEART RATE: 68 BPM | HEIGHT: 64 IN | OXYGEN SATURATION: 98 % | WEIGHT: 228 LBS | DIASTOLIC BLOOD PRESSURE: 82 MMHG | RESPIRATION RATE: 20 BRPM | SYSTOLIC BLOOD PRESSURE: 127 MMHG | BODY MASS INDEX: 38.93 KG/M2

## 2021-04-29 DIAGNOSIS — M54.9 CHRONIC BACK PAIN, UNSPECIFIED BACK LOCATION, UNSPECIFIED BACK PAIN LATERALITY: ICD-10-CM

## 2021-04-29 DIAGNOSIS — E11.40 TYPE 2 DIABETES MELLITUS WITH DIABETIC NEUROPATHY, WITH LONG-TERM CURRENT USE OF INSULIN (HCC): Primary | ICD-10-CM

## 2021-04-29 DIAGNOSIS — Z79.4 TYPE 2 DIABETES MELLITUS WITH DIABETIC NEUROPATHY, WITH LONG-TERM CURRENT USE OF INSULIN (HCC): Primary | ICD-10-CM

## 2021-04-29 DIAGNOSIS — R15.9 INCONTINENCE OF FECES, UNSPECIFIED FECAL INCONTINENCE TYPE: ICD-10-CM

## 2021-04-29 DIAGNOSIS — G89.29 CHRONIC BACK PAIN, UNSPECIFIED BACK LOCATION, UNSPECIFIED BACK PAIN LATERALITY: ICD-10-CM

## 2021-04-29 DIAGNOSIS — I10 ESSENTIAL HYPERTENSION: ICD-10-CM

## 2021-04-29 LAB — HBA1C MFR BLD: 7.1 %

## 2021-04-29 PROCEDURE — 2022F DILAT RTA XM EVC RTNOPTHY: CPT | Performed by: FAMILY MEDICINE

## 2021-04-29 PROCEDURE — G8417 CALC BMI ABV UP PARAM F/U: HCPCS | Performed by: FAMILY MEDICINE

## 2021-04-29 PROCEDURE — G8400 PT W/DXA NO RESULTS DOC: HCPCS | Performed by: FAMILY MEDICINE

## 2021-04-29 PROCEDURE — 4040F PNEUMOC VAC/ADMIN/RCVD: CPT | Performed by: FAMILY MEDICINE

## 2021-04-29 PROCEDURE — 1036F TOBACCO NON-USER: CPT | Performed by: FAMILY MEDICINE

## 2021-04-29 PROCEDURE — 1090F PRES/ABSN URINE INCON ASSESS: CPT | Performed by: FAMILY MEDICINE

## 2021-04-29 PROCEDURE — 3017F COLORECTAL CA SCREEN DOC REV: CPT | Performed by: FAMILY MEDICINE

## 2021-04-29 PROCEDURE — G8427 DOCREV CUR MEDS BY ELIG CLIN: HCPCS | Performed by: FAMILY MEDICINE

## 2021-04-29 PROCEDURE — 3051F HG A1C>EQUAL 7.0%<8.0%: CPT | Performed by: FAMILY MEDICINE

## 2021-04-29 PROCEDURE — 99214 OFFICE O/P EST MOD 30 MIN: CPT | Performed by: FAMILY MEDICINE

## 2021-04-29 PROCEDURE — 1123F ACP DISCUSS/DSCN MKR DOCD: CPT | Performed by: FAMILY MEDICINE

## 2021-04-29 PROCEDURE — 83036 HEMOGLOBIN GLYCOSYLATED A1C: CPT | Performed by: FAMILY MEDICINE

## 2021-04-29 ASSESSMENT — ENCOUNTER SYMPTOMS
ABDOMINAL PAIN: 0
VOMITING: 0
CONSTIPATION: 0
NAUSEA: 0
DIARRHEA: 0
BLOOD IN STOOL: 0
CHEST TIGHTNESS: 0
SORE THROAT: 0
SHORTNESS OF BREATH: 0

## 2021-06-10 ENCOUNTER — TELEPHONE (OUTPATIENT)
Dept: PAIN MANAGEMENT | Age: 71
End: 2021-06-10

## 2021-06-21 ENCOUNTER — OFFICE VISIT (OUTPATIENT)
Dept: PAIN MANAGEMENT | Age: 71
End: 2021-06-21
Payer: MEDICARE

## 2021-06-21 VITALS
HEIGHT: 64 IN | SYSTOLIC BLOOD PRESSURE: 131 MMHG | BODY MASS INDEX: 38.48 KG/M2 | WEIGHT: 225.4 LBS | DIASTOLIC BLOOD PRESSURE: 73 MMHG | HEART RATE: 77 BPM

## 2021-06-21 DIAGNOSIS — B02.29 POSTHERPETIC NEURALGIA: Primary | ICD-10-CM

## 2021-06-21 DIAGNOSIS — M47.817 LUMBOSACRAL SPONDYLOSIS WITHOUT MYELOPATHY: ICD-10-CM

## 2021-06-21 PROCEDURE — 4040F PNEUMOC VAC/ADMIN/RCVD: CPT | Performed by: PAIN MEDICINE

## 2021-06-21 PROCEDURE — 99212 OFFICE O/P EST SF 10 MIN: CPT | Performed by: PAIN MEDICINE

## 2021-06-21 PROCEDURE — G8400 PT W/DXA NO RESULTS DOC: HCPCS | Performed by: PAIN MEDICINE

## 2021-06-21 PROCEDURE — G8427 DOCREV CUR MEDS BY ELIG CLIN: HCPCS | Performed by: PAIN MEDICINE

## 2021-06-21 PROCEDURE — 1090F PRES/ABSN URINE INCON ASSESS: CPT | Performed by: PAIN MEDICINE

## 2021-06-21 PROCEDURE — 1123F ACP DISCUSS/DSCN MKR DOCD: CPT | Performed by: PAIN MEDICINE

## 2021-06-21 PROCEDURE — 1036F TOBACCO NON-USER: CPT | Performed by: PAIN MEDICINE

## 2021-06-21 PROCEDURE — 3017F COLORECTAL CA SCREEN DOC REV: CPT | Performed by: PAIN MEDICINE

## 2021-06-21 PROCEDURE — G8417 CALC BMI ABV UP PARAM F/U: HCPCS | Performed by: PAIN MEDICINE

## 2021-06-21 ASSESSMENT — ENCOUNTER SYMPTOMS
BOWEL INCONTINENCE: 0
BACK PAIN: 1

## 2021-06-21 NOTE — PROGRESS NOTES
HPI:     Back Pain  This is a chronic problem. The current episode started more than 1 year ago. The problem occurs constantly. The problem has been gradually improving since onset. The pain is present in the lumbar spine. The pain does not radiate. The pain is at a severity of 0/10. The patient is experiencing no pain. Pertinent negatives include no bladder incontinence, bowel incontinence, chest pain or headaches. Treatments tried: pain pump. The treatment provided significant relief. Chronic back and rib pain. Had intrathecal pump placed with definite improvement. Patient denies any new neurological symptoms. Nobowel or bladder incontinence, no weakness, and no falling. Review of OARRS does not show any aberrant prescription behavior.      Past Medical History:   Diagnosis Date    Arthritis     [facility]el obstruction ([facility]     Cancer ([facility] 2015    ovarian stage 2    Cerebral artery occlusion with cerebral infarction ([facility] 03/2018    Mini strokes \"Tia's\"    Diabetes mellitus ([facility]     Epigastric pain     GERD (gastroesophageal reflux disease)     History of anesthesia complications 4568'G    was told after gallbladder surgery to never have anesthesia again \"since it was hard for me to come out of it\"    History of blood transfusion     Hx of blood clots     lung     Hypertension     Right arm pain     Right shoulder pain     Sleep apnea     uses CPAP nightly    Tachycardia     hx of    Thyroid disease     TIA (transient ischemic attack)        Past Surgical History:   Procedure Laterality Date    ANESTHESIA NERVE BLOCK Left 11/7/2019    NERVE BLOCK (DEFINE) - INTERCOSTAL NERVE BLOCK performed by Corby Stanford MD [facility] Bilateral 5/15/2020    NERVE BLOCK BILATERAL - MBB  L4-5, L5-S1 performed by Corby Stanford MD at [address] [place] [address] Road Left     ORIF    CHOLECYSTECTOMY      COLONOSCOPY  07/08/2019    5 yr recall.     COLONOSCOPY N/A 7/8/2019    COLONOSCOPY WITH BIOPSY performed by Alex Boone MD at [address] Right     repair tendon    [facility]. INJECTION PROCEDURE FOR SACROILIAC JOINT Bilateral 8/29/2019    SACROILIAC JOINT INJECTION performed by Ethel Peters MD at [facility] Bilateral 10/10/2019    SACROILIAC JOINT INJECTION performed by Ethel Peters MD at [address]  2/2014    chris with bso    JOINT REPLACEMENT Right     shoulder    KNEE ARTHROSCOPY Right     NERVE BLOCK Bilateral 08/29/2019    SI joint injection    NERVE BLOCK Bilateral 10/10/2019    SI joint    NERVE BLOCK Bilateral 05/15/2020    Medial branch block L4-5, L5-S1    NERVE BLOCK  06/05/2020    SPINAL CORD STIMULATOR IMPLANT TRIAL (N/A )    OTHER SURGICAL HISTORY Right 11/11/2014    shoulder manipulation    SHOULDER ARTHROSCOPY Right 02/03/15    SHOULDER SURGERY  11/2014    manipulation    SPINAL CORD STIMULATOR SURGERY N/A 6/5/2020    SPINAL CORD STIMULATOR IMPLANT TRIAL performed by Ethel Peters MD at [address] [facility] N/A 7/27/2020    SPINAL CORD STIMULATOR IMPLANT WITH THORACIC LAMINOTOMY performed by Nabor Meyers MD at [address] [facility]  ENDOSCOPY  07/08/2019    UPPER GASTROINTESTINAL ENDOSCOPY N/A 7/8/2019    EGD BIOPSY performed by Alex Boone MD at [facility] Right 4/30/2015    pt has blood clot & abscess in her right kidney    VASCULAR SURGERY      [place] filter in & removed 1 year later       Allergies   Allergen Reactions    Aspirin Anaphylaxis     Only thing she can take is tylenol    Benadryl [Diphenhydramine] Other (See Comments)     Dystonic movement    Ibuprofen Anaphylaxis    Lidocaine Anaphylaxis     CAN NOT TOLERATE IV    Penicillins Anaphylaxis    Hydrocodone-Acetaminophen Other (See Comments)     Unsure

## 2021-07-08 NOTE — FLOWSHEET NOTE
[] [facility]       Physical Therapy [facility]       [facility] [address]        [address]       Phone: [phone]       Fax: [phone] [] [facility]       Promotion at [address] [facility]       Phone: [phone]       Fax: [phone] [x] [place]. [facility] Promotion  [address] [facility]   Phone: [phone]   Fax:  [phone]     Physical Therapy Daily  Aquatic Treatment Note    Date:  10/23/2020  Patient Name:  Amrita Ulloa    :  [birth year]  MRN: [id number]  Physician: GABBIE Jean                              Insurance: [facility] medicare  Medical Diagnosis: Lumbosacral spondylosis, intercostal neuralgia, chronic kathy T back pain, postherpetic neuralgia              Rehab Codes: M54.5  Onset Date: 2015             Next 's appt.: 11/3/20    Visit# / total visits: 5/12  Cancels/No Shows: 0/0    Subjective:    Pain:  [x] Yes  [] No Location: LB and L lateral torso         Pain Rating: (0-10 scale) 9/10  Pain altered Tx:  [] No  [] Yes  Action:  Comments: Pt mentions continued high levels of pain. States she does not feel therapy has helped much up to this point.      Objective:      KEY  B = Belt G = Gloves N = Noodle   C = Cuffs K = Kickboard P = Paddles   CC = Cervical Collar L = Laps T = Theratube   DB = Dumbells M = Minutes W = Weights     Exercises/Activities Precautions: R total shoulder, past TIA, uncontrolled T2DM, tends to run elevated blood sugar and will get dizzy/headache  Warm-up/Amb 10/23  Dynamic Exercises 10/23    Forward 3L  March 3L    Sideways 3L  Squat     Backwards 3L  Retro HS curls        Retro SLR     Stretches   Braiding     Gastroc/Soleus   Heel to Toe amb     Hamstring   Toe amb     Hip flexor   Heel amb     Piriformis        SKTC        Pec Stretch        Post Deltoid   Static Exercises UE        Shoulder flex/ext 15    Static Exercises LE   Shoulder abd/add 15 Heel/toe raises 15  Shoulder H.  abd/add 15    Marches 15  Shoulder IR/ER 15    Mini-squats 15  Rowing 15    4-way hip  15  Arm Circles     Hamstring curls 15  Post sh rolls     Hip Circles/Fig 8   Scap squeezes     Ankle ROM   Diagonals 1/2     Lunges    Elbow flex/ext        Pron/Sup     Functional Exercise   Wrist AROM     Step 10F       Wall Push-ups   Deep H20/     SLS   Bike 3m    Breast Stroke on Noodle   Hip abd/add 3m    Noodle Twist   Hip flex/ext     Noodle Push down   Hip IR/ER     Kickboard push/pull   Knee flex/ext        Push/pull on [place] 5m    Other:    Specific Instructions for next treatment:    Treatment Charges: Mins Units   []  Modalities     []  Ther Exercise     []  Manual Therapy     []  Ther Activities     [x]  Aquatics 30 2   []  Other       Assessment: [] Progressing toward goals. [x] No change. In pain. [x] Other: Continued aquatic ex as noted above, no progressions made this date. Verbal cueing for recall, good follow through of technique. Reports pain with completing ex but the warm water does help. No change in pain symptoms once out of the water. STG: (to be met in 6 treatments)  1. ? Pain:<5/10   2. ? ROM: hip ext 10 deg  3. ? Strength:able to perform 1 squat/sit to stand from chair  4. ? Function: <25% interference with Modified Oswestry  5. Patient to be independent with home exercise program as demonstrated by performance with correct form without cues. 6. Demonstrate Knowledge of fall prevention  LTG: (to be met in 12 treatments)  1. Able to perfom 10 squats/sit to stand from chair  2. Improve Modified Oswestry <20% interference  3. Pain <4/10        Patient goals:\"lose pain\"    Pt. Education:  [x] Yes  [] No  [] Reviewed Prior HEP/Ed  Method of Education: [x] Verbal  [x] Demo  [] Written  Comprehension of Education:  [] Verbalizes understanding. [x] Demonstrates understanding. [] Needs review.   [] Demonstrates/verbalizes HEP/Ed previously given.     Plan: [x] Continue per plan of care.    [] Other:      Time In: 1:19 pm           Time Out: 2:00 pm    Electronically signed by:  Nohemy Peterson PTA No

## 2021-07-13 ENCOUNTER — TELEPHONE (OUTPATIENT)
Dept: PRIMARY CARE CLINIC | Age: 71
End: 2021-07-13

## 2021-07-13 NOTE — TELEPHONE ENCOUNTER
Pt called stated that she's been getting the sweats and she would check her sugar and its in the 50-60's this has happened a couple times . Pt takes 78 units of Lantus at night only . Pt stated her sugar was 122 this morning . Please advise .

## 2021-07-13 NOTE — TELEPHONE ENCOUNTER
Please let pt know to cut back to 70 units and to monitor her sugars . Thanks! If needed to cut back more if keeps getting low even on her lower dose .

## 2021-07-15 NOTE — TELEPHONE ENCOUNTER
Pt called stating that she has been taking decreased insulin dose as advised by PCP, pt states that yesterday her Blood Sugar reading was 90. Pt states that she did not take her insulin last night and that when she woke up this morning and checked her readings it was 100. Pt would like advice on what to do next. Please advise.

## 2021-07-15 NOTE — TELEPHONE ENCOUNTER
I spoke with pt : Recommend only 40 units in AM if glucose is elevated in AM, but if too low to hold off. She should also continue to check after meals and night time. If continues to have lows to cut back and let us know.

## 2021-07-21 RX ORDER — POTASSIUM CHLORIDE 20 MEQ/1
TABLET, EXTENDED RELEASE ORAL
Qty: 60 TABLET | Refills: 2 | Status: SHIPPED | OUTPATIENT
Start: 2021-07-21 | End: 2022-07-27 | Stop reason: SDUPTHER

## 2021-08-02 ENCOUNTER — OFFICE VISIT (OUTPATIENT)
Dept: PRIMARY CARE CLINIC | Age: 71
End: 2021-08-02
Payer: MEDICARE

## 2021-08-02 ENCOUNTER — HOSPITAL ENCOUNTER (OUTPATIENT)
Dept: GENERAL RADIOLOGY | Age: 71
Discharge: HOME | End: 2021-08-04
Payer: MEDICARE

## 2021-08-02 ENCOUNTER — HOSPITAL ENCOUNTER (OUTPATIENT)
Age: 71
Discharge: HOME | End: 2021-08-04
Payer: MEDICARE

## 2021-08-02 VITALS
WEIGHT: 221.6 LBS | SYSTOLIC BLOOD PRESSURE: 126 MMHG | HEART RATE: 79 BPM | DIASTOLIC BLOOD PRESSURE: 82 MMHG | OXYGEN SATURATION: 97 % | BODY MASS INDEX: 38.04 KG/M2

## 2021-08-02 DIAGNOSIS — M54.31 SCIATICA OF RIGHT SIDE: ICD-10-CM

## 2021-08-02 DIAGNOSIS — E11.40 TYPE 2 DIABETES MELLITUS WITH DIABETIC NEUROPATHY, WITH LONG-TERM CURRENT USE OF INSULIN (HCC): Primary | ICD-10-CM

## 2021-08-02 DIAGNOSIS — M79.642 PAIN OF LEFT HAND: ICD-10-CM

## 2021-08-02 DIAGNOSIS — I10 ESSENTIAL HYPERTENSION: ICD-10-CM

## 2021-08-02 DIAGNOSIS — Z79.4 TYPE 2 DIABETES MELLITUS WITH DIABETIC NEUROPATHY, WITH LONG-TERM CURRENT USE OF INSULIN (HCC): Primary | ICD-10-CM

## 2021-08-02 LAB — HBA1C MFR BLD: 7.1 %

## 2021-08-02 PROCEDURE — 2022F DILAT RTA XM EVC RTNOPTHY: CPT | Performed by: FAMILY MEDICINE

## 2021-08-02 PROCEDURE — 4040F PNEUMOC VAC/ADMIN/RCVD: CPT | Performed by: FAMILY MEDICINE

## 2021-08-02 PROCEDURE — 1090F PRES/ABSN URINE INCON ASSESS: CPT | Performed by: FAMILY MEDICINE

## 2021-08-02 PROCEDURE — 3051F HG A1C>EQUAL 7.0%<8.0%: CPT | Performed by: FAMILY MEDICINE

## 2021-08-02 PROCEDURE — 83036 HEMOGLOBIN GLYCOSYLATED A1C: CPT | Performed by: FAMILY MEDICINE

## 2021-08-02 PROCEDURE — 1036F TOBACCO NON-USER: CPT | Performed by: FAMILY MEDICINE

## 2021-08-02 PROCEDURE — G8427 DOCREV CUR MEDS BY ELIG CLIN: HCPCS | Performed by: FAMILY MEDICINE

## 2021-08-02 PROCEDURE — 1123F ACP DISCUSS/DSCN MKR DOCD: CPT | Performed by: FAMILY MEDICINE

## 2021-08-02 PROCEDURE — 3017F COLORECTAL CA SCREEN DOC REV: CPT | Performed by: FAMILY MEDICINE

## 2021-08-02 PROCEDURE — 73130 X-RAY EXAM OF HAND: CPT

## 2021-08-02 PROCEDURE — 99214 OFFICE O/P EST MOD 30 MIN: CPT | Performed by: FAMILY MEDICINE

## 2021-08-02 PROCEDURE — G8400 PT W/DXA NO RESULTS DOC: HCPCS | Performed by: FAMILY MEDICINE

## 2021-08-02 PROCEDURE — G8417 CALC BMI ABV UP PARAM F/U: HCPCS | Performed by: FAMILY MEDICINE

## 2021-08-02 SDOH — ECONOMIC STABILITY: FOOD INSECURITY: WITHIN THE PAST 12 MONTHS, THE FOOD YOU BOUGHT JUST DIDN'T LAST AND YOU DIDN'T HAVE MONEY TO GET MORE.: NEVER TRUE

## 2021-08-02 SDOH — ECONOMIC STABILITY: FOOD INSECURITY: WITHIN THE PAST 12 MONTHS, YOU WORRIED THAT YOUR FOOD WOULD RUN OUT BEFORE YOU GOT MONEY TO BUY MORE.: NEVER TRUE

## 2021-08-02 ASSESSMENT — ENCOUNTER SYMPTOMS
ABDOMINAL PAIN: 0
NAUSEA: 0
SHORTNESS OF BREATH: 0
VOMITING: 0
SORE THROAT: 0

## 2021-08-02 ASSESSMENT — SOCIAL DETERMINANTS OF HEALTH (SDOH): HOW HARD IS IT FOR YOU TO PAY FOR THE VERY BASICS LIKE FOOD, HOUSING, MEDICAL CARE, AND HEATING?: NOT HARD AT ALL

## 2021-08-02 NOTE — PATIENT INSTRUCTIONS
Patient Education        Piriformis Syndrome: Exercises  Introduction  Here are some examples of exercises for you to try. The exercises may be suggested for a condition or for rehabilitation. Start each exercise slowly. Ease off the exercises if you start to have pain. You will be told when to start these exercises and which ones will work best for you. How to do the exercises  Hip rotator stretch   1. Lie on your back with both knees bent and your feet flat on the floor. 2. Put the ankle of your affected leg on your opposite thigh near your knee. 3. Use your hand to gently push your knee (on your affected leg) away from your body until you feel a gentle stretch around your hip. 4. Hold the stretch for 15 to 30 seconds. 5. Repeat 2 to 4 times. 6. Switch legs and repeat steps 1 through 5. Piriformis stretch   1. Lie on your back with your legs straight. 2. Lift your affected leg and bend your knee. With your opposite hand, reach across your body, and then gently pull your knee toward your opposite shoulder. 3. Hold the stretch for 15 to 30 seconds. 4. Repeat with your other leg. 5. Repeat 2 to 4 times on each side. Lower abdominal strengthening   1. Lie on your back with your knees bent and your feet flat on the floor. 2. Tighten your belly muscles by pulling your belly button in toward your spine. 3. Lift one foot off the floor and bring your knee toward your chest, so that your knee is straight above your hip and your leg is bent like the letter \"L. \"  4. Lift the other knee up to the same position. 5. Lower one leg at a time to the starting position. 6. Keep alternating legs until you have lifted each leg 8 to 12 times. 7. Be sure to keep your belly muscles tight and your back still as you are moving your legs. Be sure to breathe normally. Follow-up care is a key part of your treatment and safety. Be sure to make and go to all appointments, and call your doctor if you are having problems. It's also a good idea to know your test results and keep a list of the medicines you take. Current as of: November 16, 2020               Content Version: 12.9  © 2006-2021 [employer]. Care instructions adapted under license by [facility] ([facility]). If you have questions about a medical condition or this instruction, always ask your healthcare professional. [address] any warranty or liability for your use of this information.

## 2021-08-02 NOTE — PROGRESS NOTES
[address] PRIMARY CARE  [address] [facility] [address]  [address] [address]  Dept: [phone]  Dept Fax: [phone]    Dudley Kumar is a 72 y.o. female who presents today for her medical conditions/complaints as noted below. Dudley Kumar is c/o of  Chief Complaint   Patient presents with    Annual Exam    Diabetes     3mo f/u, BS has been unstable    Hand Pain     1wk ago tripped over chair fell on right knee and left hand is swollen & hurts    Back Pain     right buttock, right leg gives out       HPI:     HPI     Pt here for follow up on chronic conditions    She is diabetic and her A1c today is 7.1. She was on 40 units in morning, it spiked up to 250s. Later did 50 units in Am next day and glucose gradually went up in the day up to 300. Most of her readings have been better however. A week ago tripped on a chair when she was camping. She hurt her right knee and left hand. She had swelling which has resolved of the right knee and now she still has some swelling and bruising of her left hand. She also notes pain in her right buttock that goes down her leg. Sometimes feels like her leg will give out when she stands up. This is more recent. She also still gets the pain on the left side under her breast since her shingles that occurred many years ago.           Hemoglobin A1C (%)   Date Value   2021 7.1   2021 7.1   2021 8.6             ( goal A1C is < 7)   No results found for: LABMICR  LDL Cholesterol (mg/dL)   Date Value   2020 44   2015 86   10/06/2014 86     LDL Calculated (mg/dL)   Date Value   2019 47       (goal LDL is <100)   AST (U/L)   Date Value   2020 20     ALT (U/L)   Date Value   2020 29     BUN (mg/dL)   Date Value   2020 18     BP Readings from Last 3 Encounters:   21 126/82   21 131/73   21 127/82          (goal 120/80)    Past Medical History:   Diagnosis Date    Arthritis     [facility]     Cancer ([facility] 2015    ovarian stage 2    Cerebral artery occlusion with cerebral infarction ([facility] 03/2018    Mini strokes \"Tia's\"    Diabetes mellitus ([facility]     Epigastric pain     GERD (gastroesophageal reflux disease)     History of anesthesia complications 4427'G    was told after gallbladder surgery to never have anesthesia again \"since it was hard for me to come out of it\"    History of blood transfusion     Hx of blood clots     lung     Hypertension     Right arm pain     Right shoulder pain     Sleep apnea     uses CPAP nightly    Tachycardia     hx of    Thyroid disease     TIA (transient ischemic attack)       Past Surgical History:   Procedure Laterality Date    ANESTHESIA NERVE BLOCK Left 11/7/2019    NERVE BLOCK (DEFINE) - INTERCOSTAL NERVE BLOCK performed by Tomy Edmonds MD at [facility] Bilateral 5/15/2020    NERVE BLOCK BILATERAL - MBB  L4-5, L5-S1 performed by Tomy Edmonds MD at [address] Left     ORIF    CHOLECYSTECTOMY      COLONOSCOPY  07/08/2019    5 yr recall.     COLONOSCOPY N/A 7/8/2019    COLONOSCOPY WITH BIOPSY performed by Reza Wagoner MD at [address] Right     [facility]. INJECTION PROCEDURE FOR SACROILIAC JOINT Bilateral 8/29/2019    SACROILIAC JOINT INJECTION performed by Tomy Edmonds MD at [address] [facility] Bilateral 10/10/2019    SACROILIAC JOINT INJECTION performed by Tomy Edmonds MD at [address] [facility]  2/2014    chris with bso    JOINT REPLACEMENT Right     shoulder    KNEE ARTHROSCOPY Right     NERVE BLOCK Bilateral 08/29/2019    SI joint injection    NERVE BLOCK Bilateral 10/10/2019    SI joint    NERVE BLOCK Bilateral 05/15/2020    Medial branch block L4-5, L5-S1    NERVE BLOCK  06/05/2020    SPINAL CORD STIMULATOR IMPLANT TRIAL (N/A )    OTHER SURGICAL HISTORY Right 11/11/2014    shoulder manipulation    SHOULDER ARTHROSCOPY Right 02/03/15    SHOULDER SURGERY  11/2014    manipulation    SPINAL CORD STIMULATOR SURGERY N/A 6/5/2020    SPINAL CORD STIMULATOR IMPLANT TRIAL performed by Dallas Nance MD at [facility] N/A 7/27/2020    SPINAL CORD STIMULATOR IMPLANT WITH THORACIC LAMINOTOMY [facility]  07/08/2019    UPPER GASTROINTESTINAL ENDOSCOPY N/A 7/8/2019    EGD BIOPSY performed by Hair Jaquez MD at [facility] Right 4/30/2015    pt has blood clot & abscess in her right kidney    VASCULAR SURGERY      Sondheimer filter in & removed 1 year later       Family History   Problem Relation Age of Onset    Elevated Lipids Paternal Grandmother        Social History     Tobacco Use    Smoking status: Never Smoker    Smokeless tobacco: Never Used   Substance Use Topics    Alcohol use: Yes     Comment: rare      Current Outpatient Medications   Medication Sig Dispense Refill    potassium chloride (KLOR-CON M) 20 MEQ extended release tablet TAKE 1 TABLET BY MOUTH TWICE DAILY 60 tablet 2    omeprazole (PRILOSEC) 40 MG delayed release capsule TAKE 1 CAPSULE BY MOUTH EVERY MORNING BEFORE BREAKFAST 30 capsule 3    insulin lispro (HUMALOG) 100 UNIT/ML injection vial INEJCT SUBCUTANEOUSLY UP TO THREE TIMES A DAY BASED ON SLIDING SCALE MAX 40 UNITS A DAy 10 mL 10    DULoxetine (CYMBALTA) 60 MG extended release capsule TAKE 1 CAPSULE BY MOUTH DAILY 30 capsule 10    losartan (COZAAR) 100 MG tablet TAKE 1 TABLET BY MOUTH EVERY DAY 30 tablet 10    amLODIPine (NORVASC) 5 MG tablet TAKE (1) TABLET BY MOUTH ONCE DAILY 30 tablet 11    atorvastatin (LIPITOR) 40 MG tablet TAKE (1) TABLET BY MOUTH EVERY DAY 30 tablet 11    hydroCHLOROthiazide (HYDRODIURIL) 25 MG tablet TAKE 1 TABLET BY MOUTH EVERY MORNING 30 tablet 11    traZODone (DESYREL) 50 MG tablet TAKE (1) TABLET BY MOUTH NIGHTLY 30 tablet 11    insulin glargine (LANTUS) 100 UNIT/ML injection vial INJECT 75 UNITS SUBCUTANEOUSLY NIGHTLY 3 vial 10    gabapentin (NEURONTIN) 600 MG tablet TAKE 1 TABLET BY MOUTH FOUR TIMES DAILY 120 tablet 10    levothyroxine (SYNTHROID) 88 MCG tablet TAKE (1) TABLET BY MOUTH DAILY 30 tablet 10    oxyCODONE-acetaminophen (PERCOCET) 5-325 MG per tablet Take 1 tablet by mouth every 4 hours as needed for Pain.       tamsulosin (FLOMAX) 0.4 MG capsule TAKE 1 CAPSULE BY MOUTH NIGHTLY *EMERGENCY REFILL* 30 capsule 10    blood glucose test strips (ACCU-CHEK TED PLUS) strip USE TO TEST BLOOD SUGAR THREE TIMES A DAY AND AS NEEDED FOR SYMPTOMS OF IRREGULAR BLOOD GLUCOSE 100 strip 10    SURE COMFORT INSULIN SYRINGE 31G X 5/16\" 1 ML MISC USE AS DIRECTED FOUR TIMES A  each 10    alendronate (FOSAMAX) 70 MG tablet TAKE 1 TABLET BY MOUTH EVERY 7 DAYS 12 tablet 10    XARELTO 20 MG TABS tablet TAKE 1 TABLET BY MOUTH EVERY DAY 90 tablet 10    furosemide (LASIX) 20 MG tablet Take 1 tablet by mouth daily 30 tablet 0    vitamin C (ASCORBIC ACID) 500 MG tablet Take 500 mg by mouth daily      glucose monitoring kit (FREESTYLE) monitoring kit 1 kit by Does not apply route daily Please provide accuchek meter for patient, not freestyle 1 kit 0    EPINEPHrine (EPIPEN 2-LISA) 0.3 MG/0.3ML SOAJ injection EpiPen 2-Lisa 0.3 mg/0.3 mL injection, auto-injector      calcium carbonate (TUMS) 500 MG chewable tablet Take 1 tablet by mouth daily as needed for Heartburn      SUPER B COMPLEX/C CAPS Take by mouth      Cholecalciferol (VITAMIN D3) 5000 units TABS Take by mouth      ferrous sulfate 325 (65 Fe) MG tablet Take 325 mg by mouth daily (with breakfast)      fexofenadine (ALLEGRA) 60 MG tablet Take 60 mg by mouth daily      magnesium oxide (MAG-OX) 400 MG tablet Take 400 mg by mouth daily      Melatonin 10 MG Right Ear: External ear normal.      Left Ear: External ear normal.   Eyes:      Conjunctiva/sclera: Conjunctivae normal.      Pupils: Pupils are equal, round, and reactive to light. Cardiovascular:      Rate and Rhythm: Normal rate and regular rhythm. Heart sounds: Normal heart sounds. Pulmonary:      Effort: Pulmonary effort is normal.      Breath sounds: Normal breath sounds. Musculoskeletal:      Cervical back: Neck supple. Comments: Some tenderness to palpation of right buttock. Some tenderness to palpation of left hand. Skin:     General: Skin is warm and dry. Comments: Some bruising noted of left hand. Neurological:      Mental Status: She is alert and oriented to person, place, and time. Psychiatric:         Mood and Affect: Mood normal.         Behavior: Behavior normal.         Thought Content: Thought content normal.       /82   Pulse 79   Wt 221 lb 9.6 oz (100.5 kg)   SpO2 97%   BMI 38.04 kg/m²     Assessment:      1. Type 2 diabetes mellitus with diabetic neuropathy, with long-term current use of insulin (HCC)  -A1c stable at 7.1 .  -I recommend she continue using 50 units of Lantus in the morning. She can use a sliding scale when her sugars are elevated. - POCT glycosylated hemoglobin (Hb A1C)    2. Pain of left hand  -recommend x-ray. - XR HAND LEFT (MIN 3 VIEWS); Future    3. Essential hypertension  -Blood pressure controlled, continue current medication. 4. Sciatica of right side  -Some exercises provided for patient for piriformis. If not improving consider physical therapy.            Plan:      Return in about 3 months (around 11/2/2021) for medicare wellness exam.    Orders Placed This Encounter   Procedures    XR HAND LEFT (MIN 3 VIEWS)     Standing Status:   Future     Number of Occurrences:   1     Standing Expiration Date:   8/2/2022     Order Specific Question:   Reason for exam:     Answer:   left hand pain after fall    POCT glycosylated hemoglobin (Hb A1C)     No orders of the defined types were placed in this encounter. Patient given educational materials - see patient instructions. Discussed use, benefit, and side effects of prescribed medications. All patient questions answered. Pt voiced understanding. Reviewed healthmaintenance. Instructed to continue current medications, diet and exercise. Patient agreed with treatment plan. Follow up as directed.      Electronically signed by Giselle Stanley DO on 8/2/2021 at 3:45 PM

## 2021-08-03 NOTE — RESULT ENCOUNTER NOTE
Please let pt know her hand xray did not show a fracture. She does have some mild swelling. Recommend icing it and taking tylenol.

## 2021-08-06 ENCOUNTER — TELEPHONE (OUTPATIENT)
Dept: PRIMARY CARE CLINIC | Age: 71
End: 2021-08-06

## 2021-08-06 DIAGNOSIS — M54.31 SCIATICA OF RIGHT SIDE: Primary | ICD-10-CM

## 2021-08-06 RX ORDER — PREDNISONE 20 MG/1
20 TABLET ORAL DAILY
Qty: 5 TABLET | Refills: 0 | Status: SHIPPED | OUTPATIENT
Start: 2021-08-06 | End: 2021-08-11

## 2021-08-06 NOTE — TELEPHONE ENCOUNTER
Pt called stating that she has been doing exercises provided during 8.2.21 OV, but they are not helping and she is still in pain. Pt asking what next steps are.

## 2021-08-06 NOTE — TELEPHONE ENCOUNTER
[id number] Dang Winslow referral made, please provide her with number to schedule, and I have sent prednisone to pharmacy.

## 2021-08-06 NOTE — TELEPHONE ENCOUNTER
Pt is agreeable to physical therapy and for prednisone to be sent to [facility] in [facility]. Pt verbalized understanding of sliding scale.

## 2021-08-09 RX ORDER — ALENDRONATE SODIUM 70 MG/1
TABLET ORAL
Qty: 5 TABLET | Refills: 10 | Status: SHIPPED | OUTPATIENT
Start: 2021-08-09 | End: 2022-08-11

## 2021-08-12 ENCOUNTER — TELEPHONE (OUTPATIENT)
Dept: PRIMARY CARE CLINIC | Age: 71
End: 2021-08-12

## 2021-08-12 NOTE — TELEPHONE ENCOUNTER
Pt called stating that her lower back pain has gone away, stated that prednisone helped, and would like to know if she still needs to keep her physical therapy appt for tomorrow 8.13.21. Since her pain is gone does she need to follow with physical therapy. Please advise pt.

## 2021-08-16 ENCOUNTER — OFFICE VISIT (OUTPATIENT)
Dept: FAMILY MEDICINE CLINIC | Age: 71
End: 2021-08-16
Payer: MEDICARE

## 2021-08-16 ENCOUNTER — TELEPHONE (OUTPATIENT)
Dept: PRIMARY CARE CLINIC | Age: 71
End: 2021-08-16

## 2021-08-16 VITALS
WEIGHT: 218.8 LBS | BODY MASS INDEX: 37.56 KG/M2 | SYSTOLIC BLOOD PRESSURE: 138 MMHG | DIASTOLIC BLOOD PRESSURE: 74 MMHG | HEART RATE: 84 BPM | RESPIRATION RATE: 18 BRPM | OXYGEN SATURATION: 98 %

## 2021-08-16 DIAGNOSIS — M62.830 MUSCLE SPASM OF BACK: ICD-10-CM

## 2021-08-16 DIAGNOSIS — J06.9 VIRAL URI: Primary | ICD-10-CM

## 2021-08-16 PROCEDURE — 1123F ACP DISCUSS/DSCN MKR DOCD: CPT | Performed by: NURSE PRACTITIONER

## 2021-08-16 PROCEDURE — 1090F PRES/ABSN URINE INCON ASSESS: CPT | Performed by: NURSE PRACTITIONER

## 2021-08-16 PROCEDURE — 99213 OFFICE O/P EST LOW 20 MIN: CPT | Performed by: NURSE PRACTITIONER

## 2021-08-16 PROCEDURE — G8400 PT W/DXA NO RESULTS DOC: HCPCS | Performed by: NURSE PRACTITIONER

## 2021-08-16 PROCEDURE — G8417 CALC BMI ABV UP PARAM F/U: HCPCS | Performed by: NURSE PRACTITIONER

## 2021-08-16 PROCEDURE — 4040F PNEUMOC VAC/ADMIN/RCVD: CPT | Performed by: NURSE PRACTITIONER

## 2021-08-16 PROCEDURE — 3017F COLORECTAL CA SCREEN DOC REV: CPT | Performed by: NURSE PRACTITIONER

## 2021-08-16 PROCEDURE — 1036F TOBACCO NON-USER: CPT | Performed by: NURSE PRACTITIONER

## 2021-08-16 PROCEDURE — G8427 DOCREV CUR MEDS BY ELIG CLIN: HCPCS | Performed by: NURSE PRACTITIONER

## 2021-08-16 ASSESSMENT — ENCOUNTER SYMPTOMS
COUGH: 1
BACK PAIN: 1
SWOLLEN GLANDS: 1
NAUSEA: 0
VOMITING: 0

## 2021-08-16 NOTE — PROGRESS NOTES
[address] WALK-IN  [address] [place]  Dept: [phone]  Dept Fax: [phone]    Date of Visit:  2021  Patient Name: Fabián Ferriera   Patient :  1950     CHIEF COMPLAINT:     Fabián Ferreira is a 72 y.o. female who presents today is c/o of Pharyngitis (x3 days), Otalgia, Back Pain, and Headache          REVIEW OF SYSTEM      Review of Systems   Constitutional: Positive for chills. Respiratory: Positive for cough. Gastrointestinal: Negative for nausea and vomiting. Musculoskeletal: Positive for back pain. Neurological: Positive for headaches. HISTORY OF PRESENT ILLNESS     Jimmie Augustine presents to the walk in clinic with c/o of sore throat this AM when she woke, It has improved. She has been fully vaccinated and denies need for COVID test. She states she has not been out often, and she continues to wear a mask and keeps her distance. She states she has chronic back pain, but recently she was in the car with her  and he made a sudden stop and it caused her to tense up and she has had pain since that time. Pharyngitis  This is a new problem. The problem occurs constantly. The problem has been unchanged. Associated symptoms include chills, coughing, headaches and swollen glands. Pertinent negatives include no nausea or vomiting. Associated symptoms comments: Swollen lymph node on right side of neck    Pain radiates to right ear. Nothing aggravates the symptoms. She has tried acetaminophen for the symptoms. The treatment provided mild relief. Back Pain  This is a recurrent problem. The current episode started in the past 7 days. The problem has been waxing and waning since onset. The pain is present in the thoracic spine. The pain does not radiate. The pain is at a severity of 4/10. The pain is mild. The pain is the same all the time. The symptoms are aggravated by bending, position and twisting.  Associated symptoms include headaches. Risk factors include history of cancer and obesity. She has tried NSAIDs for the symptoms. The treatment provided mild relief.        REVIEWED INFORMATION      Allergies   Allergen Reactions    Aspirin Anaphylaxis     Only thing she can take is tylenol    Benadryl [Diphenhydramine] Other (See Comments)     Dystonic movement    Ibuprofen Anaphylaxis    Lidocaine Anaphylaxis     CAN NOT TOLERATE IV    Penicillins Anaphylaxis    Hydrocodone-Acetaminophen Other (See Comments)     Unsure of allergy    Tramadol Other (See Comments)     Unsure of allergy    Morphine Nausea And Vomiting     Unsure of allergy       Patient Active Problem List   Diagnosis    Ovarian mass    Abdominal pain    Partial bowel obstruction (HCC)    Epigastric pain    GERD (gastroesophageal reflux disease)    Lumbosacral spondylosis without myelopathy    Chronic bilateral thoracic back pain    Chronic anticoagulation    BMI 38.0-38.9,adult       Past Medical History:   Diagnosis Date    Arthritis     Bowel obstruction (HCC)     Cancer ([facility] 2015    ovarian stage 2    Cerebral artery occlusion with cerebral infarction ([facility] 03/2018    Mini strokes \"Tia's\"    Diabetes mellitus (HCC)     Epigastric pain     GERD (gastroesophageal reflux disease)     History of anesthesia complications 2084'L    was told after gallbladder surgery to never have anesthesia again \"since it was hard for me to come out of it\"    History of blood transfusion     Hx of blood clots     lung     Hypertension     Right arm pain     Right shoulder pain     Sleep apnea     uses CPAP nightly    Tachycardia     hx of    Thyroid disease     TIA (transient ischemic attack)        Past Surgical History:   Procedure Laterality Date    ANESTHESIA NERVE BLOCK Left 11/7/2019    NERVE BLOCK (DEFINE) - INTERCOSTAL NERVE BLOCK performed by Nilam Matt MD at [facility] Bilateral 5/15/2020 NERVE BLOCK BILATERAL - MBB  L4-5, L5-S1 performed by Megan Diop MD at [address] Left     ORIF    CHOLECYSTECTOMY      COLONOSCOPY  07/08/2019    5 yr recall.     COLONOSCOPY N/A 7/8/2019    COLONOSCOPY WITH BIOPSY performed by William Alexis MD at [address] Right     repair tendon    [place] INJECTION PROCEDURE FOR SACROILIAC JOINT Bilateral 8/29/2019    SACROILIAC JOINT INJECTION performed by Megan Diop MD at [facility] Bilateral 10/10/2019    SACROILIAC JOINT INJECTION performed by Megan Diop MD at [address]  2/2014    [facility] with bso    JOINT REPLACEMENT Right     shoulder    KNEE ARTHROSCOPY Right     NERVE BLOCK Bilateral 08/29/2019    SI joint injection    NERVE BLOCK Bilateral 10/10/2019    SI joint    NERVE BLOCK Bilateral 05/15/2020    Medial branch block L4-5, L5-S1    NERVE BLOCK  06/05/2020    SPINAL CORD STIMULATOR IMPLANT TRIAL (N/A )    OTHER SURGICAL HISTORY Right 11/11/2014    shoulder manipulation    SHOULDER ARTHROSCOPY Right 02/03/15    SHOULDER SURGERY  11/2014    manipulation    SPINAL CORD STIMULATOR SURGERY N/A 6/5/2020    SPINAL CORD STIMULATOR IMPLANT TRIAL performed by Megan Diop MD at [facility] N/A 7/27/2020    SPINAL CORD STIMULATOR IMPLANT WITH THORACIC LAMINOTOMY performed by Tha Bolaños MD at [facility] ENDOSCOPY  07/08/2019    UPPER GASTROINTESTINAL ENDOSCOPY N/A 7/8/2019    EGD BIOPSY performed by William Alexis MD at [address] Right 4/30/2015    pt has blood clot & abscess in her right kidney    VASCULAR SURGERY      [place] filter in & removed 1 year later            PHYSICAL EXAM     /74   Pulse 84   Resp 18   Wt 218 lb 12.8 oz (99.2 kg)   SpO2 98%   BMI 37.56 kg/m²    Physical Exam  Vitals reviewed. Constitutional:       Appearance: Normal appearance. HENT:      Right Ear: Tympanic membrane, ear canal and external ear normal.      Left Ear: Tympanic membrane, ear canal and external ear normal.      Mouth/Throat:      Mouth: Mucous membranes are moist.      Pharynx: No pharyngeal swelling, oropharyngeal exudate, posterior oropharyngeal erythema or uvula swelling. Comments:  Very light erythema, Post nasal drainage  Neck:      Vascular: Decreased carotid pulses: Enlarged lymph node. Cardiovascular:      Rate and Rhythm: Normal rate and regular rhythm. Pulmonary:      Effort: Pulmonary effort is normal.      Breath sounds: Normal breath sounds. Abdominal:      General: Bowel sounds are normal.   Skin:     General: Skin is warm and dry. Neurological:      Mental Status: She is alert. ASSESSMENT/PLAN         1. Viral URI    Symptoms management handout given with after visit summary    2. Muscle spasm of back    Reviewed assessment findings with patient; the muscles on the right side of her thoracic spine are tense and spasm. Patient prefers to be treated without medications if possible. Moist heat to affected area several times a day  Use Biofreeze over muscle spasm  Stretching exercises as tolerated       No orders of the defined types were placed in this encounter. Return if symptoms worsen or fail to improve.     COMMUNICATION:       Electronically signed by LUPE Cortez CNP on 8/17/2021 at 12:16 PM

## 2021-08-16 NOTE — PATIENT INSTRUCTIONS
Patient Education        Upper Respiratory Infection (Cold): Care Instructions  Your Care Instructions     An upper respiratory infection, or URI, is an infection of the nose, sinuses, or throat. URIs are spread by coughs, sneezes, and direct contact. The common cold is the most frequent kind of URI. The flu and sinus infections are other kinds of URIs. Almost all URIs are caused by viruses. Antibiotics won't cure them. But you can treat most infections with home care. This may include drinking lots of fluids and taking over-the-counter pain medicine. You will probably feel better in 4 to 10 days. The doctor has checked you carefully, but problems can develop later. If you notice any problems or new symptoms, get medical treatment right away. Follow-up care is a key part of your treatment and safety. Be sure to make and go to all appointments, and call your doctor if you are having problems. It's also a good idea to know your test results and keep a list of the medicines you take. How can you care for yourself at home? · To prevent dehydration, drink plenty of fluids. Choose water and other caffeine-free clear liquids until you feel better. If you have kidney, heart, or liver disease and have to limit fluids, talk with your doctor before you increase the amount of fluids you drink. · Take an over-the-counter pain medicine, such as acetaminophen (Tylenol), ibuprofen (Advil, Motrin), or naproxen (Aleve). Read and follow all instructions on the label. · Before you use cough and cold medicines, check the label. These medicines may not be safe for young children or for people with certain health problems. · Be careful when taking over-the-counter cold or flu medicines and Tylenol at the same time. Many of these medicines have acetaminophen, which is Tylenol. Read the labels to make sure that you are not taking more than the recommended dose. Too much acetaminophen (Tylenol) can be harmful.   · Get plenty of rest.  · Do not smoke or allow others to smoke around you. If you need help quitting, talk to your doctor about stop-smoking programs and medicines. These can increase your chances of quitting for good. When should you call for help? Call 911 anytime you think you may need emergency care. For example, call if:    · You have severe trouble breathing. Call your doctor now or seek immediate medical care if:    · You seem to be getting much sicker.     · You have new or worse trouble breathing.     · You have a new or higher fever.     · You have a new rash. Watch closely for changes in your health, and be sure to contact your doctor if:    · You have a new symptom, such as a sore throat, an earache, or sinus pain.     · You cough more deeply or more often, especially if you notice more mucus or a change in the color of your mucus.     · You do not get better as expected. Where can you learn more? Go to https://[employer].NatSent. org and sign in to your [employer] account. Enter W822 in the Graitec box to learn more about \"Upper Respiratory Infection (Cold): Care Instructions. \"     If you do not have an account, please click on the \"Sign Up Now\" link. Current as of: October 26, 2020               Content Version: 12.9  © 2006-2021 [employer]. Care instructions adapted under license by [facility] ([facility]). If you have questions about a medical condition or this instruction, always ask your healthcare professional. [place] [address] any warranty or liability for your use of this information. Isak Lanier Nasal rinse kit  Hot steamy showers  Warm tea and honey  Warm salt water gargles    Patient Education        Back Spasm: Care Instructions  Your Care Instructions  A back spasm is sudden tightness and pain in your back muscles. It may happen from overuse or an injury.  Things like sleeping in an awkward way, bending, lifting, standing, or sitting can sometimes cause a spasm. But the cause isn't always clear. Home treatment includes using heat or ice, taking over-the-counter (OTC) pain medicines, and avoiding activities that may cause back pain. For a back spasm that doesn't get better with home care, your doctor may prescribe medicine. Treatments such as massage or manipulation may also help ease a back spasm. Your doctor may also suggest exercise or physical therapy to help improve strength and flexibility in your back muscles. In most cases, getting back to your normal activities is good for your back. Just make sure to avoid doing things that make your pain worse. Follow-up care is a key part of your treatment and safety. Be sure to make and go to all appointments, and call your doctor if you are having problems. It's also a good idea to know your test results and keep a list of the medicines you take. How can you care for yourself at home? Heat, ice, and medicines    · To relieve pain, use heat or ice (whichever feels better) on the affected area. ? Put a warm water bottle, a heating pad set on low, or a warm cloth on your back. Put a thin cloth between the heating pad and your skin. Do not go to sleep with a heating pad on your skin. ? Try ice or a cold pack on the area for 10 to 20 minutes at a time. Put a thin cloth between the ice and your skin.     · For most back pain you can take over-the-counter pain medicine. Nonsteroidal anti-inflammatory drugs (NSAIDs) such as ibuprofen or naproxen seem to work best. But if you can't take NSAIDs you can try acetaminophen. Your doctor can prescribe stronger medicines if needed. Be safe with medicines. Read and follow all instructions on the label. Body positions and posture    · Sit or lie in positions that are most comfortable for you and that reduce pain. Try one of these positions when you lie down:  ? Lie on your back with your knees bent and supported by large pillows.   ? Lie on the floor with your legs on the seat of a sofa or chair. ? Lie on your side with your knees and hips bent and a pillow between your legs. ? Lie on your stomach if it does not make pain worse.     · Do not sit up in bed. Avoid soft couches and twisted positions.     · Avoid bed rest after the first day of back pain. Bed rest can help relieve pain at first, but it delays healing. Continued rest without activity is usually not good for your back.     · If you must sit for long periods of time, take breaks from sitting. Change positions every 30 minutes. Get up and walk around, or lie in a comfortable position. Activity    · Take short walks several times a day. You can start with 5 to 10 minutes, 3 or 4 times a day, and work up to longer walks. Walk on level surfaces and avoid hills and stairs until your back starts to feel better.     · After your back spasm starts to feel better, try to stretch your muscles every day, especially before and after exercise and at bedtime. Regular stretching can help relax your muscles.     · To prevent future back pain, do exercises to stretch and strengthen your back and stomach. Learn to use good posture, safe lifting techniques, and other ways to move to help you avoid back pain. When should you call for help? Call 911 anytime you think you may need emergency care. For example, call if:    · You are unable to move an arm or a leg at all. Call your doctor now or seek immediate medical care if:    · You have new or worse symptoms in your legs, belly, or buttocks. Symptoms may include:  ? Numbness or tingling. ? Weakness. ? Pain.     · You lose bladder or bowel control. Watch closely for changes in your health, and be sure to contact your doctor if:    · You have a fever, lose weight, or don't feel well.     · You do not get better as expected. Where can you learn more? Go to https://vivienne.[employer]. org and sign in to your AthletePath account.  Enter E232 in the [place] box to learn more about \"Back Spasm: Care Instructions. \"     If you do not have an account, please click on the \"Sign Up Now\" link. Current as of: November 16, 2020               Content Version: 12.9  © 2006-2021 [employer]. Care instructions adapted under license by [facility] ([facility]). If you have questions about a medical condition or this instruction, always ask your healthcare professional. [place] [address] any warranty or liability for your use of this information. Patient Education        Swollen Lymph Nodes: Care Instructions  Your Care Instructions     Lymph nodes are small, bean-shaped glands throughout the body. They help your body fight germs and infections. Lymph nodes often swell when there is a problem such as an injury, infection, or tumor. · The nodes in your neck, under your chin, or behind your ears may swell when you have a cold or sore throat. · An injury or infection in a leg or foot can make the nodes in your groin swell. · Sometimes medicine can make lymph nodes swell, but this is rare. Treatment depends on what caused your nodes to swell. Usually the nodes return to normal size without a problem. Follow-up care is a key part of your treatment and safety. Be sure to make and go to all appointments, and call your doctor if you are having problems. It's also a good idea to know your test results and keep a list of the medicines you take. How can you care for yourself at home? · Take your medicines exactly as prescribed. Call your doctor if you think you are having a problem with your medicine. · Avoid irritation. ? Do not squeeze or pick at the lump. ? Do not stick a needle in it. · Prevent infection. Do not squeeze, drain, or puncture a painful lump. Doing this can irritate or inflame the lump, push any existing infection deeper into the skin, or cause severe bleeding. · Get extra rest. Slow down just a little from your usual routine.   · Drink plenty

## 2021-08-16 NOTE — TELEPHONE ENCOUNTER
Spoke with pt and informed her of PCP instructions, pt verbalized understanding of PCP instructions.

## 2021-08-26 RX ORDER — RIVAROXABAN 20 MG/1
TABLET, FILM COATED ORAL
Qty: 30 TABLET | Refills: 10 | Status: SHIPPED | OUTPATIENT
Start: 2021-08-26 | End: 2022-07-14

## 2021-08-30 ENCOUNTER — OFFICE VISIT (OUTPATIENT)
Dept: PAIN MANAGEMENT | Age: 71
End: 2021-08-30
Payer: MEDICARE

## 2021-08-30 ENCOUNTER — HOSPITAL ENCOUNTER (OUTPATIENT)
Age: 71
Discharge: HOME | End: 2021-09-01
Payer: MEDICARE

## 2021-08-30 ENCOUNTER — HOSPITAL ENCOUNTER (OUTPATIENT)
Dept: GENERAL RADIOLOGY | Age: 71
Discharge: HOME | End: 2021-09-01
Payer: MEDICARE

## 2021-08-30 VITALS
DIASTOLIC BLOOD PRESSURE: 63 MMHG | BODY MASS INDEX: 36.7 KG/M2 | SYSTOLIC BLOOD PRESSURE: 147 MMHG | HEIGHT: 64 IN | WEIGHT: 215 LBS

## 2021-08-30 DIAGNOSIS — M54.6 PAIN IN THORACIC SPINE: Primary | ICD-10-CM

## 2021-08-30 DIAGNOSIS — M54.50 CHRONIC BILATERAL LOW BACK PAIN, UNSPECIFIED WHETHER SCIATICA PRESENT: ICD-10-CM

## 2021-08-30 DIAGNOSIS — G89.29 CHRONIC BILATERAL LOW BACK PAIN, UNSPECIFIED WHETHER SCIATICA PRESENT: ICD-10-CM

## 2021-08-30 DIAGNOSIS — M54.6 PAIN IN THORACIC SPINE: ICD-10-CM

## 2021-08-30 PROCEDURE — 1036F TOBACCO NON-USER: CPT | Performed by: PAIN MEDICINE

## 2021-08-30 PROCEDURE — 1123F ACP DISCUSS/DSCN MKR DOCD: CPT | Performed by: PAIN MEDICINE

## 2021-08-30 PROCEDURE — 3017F COLORECTAL CA SCREEN DOC REV: CPT | Performed by: PAIN MEDICINE

## 2021-08-30 PROCEDURE — 72100 X-RAY EXAM L-S SPINE 2/3 VWS: CPT

## 2021-08-30 PROCEDURE — G8417 CALC BMI ABV UP PARAM F/U: HCPCS | Performed by: PAIN MEDICINE

## 2021-08-30 PROCEDURE — 4040F PNEUMOC VAC/ADMIN/RCVD: CPT | Performed by: PAIN MEDICINE

## 2021-08-30 PROCEDURE — 99213 OFFICE O/P EST LOW 20 MIN: CPT | Performed by: PAIN MEDICINE

## 2021-08-30 PROCEDURE — 1090F PRES/ABSN URINE INCON ASSESS: CPT | Performed by: PAIN MEDICINE

## 2021-08-30 PROCEDURE — 72072 X-RAY EXAM THORAC SPINE 3VWS: CPT

## 2021-08-30 PROCEDURE — G8400 PT W/DXA NO RESULTS DOC: HCPCS | Performed by: PAIN MEDICINE

## 2021-08-30 PROCEDURE — G8427 DOCREV CUR MEDS BY ELIG CLIN: HCPCS | Performed by: PAIN MEDICINE

## 2021-08-30 ASSESSMENT — ENCOUNTER SYMPTOMS
BOWEL INCONTINENCE: 0
BACK PAIN: 1
ABDOMINAL PAIN: 0

## 2021-08-30 NOTE — PROGRESS NOTES
HPI:     Back Pain  This is a chronic problem. The current episode started more than 1 year ago. The problem occurs constantly. The problem has been gradually worsening since onset. The pain is present in the thoracic spine. The quality of the pain is described as aching, burning, cramping, shooting and stabbing. The pain is at a severity of 8/10. The pain is severe. The pain is the same all the time. The symptoms are aggravated by coughing, bending, position, sitting, standing, twisting, stress and lying down. Pertinent negatives include no abdominal pain, bladder incontinence or bowel incontinence. She has tried heat, ice and NSAIDs for the symptoms. The treatment provided mild relief. History of chronic thoracic and low back pain. She had a spinal cord stimulator with mild benefit. Ended up with an intrathecal pump which she feels helped however lately has been having worsening mid back pain. Does report a fall a few weeks ago. Patient denies any new neurological symptoms. Nobowel or bladder incontinence, no weakness, and no falling. Review of OARRS does not show any aberrant prescription behavior. Medication is helping the patient stay active. Patient denies any side effects and reports adequate analgesia. No sign of misuse/abuse.     Past Medical History:   Diagnosis Date    Arthritis     [address]el obstruction ([address]     Cancer ([address] 2015    ovarian stage 2    Cerebral artery occlusion with cerebral infarction ([address] 03/2018    Mini strokes \"Tia's\"    Diabetes mellitus ([address]     Epigastric pain     GERD (gastroesophageal reflux disease)     History of anesthesia complications 8354'O    was told after gallbladder surgery to never have anesthesia again \"since it was hard for me to come out of it\"    History of blood transfusion     Hx of blood clots     lung     Hypertension     Right arm pain     Right shoulder pain     Sleep apnea     uses CPAP nightly    Tachycardia     hx of    Thyroid disease     TIA (transient ischemic attack)        Past Surgical History:   Procedure Laterality Date    ANESTHESIA NERVE BLOCK Left 11/7/2019    NERVE BLOCK (DEFINE) - INTERCOSTAL NERVE BLOCK performed by Magalys Chang MD at [facility] Bilateral 5/15/2020    NERVE BLOCK BILATERAL - MBB  L4-5, L5-S1 performed by Magalys Chang MD at [address] Left     ORIF    CHOLECYSTECTOMY      COLONOSCOPY  07/08/2019    5 yr recall.     COLONOSCOPY N/A 7/8/2019    COLONOSCOPY WITH BIOPSY performed by Donald Patiño MD at [address] Right     [facility] [place]. INJECTION PROCEDURE FOR SACROILIAC JOINT Bilateral 8/29/2019    SACROILIAC JOINT INJECTION performed by Magalys Chang MD at [facility] Bilateral 10/10/2019    SACROILIAC JOINT INJECTION performed by Magalys Chang MD at [address]  2/2014    [facility] with bso    JOINT REPLACEMENT Right     shoulder    KNEE ARTHROSCOPY Right     NERVE BLOCK Bilateral 08/29/2019    SI joint injection    NERVE BLOCK Bilateral 10/10/2019    SI joint    NERVE BLOCK Bilateral 05/15/2020    Medial branch block L4-5, L5-S1    NERVE BLOCK  06/05/2020    SPINAL CORD STIMULATOR IMPLANT TRIAL (N/A )    OTHER SURGICAL HISTORY Right 11/11/2014    shoulder manipulation    SHOULDER ARTHROSCOPY Right 02/03/15    SHOULDER SURGERY  11/2014    manipulation    SPINAL CORD STIMULATOR SURGERY N/A 6/5/2020    SPINAL CORD STIMULATOR IMPLANT TRIAL performed by Magalys Chang MD at [facility] N/A 7/27/2020    SPINAL CORD STIMULATOR IMPLANT WITH THORACIC LAMINOTOMY performed by Timo Shepherd MD at [address] ENDOSCOPY  07/08/2019    UPPER GASTROINTESTINAL ENDOSCOPY N/A 7/8/2019    EGD BIOPSY performed by [facility] MD at [facility]    URETER STENT PLACEMENT Right 4/30/2015    pt has blood clot & abscess in her right kidney    VASCULAR SURGERY      [place] filter in & removed 1 year later       Allergies   Allergen Reactions    Aspirin Anaphylaxis     Only thing she can take is tylenol    Benadryl [Diphenhydramine] Other (See Comments)     Dystonic movement    Ibuprofen Anaphylaxis    Lidocaine Anaphylaxis     CAN NOT TOLERATE IV    Penicillins Anaphylaxis    Hydrocodone-Acetaminophen Other (See Comments)     Unsure of allergy    Tramadol Other (See Comments)     Unsure of allergy    Morphine Nausea And Vomiting     Unsure of allergy           Family History   Problem Relation Age of Onset    Elevated Lipids Paternal Grandmother        Social History     Socioeconomic History    Marital status: Single     Spouse name: Not on file    Number of children: Not on file    Years of education: Not on file    Highest education level: Not on file   Occupational History    Not on file   Tobacco Use    Smoking status: Never Smoker    Smokeless tobacco: Never Used   Vaping Use    Vaping Use: Never used   Substance and Sexual Activity    Alcohol use: Yes     Comment: rare    Drug use: No    Sexual activity: Yes   Other Topics Concern    Not on file   Social History Narrative    Not on file     Social Determinants of Health     Financial Resource Strain: Low Risk     Difficulty of Paying Living Expenses: Not hard at all   Food Insecurity: No Food Insecurity    Worried About Running Out of Food in the Last Year: Never true    Ricardo of Food in the Last Year: Never true   Transportation Needs:     Lack of Transportation (Medical):      Lack of Transportation (Non-Medical):    Physical Activity:     Days of Exercise per Week:     Minutes of Exercise per Session:    Stress:     Feeling of Stress :    Social Connections:     Frequency of Communication with Friends and Family:     Frequency of Social Gatherings with

## 2021-08-31 ENCOUNTER — TELEPHONE (OUTPATIENT)
Dept: PAIN MANAGEMENT | Age: 71
End: 2021-08-31

## 2021-08-31 NOTE — TELEPHONE ENCOUNTER
Please let her know there is no acute fracture, no significant change noted from previous imaging. Will also review with her at next OV.

## 2021-08-31 NOTE — TELEPHONE ENCOUNTER
Patient called in asking if we had her Xray results, informed patient that did, she would like those results. Please advise.

## 2021-09-08 ASSESSMENT — ENCOUNTER SYMPTOMS: BACK PAIN: 1

## 2021-09-08 NOTE — PROGRESS NOTES
HPI:     Back Pain  This is a new problem. The current episode started more than 1 month ago. The problem occurs daily. The problem has been gradually worsening since onset. The pain is present in the thoracic spine. The quality of the pain is described as stabbing, shooting, cramping, burning and aching. The pain is at a severity of 10/10. The pain is severe. The pain is the same all the time. The symptoms are aggravated by coughing, twisting, stress, sitting, standing, position, lying down and bending (lifting). Stiffness is present all day. Associated symptoms include leg pain, numbness, tingling and weakness. Risk factors include history of cancer. She has tried home exercises, chiropractic manipulation and heat (pain pump 04/2021, physical therapy) for the symptoms. The treatment provided no relief. Continues to have lingering thoracic pain. X-rays no acute process. Does have spinal cord stimulator as well as intrathecal pump. Patient denies any new neurological symptoms. Nobowel or bladder incontinence, no weakness, and no falling. Review of OARRS does not show any aberrant prescription behavior.      Past Medical History:   Diagnosis Date    Arthritis     Bowel obstruction ([address]     Cancer ([address] 2015    ovarian stage 2    Cerebral artery occlusion with cerebral infarction ([address] 03/2018    Mini strokes \"Tia's\"    Diabetes mellitus ([address]     Epigastric pain     GERD (gastroesophageal reflux disease)     History of anesthesia complications 8747'C    was told after gallbladder surgery to never have anesthesia again \"since it was hard for me to come out of it\"    History of blood transfusion     Hx of blood clots     lung     Hypertension     Right arm pain     Right shoulder pain     Sleep apnea     uses CPAP nightly    Tachycardia     hx of    Thyroid disease     TIA (transient ischemic attack)        Past Surgical History:   Procedure Laterality Date    ANESTHESIA NERVE BLOCK filter in & removed 1 year later       Allergies   Allergen Reactions    Aspirin Anaphylaxis     Only thing she can take is tylenol    Benadryl [Diphenhydramine] Other (See Comments)     Dystonic movement    Ibuprofen Anaphylaxis    Lidocaine Anaphylaxis     CAN NOT TOLERATE IV    Penicillins Anaphylaxis    Hydrocodone-Acetaminophen Other (See Comments)     Unsure of allergy    Tramadol Other (See Comments)     Unsure of allergy    Morphine Nausea And Vomiting     Unsure of allergy           Family History   Problem Relation Age of Onset    Elevated Lipids Paternal Grandmother        Social History     Socioeconomic History    Marital status: Single     Spouse name: Not on file    Number of children: Not on file    Years of education: Not on file    Highest education level: Not on file   Occupational History    Not on file   Tobacco Use    Smoking status: Never Smoker    Smokeless tobacco: Never Used   Vaping Use    Vaping Use: Never used   Substance and Sexual Activity    Alcohol use: Yes     Comment: rare    Drug use: No    Sexual activity: Yes   Other Topics Concern    Not on file   Social History Narrative    Not on file     Social Determinants of Health     Financial Resource Strain: Low Risk     Difficulty of Paying Living Expenses: Not hard at all   Food Insecurity: No Food Insecurity    Worried About Running Out of Food in the Last Year: Never true    Ricardo of Food in the Last Year: Never true   Transportation Needs:     Lack of Transportation (Medical):      Lack of Transportation (Non-Medical):    Physical Activity:     Days of Exercise per Week:     Minutes of Exercise per Session:    Stress:     Feeling of Stress :    Social Connections:     Frequency of Communication with Friends and Family:     Frequency of Social Gatherings with Friends and Family:     Attends Christian Services:     Active Member of Clubs or Organizations:     Attends Club or Organization Meetings:  Marital Status:    Intimate Partner Violence:     Fear of Current or Ex-Partner:     Emotionally Abused:     Physically Abused:     Sexually Abused:        Review of Systems:  Review of Systems   Musculoskeletal: Positive for back pain. Neurological: Positive for numbness, tingling and weakness. Physical Exam:  /64   Pulse 74   Ht 5' 4\" (1.626 m)   Wt 223 lb 6.4 oz (101.3 kg)   Breastfeeding No   BMI 38.35 kg/m²     Physical Exam  Constitutional:       Appearance: Normal appearance. Pulmonary:      Effort: Pulmonary effort is normal.   Neurological:      Mental Status: She is alert. Psychiatric:         Attention and Perception: Attention and perception normal.         Mood and Affect: Mood and affect normal.         Record/Diagnostics Review:    As above, I did review the imaging    Orders:  Orders Placed This Encounter   Procedures    CT THORACIC SPINE WO CONTRAST    CT LUMBAR SPINE WO CONTRAST     No orders of the defined types were placed in this encounter. Assessment:  1. Pain in thoracic spine    2. Lumbar radiculopathy        Treatment Plan:  DISCUSSION: Treatment options discussed with patient and all questions answered to patient's satisfaction. OARRS Review: Reviewed and acceptable for medications prescribed. TREATMENT OPTIONS:     Continues to have lingering pain. CT scan thoracic and lumbar spine      Franki Montero M.D. I have reviewed the chief complaint and history of present illness (including ROS and PFSH) and vital documentation by my staff and I agree with their documentation and have added where applicable.

## 2021-09-10 ENCOUNTER — OFFICE VISIT (OUTPATIENT)
Dept: PAIN MANAGEMENT | Age: 71
End: 2021-09-10
Payer: MEDICARE

## 2021-09-10 VITALS
WEIGHT: 223.4 LBS | HEIGHT: 64 IN | BODY MASS INDEX: 38.14 KG/M2 | DIASTOLIC BLOOD PRESSURE: 64 MMHG | SYSTOLIC BLOOD PRESSURE: 134 MMHG | HEART RATE: 74 BPM

## 2021-09-10 DIAGNOSIS — M54.6 PAIN IN THORACIC SPINE: Primary | ICD-10-CM

## 2021-09-10 DIAGNOSIS — M54.16 LUMBAR RADICULOPATHY: ICD-10-CM

## 2021-09-10 PROCEDURE — 1090F PRES/ABSN URINE INCON ASSESS: CPT | Performed by: PAIN MEDICINE

## 2021-09-10 PROCEDURE — 3017F COLORECTAL CA SCREEN DOC REV: CPT | Performed by: PAIN MEDICINE

## 2021-09-10 PROCEDURE — G8417 CALC BMI ABV UP PARAM F/U: HCPCS | Performed by: PAIN MEDICINE

## 2021-09-10 PROCEDURE — G8400 PT W/DXA NO RESULTS DOC: HCPCS | Performed by: PAIN MEDICINE

## 2021-09-10 PROCEDURE — 1036F TOBACCO NON-USER: CPT | Performed by: PAIN MEDICINE

## 2021-09-10 PROCEDURE — 1123F ACP DISCUSS/DSCN MKR DOCD: CPT | Performed by: PAIN MEDICINE

## 2021-09-10 PROCEDURE — G8427 DOCREV CUR MEDS BY ELIG CLIN: HCPCS | Performed by: PAIN MEDICINE

## 2021-09-10 PROCEDURE — 99213 OFFICE O/P EST LOW 20 MIN: CPT | Performed by: PAIN MEDICINE

## 2021-09-10 PROCEDURE — 4040F PNEUMOC VAC/ADMIN/RCVD: CPT | Performed by: PAIN MEDICINE

## 2021-09-16 ENCOUNTER — HOSPITAL ENCOUNTER (OUTPATIENT)
Dept: CT IMAGING | Age: 71
Discharge: HOME | End: 2021-09-18
Payer: MEDICARE

## 2021-09-16 ENCOUNTER — TELEPHONE (OUTPATIENT)
Dept: PRIMARY CARE CLINIC | Age: 71
End: 2021-09-16

## 2021-09-16 DIAGNOSIS — Z91.030 ALLERGY TO BEE STING: Primary | ICD-10-CM

## 2021-09-16 DIAGNOSIS — M54.16 LUMBAR RADICULOPATHY: ICD-10-CM

## 2021-09-16 DIAGNOSIS — M54.6 PAIN IN THORACIC SPINE: ICD-10-CM

## 2021-09-16 PROCEDURE — 72131 CT LUMBAR SPINE W/O DYE: CPT

## 2021-09-16 PROCEDURE — 72128 CT CHEST SPINE W/O DYE: CPT

## 2021-09-16 NOTE — TELEPHONE ENCOUNTER
Patient is requesting a new rx for EpiPen she is highly allergic to bees. She is not sure if you have ever ordered them before. [facility] in [place] please.

## 2021-09-17 RX ORDER — EPINEPHRINE 0.3 MG/.3ML
INJECTION SUBCUTANEOUS
Qty: 1 EACH | Refills: 3 | Status: SHIPPED | OUTPATIENT
Start: 2021-09-17

## 2021-09-22 ASSESSMENT — ENCOUNTER SYMPTOMS
ABDOMINAL PAIN: 1
BACK PAIN: 1
BOWEL INCONTINENCE: 0
RESPIRATORY NEGATIVE: 1

## 2021-09-22 NOTE — PROGRESS NOTES
HPI:     Back Pain  The current episode started more than 1 month ago. The problem occurs constantly. The problem is unchanged. The pain is present in the thoracic spine. The quality of the pain is described as aching. The pain radiates to the right thigh, left thigh, left knee, right foot, right knee and left foot. The pain is at a severity of 10/10. The pain is severe. The pain is the same all the time. The symptoms are aggravated by sitting, standing, twisting, position and bending. Associated symptoms include abdominal pain, headaches, leg pain, numbness, tingling and weakness. Pertinent negatives include no bladder incontinence, bowel incontinence, chest pain, dysuria, fever, paresthesias, pelvic pain or weight loss. Risk factors include history of cancer and history of osteoporosis (Ovarian 2015). She has tried analgesics, NSAIDs, chiropractic manipulation, ice, muscle relaxant and heat (physical therapy, injections, SCS, pain pump) for the symptoms. The treatment provided no relief. Continues to have significant mid back pain. CT scans unremarkable. She has spinal cord stimulator as well as intrathecal pump. Patient denies any new neurological symptoms. Nobowel or bladder incontinence, no weakness, and no falling. Review of OARRS does not show any aberrant prescription behavior.     Past Medical History:   Diagnosis Date    Arthritis     [facility]     Cancer ([facility] 2015    ovarian stage 2    Cerebral artery occlusion with cerebral infarction ([facility] 03/2018    Mini strokes \"Tia's\"    Diabetes mellitus ([facility]     Epigastric pain     GERD (gastroesophageal reflux disease)     History of anesthesia complications 4081'F    was told after gallbladder surgery to never have anesthesia again \"since it was hard for me to come out of it\"    History of blood transfusion     Hx of blood clots     lung     Hypertension     Right arm pain     Right shoulder pain     Sleep apnea     uses CPAP nightly    Tachycardia     hx of    Thyroid disease     TIA (transient ischemic attack)        Past Surgical History:   Procedure Laterality Date    ANESTHESIA NERVE BLOCK Left 11/7/2019    NERVE BLOCK (DEFINE) - INTERCOSTAL NERVE BLOCK performed by Noreen Mohamud MD at [facility] Bilateral 5/15/2020    NERVE BLOCK BILATERAL - MBB  L4-5, L5-S1 performed by Noreen Mohamud MD at [address] [facility] Left     ORIF    CHOLECYSTECTOMY      COLONOSCOPY  07/08/2019    5 yr recall.     COLONOSCOPY N/A 7/8/2019    COLONOSCOPY WITH BIOPSY performed by Luanna Hamman, MD at [address] Right     repair tendon    [facility] [place]. INJECTION PROCEDURE FOR SACROILIAC JOINT Bilateral 8/29/2019    SACROILIAC JOINT INJECTION performed by Noreen Mohamud MD at [facility] Bilateral 10/10/2019    SACROILIAC JOINT INJECTION performed by Noreen Mohamud MD at [address]  2/2014    chris with bso    JOINT REPLACEMENT Right     shoulder    KNEE ARTHROSCOPY Right     NERVE BLOCK Bilateral 08/29/2019    SI joint injection    NERVE BLOCK Bilateral 10/10/2019    SI joint    NERVE BLOCK Bilateral 05/15/2020    Medial branch block L4-5, L5-S1    NERVE BLOCK  06/05/2020    SPINAL CORD STIMULATOR IMPLANT TRIAL (N/A )    OTHER SURGICAL HISTORY Right 11/11/2014    shoulder manipulation    SHOULDER ARTHROSCOPY Right 02/03/15    SHOULDER SURGERY  11/2014    manipulation    SPINAL CORD STIMULATOR SURGERY N/A 6/5/2020    SPINAL CORD STIMULATOR IMPLANT TRIAL performed by Noreen Mohamud MD at [address] [facility] N/A 7/27/2020    SPINAL CORD STIMULATOR IMPLANT WITH THORACIC LAMINOTOMY performed by Viola Kilpatrick MD at [address] ENDOSCOPY  07/08/2019    UPPER GASTROINTESTINAL ENDOSCOPY N/A 7/8/2019  Frequency of Social Gatherings with Friends and Family:     Attends Mu-ism Services:     Active Member of Clubs or Organizations:     Attends Club or Organization Meetings:     Marital Status:    Intimate Partner Violence:     Fear of Current or Ex-Partner:     Emotionally Abused:     Physically Abused:     Sexually Abused:        Review of Systems:  Review of Systems   Constitutional: Negative for fever and weight loss. Cardiovascular: Negative for chest pain. Respiratory: Negative. Musculoskeletal: Positive for back pain. Gastrointestinal: Positive for abdominal pain. Negative for bowel incontinence. Genitourinary: Negative for bladder incontinence, dysuria and pelvic pain. Neurological: Positive for headaches, numbness, tingling and weakness. Negative for paresthesias. Allergic/Immunologic: Positive for environmental allergies. Physical Exam:  BP (!) 164/81 Comment: pt took her bp meds this morning, asymptomatic  Pulse 64   Ht 5' 4\" (1.626 m)   Wt 220 lb (99.8 kg)   BMI 37.76 kg/m²     Physical Exam  Constitutional:       Appearance: Normal appearance. Pulmonary:      Effort: Pulmonary effort is normal.   Neurological:      Mental Status: She is alert. Psychiatric:         Attention and Perception: Attention and perception normal.         Mood and Affect: Mood and affect normal.         Record/Diagnostics Review:    As above, I did review the imaging    Orders:  Orders Placed This Encounter   Procedures    MRI LUMBAR SPINE WO CONTRAST    MRI THORACIC SPINE WO CONTRAST       Assessment:  1. Chronic bilateral thoracic back pain    2. Other chronic pain    3. Lumbar radiculopathy        Treatment Plan:  DISCUSSION: Treatment options discussed with patient and all questions answered to patient's satisfaction. OARRS Review: Reviewed and acceptable for medications prescribed.   TREATMENT OPTIONS:     Continues to have significant pain despite extensive treatment modalities. She will touch base with [facility] in [place] to evaluate her intrathecal pump. Discussed the importance of continued physical therapy and exercise program as well. Her thoracic and lumbar spine are indicated to further evaluate pathology and guide treatment plan. Her stimluator is MRI compatible. Would need her pump checked after the MRI as well. Shawnee Augustin M.D. I have reviewed the chief complaint and history of present illness (including ROS and PFSH) and vital documentation by my staff and I agree with their documentation and have added where applicable.

## 2021-09-23 RX ORDER — BLOOD SUGAR DIAGNOSTIC
STRIP MISCELLANEOUS
Qty: 100 STRIP | Refills: 10 | Status: SHIPPED | OUTPATIENT
Start: 2021-09-23

## 2021-09-23 RX ORDER — TAMSULOSIN HYDROCHLORIDE 0.4 MG/1
CAPSULE ORAL
Qty: 30 CAPSULE | Refills: 10 | Status: SHIPPED | OUTPATIENT
Start: 2021-09-23 | End: 2022-08-11

## 2021-09-23 RX ORDER — SYRINGE AND NEEDLE,INSULIN,1ML 31 GX5/16"
SYRINGE, EMPTY DISPOSABLE MISCELLANEOUS
Qty: 100 EACH | Refills: 10 | Status: SHIPPED | OUTPATIENT
Start: 2021-09-23 | End: 2022-08-11 | Stop reason: SDUPTHER

## 2021-09-24 ENCOUNTER — OFFICE VISIT (OUTPATIENT)
Dept: PAIN MANAGEMENT | Age: 71
End: 2021-09-24
Payer: MEDICARE

## 2021-09-24 VITALS
SYSTOLIC BLOOD PRESSURE: 164 MMHG | HEART RATE: 64 BPM | DIASTOLIC BLOOD PRESSURE: 81 MMHG | HEIGHT: 64 IN | WEIGHT: 220 LBS | BODY MASS INDEX: 37.56 KG/M2

## 2021-09-24 DIAGNOSIS — G89.29 CHRONIC BILATERAL THORACIC BACK PAIN: Primary | ICD-10-CM

## 2021-09-24 DIAGNOSIS — G89.29 OTHER CHRONIC PAIN: ICD-10-CM

## 2021-09-24 DIAGNOSIS — M54.6 CHRONIC BILATERAL THORACIC BACK PAIN: Primary | ICD-10-CM

## 2021-09-24 DIAGNOSIS — M54.16 LUMBAR RADICULOPATHY: ICD-10-CM

## 2021-09-24 PROCEDURE — 1123F ACP DISCUSS/DSCN MKR DOCD: CPT | Performed by: PAIN MEDICINE

## 2021-09-24 PROCEDURE — 1090F PRES/ABSN URINE INCON ASSESS: CPT | Performed by: PAIN MEDICINE

## 2021-09-24 PROCEDURE — 4040F PNEUMOC VAC/ADMIN/RCVD: CPT | Performed by: PAIN MEDICINE

## 2021-09-24 PROCEDURE — 1036F TOBACCO NON-USER: CPT | Performed by: PAIN MEDICINE

## 2021-09-24 PROCEDURE — G8400 PT W/DXA NO RESULTS DOC: HCPCS | Performed by: PAIN MEDICINE

## 2021-09-24 PROCEDURE — G8427 DOCREV CUR MEDS BY ELIG CLIN: HCPCS | Performed by: PAIN MEDICINE

## 2021-09-24 PROCEDURE — 3017F COLORECTAL CA SCREEN DOC REV: CPT | Performed by: PAIN MEDICINE

## 2021-09-24 PROCEDURE — 99214 OFFICE O/P EST MOD 30 MIN: CPT | Performed by: PAIN MEDICINE

## 2021-09-24 PROCEDURE — G8417 CALC BMI ABV UP PARAM F/U: HCPCS | Performed by: PAIN MEDICINE

## 2021-09-27 ENCOUNTER — TELEPHONE (OUTPATIENT)
Dept: PAIN MANAGEMENT | Age: 71
End: 2021-09-27

## 2021-10-01 NOTE — TELEPHONE ENCOUNTER
Spoke with patient regarding Dr Yanet Richardson phone number and she stated she already called them and has an appointment with him on 10/06/21. Advised patient her thoracic and lumbar MRI's have been ordered and gave her the phone number to [facility] outpatient scheduling to get them done before her appointment. Patient stated she understands and will call O/P scheduling today.

## 2021-10-06 NOTE — TELEPHONE ENCOUNTER
Per Cassidy Hennessy, called patient back regarding her SCS question. Advised patient per Dr. Haley New office note, patient's SCS is MRI compatible. Patient states she has her MRI scheduled for 10/14/21 and is getting her Intrathecal Pump checked in [facility] [place] on 10/15/21 as requested by Dr. Jarocho Alexis.

## 2021-10-11 LAB
ALBUMIN SERPL-MCNC: 4.2 G/DL
ALP BLD-CCNC: 47 U/L
ALT SERPL-CCNC: 24 U/L
ANION GAP SERPL CALCULATED.3IONS-SCNC: 6 MMOL/L
AST SERPL-CCNC: 41 U/L
BASOPHILS ABSOLUTE: 0.1 /ΜL
BASOPHILS RELATIVE PERCENT: 1 %
BILIRUB SERPL-MCNC: 0.7 MG/DL (ref 0.1–1.4)
BILIRUBIN URINE: ABNORMAL
BLOOD, URINE: ABNORMAL
BUN BLDV-MCNC: 21 MG/DL
CALCIUM SERPL-MCNC: 9.3 MG/DL
CHLORIDE BLD-SCNC: 105 MMOL/L
CLARITY: CLEAR
CO2: 24 MMOL/L
COLOR: YELLOW
CREAT SERPL-MCNC: 0.8 MG/DL
EOSINOPHILS ABSOLUTE: 0.3 /ΜL
EOSINOPHILS RELATIVE PERCENT: 4.6 %
GFR CALCULATED: NORMAL
GLUCOSE BLD-MCNC: 373 MG/DL
GLUCOSE URINE: >1000
HCT VFR BLD CALC: 27.8 % (ref 36–46)
HEMOGLOBIN: 8.2 G/DL (ref 12–16)
KETONES, URINE: ABNORMAL
LEUKOCYTE ESTERASE, URINE: ABNORMAL
LYMPHOCYTES ABSOLUTE: 2.6 /ΜL
LYMPHOCYTES RELATIVE PERCENT: 44.9 %
MCH RBC QN AUTO: 21.4 PG
MCHC RBC AUTO-ENTMCNC: 29.5 G/DL
MCV RBC AUTO: 72.6 FL
MONOCYTES ABSOLUTE: 0.4 /ΜL
MONOCYTES RELATIVE PERCENT: 7.4 %
NEUTROPHILS ABSOLUTE: 2.4 /ΜL
NEUTROPHILS RELATIVE PERCENT: 41.9 %
NITRITE, URINE: ABNORMAL
PH UA: 6 (ref 4.5–8)
PLATELET # BLD: 198 K/ΜL
PMV BLD AUTO: ABNORMAL FL
POTASSIUM SERPL-SCNC: 4.8 MMOL/L
PROTEIN UA: ABNORMAL
RBC # BLD: 16.2 10^6/ΜL
SODIUM BLD-SCNC: 135 MMOL/L
SPECIFIC GRAVITY UA: 1.03 (ref 1–1.03)
TOTAL PROTEIN: 7.1
UROBILINOGEN, URINE: ABNORMAL
WBC # BLD: 5.8 10^3/ML

## 2021-10-14 ENCOUNTER — HOSPITAL ENCOUNTER (OUTPATIENT)
Dept: MRI IMAGING | Age: 71
Discharge: HOME | End: 2021-10-16
Payer: MEDICARE

## 2021-10-14 DIAGNOSIS — G89.29 CHRONIC BILATERAL THORACIC BACK PAIN: ICD-10-CM

## 2021-10-14 DIAGNOSIS — M54.16 LUMBAR RADICULOPATHY: ICD-10-CM

## 2021-10-14 DIAGNOSIS — M54.6 CHRONIC BILATERAL THORACIC BACK PAIN: ICD-10-CM

## 2021-10-15 ENCOUNTER — HOSPITAL ENCOUNTER (OUTPATIENT)
Dept: MRI IMAGING | Facility: CLINIC | Age: 71
Discharge: HOME | End: 2021-10-17
Payer: MEDICARE

## 2021-10-15 DIAGNOSIS — M54.6 CHRONIC BILATERAL THORACIC BACK PAIN: ICD-10-CM

## 2021-10-15 DIAGNOSIS — G89.29 CHRONIC BILATERAL THORACIC BACK PAIN: ICD-10-CM

## 2021-10-15 DIAGNOSIS — M54.16 LUMBAR RADICULOPATHY: ICD-10-CM

## 2021-10-15 PROCEDURE — 72146 MRI CHEST SPINE W/O DYE: CPT

## 2021-10-15 PROCEDURE — 72148 MRI LUMBAR SPINE W/O DYE: CPT

## 2021-10-18 ENCOUNTER — TELEPHONE (OUTPATIENT)
Dept: PAIN MANAGEMENT | Age: 71
End: 2021-10-18

## 2021-10-18 NOTE — TELEPHONE ENCOUNTER
patient was seen by [facility] and they want her to get CT of her ribs . patient states that Dr Ihsan Hudson (you ) talk with the [address] [place] [address] doctors. Wanted Dr Ihsan Hudson  to order the CT. Please let her know what the next step for her treatment.

## 2021-10-19 ENCOUNTER — TELEPHONE (OUTPATIENT)
Dept: ADMINISTRATIVE | Age: 71
End: 2021-10-19

## 2021-10-21 DIAGNOSIS — M54.6 THORACIC BACK PAIN, UNSPECIFIED BACK PAIN LATERALITY, UNSPECIFIED CHRONICITY: Primary | ICD-10-CM

## 2021-10-21 DIAGNOSIS — R07.81 RIB PAIN: ICD-10-CM

## 2021-10-21 NOTE — PROGRESS NOTES
Dr. Elida Vargas discussed case with Dr. Caron Aguirre at [facility]. Would like CT of chest to evaluate the ribs. Order placed.

## 2021-10-22 DIAGNOSIS — K21.9 GASTROESOPHAGEAL REFLUX DISEASE WITHOUT ESOPHAGITIS: ICD-10-CM

## 2021-10-22 RX ORDER — OMEPRAZOLE 40 MG/1
CAPSULE, DELAYED RELEASE ORAL
Qty: 30 CAPSULE | Refills: 3 | Status: SHIPPED | OUTPATIENT
Start: 2021-10-22 | End: 2022-02-17

## 2021-10-22 RX ORDER — POTASSIUM CHLORIDE 20 MEQ/1
TABLET, EXTENDED RELEASE ORAL
Qty: 60 TABLET | Refills: 2 | Status: SHIPPED | OUTPATIENT
Start: 2021-10-22 | End: 2022-01-26

## 2021-10-22 ASSESSMENT — LIFESTYLE VARIABLES
HAVE YOU OR SOMEONE ELSE BEEN INJURED AS A RESULT OF YOUR DRINKING: 0
HOW OFTEN DO YOU HAVE SIX OR MORE DRINKS ON ONE OCCASION: 0
AUDIT-C TOTAL SCORE: 1
HOW OFTEN DURING THE LAST YEAR HAVE YOU FAILED TO DO WHAT WAS NORMALLY EXPECTED FROM YOU BECAUSE OF DRINKING: 0
HOW OFTEN DO YOU HAVE SIX OR MORE DRINKS ON ONE OCCASION: NEVER
HOW MANY STANDARD DRINKS CONTAINING ALCOHOL DO YOU HAVE ON A TYPICAL DAY: ONE OR TWO
HAVE YOU OR SOMEONE ELSE BEEN INJURED AS A RESULT OF YOUR DRINKING: NO
HOW OFTEN DURING THE LAST YEAR HAVE YOU BEEN UNABLE TO REMEMBER WHAT HAPPENED THE NIGHT BEFORE BECAUSE YOU HAD BEEN DRINKING: 0
HOW OFTEN DURING THE LAST YEAR HAVE YOU HAD A FEELING OF GUILT OR REMORSE AFTER DRINKING: 0
HOW OFTEN DURING THE LAST YEAR HAVE YOU HAD A FEELING OF GUILT OR REMORSE AFTER DRINKING: NEVER
HOW OFTEN DURING THE LAST YEAR HAVE YOU FAILED TO DO WHAT WAS NORMALLY EXPECTED FROM YOU BECAUSE OF DRINKING: NEVER
AUDIT-C TOTAL SCORE: 0
HOW OFTEN DO YOU HAVE A DRINK CONTAINING ALCOHOL: MONTHLY OR LESS
HOW MANY STANDARD DRINKS CONTAINING ALCOHOL DO YOU HAVE ON A TYPICAL DAY: 0
HOW OFTEN DURING THE LAST YEAR HAVE YOU FOUND THAT YOU WERE NOT ABLE TO STOP DRINKING ONCE YOU HAD STARTED: 0
HAS A RELATIVE, FRIEND, DOCTOR, OR ANOTHER HEALTH PROFESSIONAL EXPRESSED CONCERN ABOUT YOUR DRINKING OR SUGGESTED YOU CUT DOWN: 0
HOW OFTEN DO YOU HAVE A DRINK CONTAINING ALCOHOL: 1
HAS A RELATIVE, FRIEND, DOCTOR, OR ANOTHER HEALTH PROFESSIONAL EXPRESSED CONCERN ABOUT YOUR DRINKING OR SUGGESTED YOU CUT DOWN: NO
HOW OFTEN DURING THE LAST YEAR HAVE YOU FOUND THAT YOU WERE NOT ABLE TO STOP DRINKING ONCE YOU HAD STARTED: NEVER
HOW OFTEN DURING THE LAST YEAR HAVE YOU BEEN UNABLE TO REMEMBER WHAT HAPPENED THE NIGHT BEFORE BECAUSE YOU HAD BEEN DRINKING: NEVER
AUDIT TOTAL SCORE: 1
HOW OFTEN DURING THE LAST YEAR HAVE YOU NEEDED AN ALCOHOLIC DRINK FIRST THING IN THE MORNING TO GET YOURSELF GOING AFTER A NIGHT OF HEAVY DRINKING: 0
HOW OFTEN DURING THE LAST YEAR HAVE YOU NEEDED AN ALCOHOLIC DRINK FIRST THING IN THE MORNING TO GET YOURSELF GOING AFTER A NIGHT OF HEAVY DRINKING: NEVER
AUDIT TOTAL SCORE: 0

## 2021-10-22 ASSESSMENT — PATIENT HEALTH QUESTIONNAIRE - PHQ9
SUM OF ALL RESPONSES TO PHQ9 QUESTIONS 1 & 2: 2
2. FEELING DOWN, DEPRESSED OR HOPELESS: 1
SUM OF ALL RESPONSES TO PHQ QUESTIONS 1-9: 2
1. LITTLE INTEREST OR PLEASURE IN DOING THINGS: 1
SUM OF ALL RESPONSES TO PHQ QUESTIONS 1-9: 2
SUM OF ALL RESPONSES TO PHQ QUESTIONS 1-9: 2

## 2021-10-25 ENCOUNTER — HOSPITAL ENCOUNTER (OUTPATIENT)
Dept: CT IMAGING | Age: 71
Discharge: HOME | End: 2021-10-27
Payer: MEDICARE

## 2021-10-25 DIAGNOSIS — R07.81 RIB PAIN: ICD-10-CM

## 2021-10-25 DIAGNOSIS — M54.6 THORACIC BACK PAIN, UNSPECIFIED BACK PAIN LATERALITY, UNSPECIFIED CHRONICITY: ICD-10-CM

## 2021-10-25 PROCEDURE — 71250 CT THORAX DX C-: CPT

## 2021-10-26 ENCOUNTER — TELEPHONE (OUTPATIENT)
Dept: PAIN MANAGEMENT | Age: 71
End: 2021-10-26

## 2021-10-28 ENCOUNTER — OFFICE VISIT (OUTPATIENT)
Dept: ORTHOPEDIC SURGERY | Age: 71
End: 2021-10-28
Payer: MEDICARE

## 2021-10-28 DIAGNOSIS — G89.29 CHRONIC BILATERAL THORACIC BACK PAIN: ICD-10-CM

## 2021-10-28 DIAGNOSIS — M54.6 CHRONIC BILATERAL THORACIC BACK PAIN: ICD-10-CM

## 2021-10-28 DIAGNOSIS — M96.1 POST LAMINECTOMY SYNDROME: ICD-10-CM

## 2021-10-28 DIAGNOSIS — M47.817 LUMBOSACRAL SPONDYLOSIS WITHOUT MYELOPATHY: Primary | ICD-10-CM

## 2021-10-28 PROCEDURE — 1036F TOBACCO NON-USER: CPT | Performed by: ORTHOPAEDIC SURGERY

## 2021-10-28 PROCEDURE — G8484 FLU IMMUNIZE NO ADMIN: HCPCS | Performed by: ORTHOPAEDIC SURGERY

## 2021-10-28 PROCEDURE — 3017F COLORECTAL CA SCREEN DOC REV: CPT | Performed by: ORTHOPAEDIC SURGERY

## 2021-10-28 PROCEDURE — 99213 OFFICE O/P EST LOW 20 MIN: CPT | Performed by: ORTHOPAEDIC SURGERY

## 2021-10-28 PROCEDURE — G8427 DOCREV CUR MEDS BY ELIG CLIN: HCPCS | Performed by: ORTHOPAEDIC SURGERY

## 2021-10-28 PROCEDURE — 1123F ACP DISCUSS/DSCN MKR DOCD: CPT | Performed by: ORTHOPAEDIC SURGERY

## 2021-10-28 PROCEDURE — 4040F PNEUMOC VAC/ADMIN/RCVD: CPT | Performed by: ORTHOPAEDIC SURGERY

## 2021-10-28 PROCEDURE — 1090F PRES/ABSN URINE INCON ASSESS: CPT | Performed by: ORTHOPAEDIC SURGERY

## 2021-10-28 PROCEDURE — G8417 CALC BMI ABV UP PARAM F/U: HCPCS | Performed by: ORTHOPAEDIC SURGERY

## 2021-10-28 PROCEDURE — G8400 PT W/DXA NO RESULTS DOC: HCPCS | Performed by: ORTHOPAEDIC SURGERY

## 2021-10-28 NOTE — PROGRESS NOTES
Called patient and reviewed CT chest with patient. She states she continues to have pain in the mid-back. She reports her dose of intrathecal fentanyl has been increased but this is not helping. She cannot take oral pain medications. She saw Dr. Mary Tipton today and plans to have SCS removed. Will have her follow up with Dr. Lala Rubalcava after SCS removed.

## 2021-10-28 NOTE — PROGRESS NOTES
Patient ID: Celena Rutledge is a 80 y.o. female    Chief Compliant:  Chief Complaint   Patient presents with    Follow-up     spinal cord stim removal         Diagnostic imaging:        Assessment and Plan:  1. Lumbosacral spondylosis without myelopathy    2. Chronic bilateral thoracic back pain    3. BMI 38.0-38.9,adult    4. Post laminectomy syndrome      Patient with right sided mid thoracic pain    We will proceed with spinal cord stimulator implant removal    We discussed risks of surgery and recovery process. Risks include infection, bleeding, neurologic injury, systemic and anesthetic complications, no relief of pain, need for future surgery. Follow up 2 weeks after surgery. HPI:  This is a 80 y.o. female who presents to the clinic today for mid back pain. Hx of SCS implant 7/2020    Patient reports her to pain management physicians have recommended removal of the spinal cord stimulator    Patient with right sided mid thoracic pain. She reports she is not using her SCS and she was evaluated at the [facility] who she reports suggested she have it removed    She reports she now has a pain pump which has controlled her lower back pain well. Review of Systems   All other systems reviewed and are negative.       Past History:    Current Outpatient Medications:     omeprazole (PRILOSEC) 40 MG delayed release capsule, TAKE 1 CAPSULE BY MOUTH EVERY MORNING BEFORE BREAKFAST, Disp: 30 capsule, Rfl: 3    potassium chloride (KLOR-CON M) 20 MEQ extended release tablet, TAKE 1 TABLET BY MOUTH TWICE DAILY, Disp: 60 tablet, Rfl: 2    blood glucose test strips (ACCU-CHEK TED PLUS) strip, USE TO TEST BLOOD SUGAR 3 TIMES DAILY AND AS NEEDED FOR SYMPTOMS OF IRREGULAR BLOOD GLUCOSE, Disp: 100 strip, Rfl: 10    tamsulosin (FLOMAX) 0.4 MG capsule, TAKE 1 CAPSULE BY MOUTH NIGHTLY *EMERGENCY REFILL*, Disp: 30 capsule, Rfl: 10    SURE COMFORT INSULIN SYRINGE 31G X 5/16\" 1 ML MISC, USE AS DIRECTED FOUR TIMES A DAY, Disp: 100 each, Rfl: 10    EPINEPHrine (EPIPEN 2-LISA) 0.3 MG/0.3ML SOAJ injection, EpiPen 2-Lisa 0.3 mg/0.3 mL injection, auto-injector, Disp: 1 each, Rfl: 3    XARELTO 20 MG TABS tablet, TAKE 1 TABLET BY MOUTH ONCE DAILY, Disp: 30 tablet, Rfl: 10    alendronate (FOSAMAX) 70 MG tablet, TAKE 1 TABLET BY MOUTH EVERY MONDAY{Q1W2}, Disp: 5 tablet, Rfl: 10    insulin lispro (HUMALOG) 100 UNIT/ML injection vial, INEJCT SUBCUTANEOUSLY UP TO THREE TIMES A DAY BASED ON SLIDING SCALE * MAX 40 UNITS A DAY*, Disp: 10 mL, Rfl: 10    DULoxetine (CYMBALTA) 60 MG extended release capsule, TAKE 1 CAPSULE BY MOUTH DAILY, Disp: 30 capsule, Rfl: 10    losartan (COZAAR) 100 MG tablet, TAKE 1 TABLET BY MOUTH EVERY DAY, Disp: 30 tablet, Rfl: 10    amLODIPine (NORVASC) 5 MG tablet, TAKE (1) TABLET BY MOUTH ONCE DAILY, Disp: 30 tablet, Rfl: 11    atorvastatin (LIPITOR) 40 MG tablet, TAKE (1) TABLET BY MOUTH EVERY DAY, Disp: 30 tablet, Rfl: 11    hydroCHLOROthiazide (HYDRODIURIL) 25 MG tablet, TAKE 1 TABLET BY MOUTH EVERY MORNING, Disp: 30 tablet, Rfl: 11    traZODone (DESYREL) 50 MG tablet, TAKE (1) TABLET BY MOUTH NIGHTLY, Disp: 30 tablet, Rfl: 11    insulin glargine (LANTUS) 100 UNIT/ML injection vial, INJECT 75 UNITS SUBCUTANEOUSLY NIGHTLY, Disp: 3 vial, Rfl: 10    levothyroxine (SYNTHROID) 88 MCG tablet, TAKE (1) TABLET BY MOUTH DAILY, Disp: 30 tablet, Rfl: 10    furosemide (LASIX) 20 MG tablet, Take 1 tablet by mouth daily, Disp: 30 tablet, Rfl: 0    vitamin C (ASCORBIC ACID) 500 MG tablet, Take 500 mg by mouth daily, Disp: , Rfl:     glucose monitoring kit (FREESTYLE) monitoring kit, 1 kit by Does not apply route daily Please provide accuchek meter for patient, not freestyle, Disp: 1 kit, Rfl: 0    calcium carbonate (TUMS) 500 MG chewable tablet, Take 1 tablet by mouth daily as needed for Heartburn, Disp: , Rfl:     SUPER B COMPLEX/C CAPS, Take by mouth, Disp: , Rfl:     Cholecalciferol (VITAMIN D3) 5000 units Transportation Needs:     Lack of Transportation (Medical):  Lack of Transportation (Non-Medical):    Physical Activity:     Days of Exercise per Week:     Minutes of Exercise per Session:    Stress:     Feeling of Stress :    Social Connections:     Frequency of Communication with Friends and Family:     Frequency of Social Gatherings with Friends and Family:     Attends Episcopalian Services:     Active Member of Clubs or Organizations:     Attends Club or Organization Meetings:     Marital Status:    Intimate Partner Violence:     Fear of Current or Ex-Partner:     Emotionally Abused:     Physically Abused:     Sexually Abused:      Past Medical History:   Diagnosis Date    Arthritis     [facility]     Cancer ([facility] 2015    ovarian stage 2    Cerebral artery occlusion with cerebral infarction ([facility] 03/2018    Mini strokes \"Tia's\"    Diabetes mellitus ([facility]     Epigastric pain     GERD (gastroesophageal reflux disease)     History of anesthesia complications 0251'Y    was told after gallbladder surgery to never have anesthesia again \"since it was hard for me to come out of it\"    History of blood transfusion     Hx of blood clots     lung     Hypertension     Right arm pain     Right shoulder pain     Sleep apnea     uses CPAP nightly    Tachycardia     hx of    Thyroid disease     TIA (transient ischemic attack)      Past Surgical History:   Procedure Laterality Date    ANESTHESIA NERVE BLOCK Left 11/7/2019    NERVE BLOCK (DEFINE) - INTERCOSTAL NERVE BLOCK performed by Marbella Herrera MD [facility] Bilateral 5/15/2020    NERVE BLOCK BILATERAL - MBB  L4-5, L5-S1 performed by Marbella Herrera MD at [address] [place] [address] Road Left     ORIF    CHOLECYSTECTOMY      COLONOSCOPY  07/08/2019    5 yr recall.     COLONOSCOPY N/A 7/8/2019    COLONOSCOPY WITH BIOPSY performed by Solomon Gonzalez MD at [address] [place] [address]  HAND SURGERY Right     repair tendon    HC INJECTION PROCEDURE FOR SACROILIAC JOINT Bilateral 8/29/2019    SACROILIAC JOINT INJECTION performed by Vivienne Beaver MD at [address] [facility] [address] Bilateral 10/10/2019    SACROILIAC JOINT INJECTION performed by Vivienne Beaver MD at [address]  2/2014    chris with bso    JOINT REPLACEMENT Right     shoulder    KNEE ARTHROSCOPY Right     NERVE BLOCK Bilateral 08/29/2019    SI joint injection    NERVE BLOCK Bilateral 10/10/2019    SI joint    NERVE BLOCK Bilateral 05/15/2020    Medial branch block L4-5, L5-S1    NERVE BLOCK  06/05/2020    SPINAL CORD STIMULATOR IMPLANT TRIAL (N/A )    OTHER SURGICAL HISTORY Right 11/11/2014    shoulder manipulation    SHOULDER ARTHROSCOPY Right 02/03/15    SHOULDER SURGERY  11/2014    manipulation    SPINAL CORD STIMULATOR SURGERY N/A 6/5/2020    SPINAL CORD STIMULATOR IMPLANT TRIAL performed by Vivienne Beaver MD at [facility] N/A 7/27/2020    SPINAL CORD STIMULATOR IMPLANT WITH THORACIC LAMINOTOMY performed by Tobi Grace MD at [address] [facility] ENDOSCOPY  07/08/2019    UPPER GASTROINTESTINAL ENDOSCOPY N/A 7/8/2019    EGD BIOPSY performed by Malachi Erazo MD at [address] Right 4/30/2015    pt has blood clot & abscess in her right kidney    VASCULAR SURGERY      Alesha filter in & removed 1 year later     Family History   Problem Relation Age of Onset    Elevated Lipids Paternal Grandmother         Physical Exam:  Vitals signs and nursing note reviewed. Constitutional:       Appearance: well-developed. HENT:      Head: Normocephalic and atraumatic. Nose: Nose normal.   Eyes:      Conjunctiva/sclera: Conjunctivae normal.   Neck:      Musculoskeletal: Normal range of motion and neck supple.    Pulmonary:      Effort: Pulmonary effort

## 2021-11-01 ENCOUNTER — TELEPHONE (OUTPATIENT)
Dept: PRIMARY CARE CLINIC | Age: 71
End: 2021-11-01

## 2021-11-01 ENCOUNTER — VIRTUAL VISIT (OUTPATIENT)
Dept: PAIN MANAGEMENT | Age: 71
End: 2021-11-01
Payer: MEDICARE

## 2021-11-01 DIAGNOSIS — G89.29 OTHER CHRONIC PAIN: ICD-10-CM

## 2021-11-01 DIAGNOSIS — M47.817 LUMBOSACRAL SPONDYLOSIS WITHOUT MYELOPATHY: ICD-10-CM

## 2021-11-01 DIAGNOSIS — M54.6 PAIN IN THORACIC SPINE: Primary | ICD-10-CM

## 2021-11-01 DIAGNOSIS — M54.6 THORACIC BACK PAIN, UNSPECIFIED BACK PAIN LATERALITY, UNSPECIFIED CHRONICITY: ICD-10-CM

## 2021-11-01 PROCEDURE — 1123F ACP DISCUSS/DSCN MKR DOCD: CPT | Performed by: PAIN MEDICINE

## 2021-11-01 PROCEDURE — G8428 CUR MEDS NOT DOCUMENT: HCPCS | Performed by: PAIN MEDICINE

## 2021-11-01 PROCEDURE — 4040F PNEUMOC VAC/ADMIN/RCVD: CPT | Performed by: PAIN MEDICINE

## 2021-11-01 PROCEDURE — 1090F PRES/ABSN URINE INCON ASSESS: CPT | Performed by: PAIN MEDICINE

## 2021-11-01 PROCEDURE — 3017F COLORECTAL CA SCREEN DOC REV: CPT | Performed by: PAIN MEDICINE

## 2021-11-01 PROCEDURE — G8400 PT W/DXA NO RESULTS DOC: HCPCS | Performed by: PAIN MEDICINE

## 2021-11-01 PROCEDURE — 99213 OFFICE O/P EST LOW 20 MIN: CPT | Performed by: PAIN MEDICINE

## 2021-11-01 ASSESSMENT — ENCOUNTER SYMPTOMS: BACK PAIN: 1

## 2021-11-01 NOTE — TELEPHONE ENCOUNTER
Provider's assistant called stating Dr. Georgi Beck wanted to speak to Dr. Roberto Shelby directly in regards to arranging POC for patient.    Requesting call back at Dr. Georgi Beck personal life:  [phone]

## 2021-11-01 NOTE — PROGRESS NOTES
HPI:     Back Pain  This is a chronic problem. The current episode started more than 1 year ago. The problem occurs constantly. The problem has been gradually worsening since onset. The pain is present in the lumbar spine and thoracic spine. The quality of the pain is described as aching. The pain is moderate. The pain is the same all the time. The symptoms are aggravated by position. Stiffness is present all day. Chronic low back and mid back pain. Multiple different treatment modalities. Continues to have lingering pain. Imaging with degenerative changes. She has seen [facility] [employer]. She does have a spinal cord stimulator as well as intrathecal pump. Something could be rib related issue. She has had intercostal nerve blocks in the past with benefit. CT chest without any acute process. Could not tolerate oxycodone. Patient denies any new neurological symptoms. Nobowel or bladder incontinence, no weakness, and no falling. Review of OARRS does not show any aberrant prescription behavior. Medication is helping the patient stay active. Patient denies any side effects and reports adequate analgesia. No sign of misuse/abuse.     Past Medical History:   Diagnosis Date    Arthritis     Bowel obstruction ([address]     Cancer ([address] 2015    ovarian stage 2    Cerebral artery occlusion with cerebral infarction ([address] 03/2018    Mini strokes \"Tia's\"    Diabetes mellitus ([address]     Epigastric pain     GERD (gastroesophageal reflux disease)     History of anesthesia complications 9724'T    was told after gallbladder surgery to never have anesthesia again \"since it was hard for me to come out of it\"    History of blood transfusion     Hx of blood clots     lung     Hypertension     Right arm pain     Right shoulder pain     Sleep apnea     uses CPAP nightly    Tachycardia     hx of    Thyroid disease     TIA (transient ischemic attack)        Past Surgical History:   Procedure Laterality Date    ANESTHESIA NERVE BLOCK Left 11/7/2019    NERVE BLOCK (DEFINE) - INTERCOSTAL NERVE BLOCK performed by Elijah Hester MD at [facility] Bilateral 5/15/2020    NERVE BLOCK BILATERAL - MBB  L4-5, L5-S1 performed by Elijah Hester MD at [address] Left     ORIF    CHOLECYSTECTOMY      COLONOSCOPY  07/08/2019    5 yr recall.     COLONOSCOPY N/A 7/8/2019    COLONOSCOPY WITH BIOPSY performed by Sharmila Andujar MD at [address] Right     repair tendon    [facility]. INJECTION PROCEDURE FOR SACROILIAC JOINT Bilateral 8/29/2019    SACROILIAC JOINT INJECTION performed by Elijah Hester MD at [facility] Bilateral 10/10/2019    SACROILIAC JOINT INJECTION performed by Elijah Hester MD at [address]  2/2014    [facility] with bso    JOINT REPLACEMENT Right     shoulder    KNEE ARTHROSCOPY Right     NERVE BLOCK Bilateral 08/29/2019    SI joint injection    NERVE BLOCK Bilateral 10/10/2019    SI joint    NERVE BLOCK Bilateral 05/15/2020    Medial branch block L4-5, L5-S1    NERVE BLOCK  06/05/2020    SPINAL CORD STIMULATOR IMPLANT TRIAL (N/A )    OTHER SURGICAL HISTORY Right 11/11/2014    shoulder manipulation    SHOULDER ARTHROSCOPY Right 02/03/15    SHOULDER SURGERY  11/2014    manipulation    SPINAL CORD STIMULATOR SURGERY N/A 6/5/2020    SPINAL CORD STIMULATOR IMPLANT TRIAL performed by Elijah Hester MD at [facility] N/A 7/27/2020    SPINAL CORD STIMULATOR IMPLANT WITH THORACIC LAMINOTOMY performed by Agustín Aviles MD at [address] ENDOSCOPY  07/08/2019    UPPER GASTROINTESTINAL ENDOSCOPY N/A 7/8/2019    EGD BIOPSY performed by Sharmila Andujar MD at [facility] Right 4/30/2015    pt has blood clot & abscess in her right kidney    VASCULAR SURGERY      A[place]ha filter in & removed 1 year later       Allergies   Allergen Reactions    Aspirin Anaphylaxis     Only thing she can take is tylenol    Benadryl [Diphenhydramine] Other (See Comments)     Dystonic movement    Ibuprofen Anaphylaxis    Lidocaine Anaphylaxis     CAN NOT TOLERATE IV    Penicillins Anaphylaxis    Hydrocodone-Acetaminophen Other (See Comments)     Unsure of allergy    Tramadol Other (See Comments)     Unsure of allergy    Morphine Nausea And Vomiting     Unsure of allergy           Family History   Problem Relation Age of Onset    Elevated Lipids Paternal Grandmother        Social History     Socioeconomic History    Marital status:      Spouse name: Not on file    Number of children: Not on file    Years of education: Not on file    Highest education level: Not on file   Occupational History    Not on file   Tobacco Use    Smoking status: Never Smoker    Smokeless tobacco: Never Used   Vaping Use    Vaping Use: Never used   Substance and Sexual Activity    Alcohol use: Yes     Comment: rare    Drug use: No    Sexual activity: Yes   Other Topics Concern    Not on file   Social History Narrative    Not on file     Social Determinants of Health     Financial Resource Strain: Low Risk     Difficulty of Paying Living Expenses: Not hard at all   Food Insecurity: No Food Insecurity    Worried About Running Out of Food in the Last Year: Never true    Ricardo of Food in the Last Year: Never true   Transportation Needs:     Lack of Transportation (Medical):      Lack of Transportation (Non-Medical):    Physical Activity:     Days of Exercise per Week:     Minutes of Exercise per Session:    Stress:     Feeling of Stress :    Social Connections:     Frequency of Communication with Friends and Family:     Frequency of Social Gatherings with Friends and Family:     Attends Bahai Services:     Active Member of Clubs or Organizations:     Attends Club or Organization Meetings:     Marital Status:    Intimate Partner Violence:     Fear of Current or Ex-Partner:     Emotionally Abused:     Physically Abused:     Sexually Abused:        Review of Systems:  Review of Systems   Musculoskeletal: Positive for back pain. Physical Exam:      Physical Exam  Constitutional:       Appearance: Normal appearance. Pulmonary:      Effort: Pulmonary effort is normal.   Neurological:      Mental Status: She is alert. Psychiatric:         Attention and Perception: Attention and perception normal.         Mood and Affect: Mood and affect normal.         Record/Diagnostics Review:    As above, I did review the imaging      Assessment:  1. Pain in thoracic spine    2. Thoracic back pain, unspecified back pain laterality, unspecified chronicity    3. Other chronic pain    4. Lumbosacral spondylosis without myelopathy        Treatment Plan:  DISCUSSION: Treatment options discussed with patient and all questions answered to patient's satisfaction. OARRS Review: Reviewed and acceptable for medications prescribed. TREATMENT OPTIONS:     Has SCS removal planned. Continue adjusting intrathecal pump. Could not tolerate oral opioids. Consider psychology referral for coping skill as well, she will discuss with her PCP. Jaz Rosales M.D. I have reviewed the chief complaint and history of present illness (including ROS and PFSH) and vital documentation by my staff and I agree with their documentation and have added where applicable. Melvin De is a 80 y.o. female evaluated via telephone on 11/1/2021. Consent:  She and/or health care decision maker is aware that that she may receive a bill for this telephone service, depending on her insurance coverage, and has provided verbal consent to proceed: Yes      Documentation:  I communicated with the patient and/or health care decision maker about chronic pain.    Details of this discussion including any medical advice provided: as above      I affirm this is a Patient Initiated Episode with a Patient who has not had a related appointment within my department in the past 7 days or scheduled within the next 24 hours. Patient identification was verified at the start of the visit: No    Total Time: minutes: 11-20 minutes    The visit was conducted pursuant to the emergency declaration under the [address] [place] [address], [address] [place] [address] and the [employer] General Act. Patient identification was verified, and a caregiver was present when appropriate. The patient was located in a state where the provider was credentialed to provide care.     Note: not billable if this call serves to triage the patient into an appointment for the relevant concern      Gwendloyn Castleman, MD

## 2021-11-02 ENCOUNTER — OFFICE VISIT (OUTPATIENT)
Dept: PRIMARY CARE CLINIC | Age: 71
End: 2021-11-02
Payer: MEDICARE

## 2021-11-02 ENCOUNTER — HOSPITAL ENCOUNTER (OUTPATIENT)
Dept: ULTRASOUND IMAGING | Age: 71
Discharge: HOME | End: 2021-11-04
Payer: MEDICARE

## 2021-11-02 ENCOUNTER — HOSPITAL ENCOUNTER (OUTPATIENT)
Age: 71
Discharge: HOME | End: 2021-11-02
Payer: MEDICARE

## 2021-11-02 ENCOUNTER — HOSPITAL ENCOUNTER (OUTPATIENT)
Dept: CT IMAGING | Age: 71
Discharge: HOME | End: 2021-11-04
Payer: MEDICARE

## 2021-11-02 VITALS
BODY MASS INDEX: 37.73 KG/M2 | HEIGHT: 64 IN | WEIGHT: 221 LBS | OXYGEN SATURATION: 96 % | RESPIRATION RATE: 16 BRPM | DIASTOLIC BLOOD PRESSURE: 92 MMHG | HEART RATE: 95 BPM | SYSTOLIC BLOOD PRESSURE: 140 MMHG

## 2021-11-02 DIAGNOSIS — M46.1 SACROILIITIS, NOT ELSEWHERE CLASSIFIED (HCC): ICD-10-CM

## 2021-11-02 DIAGNOSIS — M79.89 LEG SWELLING: ICD-10-CM

## 2021-11-02 DIAGNOSIS — E11.40 TYPE 2 DIABETES MELLITUS WITH DIABETIC NEUROPATHY, WITH LONG-TERM CURRENT USE OF INSULIN (HCC): ICD-10-CM

## 2021-11-02 DIAGNOSIS — D50.9 IRON DEFICIENCY ANEMIA, UNSPECIFIED IRON DEFICIENCY ANEMIA TYPE: ICD-10-CM

## 2021-11-02 DIAGNOSIS — Z23 NEED FOR INFLUENZA VACCINATION: ICD-10-CM

## 2021-11-02 DIAGNOSIS — R10.84 GENERALIZED ABDOMINAL PAIN: ICD-10-CM

## 2021-11-02 DIAGNOSIS — G47.33 OSA (OBSTRUCTIVE SLEEP APNEA): ICD-10-CM

## 2021-11-02 DIAGNOSIS — Z79.4 TYPE 2 DIABETES MELLITUS WITH DIABETIC NEUROPATHY, WITH LONG-TERM CURRENT USE OF INSULIN (HCC): ICD-10-CM

## 2021-11-02 DIAGNOSIS — Z85.43 HX OF OVARIAN CANCER: ICD-10-CM

## 2021-11-02 DIAGNOSIS — E66.01 SEVERE OBESITY (BMI 35.0-35.9 WITH COMORBIDITY) (HCC): ICD-10-CM

## 2021-11-02 DIAGNOSIS — Z00.00 ROUTINE GENERAL MEDICAL EXAMINATION AT A HEALTH CARE FACILITY: Primary | ICD-10-CM

## 2021-11-02 PROBLEM — E11.9 TYPE 2 DIABETES MELLITUS (HCC): Status: ACTIVE | Noted: 2021-11-02

## 2021-11-02 LAB
ABSOLUTE EOS #: 0.33 K/UL (ref 0–0.44)
ABSOLUTE IMMATURE GRANULOCYTE: 0 K/UL (ref 0–0.3)
ABSOLUTE LYMPH #: 2.77 K/UL (ref 1.1–3.7)
ABSOLUTE MONO #: 0.33 K/UL (ref 0.1–1.2)
BASOPHILS # BLD: 1 % (ref 0–2)
BASOPHILS ABSOLUTE: 0.07 K/UL (ref 0–0.2)
CREAT SERPL-MCNC: 0.84 MG/DL (ref 0.5–0.9)
DIFFERENTIAL TYPE: ABNORMAL
EOSINOPHILS RELATIVE PERCENT: 5 % (ref 1–4)
FERRITIN: 8 UG/L (ref 13–150)
GFR AFRICAN AMERICAN: >60 ML/MIN
GFR NON-AFRICAN AMERICAN: >60 ML/MIN
GFR SERPL CREATININE-BSD FRML MDRD: NORMAL ML/MIN/{1.73_M2}
GFR SERPL CREATININE-BSD FRML MDRD: NORMAL ML/MIN/{1.73_M2}
HBA1C MFR BLD: 10.2 %
HCT VFR BLD CALC: 31.3 % (ref 36.3–47.1)
HEMOGLOBIN: 8.6 G/DL (ref 11.9–15.1)
IMMATURE GRANULOCYTES: 0 %
IRON SATURATION: 11 % (ref 20–55)
IRON: 42 UG/DL (ref 37–145)
LYMPHOCYTES # BLD: 42 % (ref 24–43)
MCH RBC QN AUTO: 21 PG (ref 25.2–33.5)
MCHC RBC AUTO-ENTMCNC: 27.5 G/DL (ref 28.4–34.8)
MCV RBC AUTO: 76.3 FL (ref 82.6–102.9)
MONOCYTES # BLD: 5 % (ref 3–12)
MORPHOLOGY: ABNORMAL
MORPHOLOGY: ABNORMAL
NRBC AUTOMATED: 0 PER 100 WBC
PDW BLD-RTO: 17.2 % (ref 11.8–14.4)
PLATELET # BLD: 265 K/UL (ref 138–453)
PLATELET ESTIMATE: ABNORMAL
PMV BLD AUTO: 12.1 FL (ref 8.1–13.5)
RBC # BLD: 4.1 M/UL (ref 3.95–5.11)
RBC # BLD: ABNORMAL 10*6/UL
SEG NEUTROPHILS: 47 % (ref 36–65)
SEGMENTED NEUTROPHILS ABSOLUTE COUNT: 3.1 K/UL (ref 1.5–8.1)
TOTAL IRON BINDING CAPACITY: 397 UG/DL (ref 250–450)
UNSATURATED IRON BINDING CAPACITY: 355 UG/DL (ref 112–347)
WBC # BLD: 6.6 K/UL (ref 3.5–11.3)
WBC # BLD: ABNORMAL 10*3/UL

## 2021-11-02 PROCEDURE — 1123F ACP DISCUSS/DSCN MKR DOCD: CPT | Performed by: FAMILY MEDICINE

## 2021-11-02 PROCEDURE — 83540 ASSAY OF IRON: CPT

## 2021-11-02 PROCEDURE — 36415 COLL VENOUS BLD VENIPUNCTURE: CPT

## 2021-11-02 PROCEDURE — 2580000003 HC RX 258: Performed by: FAMILY MEDICINE

## 2021-11-02 PROCEDURE — G0008 ADMIN INFLUENZA VIRUS VAC: HCPCS | Performed by: FAMILY MEDICINE

## 2021-11-02 PROCEDURE — 4040F PNEUMOC VAC/ADMIN/RCVD: CPT | Performed by: FAMILY MEDICINE

## 2021-11-02 PROCEDURE — 74177 CT ABD & PELVIS W/CONTRAST: CPT

## 2021-11-02 PROCEDURE — 93971 EXTREMITY STUDY: CPT

## 2021-11-02 PROCEDURE — 85025 COMPLETE CBC W/AUTO DIFF WBC: CPT

## 2021-11-02 PROCEDURE — 3046F HEMOGLOBIN A1C LEVEL >9.0%: CPT | Performed by: FAMILY MEDICINE

## 2021-11-02 PROCEDURE — 90694 VACC AIIV4 NO PRSRV 0.5ML IM: CPT | Performed by: FAMILY MEDICINE

## 2021-11-02 PROCEDURE — 82565 ASSAY OF CREATININE: CPT

## 2021-11-02 PROCEDURE — G8484 FLU IMMUNIZE NO ADMIN: HCPCS | Performed by: FAMILY MEDICINE

## 2021-11-02 PROCEDURE — 3017F COLORECTAL CA SCREEN DOC REV: CPT | Performed by: FAMILY MEDICINE

## 2021-11-02 PROCEDURE — 83550 IRON BINDING TEST: CPT

## 2021-11-02 PROCEDURE — G0439 PPPS, SUBSEQ VISIT: HCPCS | Performed by: FAMILY MEDICINE

## 2021-11-02 PROCEDURE — 83036 HEMOGLOBIN GLYCOSYLATED A1C: CPT | Performed by: FAMILY MEDICINE

## 2021-11-02 PROCEDURE — 82728 ASSAY OF FERRITIN: CPT

## 2021-11-02 PROCEDURE — 6360000004 HC RX CONTRAST MEDICATION: Performed by: FAMILY MEDICINE

## 2021-11-02 RX ORDER — ONDANSETRON 4 MG/1
4 TABLET, ORALLY DISINTEGRATING ORAL 3 TIMES DAILY PRN
Qty: 21 TABLET | Refills: 0 | Status: SHIPPED | OUTPATIENT
Start: 2021-11-02 | End: 2022-02-14

## 2021-11-02 RX ORDER — SODIUM CHLORIDE 0.9 % (FLUSH) 0.9 %
10 SYRINGE (ML) INJECTION PRN
Status: DISCONTINUED | OUTPATIENT
Start: 2021-11-02 | End: 2021-11-05 | Stop reason: HOSPADM

## 2021-11-02 RX ORDER — 0.9 % SODIUM CHLORIDE 0.9 %
80 INTRAVENOUS SOLUTION INTRAVENOUS ONCE
Status: COMPLETED | OUTPATIENT
Start: 2021-11-02 | End: 2021-11-02

## 2021-11-02 RX ADMIN — IOHEXOL 50 ML: 240 INJECTION, SOLUTION INTRATHECAL; INTRAVASCULAR; INTRAVENOUS; ORAL at 14:42

## 2021-11-02 RX ADMIN — SODIUM CHLORIDE, PRESERVATIVE FREE 10 ML: 5 INJECTION INTRAVENOUS at 14:42

## 2021-11-02 RX ADMIN — IOPAMIDOL 75 ML: 755 INJECTION, SOLUTION INTRAVENOUS at 14:42

## 2021-11-02 RX ADMIN — SODIUM CHLORIDE 80 ML: 9 INJECTION, SOLUTION INTRAVENOUS at 14:41

## 2021-11-02 ASSESSMENT — COLUMBIA-SUICIDE SEVERITY RATING SCALE - C-SSRS
2. HAVE YOU ACTUALLY HAD ANY THOUGHTS OF KILLING YOURSELF?: NO
1. WITHIN THE PAST MONTH, HAVE YOU WISHED YOU WERE DEAD OR WISHED YOU COULD GO TO SLEEP AND NOT WAKE UP?: YES
6. HAVE YOU EVER DONE ANYTHING, STARTED TO DO ANYTHING, OR PREPARED TO DO ANYTHING TO END YOUR LIFE?: NO

## 2021-11-02 ASSESSMENT — PATIENT HEALTH QUESTIONNAIRE - PHQ9
3. TROUBLE FALLING OR STAYING ASLEEP: 3
6. FEELING BAD ABOUT YOURSELF - OR THAT YOU ARE A FAILURE OR HAVE LET YOURSELF OR YOUR FAMILY DOWN: 3
4. FEELING TIRED OR HAVING LITTLE ENERGY: 3
8. MOVING OR SPEAKING SO SLOWLY THAT OTHER PEOPLE COULD HAVE NOTICED. OR THE OPPOSITE, BEING SO FIGETY OR RESTLESS THAT YOU HAVE BEEN MOVING AROUND A LOT MORE THAN USUAL: 0
SUM OF ALL RESPONSES TO PHQ QUESTIONS 1-9: 18
SUM OF ALL RESPONSES TO PHQ QUESTIONS 1-9: 18
5. POOR APPETITE OR OVEREATING: 3
1. LITTLE INTEREST OR PLEASURE IN DOING THINGS: 3
7. TROUBLE CONCENTRATING ON THINGS, SUCH AS READING THE NEWSPAPER OR WATCHING TELEVISION: 0
9. THOUGHTS THAT YOU WOULD BE BETTER OFF DEAD, OR OF HURTING YOURSELF: 0
10. IF YOU CHECKED OFF ANY PROBLEMS, HOW DIFFICULT HAVE THESE PROBLEMS MADE IT FOR YOU TO DO YOUR WORK, TAKE CARE OF THINGS AT HOME, OR GET ALONG WITH OTHER PEOPLE: 3
SUM OF ALL RESPONSES TO PHQ9 QUESTIONS 1 & 2: 6
SUM OF ALL RESPONSES TO PHQ QUESTIONS 1-9: 18
2. FEELING DOWN, DEPRESSED OR HOPELESS: 3

## 2021-11-02 NOTE — PROGRESS NOTES
Medicare Annual Wellness Visit  Name: Chad Sharp Date: 2021   MRN: [id number] Sex: Female   Age: 80 y.o. Ethnicity: Non- / Non    : 1950 Race: White (non-)      Melvin De is here for Follow-Up from Hospital (dx UTI) and Medicare AWV (c/o back pain and not sleeping well)    Screenings for behavioral, psychosocial and functional/safety risks, and cognitive dysfunction are all negative except as indicated below. These results, as well as other patient data from the [address] form, are documented in Flowsheets linked to this Encounter. Has been having having abdominal pain throughout abdomen for past month but is much worse today. And upper back pain. Has slight constipation. , no fevers or chills   Pain upper thoracic area as well, lower back is fine. She went to ER less than month ago and was treated for possible UTI. She also notes R leg edema as well more recently. Hx of PE has been taking her xarelto. Urine culture came back negative. She had imaging of back done by Dr. Kenia Benítez a well. Needs refill on [employer] cpap supplies. Allergies   Allergen Reactions    Aspirin Anaphylaxis     Only thing she can take is tylenol    Benadryl [Diphenhydramine] Other (See Comments)     Dystonic movement    Ibuprofen Anaphylaxis    Lidocaine Anaphylaxis     CAN NOT TOLERATE IV    Penicillins Anaphylaxis    Hydrocodone-Acetaminophen Other (See Comments)     Unsure of allergy    Tramadol Other (See Comments)     Unsure of allergy         Prior to Visit Medications    Medication Sig Taking?  Authorizing Provider   ondansetron (ZOFRAN-ODT) 4 MG disintegrating tablet Take 1 tablet by mouth 3 times daily as needed for Nausea or Vomiting Yes Simi Medhkour, DO   omeprazole (PRILOSEC) 40 MG delayed release capsule TAKE 1 CAPSULE BY MOUTH EVERY MORNING BEFORE BREAKFAST Yes Simi Medhkour, DO   potassium chloride (KLOR-CON M) 20 MEQ extended release tablet TAKE 1 TABLET BY MOUTH TWICE DAILY Yes Simi Izabellaur, DO   blood glucose test strips (ACCU-CHEK TED PLUS) strip USE TO TEST BLOOD SUGAR 3 TIMES DAILY AND AS NEEDED FOR SYMPTOMS OF IRREGULAR BLOOD GLUCOSE Yes Simi Yannickhnathan, DO   tamsulosin (FLOMAX) 0.4 MG capsule TAKE 1 CAPSULE BY MOUTH NIGHTLY EMERGENCY REFILL Yes Simi Zavala, DO   SURE COMFORT INSULIN SYRINGE 31G X 5/16\" 1 ML MISC USE AS DIRECTED FOUR TIMES A DAY Yes Simi Lucas, DO   EPINEPHrine (EPIPEN 2-FERNANDO) 0.3 MG/0.3ML SOAJ injection EpiPen 2-Fernando 0.3 mg/0.3 mL injection, auto-injector Yes Simi Lucas, DO   XARELTO 20 MG TABS tablet TAKE 1 TABLET BY MOUTH ONCE DAILY Yes Simi Lucas, DO   alendronate (FOSAMAX) 70 MG tablet TAKE 1 TABLET BY MOUTH EVERY MONDAY Yes Simi Zavala, DO   insulin lispro (HUMALOG) 100 UNIT/ML injection vial INEJCT SUBCUTANEOUSLY UP TO THREE TIMES A DAY BASED ON SLIDING SCALE  MAX 40 UNITS A DAY Yes Simi Lucas, DO   DULoxetine (CYMBALTA) 60 MG extended release capsule TAKE 1 CAPSULE BY MOUTH DAILY Yes Simi Lucas, DO   losartan (COZAAR) 100 MG tablet TAKE 1 TABLET BY MOUTH EVERY DAY Yes Simi Lucas, DO   amLODIPine (NORVASC) 5 MG tablet TAKE (1) TABLET BY MOUTH ONCE DAILY Yes Simi Lucas, DO   hydroCHLOROthiazide (HYDRODIURIL) 25 MG tablet TAKE 1 TABLET BY MOUTH EVERY MORNING Yes Simi Yannickhnathan, DO   insulin glargine (LANTUS) 100 UNIT/ML injection vial INJECT 75 UNITS SUBCUTANEOUSLY NIGHTLY Yes Simi Lucas, DO   levothyroxine (SYNTHROID) 88 MCG tablet TAKE (1) TABLET BY MOUTH DAILY Yes Simi Yannickhnathan, DO   vitamin C (ASCORBIC ACID) 500 MG tablet Take 500 mg by mouth daily Yes Historical Provider, MD   glucose monitoring kit (FREESTYLE) monitoring kit 1 kit by Does not apply route daily Please provide accuchek meter for patient, not freestyle Yes Simisho Zavlaa, DO   calcium carbonate (TUMS) 500 MG chewable tablet Take 1 tablet by mouth daily as needed for Heartburn Yes Historical Provider, MD SUPER B COMPLEX/C CAPS Take by mouth Yes Historical Provider, MD   Cholecalciferol (VITAMIN D3) 5000 units TABS Take by mouth Yes Historical Provider, MD   ferrous sulfate 325 (65 Fe) MG tablet Take 325 mg by mouth daily (with breakfast) Yes Historical Provider, MD   fexofenadine (ALLEGRA) 60 MG tablet Take 60 mg by mouth daily Yes Historical Provider, MD   magnesium oxide (MAG-OX) 400 MG tablet Take 400 mg by mouth daily Yes Historical Provider, MD   Melatonin 10 MG TABS Take 20 mg by mouth daily as needed  Yes Historical Provider, MD   traZODone (DESYREL) 100 MG tablet Take 1 tablet by mouth nightly as needed for Sleep  Shara Plane, DO   traZODone (DESYREL) 100 MG tablet Take 1 tablet by mouth nightly as needed for Sleep  Shara Plane, DO   potassium chloride (KLOR-CON M) 20 MEQ extended release tablet TAKE 1 TABLET BY MOUTH TWICE DAILY  LUPE Velasquez - CNP   omeprazole (PRILOSEC) 40 MG delayed release capsule TAKE 1 CAPSULE BY MOUTH EVERY MORNING BEFORE BREAKFAST  Simi Medhkour, DO   atorvastatin (LIPITOR) 40 MG tablet TAKE (1) TABLET BY MOUTH EVERY DAY  Simi Medhkour, DO   gabapentin (NEURONTIN) 600 MG tablet TAKE 1 TABLET BY MOUTH FOUR TIMES DAILY  Simi Medhkour, DO   oxyCODONE-acetaminophen (PERCOCET) 5-325 MG per tablet Take 1 tablet by mouth every 4 hours as needed for Pain.    Historical Provider, MD   furosemide (LASIX) 20 MG tablet Take 1 tablet by mouth daily  Angel Braden APRN - CNP         Past Medical History:   Diagnosis Date    Arthritis     [facility]     Cancer ([facility] 2015    ovarian stage 2    Cerebral artery occlusion with cerebral infarction ([facility] 03/2018    Mini strokes \"Tia's\"    Diabetes mellitus ([facility]     Epigastric pain     GERD (gastroesophageal reflux disease)     History of anesthesia complications 2555'A    was told after gallbladder surgery to never have anesthesia again \"since it was hard for me to come out of it\"    History of blood transfusion     Hx of blood clots     lung     Hyperlipidemia     Hypertension     Right arm pain     Right shoulder pain     Sleep apnea     uses CPAP nightly    Tachycardia     hx of    Thyroid disease     TIA (transient ischemic attack)        Past Surgical History:   Procedure Laterality Date    ANESTHESIA NERVE BLOCK Left 11/7/2019    NERVE BLOCK (DEFINE) - INTERCOSTAL NERVE BLOCK performed by More Chauhan MD at [facility] Bilateral 5/15/2020    NERVE BLOCK BILATERAL - MBB  L4-5, L5-S1 performed by More Chauhan MD at [address] Left     ORIF    CHOLECYSTECTOMY      COLONOSCOPY  07/08/2019    5 yr recall.     COLONOSCOPY N/A 7/8/2019    COLONOSCOPY WITH BIOPSY performed by Mana Webster MD at [address] Right     repair [facility] [place]. INJECTION PROCEDURE FOR SACROILIAC JOINT Bilateral 8/29/2019    SACROILIAC JOINT INJECTION performed by More Chauhan MD at [facility] Bilateral 10/10/2019    SACROILIAC JOINT INJECTION performed by More Chauhan MD at [address]  2/2014    chris with bso    JOINT REPLACEMENT Right     shoulder    KNEE ARTHROSCOPY Right     NERVE BLOCK Bilateral 08/29/2019    SI joint injection    NERVE BLOCK Bilateral 10/10/2019    SI joint    NERVE BLOCK Bilateral 05/15/2020    Medial branch block L4-5, L5-S1    NERVE BLOCK  06/05/2020    SPINAL CORD STIMULATOR IMPLANT TRIAL (N/A )    OTHER SURGICAL HISTORY Right 11/11/2014    shoulder manipulation    OTHER SURGICAL HISTORY  04/2021    pain pump placed, fentanyl in pain pump    SHOULDER ARTHROSCOPY Right 02/03/15    SHOULDER SURGERY  11/2014    manipulation    SPINAL CORD STIMULATOR SURGERY N/A 6/5/2020    SPINAL CORD STIMULATOR IMPLANT TRIAL performed by [facility]han MD at [facility] 7/27/2020    SPINAL CORD STIMULATOR IMPLANT WITH THORACIC LAMINOTOMY performed by Azucena Sher MD at [facility] ENDOSCOPY  07/08/2019    UPPER GASTROINTESTINAL ENDOSCOPY N/A 7/8/2019    EGD BIOPSY performed by Sharita Montenegro MD at [facility] Right 4/30/2015    pt has blood clot & abscess in her right kidney    VASCULAR SURGERY      [place] filter in & removed 1 year later         Family History   Problem Relation Age of Onset    Elevated Lipids Paternal Grandmother        CareTeam (Including outside providers/suppliers regularly involved in providing care):   Patient Care Team:  Derrick Orozco DO as PCP - General (Family Medicine)  Derrick Orozco DO as PCP - [facility]  Sharita Montenegro MD as Consulting Physician (Gastroenterology)    Wt Readings from Last 3 Encounters:   12/06/21 221 lb 9.6 oz (100.5 kg)   11/18/21 219 lb 3.2 oz (99.4 kg)   11/09/21 215 lb (97.5 kg)     Vitals:    11/02/21 1100 11/02/21 1115   BP: (!) 150/94 (!) 140/92   Pulse: 95    Resp: 16    SpO2: 96%    Weight: 221 lb (100.2 kg)    Height: 5' 4\" (1.626 m)      Body mass index is 37.93 kg/m². Based upon direct observation of the patient, evaluation of cognition reveals recent and remote memory intact.     General Appearance: alert and oriented to person, place and time, well developed and well- nourished, in no acute distress  Skin: warm and dry, no rash or erythema  Head: normocephalic and atraumatic  Eyes: pupils equal, round, and reactive to light, extraocular eye movements intact, conjunctivae normal  Neck: supple   Pulmonary/Chest: clear to auscultation bilaterally- no wheezes, rales or rhonchi, normal air movement, no respiratory distress  Cardiovascular: normal rate, regular rhythm, normal S1 and S2, no murmurs  Abdomen: soft, very ttp generalized  Extremities:R leg edema  Neurologic: speech normal    Patient's complete Health Risk Assessment and screening values have been reviewed and are found in Flowsheets. The following problems were reviewed today and where indicated follow up appointments were made and/or referrals ordered. Positive Risk Factor Screenings with Interventions:   ·    Depression Interventions:pt notes having upper back pain and abdominal pain. Denies any suicidal ideation        General Health and ACP:  General  In general, how would you say your health is?: (!) Poor  In the past 7 days, have you experienced any of the following? New or Increased Pain, New or Increased Fatigue, Loneliness, Social Isolation, Stress or Anger?: (!) New or Increased Pain, New or Increased Fatigue, Stress  Do you get the social and emotional support that you need?: Yes  Do you have a Living Will?: (!) No  Advance Directives     Power of  Living Will ACP-Advance Directive ACP-Power of     Not on File Not on File Not on File Not on File      General Health Risk Interventions:  Back pain for some time, had MRI done recently and following with Dr. Newell Shone, states will be getting spinal stimulator out later this month by Dr. Ramos Blount. She also had low hgb at the ER in October- 8.2, denies any blood in stool or hematuria or any bleeding she is aware of. She did have EGD and colonoscopy in 2019 by Dr. Richard Mireles. She has been having generalized abdominal pain that has worsened, she was very ttp today during physical exam.  Also with R leg swelling as well.       Health Habits/Nutrition:  Health Habits/Nutrition  Do you exercise for at least 20 minutes 2-3 times per week?: (!) No  Have you lost any weight without trying in the past 3 months?: No  Do you eat only one meal per day?: No  Have you seen the dentist within the past year?: Yes  Body mass index: (!) 37.93  Health Habits/Nutrition Interventions:     Personalized Preventive Plan   Current Health Maintenance Status  Immunization History   Administered Date(s) Administered    COVID-19, [place] , Primary or Immunocompromised, PF, 100mcg/0.5mL 02/15/2021, 03/15/2021    Influenza, Quadv, adjuvanted, 65 yrs +, IM, PF (Fluad) 09/10/2020, 11/02/2021    Influenza, Triv, inactivated, subunit, adjuvanted, IM (Fluad 65 yrs and older) 09/09/2019    Zoster Recombinant (Shingrix) 10/23/2020        Health Maintenance   Topic Date Due    Hepatitis C screen  Never done    DTaP/Tdap/Td vaccine (1 - Tdap) Never done    DEXA (modify frequency per FRAX score)  Never done    Shingles Vaccine (2 of 2) 12/18/2020    COVID-19 Vaccine (3 - Booster for Moderna series) 09/15/2021    Lipid screen  09/29/2021    Pneumococcal 65+ years Vaccine (1 of 1 - PPSV23) 04/17/2023 (Originally 9/14/2016)    Diabetic microalbuminuria test  01/25/2022    A1C test (Diabetic or Prediabetic)  02/02/2022    Diabetic foot exam  02/25/2022    Breast cancer screen  10/21/2022    Annual Wellness Visit (AWV)  11/03/2022    Diabetic retinal exam  11/09/2022    Potassium monitoring  11/09/2022    Creatinine monitoring  11/09/2022    Colon cancer screen colonoscopy  07/08/2029    Flu vaccine  Completed    Hepatitis A vaccine  Aged Out    Hib vaccine  Aged Out    Meningococcal (ACWY) vaccine  Aged Out     Recommendations for [place] Due: see orders and patient instructions/AVS.  . Recommended screening schedule for the next 5-10 years is provided to the patient in written form: see Patient Instructions/AVS.    Ladi Marx was seen today for follow-up from hospital and medicare awv. Diagnoses and all orders for this visit:    Routine general medical examination at a health care facility    Need for influenza vaccination  -     INFLUENZA, QUADV, ADJUVANTED, 72 YRS =, IM, PF, PREFILL SYR, 0.5ML (FLUAD)    Iron deficiency anemia, unspecified iron deficiency anemia type        -  Recommend CT scan stat given her severe abdominal pain and also her anemia/            hx of cancer.  Labs ordered for follow up and I recommend she call  Millie Sanchez to get in for follow up. -     CBC With Auto Differential; Future  -     Cancel: Iron And TIBC; Future  -     Cancel: Ferritin; Future  -     CT ABDOMEN PELVIS W IV CONTRAST Additional Contrast? Radiologist Recommendation; Future    Sacroiliitis, not elsewhere classified ([address]    Type 2 diabetes mellitus with diabetic neuropathy, with long-term current use of insulin ([facility])  - a1c increased, discussed with pt over phone about medication adjustments. -     POCT glycosylated hemoglobin (Hb A1C)    Generalized abdominal pain  -     CT ABDOMEN PELVIS W IV CONTRAST Additional Contrast? Radiologist Recommendation; Future    Leg swelling  -     CT ABDOMEN PELVIS W IV CONTRAST Additional Contrast? Radiologist Recommendation; Future  -     Cancel: VL DUP LOWER EXTREMITY VENOUS RIGHT; Future    Hx of ovarian cancer  -     CT ABDOMEN PELVIS W IV CONTRAST Additional Contrast? Radiologist Recommendation; Future    Severe obesity (BMI 35.0-35.9 with comorbidity) ([facility])    [facility]- needs refills on supplies for cpap    Other orders  -     ondansetron (ZOFRAN-ODT) 4 MG disintegrating tablet;  Take 1 tablet by mouth 3 times daily as needed for Nausea or Vomiting

## 2021-11-03 ENCOUNTER — TELEPHONE (OUTPATIENT)
Dept: PRIMARY CARE CLINIC | Age: 71
End: 2021-11-03

## 2021-11-03 RX ORDER — TRAZODONE HYDROCHLORIDE 100 MG/1
100 TABLET ORAL NIGHTLY PRN
Qty: 30 TABLET | Refills: 5 | Status: SHIPPED | OUTPATIENT
Start: 2021-11-03 | End: 2021-11-09

## 2021-11-03 RX ORDER — TRAZODONE HYDROCHLORIDE 100 MG/1
100 TABLET ORAL NIGHTLY PRN
Qty: 30 TABLET | Refills: 5 | Status: SHIPPED | OUTPATIENT
Start: 2021-11-03 | End: 2022-04-21 | Stop reason: SDUPTHER

## 2021-11-03 NOTE — TELEPHONE ENCOUNTER
Spoke with pt regarding CT not showing acute abdominal issues. She does note she is constipated so I recommend increase fiber, water and to take miralax. denied any active bleeding she is aware of. I recommended she call dr Heydi Rodriguez office to see if she can get in to see him soon due to her low hgb. She has been having insomnia, so I recommend we increase her trazodone to 100 mg. Also discussed her A1c, she has been using her lantus 70 units but has not been using sliding scale as she has not been feeling well. I recommend trying to do sliding scale if possible and to increase lantus to 75 units.

## 2021-11-03 NOTE — RESULT ENCOUNTER NOTE
Spoke with pt regarding results not showing any acute issues, recommend seeing GI, she saw Dr. Daniel Higgins in 2019 and had EGD and colonoscopy. Hgb is 8.6, was 8.2 a few weeks ago. She denies any blood in her stool, she has been constipated so I recommend miralax and increase water. She is also on iron tablets, continue current medications.

## 2021-11-03 NOTE — TELEPHONE ENCOUNTER
Patient is calling into the office, states that her trazadone is in a bubble pack, if she takes the ones out of that until her new prescription comes in, then she will run out before it gets there, patient is wondering if you would be able to prescribe her a couple more to go to the [facility] pharmacy located in [facility], so she doesn't run out before her bubble pack comes. Please advise.

## 2021-11-05 ENCOUNTER — OFFICE VISIT (OUTPATIENT)
Dept: GASTROENTEROLOGY | Age: 71
End: 2021-11-05
Payer: MEDICARE

## 2021-11-05 VITALS
BODY MASS INDEX: 38.79 KG/M2 | SYSTOLIC BLOOD PRESSURE: 174 MMHG | DIASTOLIC BLOOD PRESSURE: 86 MMHG | TEMPERATURE: 98.8 F | WEIGHT: 226 LBS

## 2021-11-05 DIAGNOSIS — K21.9 GASTROESOPHAGEAL REFLUX DISEASE, UNSPECIFIED WHETHER ESOPHAGITIS PRESENT: Primary | ICD-10-CM

## 2021-11-05 DIAGNOSIS — R11.0 NAUSEA: ICD-10-CM

## 2021-11-05 DIAGNOSIS — D64.9 LOW HEMOGLOBIN: ICD-10-CM

## 2021-11-05 DIAGNOSIS — R10.13 EPIGASTRIC PAIN: ICD-10-CM

## 2021-11-05 DIAGNOSIS — D50.8 OTHER IRON DEFICIENCY ANEMIA: ICD-10-CM

## 2021-11-05 PROCEDURE — 3017F COLORECTAL CA SCREEN DOC REV: CPT | Performed by: INTERNAL MEDICINE

## 2021-11-05 PROCEDURE — 1036F TOBACCO NON-USER: CPT | Performed by: INTERNAL MEDICINE

## 2021-11-05 PROCEDURE — G8427 DOCREV CUR MEDS BY ELIG CLIN: HCPCS | Performed by: INTERNAL MEDICINE

## 2021-11-05 PROCEDURE — G8400 PT W/DXA NO RESULTS DOC: HCPCS | Performed by: INTERNAL MEDICINE

## 2021-11-05 PROCEDURE — G8417 CALC BMI ABV UP PARAM F/U: HCPCS | Performed by: INTERNAL MEDICINE

## 2021-11-05 PROCEDURE — 99214 OFFICE O/P EST MOD 30 MIN: CPT | Performed by: INTERNAL MEDICINE

## 2021-11-05 PROCEDURE — G8484 FLU IMMUNIZE NO ADMIN: HCPCS | Performed by: INTERNAL MEDICINE

## 2021-11-05 PROCEDURE — 4040F PNEUMOC VAC/ADMIN/RCVD: CPT | Performed by: INTERNAL MEDICINE

## 2021-11-05 PROCEDURE — 1123F ACP DISCUSS/DSCN MKR DOCD: CPT | Performed by: INTERNAL MEDICINE

## 2021-11-05 PROCEDURE — 1090F PRES/ABSN URINE INCON ASSESS: CPT | Performed by: INTERNAL MEDICINE

## 2021-11-05 ASSESSMENT — ENCOUNTER SYMPTOMS
ABDOMINAL PAIN: 1
EYES NEGATIVE: 1
CONSTIPATION: 1
ABDOMINAL DISTENTION: 1
SHORTNESS OF BREATH: 1
NAUSEA: 1

## 2021-11-05 NOTE — PROGRESS NOTES
GI OFFICE FOLLOW UP    Neeru Ramirez is a 80 y.o. female evaluated via on 11/5/2021. Consent:  She and/or health care decision maker is aware that that she may receive a bill for this telephone service, depending on her insurance coverage, and has provided verbal consent to proceed: YES      INTERVAL HISTORY:   No referring provider defined for this encounter. Chief Complaint   Patient presents with    Follow-up     last seen 2019 for GERD. Here today for low hgb    Fatigue     PT states being SOB/tired/weakness. Denies rectal bleeding/black stools.  Gastroesophageal Reflux     Taking prilosec 40mg daily. Patient states it controlled symptoms until recently they have worsened     Constipation     Patient uses miralax PRN    Nausea     Patient states waking up with nausea and all through the day       1. Gastroesophageal reflux disease, unspecified whether esophagitis present    2. Nausea    3. Low hemoglobin    4. Epigastric pain    5.  Other iron deficiency anemia      This patient seen my office after more than 2 years  Prior to that she had EGD and colonoscopy done colonoscopy  Revealed redundancy of the colon with spasms and some retained stools however no gross pathology was noted  Upper endoscopy revealed some gastritis    Patient recently underwent some back surgeries stimulator placed and now it needs to be removed but she was found to be anemic with iron deficiency    Patient has been on blood thinners for a long time including Xarelto    Patient denies any overt bleeding any melanotic stools    History for obesity    No known family history for colon cancer    Mild irritable bowel syndrome-like symptoms mild GERD no dysphagia    Has been started on iron pill once a day    No history for smoking alcohol abuse illicit drug usage  No known family history for colon cancer            HISTORY OF PRESENT ILLNESS: Hubert Aquino is a 80 y.o. female with a past history remarkable for , referred for evaluation of   Chief Complaint   Patient presents with    Follow-up     last seen 2019 for GERD. Here today for low hgb    Fatigue     PT states being SOB/tired/weakness. Denies rectal bleeding/black stools.  Gastroesophageal Reflux     Taking prilosec 40mg daily. Patient states it controlled symptoms until recently they have worsened     Constipation     Patient uses miralax PRN    Nausea     Patient states waking up with nausea and all through the day   . Past Medical,Family, and Social History reviewed and does contribute to the patient presenting condition. Patient's PMH/PSH,SH,PSYCH Hx, MEDs, ALLERGIES, and ROS were all reviewed and updated in the appropriate sections.     PAST MEDICAL HISTORY:  Past Medical History:   Diagnosis Date    Arthritis     Bowel obstruction ([facility]     Cancer ([facility] 2015    ovarian stage 2    Cerebral artery occlusion with cerebral infarction ([facility] 03/2018    Mini strokes \"Tia's\"    Diabetes mellitus ([facility]     Epigastric pain     GERD (gastroesophageal reflux disease)     History of anesthesia complications 6130'T    was told after gallbladder surgery to never have anesthesia again \"since it was hard for me to come out of it\"    History of blood transfusion     Hx of blood clots     lung     Hypertension     Right arm pain     Right shoulder pain     Sleep apnea     uses CPAP nightly    Tachycardia     hx of    Thyroid disease     TIA (transient ischemic attack)        Past Surgical History:   Procedure Laterality Date    ANESTHESIA NERVE BLOCK Left 11/7/2019    NERVE BLOCK (DEFINE) - INTERCOSTAL NERVE BLOCK performed by Yoandy Kumar MD at [facility] Bilateral 5/15/2020    NERVE BLOCK BILATERAL - MBB  L4-5, L5-S1 performed by Yoandy Kumar MD at [address] [place] [address] ORIF    CHOLECYSTECTOMY      COLONOSCOPY  07/08/2019    5 yr recall.     COLONOSCOPY N/A 7/8/2019    COLONOSCOPY WITH BIOPSY performed by Danette Johnston MD at [address] Right     repair tendon    [facility] INJECTION PROCEDURE FOR SACROILIAC JOINT Bilateral 8/29/2019    SACROILIAC JOINT INJECTION performed by Emmanuel Barrera MD at [address] [facility] Bilateral 10/10/2019    SACROILIAC JOINT INJECTION performed by Emmanuel Barrera MD at [address]  2/2014    chris with bso    JOINT REPLACEMENT Right     shoulder    KNEE ARTHROSCOPY Right     NERVE BLOCK Bilateral 08/29/2019    SI joint injection    NERVE BLOCK Bilateral 10/10/2019    SI joint    NERVE BLOCK Bilateral 05/15/2020    Medial branch block L4-5, L5-S1    NERVE BLOCK  06/05/2020    SPINAL CORD STIMULATOR IMPLANT TRIAL (N/A )    OTHER SURGICAL HISTORY Right 11/11/2014    shoulder manipulation    SHOULDER ARTHROSCOPY Right 02/03/15    SHOULDER SURGERY  11/2014    manipulation    SPINAL CORD STIMULATOR SURGERY N/A 6/5/2020    SPINAL CORD STIMULATOR IMPLANT TRIAL performed by Emmanuel Barrera MD at [address] [facility] N/A 7/27/2020    SPINAL CORD STIMULATOR IMPLANT WITH THORACIC LAMINOTOMY performed by Melanie Groves MD at [address] [facility]  ENDOSCOPY  07/08/2019    UPPER GASTROINTESTINAL ENDOSCOPY N/A 7/8/2019    EGD BIOPSY performed by Danette Johnston MD at [facility] Right 4/30/2015    pt has blood clot & abscess in her right kidney    VASCULAR SURGERY      Bradley filter in & removed 1 year later       CURRENT MEDICATIONS:    ALLERGIES:   Allergies   Allergen Reactions    Aspirin Anaphylaxis     Only thing she can take is tylenol    Benadryl [Diphenhydramine] Other (See Comments)     Dystonic movement    Ibuprofen Anaphylaxis    Lidocaine Anaphylaxis     CAN NOT TOLERATE IV    Penicillins Anaphylaxis    Hydrocodone-Acetaminophen Other (See Comments)     Unsure of allergy    Tramadol Other (See Comments)     Unsure of allergy    Morphine Nausea And Vomiting     Unsure of allergy       FAMILY HISTORY:       Problem Relation Age of Onset    Elevated Lipids Paternal Grandmother          SOCIAL HISTORY:   Social History     Socioeconomic History    Marital status:      Spouse name: Not on file    Number of children: Not on file    Years of education: Not on file    Highest education level: Not on file   Occupational History    Not on file   Tobacco Use    Smoking status: Never Smoker    Smokeless tobacco: Never Used   Vaping Use    Vaping Use: Never used   Substance and Sexual Activity    Alcohol use: Yes     Comment: rare    Drug use: No    Sexual activity: Yes   Other Topics Concern    Not on file   Social History Narrative    Not on file     Social Determinants of Health     Financial Resource Strain: Low Risk     Difficulty of Paying Living Expenses: Not hard at all   Food Insecurity: No Food Insecurity    Worried About Running Out of Food in the Last Year: Never true    Ricardo of Food in the Last Year: Never true   Transportation Needs:     Lack of Transportation (Medical):      Lack of Transportation (Non-Medical):    Physical Activity:     Days of Exercise per Week:     Minutes of Exercise per Session:    Stress:     Feeling of Stress :    Social Connections:     Frequency of Communication with Friends and Family:     Frequency of Social Gatherings with Friends and Family:     Attends Spiritism Services:     Active Member of Clubs or Organizations:     Attends Club or Organization Meetings:     Marital Status:    Intimate Partner Violence:     Fear of Current or Ex-Partner:     Emotionally Abused:     Physically Abused:     Sexually Abused:          REVIEW OF SYSTEMS:         Review of Systems   Constitutional: Positive for fatigue and unexpected weight change. HENT: Negative. Eyes: Negative. Respiratory: Positive for shortness of breath. Cardiovascular: Positive for leg swelling. Gastrointestinal: Positive for abdominal distention, abdominal pain, constipation and nausea. Endocrine: Negative. Genitourinary: Negative. Musculoskeletal: Negative. Skin: Negative. Neurological: Positive for dizziness, light-headedness and headaches. Hematological: Bruises/bleeds easily. Psychiatric/Behavioral: Positive for sleep disturbance. All other systems reviewed and are negative. PHYSICAL EXAMINATION: Vital signs reviewed per the nursing documentation. BP (!) 174/86   Temp 98.8 °F (37.1 °C)   Wt 226 lb (102.5 kg)   BMI 38.79 kg/m²   Body mass index is 38.79 kg/m². Physical Exam  Nursing note reviewed. Constitutional:       Appearance: She is well-developed. Comments: Anxious   Obesity    HENT:      Head: Normocephalic and atraumatic. Eyes:      Conjunctiva/sclera: Conjunctivae normal.      Pupils: Pupils are equal, round, and reactive to light. Cardiovascular:      Heart sounds: Normal heart sounds. Pulmonary:      Effort: Pulmonary effort is normal.      Breath sounds: Normal breath sounds. Abdominal:      General: Bowel sounds are normal.      Palpations: Abdomen is soft. Musculoskeletal:         General: Normal range of motion. Cervical back: Normal range of motion and neck supple. Skin:     General: Skin is warm. Neurological:      Mental Status: She is alert and oriented to person, place, and time.    Psychiatric:         Behavior: Behavior normal.           LABORATORY DATA: Reviewed  Lab Results   Component Value Date    WBC 6.6 11/02/2021    HGB 8.6 (L) 11/02/2021    HCT 31.3 (L) 11/02/2021    MCV 76.3 (L) 11/02/2021     11/02/2021     10/11/2021    K 4.8 10/11/2021     10/11/2021    CO2 24 10/11/2021    BUN 21 10/11/2021    CREATININE 0.84 11/02/2021 LABALBU 4.2 10/11/2021    BILITOT 0.7 10/11/2021    ALKPHOS 47 10/11/2021    AST 41 10/11/2021    ALT 24 10/11/2021    INR 0.9 02/12/2020         Lab Results   Component Value Date    RBC 4.10 11/02/2021    HGB 8.6 (L) 11/02/2021    MCV 76.3 (L) 11/02/2021    MCH 21.0 (L) 11/02/2021    MCHC 27.5 (L) 11/02/2021    RDW 17.2 (H) 11/02/2021    MPV 12.1 11/02/2021    BASOPCT 1 11/02/2021    LYMPHSABS 2.77 11/02/2021    MONOSABS 0.33 11/02/2021    NEUTROABS 3.10 11/02/2021    EOSABS 0.33 11/02/2021    BASOSABS 0.07 11/02/2021         DIAGNOSTIC TESTING:     CT CHEST WO CONTRAST    Result Date: 10/25/2021  EXAMINATION: CT OF THE CHEST WITHOUT CONTRAST 10/25/2021 3:55 pm TECHNIQUE: CT of the chest was performed without the administration of intravenous contrast. Multiplanar reformatted images are provided for review. Dose modulation, iterative reconstruction, and/or weight based adjustment of the mA/kV was utilized to reduce the radiation dose to as low as reasonably achievable. COMPARISON: Chest CT studies from July 3, 2015 and from July 26, 2019. HISTORY: ORDERING SYSTEM PROVIDED HISTORY: Thoracic back pain, unspecified back pain laterality, unspecified chronicity TECHNOLOGIST PROVIDED HISTORY: Reason for Exam: Pt has had lower rt rib pain and midline back pain for several months. Chronic pain, pt has spinal cord stimulator and pain pump for back problems. No recent injury Acuity: Chronic Type of Exam: Initial FINDINGS: Mediastinum: Heart size is top normal.  The thoracic aorta and proximal great vessels are normal in caliber. No pericardial effusion. No enlarged or suspicious axillary, hilar, or mediastinal lymphadenopathy. Lungs/pleura: The trachea and mainstem bronchi are widely patent. Minimal atelectasis is present at the left lung base. The lungs are otherwise clear. There is a stable 3-4 mm nodule within the right upper lobe anteriorly, and a stable 2-3 mm nodule within the right middle lobe anteriorly.   A subpleural 8 mm nodule within the posterior right lower lobe may have been present on the prior studies, difficult to determine with certainty, or could represent a new focus of atelectasis. No suspicious pulmonary nodules. Trace bilateral pleural effusions, within the mid left chest posteriorly and at the right base inferiorly. No pneumothorax or pleural fluid collections. Soft Tissues/Bones: Degenerative changes are present throughout the thoracic spine. A neural spinal stimulator device is present within the spinal canal posteriorly at the midthoracic level. No acute or healing fractures. No suspicious bone lesions. No appreciable soft tissue abnormality. Upper Abdomen: The visualized upper abdomen is unremarkable. Small hiatal hernia noted. Minimal left basilar atelectasis. Otherwise, clear lungs. Trace bilateral pleural effusions. Degenerative changes of the thoracic spine. No acute or healing fractures. A few scattered right lung pulmonary micronodules are unchanged, most likely benign foci of scarring. There is a subpleural 8-mm nodule within the posterior right lower lobe which may represent a focus of atelectasis or scarring. No suspicious lung nodules. MRI THORACIC SPINE WO CONTRAST    Result Date: 10/15/2021  EXAMINATION: MRI OF THE THORACIC SPINE WITHOUT CONTRAST  10/15/2021 7:48 am TECHNIQUE: Multiplanar multisequence MRI of the thoracic spine was performed without the administration of intravenous contrast. COMPARISON: None. HISTORY: ORDERING SYSTEM PROVIDED HISTORY: Chronic bilateral thoracic back pain TECHNOLOGIST PROVIDED HISTORY: Reason for Exam: Chronic bilateral thoracic back pain Acuity: Chronic Type of Exam: Initial Additional signs and symptoms: back pain, difficulty walking numbness both feet, pain down right leg, pain pump FINDINGS: BONES/ALIGNMENT: There is an exaggerated thoracic kyphosis. The vertebral body heights are maintained. There is no spondylolisthesis. SPINAL CORD: The spinal cord is normal in caliber and signal.  There is a spinal cord stimulator posteriorly that enters the canal at the T8-9 level. The tip is at the T6-7 level. SOFT TISSUES: The posterior paraspinal soft tissues are unremarkable. The visualized thoracic and upper abdominal soft tissues are unremarkable. DEGENERATIVE CHANGES: There is mild multilevel degenerative disc disease with loss of disc signal.  There is multilevel degenerative facet hypertrophy. There is no canal stenosis or foraminal narrowing. Mild multilevel degenerative change without significant canal stenosis or foraminal narrowing. MRI LUMBAR SPINE WO CONTRAST    Result Date: 10/15/2021  EXAMINATION: MRI OF THE LUMBAR SPINE WITHOUT CONTRAST, 10/15/2021 7:49 am TECHNIQUE: Multiplanar multisequence MRI of the lumbar spine was performed without the administration of intravenous contrast. COMPARISON: Lumbar spine CT performed 09/16/2021. HISTORY: ORDERING SYSTEM PROVIDED HISTORY: Chronic bilateral thoracic back pain TECHNOLOGIST PROVIDED HISTORY: Reason for Exam: Chronic bilateral thoracic back pain, Lumbar radiculopathy Acuity: Unknown Type of Exam: Initial Additional signs and symptoms: difficulty walking, low back pain, pain down right leg to foot FINDINGS: BONES/ALIGNMENT: The vertebral body heights are maintained. There is a Schmorl's node in the superior endplate at L3 and L4. There is age-appropriate bone marrow signal.  There is multilevel degenerative disc disease with loss of disc signal.  There is no disc space narrowing. There is no spondylolisthesis. SPINAL CORD: Conus is normal in caliber and signal and terminates at the L1 level. The cauda equina is unremarkable. SOFT TISSUES: The posterior paraspinal soft tissues are unremarkable. The visualized abdominal structures are unremarkable. L1-L2: There is no significant disc herniation, spinal canal stenosis or neural foraminal narrowing.  L2-L3: There is no significant disc herniation, spinal canal stenosis or neural foraminal narrowing. L3-L4: There is a circumferential disc bulge with facet hypertrophy. There is no canal stenosis or significant foraminal narrowing. L4-L5: There is a circumferential disc bulge. There is no canal stenosis or foraminal narrowing. L5-S1: There is a circumferential disc bulge with facet hypertrophy. There is no canal stenosis or foraminal narrowing. Multilevel degenerative disc disease and multilevel degenerative facet hypertrophy without significant canal stenosis or foraminal narrowing. CT ABDOMEN PELVIS W IV CONTRAST Additional Contrast? Radiologist Recommendation    Result Date: 11/2/2021  EXAMINATION: CT OF THE ABDOMEN AND PELVIS WITH CONTRAST 11/2/2021 1:16 pm TECHNIQUE: CT of the abdomen and pelvis was performed with the administration of intravenous contrast. Multiplanar reformatted images are provided for review. Dose modulation, iterative reconstruction, and/or weight based adjustment of the mA/kV was utilized to reduce the radiation dose to as low as reasonably achievable. COMPARISON: 04/26/2019 HISTORY: ORDERING SYSTEM PROVIDED HISTORY: Iron deficiency anemia, unspecified iron deficiency anemia type TECHNOLOGIST PROVIDED HISTORY: nausea, worsening generalized abdomen pain and low hemoglobin , tender to palpation, hx of ovarian cancer Reason for Exam: nausea, worsening generalized abdomen pain and low hemoglobin , tender to palpation, hx of ovarian cancer Acuity: Acute Type of Exam: Initial FINDINGS: Lower Chest: Trace pleural effusions. Organs: The liver, pancreas, spleen, adrenals and kidneys reveal no acute findings. GI/Bowel: There is no bowel dilatation or wall thickening identified. Diverticulosis. Mild stool burden throughout the colon. Pelvis: No acute findings. The uterus and ovaries are not visualized. Peritoneum/Retroperitoneum: No free air or free fluid. The aorta is normal in caliber.   The visceral branches are patent. No lymphadenopathy. Bones/Soft Tissues: No acute abnormality identified. Infraumbilical abdominal wall hernia is noted containing a short segment of small bowel. Degenerative endplate changes at L3 and L4 noted. Facet arthropathy in the lower lumbar spine. Thoracic neurostimulator in place. Implanted device in the right lateral abdominal subcutaneous tissue. 1.  No acute abnormality. 2.  Diverticulosis. 3.  Trace pleural effusions. 4.  Infraumbilical abdominal wall hernia containing a short segment small bowel again demonstrated. US DUP LOWER EXTREMITY RIGHT PAO    Result Date: 11/2/2021  EXAMINATION: DUPLEX VENOUS ULTRASOUND OF THE RIGHT LOWER EXTREMITY 11/2/2021 1:03 pm TECHNIQUE: Duplex ultrasound using B-mode/gray scaled imaging and Doppler spectral analysis and color flow was obtained of the deep venous structures of the right lower extremity. COMPARISON: None. HISTORY: ORDERING SYSTEM PROVIDED HISTORY: Leg swelling TECHNOLOGIST PROVIDED HISTORY: Leg swelling, hx of PE in the past and DVT in past Reason for Exam: right leg swelling/pain Acuity: Acute Type of Exam: Initial Relevant Medical/Surgical History: h/o DVT and PE, currently taking blood thinner FINDINGS: The visualized veins of the right lower extremity are patent and free of echogenic thrombus. The veins demonstrate good compressibility with normal color flow study and spectral analysis. No evidence of DVT in the right lower extremity. Assessment  1. Gastroesophageal reflux disease, unspecified whether esophagitis present    2. Nausea    3. Low hemoglobin    4. Epigastric pain    5. Other iron deficiency anemia        Plan    Increase the dose of Iron to BID    F/u HGB in 3-4 weeks    Check stools for OB X 3    Blood thinners may be causing slow blood loss?     May need repeat GI w/u     Me need referral to hematology     Rec IV iron before surgery    Pt was advised in detail about some life style and dietary modifications. She was advised about avoidance of caffeine, nicotine and chocolate. Pt was also told to stay away from any kind of fast foods, soda pops. She was also advised to avoid lots of spices, grease and fried food etc.     Instructions were also given about trying to arrange the timing, quality and quantity of food. Instructions were given about using ample amount of fiber including dietary and supplemental fiber either metamucil, bennafiber or citrucell etc.  Pt was advised about drinking ample amount of water without any colors or chemicals. Stress was given about regular exercise. Pt has verbalized understanding and agreement to these modifications. The patient was instructed to start taking some OTC Probiotics products   These are available over the counter at the Pharmacy stores and Grocery stores  He was explained about the beneficial effects they have in the GI track  They will help to establish the good bacterial jeimy and will help with the digestion and bowel movements  The patient has verbalized understanding and agreement to this plan      Pt was given advices and instructions about weight loss. She was advised about the significance of exercise at least three times a week . Dietary advices were also given about the avoidance of fast food, fried food and lots of spices and grease. nstructions were given about using ample amount of fiber including dietary and supplemental fiber either metamucil, bennafiber or citrucell etc.  Pt was advised about drinking ample amount of water without any colors or chemicals. Stress was given about regular exercise. Pt was told to stay way from soda pops.     Pt has verbalized understanding and agrrement          I communicated with the patient and/or health care decision maker about   Details of this discussion including any medical advice provided:YES      I affirm this is a Patient Initiated Episode with an Established Patient who has not had a related appointment within my department in the past 7 days or scheduled within the next 24 hours. Total Time: minutes: 21-30 minutes    Note: not billable if this call serves to triage the patient into an appointment for the relevant concern      Thank you for allowing me to participate in the care of Ms. Pedro Pablo Wu. For any further questions please do not hesitate to contact me. I have reviewed and agree with the ROS entered by the MA/LPN.          Laurie Womack MD, [facility]  Board Certified in Gastroenterology and [address] [place] [address] Gastroenterology  Office #: [phone]

## 2021-11-08 ENCOUNTER — TELEPHONE (OUTPATIENT)
Dept: PRIMARY CARE CLINIC | Age: 71
End: 2021-11-08

## 2021-11-08 DIAGNOSIS — M79.89 LEG SWELLING: Primary | ICD-10-CM

## 2021-11-08 RX ORDER — FUROSEMIDE 20 MG/1
20 TABLET ORAL DAILY
Qty: 10 TABLET | Refills: 0 | Status: SHIPPED | OUTPATIENT
Start: 2021-11-08 | End: 2021-11-18

## 2021-11-08 NOTE — TELEPHONE ENCOUNTER
----- Message from Rick Martin sent at 11/8/2021  9:19 AM EST -----  Subject: Message to Provider    QUESTIONS  Information for Provider? The patient was seen last week in office for leg   and feet swelling. The patient states that there is no relief to the   swelling and she cannot get her shoes on. What to do? Please call to   advise  ---------------------------------------------------------------------------  --------------  CALL BACK INFO  What is the best way for the office to contact you? OK to leave message on   voicemail  Preferred Call Back Phone Number? [phone]  ---------------------------------------------------------------------------  --------------  SCRIPT ANSWERS  Relationship to Patient?  Self

## 2021-11-08 NOTE — TELEPHONE ENCOUNTER
Called patient, notified of Dr Zavala's response. Patient verbalized understanding. Will call back to office in 3 days if no improvement.

## 2021-11-08 NOTE — TELEPHONE ENCOUNTER
Please let her know I would like for her to take lasix for one week. I have sent extra but I want her to just take for a week to see if there is improvement.

## 2021-11-09 ENCOUNTER — HOSPITAL ENCOUNTER (OUTPATIENT)
Dept: PREADMISSION TESTING | Age: 71
Discharge: HOME | End: 2021-11-13
Payer: MEDICARE

## 2021-11-09 VITALS
RESPIRATION RATE: 18 BRPM | WEIGHT: 215 LBS | SYSTOLIC BLOOD PRESSURE: 145 MMHG | TEMPERATURE: 98 F | OXYGEN SATURATION: 98 % | HEIGHT: 64 IN | HEART RATE: 81 BPM | BODY MASS INDEX: 36.7 KG/M2 | DIASTOLIC BLOOD PRESSURE: 73 MMHG

## 2021-11-09 LAB
ABSOLUTE EOS #: 0.29 K/UL (ref 0–0.4)
ABSOLUTE IMMATURE GRANULOCYTE: ABNORMAL K/UL (ref 0–0.3)
ABSOLUTE LYMPH #: 2.09 K/UL (ref 1–4.8)
ABSOLUTE MONO #: 0.5 K/UL (ref 0.1–1.3)
ANION GAP SERPL CALCULATED.3IONS-SCNC: 6 MMOL/L (ref 9–17)
BASOPHILS # BLD: 1 % (ref 0–2)
BASOPHILS ABSOLUTE: 0.07 K/UL (ref 0–0.2)
BUN BLDV-MCNC: 17 MG/DL (ref 8–23)
BUN/CREAT BLD: ABNORMAL (ref 9–20)
CALCIUM SERPL-MCNC: 8.7 MG/DL (ref 8.6–10.4)
CHLORIDE BLD-SCNC: 101 MMOL/L (ref 98–107)
CO2: 31 MMOL/L (ref 20–31)
CREAT SERPL-MCNC: 1.02 MG/DL (ref 0.5–0.9)
DIFFERENTIAL TYPE: ABNORMAL
EOSINOPHILS RELATIVE PERCENT: 4 % (ref 0–4)
GFR AFRICAN AMERICAN: >60 ML/MIN
GFR NON-AFRICAN AMERICAN: 54 ML/MIN
GFR SERPL CREATININE-BSD FRML MDRD: ABNORMAL ML/MIN/{1.73_M2}
GFR SERPL CREATININE-BSD FRML MDRD: ABNORMAL ML/MIN/{1.73_M2}
GLUCOSE BLD-MCNC: 350 MG/DL (ref 70–99)
HCT VFR BLD CALC: 27.3 % (ref 36–46)
HEMOGLOBIN: 8.3 G/DL (ref 12–16)
IMMATURE GRANULOCYTES: ABNORMAL %
LYMPHOCYTES # BLD: 29 % (ref 24–44)
MCH RBC QN AUTO: 21.7 PG (ref 26–34)
MCHC RBC AUTO-ENTMCNC: 30.3 G/DL (ref 31–37)
MCV RBC AUTO: 71.6 FL (ref 80–100)
MONOCYTES # BLD: 7 % (ref 1–7)
MORPHOLOGY: ABNORMAL
NRBC AUTOMATED: ABNORMAL PER 100 WBC
PDW BLD-RTO: 18.6 % (ref 11.5–14.9)
PLATELET # BLD: 213 K/UL (ref 150–450)
PLATELET ESTIMATE: ABNORMAL
PMV BLD AUTO: 9.1 FL (ref 6–12)
POTASSIUM SERPL-SCNC: 4 MMOL/L (ref 3.7–5.3)
RBC # BLD: 3.81 M/UL (ref 4–5.2)
RBC # BLD: ABNORMAL 10*6/UL
SEG NEUTROPHILS: 59 % (ref 36–66)
SEGMENTED NEUTROPHILS ABSOLUTE COUNT: 4.25 K/UL (ref 1.3–9.1)
SODIUM BLD-SCNC: 138 MMOL/L (ref 135–144)
WBC # BLD: 7.2 K/UL (ref 3.5–11)
WBC # BLD: ABNORMAL 10*3/UL

## 2021-11-09 PROCEDURE — 85025 COMPLETE CBC W/AUTO DIFF WBC: CPT

## 2021-11-09 PROCEDURE — 80048 BASIC METABOLIC PNL TOTAL CA: CPT

## 2021-11-09 PROCEDURE — 93005 ELECTROCARDIOGRAM TRACING: CPT | Performed by: ANESTHESIOLOGY

## 2021-11-09 PROCEDURE — 36415 COLL VENOUS BLD VENIPUNCTURE: CPT

## 2021-11-09 ASSESSMENT — ENCOUNTER SYMPTOMS
GASTROINTESTINAL NEGATIVE: 1
RESPIRATORY NEGATIVE: 1

## 2021-11-09 NOTE — H&P
HISTORY and [address]       NAME:  Patrick Andres  MRN: [id number]   YOB: 1950   Date: 11/9/2021   Age: 80 y.o. Gender: female     COMPLAINT AND PRESENT HISTORY:   Patrick Andres is 80 y.o.,  female, presents forpre-anesthesia/admission testing for SPINAL NERVE STIMULATOR REMOVAL  per Dr. Vicente. Primary dx:PAINFUL HARDWARE    HPI:  Pt has history of Lumbosacral spondylosis without myelopathy and Chronic bilateral thoracic back pain. Pt had spinal stimulator in July 2020 per Dr Tyson Benjamin which she is not using it and she had pain pump in April 2021 per dr Pushpa Lau at [facility]. Pt states , she was evaluated at [facility] per her pain management physicians have recommended removal of the spinal cord stimulator. Pt  C/O of  Right sided mid thoracic constant pain for about three months and it getting worse. She described her pain  as stabbing, shooting, cramping, burning and aching. The pain is at a severity of 8/10. pt states she hurts when she is coughing and sneezing and she has been sleeping in the chair. Pt has multiple falls the last one was yesterday. Pt states she fall twice in the last June and yesterday in the bath top ,she reports she did not lost her consciousness , she did not hit her head/back/upper extremieties ,she ended up on her bottom. Patient denies any bowel or bladder incontinence, no weakness. No redness, swelling or rashes. Pt denies fever/chills, chest pain or SOB.      Testing completed r/t condition:  MRI LUMBAR SPINE WO CONTRAST  FINDINGS:   BONES/ALIGNMENT: The vertebral body heights are maintained.  There is a   Schmorl's node in the superior endplate at L3 and L4.  There is   age-appropriate bone marrow signal. Honora Glad is multilevel degenerative disc   disease with loss of disc signal. Honora Glad is no disc space narrowing.  There   is no spondylolisthesis.       SPINAL CORD: Conus is normal in caliber and signal and terminates at the L1   level.  The cauda equina is unremarkable.       SOFT TISSUES: The posterior paraspinal soft tissues are unremarkable.  The   visualized abdominal structures are unremarkable.       L1-L2: There is no significant disc herniation, spinal canal stenosis or   neural foraminal narrowing.       L2-L3: There is no significant disc herniation, spinal canal stenosis or   neural foraminal narrowing.       L3-L4: There is a circumferential disc bulge with facet hypertrophy.  There   is no canal stenosis or significant foraminal narrowing.       L4-L5: There is a circumferential disc bulge.  There is no canal stenosis or   foraminal narrowing.       L5-S1: There is a circumferential disc bulge with facet hypertrophy.  There   is no canal stenosis or foraminal narrowing.           Impression   Multilevel degenerative disc disease and multilevel degenerative facet   hypertrophy without significant canal stenosis or foraminal narrowing.         MRI THORACIC SPINE WO CONTRAST  FINDINGS:   BONES/ALIGNMENT: There is an exaggerated thoracic kyphosis.  The vertebral   body heights are maintained.  There is no spondylolisthesis.       SPINAL CORD: The spinal cord is normal in caliber and signal. Tl [place] is a   spinal cord stimulator posteriorly that enters the canal at the T8-9 level. The tip is at the T6-7 level.       SOFT TISSUES: The posterior paraspinal soft tissues are unremarkable.  The   visualized thoracic and upper abdominal soft tissues are unremarkable.       DEGENERATIVE CHANGES: There is mild multilevel degenerative disc disease with   loss of disc signal. Tl Shuqualak is multilevel degenerative facet hypertrophy.    There is no canal stenosis or foraminal narrowing.           Impression   Mild multilevel degenerative change without significant canal stenosis or   foraminal narrowing.           Review of additional significant medical hx:  Cerebral artery occlusion with cerebral infarction  Mini Strokes TIA's 2018  Pt on  XARELTO 20 MG TABS tablet    HTN  Medication r/t condition:amLODIPine (NORVASC) 5 MG tablet, hydroCHLOROthiazide (HYDRODIURIL) 25 MG tablet, furosemide (LASIX) 20 MG tablet    BP Readings from Last 3 Encounters:   11/09/21 (!) 145/73   11/05/21 (!) 174/86   11/02/21 (!) 140/92     hyperlipoidemia   atorvastatin (LIPITOR) 40 MG tablet    Sleep apnea uses CPAP nightly     Thyroid disease   Medication r/t condition: levothyroxine (SYNTHROID) 88 MCG tablet    Diabetes Mellitus   Medication r/t condition : Insulin    Lab Results   Component Value Date    LABA1C 10.2 11/02/2021     Lab Results   Component Value Date     09/29/2020       Denies hx of MRSA infection. Pt has Hx of blood clots in the Lung, and Mini Strokes TIA's 2018  Denies hx of any personal or family hx of complications w/anesthesia.        PAST MEDICAL HISTORY     Past Medical History:   Diagnosis Date    Arthritis     [facility]     Cancer ([facility] 2015    ovarian stage 2    Cerebral artery occlusion with cerebral infarction ([address] 03/2018    Mini strokes \"Tia's\"    Diabetes mellitus ([address]     Epigastric pain     GERD (gastroesophageal reflux disease)     History of anesthesia complications 5870'Y    was told after gallbladder surgery to never have anesthesia again \"since it was hard for me to come out of it\"    History of blood transfusion     Hx of blood clots     lung     Hyperlipidemia     Hypertension     Right arm pain     Right shoulder pain     Sleep apnea     uses CPAP nightly    Tachycardia     hx of    Thyroid disease     TIA (transient ischemic attack)        SURGICAL HISTORY       Past Surgical History:   Procedure Laterality Date    ANESTHESIA NERVE BLOCK Left 11/7/2019    NERVE BLOCK (DEFINE) - INTERCOSTAL NERVE BLOCK performed by Emmanuel Barrera MD at [facility] Bilateral 5/15/2020    NERVE BLOCK BILATERAL - MBB  L4-5, L5-S1 performed by Emmanuel Barrera MD at [facility]    APPENDECTOMY      ARM SURGERY Left     ORIF    CHOLECYSTECTOMY      COLONOSCOPY  07/08/2019    5 yr recall.     COLONOSCOPY N/A 7/8/2019    COLONOSCOPY WITH BIOPSY performed by Anne Anderson MD at [facility] Right     [facility]. INJECTION PROCEDURE FOR SACROILIAC JOINT Bilateral 8/29/2019    SACROILIAC JOINT INJECTION performed by Gwendloyn Castleman, MD at [facility] Bilateral 10/10/2019    SACROILIAC JOINT INJECTION performed by Gwendloyn Castleman, MD at [address] [facility] [address]  2/2014    chris with bso    JOINT REPLACEMENT Right     shoulder    KNEE ARTHROSCOPY Right     NERVE BLOCK Bilateral 08/29/2019    SI joint injection    NERVE BLOCK Bilateral 10/10/2019    SI joint    NERVE BLOCK Bilateral 05/15/2020    Medial branch block L4-5, L5-S1    NERVE BLOCK  06/05/2020    SPINAL CORD STIMULATOR IMPLANT TRIAL (N/A )    OTHER SURGICAL HISTORY Right 11/11/2014    shoulder manipulation    OTHER SURGICAL HISTORY  04/2021    pain pump placed, fentanyl in pain pump    SHOULDER ARTHROSCOPY Right 02/03/15    SHOULDER SURGERY  11/2014    manipulation    SPINAL CORD STIMULATOR SURGERY N/A 6/5/2020    SPINAL CORD STIMULATOR IMPLANT TRIAL performed by Gwendloyn Castleman, MD at [facility] N/A 7/27/2020    SPINAL CORD STIMULATOR IMPLANT WITH THORACIC LAMINOTOMY performed by Jane Baez MD at [address] [place] [address] ENDOSCOPY  07/08/2019    UPPER GASTROINTESTINAL ENDOSCOPY N/A 7/8/2019    EGD BIOPSY performed by Anne Anderson MD at [facility] [address] Right 4/30/2015    pt has blood clot & abscess in her right kidney    VASCULAR SURGERY      [place] filter in & removed 1 year later       SOCIAL HISTORY       Social History     Socioeconomic History    Marital status:      Spouse name: None    Number of children: None    Years of education: None    Highest education level: None   Occupational History    None   Tobacco Use    Smoking status: Never Smoker    Smokeless tobacco: Never Used   Vaping Use    Vaping Use: Never used   Substance and Sexual Activity    Alcohol use: Yes     Comment: rare    Drug use: No    Sexual activity: Yes   Other Topics Concern    None   Social History Narrative    None     Social Determinants of Health     Financial Resource Strain: Low Risk     Difficulty of Paying Living Expenses: Not hard at all   Food Insecurity: No Food Insecurity    Worried About Running Out of Food in the Last Year: Never true    Ricardo of Food in the Last Year: Never true   Transportation Needs:     Lack of Transportation (Medical): Not on file    Lack of Transportation (Non-Medical):  Not on file   Physical Activity:     Days of Exercise per Week: Not on file    Minutes of Exercise per Session: Not on file   Stress:     Feeling of Stress : Not on file   Social Connections:     Frequency of Communication with Friends and Family: Not on file    Frequency of Social Gatherings with Friends and Family: Not on file    Attends Sabianism Services: Not on file    Active Member of [address] [place] [address] or Organizations: Not on file    Attends Club or Organization Meetings: Not on file    Marital Status: Not on file   Intimate Partner Violence:     Fear of Current or Ex-Partner: Not on file    Emotionally Abused: Not on file    Physically Abused: Not on file    Sexually Abused: Not on file   Housing Stability:     Unable to Pay for Housing in the Last Year: Not on file    Number of [place] in the Last Year: Not on file    Unstable Housing in the Last Year: Not on file       REVIEW OF SYSTEMS      Allergies   Allergen Reactions    Aspirin Anaphylaxis     Only thing she can take is tylenol    Benadryl [Diphenhydramine] Other (See Comments)     Dystonic movement    Ibuprofen Anaphylaxis    Lidocaine Anaphylaxis     CAN NOT TOLERATE IV    Penicillins Anaphylaxis    Hydrocodone-Acetaminophen Other (See Comments)     Unsure of allergy    Tramadol Other (See Comments)     Unsure of allergy          Review of Systems   Constitutional: Negative. HENT: Negative. Eyes: Positive for visual disturbance. Respiratory: Negative. Cardiovascular: Positive for leg swelling. Gastrointestinal: Negative. Endocrine: Negative. Genitourinary: Negative. Musculoskeletal: Negative. Skin: Negative. Neurological: Positive for dizziness, weakness, light-headedness and numbness. Hematological: Bruises/bleeds easily. Psychiatric/Behavioral: Positive for sleep disturbance. GENERAL PHYSICAL EXAM     Vitals: BP (!) 145/73   Pulse 81   Temp 98 °F (36.7 °C)   Resp 18   Ht 5' 4\" (1.626 m)   Wt 215 lb (97.5 kg)   SpO2 98%   BMI 36.90 kg/m²               Physical Exam  Constitutional:       Appearance: Normal appearance. HENT:      Right Ear: External ear normal.      Left Ear: External ear normal.      Mouth/Throat:      Mouth: Mucous membranes are moist.   Eyes:      General:         Right eye: No discharge. Left eye: No discharge. Pupils: Pupils are equal, round, and reactive to light. Cardiovascular:      Rate and Rhythm: Normal rate and regular rhythm. Pulses: Normal pulses. Radial pulses are 2+ on the right side and 2+ on the left side. Dorsalis pedis pulses are 2+ on the left side. Posterior tibial pulses are 2+ on the right side and 2+ on the left side. Heart sounds: Normal heart sounds. No murmur heard. No gallop. Pulmonary:      Effort: Pulmonary effort is normal.      Breath sounds: Normal breath sounds. No wheezing, rhonchi or rales. Abdominal:      General: Bowel sounds are normal.      Palpations: Abdomen is soft. Tenderness: There is no abdominal tenderness. There is no guarding.    Musculoskeletal:         General: Normal range of motion. Cervical back: Normal range of motion and neck supple. Right lower le+ Edema present. Left lower le+ Edema present. Skin:     General: Skin is warm and dry. Findings: No bruising or rash. Neurological:      General: No focal deficit present. Mental Status: She is alert and oriented to person, place, and time.       Gait: Gait normal.   Psychiatric:         Mood and Affect: Mood normal.         Behavior: Behavior normal.         LAB REVIEW     Lab Results   Component Value Date    WBC 7.2 2021    HGB 8.3 (L) 2021    HCT 27.3 (L) 2021    MCV 71.6 (L) 2021     2021     Lab Results   Component Value Date     2021    K 4.0 2021     2021    CO2 31 2021    BUN 17 2021    CREATININE 1.02 2021    GLUCOSE 350 2021    GLUCOSE 104 2019    CALCIUM 8.7 2021        SURGERY / PROVISIONAL DIAGNOSES:      PAINFUL HARDWARE  SPINAL NERVE STIMULATOR REMOVAL    Patient Active Problem List    Diagnosis Date Noted    Low hemoglobin 2021    Nausea 2021    Absolute anemia 2021    Type 2 diabetes mellitus 2021    BMI 38.0-38.9,adult 2020    Lumbosacral spondylosis without myelopathy 2020    Chronic bilateral thoracic back pain 2020    Chronic anticoagulation 2020    Epigastric pain     GERD (gastroesophageal reflux disease)     Ovarian mass     Abdominal pain     Partial bowel obstruction ([address]            LUPE Melgar - CNP on 2021 at 11:42 AM

## 2021-11-10 LAB
EKG ATRIAL RATE: 92 BPM
EKG P AXIS: 49 DEGREES
EKG P-R INTERVAL: 176 MS
EKG Q-T INTERVAL: 386 MS
EKG QRS DURATION: 80 MS
EKG QTC CALCULATION (BAZETT): 477 MS
EKG R AXIS: 8 DEGREES
EKG T AXIS: 60 DEGREES
EKG VENTRICULAR RATE: 92 BPM

## 2021-11-10 PROCEDURE — 93010 ELECTROCARDIOGRAM REPORT: CPT | Performed by: INTERNAL MEDICINE

## 2021-11-11 DIAGNOSIS — D50.9 IRON DEFICIENCY ANEMIA, UNSPECIFIED IRON DEFICIENCY ANEMIA TYPE: Primary | ICD-10-CM

## 2021-11-16 ENCOUNTER — HOSPITAL ENCOUNTER (OUTPATIENT)
Age: 71
Discharge: HOME | End: 2021-11-16
Payer: MEDICARE

## 2021-11-16 DIAGNOSIS — D50.8 OTHER IRON DEFICIENCY ANEMIA: ICD-10-CM

## 2021-11-16 DIAGNOSIS — K21.9 GASTROESOPHAGEAL REFLUX DISEASE, UNSPECIFIED WHETHER ESOPHAGITIS PRESENT: ICD-10-CM

## 2021-11-16 DIAGNOSIS — R11.0 NAUSEA: ICD-10-CM

## 2021-11-16 DIAGNOSIS — R10.13 EPIGASTRIC PAIN: ICD-10-CM

## 2021-11-16 DIAGNOSIS — D64.9 LOW HEMOGLOBIN: ICD-10-CM

## 2021-11-16 PROCEDURE — 36415 COLL VENOUS BLD VENIPUNCTURE: CPT

## 2021-11-16 PROCEDURE — 85025 COMPLETE CBC W/AUTO DIFF WBC: CPT

## 2021-11-17 ENCOUNTER — TELEPHONE (OUTPATIENT)
Dept: PAIN MANAGEMENT | Age: 71
End: 2021-11-17

## 2021-11-17 ENCOUNTER — TELEPHONE (OUTPATIENT)
Dept: PRIMARY CARE CLINIC | Age: 71
End: 2021-11-17

## 2021-11-17 LAB
ABSOLUTE EOS #: 0.27 K/UL (ref 0–0.44)
ABSOLUTE IMMATURE GRANULOCYTE: 0 K/UL (ref 0–0.3)
ABSOLUTE LYMPH #: 2.04 K/UL (ref 1.1–3.7)
ABSOLUTE MONO #: 0.41 K/UL (ref 0.1–1.2)
BASOPHILS # BLD: 1 % (ref 0–2)
BASOPHILS ABSOLUTE: 0.07 K/UL (ref 0–0.2)
DIFFERENTIAL TYPE: ABNORMAL
EOSINOPHILS RELATIVE PERCENT: 4 % (ref 1–4)
HCT VFR BLD CALC: 32.2 % (ref 36.3–47.1)
HEMOGLOBIN: 8.9 G/DL (ref 11.9–15.1)
IMMATURE GRANULOCYTES: 0 %
LYMPHOCYTES # BLD: 30 % (ref 24–43)
MCH RBC QN AUTO: 22.5 PG (ref 25.2–33.5)
MCHC RBC AUTO-ENTMCNC: 27.6 G/DL (ref 28.4–34.8)
MCV RBC AUTO: 81.5 FL (ref 82.6–102.9)
MONOCYTES # BLD: 6 % (ref 3–12)
MORPHOLOGY: ABNORMAL
NRBC AUTOMATED: 0 PER 100 WBC
PDW BLD-RTO: 23.2 % (ref 11.8–14.4)
PLATELET # BLD: 206 K/UL (ref 138–453)
PLATELET ESTIMATE: ABNORMAL
PMV BLD AUTO: 12.5 FL (ref 8.1–13.5)
RBC # BLD: 3.95 M/UL (ref 3.95–5.11)
RBC # BLD: ABNORMAL 10*6/UL
SEG NEUTROPHILS: 59 % (ref 36–65)
SEGMENTED NEUTROPHILS ABSOLUTE COUNT: 4.01 K/UL (ref 1.5–8.1)
WBC # BLD: 6.8 K/UL (ref 3.5–11.3)
WBC # BLD: ABNORMAL 10*3/UL

## 2021-11-17 NOTE — TELEPHONE ENCOUNTER
Patient states she was to call office to let PCP know how she was doing after taking Lasix. She states she took all 10 days and worked fine but now swelling again (feet and legs)  Has gone down a lot but still swollen dewey legs-swelling up to knees. Please advise.

## 2021-11-18 ENCOUNTER — HOSPITAL ENCOUNTER (OUTPATIENT)
Age: 71
Setting detail: SPECIMEN
Discharge: HOME | End: 2021-11-18

## 2021-11-18 ENCOUNTER — OFFICE VISIT (OUTPATIENT)
Dept: PRIMARY CARE CLINIC | Age: 71
End: 2021-11-18
Payer: MEDICARE

## 2021-11-18 VITALS
DIASTOLIC BLOOD PRESSURE: 88 MMHG | SYSTOLIC BLOOD PRESSURE: 136 MMHG | HEART RATE: 71 BPM | OXYGEN SATURATION: 99 % | RESPIRATION RATE: 18 BRPM | WEIGHT: 219.2 LBS | BODY MASS INDEX: 37.63 KG/M2

## 2021-11-18 DIAGNOSIS — R82.90 BAD ODOR OF URINE: ICD-10-CM

## 2021-11-18 DIAGNOSIS — M79.89 LEG SWELLING: ICD-10-CM

## 2021-11-18 DIAGNOSIS — N76.0 ACUTE VAGINITIS: Primary | ICD-10-CM

## 2021-11-18 DIAGNOSIS — E03.9 HYPOTHYROIDISM, UNSPECIFIED TYPE: ICD-10-CM

## 2021-11-18 LAB
BILIRUBIN, POC: NEGATIVE
BLOOD URINE, POC: NORMAL
CLARITY, POC: NORMAL
COLOR, POC: YELLOW
GLUCOSE URINE, POC: NORMAL
KETONES, POC: NEGATIVE
LEUKOCYTE EST, POC: NORMAL
NITRITE, POC: NEGATIVE
PH, POC: 7
PROTEIN, POC: NEGATIVE
SPECIFIC GRAVITY, POC: 1.02
UROBILINOGEN, POC: NORMAL

## 2021-11-18 PROCEDURE — G8484 FLU IMMUNIZE NO ADMIN: HCPCS | Performed by: NURSE PRACTITIONER

## 2021-11-18 PROCEDURE — 99214 OFFICE O/P EST MOD 30 MIN: CPT | Performed by: NURSE PRACTITIONER

## 2021-11-18 PROCEDURE — 4040F PNEUMOC VAC/ADMIN/RCVD: CPT | Performed by: NURSE PRACTITIONER

## 2021-11-18 PROCEDURE — G8417 CALC BMI ABV UP PARAM F/U: HCPCS | Performed by: NURSE PRACTITIONER

## 2021-11-18 PROCEDURE — 1090F PRES/ABSN URINE INCON ASSESS: CPT | Performed by: NURSE PRACTITIONER

## 2021-11-18 PROCEDURE — 3017F COLORECTAL CA SCREEN DOC REV: CPT | Performed by: NURSE PRACTITIONER

## 2021-11-18 PROCEDURE — G8400 PT W/DXA NO RESULTS DOC: HCPCS | Performed by: NURSE PRACTITIONER

## 2021-11-18 PROCEDURE — 81002 URINALYSIS NONAUTO W/O SCOPE: CPT | Performed by: NURSE PRACTITIONER

## 2021-11-18 PROCEDURE — G8427 DOCREV CUR MEDS BY ELIG CLIN: HCPCS | Performed by: NURSE PRACTITIONER

## 2021-11-18 PROCEDURE — 1123F ACP DISCUSS/DSCN MKR DOCD: CPT | Performed by: NURSE PRACTITIONER

## 2021-11-18 PROCEDURE — 1036F TOBACCO NON-USER: CPT | Performed by: NURSE PRACTITIONER

## 2021-11-18 RX ORDER — FUROSEMIDE 20 MG/1
20 TABLET ORAL DAILY
Qty: 90 TABLET | Refills: 2 | Status: SHIPPED | OUTPATIENT
Start: 2021-11-18 | End: 2022-05-04

## 2021-11-18 RX ORDER — FUROSEMIDE 20 MG/1
20 TABLET ORAL DAILY
Qty: 30 TABLET | Refills: 0 | Status: SHIPPED | OUTPATIENT
Start: 2021-11-18 | End: 2021-11-18

## 2021-11-18 RX ORDER — LEVOTHYROXINE SODIUM 88 UG/1
TABLET ORAL
Qty: 30 TABLET | Refills: 10 | Status: SHIPPED | OUTPATIENT
Start: 2021-11-18 | End: 2022-10-25

## 2021-11-18 RX ORDER — METRONIDAZOLE 500 MG/1
500 TABLET ORAL 2 TIMES DAILY
Qty: 14 TABLET | Refills: 0 | Status: SHIPPED | OUTPATIENT
Start: 2021-11-18 | End: 2021-11-25

## 2021-11-18 RX ORDER — GABAPENTIN 600 MG/1
TABLET ORAL
Qty: 120 TABLET | Refills: 3 | Status: SHIPPED | OUTPATIENT
Start: 2021-11-18 | End: 2022-03-23

## 2021-11-18 ASSESSMENT — ENCOUNTER SYMPTOMS
TROUBLE SWALLOWING: 0
EYE DISCHARGE: 0
CHEST TIGHTNESS: 0
COUGH: 0
WHEEZING: 0
SINUS PRESSURE: 0
SHORTNESS OF BREATH: 0
SORE THROAT: 0
SINUS PAIN: 0
ABDOMINAL PAIN: 0
EYE REDNESS: 0
VOMITING: 0
EYE ITCHING: 0
DIARRHEA: 0
NAUSEA: 0

## 2021-11-18 ASSESSMENT — PATIENT HEALTH QUESTIONNAIRE - PHQ9
5. POOR APPETITE OR OVEREATING: 1
8. MOVING OR SPEAKING SO SLOWLY THAT OTHER PEOPLE COULD HAVE NOTICED. OR THE OPPOSITE, BEING SO FIGETY OR RESTLESS THAT YOU HAVE BEEN MOVING AROUND A LOT MORE THAN USUAL: 0
1. LITTLE INTEREST OR PLEASURE IN DOING THINGS: 0
9. THOUGHTS THAT YOU WOULD BE BETTER OFF DEAD, OR OF HURTING YOURSELF: 0
7. TROUBLE CONCENTRATING ON THINGS, SUCH AS READING THE NEWSPAPER OR WATCHING TELEVISION: 0
6. FEELING BAD ABOUT YOURSELF - OR THAT YOU ARE A FAILURE OR HAVE LET YOURSELF OR YOUR FAMILY DOWN: 0
10. IF YOU CHECKED OFF ANY PROBLEMS, HOW DIFFICULT HAVE THESE PROBLEMS MADE IT FOR YOU TO DO YOUR WORK, TAKE CARE OF THINGS AT HOME, OR GET ALONG WITH OTHER PEOPLE: 1
SUM OF ALL RESPONSES TO PHQ9 QUESTIONS 1 & 2: 3
3. TROUBLE FALLING OR STAYING ASLEEP: 1
SUM OF ALL RESPONSES TO PHQ QUESTIONS 1-9: 8
2. FEELING DOWN, DEPRESSED OR HOPELESS: 3
SUM OF ALL RESPONSES TO PHQ QUESTIONS 1-9: 8
SUM OF ALL RESPONSES TO PHQ QUESTIONS 1-9: 8
4. FEELING TIRED OR HAVING LITTLE ENERGY: 3

## 2021-11-18 NOTE — PROGRESS NOTES
[address] [facility] PRIMARY CARE  [facility] [address] [facility] [address]  [address] [address]  Dept: [phone]  Dept Fax: [phone]    Javi Painting is a 80 y.o. female who presents today for her medical conditions/complaintsas noted below. Javi Painting is c/o of Leg Swelling (foot/leg swelling x 1 wk, was on med for 10 days, swelling went down alittle but sore) and Other (possible uti, noticed bad odor, noticed )        HPI:     Pt presents for a same day appointment. Pt of dr. Niecy Flowers  bp stable  Weight stable    Pt presents for a same day appointment with concerns of a UTI and leg swelling. She was seen in the office about 10 days ago. She was c/o leg swelling. She was started on lasix. This did help the swelling a little bit. She is not wear compression stockings. She has had swelling in the past but a long time ago. Her legs are now painful. R is worse than the L. No chest pain shortness of breath or palpitations    She c/o an odor to her urine 5-6 days ago. Unable to describe the odor. She had a recent uti about a month ago. She was on medication for this. C/o mild burning. No hematuria. She doesn't realize she is urinating sometimes. She does c/o vaginal discharge which is abnormal for her. C/o vaginal itching. Pain management in [facility]  Due to have spinal stimulator removed    Follow hem/onc with concerns of persistent low blood levels.        Past Medical History:   Diagnosis Date    Arthritis     [facility]     Cancer ([facility]     ovarian stage 2    Cerebral artery occlusion with cerebral infarction ([facility] 2018    Mini strokes \"Tia's\"    Diabetes mellitus ([facility]     Epigastric pain     GERD (gastroesophageal reflux disease)     History of anesthesia complications 5488'D    was told after gallbladder surgery to never have anesthesia again \"since it was hard for me to come out of it\"    History of blood transfusion     Hx of blood clots     lung  Hyperlipidemia     Hypertension     Right arm pain     Right shoulder pain     Sleep apnea     uses CPAP nightly    Tachycardia     hx of    Thyroid disease     TIA (transient ischemic attack)       Past Surgical History:   Procedure Laterality Date    ANESTHESIA NERVE BLOCK Left 11/7/2019    NERVE BLOCK (DEFINE) - INTERCOSTAL NERVE BLOCK performed by Bhavik Arias MD at [facility] Bilateral 5/15/2020    NERVE BLOCK BILATERAL - MBB  L4-5, L5-S1 performed by Bhavik Arias MD at [address] Left     ORIF    CHOLECYSTECTOMY      COLONOSCOPY  07/08/2019    5 yr recall.     COLONOSCOPY N/A 7/8/2019    COLONOSCOPY WITH BIOPSY performed by Thiago Allen MD at [address] Right     [facility] [place]. INJECTION PROCEDURE FOR SACROILIAC JOINT Bilateral 8/29/2019    SACROILIAC JOINT INJECTION performed by Bhavik Arias MD at [address] [facility] Bilateral 10/10/2019    SACROILIAC JOINT INJECTION performed by Bhavik Arias MD at [address]  2/2014    chris with bso    JOINT REPLACEMENT Right     shoulder    KNEE ARTHROSCOPY Right     NERVE BLOCK Bilateral 08/29/2019    SI joint injection    NERVE BLOCK Bilateral 10/10/2019    SI joint    NERVE BLOCK Bilateral 05/15/2020    Medial branch block L4-5, L5-S1    NERVE BLOCK  06/05/2020    SPINAL CORD STIMULATOR IMPLANT TRIAL (N/A )    OTHER SURGICAL HISTORY Right 11/11/2014    shoulder manipulation    OTHER SURGICAL HISTORY  04/2021    pain pump placed, fentanyl in pain pump    SHOULDER ARTHROSCOPY Right 02/03/15    SHOULDER SURGERY  11/2014    manipulation    SPINAL CORD STIMULATOR SURGERY N/A 6/5/2020    SPINAL CORD STIMULATOR IMPLANT TRIAL performed by Bhavik Arias MD at [facility] N/A 7/27/2020    SPINAL CORD STIMULATOR IMPLANT WITH THORACIC LAMINOTOMY performed by Arely Jimenez MD at [address] ENDOSCOPY  07/08/2019    UPPER GASTROINTESTINAL ENDOSCOPY N/A 7/8/2019    EGD BIOPSY performed by Israel Barboza MD at [address] Right 4/30/2015    pt has blood clot & abscess in her right kidney    VASCULAR SURGERY      Truman filter in & removed 1 year later       Family History   Problem Relation Age of Onset    Elevated Lipids Paternal Grandmother        Social History     Tobacco Use    Smoking status: Never Smoker    Smokeless tobacco: Never Used   Substance Use Topics    Alcohol use: Yes     Comment: rare      Current Outpatient Medications   Medication Sig Dispense Refill    levothyroxine (SYNTHROID) 88 MCG tablet TAKE (1) TABLET BY MOUTH EVERY DAY 30 tablet 10    gabapentin (NEURONTIN) 600 MG tablet TAKE (1) TABLET BY MOUTH FOUR TIMES A  tablet 3    metroNIDAZOLE (FLAGYL) 500 MG tablet Take 1 tablet by mouth 2 times daily for 7 days 14 tablet 0    Elastic Bandages & Supports (MEDICAL COMPRESSION STOCKINGS) MISC 2 each by Does not apply route daily Right and left knee high compression stockings. 30-40 mmHg. To be worn continuously throughout the day.  2 each 11    furosemide (LASIX) 20 MG tablet Take 1 tablet by mouth daily 30 tablet 0    traZODone (DESYREL) 100 MG tablet Take 1 tablet by mouth nightly as needed for Sleep 30 tablet 5    ondansetron (ZOFRAN-ODT) 4 MG disintegrating tablet Take 1 tablet by mouth 3 times daily as needed for Nausea or Vomiting 21 tablet 0    omeprazole (PRILOSEC) 40 MG delayed release capsule TAKE 1 CAPSULE BY MOUTH EVERY MORNING BEFORE BREAKFAST 30 capsule 3    potassium chloride (KLOR-CON M) 20 MEQ extended release tablet TAKE 1 TABLET BY MOUTH TWICE DAILY 60 tablet 2    blood glucose test strips (ACCU-CHEK TED PLUS) strip USE TO TEST BLOOD SUGAR 3 TIMES DAILY AND AS NEEDED FOR SYMPTOMS OF IRREGULAR BLOOD GLUCOSE 100 strip 10    tamsulosin (FLOMAX) 0.4 MG capsule TAKE 1 CAPSULE BY MOUTH NIGHTLY *EMERGENCY REFILL 30 capsule 10    SURE COMFORT INSULIN SYRINGE 31G X 5/16\" 1 ML MISC USE AS DIRECTED FOUR TIMES A  each 10    EPINEPHrine (EPIPEN 2-LISA) 0.3 MG/0.3ML SOAJ injection EpiPen 2-Lisa 0.3 mg/0.3 mL injection, auto-injector 1 each 3    XARELTO 20 MG TABS tablet TAKE 1 TABLET BY MOUTH ONCE DAILY 30 tablet 10    alendronate (FOSAMAX) 70 MG tablet TAKE 1 TABLET BY MOUTH EVERY MONDAY 5 tablet 10    insulin lispro (HUMALOG) 100 UNIT/ML injection vial INEJCT SUBCUTANEOUSLY UP TO THREE TIMES A DAY BASED ON SLIDING SCALE  MAX 40 UNITS A DAY 10 mL 10    DULoxetine (CYMBALTA) 60 MG extended release capsule TAKE 1 CAPSULE BY MOUTH DAILY 30 capsule 10    losartan (COZAAR) 100 MG tablet TAKE 1 TABLET BY MOUTH EVERY DAY 30 tablet 10    amLODIPine (NORVASC) 5 MG tablet TAKE (1) TABLET BY MOUTH ONCE DAILY 30 tablet 11    atorvastatin (LIPITOR) 40 MG tablet TAKE (1) TABLET BY MOUTH EVERY DAY 30 tablet 11    hydroCHLOROthiazide (HYDRODIURIL) 25 MG tablet TAKE 1 TABLET BY MOUTH EVERY MORNING 30 tablet 11    insulin glargine (LANTUS) 100 UNIT/ML injection vial INJECT 75 UNITS SUBCUTANEOUSLY NIGHTLY 3 vial 10    vitamin C (ASCORBIC ACID) 500 MG tablet Take 500 mg by mouth daily      glucose monitoring kit (FREESTYLE) monitoring kit 1 kit by Does not apply route daily Please provide accuchek meter for patient, not freestyle 1 kit 0    calcium carbonate (TUMS) 500 MG chewable tablet Take 1 tablet by mouth daily as needed for Heartburn      SUPER B COMPLEX/C CAPS Take by mouth      Cholecalciferol (VITAMIN D3) 5000 units TABS Take by mouth      ferrous sulfate 325 (65 Fe) MG tablet Take 325 mg by mouth daily (with breakfast)      fexofenadine (ALLEGRA) 60 MG tablet Take 60 mg by mouth daily      magnesium oxide (MAG-OX) 400 MG tablet Take 400 mg by mouth daily       No current facility-administered medications for this visit. Allergies   Allergen Reactions    Aspirin Anaphylaxis     Only thing she can take is tylenol    Benadryl [Diphenhydramine] Other (See Comments)     Dystonic movement    Ibuprofen Anaphylaxis    Lidocaine Anaphylaxis     CAN NOT TOLERATE IV    Penicillins Anaphylaxis    Hydrocodone-Acetaminophen Other (See Comments)     Unsure of allergy    Tramadol Other (See Comments)     Unsure of allergy       Health Maintenance   Topic Date Due    Hepatitis C screen  Never done    DTaP/Tdap/Td vaccine (1 - Tdap) Never done    DEXA (modify frequency per FRAX score)  Never done    Shingles Vaccine (2 of 2) 12/18/2020    COVID-19 Vaccine (3 - Booster for Moderna series) 09/15/2021    Lipid screen  09/29/2021    Pneumococcal 65+ years Vaccine (1 of 1 - PPSV23) 04/17/2023 (Originally 9/14/2016)    Diabetic microalbuminuria test  01/25/2022    A1C test (Diabetic or Prediabetic)  02/02/2022    Diabetic foot exam  02/25/2022    Breast cancer screen  10/21/2022    Annual Wellness Visit (AWV)  11/03/2022    Diabetic retinal exam  11/09/2022    Potassium monitoring  11/09/2022    Creatinine monitoring  11/09/2022    Colon cancer screen colonoscopy  07/08/2029    Flu vaccine  Completed    Hepatitis A vaccine  Aged Out    Hib vaccine  Aged Out    Meningococcal (ACWY) vaccine  Aged Out       :     Review of Systems   Constitutional: Negative for chills, fatigue and fever. HENT: Negative for ear discharge, ear pain, sinus pressure, sinus pain, sore throat and trouble swallowing. Eyes: Negative for discharge, redness and itching. Respiratory: Negative for cough, chest tightness, shortness of breath and wheezing. Cardiovascular: Positive for leg swelling. Negative for chest pain. Gastrointestinal: Negative for abdominal pain, diarrhea, nausea and vomiting. Genitourinary: Positive for dysuria and vaginal discharge. Negative for difficulty urinating.    Musculoskeletal: Negative for arthralgias and neck pain. Skin: Negative for rash. Neurological: Negative for dizziness, weakness, light-headedness and headaches. All other systems reviewed and are negative. Objective:     Physical Exam  Constitutional:       Appearance: Normal appearance. She is normal weight. HENT:      Head: Normocephalic and atraumatic. Nose: Nose normal.   Eyes:      Extraocular Movements: Extraocular movements intact. Conjunctiva/sclera: Conjunctivae normal.      Pupils: Pupils are equal, round, and reactive to light. Cardiovascular:      Rate and Rhythm: Normal rate and regular rhythm. Pulses: Normal pulses. Heart sounds: Normal heart sounds. Comments: Mild swelling to BLE,R>L  Pulmonary:      Effort: Pulmonary effort is normal.      Breath sounds: Normal breath sounds. Abdominal:      General: Abdomen is flat. Palpations: Abdomen is soft. Musculoskeletal:         General: Normal range of motion. Cervical back: Neck supple. Right lower leg: Edema present. Left lower leg: Edema present. Skin:     General: Skin is warm and dry. Capillary Refill: Capillary refill takes less than 2 seconds. Neurological:      General: No focal deficit present. Mental Status: She is alert and oriented to person, place, and time. Psychiatric:         Mood and Affect: Mood normal.       /88   Pulse 71   Resp 18   Wt 219 lb 3.2 oz (99.4 kg)   SpO2 99%   Breastfeeding No   BMI 37.63 kg/m²     Assessment:       Diagnosis Orders   1. Acute vaginitis     2. Bad odor of urine  POCT Urinalysis no Micro    Culture, Urine   3. Leg swelling         :      Return if symptoms worsen or fail to improve. 1. Vaginitis  -rx for flagyl  -discussed avoiding alcohol  2. Bad odor of urine  -poc ua shows mild leuks. Reviewed previous UA  -plan to send for culture. Will tx for BV at this time  3.  Leg swelling   -rx for lasix x 1 chino  -rx for compression stockings    F/u with dr. Piyush Partida Orders Placed This Encounter   Procedures    Culture, Urine     Standing Status:   Future     Standing Expiration Date:   2022     Order Specific Question:   Specify (ex-cath, midstream, cysto, etc)? Answer:   mid stream    POCT Urinalysis no Micro     Orders Placed This Encounter   Medications    metroNIDAZOLE (FLAGYL) 500 MG tablet     Sig: Take 1 tablet by mouth 2 times daily for 7 days     Dispense:  14 tablet     Refill:  0    Elastic Bandages & Supports (MEDICAL COMPRESSION STOCKINGS) MISC     Si each by Does not apply route daily Right and left knee high compression stockings. 30-40 mmHg. To be worn continuously throughout the day. Dispense:  2 each     Refill:  11    furosemide (LASIX) 20 MG tablet     Sig: Take 1 tablet by mouth daily     Dispense:  30 tablet     Refill:  0       Patient given educational materials - seepatient instructions. Discussed use, benefit, and side effects of prescribed medications. All patient questions answered. Pt voiced understanding. Reviewed health maintenance. Instructed to continue current medications, diet and exercise. Patient agreedwith treatment plan. Follow up as directed.       Electronically signed by LUPE Herrera CNP on 1at 2:45 PM

## 2021-11-19 ENCOUNTER — TELEPHONE (OUTPATIENT)
Dept: PRIMARY CARE CLINIC | Age: 71
End: 2021-11-19

## 2021-11-19 LAB
CULTURE: NORMAL
Lab: NORMAL
SPECIMEN DESCRIPTION: NORMAL

## 2021-11-19 NOTE — TELEPHONE ENCOUNTER
Please contact patient and DME to figure out which measurements they specifically request.  Also, she requesting knee-high or thigh-high compression stockings.

## 2021-11-19 NOTE — TELEPHONE ENCOUNTER
Pt is requesting a prescription   with measurements and medical records faxed over to them in order to receive the [facility] (Fax) [phone]

## 2021-11-19 NOTE — TELEPHONE ENCOUNTER
Per pt, she believes the measurements are in reference to her foot size. I attempted to contact [facility] [phone], ext [phone] at t he clinical question line. The line rings busy x3.

## 2021-11-19 NOTE — TELEPHONE ENCOUNTER
----- Message from Gayathri Norton sent at 11/18/2021  4:48 PM EST -----  Subject: Message to Provider    QUESTIONS  Information for Provider? Pt called in with Krish Otto from [employer] in order to receive assistance in getting her prescription   of compression stockings filled. They've found a company [employer]) to have that done at but that location needs a prescription   with measurements and medical records faxed over to them in order to   receive the stockings [employer] Fax? [phone]  ---------------------------------------------------------------------------  --------------  [address]  What is the best way for the office to contact you? OK to leave message on   voicemail  Preferred Call Back Phone Number? [phone]  ---------------------------------------------------------------------------  --------------  SCRIPT ANSWERS  Relationship to Patient?  Self

## 2021-11-23 ENCOUNTER — HOSPITAL ENCOUNTER (OUTPATIENT)
Age: 71
Setting detail: SPECIMEN
Discharge: HOME | End: 2021-11-23
Payer: MEDICARE

## 2021-11-23 DIAGNOSIS — R11.0 NAUSEA: ICD-10-CM

## 2021-11-23 DIAGNOSIS — R10.13 EPIGASTRIC PAIN: ICD-10-CM

## 2021-11-23 DIAGNOSIS — K21.9 GASTROESOPHAGEAL REFLUX DISEASE, UNSPECIFIED WHETHER ESOPHAGITIS PRESENT: ICD-10-CM

## 2021-11-23 DIAGNOSIS — D64.9 LOW HEMOGLOBIN: ICD-10-CM

## 2021-11-23 DIAGNOSIS — D50.8 OTHER IRON DEFICIENCY ANEMIA: ICD-10-CM

## 2021-11-23 LAB
DATE, STOOL #1: ABNORMAL
DATE, STOOL #2: ABNORMAL
DATE, STOOL #3: ABNORMAL
HEMOCCULT SP1 STL QL: POSITIVE
HEMOCCULT SP2 STL QL: NEGATIVE
HEMOCCULT SP3 STL QL: POSITIVE
TIME, STOOL #1: 1500
TIME, STOOL #2: 2033
TIME, STOOL #3: 1630

## 2021-11-23 PROCEDURE — 82270 OCCULT BLOOD FECES: CPT

## 2021-12-06 ENCOUNTER — TELEPHONE (OUTPATIENT)
Dept: ONCOLOGY | Age: 71
End: 2021-12-06

## 2021-12-06 ENCOUNTER — INITIAL CONSULT (OUTPATIENT)
Dept: ONCOLOGY | Age: 71
End: 2021-12-06
Payer: MEDICARE

## 2021-12-06 VITALS
BODY MASS INDEX: 37.83 KG/M2 | SYSTOLIC BLOOD PRESSURE: 141 MMHG | TEMPERATURE: 97.8 F | HEART RATE: 85 BPM | WEIGHT: 221.6 LBS | RESPIRATION RATE: 18 BRPM | HEIGHT: 64 IN | DIASTOLIC BLOOD PRESSURE: 83 MMHG

## 2021-12-06 DIAGNOSIS — D50.9 MICROCYTIC ANEMIA: Primary | ICD-10-CM

## 2021-12-06 DIAGNOSIS — K90.9 IRON MALABSORPTION: ICD-10-CM

## 2021-12-06 DIAGNOSIS — D64.9 LOW HEMOGLOBIN: ICD-10-CM

## 2021-12-06 DIAGNOSIS — D50.8 IRON DEFICIENCY ANEMIA SECONDARY TO INADEQUATE DIETARY IRON INTAKE: ICD-10-CM

## 2021-12-06 DIAGNOSIS — C56.9 MALIGNANT NEOPLASM OF OVARY, UNSPECIFIED LATERALITY (HCC): ICD-10-CM

## 2021-12-06 PROCEDURE — G8427 DOCREV CUR MEDS BY ELIG CLIN: HCPCS | Performed by: INTERNAL MEDICINE

## 2021-12-06 PROCEDURE — G8417 CALC BMI ABV UP PARAM F/U: HCPCS | Performed by: INTERNAL MEDICINE

## 2021-12-06 PROCEDURE — 99202 OFFICE O/P NEW SF 15 MIN: CPT | Performed by: INTERNAL MEDICINE

## 2021-12-06 PROCEDURE — 1090F PRES/ABSN URINE INCON ASSESS: CPT | Performed by: INTERNAL MEDICINE

## 2021-12-06 PROCEDURE — G8484 FLU IMMUNIZE NO ADMIN: HCPCS | Performed by: INTERNAL MEDICINE

## 2021-12-06 PROCEDURE — 99205 OFFICE O/P NEW HI 60 MIN: CPT | Performed by: INTERNAL MEDICINE

## 2021-12-06 RX ORDER — SODIUM CHLORIDE 9 MG/ML
INJECTION, SOLUTION INTRAVENOUS CONTINUOUS
Status: CANCELLED | OUTPATIENT
Start: 2021-12-06

## 2021-12-06 RX ORDER — SODIUM CHLORIDE 9 MG/ML
25 INJECTION, SOLUTION INTRAVENOUS PRN
Status: CANCELLED | OUTPATIENT
Start: 2021-12-06

## 2021-12-06 RX ORDER — ALBUTEROL SULFATE 90 UG/1
4 AEROSOL, METERED RESPIRATORY (INHALATION) PRN
Status: CANCELLED | OUTPATIENT
Start: 2021-12-06

## 2021-12-06 RX ORDER — ONDANSETRON 2 MG/ML
8 INJECTION INTRAMUSCULAR; INTRAVENOUS
Status: CANCELLED | OUTPATIENT
Start: 2021-12-06

## 2021-12-06 RX ORDER — ACETAMINOPHEN 325 MG/1
650 TABLET ORAL
Status: CANCELLED | OUTPATIENT
Start: 2021-12-06

## 2021-12-06 RX ORDER — DIPHENHYDRAMINE HYDROCHLORIDE 50 MG/ML
50 INJECTION INTRAMUSCULAR; INTRAVENOUS
Status: CANCELLED | OUTPATIENT
Start: 2021-12-06

## 2021-12-06 RX ORDER — SODIUM CHLORIDE 0.9 % (FLUSH) 0.9 %
5-40 SYRINGE (ML) INJECTION PRN
Status: CANCELLED | OUTPATIENT
Start: 2021-12-06

## 2021-12-06 RX ORDER — EPINEPHRINE 1 MG/ML
0.3 INJECTION, SOLUTION, CONCENTRATE INTRAVENOUS PRN
Status: CANCELLED | OUTPATIENT
Start: 2021-12-06

## 2021-12-06 RX ORDER — HEPARIN SODIUM (PORCINE) LOCK FLUSH IV SOLN 100 UNIT/ML 100 UNIT/ML
500 SOLUTION INTRAVENOUS PRN
Status: CANCELLED | OUTPATIENT
Start: 2021-12-06

## 2021-12-06 NOTE — TELEPHONE ENCOUNTER
AVS from 12/6/21    IV iron infusion soon  Discontinue PO iron  Will see Dr Vanna Bliss 1/17/22   RV 2 months with CBC, iron studies and  at RV  Refer to Genetic counsilor    AVS given to  to follow precert    RV scheduled 2/14/22 @ 12:30pm    Deb to contact patient to schedule    PT was given AVS and an appt schedule    Electronically signed by Luis Gaspar on 12/6/2021 at 2:46 PM

## 2021-12-06 NOTE — PATIENT INSTRUCTIONS
IV iron infusion soon  Discontinue PO iron  Will see Dr Juan Horton 1/17/22  RV 2 months with CBC, Iron studies and  at RV  Refer to [facility] counselor

## 2021-12-06 NOTE — LETTER
_    Dustin Marquis MD    12/6/2021     Zulema Melissa, [address]  [address] [facility]    Dear Dr Kobe Rojas: Thank you for referring Dayanara Toribio, 1950, to me for evaluation. Below are the relevant portions of my assessment and plan of care. Ms. Dayanara Toribio is a very pleasant 80 y.o. female with history of multiple co morbidities as listed. Patient is referred for further management of anemia. Patient has history of ovarian cancer in 2015. She was treated at [facility] with surgery and chemotherapy using Taxol carboplatin. Patient developed anemia at that time and she received blood transfusions. She continued to have mild anemia for several years. Patient had GI evaluation and July 2019. She had EGD for GERD and she had colonoscopy at that time. No source of bleeding was identified. Patient was recently noted to have increasing symptoms related to anemia. She has significant drop of her hemoglobin not responding to oral iron. She has generalized weakness and fatigue and dizziness. She has shortness of breath on exertion. Patient has significant muscle cramps and ice craving. Patient denies any vaginal bleeding. No hematuria. No hematochezia. No melena. No hematemesis. Stool guaiac test was positive for Hemoccult blood and 2 out of 3 samples. Patient has left upper abdominal pain on and off for several months. She has CT scan in November which was negative. Patient denies smoking or alcohol drinking. Booker Zamarripa        PAST MEDICAL HISTORY: has a past medical history of Arthritis, Bowel obstruction ([address] Cancer ([address] Cerebral artery occlusion with cerebral infarction ([address] Diabetes mellitus ([address] Epigastric pain, GERD (gastroesophageal reflux disease), History of anesthesia complications, History of blood transfusion, Hx of blood clots, Hyperlipidemia, Hypertension, Right arm pain, Right shoulder pain, Sleep apnea, Tachycardia, Thyroid disease, and TIA (transient ischemic attack). PAST SURGICAL HISTORY: has a past surgical history that includes Knee arthroscopy (Right); Hand surgery (Right); Cholecystectomy; other surgical history (Right, 11/11/2014); shoulder surgery (11/2014); Shoulder arthroscopy (Right, 02/03/15); Appendectomy; Ureter stent placement (Right, 4/30/2015); Hysterectomy (2/2014); Colonoscopy (07/08/2019); Upper gastrointestinal endoscopy (07/08/2019); Colonoscopy (N/A, 7/8/2019); Upper gastrointestinal endoscopy (N/A, 7/8/2019); Nerve Block (Bilateral, 08/29/2019); Injection Procedure For Sacroiliac Joint (Bilateral, 8/29/2019); Nerve Block (Bilateral, 10/10/2019); Injection Procedure For Sacroiliac Joint (Bilateral, 10/10/2019); Anesthesia Nerve Block (Left, 11/7/2019); Nerve Block (Bilateral, 05/15/2020); Anesthesia Nerve Block (Bilateral, 5/15/2020); Nerve Block (06/05/2020); Spinal Cord Stimulator Surgery (N/A, 6/5/2020); vascular surgery; Arm Surgery (Left); joint replacement (Right); Tonsillectomy; Spinal Cord Stimulator Surgery (N/A, 7/27/2020); and other surgical history (04/2021). CURRENT MEDICATIONS:  has a current medication list which includes the following prescription(s): levothyroxine, gabapentin, medical compression stockings, furosemide, trazodone, ondansetron, omeprazole, potassium chloride, accu-chek junior plus, tamsulosin, sure comfort insulin syringe, epinephrine, xarelto, alendronate, insulin lispro, duloxetine, losartan, amlodipine, atorvastatin, hydrochlorothiazide, insulin glargine, vitamin c, glucose monitoring, calcium carbonate, super b complex/c, vitamin d3, ferrous sulfate, fexofenadine, magnesium oxide, [DISCONTINUED] potassium chloride, and [DISCONTINUED] omeprazole. ALLERGIES:  is allergic to aspirin, benadryl [diphenhydramine], ibuprofen, lidocaine, penicillins, hydrocodone-acetaminophen, and tramadol. FAMILY HISTORY: Mother and grandmother and maternal aunt had breast cancer. Father had cancer. Otherwise negative for any hematological or oncological conditions. SOCIAL HISTORY:  reports that she has never smoked. She has never used smokeless tobacco. She reports current alcohol use. She reports that she does not use drugs. REVIEW OF SYSTEMS:     · General: Positive for weakness and fatigue. No unanticipated weight loss or decreased appetite. No fever or chills. · Eyes: No blurred vision, eye pain or double vision. · Ears: No hearing problems or drainage. No tinnitus. · Throat: No sore throat, problems with swallowing or dysphagia. · Respiratory: No cough, sputum or hemoptysis. No shortness of breath. No pleuritic chest pain. · Cardiovascular: No chest pain, orthopnea or PND. No lower extremity edema. No palpitation. · Gastrointestinal: As above. · Genitourinary: No dysuria, hematuria, frequency or urgency. · Musculoskeletal: Positive for muscle cramps. No limitation of movement. No back pain. No gait disturbance, No joint complaints. · Dermatologic: No skin rashes or pruritus. No skin lesions or discolorations. · Psychiatric: No depression, anxiety, or stress or signs of schizophrenia. No change in mood or affect. · Hematologic: No history of bleeding tendency. No bruises or ecchymosis. No history of clotting problems. · Infectious disease: No fever, chills or frequent infections. · Endocrine: No polydipsia or polyuria. No temperature intolerance. · Neurologic: No headaches positive dizziness. No weakness or numbness of the extremities. No changes in balance, coordination,  memory, mentation, behavior. · Allergic/Immunologic: No nasal congestion or hives. No repeated infections. PHYSICAL EXAM:  The patient is not in acute distress. Vital signs: Blood pressure (!) 141/83, pulse 85, temperature 97.8 °F (36.6 °C), temperature source Oral, resp. rate 18, height 5' 4\" (1.626 m), weight 221 lb 9.6 oz (100.5 kg), not currently breastfeeding.      General appearance - aunt    PLAN: I reviewed the labs available to me and discussed with the patient. For more than 60 minutes of face to face discussion, I explained to the patient the nature of this hematologic problem. I explained the significance of these abnormalities in layman language. The blood picture is typical for iron deficiency anemia not responding to oral iron with significant severe constipation related to the oral intake of iron. So patient will definitely benefit from IV iron infusion to replace the iron deficiency. I explained to the patient that it is very crucial that we look for an explanation for her iron deficiency anemia. Very likely patient is having GI blood loss. She had positive stool guaiac test.  She had her last colonoscopy and EGD in July 2019. There is a need for repeating GI work-up especially colonoscopy and if needed small intestine evaluation with capsule camera. Patient is scheduled to see gastroenterology Dr. Cielo Carlson 1/17/2021. I discussed with patient other problems related to her history of ovarian cancer treated in 2015 and the strong family history of breast cancer. Patient qualifies for genetic testing. I explained the importance of that and she agreed. We will make referral to genetic counselor for evaluation and possible testing. Furthermore patient did not have follow-up with her GYN oncologist for the last 3 to 4 years. Her last CT scan of the abdomen was negative. I will add tumor marker CA-125 to her follow-up labs which will also include the iron studies and blood counts in about 2 months. Patient will be seen sooner for any problems. Patient's questions were answered to the best of her satisfaction and she verbalized full understanding and agreement. If you have questions, please do not hesitate to call me. I look forward to following Romain Ordonez along with you.     Sincerely,                            [facility]                            This note is created with the assistance of a speech recognition program.  While intending to generate a document that actually reflects the content of the visit, the document can still have some errors including those of syntax and sound a like substitutions which may escape proof reading. It such instances, actual meaning can be extrapolated by contextual diversion.

## 2021-12-06 NOTE — PROGRESS NOTES
_               Ms. Jamel Chavira is a very pleasant 80 y.o. female with history of multiple co morbidities as listed. Patient is referred for further management of anemia. Patient has history of ovarian cancer in 2015. She was treated at [facility] with surgery and chemotherapy using Taxol carboplatin. Patient developed anemia at that time and she received blood transfusions. She continued to have mild anemia for several years. Patient had GI evaluation and July 2019. She had EGD for GERD and she had colonoscopy at that time. No source of bleeding was identified. Patient was recently noted to have increasing symptoms related to anemia. She has significant drop of her hemoglobin not responding to oral iron. She has generalized weakness and fatigue and dizziness. She has shortness of breath on exertion. Patient has significant muscle cramps and ice craving. Patient denies any vaginal bleeding. No hematuria. No hematochezia. No melena. No hematemesis. Stool guaiac test was positive for Hemoccult blood and 2 out of 3 samples. Patient has left upper abdominal pain on and off for several months. She has CT scan in November which was negative. Patient denies smoking or alcohol drinking. Cornelio Ortiz PAST MEDICAL HISTORY: has a past medical history of Arthritis, Bowel obstruction ([address] Cancer ([address] Cerebral artery occlusion with cerebral infarction ([address] Diabetes mellitus ([address] Epigastric pain, GERD (gastroesophageal reflux disease), History of anesthesia complications, History of blood transfusion, Hx of blood clots, Hyperlipidemia, Hypertension, Right arm pain, Right shoulder pain, Sleep apnea, Tachycardia, Thyroid disease, and TIA (transient ischemic attack). PAST SURGICAL HISTORY: has a past surgical history that includes Knee arthroscopy (Right); Hand surgery (Right);  Cholecystectomy; other surgical history (Right, 11/11/2014); shoulder surgery (11/2014); Shoulder arthroscopy (Right, 02/03/15); Appendectomy; Ureter stent placement (Right, 4/30/2015); Hysterectomy (2/2014); Colonoscopy (07/08/2019); Upper gastrointestinal endoscopy (07/08/2019); Colonoscopy (N/A, 7/8/2019); Upper gastrointestinal endoscopy (N/A, 7/8/2019); Nerve Block (Bilateral, 08/29/2019); Injection Procedure For Sacroiliac Joint (Bilateral, 8/29/2019); Nerve Block (Bilateral, 10/10/2019); Injection Procedure For Sacroiliac Joint (Bilateral, 10/10/2019); Anesthesia Nerve Block (Left, 11/7/2019); Nerve Block (Bilateral, 05/15/2020); Anesthesia Nerve Block (Bilateral, 5/15/2020); Nerve Block (06/05/2020); Spinal Cord Stimulator Surgery (N/A, 6/5/2020); vascular surgery; Arm Surgery (Left); joint replacement (Right); Tonsillectomy; Spinal Cord Stimulator Surgery (N/A, 7/27/2020); and other surgical history (04/2021). CURRENT MEDICATIONS:  has a current medication list which includes the following prescription(s): levothyroxine, gabapentin, medical compression stockings, furosemide, trazodone, ondansetron, omeprazole, potassium chloride, accu-chek junior plus, tamsulosin, sure comfort insulin syringe, epinephrine, xarelto, alendronate, insulin lispro, duloxetine, losartan, amlodipine, atorvastatin, hydrochlorothiazide, insulin glargine, vitamin c, glucose monitoring, calcium carbonate, super b complex/c, vitamin d3, ferrous sulfate, fexofenadine, magnesium oxide, [DISCONTINUED] potassium chloride, and [DISCONTINUED] omeprazole. ALLERGIES:  is allergic to aspirin, benadryl [diphenhydramine], ibuprofen, lidocaine, penicillins, hydrocodone-acetaminophen, and tramadol. FAMILY HISTORY: Mother and grandmother and maternal aunt had breast cancer. Father had cancer. Otherwise negative for any hematological or oncological conditions. SOCIAL HISTORY:  reports that she has never smoked.  She has never used smokeless tobacco. She reports current alcohol use. She reports that she does not use drugs. REVIEW OF SYSTEMS:     · General: Positive for weakness and fatigue. No unanticipated weight loss or decreased appetite. No fever or chills. · Eyes: No blurred vision, eye pain or double vision. · Ears: No hearing problems or drainage. No tinnitus. · Throat: No sore throat, problems with swallowing or dysphagia. · Respiratory: No cough, sputum or hemoptysis. No shortness of breath. No pleuritic chest pain. · Cardiovascular: No chest pain, orthopnea or PND. No lower extremity edema. No palpitation. · Gastrointestinal: As above. · Genitourinary: No dysuria, hematuria, frequency or urgency. · Musculoskeletal: Positive for muscle cramps. No limitation of movement. No back pain. No gait disturbance, No joint complaints. · Dermatologic: No skin rashes or pruritus. No skin lesions or discolorations. · Psychiatric: No depression, anxiety, or stress or signs of schizophrenia. No change in mood or affect. · Hematologic: No history of bleeding tendency. No bruises or ecchymosis. No history of clotting problems. · Infectious disease: No fever, chills or frequent infections. · Endocrine: No polydipsia or polyuria. No temperature intolerance. · Neurologic: No headaches positive dizziness. No weakness or numbness of the extremities. No changes in balance, coordination,  memory, mentation, behavior. · Allergic/Immunologic: No nasal congestion or hives. No repeated infections. PHYSICAL EXAM:  The patient is not in acute distress. Vital signs: Blood pressure (!) 141/83, pulse 85, temperature 97.8 °F (36.6 °C), temperature source Oral, resp. rate 18, height 5' 4\" (1.626 m), weight 221 lb 9.6 oz (100.5 kg), not currently breastfeeding.      General appearance - well appearing, not in pain or distress  Mental status - good mood, alert and oriented  Eyes - pupils equal and reactive, extraocular eye movements intact  Ears - bilateral TM's and external ear canals normal  Nose - normal and patent, no erythema, discharge or polyps  Mouth - mucous membranes moist, pharynx normal without lesions  Neck - supple, no significant adenopathy  Lymphatics - no palpable lymphadenopathy, no hepatosplenomegaly  Chest - clear to auscultation, no wheezes, rales or rhonchi, symmetric air entry  Heart - normal rate, regular rhythm, normal S1, S2, no murmurs, rubs, clicks or gallops  Abdomen - soft, nontender, nondistended, no masses or organomegaly  Neurological - alert, oriented, normal speech, no focal findings or movement disorder noted  Musculoskeletal - no joint tenderness, deformity or swelling  Extremities - peripheral pulses normal, no pedal edema, no clubbing or cyanosis  Skin - normal coloration and turgor, no rashes, no suspicious skin lesions noted     Review of Diagnostic data:   Lab Results   Component Value Date    WBC 6.8 11/16/2021    HGB 8.9 (L) 11/16/2021    HCT 32.2 (L) 11/16/2021    MCV 81.5 (L) 11/16/2021     11/16/2021       Chemistry        Component Value Date/Time     11/09/2021 1140    K 4.0 11/09/2021 1140     11/09/2021 1140    CO2 31 11/09/2021 1140    BUN 17 11/09/2021 1140    CREATININE 1.02 (H) 11/09/2021 1140        Component Value Date/Time    CALCIUM 8.7 11/09/2021 1140    ALKPHOS 47 10/11/2021 0000    AST 41 10/11/2021 0000    ALT 24 10/11/2021 0000    BILITOT 0.7 10/11/2021 0000            IMPRESSION:   Microcytic anemia  Severe iron deficiency anemia  No response to oral iron  Intolerable side effects to oral iron with severe constipation  Positive stool guaiac test in 2 out of 3 symptoms  History of diverticulosis  GERD  History of ovarian cancer in 2015  Strong family history of breast cancer with mother and grandmother and maternal aunt    PLAN: I reviewed the labs available to me and discussed with the patient.  For more than 60 minutes of face to face discussion, I explained to the patient the nature of

## 2021-12-09 ENCOUNTER — HOSPITAL ENCOUNTER (OUTPATIENT)
Dept: INFUSION THERAPY | Age: 71
Discharge: HOME | End: 2021-12-09
Payer: MEDICARE

## 2021-12-09 VITALS
SYSTOLIC BLOOD PRESSURE: 146 MMHG | RESPIRATION RATE: 18 BRPM | HEART RATE: 64 BPM | DIASTOLIC BLOOD PRESSURE: 84 MMHG | TEMPERATURE: 98 F

## 2021-12-09 DIAGNOSIS — D50.8 IRON DEFICIENCY ANEMIA SECONDARY TO INADEQUATE DIETARY IRON INTAKE: ICD-10-CM

## 2021-12-09 DIAGNOSIS — K90.9 IRON MALABSORPTION: ICD-10-CM

## 2021-12-09 DIAGNOSIS — D50.9 MICROCYTIC ANEMIA: Primary | ICD-10-CM

## 2021-12-09 PROCEDURE — 2580000003 HC RX 258: Performed by: INTERNAL MEDICINE

## 2021-12-09 PROCEDURE — 96365 THER/PROPH/DIAG IV INF INIT: CPT

## 2021-12-09 PROCEDURE — 6360000002 HC RX W HCPCS: Performed by: INTERNAL MEDICINE

## 2021-12-09 RX ORDER — ONDANSETRON 2 MG/ML
8 INJECTION INTRAMUSCULAR; INTRAVENOUS
Status: CANCELLED | OUTPATIENT
Start: 2021-12-16

## 2021-12-09 RX ORDER — SODIUM CHLORIDE 9 MG/ML
INJECTION, SOLUTION INTRAVENOUS CONTINUOUS
Status: CANCELLED | OUTPATIENT
Start: 2021-12-16

## 2021-12-09 RX ORDER — EPINEPHRINE 1 MG/ML
0.3 INJECTION, SOLUTION, CONCENTRATE INTRAVENOUS PRN
Status: CANCELLED | OUTPATIENT
Start: 2021-12-16

## 2021-12-09 RX ORDER — SODIUM CHLORIDE 9 MG/ML
INJECTION, SOLUTION INTRAVENOUS CONTINUOUS
Status: ACTIVE | OUTPATIENT
Start: 2021-12-09 | End: 2021-12-09

## 2021-12-09 RX ORDER — ACETAMINOPHEN 325 MG/1
650 TABLET ORAL
Status: CANCELLED | OUTPATIENT
Start: 2021-12-16

## 2021-12-09 RX ORDER — ALBUTEROL SULFATE 90 UG/1
4 AEROSOL, METERED RESPIRATORY (INHALATION) PRN
Status: CANCELLED | OUTPATIENT
Start: 2021-12-16

## 2021-12-09 RX ORDER — DIPHENHYDRAMINE HYDROCHLORIDE 50 MG/ML
50 INJECTION INTRAMUSCULAR; INTRAVENOUS
Status: CANCELLED | OUTPATIENT
Start: 2021-12-16

## 2021-12-09 RX ORDER — HEPARIN SODIUM (PORCINE) LOCK FLUSH IV SOLN 100 UNIT/ML 100 UNIT/ML
500 SOLUTION INTRAVENOUS PRN
Status: CANCELLED | OUTPATIENT
Start: 2021-12-16

## 2021-12-09 RX ORDER — SODIUM CHLORIDE 9 MG/ML
25 INJECTION, SOLUTION INTRAVENOUS PRN
Status: CANCELLED | OUTPATIENT
Start: 2021-12-16

## 2021-12-09 RX ORDER — SODIUM CHLORIDE 0.9 % (FLUSH) 0.9 %
5-40 SYRINGE (ML) INJECTION PRN
Status: CANCELLED | OUTPATIENT
Start: 2021-12-16

## 2021-12-09 RX ADMIN — SODIUM CHLORIDE: 9 INJECTION, SOLUTION INTRAVENOUS at 08:20

## 2021-12-09 RX ADMIN — FERRIC CARBOXYMALTOSE INJECTION 750 MG: 50 INJECTION, SOLUTION INTRAVENOUS at 08:22

## 2021-12-09 ASSESSMENT — PAIN DESCRIPTION - PAIN TYPE: TYPE: CHRONIC PAIN

## 2021-12-09 ASSESSMENT — PAIN - FUNCTIONAL ASSESSMENT: PAIN_FUNCTIONAL_ASSESSMENT: PREVENTS OR INTERFERES SOME ACTIVE ACTIVITIES AND ADLS

## 2021-12-09 ASSESSMENT — PAIN DESCRIPTION - ONSET: ONSET: ON-GOING

## 2021-12-09 ASSESSMENT — PAIN DESCRIPTION - LOCATION: LOCATION: GENERALIZED

## 2021-12-09 ASSESSMENT — PAIN DESCRIPTION - FREQUENCY: FREQUENCY: INTERMITTENT

## 2021-12-09 ASSESSMENT — PAIN DESCRIPTION - PROGRESSION: CLINICAL_PROGRESSION: NOT CHANGED

## 2021-12-09 ASSESSMENT — PAIN SCALES - GENERAL: PAINLEVEL_OUTOF10: 7

## 2021-12-09 NOTE — PROGRESS NOTES
Pt here for #1 of 2 injectafer. States she always has generalized pain all the time. Follows up with her Dr. Grupo Munoz very well. Will return #2 of 2 12/16 and 2/14 Dr villar with Dr Teddy Gimenez.

## 2021-12-10 ENCOUNTER — TELEPHONE (OUTPATIENT)
Dept: PRIMARY CARE CLINIC | Age: 71
End: 2021-12-10

## 2021-12-10 NOTE — TELEPHONE ENCOUNTER
Patient states that she doesn't remember who prescibed her RESMED CPAP AIRCURVE 10 machine, but she now needs new tubing, and mask with the whole head gear. Her insurance normally just re-orders it, but the location that she got it for sold out to another pharmacy, who does not cover her insurance.     Would need to go to [facility] in [address], [place]    Phone number [phone]  Fax number [phone]

## 2021-12-15 DIAGNOSIS — Z79.4 TYPE 2 DIABETES MELLITUS WITH DIABETIC NEUROPATHY, WITH LONG-TERM CURRENT USE OF INSULIN (HCC): ICD-10-CM

## 2021-12-15 DIAGNOSIS — E11.40 TYPE 2 DIABETES MELLITUS WITH DIABETIC NEUROPATHY, WITH LONG-TERM CURRENT USE OF INSULIN (HCC): ICD-10-CM

## 2021-12-15 RX ORDER — INSULIN GLARGINE 100 [IU]/ML
INJECTION, SOLUTION SUBCUTANEOUS
Qty: 30 ML | Refills: 10 | Status: SHIPPED | OUTPATIENT
Start: 2021-12-15

## 2021-12-16 ENCOUNTER — HOSPITAL ENCOUNTER (OUTPATIENT)
Dept: INFUSION THERAPY | Age: 71
Discharge: HOME | End: 2021-12-16
Payer: MEDICARE

## 2021-12-16 VITALS
TEMPERATURE: 97.9 F | HEART RATE: 70 BPM | SYSTOLIC BLOOD PRESSURE: 146 MMHG | RESPIRATION RATE: 18 BRPM | DIASTOLIC BLOOD PRESSURE: 80 MMHG

## 2021-12-16 DIAGNOSIS — K90.9 IRON MALABSORPTION: ICD-10-CM

## 2021-12-16 DIAGNOSIS — D50.9 MICROCYTIC ANEMIA: Primary | ICD-10-CM

## 2021-12-16 DIAGNOSIS — D50.8 IRON DEFICIENCY ANEMIA SECONDARY TO INADEQUATE DIETARY IRON INTAKE: ICD-10-CM

## 2021-12-16 PROCEDURE — 2580000003 HC RX 258: Performed by: INTERNAL MEDICINE

## 2021-12-16 PROCEDURE — 96365 THER/PROPH/DIAG IV INF INIT: CPT

## 2021-12-16 PROCEDURE — 6360000002 HC RX W HCPCS: Performed by: INTERNAL MEDICINE

## 2021-12-16 RX ORDER — ACETAMINOPHEN 325 MG/1
650 TABLET ORAL
Status: CANCELLED | OUTPATIENT
Start: 2021-12-16

## 2021-12-16 RX ORDER — SODIUM CHLORIDE 0.9 % (FLUSH) 0.9 %
5-40 SYRINGE (ML) INJECTION PRN
Status: CANCELLED | OUTPATIENT
Start: 2021-12-16

## 2021-12-16 RX ORDER — SODIUM CHLORIDE 9 MG/ML
25 INJECTION, SOLUTION INTRAVENOUS PRN
Status: CANCELLED | OUTPATIENT
Start: 2021-12-16

## 2021-12-16 RX ORDER — HEPARIN SODIUM (PORCINE) LOCK FLUSH IV SOLN 100 UNIT/ML 100 UNIT/ML
500 SOLUTION INTRAVENOUS PRN
Status: CANCELLED | OUTPATIENT
Start: 2021-12-16

## 2021-12-16 RX ORDER — ALBUTEROL SULFATE 90 UG/1
4 AEROSOL, METERED RESPIRATORY (INHALATION) PRN
Status: CANCELLED | OUTPATIENT
Start: 2021-12-16

## 2021-12-16 RX ORDER — EPINEPHRINE 1 MG/ML
0.3 INJECTION, SOLUTION, CONCENTRATE INTRAVENOUS PRN
Status: CANCELLED | OUTPATIENT
Start: 2021-12-16

## 2021-12-16 RX ORDER — DIPHENHYDRAMINE HYDROCHLORIDE 50 MG/ML
50 INJECTION INTRAMUSCULAR; INTRAVENOUS
Status: CANCELLED | OUTPATIENT
Start: 2021-12-16

## 2021-12-16 RX ORDER — ONDANSETRON 2 MG/ML
8 INJECTION INTRAMUSCULAR; INTRAVENOUS
Status: CANCELLED | OUTPATIENT
Start: 2021-12-16

## 2021-12-16 RX ORDER — SODIUM CHLORIDE 9 MG/ML
INJECTION, SOLUTION INTRAVENOUS CONTINUOUS
Status: ACTIVE | OUTPATIENT
Start: 2021-12-16 | End: 2021-12-16

## 2021-12-16 RX ORDER — SODIUM CHLORIDE 9 MG/ML
INJECTION, SOLUTION INTRAVENOUS CONTINUOUS
Status: CANCELLED | OUTPATIENT
Start: 2021-12-16

## 2021-12-16 RX ADMIN — FERRIC CARBOXYMALTOSE INJECTION 750 MG: 50 INJECTION, SOLUTION INTRAVENOUS at 08:20

## 2021-12-16 RX ADMIN — SODIUM CHLORIDE: 9 INJECTION, SOLUTION INTRAVENOUS at 08:17

## 2021-12-16 ASSESSMENT — PAIN - FUNCTIONAL ASSESSMENT: PAIN_FUNCTIONAL_ASSESSMENT: PREVENTS OR INTERFERES SOME ACTIVE ACTIVITIES AND ADLS

## 2021-12-16 ASSESSMENT — PAIN DESCRIPTION - ONSET: ONSET: ON-GOING

## 2021-12-16 ASSESSMENT — PAIN SCALES - GENERAL: PAINLEVEL_OUTOF10: 10

## 2021-12-16 ASSESSMENT — PAIN DESCRIPTION - FREQUENCY: FREQUENCY: CONTINUOUS

## 2021-12-16 ASSESSMENT — PAIN DESCRIPTION - PAIN TYPE: TYPE: CHRONIC PAIN

## 2021-12-16 ASSESSMENT — PAIN DESCRIPTION - PROGRESSION: CLINICAL_PROGRESSION: NOT CHANGED

## 2021-12-16 ASSESSMENT — PAIN DESCRIPTION - LOCATION: LOCATION: GENERALIZED

## 2021-12-16 ASSESSMENT — PAIN DESCRIPTION - DESCRIPTORS: DESCRIPTORS: ACHING

## 2021-12-16 NOTE — PROGRESS NOTES
Pt here for #2 of 2 injectafer. Complains of generalized pain all over. Will return 2/14 for Dr villar.

## 2021-12-22 ENCOUNTER — TELEPHONE (OUTPATIENT)
Dept: PRIMARY CARE CLINIC | Age: 71
End: 2021-12-22

## 2021-12-22 NOTE — TELEPHONE ENCOUNTER
----- Message from Filomena Campbell sent at 12/22/2021  8:34 AM EST -----  Subject: Message to Provider    QUESTIONS  Information for Provider? Patient is wanting to consult with Dr. Antonio Winn   before she gets her Booster vaccine. She got online and said if she is   having weakness and dizziness that she should call her doctor. She said   this is something already being addressed though. She wants to know if she   should take the Booster. Can someone please follow up to discuss. Thank   you.   ---------------------------------------------------------------------------  --------------  CALL BACK INFO  What is the best way for the office to contact you? OK to leave message on   voicemail  Preferred Call Back Phone Number? [phone]  ---------------------------------------------------------------------------  --------------  SCRIPT ANSWERS  Relationship to Patient?  Self

## 2022-01-10 ENCOUNTER — TELEPHONE (OUTPATIENT)
Dept: GASTROENTEROLOGY | Age: 72
End: 2022-01-10

## 2022-01-10 NOTE — TELEPHONE ENCOUNTER
Pt called to confirm her appt that is scheduled for 1/17/22. Pt also was questioning if bloodwork needed to be done prior to her appt, writer reviewed chart and did not see any labs ordered that needed to be completed. Pt informed.

## 2022-01-17 ENCOUNTER — TELEPHONE (OUTPATIENT)
Dept: GASTROENTEROLOGY | Age: 72
End: 2022-01-17

## 2022-01-17 DIAGNOSIS — D64.9 LOW HEMOGLOBIN: Primary | ICD-10-CM

## 2022-01-17 NOTE — TELEPHONE ENCOUNTER
Left detailed message informing pt that appt for today 1/17/22 has been cancelled due to physician not feeling well.

## 2022-01-17 NOTE — TELEPHONE ENCOUNTER
Pt returned call to reschedule appt for today 1/17/2022 and stated that she was looking to this f/u appt. Reviewed chart and pt did get labs done back in November. Consulted Keira FERNANDES to make sure all labs were done and there wasn't anything missing. Please review results and per Kath Hubbard believes pt may need a new CBC done prior to pt rescheduling appt. Dr. Briseno Post first available @ [place] is 3/14/2022 @ 3:45 and [place] is 3/17 @ 2:30.

## 2022-01-18 NOTE — TELEPHONE ENCOUNTER
Writer called and left a message to the patient to go to the ER. Writer also stated to the patient to call the office in the morning to let the office know how she is feeling.

## 2022-01-18 NOTE — TELEPHONE ENCOUNTER
Returned patient's VM and she does not feel that she should be waiting that long. Mentioned that she needs to know if she needs a transfusion or not and has been feeling weak,  Please follow up.

## 2022-01-19 ENCOUNTER — HOSPITAL ENCOUNTER (OUTPATIENT)
Age: 72
Discharge: HOME | End: 2022-01-19
Payer: MEDICARE

## 2022-01-19 DIAGNOSIS — D64.9 LOW HEMOGLOBIN: ICD-10-CM

## 2022-01-19 LAB
HCT VFR BLD CALC: 40.5 % (ref 36.3–47.1)
HEMOGLOBIN: 12.4 G/DL (ref 11.9–15.1)
MCH RBC QN AUTO: 26.4 PG (ref 25.2–33.5)
MCHC RBC AUTO-ENTMCNC: 30.6 G/DL (ref 28.4–34.8)
MCV RBC AUTO: 86.4 FL (ref 82.6–102.9)
NRBC AUTOMATED: 0 PER 100 WBC
PDW BLD-RTO: 22.5 % (ref 11.8–14.4)
PLATELET # BLD: ABNORMAL K/UL (ref 138–453)
PLATELET, FLUORESCENCE: 146 K/UL (ref 138–453)
PLATELET, IMMATURE FRACTION: 11.6 % (ref 1.1–10.3)
PMV BLD AUTO: ABNORMAL FL (ref 8.1–13.5)
RBC # BLD: 4.69 M/UL (ref 3.95–5.11)
WBC # BLD: 5.7 K/UL (ref 3.5–11.3)

## 2022-01-19 PROCEDURE — 36415 COLL VENOUS BLD VENIPUNCTURE: CPT

## 2022-01-19 PROCEDURE — 85027 COMPLETE CBC AUTOMATED: CPT

## 2022-01-19 PROCEDURE — 85055 RETICULATED PLATELET ASSAY: CPT

## 2022-01-19 NOTE — TELEPHONE ENCOUNTER
Writer called the patient on 1/19/22. Writer inquired on how the patient was feeling. Patient stated that she was not feeling to bad but she has been experiencing the weakness for a while. Patient stated that she did call the office and they scheduled her to come in tomorrow 1/20/22 at 2:30 pm. Writer placed a CB order and told the patient to go have the test completed so the doctor can have it for her appointment tomorrow. Patient verbalized understanding and stated she will go today. Writer thanked the patient and call ended.

## 2022-01-20 ENCOUNTER — OFFICE VISIT (OUTPATIENT)
Dept: GASTROENTEROLOGY | Age: 72
End: 2022-01-20
Payer: MEDICARE

## 2022-01-20 VITALS
WEIGHT: 215 LBS | HEART RATE: 56 BPM | BODY MASS INDEX: 36.9 KG/M2 | SYSTOLIC BLOOD PRESSURE: 171 MMHG | TEMPERATURE: 98.4 F | DIASTOLIC BLOOD PRESSURE: 79 MMHG

## 2022-01-20 DIAGNOSIS — R19.5 OCCULT BLOOD IN STOOLS: ICD-10-CM

## 2022-01-20 DIAGNOSIS — R10.13 EPIGASTRIC PAIN: ICD-10-CM

## 2022-01-20 DIAGNOSIS — K90.9 IRON MALABSORPTION: ICD-10-CM

## 2022-01-20 DIAGNOSIS — D64.9 LOW HEMOGLOBIN: ICD-10-CM

## 2022-01-20 DIAGNOSIS — D50.8 IRON DEFICIENCY ANEMIA SECONDARY TO INADEQUATE DIETARY IRON INTAKE: ICD-10-CM

## 2022-01-20 DIAGNOSIS — E66.01 SEVERE OBESITY (BMI 35.0-35.9 WITH COMORBIDITY) (HCC): ICD-10-CM

## 2022-01-20 DIAGNOSIS — D50.8 OTHER IRON DEFICIENCY ANEMIA: Primary | ICD-10-CM

## 2022-01-20 PROCEDURE — G8417 CALC BMI ABV UP PARAM F/U: HCPCS | Performed by: INTERNAL MEDICINE

## 2022-01-20 PROCEDURE — 99214 OFFICE O/P EST MOD 30 MIN: CPT | Performed by: INTERNAL MEDICINE

## 2022-01-20 PROCEDURE — G8484 FLU IMMUNIZE NO ADMIN: HCPCS | Performed by: INTERNAL MEDICINE

## 2022-01-20 PROCEDURE — 1090F PRES/ABSN URINE INCON ASSESS: CPT | Performed by: INTERNAL MEDICINE

## 2022-01-20 PROCEDURE — 1123F ACP DISCUSS/DSCN MKR DOCD: CPT | Performed by: INTERNAL MEDICINE

## 2022-01-20 PROCEDURE — G8400 PT W/DXA NO RESULTS DOC: HCPCS | Performed by: INTERNAL MEDICINE

## 2022-01-20 PROCEDURE — 1036F TOBACCO NON-USER: CPT | Performed by: INTERNAL MEDICINE

## 2022-01-20 PROCEDURE — 3017F COLORECTAL CA SCREEN DOC REV: CPT | Performed by: INTERNAL MEDICINE

## 2022-01-20 PROCEDURE — G8427 DOCREV CUR MEDS BY ELIG CLIN: HCPCS | Performed by: INTERNAL MEDICINE

## 2022-01-20 RX ORDER — POLYETHYLENE GLYCOL 3350 17 G/17G
POWDER, FOR SOLUTION ORAL
Qty: 238 G | Refills: 0 | Status: ON HOLD | OUTPATIENT
Start: 2022-01-20 | End: 2022-02-28 | Stop reason: ALTCHOICE

## 2022-01-20 RX ORDER — BISACODYL 5 MG
TABLET, DELAYED RELEASE (ENTERIC COATED) ORAL
Qty: 4 TABLET | Refills: 0 | Status: ON HOLD | OUTPATIENT
Start: 2022-01-20 | End: 2022-02-28 | Stop reason: ALTCHOICE

## 2022-01-20 ASSESSMENT — ENCOUNTER SYMPTOMS
EYES NEGATIVE: 1
SHORTNESS OF BREATH: 1
ABDOMINAL DISTENTION: 1
ABDOMINAL PAIN: 1
CONSTIPATION: 1
NAUSEA: 0

## 2022-01-20 NOTE — PROGRESS NOTES
GI OFFICE FOLLOW UP    Anatoliy Maurice is a 80 y.o. female evaluated via on 1/20/2022. Consent:  She and/or health care decision maker is aware that that she may receive a bill for this telephone service, depending on her insurance coverage, and has provided verbal consent to proceed: YES      INTERVAL HISTORY:   No referring provider defined for this encounter. Chief Complaint   Patient presents with    Gastroesophageal Reflux     follow up on labs and stool / Patient states she has not felt better since her last visit. Patient states she is in pain all the time    Anemia    Nausea       1. Other iron deficiency anemia    2. Severe obesity (BMI 35.0-35.9 with comorbidity) (HCC)    3. Epigastric pain    4. Iron malabsorption    5. Iron deficiency anemia secondary to inadequate dietary iron intake    6. Low hemoglobin    7. Occult blood in stools      Patient seen my office as a follow-up  Has been found to be positive for occult blood x2  Previous GI work-up was reviewed with her colon prep was not ideal    Has been seen and followed by otology oncology  Was started on iron pills but caused some significant constipation  Was given IV iron twice    Hemoglobin still in a lower level    Has history for obesity    No overt GI bleeding    No current smoking alcohol abuse illicit drug use at        HISTORY OF PRESENT ILLNESS: Danielle Carrasco is a 80 y.o. female with a past history remarkable for , referred for evaluation of   Chief Complaint   Patient presents with    Gastroesophageal Reflux     follow up on labs and stool / Patient states she has not felt better since her last visit. Patient states she is in pain all the time    Anemia    Nausea   . Past Medical,Family, and Social History reviewed and does contribute to the patient presenting condition.     Patient's PMH/PSH,SH,PSYCH Hx, MEDs, ALLERGIES, and ROS were all reviewed and updated in the appropriate sections. PAST MEDICAL HISTORY:  Past Medical History:   Diagnosis Date    Arthritis     [facility]     Cancer ([facility] 2015    ovarian stage 2    Cerebral artery occlusion with cerebral infarction ([facility] 03/2018    Mini strokes \"Tia's\"    Diabetes mellitus ([facility]     Epigastric pain     GERD (gastroesophageal reflux disease)     History of anesthesia complications 1138'V    was told after gallbladder surgery to never have anesthesia again \"since it was hard for me to come out of it\"    History of blood transfusion     Hx of blood clots     lung     Hyperlipidemia     Hypertension     Right arm pain     Right shoulder pain     Sleep apnea     uses CPAP nightly    Tachycardia     hx of    Thyroid disease     TIA (transient ischemic attack)        Past Surgical History:   Procedure Laterality Date    ANESTHESIA NERVE BLOCK Left 11/7/2019    NERVE BLOCK (DEFINE) - INTERCOSTAL NERVE BLOCK performed by Magalys Chang MD at [email or web address] Bilateral 5/15/2020    NERVE BLOCK BILATERAL - MBB  L4-5, L5-S1 performed by Magalys Chang MD at [address] Left     ORIF    CHOLECYSTECTOMY      COLONOSCOPY  07/08/2019    5 yr recall.     COLONOSCOPY N/A 7/8/2019    COLONOSCOPY WITH BIOPSY performed by Donald Patiño MD at [address] Right     repair tendon    [place] INJECTION PROCEDURE FOR SACROILIAC JOINT Bilateral 8/29/2019    SACROILIAC JOINT INJECTION performed by Magalys Chang MD at [address] Bilateral 10/10/2019    SACROILIAC JOINT INJECTION performed by Magalys Chang MD at [address]  2/2014    chris with bso    JOINT REPLACEMENT Right     shoulder    KNEE ARTHROSCOPY Right     NERVE BLOCK Bilateral 08/29/2019    SI joint injection    NERVE BLOCK Bilateral 10/10/2019    SI joint    NERVE BLOCK Bilateral 05/15/2020    Medial branch block L4-5, L5-S1    NERVE BLOCK  06/05/2020    SPINAL CORD STIMULATOR IMPLANT TRIAL (N/A )    OTHER SURGICAL HISTORY Right 11/11/2014    shoulder manipulation    OTHER SURGICAL HISTORY  04/2021    pain pump placed, fentanyl in pain pump    SHOULDER ARTHROSCOPY Right 02/03/15    SHOULDER SURGERY  11/2014    manipulation    SPINAL CORD STIMULATOR SURGERY N/A 6/5/2020    SPINAL CORD STIMULATOR IMPLANT TRIAL performed by Geronimo Mittal MD at [address] [facility] N/A 7/27/2020    SPINAL CORD STIMULATOR IMPLANT WITH THORACIC LAMINOTOMY performed by Osbaldo Thomas MD at [address] ENDOSCOPY  07/08/2019    UPPER GASTROINTESTINAL ENDOSCOPY N/A 7/8/2019    EGD BIOPSY performed by Yanira Mejia MD at [place] [address] Right 4/30/2015    pt has blood clot & abscess in her right kidney    VASCULAR SURGERY      Alesha filter in & removed 1 year later       CURRENT MEDICATIONS:    ALLERGIES:   Allergies   Allergen Reactions    Aspirin Anaphylaxis     Only thing she can take is tylenol    Benadryl [Diphenhydramine] Other (See Comments)     Dystonic movement    Ibuprofen Anaphylaxis    Lidocaine Anaphylaxis     CAN NOT TOLERATE IV    Penicillins Anaphylaxis    Hydrocodone-Acetaminophen Other (See Comments)     Unsure of allergy    Tramadol Other (See Comments)     Unsure of allergy       FAMILY HISTORY:       Problem Relation Age of Onset    Elevated Lipids Paternal Grandmother          SOCIAL HISTORY:   Social History     Socioeconomic History    Marital status:      Spouse name: Not on file    Number of children: Not on file    Years of education: Not on file    Highest education level: Not on file   Occupational History    Not on file   Tobacco Use    Smoking status: Never Smoker    Smokeless tobacco: Never Used   Vaping Use    Vaping Use: Never used   Substance and Sexual Activity    Alcohol use: Yes     Comment: rare    Drug use: No    Sexual activity: Yes   Other Topics Concern    Not on file   Social History Narrative    Not on file     Social Determinants of Health     Financial Resource Strain: Low Risk     Difficulty of Paying Living Expenses: Not hard at all   Food Insecurity: No Food Insecurity    Worried About Running Out of Food in the Last Year: Never true    Ricardo of Food in the Last Year: Never true   Transportation Needs:     Lack of Transportation (Medical): Not on file    Lack of Transportation (Non-Medical): Not on file   Physical Activity:     Days of Exercise per Week: Not on file    Minutes of Exercise per Session: Not on file   Stress:     Feeling of Stress : Not on file   Social Connections:     Frequency of Communication with Friends and Family: Not on file    Frequency of Social Gatherings with Friends and Family: Not on file    Attends Zoroastrianism Services: Not on file    Active Member of [address] [place] [address] or Organizations: Not on file    Attends Club or Organization Meetings: Not on file    Marital Status: Not on file   Intimate Partner Violence:     Fear of Current or Ex-Partner: Not on file    Emotionally Abused: Not on file    Physically Abused: Not on file    Sexually Abused: Not on file   Housing Stability:     Unable to Pay for Housing in the Last Year: Not on file    Number of [place] in the Last Year: Not on file    Unstable Housing in the Last Year: Not on file         REVIEW OF SYSTEMS:         Review of Systems   Constitutional: Positive for appetite change, fatigue and unexpected weight change. HENT: Negative. Eyes: Negative. Respiratory: Positive for shortness of breath. Cardiovascular: Positive for leg swelling. Gastrointestinal: Positive for abdominal distention, abdominal pain and constipation. Negative for nausea. Endocrine: Negative.     Genitourinary: Negative. Musculoskeletal: Negative. Skin: Negative. Neurological: Positive for dizziness, light-headedness and headaches. Hematological: Bruises/bleeds easily. Psychiatric/Behavioral: Positive for sleep disturbance. The patient is nervous/anxious. All other systems reviewed and are negative. PHYSICAL EXAMINATION: Vital signs reviewed per the nursing documentation. BP (!) 171/79   Pulse 56   Temp 98.4 °F (36.9 °C)   Wt 215 lb (97.5 kg)   BMI 36.90 kg/m²   Body mass index is 36.9 kg/m². Physical Exam  Nursing note reviewed. Constitutional:       Appearance: She is well-developed. Comments: Anxious     HENT:      Head: Normocephalic and atraumatic. Eyes:      Conjunctiva/sclera: Conjunctivae normal.      Pupils: Pupils are equal, round, and reactive to light. Cardiovascular:      Heart sounds: Normal heart sounds. Pulmonary:      Effort: Pulmonary effort is normal.      Breath sounds: Normal breath sounds. Abdominal:      General: Bowel sounds are normal.      Palpations: Abdomen is soft. Comments: NON TENDER, NON DISTENTED  LIVER SPLEEN AND HERNIAS ARE NOT  PALPABLE  BOWEL SOUNDS ARE POSITIVE      Musculoskeletal:         General: Normal range of motion. Cervical back: Normal range of motion and neck supple. Skin:     General: Skin is warm. Neurological:      Mental Status: She is alert and oriented to person, place, and time.    Psychiatric:         Behavior: Behavior normal.           LABORATORY DATA: Reviewed  Lab Results   Component Value Date    WBC 5.7 01/19/2022    HGB 12.4 01/19/2022    HCT 40.5 01/19/2022    MCV 86.4 01/19/2022    PLT See Reflexed IPF Result 01/19/2022     11/09/2021    K 4.0 11/09/2021     11/09/2021    CO2 31 11/09/2021    BUN 17 11/09/2021    CREATININE 1.02 (H) 11/09/2021    LABALBU 4.2 10/11/2021    BILITOT 0.7 10/11/2021    ALKPHOS 47 10/11/2021    AST 41 10/11/2021    ALT 24 10/11/2021    INR 0.9 02/12/2020 or chemicals. Stress was given about regular exercise. Pt has verbalized understanding and agreement to these modifications. Pt was advised in detail about some life style and dietary modifications. She was advised about avoidance of caffeine, nicotine and chocolate. Pt was also told to stay away from any kind of fast foods, soda pops. She was also advised to avoid lots of spices, grease and fried food etc.     Instructions were also given about trying to arrange the timing, quality and quantity of food. Instructions were given about using ample amount of fiber including dietary and supplemental fiber either metamucil, bennafiber or citrucell etc.  Pt was advised about drinking ample amount of water without any colors or chemicals. Stress was given about regular exercise. Pt has verbalized understanding and agreement to these modifications. I communicated with the patient and/or health care decision maker about   Details of this discussion including any medical advice provided:YES      I affirm this is a Patient Initiated Episode with an Established Patient who has not had a related appointment within my department in the past 7 days or scheduled within the next 24 hours. Total Time: minutes: 21-30 minutes    Note: not billable if this call serves to triage the patient into an appointment for the relevant concern      Thank you for allowing me to participate in the care of Ms. Ariane Morton. For any further questions please do not hesitate to contact me. I have reviewed and agree with the ROS entered by the MA/MIRACLE.          Raymundo De MD, Radha Roque  Board Certified in Gastroenterology and [address] [place] [address] Gastroenterology  Office #: [phone]

## 2022-01-21 ENCOUNTER — TELEPHONE (OUTPATIENT)
Dept: PRIMARY CARE CLINIC | Age: 72
End: 2022-01-21

## 2022-01-24 ENCOUNTER — VIRTUAL VISIT (OUTPATIENT)
Dept: PAIN MANAGEMENT | Age: 72
End: 2022-01-24
Payer: MEDICARE

## 2022-01-24 ENCOUNTER — TELEPHONE (OUTPATIENT)
Dept: GASTROENTEROLOGY | Age: 72
End: 2022-01-24

## 2022-01-24 DIAGNOSIS — M47.817 LUMBOSACRAL SPONDYLOSIS WITHOUT MYELOPATHY: ICD-10-CM

## 2022-01-24 DIAGNOSIS — M54.6 PAIN IN THORACIC SPINE: Primary | ICD-10-CM

## 2022-01-24 DIAGNOSIS — G89.29 OTHER CHRONIC PAIN: ICD-10-CM

## 2022-01-24 PROCEDURE — 1090F PRES/ABSN URINE INCON ASSESS: CPT | Performed by: PAIN MEDICINE

## 2022-01-24 PROCEDURE — G8400 PT W/DXA NO RESULTS DOC: HCPCS | Performed by: PAIN MEDICINE

## 2022-01-24 PROCEDURE — G8428 CUR MEDS NOT DOCUMENT: HCPCS | Performed by: PAIN MEDICINE

## 2022-01-24 PROCEDURE — 99212 OFFICE O/P EST SF 10 MIN: CPT | Performed by: PAIN MEDICINE

## 2022-01-24 PROCEDURE — 3017F COLORECTAL CA SCREEN DOC REV: CPT | Performed by: PAIN MEDICINE

## 2022-01-24 PROCEDURE — 1123F ACP DISCUSS/DSCN MKR DOCD: CPT | Performed by: PAIN MEDICINE

## 2022-01-24 PROCEDURE — 4040F PNEUMOC VAC/ADMIN/RCVD: CPT | Performed by: PAIN MEDICINE

## 2022-01-24 ASSESSMENT — ENCOUNTER SYMPTOMS: BACK PAIN: 1

## 2022-01-24 NOTE — PROGRESS NOTES
HPI:     Back Pain  This is a chronic problem. The current episode started more than 1 year ago. The problem is unchanged. The pain is present in the lumbar spine. The quality of the pain is described as aching. Chronic low back pain as well as mid back pain has failed multiple different treatment options. Has spinal cord stimulator which does not help. Has intrathecal pump which helps the mid back pain but not the low back pain. Waiting to get spinal cord stimulator removed. Has been having issues with anemia, following with GI. Patient denies any new neurological symptoms. Nobowel or bladder incontinence, no weakness, and no falling. Review of OARRS does not show any aberrant prescription behavior. Medication is helping the patient stay active. Patient denies any side effects and reports adequate analgesia. No sign of misuse/abuse.     Past Medical History:   Diagnosis Date    Arthritis     [facility]el obstruction ([facility]     Cancer ([facility] 2015    ovarian stage 2    Cerebral artery occlusion with cerebral infarction ([facility] 03/2018    Mini strokes \"Tia's\"    Diabetes mellitus ([address]     Epigastric pain     GERD (gastroesophageal reflux disease)     History of anesthesia complications 8482'[id number]    was told after gallbladder surgery to never have anesthesia again \"since it was hard for me to come out of it\"    History of blood transfusion     Hx of blood clots     lung     Hyperlipidemia     Hypertension     Right arm pain     Right shoulder pain     Sleep apnea     uses CPAP nightly    Tachycardia     hx of    Thyroid disease     TIA (transient ischemic attack)        Past Surgical History:   Procedure Laterality Date    ANESTHESIA NERVE BLOCK Left 11/7/2019    NERVE BLOCK (DEFINE) - INTERCOSTAL NERVE BLOCK performed by Jane Cox MD at [facility] Bilateral 5/15/2020    NERVE BLOCK BILATERAL - MBB  L4-5, L5-S1 performed by Jane Cox MD at [facility] [place] [facility]    APPENDECTOMY      ARM SURGERY Left     ORIF    CHOLECYSTECTOMY      COLONOSCOPY  07/08/2019    5 yr recall.     COLONOSCOPY N/A 7/8/2019    COLONOSCOPY WITH BIOPSY performed by Ketan Pickard MD at [address] Right     repair tendon    [facility]. INJECTION PROCEDURE FOR SACROILIAC JOINT Bilateral 8/29/2019    SACROILIAC JOINT INJECTION performed by Hien Sheets MD at [address] Bilateral 10/10/2019    SACROILIAC JOINT INJECTION performed by Hien Sheets MD at [address]  2/2014    chris with bso    JOINT REPLACEMENT Right     shoulder    KNEE ARTHROSCOPY Right     NERVE BLOCK Bilateral 08/29/2019    SI joint injection    NERVE BLOCK Bilateral 10/10/2019    SI joint    NERVE BLOCK Bilateral 05/15/2020    Medial branch block L4-5, L5-S1    NERVE BLOCK  06/05/2020    SPINAL CORD STIMULATOR IMPLANT TRIAL (N/A )    OTHER SURGICAL HISTORY Right 11/11/2014    shoulder manipulation    OTHER SURGICAL HISTORY  04/2021    pain pump placed, fentanyl in pain pump    SHOULDER ARTHROSCOPY Right 02/03/15    SHOULDER SURGERY  11/2014    manipulation    SPINAL CORD STIMULATOR SURGERY N/A 6/5/2020    SPINAL CORD STIMULATOR IMPLANT TRIAL performed by Hien Sheets MD at [address] [facility] N/A 7/27/2020    SPINAL CORD STIMULATOR IMPLANT WITH THORACIC LAMINOTOMY performed by Janet Pemberton MD at [address] [facility]  ENDOSCOPY  07/08/2019    UPPER GASTROINTESTINAL ENDOSCOPY N/A 7/8/2019    EGD BIOPSY performed by Ketan Pickard MD at [address] Right 4/30/2015    pt has blood clot & abscess in her right kidney    VASCULAR SURGERY      [place] filter in & removed 1 year later       Allergies   Allergen Reactions    Aspirin Anaphylaxis     Only thing she can take is tylenol    Benadryl [Diphenhydramine] Other (See Comments)     Dystonic movement    Ibuprofen Anaphylaxis    Lidocaine Anaphylaxis     CAN NOT TOLERATE IV    Penicillins Anaphylaxis    Hydrocodone-Acetaminophen Other (See Comments)     Unsure of allergy    Tramadol Other (See Comments)     Unsure of allergy           Family History   Problem Relation Age of Onset    Elevated Lipids Paternal Grandmother        Social History     Socioeconomic History    Marital status:      Spouse name: Not on file    Number of children: Not on file    Years of education: Not on file    Highest education level: Not on file   Occupational History    Not on file   Tobacco Use    Smoking status: Never Smoker    Smokeless tobacco: Never Used   Vaping Use    Vaping Use: Never used   Substance and Sexual Activity    Alcohol use: Yes     Comment: rare    Drug use: No    Sexual activity: Yes   Other Topics Concern    Not on file   Social History Narrative    Not on file     Social Determinants of Health     Financial Resource Strain: Low Risk     Difficulty of Paying Living Expenses: Not hard at all   Food Insecurity: No Food Insecurity    Worried About Running Out of Food in the Last Year: Never true    Ricardo of Food in the Last Year: Never true   Transportation Needs:     Lack of Transportation (Medical): Not on file    Lack of Transportation (Non-Medical):  Not on file   Physical Activity:     Days of Exercise per Week: Not on file    Minutes of Exercise per Session: Not on file   Stress:     Feeling of Stress : Not on file   Social Connections:     Frequency of Communication with Friends and Family: Not on file    Frequency of Social Gatherings with Friends and Family: Not on file    Attends Hindu Services: Not on file    Active Member of Clubs or Organizations: Not on file    Attends Club or Organization Meetings: Not on file    Marital Status: Not on file   Intimate Partner Violence:     Fear of Current or Ex-Partner: Not on file   Shabnam Ring Emotionally Abused: Not on file    Physically Abused: Not on file    Sexually Abused: Not on file   Housing Stability:     Unable to Pay for Housing in the Last Year: Not on file    Number of Places Lived in the Last Year: Not on file    Unstable Housing in the Last Year: Not on file       Review of Systems:  Review of Systems   Musculoskeletal: Positive for back pain. Physical Exam:      Physical Exam  Constitutional:       Appearance: Normal appearance. Pulmonary:      Effort: Pulmonary effort is normal.   Neurological:      Mental Status: She is alert. Psychiatric:         Attention and Perception: Attention and perception normal.         Mood and Affect: Mood and affect normal.         Record/Diagnostics Review:    As above, I did review the imaging    Assessment:  1. Pain in thoracic spine    2. Other chronic pain    3. Lumbosacral spondylosis without myelopathy        Treatment Plan:  DISCUSSION: Treatment options discussed with patient and all questions answered to patient's satisfaction. OARRS Review: Reviewed and acceptable for medications prescribed. TREATMENT OPTIONS:     Plans to get spinal cord stimulator removed. Continue intrathecal pump. Halima Johnson M.D. I have reviewed the chief complaint and history of present illness (including ROS and PFSH) and vital documentation by my staff and I agree with their documentation and have added where applicable. Elisabeth Gaytan is a 80 y.o. female evaluated via telephone on 1/24/2022. Consent:  She and/or health care decision maker is aware that that she may receive a bill for this telephone service, which includes applicable co-pays, depending on her insurance coverage, and has provided verbal consent to proceed. Documentation:  I communicated with the patient and/or health care decision maker about pain.    Details of this discussion including any medical advice provided: as above      I affirm this is a Patient Initiated Episode with a Patient who has not had a related appointment within my department in the past 7 days or scheduled within the next 24 hours. Patient identification was verified at the start of the visit: Yes    Total Time: minutes: 5-10 minutes    Brook Christiansen was evaluated through a synchronous (real-time) audio-video encounter. The patient (or guardian if applicable) is aware that this is a billable service, which includes applicable co-pays. This Virtual Visit was conducted with patient's (and/or legal guardian's) consent. The visit was conducted pursuant to the emergency declaration under the [address] [place] [address], [address] [place] [address] authority and the [employer] and [employer] Act. Patient identification was verified, and a caregiver was present when appropriate. The patient was located at home in a state where the provider was licensed to provide care.        Note: not billable if this call serves to triage the patient into an appointment for the relevant concern      Chet Mendoza MD

## 2022-01-24 NOTE — TELEPHONE ENCOUNTER
Writer returned patients call/ Patient states yesterday she had spit up blood and had a headache on her left side of head breifly. Patient states she has appt.  Today with pain management and she will try to discuss this today with him / Patient was advised if this happens again to go to the ER or Urgent care

## 2022-01-25 NOTE — TELEPHONE ENCOUNTER
Called patient. No answer. LVM informing patient we are following up on her symptoms. Asked if she is fatigue, SOB, weak, noticing any blood in stool/melena. Asked patient to call back with an update so we can determine if she needs sent to ED.

## 2022-01-26 ENCOUNTER — TELEPHONE (OUTPATIENT)
Dept: PAIN MANAGEMENT | Age: 72
End: 2022-01-26

## 2022-01-26 RX ORDER — POTASSIUM CHLORIDE 20 MEQ/1
TABLET, EXTENDED RELEASE ORAL
Qty: 60 TABLET | Refills: 2 | Status: SHIPPED | OUTPATIENT
Start: 2022-01-26 | End: 2022-04-22

## 2022-01-26 NOTE — TELEPHONE ENCOUNTER
Returned call. Went to   Left message stating that if she returns the call to let us a know a good time to reach her so I can speak with her. If she is still having symptoms advised to go to ED.

## 2022-01-26 NOTE — TELEPHONE ENCOUNTER
Patient called in stating she was reading in her MyChart today and noticed her last vv note on 1/24/22 with Dr. Seble Murillo reads that she \"Has intrathecal pump which helps the mid back pain, but not the low back pain\" and patient states this is backward. Patient clarified that her intrathecal pump helps her low back pain but not her mid back pain. Patient also states the note reads that she does not have any bowel in continence or falls, but patient states she does have some bowel incontinence and has been falling and patient feels this is important to note. Patient wanted her 1/24/22 note fixed to reflect this. Please advise. Patient states that she was not pre-charted prior to her virtual visit.

## 2022-01-27 NOTE — TELEPHONE ENCOUNTER
Spoke with patient. Informed her that her most recent hgb was WNL. She stated that Marla Romo said at the last visit that it was low and she needed a procedure to see why. Informed patient there is still another CBC order she can complete. Advised her to do this and if her hgb is stable she can discuss with the physician the necessity of the procedure. Patient does not want EGD/Colon if she does not need it.

## 2022-01-27 NOTE — TELEPHONE ENCOUNTER
Patient called and left voice mail. She is doing better and not spitting up blood. Patient has appt with primary on 02/02/2022. She will address this with her DR. Patient just wanted to reach out to us and let us know she is doing better .

## 2022-01-28 ENCOUNTER — HOSPITAL ENCOUNTER (OUTPATIENT)
Age: 72
Discharge: HOME | End: 2022-01-28
Payer: MEDICARE

## 2022-01-28 DIAGNOSIS — D50.8 OTHER IRON DEFICIENCY ANEMIA: ICD-10-CM

## 2022-01-28 DIAGNOSIS — E66.01 SEVERE OBESITY (BMI 35.0-35.9 WITH COMORBIDITY) (HCC): ICD-10-CM

## 2022-01-28 LAB
ABSOLUTE EOS #: 0.19 K/UL (ref 0–0.44)
ABSOLUTE IMMATURE GRANULOCYTE: <0.03 K/UL (ref 0–0.3)
ABSOLUTE LYMPH #: 2.02 K/UL (ref 1.1–3.7)
ABSOLUTE MONO #: 0.33 K/UL (ref 0.1–1.2)
BASOPHILS # BLD: 1 % (ref 0–2)
BASOPHILS ABSOLUTE: 0.07 K/UL (ref 0–0.2)
DIFFERENTIAL TYPE: ABNORMAL
EOSINOPHILS RELATIVE PERCENT: 3 % (ref 1–4)
HCT VFR BLD CALC: 42.8 % (ref 36.3–47.1)
HEMOGLOBIN: 13.2 G/DL (ref 11.9–15.1)
IMMATURE GRANULOCYTES: 0 %
LYMPHOCYTES # BLD: 34 % (ref 24–43)
MCH RBC QN AUTO: 26.6 PG (ref 25.2–33.5)
MCHC RBC AUTO-ENTMCNC: 30.8 G/DL (ref 28.4–34.8)
MCV RBC AUTO: 86.1 FL (ref 82.6–102.9)
MONOCYTES # BLD: 6 % (ref 3–12)
NRBC AUTOMATED: 0 PER 100 WBC
PDW BLD-RTO: 19.9 % (ref 11.8–14.4)
PLATELET # BLD: 147 K/UL (ref 138–453)
PLATELET ESTIMATE: ABNORMAL
PMV BLD AUTO: ABNORMAL FL (ref 8.1–13.5)
RBC # BLD: 4.97 M/UL (ref 3.95–5.11)
RBC # BLD: ABNORMAL 10*6/UL
SEG NEUTROPHILS: 56 % (ref 36–65)
SEGMENTED NEUTROPHILS ABSOLUTE COUNT: 3.41 K/UL (ref 1.5–8.1)
WBC # BLD: 6 K/UL (ref 3.5–11.3)
WBC # BLD: ABNORMAL 10*3/UL

## 2022-01-28 PROCEDURE — 85025 COMPLETE CBC W/AUTO DIFF WBC: CPT

## 2022-01-28 PROCEDURE — 36415 COLL VENOUS BLD VENIPUNCTURE: CPT

## 2022-01-31 ENCOUNTER — TELEPHONE (OUTPATIENT)
Dept: GASTROENTEROLOGY | Age: 72
End: 2022-01-31

## 2022-01-31 NOTE — TELEPHONE ENCOUNTER
Writer spoke to pt over the phone in regards to receiving clearance back from Dr Shayan Butler and they have okayed for pt to stop Xarelto 2 days prior to proc. Writer instructed pt to stop Xarelto on 2/26/22 for two days. Pt voices her understanding.

## 2022-02-09 ENCOUNTER — OFFICE VISIT (OUTPATIENT)
Dept: PRIMARY CARE CLINIC | Age: 72
End: 2022-02-09
Payer: MEDICARE

## 2022-02-09 VITALS
HEART RATE: 75 BPM | SYSTOLIC BLOOD PRESSURE: 162 MMHG | OXYGEN SATURATION: 98 % | DIASTOLIC BLOOD PRESSURE: 102 MMHG | WEIGHT: 207.4 LBS | BODY MASS INDEX: 35.6 KG/M2

## 2022-02-09 DIAGNOSIS — M46.1 SACROILIITIS, NOT ELSEWHERE CLASSIFIED (HCC): ICD-10-CM

## 2022-02-09 DIAGNOSIS — E11.40 TYPE 2 DIABETES MELLITUS WITH DIABETIC NEUROPATHY, WITH LONG-TERM CURRENT USE OF INSULIN (HCC): Primary | ICD-10-CM

## 2022-02-09 DIAGNOSIS — Z79.4 TYPE 2 DIABETES MELLITUS WITH DIABETIC NEUROPATHY, WITH LONG-TERM CURRENT USE OF INSULIN (HCC): Primary | ICD-10-CM

## 2022-02-09 DIAGNOSIS — I10 ESSENTIAL HYPERTENSION: ICD-10-CM

## 2022-02-09 DIAGNOSIS — D50.9 IRON DEFICIENCY ANEMIA, UNSPECIFIED IRON DEFICIENCY ANEMIA TYPE: ICD-10-CM

## 2022-02-09 DIAGNOSIS — K59.00 CONSTIPATION, UNSPECIFIED CONSTIPATION TYPE: ICD-10-CM

## 2022-02-09 LAB — HBA1C MFR BLD: 13.9 %

## 2022-02-09 PROCEDURE — 4040F PNEUMOC VAC/ADMIN/RCVD: CPT | Performed by: FAMILY MEDICINE

## 2022-02-09 PROCEDURE — 99214 OFFICE O/P EST MOD 30 MIN: CPT | Performed by: FAMILY MEDICINE

## 2022-02-09 PROCEDURE — 3017F COLORECTAL CA SCREEN DOC REV: CPT | Performed by: FAMILY MEDICINE

## 2022-02-09 PROCEDURE — G8417 CALC BMI ABV UP PARAM F/U: HCPCS | Performed by: FAMILY MEDICINE

## 2022-02-09 PROCEDURE — G8427 DOCREV CUR MEDS BY ELIG CLIN: HCPCS | Performed by: FAMILY MEDICINE

## 2022-02-09 PROCEDURE — G8484 FLU IMMUNIZE NO ADMIN: HCPCS | Performed by: FAMILY MEDICINE

## 2022-02-09 PROCEDURE — 1090F PRES/ABSN URINE INCON ASSESS: CPT | Performed by: FAMILY MEDICINE

## 2022-02-09 PROCEDURE — 83036 HEMOGLOBIN GLYCOSYLATED A1C: CPT | Performed by: FAMILY MEDICINE

## 2022-02-09 PROCEDURE — 3046F HEMOGLOBIN A1C LEVEL >9.0%: CPT | Performed by: FAMILY MEDICINE

## 2022-02-09 PROCEDURE — 1036F TOBACCO NON-USER: CPT | Performed by: FAMILY MEDICINE

## 2022-02-09 PROCEDURE — G8400 PT W/DXA NO RESULTS DOC: HCPCS | Performed by: FAMILY MEDICINE

## 2022-02-09 PROCEDURE — 1123F ACP DISCUSS/DSCN MKR DOCD: CPT | Performed by: FAMILY MEDICINE

## 2022-02-09 PROCEDURE — 2022F DILAT RTA XM EVC RTNOPTHY: CPT | Performed by: FAMILY MEDICINE

## 2022-02-09 RX ORDER — AMLODIPINE BESYLATE 10 MG/1
10 TABLET ORAL DAILY
Qty: 30 TABLET | Refills: 5 | Status: SHIPPED | OUTPATIENT
Start: 2022-02-09

## 2022-02-09 RX ORDER — AMLODIPINE BESYLATE 10 MG/1
10 TABLET ORAL DAILY
Qty: 10 TABLET | Refills: 0 | Status: SHIPPED | OUTPATIENT
Start: 2022-02-09 | End: 2022-03-14

## 2022-02-09 RX ORDER — BISACODYL 5 MG
TABLET, DELAYED RELEASE (ENTERIC COATED) ORAL
Qty: 4 TABLET | Refills: 0 | Status: ON HOLD | OUTPATIENT
Start: 2022-02-09 | End: 2022-02-28 | Stop reason: ALTCHOICE

## 2022-02-09 RX ORDER — POLYETHYLENE GLYCOL 3350 17 G/17G
POWDER, FOR SOLUTION ORAL
Qty: 238 G | Refills: 0 | Status: ON HOLD | OUTPATIENT
Start: 2022-02-09 | End: 2022-02-28 | Stop reason: ALTCHOICE

## 2022-02-09 ASSESSMENT — ENCOUNTER SYMPTOMS
VOMITING: 0
BACK PAIN: 1

## 2022-02-09 NOTE — PROGRESS NOTES
[address] PRIMARY CARE  [facility] [address] [address]  Dept: [phone]  Dept Fax: [phone]    Rupert Madera is a 80 y.o. female who presents today for her medical conditions/complaints as noted below. Rupert Madera is c/o of  Chief Complaint   Patient presents with    Hypertension     last night before bed 174/108, called EMS 1/2/22 and was evaluated    Headache    Other     pt states she got into her car today and startng near her sterum she felt a shaking feeling that radiated outwards         HPI:     HPI       Pt seen today for follow up chronic conditions and follow up on elevated BP. States Wednesday she called EMS due to elevated be in 880Q [id number] diastolic . States they did ekg and was told normal and told up to her if wanted to go to ED. She did not go to ED. BP did improve, but still is elevated. BP last night was high as well. She has been having headaches as well due to elevated BP. She is on losartan 100 mg, hctz 25 mg, and amlodipine 5 mg. Takes medications regularly. She notes has needed to use sliding scale more frequently lately. A1c today is at: 13.9 today, uses 75 units in AM, sliding scale through out day. Felt abnormal shakiness feeling in abdomen that spread to body when she got out of her car today. This has improved. Following with GI and hematology for her iron deficiency , improved with IV iron. Has colonoscopy scheduled on 2/28. Constipated so has occasionally been using miralax.        Hemoglobin A1C (%)   Date Value   02/09/2022 13.9   11/02/2021 10.2   08/02/2021 7.1             ( goal A1C is < 7)   No results found for: LABMICR  LDL Cholesterol (mg/dL)   Date Value   09/29/2020 44   11/02/2015 86   10/06/2014 86     LDL Calculated (mg/dL)   Date Value   04/06/2019 47       (goal LDL is <100)   AST (U/L)   Date Value   10/11/2021 41     ALT (U/L)   Date Value   10/11/2021 24     BUN (mg/dL)   Date Value   11/09/2021 17     BP Readings from Last 3 Encounters:   02/14/22 (!) 196/80   02/09/22 (!) 162/102   01/20/22 (!) 171/79          (goal 120/80)    Past Medical History:   Diagnosis Date    Arthritis     [facility]     Cancer ([facility] 2015    ovarian stage 2    Cerebral artery occlusion with cerebral infarction ([facility] 03/2018    Mini strokes \"Tia's\"    Diabetes mellitus ([facility]     Epigastric pain     GERD (gastroesophageal reflux disease)     History of anesthesia complications [id number]    was told after gallbladder surgery to never have anesthesia again \"since it was hard for me to come out of it\"    History of blood transfusion     Hx of blood clots     lung     Hyperlipidemia     Hypertension     Right arm pain     Right shoulder pain     Sleep apnea     uses CPAP nightly    Tachycardia     hx of    Thyroid disease     TIA (transient ischemic attack)       Past Surgical History:   Procedure Laterality Date    ANESTHESIA NERVE BLOCK Left 11/7/2019    NERVE BLOCK (DEFINE) - INTERCOSTAL NERVE BLOCK performed by Benigno Bolton MD at [facility] Bilateral 5/15/2020    NERVE BLOCK BILATERAL - MBB  L4-5, L5-S1 performed by Benigno Bolton MD at [address] Left     ORIF    CHOLECYSTECTOMY      COLONOSCOPY  07/08/2019    5 yr recall.     COLONOSCOPY N/A 7/8/2019    COLONOSCOPY WITH BIOPSY performed by Óscar Santiago MD at [address] Right     repair tendon    [place] INJECTION PROCEDURE FOR SACROILIAC JOINT Bilateral 8/29/2019    SACROILIAC JOINT INJECTION performed by Benigno Bolton MD at [facility] Bilateral 10/10/2019    SACROILIAC JOINT INJECTION performed by Benigno Bolton MD at [facility]  2/2014    chris with bso    JOINT REPLACEMENT Right     shoulder    KNEE ARTHROSCOPY Right     NERVE BLOCK Bilateral 08/29/2019    SI joint injection    NERVE BLOCK Bilateral 10/10/2019    SI joint    NERVE BLOCK Bilateral 05/15/2020    Medial branch block L4-5, L5-S1    NERVE BLOCK  06/05/2020    SPINAL CORD STIMULATOR IMPLANT TRIAL (N/A )    OTHER SURGICAL HISTORY Right 11/11/2014    shoulder manipulation    OTHER SURGICAL HISTORY  04/2021    pain pump placed, fentanyl in pain pump    SHOULDER ARTHROSCOPY Right 02/03/15    SHOULDER SURGERY  11/2014    manipulation    SPINAL CORD STIMULATOR SURGERY N/A 6/5/2020    SPINAL CORD STIMULATOR IMPLANT TRIAL performed by Caty Schmitz MD at [facility] N/A 7/27/2020    SPINAL CORD STIMULATOR IMPLANT WITH THORACIC LAMINOTOMY performed by Malvin Chino MD at [address] ENDOSCOPY  07/08/2019    UPPER GASTROINTESTINAL ENDOSCOPY N/A 7/8/2019    EGD BIOPSY performed by Meaghan Woo MD at [facility] Right 4/30/2015    pt has blood clot & abscess in her right kidney    VASCULAR SURGERY      Alesha filter in & removed 1 year later       Family History   Problem Relation Age of Onset    Elevated Lipids Paternal Grandmother        Social History     Tobacco Use    Smoking status: Never Smoker    Smokeless tobacco: Never Used   Substance Use Topics    Alcohol use: Yes     Comment: rare      Current Outpatient Medications   Medication Sig Dispense Refill    amLODIPine (NORVASC) 10 MG tablet Take 1 tablet by mouth daily 10 tablet 0    amLODIPine (NORVASC) 10 MG tablet Take 1 tablet by mouth daily 30 tablet 5    potassium chloride (KLOR-CON M) 20 MEQ extended release tablet TAKE 1 TABLET BY MOUTH TWICE DAILY 60 tablet 2    polyethylene glycol (GLYCOLAX) 17 GM/SCOOP powder Follow instructions provided to you from physician's office. 238 g 0    bisacodyl (BISACODYL) 5 MG EC tablet Follow instructions provided given by the physician's office.  4 tablet 0    insulin glargine (LANTUS) 100 UNIT/ML injection vial INJECT 75 UNITS SUBCUTANEOUSLY NIGHTLY 30 mL 10    levothyroxine (SYNTHROID) 88 MCG tablet TAKE (1) TABLET BY MOUTH EVERY DAY 30 tablet 10    gabapentin (NEURONTIN) 600 MG tablet TAKE (1) TABLET BY MOUTH FOUR TIMES A  tablet 3    Elastic Bandages & Supports (MEDICAL COMPRESSION STOCKINGS) MISC 2 each by Does not apply route daily Right and left knee high compression stockings. 30-40 mmHg. To be worn continuously throughout the day.  2 each 11    furosemide (LASIX) 20 MG tablet TAKE 1 TABLET BY MOUTH DAILY 90 tablet 2    traZODone (DESYREL) 100 MG tablet Take 1 tablet by mouth nightly as needed for Sleep 30 tablet 5    ondansetron (ZOFRAN-ODT) 4 MG disintegrating tablet Take 1 tablet by mouth 3 times daily as needed for Nausea or Vomiting 21 tablet 0    omeprazole (PRILOSEC) 40 MG delayed release capsule TAKE 1 CAPSULE BY MOUTH EVERY MORNING BEFORE BREAKFAST 30 capsule 3    blood glucose test strips (ACCU-CHEK TED PLUS) strip USE TO TEST BLOOD SUGAR 3 TIMES DAILY AND AS NEEDED FOR SYMPTOMS OF IRREGULAR BLOOD GLUCOSE 100 strip 10    tamsulosin (FLOMAX) 0.4 MG capsule TAKE 1 CAPSULE BY MOUTH NIGHTLY *EMERGENCY REFILL* 30 capsule 10    SURE COMFORT INSULIN SYRINGE 31G X 5/16\" 1 ML MISC USE AS DIRECTED FOUR TIMES A  each 10    EPINEPHrine (EPIPEN 2-LISA) 0.3 MG/0.3ML SOAJ injection EpiPen 2-Lisa 0.3 mg/0.3 mL injection, auto-injector 1 each 3    XARELTO 20 MG TABS tablet TAKE 1 TABLET BY MOUTH ONCE DAILY 30 tablet 10    alendronate (FOSAMAX) 70 MG tablet TAKE 1 TABLET BY MOUTH EVERY MONDAY 5 tablet 10    insulin lispro (HUMALOG) 100 UNIT/ML injection vial INEJCT SUBCUTANEOUSLY UP TO THREE TIMES A DAY BASED ON SLIDING SCALE 10 mL 10    DULoxetine (CYMBALTA) 60 MG extended release capsule TAKE 1 CAPSULE BY MOUTH DAILY 30 capsule 10    losartan (COZAAR) 100 MG tablet TAKE 1 TABLET BY MOUTH EVERY DAY 30 tablet 10    atorvastatin (LIPITOR) 40 MG tablet TAKE (1) TABLET BY MOUTH EVERY DAY 30 tablet 11    hydroCHLOROthiazide (HYDRODIURIL) 25 MG tablet TAKE 1 TABLET BY MOUTH EVERY MORNING 30 tablet 11    vitamin C (ASCORBIC ACID) 500 MG tablet Take 500 mg by mouth daily      glucose monitoring kit (FREESTYLE) monitoring kit 1 kit by Does not apply route daily Please provide accuchek meter for patient, not freestyle 1 kit 0    calcium carbonate (TUMS) 500 MG chewable tablet Take 1 tablet by mouth daily as needed for Heartburn      SUPER B COMPLEX/C CAPS Take by mouth      Cholecalciferol (VITAMIN D3) 5000 units TABS Take by mouth      ferrous sulfate 325 (65 Fe) MG tablet Take 325 mg by mouth daily (with breakfast)      fexofenadine (ALLEGRA) 60 MG tablet Take 60 mg by mouth daily      magnesium oxide (MAG-OX) 400 MG tablet Take 400 mg by mouth daily       No current facility-administered medications for this visit.      Allergies   Allergen Reactions    Aspirin Anaphylaxis     Only thing she can take is tylenol    Benadryl [Diphenhydramine] Other (See Comments)     Dystonic movement    Ibuprofen Anaphylaxis    Lidocaine Anaphylaxis     CAN NOT TOLERATE IV    Penicillins Anaphylaxis    Hydrocodone-Acetaminophen Other (See Comments)     Unsure of allergy    Tramadol Other (See Comments)     Unsure of allergy       Health Maintenance   Topic Date Due    Hepatitis C screen  Never done    DTaP/Tdap/Td vaccine (1 - Tdap) Never done    DEXA (modify frequency per FRAX score)  Never done    Shingles Vaccine (2 of 2) 12/18/2020    COVID-19 Vaccine (3 - Booster for Moderna series) 08/15/2021    Lipid screen  09/29/2021    Diabetic microalbuminuria test  01/25/2022    Diabetic foot exam  02/25/2022    Pneumococcal 65+ years Vaccine (1 of 1 - PPSV23) 04/17/2023 (Originally 9/14/2016)    A1C test (Diabetic or Prediabetic)  05/09/2022    Breast cancer screen  10/21/2022    Annual Wellness Visit (AWV)  11/03/2022    Diabetic retinal exam  11/09/2022    Potassium monitoring  11/09/2022    Creatinine monitoring  11/09/2022    Depression Screen  11/18/2022    Colon cancer screen colonoscopy  07/08/2029    Flu vaccine  Completed    Hepatitis A vaccine  Aged Out    Hib vaccine  Aged Out    Meningococcal (ACWY) vaccine  Aged Out       Subjective:      Review of Systems   Constitutional: Negative for chills and fever. Gastrointestinal: Negative for vomiting. Musculoskeletal: Positive for back pain. Neurological: Positive for headaches. Objective:     Physical Exam  Constitutional:       Appearance: She is well-developed. HENT:      Head: Normocephalic and atraumatic. Right Ear: External ear normal.      Left Ear: External ear normal.   Eyes:      Conjunctiva/sclera: Conjunctivae normal.      Pupils: Pupils are equal, round, and reactive to light. Cardiovascular:      Rate and Rhythm: Normal rate and regular rhythm. Heart sounds: Normal heart sounds. Pulmonary:      Effort: Pulmonary effort is normal.      Breath sounds: Normal breath sounds. Musculoskeletal:      Cervical back: Neck supple. Skin:     General: Skin is warm and dry. Neurological:      Mental Status: She is alert and oriented to person, place, and time. Psychiatric:         Mood and Affect: Mood normal.         Behavior: Behavior normal.         Thought Content: Thought content normal.       BP (!) 162/102   Pulse 75   Wt 207 lb 6.4 oz (94.1 kg)   SpO2 98%   BMI 35.60 kg/m²     Assessment:      1. Type 2 diabetes mellitus with diabetic neuropathy, with long-term current use of insulin ([facility])  - recommend splitting lantus to 60 units in am and 20 at night and sliding scale . Bring sugar log next visit. A1c 13.9 today. - recommend seeing endocrinology  - POCT glycosylated hemoglobin (Hb A1C)  - Julio Valdes MD, Endocrinology, [place]    2.  Essential hypertension  - bp uncontrolled, increase amlodipine to 10 mg . Recommend calling if still elevated. monitor BP. 3. Sacroiliitis, not elsewhere classified ([address]  - following with pain management. 4. Constipation, unspecified constipation type  - recommend miralax and increase water intake. 5. Iron deficiency anemia, unspecified iron deficiency anemia type  - improved with IV iron, continue to follow with GI and Hematology. Has colonoscopy 2/28 scheduled. Plan:      Return in about 4 weeks (around 3/9/2022) for follow up. Orders Placed This Encounter   Procedures   Roma Webb MD, Endocrinology, [facility]     Referral Priority:   Routine     Referral Type:   Eval and Treat     Referral Reason:   Specialty Services Required     Referred to Provider:   Elisabeth Finn MD     Requested Specialty:   Endocrinology     Number of Visits Requested:   1    POCT glycosylated hemoglobin (Hb A1C)     Orders Placed This Encounter   Medications    amLODIPine (NORVASC) 10 MG tablet     Sig: Take 1 tablet by mouth daily     Dispense:  10 tablet     Refill:  0    amLODIPine (NORVASC) 10 MG tablet     Sig: Take 1 tablet by mouth daily     Dispense:  30 tablet     Refill:  5        Patient given educational materials - see patient instructions. Discussed use, benefit, and side effects of prescribed medications. All patient questions answered. Pt voiced understanding. Reviewed healthmaintenance. Instructed to continue current medications, diet and exercise. Patient agreed with treatment plan. Follow up as directed.      Electronically signed by Marianne Warren DO on 2/14/2022 at 12:57 PM

## 2022-02-09 NOTE — TELEPHONE ENCOUNTER
Writer returned call and got no answer. Lvm advising pt prep was resent to melinda in [facility].

## 2022-02-14 ENCOUNTER — HOSPITAL ENCOUNTER (OUTPATIENT)
Age: 72
Discharge: HOME | End: 2022-02-14
Payer: MEDICARE

## 2022-02-14 ENCOUNTER — OFFICE VISIT (OUTPATIENT)
Dept: ONCOLOGY | Age: 72
End: 2022-02-14
Payer: MEDICARE

## 2022-02-14 ENCOUNTER — TELEPHONE (OUTPATIENT)
Dept: ONCOLOGY | Age: 72
End: 2022-02-14

## 2022-02-14 VITALS
BODY MASS INDEX: 35.7 KG/M2 | WEIGHT: 208 LBS | DIASTOLIC BLOOD PRESSURE: 80 MMHG | SYSTOLIC BLOOD PRESSURE: 196 MMHG | HEART RATE: 60 BPM | TEMPERATURE: 97.1 F

## 2022-02-14 DIAGNOSIS — K90.9 IRON MALABSORPTION: ICD-10-CM

## 2022-02-14 DIAGNOSIS — D50.8 IRON DEFICIENCY ANEMIA SECONDARY TO INADEQUATE DIETARY IRON INTAKE: ICD-10-CM

## 2022-02-14 DIAGNOSIS — D50.9 MICROCYTIC ANEMIA: ICD-10-CM

## 2022-02-14 DIAGNOSIS — C56.9 MALIGNANT NEOPLASM OF OVARY, UNSPECIFIED LATERALITY (HCC): ICD-10-CM

## 2022-02-14 DIAGNOSIS — D50.9 MICROCYTIC ANEMIA: Primary | ICD-10-CM

## 2022-02-14 DIAGNOSIS — D64.9 LOW HEMOGLOBIN: ICD-10-CM

## 2022-02-14 LAB
ABSOLUTE EOS #: 0.09 K/UL (ref 0–0.4)
ABSOLUTE IMMATURE GRANULOCYTE: ABNORMAL K/UL (ref 0–0.3)
ABSOLUTE LYMPH #: 1.63 K/UL (ref 1–4.8)
ABSOLUTE MONO #: 0.09 K/UL (ref 0.1–0.8)
BASOPHILS # BLD: 1 % (ref 0–2)
BASOPHILS ABSOLUTE: 0.04 K/UL (ref 0–0.2)
CA 125: 7 U/ML
DIFFERENTIAL TYPE: ABNORMAL
EOSINOPHILS RELATIVE PERCENT: 2 % (ref 1–4)
FERRITIN: 210 UG/L (ref 13–150)
HCT VFR BLD CALC: 41.5 % (ref 36–46)
HEMOGLOBIN: 13.7 G/DL (ref 12–16)
IMMATURE GRANULOCYTES: ABNORMAL %
IRON SATURATION: 31 % (ref 20–55)
IRON: 76 UG/DL (ref 37–145)
LYMPHOCYTES # BLD: 37 % (ref 24–44)
MCH RBC QN AUTO: 27.8 PG (ref 26–34)
MCHC RBC AUTO-ENTMCNC: 32.9 G/DL (ref 31–37)
MCV RBC AUTO: 84.4 FL (ref 80–100)
MONOCYTES # BLD: 2 % (ref 1–7)
MORPHOLOGY: ABNORMAL
MORPHOLOGY: ABNORMAL
NRBC AUTOMATED: ABNORMAL PER 100 WBC
PDW BLD-RTO: 20.2 % (ref 12.5–15.4)
PLATELET # BLD: 145 K/UL (ref 140–450)
PLATELET ESTIMATE: ABNORMAL
PMV BLD AUTO: 10.1 FL (ref 6–12)
RBC # BLD: 4.91 M/UL (ref 4–5.2)
RBC # BLD: ABNORMAL 10*6/UL
SEG NEUTROPHILS: 58 % (ref 36–66)
SEGMENTED NEUTROPHILS ABSOLUTE COUNT: 2.55 K/UL (ref 1.8–7.7)
TOTAL IRON BINDING CAPACITY: 244 UG/DL (ref 250–450)
UNSATURATED IRON BINDING CAPACITY: 168 UG/DL (ref 112–347)
WBC # BLD: 4.4 K/UL (ref 3.5–11)
WBC # BLD: ABNORMAL 10*3/UL

## 2022-02-14 PROCEDURE — 36415 COLL VENOUS BLD VENIPUNCTURE: CPT

## 2022-02-14 PROCEDURE — 86304 IMMUNOASSAY TUMOR CA 125: CPT

## 2022-02-14 PROCEDURE — G8484 FLU IMMUNIZE NO ADMIN: HCPCS | Performed by: INTERNAL MEDICINE

## 2022-02-14 PROCEDURE — 83550 IRON BINDING TEST: CPT

## 2022-02-14 PROCEDURE — 99214 OFFICE O/P EST MOD 30 MIN: CPT | Performed by: INTERNAL MEDICINE

## 2022-02-14 PROCEDURE — 83540 ASSAY OF IRON: CPT

## 2022-02-14 PROCEDURE — 1036F TOBACCO NON-USER: CPT | Performed by: INTERNAL MEDICINE

## 2022-02-14 PROCEDURE — G8428 CUR MEDS NOT DOCUMENT: HCPCS | Performed by: INTERNAL MEDICINE

## 2022-02-14 PROCEDURE — 82728 ASSAY OF FERRITIN: CPT

## 2022-02-14 PROCEDURE — 3017F COLORECTAL CA SCREEN DOC REV: CPT | Performed by: INTERNAL MEDICINE

## 2022-02-14 PROCEDURE — 4040F PNEUMOC VAC/ADMIN/RCVD: CPT | Performed by: INTERNAL MEDICINE

## 2022-02-14 PROCEDURE — 85025 COMPLETE CBC W/AUTO DIFF WBC: CPT

## 2022-02-14 PROCEDURE — G8417 CALC BMI ABV UP PARAM F/U: HCPCS | Performed by: INTERNAL MEDICINE

## 2022-02-14 PROCEDURE — 1123F ACP DISCUSS/DSCN MKR DOCD: CPT | Performed by: INTERNAL MEDICINE

## 2022-02-14 PROCEDURE — 1090F PRES/ABSN URINE INCON ASSESS: CPT | Performed by: INTERNAL MEDICINE

## 2022-02-14 PROCEDURE — G8400 PT W/DXA NO RESULTS DOC: HCPCS | Performed by: INTERNAL MEDICINE

## 2022-02-14 NOTE — PROGRESS NOTES
_           Chief Complaint   Patient presents with    Follow-up     review status of disease    Pain     constant      DIAGNOSIS:       Microcytic anemia  Severe iron deficiency anemia  No response to oral iron  Intolerable side effects to oral iron with severe constipation  Positive stool guaiac test in 2 out of 3 symptoms  History of diverticulosis  GERD  History of ovarian cancer in 2015  Strong family history of breast cancer with mother and grandmother and maternal aunt    CURRENT THERAPY:         IV iron infusion December 2021  GI work up in progress. BRIEF CASE HISTORY:      Ms. Genet Tyler is a very pleasant 80 y.o. female with history of multiple co morbidities as listed. Patient is referred for further management of anemia. Patient has history of ovarian cancer in 2015. She was treated at [facility] with surgery and chemotherapy using Taxol carboplatin. Patient developed anemia at that time and she received blood transfusions. She continued to have mild anemia for several years. Patient had GI evaluation and July 2019. She had EGD for GERD and she had colonoscopy at that time. No source of bleeding was identified. Patient was recently noted to have increasing symptoms related to anemia. She has significant drop of her hemoglobin not responding to oral iron. She has generalized weakness and fatigue and dizziness. She has shortness of breath on exertion. Patient has significant muscle cramps and ice craving. Patient denies any vaginal bleeding. No hematuria. No hematochezia. No melena. No hematemesis. Stool guaiac test was positive for Hemoccult blood and 2 out of 3 samples. Patient has left upper abdominal pain on and off for several months. She has CT scan in November which was negative. Patient denies smoking or alcohol drinking. .     INTERIM HISTORY:   Seen for follow up anemia.  C/o weakness and fatigue. Occasional dizziness. No active bleeding. Will have GI work up with plan for endoscopy 2/28/22. PAST MEDICAL HISTORY: has a past medical history of Arthritis, Bowel obstruction ([facility] Cancer ([facility] Cerebral artery occlusion with cerebral infarction ([facility] Diabetes mellitus ([facility] Epigastric pain, GERD (gastroesophageal reflux disease), History of anesthesia complications, History of blood transfusion, Hx of blood clots, Hyperlipidemia, Hypertension, Right arm pain, Right shoulder pain, Sleep apnea, Tachycardia, Thyroid disease, and TIA (transient ischemic attack). PAST SURGICAL HISTORY: has a past surgical history that includes Knee arthroscopy (Right); Hand surgery (Right); Cholecystectomy; other surgical history (Right, 11/11/2014); shoulder surgery (11/2014); Shoulder arthroscopy (Right, 02/03/15); Appendectomy; Ureter stent placement (Right, 4/30/2015); Hysterectomy (2/2014); Colonoscopy (07/08/2019); Upper gastrointestinal endoscopy (07/08/2019); Colonoscopy (N/A, 7/8/2019); Upper gastrointestinal endoscopy (N/A, 7/8/2019); Nerve Block (Bilateral, 08/29/2019); Injection Procedure For Sacroiliac Joint (Bilateral, 8/29/2019); Nerve Block (Bilateral, 10/10/2019); Injection Procedure For Sacroiliac Joint (Bilateral, 10/10/2019); Anesthesia Nerve Block (Left, 11/7/2019); Nerve Block (Bilateral, 05/15/2020); Anesthesia Nerve Block (Bilateral, 5/15/2020); Nerve Block (06/05/2020); Spinal Cord Stimulator Surgery (N/A, 6/5/2020); vascular surgery; Arm Surgery (Left); joint replacement (Right); Tonsillectomy; Spinal Cord Stimulator Surgery (N/A, 7/27/2020); and other surgical history (04/2021).      CURRENT MEDICATIONS:  has a current medication list which includes the following prescription(s): omeprazole, amlodipine, amlodipine, bisacodyl, polyethylene glycol, potassium chloride, polyethylene glycol, bisacodyl, insulin glargine, levothyroxine, gabapentin, furosemide, trazodone, accu-chek junior plus, tamsulosin, sure comfort insulin syringe, epinephrine, xarelto, alendronate, [DISCONTINUED] potassium chloride, [DISCONTINUED] omeprazole, insulin lispro, duloxetine, losartan, atorvastatin, hydrochlorothiazide, vitamin c, glucose monitoring, calcium carbonate, super b complex/c, vitamin d3, ferrous sulfate, fexofenadine, and magnesium oxide. ALLERGIES:  is allergic to aspirin, benadryl [diphenhydramine], ibuprofen, lidocaine, penicillins, hydrocodone-acetaminophen, and tramadol. FAMILY HISTORY: Mother and grandmother and maternal aunt had breast cancer. Father had cancer. Otherwise negative for any hematological or oncological conditions. SOCIAL HISTORY:  reports that she has never smoked. She has never used smokeless tobacco. She reports current alcohol use. She reports that she does not use drugs. REVIEW OF SYSTEMS:     · General: Positive for weakness and fatigue. No unanticipated weight loss or decreased appetite. No fever or chills. · Eyes: No blurred vision, eye pain or double vision. · Ears: No hearing problems or drainage. No tinnitus. · Throat: No sore throat, problems with swallowing or dysphagia. · Respiratory: No cough, sputum or hemoptysis. No shortness of breath. No pleuritic chest pain. · Cardiovascular: No chest pain, orthopnea or PND. No lower extremity edema. No palpitation. · Gastrointestinal: As above. · Genitourinary: No dysuria, hematuria, frequency or urgency. · Musculoskeletal: Positive for muscle cramps. No limitation of movement. No back pain. No gait disturbance, No joint complaints. · Dermatologic: No skin rashes or pruritus. No skin lesions or discolorations. · Psychiatric: No depression, anxiety, or stress or signs of schizophrenia. No change in mood or affect. · Hematologic: No history of bleeding tendency. No bruises or ecchymosis. No history of clotting problems. · Infectious disease: No fever, chills or frequent infections. · Endocrine: No polydipsia or polyuria. No temperature intolerance. · Neurologic: No headaches positive dizziness. No weakness or numbness of the extremities. No changes in balance, coordination,  memory, mentation, behavior. · Allergic/Immunologic: No nasal congestion or hives. No repeated infections. PHYSICAL EXAM:  The patient is not in acute distress. Vital signs: Blood pressure (!) 196/80, pulse 60, temperature 97.1 °F (36.2 °C), weight 208 lb (94.3 kg), not currently breastfeeding.      General appearance - well appearing, not in pain or distress  Mental status - good mood, alert and oriented  Eyes - pupils equal and reactive, extraocular eye movements intact  Ears - bilateral TM's and external ear canals normal  Nose - normal and patent, no erythema, discharge or polyps  Mouth - mucous membranes moist, pharynx normal without lesions  Neck - supple, no significant adenopathy  Lymphatics - no palpable lymphadenopathy, no hepatosplenomegaly  Chest - clear to auscultation, no wheezes, rales or rhonchi, symmetric air entry  Heart - normal rate, regular rhythm, normal S1, S2, no murmurs, rubs, clicks or gallops  Abdomen - soft, nontender, nondistended, no masses or organomegaly  Neurological - alert, oriented, normal speech, no focal findings or movement disorder noted  Musculoskeletal - no joint tenderness, deformity or swelling  Extremities - peripheral pulses normal, no pedal edema, no clubbing or cyanosis  Skin - normal coloration and turgor, no rashes, no suspicious skin lesions noted     Review of Diagnostic data:   Lab Results   Component Value Date    WBC 4.4 02/14/2022    HGB 13.7 02/14/2022    HCT 41.5 02/14/2022    MCV 84.4 02/14/2022     02/14/2022       Chemistry        Component Value Date/Time     11/09/2021 1140    K 4.0 11/09/2021 1140     11/09/2021 1140    CO2 31 11/09/2021 1140    BUN 17 11/09/2021 1140    CREATININE 1.02 (H) 11/09/2021 1140        Component Value Date/Time    CALCIUM 8.7 11/09/2021 1140    ALKPHOS 47 10/11/2021 0000    AST 41 10/11/2021 0000    ALT 24 10/11/2021 0000    BILITOT 0.7 10/11/2021 0000            IMPRESSION:   Microcytic anemia  Severe iron deficiency anemia  No response to oral iron  Intolerable side effects to oral iron with severe constipation  Positive stool guaiac test in 2 out of 3 symptoms  History of diverticulosis  GERD  History of ovarian cancer in 2015  Strong family history of breast cancer with mother and grandmother and maternal aunt    PLAN: I reviewed the labs as above and discussed with the patient. I explained to the patient the nature of this hematologic problem. I explained the significance of these abnormalities in layman language. The blood picture is typical for iron deficiency anemia not responding to oral iron with significant severe constipation related to the oral intake of iron. So patient was treated with IV iron infusion to replace the iron deficiency. Very good response. She had positive stool guaiac test.  She had her last colonoscopy and EGD in July 2019. She will have a repeated GI work up 2/28/22. I discussed with patient other problems related to her history of ovarian cancer treated in 2015 and the strong family history of breast cancer. Patient qualifies for genetic testing. I explained the importance of that and she agreed. We will make referral to genetic counselor for evaluation and possible testing. Furthermore patient did not have follow-up with her GYN oncologist for the last 3 to 4 years. Her last CT scan of the abdomen was negative. Repeated tumor marker CA-125 is normal.   Will see her in 3 months with repeated labs. Sooner for any problems. Patient's questions were answered to the best of her satisfaction and she verbalized full understanding and agreement.

## 2022-02-14 NOTE — TELEPHONE ENCOUNTER
AVS from 2/14/22     RV 3 months with CBC, Iron studies and  at RV       rv scheduled for 5/23/22 @ 12:15pm  Labs will be drawn at time of rv     Pt was given AVS and appt schedule

## 2022-02-15 ENCOUNTER — INITIAL CONSULT (OUTPATIENT)
Dept: ONCOLOGY | Age: 72
End: 2022-02-15
Payer: MEDICARE

## 2022-02-15 DIAGNOSIS — C56.9 MALIGNANT NEOPLASM OF OVARY, UNSPECIFIED LATERALITY (HCC): Primary | ICD-10-CM

## 2022-02-15 DIAGNOSIS — Z80.3 FAMILY HISTORY OF BREAST CANCER: ICD-10-CM

## 2022-02-15 PROCEDURE — 96040 PR GENETIC COUNSELING, EACH 30 MIN: CPT | Performed by: GENETIC COUNSELOR, MS

## 2022-02-15 NOTE — PROGRESS NOTES
[facility]   Hereditary Cancer Risk Assessment     Name: Murtaza Dewitt   YOB: 1950   Date of Consultation: 2/15/22     Ms. Jerad Malave was seen at the [facility] for genetic counseling on 2/15/22. Ms. Jerad Malave was referred by Mel Mann MD due to her personal and family history of cancer. PERSONAL HISTORY   Ms. Jerad Malave is a 80 y.o.  female with a history of stage II ovarian cancer diagnosed in 2015 which was treated at [facility] which surgery and chemotherapy. A pathology report is not available today to review. Ms. Jerad aMlave reports menarche at age 16, first child at age 22, and underwent menopause at age 59. FAMILY HISTORY  Ms. Jerad Malave has 1 son(s) and 1 daughter(s). She has 1 full brother, living and no cancer. Ms. Blaire Riley mother passed away at age 76 from metastatic breast cancer. Her maternal grandmother was also diagnosed with breast cancer at age 54 along with a maternal aunt with breast cancer at an unknown age. There is no information known regarding Ms. Richter's extended paternal family. Ms. Jerad Malave reports unknown ancestry and denies any known Ashkenazi Protestant heritage. RISK ASSESSMENT   We discussed that approximately 5-10% of cancers are due to a hereditary gene mutation which causes an increased risk for certain cancers. Hereditary cancers are typically diagnosed at younger ages (under age 46y) and occur in multiple generations of a family. Multiple individuals with the same type of cancer (example: breast or colorectal) or uncommon cancers (example: ovarian, pancreatic, male breast cancer) are also features of hereditary cancers.      In summary, Ms. Jerad Malave meets the [address] [place] [address] (NCCN) guidelines for genetic testing of the BRCA1/2 genes based on her personal diagnosis of ovarian cancer and that she has 3 maternal relatives (mother, grandmother and aunt) with breast cancer. The NCCN guidelines also recognize that an individual's personal and/or family history may be explained by more than one inherited cancer syndrome. Thus, a multi-gene panel may be more efficient, more cost effecting, and increases the yield of detecting a hereditary mutation which would impact medical management. Given her personal and/or family history, we recommend testing for the following genes at minimum: SIDDHARTH, CHEK2, NBN, and PALB2. DISCUSSION  We discussed that the BRCA1/2 genes are the most common genes associated with hereditary breast and ovarian cancer. We also discussed that genetic testing is available for multiple other genes related to hereditary cancer. Some of these genes are known to carry a significant increased risk for several cancers including colon, breast, uterine, ovarian, stomach, and pancreatic cancer, while some of these genes are believed to have a moderate increased risk for breast and other cancers. We discussed the possibility of finding a mutation in genes with limited information to guide medical management, as well we as the possibility of identifying variants of uncertain significance (VUS). We discussed the risks, benefits, and limitations of genetic testing. Possible test results were discussed as well as potential screening and prevention strategies. Specifically, we discussed increased breast cancer surveillance by mammogram and breast MRI as well as the option of prophylactic mastectomy. We discussed the recommendation for prophylactic oophorectomy for results which suggest an increased risk for ovarian cancer. Lastly, we discussed that the results of Ms. Richter's genetic testing may be beneficial in defining her risk for cancer as well as for her family members. SUMMARY & PLAN  1) Ms. Cristiana Leary meets the NCCN criteria for genetic testing based on her personal diagnosis of ovarian cancer and strong family history of  Breast cancer.      2) Genetic testing via a multi-gene panel was recommended and offered to Ms. Romelia Rangel. 3) Ms. Romelia Rangel elected to proceed with the CancerNext Expanded + RNA Insight gene panel. 4) Ms. Romelia Rangel is aware that she will receive a notification from [facility] if the out of pocket cost for testing exceeds $100 (based on individual insurance plan) and the option to proceed with the self pay price of $249.     5) Informed consent was obtained and a blood sample was sent to [facility]. We will call Ms. Romelia Rangel with results as soon as they are available. A follow up appointment may be recommended. A summary letter with results and final medical management recommendations will be sent once available. A total of 35 minutes were spent face to face with Ms. Romelia Rangel and 50% of the time was spent educating and counseling. The [address] [place] [address] National Program would be glad to offer our assistance should you have any questions or concerns about this information. Please feel free to contact us at [phone]. Emilio Suárez.  Prudence Riddle, MS, [facility]   Licensed Genetic Counselor

## 2022-02-17 DIAGNOSIS — K21.9 GASTROESOPHAGEAL REFLUX DISEASE WITHOUT ESOPHAGITIS: ICD-10-CM

## 2022-02-17 RX ORDER — OMEPRAZOLE 40 MG/1
CAPSULE, DELAYED RELEASE ORAL
Qty: 30 CAPSULE | Refills: 3 | Status: SHIPPED | OUTPATIENT
Start: 2022-02-17

## 2022-02-25 ENCOUNTER — ANESTHESIA EVENT (OUTPATIENT)
Dept: OPERATING ROOM | Age: 72
End: 2022-02-25
Payer: MEDICARE

## 2022-02-28 ENCOUNTER — HOSPITAL ENCOUNTER (OUTPATIENT)
Age: 72
Setting detail: OUTPATIENT SURGERY
Discharge: HOME | End: 2022-02-28
Attending: INTERNAL MEDICINE | Admitting: INTERNAL MEDICINE
Payer: MEDICARE

## 2022-02-28 ENCOUNTER — ANESTHESIA (OUTPATIENT)
Dept: OPERATING ROOM | Age: 72
End: 2022-02-28
Payer: MEDICARE

## 2022-02-28 VITALS
RESPIRATION RATE: 14 BRPM | OXYGEN SATURATION: 95 % | SYSTOLIC BLOOD PRESSURE: 205 MMHG | DIASTOLIC BLOOD PRESSURE: 92 MMHG

## 2022-02-28 VITALS
SYSTOLIC BLOOD PRESSURE: 181 MMHG | HEART RATE: 59 BPM | TEMPERATURE: 96.3 F | BODY MASS INDEX: 36.2 KG/M2 | DIASTOLIC BLOOD PRESSURE: 91 MMHG | HEIGHT: 63 IN | OXYGEN SATURATION: 97 % | WEIGHT: 204.3 LBS | RESPIRATION RATE: 17 BRPM

## 2022-02-28 LAB
GLUCOSE BLD-MCNC: 233 MG/DL (ref 65–105)
GLUCOSE BLD-MCNC: 346 MG/DL (ref 65–105)

## 2022-02-28 PROCEDURE — 82947 ASSAY GLUCOSE BLOOD QUANT: CPT

## 2022-02-28 PROCEDURE — 3609027000 HC COLONOSCOPY: Performed by: INTERNAL MEDICINE

## 2022-02-28 PROCEDURE — 3609012400 HC EGD TRANSORAL BIOPSY SINGLE/MULTIPLE: Performed by: INTERNAL MEDICINE

## 2022-02-28 PROCEDURE — 88342 IMHCHEM/IMCYTCHM 1ST ANTB: CPT

## 2022-02-28 PROCEDURE — 6360000002 HC RX W HCPCS: Performed by: NURSE ANESTHETIST, CERTIFIED REGISTERED

## 2022-02-28 PROCEDURE — 2709999900 HC NON-CHARGEABLE SUPPLY: Performed by: INTERNAL MEDICINE

## 2022-02-28 PROCEDURE — 3700000000 HC ANESTHESIA ATTENDED CARE: Performed by: INTERNAL MEDICINE

## 2022-02-28 PROCEDURE — 3700000001 HC ADD 15 MINUTES (ANESTHESIA): Performed by: INTERNAL MEDICINE

## 2022-02-28 PROCEDURE — 88305 TISSUE EXAM BY PATHOLOGIST: CPT

## 2022-02-28 PROCEDURE — 2580000003 HC RX 258: Performed by: ANESTHESIOLOGY

## 2022-02-28 PROCEDURE — 88341 IMHCHEM/IMCYTCHM EA ADD ANTB: CPT

## 2022-02-28 PROCEDURE — 45378 DIAGNOSTIC COLONOSCOPY: CPT | Performed by: INTERNAL MEDICINE

## 2022-02-28 PROCEDURE — 43239 EGD BIOPSY SINGLE/MULTIPLE: CPT | Performed by: INTERNAL MEDICINE

## 2022-02-28 PROCEDURE — 7100000011 HC PHASE II RECOVERY - ADDTL 15 MIN: Performed by: INTERNAL MEDICINE

## 2022-02-28 PROCEDURE — 6370000000 HC RX 637 (ALT 250 FOR IP): Performed by: ANESTHESIOLOGY

## 2022-02-28 PROCEDURE — 7100000010 HC PHASE II RECOVERY - FIRST 15 MIN: Performed by: INTERNAL MEDICINE

## 2022-02-28 RX ORDER — SODIUM CHLORIDE 9 MG/ML
25 INJECTION, SOLUTION INTRAVENOUS PRN
Status: DISCONTINUED | OUTPATIENT
Start: 2022-02-28 | End: 2022-02-28 | Stop reason: HOSPADM

## 2022-02-28 RX ORDER — SODIUM CHLORIDE 0.9 % (FLUSH) 0.9 %
5-40 SYRINGE (ML) INJECTION PRN
Status: DISCONTINUED | OUTPATIENT
Start: 2022-02-28 | End: 2022-02-28 | Stop reason: HOSPADM

## 2022-02-28 RX ORDER — PROPOFOL 10 MG/ML
INJECTION, EMULSION INTRAVENOUS PRN
Status: DISCONTINUED | OUTPATIENT
Start: 2022-02-28 | End: 2022-02-28 | Stop reason: SDUPTHER

## 2022-02-28 RX ORDER — SODIUM CHLORIDE 0.9 % (FLUSH) 0.9 %
5-40 SYRINGE (ML) INJECTION EVERY 12 HOURS SCHEDULED
Status: DISCONTINUED | OUTPATIENT
Start: 2022-02-28 | End: 2022-02-28 | Stop reason: HOSPADM

## 2022-02-28 RX ORDER — SODIUM CHLORIDE 9 MG/ML
INJECTION, SOLUTION INTRAVENOUS CONTINUOUS
Status: DISCONTINUED | OUTPATIENT
Start: 2022-02-28 | End: 2022-02-28 | Stop reason: HOSPADM

## 2022-02-28 RX ORDER — FENTANYL CITRATE 50 UG/ML
25 INJECTION, SOLUTION INTRAMUSCULAR; INTRAVENOUS EVERY 5 MIN PRN
Status: DISCONTINUED | OUTPATIENT
Start: 2022-02-28 | End: 2022-02-28 | Stop reason: HOSPADM

## 2022-02-28 RX ORDER — SODIUM CHLORIDE, SODIUM LACTATE, POTASSIUM CHLORIDE, CALCIUM CHLORIDE 600; 310; 30; 20 MG/100ML; MG/100ML; MG/100ML; MG/100ML
INJECTION, SOLUTION INTRAVENOUS CONTINUOUS
Status: DISCONTINUED | OUTPATIENT
Start: 2022-02-28 | End: 2022-02-28 | Stop reason: HOSPADM

## 2022-02-28 RX ORDER — ONDANSETRON 2 MG/ML
4 INJECTION INTRAMUSCULAR; INTRAVENOUS
Status: DISCONTINUED | OUTPATIENT
Start: 2022-02-28 | End: 2022-02-28 | Stop reason: HOSPADM

## 2022-02-28 RX ORDER — SODIUM CHLORIDE 0.9 % (FLUSH) 0.9 %
10 SYRINGE (ML) INJECTION EVERY 12 HOURS SCHEDULED
Status: DISCONTINUED | OUTPATIENT
Start: 2022-02-28 | End: 2022-02-28 | Stop reason: HOSPADM

## 2022-02-28 RX ORDER — SODIUM CHLORIDE 0.9 % (FLUSH) 0.9 %
10 SYRINGE (ML) INJECTION PRN
Status: DISCONTINUED | OUTPATIENT
Start: 2022-02-28 | End: 2022-02-28 | Stop reason: HOSPADM

## 2022-02-28 RX ORDER — HYDROMORPHONE HYDROCHLORIDE 1 MG/ML
0.25 INJECTION, SOLUTION INTRAMUSCULAR; INTRAVENOUS; SUBCUTANEOUS EVERY 5 MIN PRN
Status: DISCONTINUED | OUTPATIENT
Start: 2022-02-28 | End: 2022-02-28 | Stop reason: HOSPADM

## 2022-02-28 RX ADMIN — PROPOFOL 20 MG: 10 INJECTION, EMULSION INTRAVENOUS at 11:21

## 2022-02-28 RX ADMIN — PROPOFOL 30 MG: 10 INJECTION, EMULSION INTRAVENOUS at 10:51

## 2022-02-28 RX ADMIN — PROPOFOL 50 MG: 10 INJECTION, EMULSION INTRAVENOUS at 10:50

## 2022-02-28 RX ADMIN — PROPOFOL 20 MG: 10 INJECTION, EMULSION INTRAVENOUS at 11:08

## 2022-02-28 RX ADMIN — PROPOFOL 50 MG: 10 INJECTION, EMULSION INTRAVENOUS at 11:11

## 2022-02-28 RX ADMIN — PROPOFOL 20 MG: 10 INJECTION, EMULSION INTRAVENOUS at 11:24

## 2022-02-28 RX ADMIN — PROPOFOL 20 MG: 10 INJECTION, EMULSION INTRAVENOUS at 11:02

## 2022-02-28 RX ADMIN — PROPOFOL 20 MG: 10 INJECTION, EMULSION INTRAVENOUS at 10:53

## 2022-02-28 RX ADMIN — PROPOFOL 20 MG: 10 INJECTION, EMULSION INTRAVENOUS at 11:05

## 2022-02-28 RX ADMIN — PROPOFOL 20 MG: 10 INJECTION, EMULSION INTRAVENOUS at 10:57

## 2022-02-28 RX ADMIN — PROPOFOL 20 MG: 10 INJECTION, EMULSION INTRAVENOUS at 11:00

## 2022-02-28 RX ADMIN — PROPOFOL 20 MG: 10 INJECTION, EMULSION INTRAVENOUS at 11:14

## 2022-02-28 RX ADMIN — PROPOFOL 20 MG: 10 INJECTION, EMULSION INTRAVENOUS at 11:16

## 2022-02-28 RX ADMIN — INSULIN HUMAN 18 UNITS: 100 INJECTION, SOLUTION PARENTERAL at 10:15

## 2022-02-28 RX ADMIN — PROPOFOL 20 MG: 10 INJECTION, EMULSION INTRAVENOUS at 10:54

## 2022-02-28 RX ADMIN — SODIUM CHLORIDE, POTASSIUM CHLORIDE, SODIUM LACTATE AND CALCIUM CHLORIDE: 600; 310; 30; 20 INJECTION, SOLUTION INTRAVENOUS at 09:56

## 2022-02-28 RX ADMIN — PROPOFOL 20 MG: 10 INJECTION, EMULSION INTRAVENOUS at 11:18

## 2022-02-28 ASSESSMENT — PULMONARY FUNCTION TESTS
PIF_VALUE: 1

## 2022-02-28 ASSESSMENT — PAIN DESCRIPTION - DESCRIPTORS: DESCRIPTORS: DISCOMFORT

## 2022-02-28 ASSESSMENT — PAIN SCALES - GENERAL
PAINLEVEL_OUTOF10: 0

## 2022-02-28 ASSESSMENT — PAIN - FUNCTIONAL ASSESSMENT: PAIN_FUNCTIONAL_ASSESSMENT: 0-10

## 2022-02-28 NOTE — OP NOTE
PROCEDURE NOTE    DATE OF PROCEDURE: 2/28/2022    SURGEON: Rashad Gutierrez MD    ASSISTANT: None    PREOPERATIVE DIAGNOSIS: ANEMIA    POSTOPERATIVE DIAGNOSIS: as described below    OPERATION: Total colonoscopy     ANESTHESIA: MAC PER ANESTHESIA     ESTIMATED BLOOD LOSS: less than 50     COMPLICATIONS: None. SPECIMENS:  Was Not Obtained    HISTORY: The patient is a 80y.o. year old female with history of above preop diagnosis. I recommended colonoscopy with possible biopsy or polypectomy and I explained the risk, benefits, expected outcome, and alternatives to the procedure. Risks included but are not limited to bleeding, infection, respiratory distress, hypotension, and perforation of the colon and possibility of missing a lesion. The patient understands and is in agreement. PROCEDURE: The patient was given IV conscious sedation. The patient's SPO2 remained above 90% throughout the procedure. The colonoscope was inserted per rectum and advanced under direct vision to the cecum with difficulty. The prep was good. VERY REDUNDANT AND FLOPPY COLON   RETAINED PASTY STOOLS   SPASMS THROUGHOUT THE COLON   AGGRESSIVE FLUSHING WERE DONE  NO GROSS PATHOLOGY WAS NOTED   Findings:  Terminal ileum: not examined    Cecum/Ascending colon: normal    Transverse colon: normal    Descending/Sigmoid colon: abnormal: DIVERTICULOSIS MOD TO SEVERE     Rectum/Anus: examined in normal and retroflexed positions and was abnormal: INT HEMORRHOIDS    Withdrawal Time was (minutes): 22    The colon was decompressed and the scope was removed. The patient tolerated the procedure well. Recommendations/Plan:   1. Lifestyle and dietary modifications as discussed  2. F/U In Office in 3-4 weeks  3. Discussed with the family  4. Colonoscopy Recall :5 year  5. POST SEDATION PATIENT WAS STABLE WITH STABLE VITAL SIGNS AND OXYGEN SATURATIONS AND WAS DISCHARGED HOME WITH RIDE IN A STABLE CONDITION.     Electronically signed by Shamika Curiel Anirudh Roque MD  on 2/28/2022 at 11:27 AM

## 2022-02-28 NOTE — ANESTHESIA POSTPROCEDURE EVALUATION
Department of Anesthesiology  Postprocedure Note    Patient: Hanh Maloney  MRN: [id number]  YOB: 1950  Date of evaluation: 2/28/2022  Time:  1:27 PM     Procedure Summary     Date: 02/28/22 Room / Location: [place] [address] / [facility] - INPATIENT    Anesthesia Start: 6914 Anesthesia Stop: 7618    Procedures:       COLONOSCOPY DIAGNOSTIC (N/A Anus)      EGD BIOPSY (N/A Mouth) Diagnosis: (DX IRON DEFICIENCY ANEMIA)    Surgeons: Charlotte Bass MD Responsible Provider: Lucho Lira MD    Anesthesia Type: MAC ASA Status: 4          Anesthesia Type: MAC    Quang Phase I:      Quang Phase II: Quang Score: 10    Last vitals: Reviewed and per EMR flowsheets.        Anesthesia Post Evaluation    Patient location during evaluation: PACU  Patient participation: complete - patient participated  Level of consciousness: awake  Airway patency: patent  Nausea & Vomiting: no nausea  Complications: no  Cardiovascular status: blood pressure returned to baseline  Respiratory status: acceptable  Hydration status: euvolemic  Comments: Multimodal analgesia pain management as indicated by procedure  Multimodal analgesia pain management approach

## 2022-02-28 NOTE — OP NOTE
PROCEDURE NOTE    DATE OF PROCEDURE: 2/28/2022     SURGEON: Danita Laws MD    ASSISTANT: None    PREOPERATIVE DIAGNOSIS: ANEMIA    POSTOPERATIVE DIAGNOSIS: As described below    OPERATION: Upper GI endoscopy with Biopsy    ANESTHESIA: MAC PER ANESTHESIA     ESTIMATED BLOOD LOSS: Less than 50 ml    COMPLICATIONS: None. SPECIMENS:  Was Obtained:     HISTORY: The patient is a 80y.o. year old female with history of above preop diagnosis. I recommended esophagogastroduodenoscopy with possible biopsy and I explained the risk, benefits, expected outcome, and alternatives to the procedure. Risks included but are not limited to bleeding, infection, respiratory distress, hypotension, and perforation of the esophagus, stomach, or duodenum. Patient understands and is in agreement. PROCEDURE: The patient was given IV conscious sedation. The patient's SPO2 remained above 90% throughout the procedure. The gastroscope was inserted orally and advanced under direct vision through the esophagus, through the stomach, through the pylorus, and into the descending duodenum. Findings:    Retropharyngeal area was grossly normal appearing    Esophagus: normal    Stomach:    Fundus: normal    Body: normal    Antrum: normal    Duodenum:     Descending:  RANDOM BIOPSIED    AT THE PROX DESCENDING DUODENUM A PROMINENT LUMP WAS NOTED BIOPSIES AND PICTURES WERE TAKEN       Bulb: normal    The scope was removed and the patient tolerated the procedure well. Recommendations/Plan:   1. F/U Biopsies  2. F/U In Office in 3-4 weeks  3. Discussed with the family  4.  Post sedation patient was stable with stable vital signs and stable O2 saturations    Electronically signed by Danita Laws MD  on 2/28/2022 at 10:58 AM

## 2022-02-28 NOTE — H&P
Interval H&P Note    Pt Name: Brandan Drew  MRN: [id number]  YOB: 1950  Date of evaluation: 2/28/2022      [x] I have reviewed in epic the Oncology/Hematology Progress Note by Dr Cate Glynn dated 2/14/22 attached below for an Interval History and Physical note. [x] I have examined  Brandan Drew, 79 yo female with multiple known comorbidities some of which include, HTN, HLD, SHANNAN on CPAP, remote TIA's 2018 and blood clots on anticoagulants. There are no changes to the patient who is scheduled for a diagnostic colonoscopy and EGD by Dr Shruti Mcleod for iron deficiency anemia. Patient follows with Dr Cate Glynn and was seen 2/14/22 for her known Fe def anemia with positive stool for occult blood 2 out of 3 samples. Advised to see  Gastroenterologist, Dr Shruti Mcleod and evaluated 1/20/22. She was scheduled for diagnostic testing and arrived for her procedures. She had both a colonoscopy and EGD with previous workup and past IV iron infusions. The patient followed the bowel prep until clear. The patient denies new health changes, fever, chills, wheezing, cough, increased SOB, chest pain, open sores or wounds.   +DM POC BS  346 Last A1C > 13% PCP recently changed her insulin  Anesthesiologist informed about BS  by preop RN and ordered insulin   Xarelto last 2/23/22    Vital signs: BP (!) 160/73   Pulse 56   Temp 97.3 °F (36.3 °C) (Temporal)   Resp 18   Ht 5' 3\" (1.6 m)   Wt 204 lb 4.8 oz (92.7 kg)   SpO2 98%   BMI 36.19 kg/m²     Allergies:  Aspirin, Benadryl [diphenhydramine], Ibuprofen, Lidocaine, Penicillins, Hydrocodone-acetaminophen, and Tramadol    Medications:    (refer to epic)       This is a 80 y.o. female who is pleasant, cooperative, alert and oriented x3, in no acute distress Skin pink warm and dry     Physical Exam:  Lungs: Bilateral equal air entry, unlabored, clear to ausculation, no wheezing, rales or rhonchi, normal effort  Cardiovascular: HR 56 asymptomatic bradycardic rate and regular rhythm, no murmur, gallop, rub. Abdomen:tenderness across upper abdomen > on left Pain pump right mid/upper quad Soft, obese w/pannus,  nondistended, normal bowel sounds. spinal cord stimulator gluteal generator battery   Labs:  Recent Labs     02/14/22  1240   HGB 13.7   HCT 41.5   WBC 4.4   MCV 84.4          No results for input(s): COVID19 in the last 720 hours. LUPE Carolina - CNP   Electronically signed 2/28/2022 at 9:46 AM      Serena Miguel MD   Physician   Specialty:  Oncology   Progress Notes      Signed   Encounter Date:  2/14/2022         Related encounter: Office Visit from 2/14/2022 in [address]           Signed                                                                                                    _                Chief Complaint   Patient presents with    Follow-up       review status of disease    Pain       constant       DIAGNOSIS:       Microcytic anemia  Severe iron deficiency anemia  No response to oral iron  Intolerable side effects to oral iron with severe constipation  Positive stool guaiac test in 2 out of 3 symptoms  History of diverticulosis  GERD  History of ovarian cancer in 2015  Strong family history of breast cancer with mother and grandmother and maternal aunt    CURRENT THERAPY:         IV iron infusion December 2021  GI work up in progress. BRIEF CASE HISTORY:      Ms. Tal Feliz is a very pleasant 80 y.o. female with history of multiple co morbidities as listed. Patient is referred for further management of anemia. Patient has history of ovarian cancer in 2015. She was treated at [facility] with surgery and chemotherapy using Taxol carboplatin. Patient developed anemia at that time and she received blood transfusions. She continued to have mild anemia for several years. Patient had GI evaluation and July 2019. She had EGD for GERD and she had colonoscopy at that time.   No source of bleeding was identified. Patient was recently noted to have increasing symptoms related to anemia. She has significant drop of her hemoglobin not responding to oral iron. She has generalized weakness and fatigue and dizziness. She has shortness of breath on exertion. Patient has significant muscle cramps and ice craving. Patient denies any vaginal bleeding. No hematuria. No hematochezia. No melena. No hematemesis. Stool guaiac test was positive for Hemoccult blood and 2 out of 3 samples. Patient has left upper abdominal pain on and off for several months. She has CT scan in November which was negative. Patient denies smoking or alcohol drinking. .      INTERIM HISTORY:   Seen for follow up anemia. C/o weakness and fatigue. Occasional dizziness. No active bleeding. Will have GI work up with plan for endoscopy 2/28/22. PAST MEDICAL HISTORY: has a past medical history of Arthritis, Bowel obstruction ([address] Cancer ([address] Cerebral artery occlusion with cerebral infarction ([address] Diabetes mellitus ([address] Epigastric pain, GERD (gastroesophageal reflux disease), History of anesthesia complications, History of blood transfusion, Hx of blood clots, Hyperlipidemia, Hypertension, Right arm pain, Right shoulder pain, Sleep apnea, Tachycardia, Thyroid disease, and TIA (transient ischemic attack). PAST SURGICAL HISTORY: has a past surgical history that includes Knee arthroscopy (Right); Hand surgery (Right); Cholecystectomy; other surgical history (Right, 11/11/2014); shoulder surgery (11/2014); Shoulder arthroscopy (Right, 02/03/15); Appendectomy; Ureter stent placement (Right, 4/30/2015); Hysterectomy (2/2014); Colonoscopy (07/08/2019); Upper gastrointestinal endoscopy (07/08/2019); Colonoscopy (N/A, 7/8/2019); Upper gastrointestinal endoscopy (N/A, 7/8/2019); Nerve Block (Bilateral, 08/29/2019); Injection Procedure For Sacroiliac Joint (Bilateral, 8/29/2019); Nerve Block (Bilateral, 10/10/2019);  Injection Procedure For Sacroiliac Joint (Bilateral, 10/10/2019); Anesthesia Nerve Block (Left, 11/7/2019); Nerve Block (Bilateral, 05/15/2020); Anesthesia Nerve Block (Bilateral, 5/15/2020); Nerve Block (06/05/2020); Spinal Cord Stimulator Surgery (N/A, 6/5/2020); vascular surgery; Arm Surgery (Left); joint replacement (Right); Tonsillectomy; Spinal Cord Stimulator Surgery (N/A, 7/27/2020); and other surgical history (04/2021). CURRENT MEDICATIONS:  has a current medication list which includes the following prescription(s): omeprazole, amlodipine, amlodipine, bisacodyl, polyethylene glycol, potassium chloride, polyethylene glycol, bisacodyl, insulin glargine, levothyroxine, gabapentin, furosemide, trazodone, accu-chek junior plus, tamsulosin, sure comfort insulin syringe, epinephrine, xarelto, alendronate, [DISCONTINUED] potassium chloride, [DISCONTINUED] omeprazole, insulin lispro, duloxetine, losartan, atorvastatin, hydrochlorothiazide, vitamin c, glucose monitoring, calcium carbonate, super b complex/c, vitamin d3, ferrous sulfate, fexofenadine, and magnesium oxide. ALLERGIES:  is allergic to aspirin, benadryl [diphenhydramine], ibuprofen, lidocaine, penicillins, hydrocodone-acetaminophen, and tramadol. FAMILY HISTORY: Mother and grandmother and maternal aunt had breast cancer. Father had cancer. Otherwise negative for any hematological or oncological conditions. SOCIAL HISTORY:  reports that she has never smoked. She has never used smokeless tobacco. She reports current alcohol use. She reports that she does not use drugs. REVIEW OF SYSTEMS:     · General: Positive for weakness and fatigue. No unanticipated weight loss or decreased appetite. No fever or chills. · Eyes: No blurred vision, eye pain or double vision. · Ears: No hearing problems or drainage. No tinnitus. · Throat: No sore throat, problems with swallowing or dysphagia. · Respiratory: No cough, sputum or hemoptysis. No shortness of breath.  No pleuritic chest pain. · Cardiovascular: No chest pain, orthopnea or PND. No lower extremity edema. No palpitation. · Gastrointestinal: As above. · Genitourinary: No dysuria, hematuria, frequency or urgency. · Musculoskeletal: Positive for muscle cramps. No limitation of movement. No back pain. No gait disturbance, No joint complaints. · Dermatologic: No skin rashes or pruritus. No skin lesions or discolorations. · Psychiatric: No depression, anxiety, or stress or signs of schizophrenia. No change in mood or affect. · Hematologic: No history of bleeding tendency. No bruises or ecchymosis. No history of clotting problems. · Infectious disease: No fever, chills or frequent infections. · Endocrine: No polydipsia or polyuria. No temperature intolerance. · Neurologic: No headaches positive dizziness. No weakness or numbness of the extremities. No changes in balance, coordination,  memory, mentation, behavior. · Allergic/Immunologic: No nasal congestion or hives. No repeated infections. PHYSICAL EXAM:  The patient is not in acute distress. Vital signs: Blood pressure (!) 196/80, pulse 60, temperature 97.1 °F (36.2 °C), weight 208 lb (94.3 kg), not currently breastfeeding.       General appearance - well appearing, not in pain or distress  Mental status - good mood, alert and oriented  Eyes - pupils equal and reactive, extraocular eye movements intact  Ears - bilateral TM's and external ear canals normal  Nose - normal and patent, no erythema, discharge or polyps  Mouth - mucous membranes moist, pharynx normal without lesions  Neck - supple, no significant adenopathy  Lymphatics - no palpable lymphadenopathy, no hepatosplenomegaly  Chest - clear to auscultation, no wheezes, rales or rhonchi, symmetric air entry  Heart - normal rate, regular rhythm, normal S1, S2, no murmurs, rubs, clicks or gallops  Abdomen - soft, nontender, nondistended, no masses or organomegaly  Neurological - alert, oriented, normal speech, no focal findings or movement disorder noted  Musculoskeletal - no joint tenderness, deformity or swelling  Extremities - peripheral pulses normal, no pedal edema, no clubbing or cyanosis  Skin - normal coloration and turgor, no rashes, no suspicious skin lesions noted      Review of Diagnostic data:         Lab Results   Component Value Date     WBC 4.4 02/14/2022     HGB 13.7 02/14/2022     HCT 41.5 02/14/2022     MCV 84.4 02/14/2022      02/14/2022        Chemistry               Component Value Date/Time      11/09/2021 1140     K 4.0 11/09/2021 1140      11/09/2021 1140     CO2 31 11/09/2021 1140     BUN 17 11/09/2021 1140     CREATININE 1.02 (H) 11/09/2021 1140               Component Value Date/Time     CALCIUM 8.7 11/09/2021 1140     ALKPHOS 47 10/11/2021 0000     AST 41 10/11/2021 0000     ALT 24 10/11/2021 0000     BILITOT 0.7 10/11/2021 0000                IMPRESSION:   Microcytic anemia  Severe iron deficiency anemia  No response to oral iron  Intolerable side effects to oral iron with severe constipation  Positive stool guaiac test in 2 out of 3 symptoms  History of diverticulosis  GERD  History of ovarian cancer in 2015  Strong family history of breast cancer with mother and grandmother and maternal aunt     PLAN: I reviewed the labs as above and discussed with the patient. I explained to the patient the nature of this hematologic problem. I explained the significance of these abnormalities in layman language. The blood picture is typical for iron deficiency anemia not responding to oral iron with significant severe constipation related to the oral intake of iron. So patient was treated with IV iron infusion to replace the iron deficiency. Very good response. She had positive stool guaiac test.  She had her last colonoscopy and EGD in July 2019. She will have a repeated GI work up 2/28/22.      I discussed with patient other problems related to her history of ovarian cancer treated in 2015 and the strong family history of breast cancer. Patient qualifies for genetic testing. I explained the importance of that and she agreed. We will make referral to genetic counselor for evaluation and possible testing. Furthermore patient did not have follow-up with her GYN oncologist for the last 3 to 4 years. Her last CT scan of the abdomen was negative. Repeated tumor marker CA-125 is normal.   Will see her in 3 months with repeated labs. Sooner for any problems. Patient's questions were answered to the best of her satisfaction and she verbalized full understanding and agreement.

## 2022-03-04 LAB — SURGICAL PATHOLOGY REPORT: NORMAL

## 2022-03-07 ENCOUNTER — TELEPHONE (OUTPATIENT)
Dept: ONCOLOGY | Age: 72
End: 2022-03-07

## 2022-03-07 NOTE — TELEPHONE ENCOUNTER
[facility]   Hereditary Cancer Risk Assessment     Name: Brandan Drew  YOB: 1950  Date of Results Disclosure: 03/07/22      HISTORY   Ms. Kayla Naidu was seen for genetic counseling at the request of Aamir Haley MD due to her personal and family history of cancer. At that time, Ms. Kayla Naidu chose to pursue genetic testing via the CancerNext Expanded + RNA gene panel. These results were discussed with Ms. Kayla Naidu via telephone. A summary of Ms. Walterss results and recommendations are below. RESULTS  [facility] CancerNext-Expanded Panel + RNAinsight: NEGATIVE - NO CLINICALLY SIGNIFICANT MUTATIONS DETECTED   This panel included the analysis of 77 genes associated with hereditary cancer including: AIP, ALK, APC, SIDDHARTH, BAP1, BARD1, BLM, BMPR1A, BRCA1, BRCA2, BRIP1, CDC73, CDH1, CDK4, CDKN1B, CDKN2A, CHEK2, CTNNA1, DICER1, EGFR, EGLN1, EPCAM, FANCC, FH, FLCN, GALNT12, GREM1, HOXB13, KIF1B, KIT1, LZTR1, MAX, MEN1, MET, MITF, MLH1, MSH2, MSH3, MSH6, MUTYH, NBN, NF1, NF2, NTHL1, PALB2, PDGFRA, PHOX2B, PMS2, POLD1, POLE, POT1, PJADF9Z, PTCH1, PTEN, RAD51C, RAD51D, RB1, RECQL, RET, SDHA, SDHAF2, SDHB, SDHC, ,SDHD, SMAD4, SMARCA4, SMARCB1, SMARCE1, STK11, SUFU, FGTK064, TP53, TSC1, TSC2, VHL, and XRCC2. In addition, no clinically relevant aberrant RNA transcripts were detected in select genes. Please refer to genetic test report for technical details. We discussed that Ms. Walterss negative test result greatly reduces the likelihood that her personal history of cancer is due to a hereditary mutation. It is possible that her personal history of cancer may be due to a gene for which testing was not performed or which has yet to be discovered. RECOMMENDATIONS  1) Ms. Kayla Naidu should continue cancer surveillance as directed by her physicians. RECOMMENDATIONS FOR FAMILY MEMBERS   1) Genetic testing is not recommended for Ms. Richter's children based on her negative test results. However, this recommendation does not take into consideration any family history of cancer in their paternal family. 2) It is possible that the cancers in Ms. Gerri Aponte family are due to a hereditary gene mutation that she did not inherit. Therefore, her relatives (particularly those with a current or previous cancer diagnosis) may consider genetic counseling and testing to clarify this possibility. Relatives may contact the [address] [place] [address] at [phone] or locate a genetic counselor at www. [employer]. [place].     3) We encourage Ms. Gerri Aponte relatives to discuss their family history of cancer with their physicians to determine the most appropriate cancer screening recommendations. Ms. Gerri Aponte female relatives may benefit from a formal breast cancer risk assessment by the Donice Dicker or Guyann Face risk models to determine if additional breast cancer screening is warranted. SUMMARY & PLAN   1) Ms. Weaver genetic test results are negative meaning there were no clinically significant mutations detected in the 77 genes analyzed. 2) There are no changes in medical management for Ms. Magdiel Lu based on her negative genetic test results. 3) We encourage Ms. Magdiel Lu to contact us every 1-2 years to determine if there are any new genetic testing or research options available. 4) We encourage Ms. Magdiel Lu to contact us with updates to her personal and/or familys cancer history as this information may alter our assessment and/or recommendations. The [address] [place] [address] would be glad to offer our assistance should you have any questions or concerns about this information. Please feel free to contact us at [phone]. Freda Koyanagi.  Wagner Chicas MS, [facility]   Licensed Genetic Counselor         CC:  Ms. Jared Alexander MD

## 2022-03-07 NOTE — TELEPHONE ENCOUNTER
Left message for Remedios Cho notifying her that her genetic test results are available. Requested that she return my phone call at her earliest convenience to review results.

## 2022-03-14 ENCOUNTER — OFFICE VISIT (OUTPATIENT)
Dept: PRIMARY CARE CLINIC | Age: 72
End: 2022-03-14
Payer: MEDICARE

## 2022-03-14 VITALS
DIASTOLIC BLOOD PRESSURE: 86 MMHG | BODY MASS INDEX: 36.07 KG/M2 | WEIGHT: 203.6 LBS | HEART RATE: 60 BPM | SYSTOLIC BLOOD PRESSURE: 138 MMHG | RESPIRATION RATE: 16 BRPM | OXYGEN SATURATION: 98 %

## 2022-03-14 DIAGNOSIS — Z79.4 TYPE 2 DIABETES MELLITUS WITH DIABETIC NEUROPATHY, WITH LONG-TERM CURRENT USE OF INSULIN (HCC): Primary | ICD-10-CM

## 2022-03-14 DIAGNOSIS — I10 ESSENTIAL HYPERTENSION: ICD-10-CM

## 2022-03-14 DIAGNOSIS — E11.40 TYPE 2 DIABETES MELLITUS WITH DIABETIC NEUROPATHY, WITH LONG-TERM CURRENT USE OF INSULIN (HCC): Primary | ICD-10-CM

## 2022-03-14 DIAGNOSIS — D22.9 CHANGE IN MOLE: ICD-10-CM

## 2022-03-14 DIAGNOSIS — K59.00 CONSTIPATION, UNSPECIFIED CONSTIPATION TYPE: ICD-10-CM

## 2022-03-14 DIAGNOSIS — D50.9 IRON DEFICIENCY ANEMIA, UNSPECIFIED IRON DEFICIENCY ANEMIA TYPE: ICD-10-CM

## 2022-03-14 PROCEDURE — 2022F DILAT RTA XM EVC RTNOPTHY: CPT | Performed by: FAMILY MEDICINE

## 2022-03-14 PROCEDURE — G8427 DOCREV CUR MEDS BY ELIG CLIN: HCPCS | Performed by: FAMILY MEDICINE

## 2022-03-14 PROCEDURE — 3017F COLORECTAL CA SCREEN DOC REV: CPT | Performed by: FAMILY MEDICINE

## 2022-03-14 PROCEDURE — 1036F TOBACCO NON-USER: CPT | Performed by: FAMILY MEDICINE

## 2022-03-14 PROCEDURE — 4040F PNEUMOC VAC/ADMIN/RCVD: CPT | Performed by: FAMILY MEDICINE

## 2022-03-14 PROCEDURE — 1123F ACP DISCUSS/DSCN MKR DOCD: CPT | Performed by: FAMILY MEDICINE

## 2022-03-14 PROCEDURE — G8400 PT W/DXA NO RESULTS DOC: HCPCS | Performed by: FAMILY MEDICINE

## 2022-03-14 PROCEDURE — G8484 FLU IMMUNIZE NO ADMIN: HCPCS | Performed by: FAMILY MEDICINE

## 2022-03-14 PROCEDURE — 3046F HEMOGLOBIN A1C LEVEL >9.0%: CPT | Performed by: FAMILY MEDICINE

## 2022-03-14 PROCEDURE — G8417 CALC BMI ABV UP PARAM F/U: HCPCS | Performed by: FAMILY MEDICINE

## 2022-03-14 PROCEDURE — 1090F PRES/ABSN URINE INCON ASSESS: CPT | Performed by: FAMILY MEDICINE

## 2022-03-14 PROCEDURE — 99214 OFFICE O/P EST MOD 30 MIN: CPT | Performed by: FAMILY MEDICINE

## 2022-03-14 ASSESSMENT — PATIENT HEALTH QUESTIONNAIRE - PHQ9
SUM OF ALL RESPONSES TO PHQ QUESTIONS 1-9: 1
2. FEELING DOWN, DEPRESSED OR HOPELESS: 0
SUM OF ALL RESPONSES TO PHQ QUESTIONS 1-9: 1
1. LITTLE INTEREST OR PLEASURE IN DOING THINGS: 1
SUM OF ALL RESPONSES TO PHQ9 QUESTIONS 1 & 2: 1

## 2022-03-14 NOTE — PROGRESS NOTES
[address] PRIMARY CARE  [address] [facility] [address]  [address] [address]  Dept: [phone]  Dept Fax: [phone]    Murtaza Dewitt is a 80 y.o. female who presents today for her medical conditions/complaints as noted below. Murtaza Dewitt is c/o of  Chief Complaint   Patient presents with    1 Month Follow-Up    Skin Problem     top of left hand        HPI:     HPI     Pt here for one month follow up    Pt feels like her sugars are starting to get better, she has not needed to use sliding scale much lately. She does have a bump on left hand has been there for some time, light pink with white crusting area. BP has been better as well, only had one episode of high diastolic but otherwise has been better. Her hgb has improved since IV iron infusion. She had colonoscopy that showed mod-severe diverticulosis and hemorrhoids. EGD had lump that was biopsied and was back as normal duodenal mucosa. Does have constipation, states went only twice since her colonoscopy on .         Hemoglobin A1C (%)   Date Value   2022 13.9   2021 10.2   2021 7.1             ( goal A1C is < 7)   No results found for: LABMICR  LDL Cholesterol (mg/dL)   Date Value   2020 44   2015 86   10/06/2014 86     LDL Calculated (mg/dL)   Date Value   2019 47       (goal LDL is <100)   AST (U/L)   Date Value   10/11/2021 41     ALT (U/L)   Date Value   10/11/2021 24     BUN (mg/dL)   Date Value   2021 17     BP Readings from Last 3 Encounters:   22 138/86   22 (!) 205/92   22 (!) 181/91          (goal 120/80)    Past Medical History:   Diagnosis Date    Arthritis     Bowel obstruction ([address]     Cancer ([facility]     ovarian stage 2    Cerebral artery occlusion with cerebral infarction ([facility] 2018    Mini strokes \"Tia's\"    CPAP (continuous positive airway pressure) dependence     Diabetes mellitus (HCC)     Epigastric pain     GERD (gastroesophageal reflux disease)     History of anesthesia complications [address]    was told after gallbladder surgery to never have anesthesia again \"since it was hard for me to come out of it\"    History of blood transfusion     Hx of blood clots     lung     Hyperlipidemia     Hypertension     Right arm pain     Right shoulder pain     Sleep apnea     uses CPAP nightly    Tachycardia     hx of    Thyroid disease     TIA (transient ischemic attack)       Past Surgical History:   Procedure Laterality Date    ANESTHESIA NERVE BLOCK Left 11/7/2019    NERVE BLOCK (DEFINE) - INTERCOSTAL NERVE BLOCK performed by Rigo Gaming MD at [facility] Bilateral 5/15/2020    NERVE BLOCK BILATERAL - MBB  L4-5, L5-S1 performed by Rigo Gaming MD at [address] Left     ORIF    CHOLECYSTECTOMY      COLONOSCOPY  07/08/2019    5 yr recall.     COLONOSCOPY N/A 7/8/2019    COLONOSCOPY WITH BIOPSY performed by Rashad Gutierrez MD at [facility] COLONOSCOPY N/A 2/28/2022    COLONOSCOPY DIAGNOSTIC performed by Rashad Gutierrez MD at [address] Right     [facility]. INJECTION PROCEDURE FOR SACROILIAC JOINT Bilateral 8/29/2019    SACROILIAC JOINT INJECTION performed by Rigo Gaming MD at [facility] Bilateral 10/10/2019    SACROILIAC JOINT INJECTION performed by Rigo Gaming MD at [address]  2/2014    chris with bso    JOINT REPLACEMENT Right     shoulder    KNEE ARTHROSCOPY Right     NERVE BLOCK Bilateral 08/29/2019    SI joint injection    NERVE BLOCK Bilateral 10/10/2019    SI joint    NERVE BLOCK Bilateral 05/15/2020    Medial branch block L4-5, L5-S1    NERVE BLOCK  06/05/2020    SPINAL CORD STIMULATOR IMPLANT TRIAL (N/A )    OTHER SURGICAL HISTORY Right 11/11/2014    shoulder manipulation    OTHER SURGICAL HISTORY 04/2021    pain pump placed, fentanyl in pain pump    SHOULDER ARTHROSCOPY Right 02/03/15    SHOULDER SURGERY  11/2014    manipulation    SPINAL CORD STIMULATOR SURGERY N/A 6/5/2020    SPINAL CORD STIMULATOR IMPLANT TRIAL performed by Jean Pineda MD at [facility] N/A 7/27/2020    SPINAL CORD STIMULATOR IMPLANT WITH THORACIC LAMINOTOMY performed by Ariel Shah MD at [address] ENDOSCOPY  07/08/2019    UPPER GASTROINTESTINAL ENDOSCOPY N/A 7/8/2019    EGD BIOPSY performed by Marcia Garcia MD at [facility] N/A 2/28/2022    EGD BIOPSY performed by Marcia Garcia MD at [address] Right 4/30/2015    pt has blood clot & abscess in her right kidney    VASCULAR SURGERY      Katy filter in & removed 1 year later       Family History   Problem Relation Age of Onset    Elevated Lipids Paternal Grandmother        Social History     Tobacco Use    Smoking status: Never Smoker    Smokeless tobacco: Never Used   Substance Use Topics    Alcohol use: Yes     Comment: rare      Current Outpatient Medications   Medication Sig Dispense Refill    omeprazole (PRILOSEC) 40 MG delayed release capsule TAKE 1 CAPSULE BY MOUTH EVERY MORNING BEFORE BREAKFAST 30 capsule 3    amLODIPine (NORVASC) 10 MG tablet Take 1 tablet by mouth daily 30 tablet 5    potassium chloride (KLOR-CON M) 20 MEQ extended release tablet TAKE 1 TABLET BY MOUTH TWICE DAILY 60 tablet 2    insulin glargine (LANTUS) 100 UNIT/ML injection vial INJECT 75 UNITS SUBCUTANEOUSLY NIGHTLY 30 mL 10    levothyroxine (SYNTHROID) 88 MCG tablet TAKE (1) TABLET BY MOUTH EVERY DAY 30 tablet 10    gabapentin (NEURONTIN) 600 MG tablet TAKE (1) TABLET BY MOUTH FOUR TIMES A  tablet 3    furosemide (LASIX) 20 MG tablet TAKE 1 TABLET BY MOUTH DAILY 90 tablet 2    traZODone (DESYREL) 100 MG tablet Take 1 tablet by mouth nightly as needed for Sleep 30 tablet 5    blood glucose test strips (ACCU-CHEK TED PLUS) strip USE TO TEST BLOOD SUGAR 3 TIMES DAILY AND AS NEEDED FOR SYMPTOMS OF IRREGULAR BLOOD GLUCOSE 100 strip 10    tamsulosin (FLOMAX) 0.4 MG capsule TAKE 1 CAPSULE BY MOUTH NIGHTLY *EMERGENCY REFILL* 30 capsule 10    SURE COMFORT INSULIN SYRINGE 31G X 5/16\" 1 ML MISC USE AS DIRECTED FOUR TIMES A  each 10    EPINEPHrine (EPIPEN 2-LISA) 0.3 MG/0.3ML SOAJ injection EpiPen 2-Lisa 0.3 mg/0.3 mL injection, auto-injector 1 each 3    XARELTO 20 MG TABS tablet TAKE 1 TABLET BY MOUTH ONCE DAILY 30 tablet 10    alendronate (FOSAMAX) 70 MG tablet TAKE 1 TABLET BY MOUTH EVERY MONDAY 5 tablet 10    insulin lispro (HUMALOG) 100 UNIT/ML injection vial INEJCT SUBCUTANEOUSLY UP TO THREE TIMES A DAY BASED ON SLIDING SCALE  MAX 40 UNITS A DAY 10 mL 10    DULoxetine (CYMBALTA) 60 MG extended release capsule TAKE 1 CAPSULE BY MOUTH DAILY 30 capsule 10    losartan (COZAAR) 100 MG tablet TAKE 1 TABLET BY MOUTH EVERY DAY 30 tablet 10    atorvastatin (LIPITOR) 40 MG tablet TAKE (1) TABLET BY MOUTH EVERY DAY 30 tablet 11    hydroCHLOROthiazide (HYDRODIURIL) 25 MG tablet TAKE 1 TABLET BY MOUTH EVERY MORNING 30 tablet 11    vitamin C (ASCORBIC ACID) 500 MG tablet Take 500 mg by mouth daily      glucose monitoring kit (FREESTYLE) monitoring kit 1 kit by Does not apply route daily Please provide accuchek meter for patient, not freestyle 1 kit 0    calcium carbonate (TUMS) 500 MG chewable tablet Take 1 tablet by mouth daily as needed for Heartburn      SUPER B COMPLEX/C CAPS Take by mouth      Cholecalciferol (VITAMIN D3) 5000 units TABS Take by mouth      ferrous sulfate 325 (65 Fe) MG tablet Take 325 mg by mouth daily (with breakfast)      fexofenadine (ALLEGRA) 60 MG tablet Take 60 mg by mouth daily      magnesium oxide (MAG-OX) 400 MG tablet Take 400 mg by mouth daily       No current facility-administered medications for this visit. Allergies   Allergen Reactions    Aspirin Anaphylaxis     Only thing she can take is tylenol    Benadryl [Diphenhydramine] Other (See Comments)     Dystonic movement    Ibuprofen Anaphylaxis    Lidocaine Anaphylaxis     CAN NOT TOLERATE IV    Penicillins Anaphylaxis    Hydrocodone-Acetaminophen Other (See Comments)     Unsure of allergy    Tramadol Other (See Comments)     Unsure of allergy       Health Maintenance   Topic Date Due    Hepatitis C screen  Never done    DTaP/Tdap/Td vaccine (1 - Tdap) Never done    DEXA (modify frequency per FRAX score)  Never done    Shingles Vaccine (2 of 2) 12/18/2020    COVID-19 Vaccine (3 - Booster for Moderna series) 08/15/2021    Lipid screen  09/29/2021    Diabetic microalbuminuria test  01/25/2022    Diabetic foot exam  02/25/2022    Pneumococcal 65+ years Vaccine (1 of 1 - PPSV23) 04/17/2023 (Originally 9/14/2016)    A1C test (Diabetic or Prediabetic)  05/09/2022    Breast cancer screen  10/21/2022    Annual Wellness Visit (AWV)  11/03/2022    Diabetic retinal exam  11/09/2022    Potassium monitoring  11/09/2022    Creatinine monitoring  11/09/2022    Depression Screen  03/14/2023    Colorectal Cancer Screen  02/28/2032    Flu vaccine  Completed    Hepatitis A vaccine  Aged Out    Hib vaccine  Aged Out    Meningococcal (ACWY) vaccine  Aged Out       Subjective:      Review of Systems   Constitutional: Negative for chills and fever. Respiratory: Negative for shortness of breath. Gastrointestinal: Positive for constipation. Negative for vomiting. Musculoskeletal: Positive for back pain. Objective:     Physical Exam  Constitutional:       Appearance: She is well-developed. HENT:      Head: Normocephalic and atraumatic. Right Ear: External ear normal.      Left Ear: External ear normal.   Eyes:      Conjunctiva/sclera: Conjunctivae normal.      Pupils: Pupils are equal, round, and reactive to light. Cardiovascular:      Rate and Rhythm: Normal rate and regular rhythm. Heart sounds: Normal heart sounds. Pulmonary:      Effort: Pulmonary effort is normal.      Breath sounds: Normal breath sounds. Musculoskeletal:      Cervical back: Neck supple. Skin:     General: Skin is warm and dry. Comments: Light pink raised bump on dorsal aspect of left hand with some white crusting. Neurological:      Mental Status: She is alert and oriented to person, place, and time. Psychiatric:         Behavior: Behavior normal.         Thought Content: Thought content normal.       /86   Pulse 60   Resp 16   Wt 203 lb 9.6 oz (92.4 kg)   SpO2 98%   BMI 36.07 kg/m²     Assessment:      1. Type 2 diabetes mellitus with diabetic neuropathy, with long-term current use of insulin (HCC)  - sugars improving     2. Essential hypertension  - BP improved, continue monitoring and current medications. If elevated recommend letting me know so we can adjust medication. 3. Constipation, unspecified constipation type  - recommend miralax daily prn and increase water and fiber intake. 4. Iron deficiency anemia, unspecified iron deficiency anemia type  - improved with iron infusion. 5. Change in mole  - recommend seeing dermatology regarding new lesion/ lump on left hand. Plan:      Return in about 2 months (around 5/14/2022) for diabetes follow up. No orders of the defined types were placed in this encounter. No orders of the defined types were placed in this encounter. Patient given educational materials - see patient instructions. Discussed use, benefit, and side effects of prescribed medications. All patient questions answered. Pt voiced understanding. Reviewed healthmaintenance. Instructed to continue current medications, diet and exercise. Patient agreed with treatment plan. Follow up as directed.      Electronically signed by Marin Haq DO on 3/16/2022 at 9:04 AM

## 2022-03-16 ASSESSMENT — ENCOUNTER SYMPTOMS
VOMITING: 0
BACK PAIN: 1
SHORTNESS OF BREATH: 0
CONSTIPATION: 1

## 2022-03-23 RX ORDER — GABAPENTIN 600 MG/1
TABLET ORAL
Qty: 120 TABLET | Refills: 3 | Status: SHIPPED | OUTPATIENT
Start: 2022-03-23 | End: 2022-07-14

## 2022-04-01 ENCOUNTER — TELEPHONE (OUTPATIENT)
Dept: PRIMARY CARE CLINIC | Age: 72
End: 2022-04-01

## 2022-04-01 NOTE — TELEPHONE ENCOUNTER
Scott Shea calling in [facility], states that for patients referral they need last OV note and labs faxed to them. Fax [phone].  Will fax

## 2022-04-05 LAB
AVERAGE GLUCOSE: 282
HBA1C MFR BLD: 14.3 %

## 2022-04-11 ENCOUNTER — OFFICE VISIT (OUTPATIENT)
Dept: GASTROENTEROLOGY | Age: 72
End: 2022-04-11
Payer: MEDICARE

## 2022-04-11 ENCOUNTER — HOSPITAL ENCOUNTER (OUTPATIENT)
Age: 72
Discharge: HOME | End: 2022-04-11
Payer: MEDICARE

## 2022-04-11 VITALS
DIASTOLIC BLOOD PRESSURE: 83 MMHG | TEMPERATURE: 96.8 F | HEART RATE: 61 BPM | BODY MASS INDEX: 35.96 KG/M2 | SYSTOLIC BLOOD PRESSURE: 155 MMHG | WEIGHT: 203 LBS

## 2022-04-11 DIAGNOSIS — K90.9 IRON MALABSORPTION: ICD-10-CM

## 2022-04-11 DIAGNOSIS — R19.5 OCCULT BLOOD IN STOOLS: ICD-10-CM

## 2022-04-11 DIAGNOSIS — K21.9 GASTROESOPHAGEAL REFLUX DISEASE, UNSPECIFIED WHETHER ESOPHAGITIS PRESENT: ICD-10-CM

## 2022-04-11 DIAGNOSIS — C56.9 MALIGNANT NEOPLASM OF OVARY, UNSPECIFIED LATERALITY (HCC): ICD-10-CM

## 2022-04-11 DIAGNOSIS — K57.30 DIVERTICULOSIS OF COLON: ICD-10-CM

## 2022-04-11 DIAGNOSIS — K57.30 DIVERTICULOSIS OF COLON: Primary | ICD-10-CM

## 2022-04-11 DIAGNOSIS — R11.0 NAUSEA: ICD-10-CM

## 2022-04-11 DIAGNOSIS — D50.8 IRON DEFICIENCY ANEMIA SECONDARY TO INADEQUATE DIETARY IRON INTAKE: ICD-10-CM

## 2022-04-11 DIAGNOSIS — D50.9 MICROCYTIC ANEMIA: ICD-10-CM

## 2022-04-11 LAB
ABSOLUTE EOS #: 0.21 K/UL (ref 0–0.44)
ABSOLUTE IMMATURE GRANULOCYTE: <0.03 K/UL (ref 0–0.3)
ABSOLUTE LYMPH #: 2.67 K/UL (ref 1.1–3.7)
ABSOLUTE MONO #: 0.43 K/UL (ref 0.1–1.2)
BASOPHILS # BLD: 1 % (ref 0–2)
BASOPHILS ABSOLUTE: 0.06 K/UL (ref 0–0.2)
EOSINOPHILS RELATIVE PERCENT: 3 % (ref 1–4)
HCT VFR BLD CALC: 41.7 % (ref 36.3–47.1)
HEMOGLOBIN: 13.2 G/DL (ref 11.9–15.1)
IMMATURE GRANULOCYTES: 0 %
LYMPHOCYTES # BLD: 40 % (ref 24–43)
MCH RBC QN AUTO: 29.3 PG (ref 25.2–33.5)
MCHC RBC AUTO-ENTMCNC: 31.7 G/DL (ref 28.4–34.8)
MCV RBC AUTO: 92.5 FL (ref 82.6–102.9)
MONOCYTES # BLD: 6 % (ref 3–12)
NRBC AUTOMATED: 0 PER 100 WBC
PDW BLD-RTO: 14.2 % (ref 11.8–14.4)
PLATELET # BLD: 170 K/UL (ref 138–453)
PMV BLD AUTO: 13 FL (ref 8.1–13.5)
RBC # BLD: 4.51 M/UL (ref 3.95–5.11)
SEG NEUTROPHILS: 50 % (ref 36–65)
SEGMENTED NEUTROPHILS ABSOLUTE COUNT: 3.32 K/UL (ref 1.5–8.1)
WBC # BLD: 6.7 K/UL (ref 3.5–11.3)

## 2022-04-11 PROCEDURE — 85025 COMPLETE CBC W/AUTO DIFF WBC: CPT

## 2022-04-11 PROCEDURE — 1123F ACP DISCUSS/DSCN MKR DOCD: CPT | Performed by: INTERNAL MEDICINE

## 2022-04-11 PROCEDURE — G8400 PT W/DXA NO RESULTS DOC: HCPCS | Performed by: INTERNAL MEDICINE

## 2022-04-11 PROCEDURE — 1090F PRES/ABSN URINE INCON ASSESS: CPT | Performed by: INTERNAL MEDICINE

## 2022-04-11 PROCEDURE — 1036F TOBACCO NON-USER: CPT | Performed by: INTERNAL MEDICINE

## 2022-04-11 PROCEDURE — 4040F PNEUMOC VAC/ADMIN/RCVD: CPT | Performed by: INTERNAL MEDICINE

## 2022-04-11 PROCEDURE — G8427 DOCREV CUR MEDS BY ELIG CLIN: HCPCS | Performed by: INTERNAL MEDICINE

## 2022-04-11 PROCEDURE — G8417 CALC BMI ABV UP PARAM F/U: HCPCS | Performed by: INTERNAL MEDICINE

## 2022-04-11 PROCEDURE — 3017F COLORECTAL CA SCREEN DOC REV: CPT | Performed by: INTERNAL MEDICINE

## 2022-04-11 PROCEDURE — 36415 COLL VENOUS BLD VENIPUNCTURE: CPT

## 2022-04-11 PROCEDURE — 99214 OFFICE O/P EST MOD 30 MIN: CPT | Performed by: INTERNAL MEDICINE

## 2022-04-11 ASSESSMENT — ENCOUNTER SYMPTOMS
RECTAL PAIN: 0
WHEEZING: 0
TROUBLE SWALLOWING: 0
ANAL BLEEDING: 0
DIARRHEA: 0
CONSTIPATION: 1
BLOOD IN STOOL: 0
NAUSEA: 0
SORE THROAT: 0
ABDOMINAL PAIN: 1
SHORTNESS OF BREATH: 0
COUGH: 0
VOICE CHANGE: 0
CHOKING: 0
ABDOMINAL DISTENTION: 1
VOMITING: 0

## 2022-04-11 NOTE — PROGRESS NOTES
GI CLINIC FOLLOW UP    NTERVAL HISTORY:   No referring provider defined for this encounter. Chief Complaint   Patient presents with    Follow-up     Pt is here today for a f/u on Colon/EGD proc. 1. Diverticulosis of colon    2. Occult blood in stools    3. Malignant neoplasm of ovary, unspecified laterality ([facility]    4. Iron malabsorption    5. Iron deficiency anemia secondary to inadequate dietary iron intake    6. Microcytic anemia    7. Nausea    8. BMI 38.0-38.9,adult    9. Gastroesophageal reflux disease, unspecified whether esophagitis present       The patient is here as a follow up of her recent GI procedure. The results have been sent to you separately   The findings were explained to the patient in detail and biopsies were also discussed   with her    She has non specific pains   She has back issues  Has been seen by pain MD  Has a stimulator   Is suppose to be removed    Previously has history for occult blood positive stools  Had a CT scan of the abdomen pelvis images reviewed with her  Patient has been complaining of some abdominal pains, off and on cramping  Also complains of abdominal bloating and gas  Has off and on nausea without any sig vomiting  Has some alternating constipation and diarrhea  Has no weight loss  Has some anxiety issues    Has obesity  No smoking alcohol abuse illicit drug usage  HISTORY OF PRESENT ILLNESS: Andre Bee is a 80 y.o. female with a past history remarkable for , referred for evaluation of   Chief Complaint   Patient presents with    Follow-up     Pt is here today for a f/u on Colon/EGD proc. [facility] Billing Past Medical,Family, and Social History reviewed and does contribute to the patient presenting condition. Patient's PMH/PSH,SH,PSYCH Hx, MEDs, ALLERGIES, and ROS were all reviewed and updated in the appropriate sections.     PAST MEDICAL HISTORY:  Past Medical History:   Diagnosis Date    Arthritis     Bowel obstruction ([facility]     Cancer ([facility] 2015    ovarian stage 2    Cerebral artery occlusion with cerebral infarction ([facility] 03/2018    Mini strokes \"Tia's\"    CPAP (continuous positive airway pressure) dependence     Diabetes mellitus ([address]     Epigastric pain     GERD (gastroesophageal reflux disease)     History of anesthesia complications 1474'T    was told after gallbladder surgery to never have anesthesia again \"since it was hard for me to come out of it\"    History of blood transfusion     Hx of blood clots     lung     Hyperlipidemia     Hypertension     Right arm pain     Right shoulder pain     Sleep apnea     uses CPAP nightly    Tachycardia     hx of    Thyroid disease     TIA (transient ischemic attack)        Past Surgical History:   Procedure Laterality Date    ANESTHESIA NERVE BLOCK Left 11/7/2019    NERVE BLOCK (DEFINE) - INTERCOSTAL NERVE BLOCK performed by Kathleen Morrison MD at [facility] Bilateral 5/15/2020    NERVE BLOCK BILATERAL - MBB  L4-5, L5-S1 performed by Kathleen Morrison MD at [address] Left     ORIF    CHOLECYSTECTOMY      COLONOSCOPY  07/08/2019    5 yr recall.     COLONOSCOPY N/A 7/8/2019    COLONOSCOPY WITH BIOPSY performed by Pola White MD at [address] [facility] COLONOSCOPY N/A 2/28/2022    COLONOSCOPY DIAGNOSTIC performed by Pola White MD at [address] Right     [facility]. INJECTION PROCEDURE FOR SACROILIAC JOINT Bilateral 8/29/2019    SACROILIAC JOINT INJECTION performed by Kathleen Morrison MD at [facility] Bilateral 10/10/2019    SACROILIAC JOINT INJECTION performed by Kathleen Morrison MD at [address]  2/2014    chris with bso    JOINT REPLACEMENT Right     shoulder    KNEE ARTHROSCOPY Right     NERVE BLOCK Bilateral 08/29/2019    SI joint injection    NERVE BLOCK Bilateral 10/10/2019    SI joint    NERVE BLOCK Bilateral 05/15/2020    Medial branch block L4-5, L5-S1    NERVE BLOCK  06/05/2020    SPINAL CORD STIMULATOR IMPLANT TRIAL (N/A )    OTHER SURGICAL HISTORY Right 11/11/2014    shoulder manipulation    OTHER SURGICAL HISTORY  04/2021    pain pump placed, fentanyl in pain pump    SHOULDER ARTHROSCOPY Right 02/03/15    SHOULDER SURGERY  11/2014    manipulation    SPINAL CORD STIMULATOR SURGERY N/A 6/5/2020    SPINAL CORD STIMULATOR IMPLANT TRIAL performed by Vicky Romo MD at [facility] N/A 7/27/2020    SPINAL CORD STIMULATOR IMPLANT WITH THORACIC LAMINOTOMY performed by Long Mcfarlane MD at [address] ENDOSCOPY  07/08/2019    UPPER GASTROINTESTINAL ENDOSCOPY N/A 7/8/2019    EGD BIOPSY performed by Ketty Ontiveros MD at [address] N/A 2/28/2022    EGD BIOPSY performed by Ketty Ontiveros MD at [address] Right 4/30/2015    pt has blood clot & abscess in her right kidney    VASCULAR SURGERY      [place] filter in & removed 1 year later       CURRENT MEDICATIONS:    ALLERGIES:   Allergies   Allergen Reactions    Aspirin Anaphylaxis     Only thing she can take is tylenol    Benadryl [Diphenhydramine] Other (See Comments)     Dystonic movement    Ibuprofen Anaphylaxis    Lidocaine Anaphylaxis     CAN NOT TOLERATE IV    Penicillins Anaphylaxis    Hydrocodone-Acetaminophen Other (See Comments)     Unsure of allergy    Tramadol Other (See Comments)     Unsure of allergy       FAMILY HISTORY:       Problem Relation Age of Onset    Elevated Lipids Paternal Grandmother          SOCIAL HISTORY:   Social History     Socioeconomic History    Marital status:      Spouse name: Not on file    Number of children: Not on file    Years of education: Not on file    Highest education level: Not on file   Occupational History    Not on file   Tobacco Use    leg swelling. Gastrointestinal: Positive for abdominal distention, abdominal pain and constipation. Negative for anal bleeding, blood in stool, diarrhea, nausea, rectal pain and vomiting. Neurological: Positive for dizziness, light-headedness and headaches. Negative for weakness and numbness. Hematological: Does not bruise/bleed easily. Psychiatric/Behavioral: Negative for confusion and sleep disturbance. The patient is not nervous/anxious. PHYSICAL EXAMINATION: Vital signs reviewed per the nursing documentation. BP (!) 155/83   Pulse 61   Temp 96.8 °F (36 °C)   Wt 203 lb (92.1 kg)   BMI 35.96 kg/m²   Body mass index is 35.96 kg/m². Physical Exam  Nursing note reviewed. Constitutional:       Appearance: She is well-developed. Comments: Anxious    HENT:      Head: Normocephalic and atraumatic. Eyes:      Conjunctiva/sclera: Conjunctivae normal.      Pupils: Pupils are equal, round, and reactive to light. Cardiovascular:      Heart sounds: Normal heart sounds. Pulmonary:      Effort: Pulmonary effort is normal.      Breath sounds: Normal breath sounds. Abdominal:      General: Bowel sounds are normal.      Palpations: Abdomen is soft. Comments: Mild TENDER, NON DISTENTED  LIVER SPLEEN AND HERNIAS ARE NOT  PALPABLE  BOWEL SOUNDS ARE POSITIVE      Musculoskeletal:         General: Normal range of motion. Cervical back: Normal range of motion and neck supple. Skin:     General: Skin is warm. Neurological:      Mental Status: She is alert and oriented to person, place, and time.    Psychiatric:         Behavior: Behavior normal.           LABORATORY DATA: Reviewed  Lab Results   Component Value Date    WBC 4.4 02/14/2022    HGB 13.7 02/14/2022    HCT 41.5 02/14/2022    MCV 84.4 02/14/2022     02/14/2022     11/09/2021    K 4.0 11/09/2021     11/09/2021    CO2 31 11/09/2021    BUN 17 11/09/2021    CREATININE 1.02 (H) 11/09/2021    LABALBU 4.2 10/11/2021    BILITOT 0.7 10/11/2021    ALKPHOS 47 10/11/2021    AST 41 10/11/2021    ALT 24 10/11/2021    INR 0.9 02/12/2020         Lab Results   Component Value Date    RBC 4.91 02/14/2022    HGB 13.7 02/14/2022    MCV 84.4 02/14/2022    MCH 27.8 02/14/2022    MCHC 32.9 02/14/2022    RDW 20.2 (H) 02/14/2022    MPV 10.1 02/14/2022    BASOPCT 1 02/14/2022    LYMPHSABS 1.63 02/14/2022    MONOSABS 0.09 (L) 02/14/2022    NEUTROABS 2.55 02/14/2022    EOSABS 0.09 02/14/2022    BASOSABS 0.04 02/14/2022         DIAGNOSTIC TESTING:     No results found. Assessment  1. Diverticulosis of colon    2. Occult blood in stools    3. Malignant neoplasm of ovary, unspecified laterality ([address]    4. Iron malabsorption    5. Iron deficiency anemia secondary to inadequate dietary iron intake    6. Microcytic anemia    7. Nausea    8. BMI 38.0-38.9,adult    9. Gastroesophageal reflux disease, unspecified whether esophagitis present        Plan    Will get CBC    If drops may need Capsule endoscopy     Continue to watch hemoglobin hematocrit    Most of her symptoms appear to be related to irritable bowel syndrome     Has some musculoskeletal issues    She will have a pain pump removed maybe that is causing rib cage area pain and some abdominal radiation? Previous work-up extensively reviewed with her    She was also reassured    Pt was advised in detail about some life style and dietary modifications. She was advised about avoidance of caffeine, nicotine and chocolate. Pt was also told to stay away from any kind of fast foods, soda pops. She was also advised to avoid lots of spices, grease and fried food etc.     Instructions were also given about trying to arrange the timing, quality and quantity of food.     Instructions were given about using ample amount of fiber including dietary and supplemental fiber either metamucil, bennafiber or citrucell etc.  Pt was advised about drinking ample amount of water without any colors or chemicals. Stress was given about regular exercise. Pt has verbalized understanding and agreement to these modifications. The patient was instructed to start taking some OTC Probiotics products   These are available over the counter at the Pharmacy stores and Grocery stores  He was explained about the beneficial effects they have in the GI track  They will help to establish the good bacterial jeimy and will help with the digestion and bowel movements  The patient has verbalized understanding and agreement to this plan    Pt was given advices and instructions about weight loss. She was advised about the significance of exercise at least three times a week . Dietary advices were also given about the avoidance of fast food, fried food and lots of spices and grease. nstructions were given about using ample amount of fiber including dietary and supplemental fiber either metamucil, bennafiber or citrucell etc.  Pt was advised about drinking ample amount of water without any colors or chemicals. Stress was given about regular exercise. Pt was told to stay way from soda pops. Pt has verbalized understanding and agrrement    See her back in 3 4 months she was asked to call me if there is any problem or issues    More than half of patient's clinic visit time was spent in counseling about lifestyle and dietary modifications  Patient's  questions were answered in this regard as well  The patient has verbalized understanding and agreement     Thank you for allowing me to participate in the care of Ms. Rick Epperson. For any further questions please do not hesitate to contact me. I have reviewed and agree with the ROS entered by the MA/Nurse.          Sri Camilo MD, [facility]  Board Certified in Gastroenterology and [address] [place] [address] Gastroenterology  Office #: [phone]

## 2022-04-21 ENCOUNTER — OFFICE VISIT (OUTPATIENT)
Dept: ORTHOPEDIC SURGERY | Age: 72
End: 2022-04-21
Payer: MEDICARE

## 2022-04-21 VITALS — WEIGHT: 203 LBS | BODY MASS INDEX: 35.97 KG/M2 | RESPIRATION RATE: 14 BRPM | HEIGHT: 63 IN

## 2022-04-21 DIAGNOSIS — M47.817 LUMBOSACRAL SPONDYLOSIS WITHOUT MYELOPATHY: Primary | ICD-10-CM

## 2022-04-21 DIAGNOSIS — M96.1 POST LAMINECTOMY SYNDROME: ICD-10-CM

## 2022-04-21 DIAGNOSIS — M54.6 CHRONIC BILATERAL THORACIC BACK PAIN: ICD-10-CM

## 2022-04-21 DIAGNOSIS — G89.29 CHRONIC BILATERAL THORACIC BACK PAIN: ICD-10-CM

## 2022-04-21 PROCEDURE — 3017F COLORECTAL CA SCREEN DOC REV: CPT | Performed by: ORTHOPAEDIC SURGERY

## 2022-04-21 PROCEDURE — 1036F TOBACCO NON-USER: CPT | Performed by: ORTHOPAEDIC SURGERY

## 2022-04-21 PROCEDURE — G8417 CALC BMI ABV UP PARAM F/U: HCPCS | Performed by: ORTHOPAEDIC SURGERY

## 2022-04-21 PROCEDURE — G8427 DOCREV CUR MEDS BY ELIG CLIN: HCPCS | Performed by: ORTHOPAEDIC SURGERY

## 2022-04-21 PROCEDURE — 1090F PRES/ABSN URINE INCON ASSESS: CPT | Performed by: ORTHOPAEDIC SURGERY

## 2022-04-21 PROCEDURE — 4040F PNEUMOC VAC/ADMIN/RCVD: CPT | Performed by: ORTHOPAEDIC SURGERY

## 2022-04-21 PROCEDURE — G8400 PT W/DXA NO RESULTS DOC: HCPCS | Performed by: ORTHOPAEDIC SURGERY

## 2022-04-21 PROCEDURE — 1123F ACP DISCUSS/DSCN MKR DOCD: CPT | Performed by: ORTHOPAEDIC SURGERY

## 2022-04-21 PROCEDURE — 99213 OFFICE O/P EST LOW 20 MIN: CPT | Performed by: ORTHOPAEDIC SURGERY

## 2022-04-21 RX ORDER — TRAZODONE HYDROCHLORIDE 100 MG/1
100 TABLET ORAL NIGHTLY PRN
Qty: 30 TABLET | Refills: 10 | Status: SHIPPED | OUTPATIENT
Start: 2022-04-21

## 2022-04-22 ENCOUNTER — NURSE TRIAGE (OUTPATIENT)
Dept: OTHER | Facility: CLINIC | Age: 72
End: 2022-04-22

## 2022-04-22 RX ORDER — POTASSIUM CHLORIDE 20 MEQ/1
TABLET, EXTENDED RELEASE ORAL
Qty: 60 TABLET | Refills: 10 | Status: SHIPPED | OUTPATIENT
Start: 2022-04-22 | End: 2022-07-27

## 2022-04-27 ENCOUNTER — TELEPHONE (OUTPATIENT)
Dept: PRIMARY CARE CLINIC | Age: 72
End: 2022-04-27

## 2022-04-27 NOTE — TELEPHONE ENCOUNTER
Pt states she has surgery 5/18/22 and was told she needs to stop her Xarelto 10days prior, pt is calling to make sure that is ok. Please advise.

## 2022-04-27 NOTE — TELEPHONE ENCOUNTER
Spoke with pt, states was told 7 days but was also told could also do 5 if I thought would be ok. I  Recommended 5 days , given risk of blood clots and her hx of dvt/PE.

## 2022-05-03 NOTE — H&P
HISTORY and [facility] [id number]       NAME:  Jeet Hallman  MRN: [id number]   YOB: 1950   Date: 5/4/2022   Age: 80 y.o. Gender: female       COMPLAINT AND PRESENT HISTORY:   Jeet Hallman  is a 80 y.o. female presenting today for  preadmission testing prior to a [address] as r/t [address] [facility] [facility]. Pt had spinal cord stimulator and pain pump placed in 2020 and reports no relief. She was to have the implant removed at an earlier date however she had to cancel due to low HGB. Pt had GI workup looking for gi bleed which was negative. Pt states she has developed a pain in her thoracic spine, states she was told there may be a wire from the stimulator that is interfering with the pain pump. Pt rates pain 10/10 at times, worse with twisting or being up for long periods of time. Pain is constant, usually around a 2/10, aching, squeezing in nature. She denies any falls or trauma, no numbness or paraesthesias. She reports recent intermittent loss of control of bowels. States she has to wear a depends. Pt has a PMHX significant for TIA, PE- on xeralto  Thyroid dx- on synthroid  HTN, HLD  SHANNAN-CPAP  DM2-on insulin was referred to Endocrine, wearing a dexcom. States her BS are \"all over the place\" states she calls the office every week and adjustments are made to her meds. Lab Result   LABA1C 14.3 04/05/2022        BP Readings from Last 3 Encounters:   05/04/22 129/61   04/11/22 (!) 155/83   03/14/22 138/86           Pt does not wear dentures. Pt denies any hx of MRSA infection  Pt currently taking xeralto, instructed by MD to hold 5 days prior to surgery   Pt does report a hx of prolonged emergence from general anesthesia. Pt denies any acute symptoms of illness at this time including no SOB, CP, fever, URI or UTI symptoms. RECENT IMAGING    No results found.      PAST MEDICAL HISTORY     Past Medical History:   Diagnosis Date    Arthritis     Bowel obstruction ([address]     Cancer ([address] 2015    ovarian stage 2    Cerebral artery occlusion with cerebral infarction ([address] 03/2018    Mini strokes \"Tia's\"    CPAP (continuous positive airway pressure) dependence     Diabetes mellitus ([address]     Epigastric pain     GERD (gastroesophageal reflux disease)     History of anesthesia complications [address]    was told after gallbladder surgery to never have anesthesia again \"since it was hard for me to come out of it\"    History of blood transfusion     Hx of blood clots     lung     Hyperlipidemia     Hypertension     Prolonged emergence from general anesthesia     Right arm pain     Right shoulder pain     Sleep apnea     uses CPAP nightly    Tachycardia     hx of    Thyroid disease     TIA (transient ischemic attack)        SURGICAL HISTORY       Past Surgical History:   Procedure Laterality Date    ANESTHESIA NERVE BLOCK Left 11/7/2019    NERVE BLOCK (DEFINE) - INTERCOSTAL NERVE BLOCK performed by Dallas Nance MD at [facility] Bilateral 5/15/2020    NERVE BLOCK BILATERAL - MBB  L4-5, L5-S1 performed by Dallas Nance MD at [facility] Left     ORIF    CHOLECYSTECTOMY      COLONOSCOPY  07/08/2019    5 yr recall.     COLONOSCOPY N/A 7/8/2019    COLONOSCOPY WITH BIOPSY performed by Hair Jaquez MD at [address] [facility] COLONOSCOPY N/A 2/28/2022    COLONOSCOPY DIAGNOSTIC performed by Hair Jaquez MD at [facility] Right     repair tendon    [facility]. INJECTION PROCEDURE FOR SACROILIAC JOINT Bilateral 8/29/2019    SACROILIAC JOINT INJECTION performed by Dallas Nance MD at [facility] Bilateral 10/10/2019    SACROILIAC JOINT INJECTION performed by Dallas Nance MD at [facility]  2/2014    chris with bso    JOINT REPLACEMENT Right     shoulder    KNEE ARTHROSCOPY Right     NERVE BLOCK Bilateral 08/29/2019    SI joint injection    NERVE BLOCK Bilateral 10/10/2019    SI joint    NERVE BLOCK Bilateral 05/15/2020    Medial branch block L4-5, L5-S1    NERVE BLOCK  06/05/2020    SPINAL CORD STIMULATOR IMPLANT TRIAL (N/A )    OTHER SURGICAL HISTORY Right 11/11/2014    shoulder manipulation    OTHER SURGICAL HISTORY  04/2021    pain pump placed, fentanyl in pain pump    SHOULDER ARTHROSCOPY Right 02/03/15    SHOULDER SURGERY  11/2014    manipulation    SPINAL CORD STIMULATOR SURGERY N/A 6/5/2020    SPINAL CORD STIMULATOR IMPLANT TRIAL performed by Favio Park MD at [facility] N/A 7/27/2020    SPINAL CORD STIMULATOR IMPLANT WITH THORACIC LAMINOTOMY performed [facility]azmin Murphy [facility] END[facility]  07/08/2019    UPPER GASTROINTESTINAL ENDOSCOPY N/A 7/8/2019    EGD BIOPSY performed by Guille Garcia MD at [address] [facility] N/A 2/28/2022    EGD BIOPSY performed by Guille Garcia MD at [facility] Right 4/30/2015    pt has blood clot & abscess in her right kidney    VASCULAR SURGERY      Cinebar filter in & removed 1 year later       FAMILY HISTORY       Family History   Problem Relation Age of Onset    Elevated Lipids Paternal Grandmother        SOCIAL HISTORY       Social History     Socioeconomic History    Marital status:      Spouse name: None    Number of children: None    Years of education: None    Highest education level: None   Occupational History    None   Tobacco Use    Smoking status: Never Smoker    Smokeless tobacco: Never Used   Vaping Use    Vaping Use: Never used   Substance and Sexual Activity    Alcohol use: Yes     Comment: rare    Drug use: No    Sexual activity: Yes   Other Topics Concern    None   Social History Narrative    None     Social Determinants of Health     Financial Resource Strain: Low Risk     Difficulty of Paying Living Expenses: Not hard at all   Food Insecurity: No Food Insecurity    Worried About Running Out of Food in the Last Year: Never true    Ricardo of Food in the Last Year: Never true   Transportation Needs:     Lack of Transportation (Medical): Not on file    Lack of Transportation (Non-Medical): Not on file   Physical Activity:     Days of Exercise per Week: Not on file    Minutes of Exercise per Session: Not on file   Stress:     Feeling of Stress : Not on file   Social Connections:     Frequency of Communication with Friends and Family: Not on file    Frequency of Social Gatherings with Friends and Family: Not on file    Attends Protestant Services: Not on file    Active Member of [address] [place] [address] or Organizations: Not on file    Attends Club or Organization Meetings: Not on file    Marital Status: Not on file   Intimate Partner Violence:     Fear of Current or Ex-Partner: Not on file    Emotionally Abused: Not on file    Physically Abused: Not on file    Sexually Abused: Not on file   Housing Stability:     Unable to Pay for Housing in the Last Year: Not on file    Number of [place] in the Last Year: Not on file    Unstable Housing in the Last Year: Not on file           REVIEW OF SYSTEMS      Allergies   Allergen Reactions    Aspirin Anaphylaxis     Only thing she can take is tylenol    Benadryl [Diphenhydramine] Other (See Comments)     Dystonic movement    Ibuprofen Anaphylaxis    Lidocaine Anaphylaxis     CAN NOT TOLERATE IV    Penicillins Anaphylaxis    Hydrocodone-Acetaminophen Other (See Comments)     Unsure of allergy    Tramadol Other (See Comments)     Unsure of allergy            Review of Systems   Constitutional: Negative for chills, diaphoresis, fatigue and fever. HENT: Negative for congestion, dental problem, ear pain, postnasal drip, rhinorrhea, sinus pressure, sinus pain, sore throat and trouble swallowing.     Eyes: Positive for visual disturbance. Respiratory: Positive for apnea. Negative for cough, chest tightness, shortness of breath and wheezing. Cardiovascular: Negative for chest pain, palpitations and leg swelling. Gastrointestinal: Positive for diarrhea. Negative for abdominal distention, abdominal pain, constipation, nausea and vomiting. Intermittent loose stools. Genitourinary: Negative for dysuria, flank pain, frequency and hematuria. Musculoskeletal:        See hpi   Left index finger and thumb, stiff, can not bend. Skin: Negative for rash and wound. Neurological: Positive for numbness. Negative for dizziness, weakness and headaches. Bilateral feet, up to knees. Hands and wrists bilaterally    Hematological: Bruises/bleeds easily. On xeralto   Psychiatric/Behavioral: Negative for agitation and confusion. The patient is not nervous/anxious. See HPI    GENERAL PHYSICAL EXAM:     Vitals: /61   Pulse 71   Temp 98 °F (36.7 °C)   Resp 20   Ht 5' 4\" (1.626 m)   Wt 213 lb (96.6 kg)   SpO2 100%   BMI 36.56 kg/m²  Body mass index is 36.56 kg/m². Physical Exam  Vitals reviewed. Constitutional:       General: She is not in acute distress. Appearance: Normal appearance. She is well-developed. She is obese. She is not ill-appearing or toxic-appearing. HENT:      Head: Normocephalic and atraumatic. Mouth/Throat:      Mouth: Mucous membranes are moist.      Pharynx: Oropharynx is clear. No oropharyngeal exudate or posterior oropharyngeal erythema. Comments: Capped teeth   Eyes:      Extraocular Movements: Extraocular movements intact. Conjunctiva/sclera: Conjunctivae normal.      Pupils: Pupils are equal, round, and reactive to light. Cardiovascular:      Rate and Rhythm: Normal rate and regular rhythm. Pulses: Normal pulses. Heart sounds: Murmur heard. No friction rub. No gallop.     Pulmonary:      Effort: Pulmonary effort is normal. Breath sounds: Normal breath sounds. No wheezing. Abdominal:      General: Bowel sounds are normal. There is no distension. Palpations: Abdomen is soft. Tenderness: There is no abdominal tenderness. There is no guarding or rebound. Musculoskeletal:         General: Tenderness present. No swelling. Normal range of motion. Cervical back: Normal range of motion and neck supple. No rigidity or tenderness. Right lower leg: No edema. Left lower leg: No edema. Skin:     General: Skin is warm and dry. Findings: No erythema. Neurological:      General: No focal deficit present. Mental Status: She is alert and oriented to person, place, and time. Mental status is at baseline. Sensory: No sensory deficit. Motor: Weakness present. Gait: Gait abnormal.   Psychiatric:         Mood and Affect: Mood normal.         Behavior: Behavior normal.         Thought Content:  Thought content normal.         Judgment: Judgment normal.                                                                                         PROVISIONAL DIAGNOSES / SURGERY:      SPINAL CORD STIMULATOR REMOVAL     PAINFUL SPINAL CORD STIMULATOR    Patient Active Problem List    Diagnosis Date Noted    Diverticulosis of colon 04/11/2022    Sacroiliitis, not elsewhere classified [facility] 02/09/2022    Occult blood in stools 01/20/2022    Microcytic anemia 12/06/2021    Iron deficiency anemia secondary to inadequate dietary iron intake 12/06/2021    Iron malabsorption 12/06/2021    Malignant neoplasm of ovary ([address] 12/06/2021    Low hemoglobin 11/05/2021    Nausea 11/05/2021    Absolute anemia 11/05/2021    Type 2 diabetes mellitus 11/02/2021    BMI 38.0-38.9,adult 06/18/2020    Lumbosacral spondylosis without myelopathy 02/18/2020    Chronic bilateral thoracic back pain 02/18/2020    Chronic anticoagulation 02/18/2020    Epigastric pain     GERD (gastroesophageal reflux disease)     Ovarian mass     Abdominal pain     Partial bowel obstruction (HCC)          Total time spent on encounter- PAT provider minutes: 21-30 minutes    NO CLEARANCE REQUIRED.     Barrett Or, APRN - CNP on 5/4/2022 at 2:32 PM

## 2022-05-04 ENCOUNTER — HOSPITAL ENCOUNTER (OUTPATIENT)
Dept: PREADMISSION TESTING | Age: 72
Discharge: HOME | End: 2022-05-08
Payer: MEDICARE

## 2022-05-04 VITALS
SYSTOLIC BLOOD PRESSURE: 129 MMHG | WEIGHT: 213 LBS | HEIGHT: 64 IN | RESPIRATION RATE: 20 BRPM | DIASTOLIC BLOOD PRESSURE: 61 MMHG | HEART RATE: 71 BPM | TEMPERATURE: 98 F | BODY MASS INDEX: 36.37 KG/M2 | OXYGEN SATURATION: 100 %

## 2022-05-04 LAB
ABSOLUTE EOS #: 0.2 K/UL (ref 0–0.4)
ABSOLUTE LYMPH #: 2 K/UL (ref 1–4.8)
ABSOLUTE MONO #: 0.3 K/UL (ref 0.1–1.3)
ANION GAP SERPL CALCULATED.3IONS-SCNC: 8 MMOL/L (ref 9–17)
BASOPHILS # BLD: 1 % (ref 0–2)
BASOPHILS ABSOLUTE: 0.1 K/UL (ref 0–0.2)
BUN BLDV-MCNC: 15 MG/DL (ref 8–23)
CALCIUM SERPL-MCNC: 9.1 MG/DL (ref 8.6–10.4)
CHLORIDE BLD-SCNC: 103 MMOL/L (ref 98–107)
CO2: 29 MMOL/L (ref 20–31)
CREAT SERPL-MCNC: 0.77 MG/DL (ref 0.5–0.9)
EOSINOPHILS RELATIVE PERCENT: 4 % (ref 0–4)
ESTIMATED AVERAGE GLUCOSE: 275 MG/DL
GFR AFRICAN AMERICAN: >60 ML/MIN
GFR NON-AFRICAN AMERICAN: >60 ML/MIN
GFR SERPL CREATININE-BSD FRML MDRD: ABNORMAL ML/MIN/{1.73_M2}
GLUCOSE BLD-MCNC: 161 MG/DL (ref 70–99)
HBA1C MFR BLD: 11.2 % (ref 4–6)
HCT VFR BLD CALC: 35.9 % (ref 36–46)
HEMOGLOBIN: 12.2 G/DL (ref 12–16)
LYMPHOCYTES # BLD: 36 % (ref 24–44)
MCH RBC QN AUTO: 29.8 PG (ref 26–34)
MCHC RBC AUTO-ENTMCNC: 33.9 G/DL (ref 31–37)
MCV RBC AUTO: 87.9 FL (ref 80–100)
MONOCYTES # BLD: 6 % (ref 1–7)
PDW BLD-RTO: 14 % (ref 11.5–14.9)
PLATELET # BLD: 196 K/UL (ref 150–450)
PMV BLD AUTO: 9.8 FL (ref 6–12)
POTASSIUM SERPL-SCNC: 3.9 MMOL/L (ref 3.7–5.3)
RBC # BLD: 4.08 M/UL (ref 4–5.2)
SEG NEUTROPHILS: 53 % (ref 36–66)
SEGMENTED NEUTROPHILS ABSOLUTE COUNT: 3.1 K/UL (ref 1.3–9.1)
SODIUM BLD-SCNC: 140 MMOL/L (ref 135–144)
WBC # BLD: 5.7 K/UL (ref 3.5–11)

## 2022-05-04 PROCEDURE — 36415 COLL VENOUS BLD VENIPUNCTURE: CPT

## 2022-05-04 PROCEDURE — 85025 COMPLETE CBC W/AUTO DIFF WBC: CPT

## 2022-05-04 PROCEDURE — 83036 HEMOGLOBIN GLYCOSYLATED A1C: CPT

## 2022-05-04 PROCEDURE — 80048 BASIC METABOLIC PNL TOTAL CA: CPT

## 2022-05-04 PROCEDURE — APPSS30 APP SPLIT SHARED TIME 16-30 MINUTES: Performed by: NURSE PRACTITIONER

## 2022-05-04 RX ORDER — TIMOLOL MALEATE 5 MG/ML
SOLUTION/ DROPS OPHTHALMIC
COMMUNITY
Start: 2022-03-15

## 2022-05-04 ASSESSMENT — ENCOUNTER SYMPTOMS
SINUS PRESSURE: 0
TROUBLE SWALLOWING: 0
NAUSEA: 0
CONSTIPATION: 0
SHORTNESS OF BREATH: 0
CHEST TIGHTNESS: 0
COUGH: 0
VOMITING: 0
ABDOMINAL PAIN: 0
WHEEZING: 0
SORE THROAT: 0
APNEA: 1
RHINORRHEA: 0
DIARRHEA: 1
SINUS PAIN: 0
ABDOMINAL DISTENTION: 0

## 2022-05-13 ENCOUNTER — TELEPHONE (OUTPATIENT)
Dept: ONCOLOGY | Age: 72
End: 2022-05-13

## 2022-05-13 ENCOUNTER — OFFICE VISIT (OUTPATIENT)
Dept: ONCOLOGY | Age: 72
End: 2022-05-13
Payer: MEDICARE

## 2022-05-13 ENCOUNTER — HOSPITAL ENCOUNTER (OUTPATIENT)
Age: 72
Discharge: HOME | End: 2022-05-13
Payer: MEDICARE

## 2022-05-13 VITALS
HEART RATE: 70 BPM | SYSTOLIC BLOOD PRESSURE: 110 MMHG | DIASTOLIC BLOOD PRESSURE: 61 MMHG | TEMPERATURE: 96.7 F | BODY MASS INDEX: 36.94 KG/M2 | WEIGHT: 215.2 LBS

## 2022-05-13 DIAGNOSIS — D50.8 OTHER IRON DEFICIENCY ANEMIA: ICD-10-CM

## 2022-05-13 DIAGNOSIS — C56.9 MALIGNANT NEOPLASM OF OVARY, UNSPECIFIED LATERALITY (HCC): ICD-10-CM

## 2022-05-13 DIAGNOSIS — D50.9 MICROCYTIC ANEMIA: ICD-10-CM

## 2022-05-13 DIAGNOSIS — D50.9 MICROCYTIC ANEMIA: Primary | ICD-10-CM

## 2022-05-13 DIAGNOSIS — D64.9 LOW HEMOGLOBIN: ICD-10-CM

## 2022-05-13 DIAGNOSIS — D50.8 IRON DEFICIENCY ANEMIA SECONDARY TO INADEQUATE DIETARY IRON INTAKE: ICD-10-CM

## 2022-05-13 DIAGNOSIS — K90.9 IRON MALABSORPTION: ICD-10-CM

## 2022-05-13 LAB
ABSOLUTE EOS #: 0.2 K/UL (ref 0–0.4)
ABSOLUTE LYMPH #: 1.8 K/UL (ref 1–4.8)
ABSOLUTE MONO #: 0.4 K/UL (ref 0.1–1.2)
BASOPHILS # BLD: 1 % (ref 0–2)
BASOPHILS ABSOLUTE: 0.1 K/UL (ref 0–0.2)
CA 125: 6 U/ML
EOSINOPHILS RELATIVE PERCENT: 4 % (ref 1–4)
FERRITIN: 133 NG/ML (ref 13–150)
HCT VFR BLD CALC: 34.2 % (ref 36–46)
HEMOGLOBIN: 11.4 G/DL (ref 12–16)
IRON SATURATION: 24 % (ref 20–55)
IRON: 58 UG/DL (ref 37–145)
LYMPHOCYTES # BLD: 28 % (ref 24–44)
MCH RBC QN AUTO: 29.5 PG (ref 26–34)
MCHC RBC AUTO-ENTMCNC: 33.4 G/DL (ref 31–37)
MCV RBC AUTO: 88.3 FL (ref 80–100)
MONOCYTES # BLD: 6 % (ref 2–11)
PDW BLD-RTO: 13.8 % (ref 12.5–15.4)
PLATELET # BLD: 169 K/UL (ref 140–450)
PMV BLD AUTO: 9.6 FL (ref 6–12)
RBC # BLD: 3.87 M/UL (ref 4–5.2)
SEG NEUTROPHILS: 61 % (ref 36–66)
SEGMENTED NEUTROPHILS ABSOLUTE COUNT: 4 K/UL (ref 1.8–7.7)
TOTAL IRON BINDING CAPACITY: 241 UG/DL (ref 250–450)
UNSATURATED IRON BINDING CAPACITY: 183 UG/DL (ref 112–347)
WBC # BLD: 6.5 K/UL (ref 3.5–11)

## 2022-05-13 PROCEDURE — 3017F COLORECTAL CA SCREEN DOC REV: CPT | Performed by: INTERNAL MEDICINE

## 2022-05-13 PROCEDURE — 83540 ASSAY OF IRON: CPT

## 2022-05-13 PROCEDURE — 1090F PRES/ABSN URINE INCON ASSESS: CPT | Performed by: INTERNAL MEDICINE

## 2022-05-13 PROCEDURE — 4040F PNEUMOC VAC/ADMIN/RCVD: CPT | Performed by: INTERNAL MEDICINE

## 2022-05-13 PROCEDURE — 36415 COLL VENOUS BLD VENIPUNCTURE: CPT

## 2022-05-13 PROCEDURE — G8417 CALC BMI ABV UP PARAM F/U: HCPCS | Performed by: INTERNAL MEDICINE

## 2022-05-13 PROCEDURE — 99214 OFFICE O/P EST MOD 30 MIN: CPT | Performed by: INTERNAL MEDICINE

## 2022-05-13 PROCEDURE — G8427 DOCREV CUR MEDS BY ELIG CLIN: HCPCS | Performed by: INTERNAL MEDICINE

## 2022-05-13 PROCEDURE — 82728 ASSAY OF FERRITIN: CPT

## 2022-05-13 PROCEDURE — 1036F TOBACCO NON-USER: CPT | Performed by: INTERNAL MEDICINE

## 2022-05-13 PROCEDURE — 1123F ACP DISCUSS/DSCN MKR DOCD: CPT | Performed by: INTERNAL MEDICINE

## 2022-05-13 PROCEDURE — 85025 COMPLETE CBC W/AUTO DIFF WBC: CPT

## 2022-05-13 PROCEDURE — G8400 PT W/DXA NO RESULTS DOC: HCPCS | Performed by: INTERNAL MEDICINE

## 2022-05-13 PROCEDURE — 83550 IRON BINDING TEST: CPT

## 2022-05-13 PROCEDURE — 86304 IMMUNOASSAY TUMOR CA 125: CPT

## 2022-05-13 NOTE — TELEPHONE ENCOUNTER
Will watch for iron tests results.  If low will give IV iron  RV 3-4 months with CBC, iron studies before RV    Will follow results    RV scheduled 9/12/22 @ 3pm    PT was given AVS and an appt schedule    Electronically signed by Shawna Couch on 5/13/2022 at 1:40 PM

## 2022-05-13 NOTE — PATIENT INSTRUCTIONS
Will watch for iron tests results.  If low will give IV iron  RV 3-4 months with CBC, iron studies before RV

## 2022-05-17 ENCOUNTER — OFFICE VISIT (OUTPATIENT)
Dept: PRIMARY CARE CLINIC | Age: 72
End: 2022-05-17
Payer: MEDICARE

## 2022-05-17 VITALS — HEART RATE: 72 BPM | SYSTOLIC BLOOD PRESSURE: 118 MMHG | OXYGEN SATURATION: 98 % | DIASTOLIC BLOOD PRESSURE: 68 MMHG

## 2022-05-17 DIAGNOSIS — M79.89 LEG SWELLING: ICD-10-CM

## 2022-05-17 DIAGNOSIS — M54.9 CHRONIC BACK PAIN, UNSPECIFIED BACK LOCATION, UNSPECIFIED BACK PAIN LATERALITY: ICD-10-CM

## 2022-05-17 DIAGNOSIS — I10 ESSENTIAL HYPERTENSION: ICD-10-CM

## 2022-05-17 DIAGNOSIS — Z79.4 TYPE 2 DIABETES MELLITUS WITH DIABETIC NEUROPATHY, WITH LONG-TERM CURRENT USE OF INSULIN (HCC): Primary | ICD-10-CM

## 2022-05-17 DIAGNOSIS — E11.40 TYPE 2 DIABETES MELLITUS WITH DIABETIC NEUROPATHY, WITH LONG-TERM CURRENT USE OF INSULIN (HCC): Primary | ICD-10-CM

## 2022-05-17 DIAGNOSIS — G89.29 CHRONIC BACK PAIN, UNSPECIFIED BACK LOCATION, UNSPECIFIED BACK PAIN LATERALITY: ICD-10-CM

## 2022-05-17 PROCEDURE — G8427 DOCREV CUR MEDS BY ELIG CLIN: HCPCS | Performed by: FAMILY MEDICINE

## 2022-05-17 PROCEDURE — 1036F TOBACCO NON-USER: CPT | Performed by: FAMILY MEDICINE

## 2022-05-17 PROCEDURE — 99214 OFFICE O/P EST MOD 30 MIN: CPT | Performed by: FAMILY MEDICINE

## 2022-05-17 PROCEDURE — 2022F DILAT RTA XM EVC RTNOPTHY: CPT | Performed by: FAMILY MEDICINE

## 2022-05-17 PROCEDURE — 1090F PRES/ABSN URINE INCON ASSESS: CPT | Performed by: FAMILY MEDICINE

## 2022-05-17 PROCEDURE — 3017F COLORECTAL CA SCREEN DOC REV: CPT | Performed by: FAMILY MEDICINE

## 2022-05-17 PROCEDURE — 1123F ACP DISCUSS/DSCN MKR DOCD: CPT | Performed by: FAMILY MEDICINE

## 2022-05-17 PROCEDURE — G8417 CALC BMI ABV UP PARAM F/U: HCPCS | Performed by: FAMILY MEDICINE

## 2022-05-17 PROCEDURE — 4040F PNEUMOC VAC/ADMIN/RCVD: CPT | Performed by: FAMILY MEDICINE

## 2022-05-17 PROCEDURE — G8400 PT W/DXA NO RESULTS DOC: HCPCS | Performed by: FAMILY MEDICINE

## 2022-05-17 PROCEDURE — 3046F HEMOGLOBIN A1C LEVEL >9.0%: CPT | Performed by: FAMILY MEDICINE

## 2022-05-17 RX ORDER — FUROSEMIDE 20 MG/1
20 TABLET ORAL DAILY
Qty: 30 TABLET | Refills: 3 | Status: SHIPPED | OUTPATIENT
Start: 2022-05-17 | End: 2022-07-08

## 2022-05-17 RX ORDER — FUROSEMIDE 20 MG/1
20 TABLET ORAL DAILY
Qty: 30 TABLET | Refills: 0 | Status: SHIPPED | OUTPATIENT
Start: 2022-05-17 | End: 2022-08-30

## 2022-05-17 NOTE — PROGRESS NOTES
[address] PRIMARY CARE  [address] [facility] [address]  [address] [address]  Dept: [phone]  Dept Fax: [phone]    Fiona Calzada is a 80 y.o. female who presents today for her medical conditions/complaints as noted below. Fiona Calzada is c/o of  Chief Complaint   Patient presents with    Diabetes         HPI:     HPI     Pt here for follow up on chronic conditions. Following with endocrinology, diabetes has been improving but still very elevated  Had to get procedure cancelled ( to remove spinal stimulator)due to A1c very high. HTN is well controlled on current medications. Some mild leg swelling bl legs, R>L which is usually that way for her . she is taking xarelto as prescribed.        Hemoglobin A1C (%)   Date Value   2022 11.2 (H)   2022 14.3   2022 13.9             ( goal A1C is < 7)   No results found for: LABMICR  LDL Cholesterol (mg/dL)   Date Value   2020 44   2015 86   10/06/2014 86     LDL Calculated (mg/dL)   Date Value   2019 47       (goal LDL is <100)   AST (U/L)   Date Value   10/11/2021 41     ALT (U/L)   Date Value   10/11/2021 24     BUN (mg/dL)   Date Value   2022 15     BP Readings from Last 3 Encounters:   22 118/68   22 110/61   22 129/61          (goal 120/80)    Past Medical History:   Diagnosis Date    Arthritis     Bowel obstruction ([address]     Cancer ([address]     ovarian stage 2    Cerebral artery occlusion with cerebral infarction ([address] 2018    Mini strokes \"Tia's\"    CPAP (continuous positive airway pressure) dependence     Diabetes mellitus ([address]     Epigastric pain     GERD (gastroesophageal reflux disease)     History of anesthesia complications 4282'[id number]    was told after gallbladder surgery to never have anesthesia again \"since it was hard for me to come out of it\"    History of blood transfusion     Hx of blood clots     lung     Hyperlipidemia     Hypertension     Prolonged emergence from general anesthesia     Right arm pain     Right shoulder pain     Sleep apnea     uses CPAP nightly    Tachycardia     hx of    Thyroid disease     TIA (transient ischemic attack)       Past Surgical History:   Procedure Laterality Date    ANESTHESIA NERVE BLOCK Left 11/7/2019    NERVE BLOCK (DEFINE) - INTERCOSTAL NERVE BLOCK performed by Tomy Edmonds MD at [facility] Bilateral 5/15/2020    NERVE BLOCK BILATERAL - MBB  L4-5, L5-S1 performed by Tomy Edmonds MD at [address] Left     ORIF    CHOLECYSTECTOMY      COLONOSCOPY  07/08/2019    5 yr recall.     COLONOSCOPY N/A 7/8/2019    COLONOSCOPY WITH BIOPSY performed by Reza Wagoner MD at [facility] COLONOSCOPY N/A 2/28/2022    COLONOSCOPY DIAGNOSTIC performed by Reza Wagoner MD at [address] Right     [facility] [place]. INJECTION PROCEDURE FOR SACROILIAC JOINT Bilateral 8/29/2019    SACROILIAC JOINT INJECTION performed by Tomy Edmonds MD at [address] [facility] Bilateral 10/10/2019    SACROILIAC JOINT INJECTION performed by [facility]omy Edmonds MD [address]  2/2014    chris with bso    JOINT REPLACEMENT Right     shoulder    KNEE ARTHROSCOPY Right     NERVE BLOCK Bilateral 08/29/2019    SI joint injection    NERVE BLOCK Bilateral 10/10/2019    SI joint    NERVE BLOCK Bilateral 05/15/2020    Medial branch block L4-5, L5-S1    NERVE BLOCK  06/05/2020    SPINAL CORD STIMULATOR IMPLANT TRIAL (N/A )    OTHER SURGICAL HISTORY Right 11/11/2014    shoulder manipulation    OTHER SURGICAL HISTORY  04/2021    pain pump placed, fentanyl in pain pump    SHOULDER ARTHROSCOPY Right 02/03/15    SHOULDER SURGERY  11/2014    manipulation    SPINAL CORD STIMULATOR SURGERY N/A 6/5/2020    SPINAL CORD STIMULATOR IMPLANT TRIAL performed by Irish Flores Aditya Gruber MD at [address] [facility] N/A 7/27/2020    SPINAL CORD STIMULATOR IMPLANT WITH THORACIC LAMINOTOMY performed by Jazmin Murphy MD at [address] ENDOSCOPY  07/08/2019    UPPER GASTROINTESTINAL ENDOSCOPY N/A 7/8/2019    EGD BIOPSY performed by Guille Garcia MD at [address] N/A 2/28/2022    EGD BIOPSY performed by Guille Garcia MD at [address] Right 4/30/2015    pt has blood clot & abscess in her right kidney    VASCULAR SURGERY      Alesha filter in & removed 1 year later       Family History   Problem Relation Age of Onset    Elevated Lipids Paternal Grandmother        Social History     Tobacco Use    Smoking status: Never Smoker    Smokeless tobacco: Never Used   Substance Use Topics    Alcohol use: Yes     Comment: rare      Current Outpatient Medications   Medication Sig Dispense Refill    furosemide (LASIX) 20 MG tablet Take 1 tablet by mouth daily 30 tablet 3    furosemide (LASIX) 20 MG tablet Take 1 tablet by mouth daily 30 tablet 0    timolol (TIMOPTIC) 0.5 % ophthalmic solution INSTILL 1 DROP IN BOTH EYES TWICE DAILY      potassium chloride (KLOR-CON M) 20 MEQ extended release tablet TAKE ONE (1) TABLET BY MOUTH TWICE DAILY 60 tablet 10    traZODone (DESYREL) 100 MG tablet TAKE 1 TABLET BY MOUTH NIGHTLY AS NEEDED FOR SLEEP 30 tablet 10    gabapentin (NEURONTIN) 600 MG tablet TAKE 1 TABLET BY MOUTH FOUR TIMES DAILY 120 tablet 3    omeprazole (PRILOSEC) 40 MG delayed release capsule TAKE 1 CAPSULE BY MOUTH EVERY MORNING BEFORE BREAKFAST 30 capsule 3    amLODIPine (NORVASC) 10 MG tablet Take 1 tablet by mouth daily 30 tablet 5    insulin glargine (LANTUS) 100 UNIT/ML injection vial INJECT 75 UNITS SUBCUTANEOUSLY NIGHTLY (Patient taking differently: 2 times daily 50 units in the morning, 20 units nightly) 30 mL 10    levothyroxine (SYNTHROID) 88 MCG tablet TAKE (1) TABLET BY MOUTH EVERY DAY 30 tablet 10    blood glucose test strips (ACCU-CHEK TED PLUS) strip USE TO TEST BLOOD SUGAR 3 TIMES DAILY AND AS NEEDED FOR SYMPTOMS OF IRREGULAR BLOOD GLUCOSE 100 strip 10    tamsulosin (FLOMAX) 0.4 MG capsule TAKE 1 CAPSULE BY MOUTH NIGHTLY *EMERGENCY REFILL* 30 capsule 10    SURE COMFORT INSULIN SYRINGE 31G X 5/16\" 1 ML MISC USE AS DIRECTED FOUR TIMES A  each 10    EPINEPHrine (EPIPEN 2-LISA) 0.3 MG/0.3ML SOAJ injection EpiPen 2-Lisa 0.3 mg/0.3 mL injection, auto-injector 1 each 3    XARELTO 20 MG TABS tablet TAKE 1 TABLET BY MOUTH ONCE DAILY 30 tablet 10    alendronate (FOSAMAX) 70 MG tablet TAKE 1 TABLET BY MOUTH EVERY MONDAY 5 tablet 10    insulin lispro (HUMALOG) 100 UNIT/ML injection vial INEJCT SUBCUTANEOUSLY UP TO THREE TIMES A DAY BASED ON SLIDING SCALE * MAX 40 UNITS A DAY* (Patient taking differently: INEJCT SUBCUTANEOUSLY UP TO THREE TIMES A DAY BASED ON SLIDING SCALE   8 units each morning and sliding scale) 10 mL 10    DULoxetine (CYMBALTA) 60 MG extended release capsule TAKE 1 CAPSULE BY MOUTH DAILY 30 capsule 10    losartan (COZAAR) 100 MG tablet TAKE 1 TABLET BY MOUTH EVERY DAY 30 tablet 10    atorvastatin (LIPITOR) 40 MG tablet TAKE (1) TABLET BY MOUTH EVERY DAY 30 tablet 11    hydroCHLOROthiazide (HYDRODIURIL) 25 MG tablet TAKE 1 TABLET BY MOUTH EVERY MORNING 30 tablet 11    vitamin C (ASCORBIC ACID) 500 MG tablet Take 500 mg by mouth daily      glucose monitoring kit (FREESTYLE) monitoring kit 1 kit by Does not apply route daily Please provide accuchek meter for patient, not freestyle 1 kit 0    calcium carbonate (TUMS) 500 MG chewable tablet Take 1 tablet by mouth daily as needed for Heartburn      SUPER B COMPLEX/C CAPS Take by mouth      Cholecalciferol (VITAMIN D3) 5000 units TABS Take by mouth      ferrous sulfate 325 (65 Fe) MG tablet Take 325 mg by mouth daily (with breakfast)      fexofenadine (ALLEGRA) 60 MG tablet Take 60 mg by mouth daily      magnesium oxide (MAG-OX) 400 MG tablet Take 400 mg by mouth daily       No current facility-administered medications for this visit. Allergies   Allergen Reactions    Aspirin Anaphylaxis     Only thing she can take is tylenol    Benadryl [Diphenhydramine] Other (See Comments)     Dystonic movement    Ibuprofen Anaphylaxis    Lidocaine Anaphylaxis     CAN NOT TOLERATE IV    Penicillins Anaphylaxis    Hydrocodone-Acetaminophen Other (See Comments)     Unsure of allergy    Tramadol Other (See Comments)     Unsure of allergy       Health Maintenance   Topic Date Due    Hepatitis C screen  Never done    DTaP/Tdap/Td vaccine (1 - Tdap) Never done    DEXA (modify frequency per FRAX score)  Never done    Shingles vaccine (2 of 2) 12/18/2020    Lipids  09/29/2021    Diabetic microalbuminuria test  01/25/2022    Diabetic foot exam  02/25/2022    Pneumococcal 65+ years Vaccine (1 - PCV) 04/17/2023 (Originally 9/14/1956)    A1C test (Diabetic or Prediabetic)  08/04/2022    Breast cancer screen  10/21/2022    Annual Wellness Visit (AWV)  11/03/2022    Diabetic retinal exam  11/09/2022    Depression Screen  03/14/2023    Colorectal Cancer Screen  02/28/2027    Flu vaccine  Completed    COVID-19 Vaccine  Completed    Hepatitis A vaccine  Aged Out    Hib vaccine  Aged Out    Meningococcal (ACWY) vaccine  Aged Out       Subjective:      Review of Systems   Constitutional: Negative for chills and fever. Respiratory: Negative for shortness of breath. Cardiovascular: Positive for leg swelling. Negative for chest pain. Gastrointestinal: Negative for vomiting. Musculoskeletal: Positive for back pain. Objective:     Physical Exam  Constitutional:       Appearance: She is well-developed. HENT:      Head: Normocephalic and atraumatic.       Right Ear: External ear normal.      Left Ear: External ear normal.   Eyes:      Conjunctiva/sclera: Conjunctivae normal. Pupils: Pupils are equal, round, and reactive to light. Cardiovascular:      Rate and Rhythm: Normal rate and regular rhythm. Heart sounds: Normal heart sounds. Pulmonary:      Effort: Pulmonary effort is normal.      Breath sounds: Normal breath sounds. Musculoskeletal:      Cervical back: Neck supple. Right lower leg: Edema (mild BL LE edema, R>L) present. Left lower leg: Edema present. Skin:     General: Skin is warm and dry. Neurological:      Mental Status: She is alert and oriented to person, place, and time. Psychiatric:         Behavior: Behavior normal.         Thought Content: Thought content normal.       /68   Pulse 72   SpO2 98%     Assessment:      1. Type 2 diabetes mellitus with diabetic neuropathy, with long-term current use of insulin ([facility])  - A1c elevated but is improving. Recommend to continue to follow up with endocrinology. 2. Essential hypertension  - controlled, continue current medications. - Basic Metabolic Panel; Future    3. Leg swelling  - recommend low dose lasix, continue potassium supplements. Check BMP in one week. - furosemide (LASIX) 20 MG tablet; Take 1 tablet by mouth daily  Dispense: 30 tablet; Refill: 3    4. Chronic back pain, unspecified back location, unspecified back pain laterality  - pt had to cancel spinal stimulator removal due to elevated glucose, recommend follow up with endocrinology. Plan:      Return in about 3 months (around 8/17/2022) for follow up.     Orders Placed This Encounter   Procedures    Basic Metabolic Panel     Standing Status:   Future     Standing Expiration Date:   5/17/2023     Orders Placed This Encounter   Medications    furosemide (LASIX) 20 MG tablet     Sig: Take 1 tablet by mouth daily     Dispense:  30 tablet     Refill:  3    furosemide (LASIX) 20 MG tablet     Sig: Take 1 tablet by mouth daily     Dispense:  30 tablet     Refill:  0        Patient given educational materials - see patient instructions. Discussed use, benefit, and side effects of prescribed medications. All patient questions answered. Pt voiced understanding. Reviewed healthmaintenance. Instructed to continue current medications, diet and exercise. Patient agreed with treatment plan. Follow up as directed.      Electronically signed by Hank Glover DO on 5/18/2022 at 2:48 PM

## 2022-05-18 ASSESSMENT — ENCOUNTER SYMPTOMS
SHORTNESS OF BREATH: 0
VOMITING: 0
BACK PAIN: 1

## 2022-05-19 NOTE — PROGRESS NOTES
_           Chief Complaint   Patient presents with    Follow-up     review status of disease    Other     trying to get A1C down     Discuss Labs     DIAGNOSIS:       Microcytic anemia  Severe iron deficiency anemia  No response to oral iron  Intolerable side effects to oral iron with severe constipation  Positive stool guaiac test in 2 out of 3 symptoms  History of diverticulosis  GERD  History of ovarian cancer in 2015  Strong family history of breast cancer with mother and grandmother and maternal aunt    CURRENT THERAPY:         IV iron infusion December 2021  GI work up negative. BRIEF CASE HISTORY:      Ms. Rocco Carroll is a very pleasant 80 y.o. female with history of multiple co morbidities as listed. Patient is referred for further management of anemia. Patient has history of ovarian cancer in 2015. She was treated at [facility] with surgery and chemotherapy using Taxol carboplatin. Patient developed anemia at that time and she received blood transfusions. She continued to have mild anemia for several years. Patient had GI evaluation and July 2019. She had EGD for GERD and she had colonoscopy at that time. No source of bleeding was identified. Patient was recently noted to have increasing symptoms related to anemia. She has significant drop of her hemoglobin not responding to oral iron. She has generalized weakness and fatigue and dizziness. She has shortness of breath on exertion. Patient has significant muscle cramps and ice craving. Patient denies any vaginal bleeding. No hematuria. No hematochezia. No melena. No hematemesis. Stool guaiac test was positive for Hemoccult blood and 2 out of 3 samples. Patient has left upper abdominal pain on and off for several months. She has CT scan in November which was negative. Patient denies smoking or alcohol drinking. .     INTERIM HISTORY:   Seen for follow up anemia. C/o weakness and fatigue. Occasional dizziness. No active bleeding. negative GI work up   No active bleeding. Aime Siddiqi PAST MEDICAL HISTORY: has a past medical history of Arthritis, Bowel obstruction ([address] Cancer ([address] Cerebral artery occlusion with cerebral infarction ([address] CPAP (continuous positive airway pressure) dependence, Diabetes mellitus ([address] Epigastric pain, GERD (gastroesophageal reflux disease), History of anesthesia complications, History of blood transfusion, Hx of blood clots, Hyperlipidemia, Hypertension, Prolonged emergence from general anesthesia, Right arm pain, Right shoulder pain, Sleep apnea, Tachycardia, Thyroid disease, and TIA (transient ischemic attack). PAST SURGICAL HISTORY: has a past surgical history that includes Knee arthroscopy (Right); Hand surgery (Right); Cholecystectomy; other surgical history (Right, 11/11/2014); shoulder surgery (11/2014); Shoulder arthroscopy (Right, 02/03/15); Appendectomy; Ureter stent placement (Right, 4/30/2015); Hysterectomy (2/2014); Colonoscopy (07/08/2019); Upper gastrointestinal endoscopy (07/08/2019); Colonoscopy (N/A, 7/8/2019); Upper gastrointestinal endoscopy (N/A, 7/8/2019); Nerve Block (Bilateral, 08/29/2019); Injection Procedure For Sacroiliac Joint (Bilateral, 8/29/2019); Nerve Block (Bilateral, 10/10/2019); Injection Procedure For Sacroiliac Joint (Bilateral, 10/10/2019); Anesthesia Nerve Block (Left, 11/7/2019); Nerve Block (Bilateral, 05/15/2020); Anesthesia Nerve Block (Bilateral, 5/15/2020); Nerve Block (06/05/2020); Spinal Cord Stimulator Surgery (N/A, 6/5/2020); Arm Surgery (Left); joint replacement (Right); Tonsillectomy; Spinal Cord Stimulator Surgery (N/A, 7/27/2020); other surgical history (04/2021); Colonoscopy (N/A, 2/28/2022); Upper gastrointestinal endoscopy (N/A, 2/28/2022); and vascular surgery.      CURRENT MEDICATIONS:  has a current medication list which includes the following prescription(s): timolol, potassium chloride, trazodone, omeprazole, amlodipine, insulin glargine, levothyroxine, accu-chek junior plus, tamsulosin, sure comfort insulin syringe, epinephrine, xarelto, alendronate, insulin lispro, duloxetine, losartan, atorvastatin, hydrochlorothiazide, vitamin c, glucose monitoring, calcium carbonate, super b complex/c, vitamin d3, ferrous sulfate, fexofenadine, magnesium oxide, furosemide, furosemide, gabapentin, [DISCONTINUED] potassium chloride, and [DISCONTINUED] omeprazole. ALLERGIES:  is allergic to aspirin, benadryl [diphenhydramine], ibuprofen, lidocaine, penicillins, hydrocodone-acetaminophen, and tramadol. FAMILY HISTORY: Mother and grandmother and maternal aunt had breast cancer. Father had cancer. Otherwise negative for any hematological or oncological conditions. SOCIAL HISTORY:  reports that she has never smoked. She has never used smokeless tobacco. She reports current alcohol use. She reports that she does not use drugs. REVIEW OF SYSTEMS:     · General: Positive for weakness and fatigue. No unanticipated weight loss or decreased appetite. No fever or chills. · Eyes: No blurred vision, eye pain or double vision. · Ears: No hearing problems or drainage. No tinnitus. · Throat: No sore throat, problems with swallowing or dysphagia. · Respiratory: No cough, sputum or hemoptysis. No shortness of breath. No pleuritic chest pain. · Cardiovascular: No chest pain, orthopnea or PND. No lower extremity edema. No palpitation. · Gastrointestinal: As above. · Genitourinary: No dysuria, hematuria, frequency or urgency. · Musculoskeletal: Positive for muscle cramps. No limitation of movement. No back pain. No gait disturbance, No joint complaints. · Dermatologic: No skin rashes or pruritus. No skin lesions or discolorations. · Psychiatric: No depression, anxiety, or stress or signs of schizophrenia. No change in mood or affect.     · Hematologic: No history of bleeding tendency. No bruises or ecchymosis. No history of clotting problems. · Infectious disease: No fever, chills or frequent infections. · Endocrine: No polydipsia or polyuria. No temperature intolerance. · Neurologic: No headaches positive dizziness. No weakness or numbness of the extremities. No changes in balance, coordination,  memory, mentation, behavior. · Allergic/Immunologic: No nasal congestion or hives. No repeated infections. PHYSICAL EXAM:  The patient is not in acute distress. Vital signs: Blood pressure 110/61, pulse 70, temperature 96.7 °F (35.9 °C), temperature source Temporal, weight 215 lb 3.2 oz (97.6 kg), not currently breastfeeding.      General appearance - well appearing, not in pain or distress  Mental status - good mood, alert and oriented  Eyes - pupils equal and reactive, extraocular eye movements intact  Ears - bilateral TM's and external ear canals normal  Nose - normal and patent, no erythema, discharge or polyps  Mouth - mucous membranes moist, pharynx normal without lesions  Neck - supple, no significant adenopathy  Lymphatics - no palpable lymphadenopathy, no hepatosplenomegaly  Chest - clear to auscultation, no wheezes, rales or rhonchi, symmetric air entry  Heart - normal rate, regular rhythm, normal S1, S2, no murmurs, rubs, clicks or gallops  Abdomen - soft, nontender, nondistended, no masses or organomegaly  Neurological - alert, oriented, normal speech, no focal findings or movement disorder noted  Musculoskeletal - no joint tenderness, deformity or swelling  Extremities - peripheral pulses normal, no pedal edema, no clubbing or cyanosis  Skin - normal coloration and turgor, no rashes, no suspicious skin lesions noted     Review of Diagnostic data:   Lab Results   Component Value Date    WBC 6.5 05/13/2022    HGB 11.4 (L) 05/13/2022    HCT 34.2 (L) 05/13/2022    MCV 88.3 05/13/2022     05/13/2022       Chemistry        Component Value Date/Time     05/04/2022 1415    K 3.9 05/04/2022 1415     05/04/2022 1415    CO2 29 05/04/2022 1415    BUN 15 05/04/2022 1415    CREATININE 0.77 05/04/2022 1415        Component Value Date/Time    CALCIUM 9.1 05/04/2022 1415    ALKPHOS 47 10/11/2021 0000    AST 41 10/11/2021 0000    ALT 24 10/11/2021 0000    BILITOT 0.7 10/11/2021 0000            IMPRESSION:   Microcytic anemia  Severe iron deficiency anemia  No response to oral iron  Intolerable side effects to oral iron with severe constipation  Positive stool guaiac test in 2 out of 3 symptoms  History of diverticulosis  GERD  History of ovarian cancer in 2015  Strong family history of breast cancer with mother and grandmother and maternal aunt    PLAN: I reviewed the labs as above and discussed with the patient. I explained to the patient the nature of this hematologic problem. I explained the significance of these abnormalities in layman language. The blood picture is typical for iron deficiency anemia not responding to oral iron with significant severe constipation related to the oral intake of iron. So patient was treated with IV iron infusion to replace the iron deficiency. Very good response. She had positive stool guaiac test.  She had negative GI work up. We will monitor closely. I discussed with patient other problems related to her history of ovarian cancer treated in 2015 and the strong family history of breast cancer. Patient qualifies for genetic testing. I explained the importance of that and she agreed. We will make referral to genetic counselor for evaluation and possible testing. Furthermore patient did not have follow-up with her GYN oncologist for the last 3 to 4 years. Her last CT scan of the abdomen was negative. Repeated tumor marker CA-125 is normal.   Will see her in 3-4 months with repeated labs. Sooner for any problems.    Patient's questions were answered to the best of her satisfaction and she verbalized full understanding and agreement.

## 2022-06-09 ENCOUNTER — HOSPITAL ENCOUNTER (OUTPATIENT)
Dept: GENERAL RADIOLOGY | Age: 72
Discharge: HOME | End: 2022-06-11
Payer: MEDICARE

## 2022-06-09 ENCOUNTER — OFFICE VISIT (OUTPATIENT)
Dept: PRIMARY CARE CLINIC | Age: 72
End: 2022-06-09
Payer: MEDICARE

## 2022-06-09 ENCOUNTER — HOSPITAL ENCOUNTER (OUTPATIENT)
Age: 72
Discharge: HOME | End: 2022-06-09
Payer: MEDICARE

## 2022-06-09 ENCOUNTER — HOSPITAL ENCOUNTER (OUTPATIENT)
Age: 72
Discharge: HOME | End: 2022-06-11
Payer: MEDICARE

## 2022-06-09 ENCOUNTER — TELEPHONE (OUTPATIENT)
Dept: PRIMARY CARE CLINIC | Age: 72
End: 2022-06-09

## 2022-06-09 VITALS — HEART RATE: 71 BPM | SYSTOLIC BLOOD PRESSURE: 112 MMHG | DIASTOLIC BLOOD PRESSURE: 68 MMHG | OXYGEN SATURATION: 97 %

## 2022-06-09 DIAGNOSIS — M79.674 PAIN OF TOE OF RIGHT FOOT: ICD-10-CM

## 2022-06-09 DIAGNOSIS — I10 ESSENTIAL HYPERTENSION: ICD-10-CM

## 2022-06-09 DIAGNOSIS — M79.674 PAIN OF TOE OF RIGHT FOOT: Primary | ICD-10-CM

## 2022-06-09 LAB
ANION GAP SERPL CALCULATED.3IONS-SCNC: 14 MMOL/L (ref 9–17)
BUN BLDV-MCNC: 20 MG/DL (ref 8–23)
CALCIUM SERPL-MCNC: 9.4 MG/DL (ref 8.6–10.4)
CHLORIDE BLD-SCNC: 103 MMOL/L (ref 98–107)
CO2: 27 MMOL/L (ref 20–31)
CREAT SERPL-MCNC: 0.86 MG/DL (ref 0.5–0.9)
GFR AFRICAN AMERICAN: >60 ML/MIN
GFR NON-AFRICAN AMERICAN: >60 ML/MIN
GFR SERPL CREATININE-BSD FRML MDRD: ABNORMAL ML/MIN/{1.73_M2}
GLUCOSE BLD-MCNC: 177 MG/DL (ref 70–99)
POTASSIUM SERPL-SCNC: 3.7 MMOL/L (ref 3.7–5.3)
SODIUM BLD-SCNC: 144 MMOL/L (ref 135–144)

## 2022-06-09 PROCEDURE — G8400 PT W/DXA NO RESULTS DOC: HCPCS | Performed by: FAMILY MEDICINE

## 2022-06-09 PROCEDURE — 80048 BASIC METABOLIC PNL TOTAL CA: CPT

## 2022-06-09 PROCEDURE — 3017F COLORECTAL CA SCREEN DOC REV: CPT | Performed by: FAMILY MEDICINE

## 2022-06-09 PROCEDURE — 1090F PRES/ABSN URINE INCON ASSESS: CPT | Performed by: FAMILY MEDICINE

## 2022-06-09 PROCEDURE — 36415 COLL VENOUS BLD VENIPUNCTURE: CPT

## 2022-06-09 PROCEDURE — G8427 DOCREV CUR MEDS BY ELIG CLIN: HCPCS | Performed by: FAMILY MEDICINE

## 2022-06-09 PROCEDURE — 1036F TOBACCO NON-USER: CPT | Performed by: FAMILY MEDICINE

## 2022-06-09 PROCEDURE — 1123F ACP DISCUSS/DSCN MKR DOCD: CPT | Performed by: FAMILY MEDICINE

## 2022-06-09 PROCEDURE — 73620 X-RAY EXAM OF FOOT: CPT

## 2022-06-09 PROCEDURE — 99213 OFFICE O/P EST LOW 20 MIN: CPT | Performed by: FAMILY MEDICINE

## 2022-06-09 PROCEDURE — G8417 CALC BMI ABV UP PARAM F/U: HCPCS | Performed by: FAMILY MEDICINE

## 2022-06-09 ASSESSMENT — ENCOUNTER SYMPTOMS: VOMITING: 0

## 2022-06-09 NOTE — TELEPHONE ENCOUNTER
Last Visit Date: 6/9/2022   Next Visit Date: 8/18/2022   Pt called and said she got her xray on her toe, but after the xray the tech dropped the xray plate on the same foot, and it is now black and blue on the top of her foot. She waited for someone from the xray dept to talk to her but they took too long.

## 2022-06-09 NOTE — PROGRESS NOTES
[address] PRIMARY CARE  [address] [facility] 15[address]  [address] [address]  Dept: [phone]  Dept Fax: [phone]    Carmen Mclain is a 80 y.o. female who presents today for her medical conditions/complaints as noted below. Carmen Mclain is c/o of  Chief Complaint   Patient presents with    Toe Pain     paintul lump on inside of right toe, toe gets red the more pt walks on it has clear fluid seeping from bump,, x1wk         HPI:     HPI     Pt here  Today due to R big toe with small bump that has drained clear fluid. States has been going on for about a week . States it will get red by end of the day if doing a lot of walking but then the redness improves if resting. She has been putting neosporin on it.        Hemoglobin A1C (%)   Date Value   2022 11.2 (H)   2022 14.3   2022 13.9             ( goal A1C is < 7)   No results found for: LABMICR  LDL Cholesterol (mg/dL)   Date Value   2020 44   2015 86   10/06/2014 86     LDL Calculated (mg/dL)   Date Value   2019 47       (goal LDL is <100)   AST (U/L)   Date Value   10/11/2021 41     ALT (U/L)   Date Value   10/11/2021 24     BUN (mg/dL)   Date Value   2022 15     BP Readings from Last 3 Encounters:   22 112/68   22 118/68   22 110/61          (goal 120/80)    Past Medical History:   Diagnosis Date    Arthritis     Bowel obstruction ([address]     Cancer ([address]     ovarian stage 2    Cerebral artery occlusion with cerebral infarction ([address] 2018    Mini strokes \"Tia's\"    CPAP (continuous positive airway pressure) dependence     Diabetes mellitus ([address]     Epigastric pain     GERD (gastroesophageal reflux disease)     History of anesthesia complications 6559'M    was told after gallbladder surgery to never have anesthesia again \"since it was hard for me to come out of it\"    History of blood transfusion     Hx of blood clots     lung     Hyperlipidemia  Hypertension     Prolonged emergence from general anesthesia     Right arm pain     Right shoulder pain     Sleep apnea     uses CPAP nightly    Tachycardia     hx of    Thyroid disease     TIA (transient ischemic attack)       Past Surgical History:   Procedure Laterality Date    ANESTHESIA NERVE BLOCK Left 11/7/2019    NERVE BLOCK (DEFINE) - INTERCOSTAL NERVE BLOCK performed by Arely Blackman MD at [facility] Bilateral 5/15/2020    NERVE BLOCK BILATERAL - MBB  L4-5, L5-S1 performed by Arely Blackman MD at [address] Left     ORIF    CHOLECYSTECTOMY      COLONOSCOPY  07/08/2019    5 yr recall.     COLONOSCOPY N/A 7/8/2019    COLONOSCOPY WITH BIOPSY performed by Roberth Deshpande MD at [address] COLONOSCOPY N/A 2/28/2022    COLONOSCOPY DIAGNOSTIC performed by Roberth Deshpande MD at [address] Right     [facility] [place]. INJECTION PROCEDURE FOR SACROILIAC JOINT Bilateral 8/29/2019    SACROILIAC JOINT INJECTION performed by Arely Blackman MD at [facility] Bilateral 10/10/2019    SACROILIAC JOINT INJECTION performed by Arely Blackman MD at [facility] ([facility])  2/2014    chris with bso    JOINT REPLACEMENT Right     shoulder    KNEE ARTHROSCOPY Right     NERVE BLOCK Bilateral 08/29/2019    SI joint injection    NERVE BLOCK Bilateral 10/10/2019    SI joint    NERVE BLOCK Bilateral 05/15/2020    Medial branch block L4-5, L5-S1    NERVE BLOCK  06/05/2020    SPINAL CORD STIMULATOR IMPLANT TRIAL (N/A )    OTHER SURGICAL HISTORY Right 11/11/2014    shoulder manipulation    OTHER SURGICAL HISTORY  04/2021    pain pump placed, fentanyl in pain pump    SHOULDER ARTHROSCOPY Right 02/03/15    SHOULDER SURGERY  11/2014    manipulation    SPINAL CORD STIMULATOR SURGERY N/A 6/5/2020    SPINAL CORD STIMULATOR IMPLANT TRIAL performed by Morteza Mercedes MD at [facility] N/A 7/27/2020    SPINAL CORD STIMULATOR IMPLANT WITH THORACIC LAMINOTOMY performed by Alicia Preston MD at [address] ENDOSCOPY  07/08/2019    UPPER GASTROINTESTINAL ENDOSCOPY N/A 7/8/2019    EGD BIOPSY performed by Ben Erickson MD at [address] N/A 2/28/2022    EGD BIOPSY performed by Ben Erickson MD at [facility] Right 4/30/2015    pt has blood clot & abscess in her right kidney    VASCULAR SURGERY      [employer] filter in & removed 1 year later       Family History   Problem Relation Age of Onset    Elevated Lipids Paternal Grandmother        Social History     Tobacco Use    Smoking status: Never Smoker    Smokeless tobacco: Never Used   Substance Use Topics    Alcohol use: Yes     Comment: rare      Current Outpatient Medications   Medication Sig Dispense Refill    furosemide (LASIX) 20 MG tablet Take 1 tablet by mouth daily 30 tablet 3    furosemide (LASIX) 20 MG tablet Take 1 tablet by mouth daily 30 tablet 0    timolol (TIMOPTIC) 0.5 % ophthalmic solution INSTILL 1 DROP IN BOTH EYES TWICE DAILY      potassium chloride (KLOR-CON M) 20 MEQ extended release tablet TAKE ONE (1) TABLET BY MOUTH TWICE DAILY 60 tablet 10    traZODone (DESYREL) 100 MG tablet TAKE 1 TABLET BY MOUTH NIGHTLY AS NEEDED FOR SLEEP 30 tablet 10    gabapentin (NEURONTIN) 600 MG tablet TAKE 1 TABLET BY MOUTH FOUR TIMES DAILY 120 tablet 3    omeprazole (PRILOSEC) 40 MG delayed release capsule TAKE 1 CAPSULE BY MOUTH EVERY MORNING BEFORE BREAKFAST 30 capsule 3    amLODIPine (NORVASC) 10 MG tablet Take 1 tablet by mouth daily 30 tablet 5    insulin glargine (LANTUS) 100 UNIT/ML injection vial INJECT 75 UNITS SUBCUTANEOUSLY NIGHTLY (Patient taking differently: 2 times daily 50 units in the morning, 20 units nightly) 30 mL 10    levothyroxine (SYNTHROID) 88 MCG tablet TAKE (1) TABLET BY MOUTH EVERY DAY 30 tablet 10    blood glucose test strips (ACCU-CHEK TED PLUS) strip USE TO TEST BLOOD SUGAR 3 TIMES DAILY AND AS NEEDED FOR SYMPTOMS OF IRREGULAR BLOOD GLUCOSE 100 strip 10    tamsulosin (FLOMAX) 0.4 MG capsule TAKE 1 CAPSULE BY MOUTH NIGHTLY EMERGENCY REFILL 30 capsule 10    SURE COMFORT INSULIN SYRINGE 31G X 5/16\" 1 ML MISC USE AS DIRECTED FOUR TIMES A  each 10    EPINEPHrine (EPIPEN 2-LISA) 0.3 MG/0.3ML SOAJ injection EpiPen 2-Lisa 0.3 mg/0.3 mL injection, auto-injector 1 each 3    XARELTO 20 MG TABS tablet TAKE 1 TABLET BY MOUTH ONCE DAILY 30 tablet 10    alendronate (FOSAMAX) 70 MG tablet TAKE 1 TABLET BY MOUTH EVERY MONDAY 5 tablet 10    insulin lispro (HUMALOG) 100 UNIT/ML injection vial INEJCT SUBCUTANEOUSLY UP TO THREE TIMES A DAY BASED ON SLIDING SCALE * MAX 40 UNITS A DAY* (Patient taking differently: INEJCT SUBCUTANEOUSLY UP TO THREE TIMES A DAY BASED ON SLIDING SCALE * MAX 40 UNITS A DAY*  8 units each morning and sliding scale) 10 mL 10    DULoxetine (CYMBALTA) 60 MG extended release capsule TAKE 1 CAPSULE BY MOUTH DAILY 30 capsule 10    losartan (COZAAR) 100 MG tablet TAKE 1 TABLET BY MOUTH EVERY DAY 30 tablet 10    atorvastatin (LIPITOR) 40 MG tablet TAKE (1) TABLET BY MOUTH EVERY DAY 30 tablet 11    hydroCHLOROthiazide (HYDRODIURIL) 25 MG tablet TAKE 1 TABLET BY MOUTH EVERY MORNING 30 tablet 11    vitamin C (ASCORBIC ACID) 500 MG tablet Take 500 mg by mouth daily      glucose monitoring kit (FREESTYLE) monitoring kit 1 kit by Does not apply route daily Please provide accuchek meter for patient, not freestyle 1 kit 0    calcium carbonate (TUMS) 500 MG chewable tablet Take 1 tablet by mouth daily as needed for Heartburn      SUPER B COMPLEX/C CAPS Take by mouth      Cholecalciferol (VITAMIN D3) 5000 units TABS Take by mouth      ferrous sulfate 325 (65 Fe) MG tablet Take 325 mg by mouth daily (with breakfast)      fexofenadine (ALLEGRA) 60 MG tablet Take 60 mg by mouth daily      magnesium oxide (MAG-OX) 400 MG tablet Take 400 mg by mouth daily       No current facility-administered medications for this visit. Allergies   Allergen Reactions    Aspirin Anaphylaxis     Only thing she can take is tylenol    Benadryl [Diphenhydramine] Other (See Comments)     Dystonic movement    Ibuprofen Anaphylaxis    Lidocaine Anaphylaxis     CAN NOT TOLERATE IV    Penicillins Anaphylaxis    Hydrocodone-Acetaminophen Other (See Comments)     Unsure of allergy    Tramadol Other (See Comments)     Unsure of allergy       Health Maintenance   Topic Date Due    Hepatitis C screen  Never done    DTaP/Tdap/Td vaccine (1 - Tdap) Never done    DEXA (modify frequency per FRAX score)  Never done    Shingles vaccine (2 of 2) 12/18/2020    Lipids  09/29/2021    Diabetic microalbuminuria test  01/25/2022    Diabetic foot exam  02/25/2022    Pneumococcal 65+ years Vaccine (1 - PCV) 04/17/2023 (Originally 9/14/1956)    A1C test (Diabetic or Prediabetic)  08/04/2022    Breast cancer screen  10/21/2022    Annual Wellness Visit (AWV)  11/03/2022    Diabetic retinal exam  11/09/2022    Depression Screen  03/14/2023    Colorectal Cancer Screen  02/28/2027    Flu vaccine  Completed    COVID-19 Vaccine  Completed    Hepatitis A vaccine  Aged Out    Hib vaccine  Aged Out    Meningococcal (ACWY) vaccine  Aged Out       Subjective:      Review of Systems   Constitutional: Negative for chills and fever. Cardiovascular:        Leg swelling improving with lasix. Gastrointestinal: Negative for vomiting. Musculoskeletal:        R big toe with some clear drainage       Objective:     Physical Exam  Constitutional:       Appearance: She is well-developed. HENT:      Head: Normocephalic and atraumatic.       Right Ear: External ear normal.      Left Ear: External ear normal.   Eyes:      Conjunctiva/sclera: Conjunctivae normal.      Pupils: Pupils are equal, round, and reactive to light. Cardiovascular:      Rate and Rhythm: Normal rate and regular rhythm. Heart sounds: Normal heart sounds. Pulmonary:      Effort: Pulmonary effort is normal.      Breath sounds: Normal breath sounds. Musculoskeletal:      Cervical back: Neck supple. Feet:    Feet:      Comments: Very small opening in skin noted in R medial part of toe, some minimal clear drainage noted. No pus drainage. Some mild ttp in that area. Skin:     General: Skin is warm and dry. Neurological:      Mental Status: She is alert and oriented to person, place, and time. Psychiatric:         Mood and Affect: Mood normal.         Behavior: Behavior normal.         Thought Content: Thought content normal.       /68   Pulse 71   SpO2 97%     Assessment:      1. Pain of toe of right foot  - does not appear to require any oral antibiotics at this time. Recommend can try to wear band-aid as she has been if walking a lot to keep toes from rubbing on each other. Will check xray due to some ttp of toe.   - XR FOOT RIGHT (2 VIEWS); Future           Plan:      Return for follow up. Orders Placed This Encounter   Procedures    XR FOOT RIGHT (2 VIEWS)     Standing Status:   Future     Number of Occurrences:   1     Standing Expiration Date:   6/9/2023     Order Specific Question:   Reason for exam:     Answer:   R inner toe with some tenderness and drains clear fluid. gets red by end of the day     No orders of the defined types were placed in this encounter. Patient given educational materials - see patient instructions. Discussed use, benefit, and side effects of prescribed medications. All patient questions answered. Pt voiced understanding. Reviewed healthmaintenance. Instructed to continue current medications, diet and exercise. Patient agreed with treatment plan. Follow up as directed.      Electronically signed by Omar Morris DO on 6/9/2022 at 1:01 PM

## 2022-06-09 NOTE — TELEPHONE ENCOUNTER
Spoke with pt, states foot is bruised but she was able to walk on it and is not really painful. Recommend ace wrap and icing it and elevating at home. If gets worsening pain to follow up.

## 2022-06-12 NOTE — RESULT ENCOUNTER NOTE
Please let pt know her xray shows some arthritis in her big toe, no signs of breakdown of bone or fracture. If pain does not improve would recommend seeing podiatry . I can make a referral if she would like if not improving.

## 2022-06-16 NOTE — TELEPHONE ENCOUNTER
Looks like it is scheduled 2/28 so I can sign it when back on 1/31. If needed earlier let me know, and I can make a letter.
Office received surgical clearance for pts EGD/Colonoscopy. Pt is on Xarelto and they need to also know if she can stop & for how many days, if she is cleared with no restrictions, cleared with restrictions or not cleared. Is another provider able to fill this out for the patient? Please advise.
Thank you.
Patient

## 2022-07-06 ENCOUNTER — OFFICE VISIT (OUTPATIENT)
Dept: PRIMARY CARE CLINIC | Age: 72
End: 2022-07-06
Payer: MEDICARE

## 2022-07-06 VITALS
DIASTOLIC BLOOD PRESSURE: 86 MMHG | HEART RATE: 71 BPM | WEIGHT: 213.2 LBS | OXYGEN SATURATION: 100 % | RESPIRATION RATE: 16 BRPM | BODY MASS INDEX: 36.6 KG/M2 | SYSTOLIC BLOOD PRESSURE: 122 MMHG

## 2022-07-06 DIAGNOSIS — M54.9 UPPER BACK PAIN ON LEFT SIDE: Primary | ICD-10-CM

## 2022-07-06 PROCEDURE — 1036F TOBACCO NON-USER: CPT | Performed by: STUDENT IN AN ORGANIZED HEALTH CARE EDUCATION/TRAINING PROGRAM

## 2022-07-06 PROCEDURE — 1123F ACP DISCUSS/DSCN MKR DOCD: CPT | Performed by: STUDENT IN AN ORGANIZED HEALTH CARE EDUCATION/TRAINING PROGRAM

## 2022-07-06 PROCEDURE — 3017F COLORECTAL CA SCREEN DOC REV: CPT | Performed by: STUDENT IN AN ORGANIZED HEALTH CARE EDUCATION/TRAINING PROGRAM

## 2022-07-06 PROCEDURE — G8427 DOCREV CUR MEDS BY ELIG CLIN: HCPCS | Performed by: STUDENT IN AN ORGANIZED HEALTH CARE EDUCATION/TRAINING PROGRAM

## 2022-07-06 PROCEDURE — G8417 CALC BMI ABV UP PARAM F/U: HCPCS | Performed by: STUDENT IN AN ORGANIZED HEALTH CARE EDUCATION/TRAINING PROGRAM

## 2022-07-06 PROCEDURE — 1090F PRES/ABSN URINE INCON ASSESS: CPT | Performed by: STUDENT IN AN ORGANIZED HEALTH CARE EDUCATION/TRAINING PROGRAM

## 2022-07-06 PROCEDURE — G8400 PT W/DXA NO RESULTS DOC: HCPCS | Performed by: STUDENT IN AN ORGANIZED HEALTH CARE EDUCATION/TRAINING PROGRAM

## 2022-07-06 PROCEDURE — 99213 OFFICE O/P EST LOW 20 MIN: CPT | Performed by: STUDENT IN AN ORGANIZED HEALTH CARE EDUCATION/TRAINING PROGRAM

## 2022-07-06 RX ORDER — TIZANIDINE 2 MG/1
2 TABLET ORAL NIGHTLY
Qty: 5 TABLET | Refills: 0 | Status: SHIPPED | OUTPATIENT
Start: 2022-07-06 | End: 2022-07-12 | Stop reason: SDUPTHER

## 2022-07-06 RX ORDER — CAPSAICIN 0.03 G/100G
1 PATCH TOPICAL DAILY
Qty: 15 PATCH | Refills: 0 | Status: SHIPPED | OUTPATIENT
Start: 2022-07-06 | End: 2022-07-20

## 2022-07-06 ASSESSMENT — ENCOUNTER SYMPTOMS
EYE ITCHING: 0
RHINORRHEA: 0
EYE DISCHARGE: 0
SHORTNESS OF BREATH: 0
ABDOMINAL DISTENTION: 0
COUGH: 0
WHEEZING: 0
BACK PAIN: 1
ABDOMINAL PAIN: 0

## 2022-07-06 ASSESSMENT — PATIENT HEALTH QUESTIONNAIRE - PHQ9
2. FEELING DOWN, DEPRESSED OR HOPELESS: 0
SUM OF ALL RESPONSES TO PHQ QUESTIONS 1-9: 0
SUM OF ALL RESPONSES TO PHQ9 QUESTIONS 1 & 2: 0
SUM OF ALL RESPONSES TO PHQ QUESTIONS 1-9: 0
SUM OF ALL RESPONSES TO PHQ QUESTIONS 1-9: 0
1. LITTLE INTEREST OR PLEASURE IN DOING THINGS: 0
SUM OF ALL RESPONSES TO PHQ QUESTIONS 1-9: 0
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
## 2022-07-08 ENCOUNTER — OFFICE VISIT (OUTPATIENT)
Dept: PAIN MANAGEMENT | Age: 72
End: 2022-07-08
Payer: MEDICARE

## 2022-07-08 VITALS — WEIGHT: 214.8 LBS | BODY MASS INDEX: 36.67 KG/M2 | HEIGHT: 64 IN

## 2022-07-08 DIAGNOSIS — M54.6 ACUTE LEFT-SIDED THORACIC BACK PAIN: ICD-10-CM

## 2022-07-08 PROCEDURE — 99213 OFFICE O/P EST LOW 20 MIN: CPT | Performed by: NURSE PRACTITIONER

## 2022-07-08 PROCEDURE — 1123F ACP DISCUSS/DSCN MKR DOCD: CPT | Performed by: NURSE PRACTITIONER

## 2022-07-08 ASSESSMENT — ENCOUNTER SYMPTOMS
BACK PAIN: 1
CONSTIPATION: 0
BOWEL INCONTINENCE: 0
SHORTNESS OF BREATH: 0
COUGH: 0
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
## 2022-07-12 ENCOUNTER — TELEPHONE (OUTPATIENT)
Dept: PRIMARY CARE CLINIC | Age: 72
End: 2022-07-12

## 2022-07-12 DIAGNOSIS — M54.9 UPPER BACK PAIN ON LEFT SIDE: ICD-10-CM

## 2022-07-12 RX ORDER — TIZANIDINE 2 MG/1
2 TABLET ORAL EVERY 8 HOURS PRN
Qty: 20 TABLET | Refills: 0 | Status: SHIPPED | OUTPATIENT
Start: 2022-07-12
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
## 2022-07-13 LAB
AVERAGE GLUCOSE: 63
HBA1C MFR BLD: 7.3 %

## 2022-07-14 ENCOUNTER — TELEPHONE (OUTPATIENT)
Dept: ORTHOPEDIC SURGERY | Age: 72
End: 2022-07-14

## 2022-07-14 RX ORDER — RIVAROXABAN 20 MG/1
TABLET, FILM COATED ORAL
Qty: 30 TABLET | Refills: 10 | Status: SHIPPED | OUTPATIENT
Start: 2022-07-14

## 2022-07-14 RX ORDER — GABAPENTIN 600 MG/1
TABLET ORAL
Qty: 120 TABLET | Refills: 10 | Status: SHIPPED | OUTPATIENT
Start: 2022-07-14 | End: 2022-09-12

## 2022-07-14 NOTE — TELEPHONE ENCOUNTER
Patient most recent A1C result is in chart. Is that low enough for you to proceed with removing her stimulator?
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
## 2022-07-20 ENCOUNTER — HOSPITAL ENCOUNTER (OUTPATIENT)
Dept: PREADMISSION TESTING | Age: 72
Discharge: HOME | End: 2022-07-24
Attending: ORTHOPAEDIC SURGERY | Admitting: ORTHOPAEDIC SURGERY
Payer: MEDICARE

## 2022-07-20 ENCOUNTER — TELEPHONE (OUTPATIENT)
Dept: PRIMARY CARE CLINIC | Age: 72
End: 2022-07-20

## 2022-07-20 VITALS
TEMPERATURE: 98 F | RESPIRATION RATE: 18 BRPM | BODY MASS INDEX: 36.37 KG/M2 | HEART RATE: 65 BPM | OXYGEN SATURATION: 98 % | DIASTOLIC BLOOD PRESSURE: 50 MMHG | SYSTOLIC BLOOD PRESSURE: 124 MMHG | WEIGHT: 213 LBS | HEIGHT: 64 IN

## 2022-07-20 LAB
ABSOLUTE EOS #: 0.2 K/UL (ref 0–0.4)
ABSOLUTE LYMPH #: 2.1 K/UL (ref 1–4.8)
ABSOLUTE MONO #: 0.4 K/UL (ref 0.1–1.3)
ANION GAP SERPL CALCULATED.3IONS-SCNC: 8 MMOL/L (ref 9–17)
BASOPHILS # BLD: 1 % (ref 0–2)
BASOPHILS ABSOLUTE: 0.1 K/UL (ref 0–0.2)
BUN BLDV-MCNC: 26 MG/DL (ref 8–23)
CALCIUM SERPL-MCNC: 9.2 MG/DL (ref 8.6–10.4)
CHLORIDE BLD-SCNC: 101 MMOL/L (ref 98–107)
CO2: 31 MMOL/L (ref 20–31)
CREAT SERPL-MCNC: 1.04 MG/DL (ref 0.5–0.9)
EOSINOPHILS RELATIVE PERCENT: 4 % (ref 0–4)
GFR AFRICAN AMERICAN: >60 ML/MIN
GFR NON-AFRICAN AMERICAN: 52 ML/MIN
GFR SERPL CREATININE-BSD FRML MDRD: ABNORMAL ML/MIN/{1.73_M2}
GLUCOSE BLD-MCNC: 240 MG/DL (ref 70–99)
HCT VFR BLD CALC: 34.4 % (ref 36–46)
HEMOGLOBIN: 11.2 G/DL (ref 12–16)
LYMPHOCYTES # BLD: 35 % (ref 24–44)
MCH RBC QN AUTO: 28.5 PG (ref 26–34)
MCHC RBC AUTO-ENTMCNC: 32.6 G/DL (ref 31–37)
MCV RBC AUTO: 87.5 FL (ref 80–100)
MONOCYTES # BLD: 7 % (ref 1–7)
PDW BLD-RTO: 13.6 % (ref 11.5–14.9)
PLATELET # BLD: 192 K/UL (ref 150–450)
PMV BLD AUTO: 9.9 FL (ref 6–12)
POTASSIUM SERPL-SCNC: 3.3 MMOL/L (ref 3.7–5.3)
RBC # BLD: 3.93 M/UL (ref 4–5.2)
SEG NEUTROPHILS: 53 % (ref 36–66)
SEGMENTED NEUTROPHILS ABSOLUTE COUNT: 3.2 K/UL (ref 1.3–9.1)
SODIUM BLD-SCNC: 140 MMOL/L (ref 135–144)
WBC # BLD: 6 K/UL (ref 3.5–11)

## 2022-07-20 PROCEDURE — 93005 ELECTROCARDIOGRAM TRACING: CPT | Performed by: NURSE PRACTITIONER

## 2022-07-20 PROCEDURE — 80048 BASIC METABOLIC PNL TOTAL CA: CPT

## 2022-07-20 PROCEDURE — 36415 COLL VENOUS BLD VENIPUNCTURE: CPT

## 2022-07-20 PROCEDURE — 85025 COMPLETE CBC W/AUTO DIFF WBC: CPT

## 2022-07-20 PROCEDURE — APPSS45 APP SPLIT SHARED TIME 31-45 MINUTES: Performed by: NURSE PRACTITIONER

## 2022-07-20 NOTE — TELEPHONE ENCOUNTER
Spoke with pt regarding receiving paperwork for pre-op clearance for spinal stimulator removal. She is able to go up one to two flights of stairs or walk 1-2 blocks without any shortness of breath or chest pain. A1c has improved as well. Paper filled out for surgical clearance and for her to hold xarelto for five days prior to surgery and it will be faxed.

## 2022-07-20 NOTE — DISCHARGE INSTRUCTIONS
Pre-op Instructions For Out-Patient Surgery    Medication Instructions:  Please stop herbs and any supplements now (includes vitamins and minerals). Please contact your surgeon and prescribing physician for pre-op instructions for any blood thinners. Stop Xarelto as directed    If you have inhalers/aerosol treatments at home, please use them the morning of your surgery and bring the inhalers with you to the hospital.    Please take the following medications the morning of your surgery with a sip of water:    Losartan, Amlodipine, Levothyroxine, Omeprazole    Surgery Instructions:  After midnight before surgery:  Do not eat or drink anything, including water, mints, gum, and hard candy. You may brush your teeth without swallowing. No smoking, chewing tobacco, or street drugs. Please shower or bathe before surgery. If you were given Surgical Scrub Chlorhexidine Gluconate Liquid (CHG), please shower the night before and the morning of your surgery following the detailed instructions you received during your pre-admission visit. Please do not wear any cologne, lotion, powder, deodorant, jewelry, piercings, perfume, makeup, nail polish, hair accessories, or hair spray on the day of surgery. Wear loose comfortable clothing. Leave your valuables at home. Bring a storage case for any glasses/contacts. An adult who is responsible for you MUST drive you home and should be with you for the first 24 hours after surgery. If having out-patient knee and foot surgeries, please arrange for planned crutches, walker, or wheelchair before arriving to the hospital.    The Day of Surgery:  Arrive at [facility] Surgery Entr[facility] at the time directed by your surgeon and check in at the desk. If you have a living will or healthcare power of , please bring a copy. You will be taken to the pre-op holding area where you will be prepared for surgery.   A physical assessment will be performed by a nurse practitioner or house officer. Your IV will be started and you will meet your anesthesiologist.    When you go to surgery, your family will be directed to the surgical waiting room, where the doctor should speak with them after your surgery. After surgery, you will be taken to the recovery room then when you are awake and stable you will go to the short stay unit for preparation to be discharged. If you use a Bi-PAP or C-PAP machine, please bring it with you and leave it in the car in case it is needed in recovery room.

## 2022-07-21 LAB
EKG ATRIAL RATE: 64 BPM
EKG P AXIS: 4 DEGREES
EKG P-R INTERVAL: 156 MS
EKG Q-T INTERVAL: 458 MS
EKG QRS DURATION: 92 MS
EKG QTC CALCULATION (BAZETT): 472 MS
EKG R AXIS: 22 DEGREES
EKG T AXIS: 53 DEGREES
EKG VENTRICULAR RATE: 64 BPM

## 2022-07-22 ENCOUNTER — TELEPHONE (OUTPATIENT)
Dept: PRIMARY CARE CLINIC | Age: 72
End: 2022-07-22

## 2022-07-22 ENCOUNTER — TELEPHONE (OUTPATIENT)
Dept: PAIN MANAGEMENT | Age: 72
End: 2022-07-22

## 2022-07-22 DIAGNOSIS — M54.9 CHRONIC BACK PAIN, UNSPECIFIED BACK LOCATION, UNSPECIFIED BACK PAIN LATERALITY: Primary | ICD-10-CM

## 2022-07-22 DIAGNOSIS — G89.29 CHRONIC BACK PAIN, UNSPECIFIED BACK LOCATION, UNSPECIFIED BACK PAIN LATERALITY: Primary | ICD-10-CM

## 2022-07-22 RX ORDER — TRAMADOL HYDROCHLORIDE 50 MG/1
50 TABLET ORAL EVERY 8 HOURS PRN
Qty: 9 TABLET | Refills: 0 | Status: SHIPPED | OUTPATIENT
Start: 2022-07-22 | End: 2022-07-25

## 2022-07-22 NOTE — TELEPHONE ENCOUNTER
Patient called in saying she went to ER, is experiencing severe back pain that is radiating all the way down the right leg to foot. Patient states she was given medication in the ER and followed up with her PCP who is not in the office and was told by PCP to call Pain management. Patient advised to come in for follow up visit, states she is in too much pain to make it to appt. Patient would like to know if she can be prescribed something for pain. Please advise.

## 2022-07-22 NOTE — TELEPHONE ENCOUNTER
Spoke with karen to call pt to see if she tolerates norco or tramadol since they are on her allergy list. States she tolerates tramadol better and we recommended she follow up call with Dr Jessica Ward office Monday. Will send short course of tramadol .

## 2022-07-22 NOTE — TELEPHONE ENCOUNTER
Writer received call from PCP asking the writer to contact the patient to inquire which medications she tolerates better, the Tramadol or the [place].  The PCP stated she will send a short supply, but to stress to the patient to contact her pain management provider to make a follow up. Writer contacted the patient and informed her of the above information. The patient states she tolerates Tramadol well. The patient verbalized understanding of PCP instructions. Writer contacted PCP and informed her that the patient tolerated Tramadol, PCP verbalized understanding.

## 2022-07-22 NOTE — TELEPHONE ENCOUNTER
Pt called stating she is having severe back pain. She was seen at the ER and was given medications for it and was advised to f/u with PCP. She states she called the office and spoke with someone that advised her to call pain management. See pain managements note. Pt states she was advised that pain management won't provided any medication until pt see's Dr. Roshni James which wouldn't be until sometime next week. She is requesting if PCP or one of the other providers could send in something for her back pain until she can see pain management. She states she is in so much pain that she can't move or get up to go to the bathroom.     Please advise

## 2022-07-25 ENCOUNTER — HOSPITAL ENCOUNTER (OUTPATIENT)
Dept: GENERAL RADIOLOGY | Age: 72
Discharge: HOME | End: 2022-07-27
Payer: MEDICARE

## 2022-07-25 ENCOUNTER — HOSPITAL ENCOUNTER (OUTPATIENT)
Age: 72
Discharge: HOME | End: 2022-07-25
Payer: MEDICARE

## 2022-07-25 ENCOUNTER — HOSPITAL ENCOUNTER (OUTPATIENT)
Age: 72
Discharge: HOME | End: 2022-07-27
Payer: MEDICARE

## 2022-07-25 ENCOUNTER — OFFICE VISIT (OUTPATIENT)
Dept: PRIMARY CARE CLINIC | Age: 72
End: 2022-07-25
Payer: MEDICARE

## 2022-07-25 VITALS — SYSTOLIC BLOOD PRESSURE: 126 MMHG | HEART RATE: 74 BPM | OXYGEN SATURATION: 97 % | DIASTOLIC BLOOD PRESSURE: 80 MMHG

## 2022-07-25 DIAGNOSIS — M54.41 RIGHT-SIDED LOW BACK PAIN WITH RIGHT-SIDED SCIATICA, UNSPECIFIED CHRONICITY: ICD-10-CM

## 2022-07-25 DIAGNOSIS — M54.41 RIGHT-SIDED LOW BACK PAIN WITH RIGHT-SIDED SCIATICA, UNSPECIFIED CHRONICITY: Primary | ICD-10-CM

## 2022-07-25 DIAGNOSIS — M25.559 HIP PAIN: ICD-10-CM

## 2022-07-25 DIAGNOSIS — E87.6 HYPOKALEMIA: ICD-10-CM

## 2022-07-25 LAB
ANION GAP SERPL CALCULATED.3IONS-SCNC: 8 MMOL/L (ref 9–17)
BUN BLDV-MCNC: 24 MG/DL (ref 8–23)
CALCIUM SERPL-MCNC: 9.1 MG/DL (ref 8.6–10.4)
CHLORIDE BLD-SCNC: 102 MMOL/L (ref 98–107)
CO2: 33 MMOL/L (ref 20–31)
CREAT SERPL-MCNC: 0.94 MG/DL (ref 0.5–0.9)
GFR AFRICAN AMERICAN: >60 ML/MIN
GFR NON-AFRICAN AMERICAN: 59 ML/MIN
GFR SERPL CREATININE-BSD FRML MDRD: ABNORMAL ML/MIN/{1.73_M2}
GLUCOSE BLD-MCNC: 134 MG/DL (ref 70–99)
POTASSIUM SERPL-SCNC: 3.6 MMOL/L (ref 3.7–5.3)
SODIUM BLD-SCNC: 143 MMOL/L (ref 135–144)

## 2022-07-25 PROCEDURE — 1123F ACP DISCUSS/DSCN MKR DOCD: CPT | Performed by: FAMILY MEDICINE

## 2022-07-25 PROCEDURE — 80048 BASIC METABOLIC PNL TOTAL CA: CPT

## 2022-07-25 PROCEDURE — G8427 DOCREV CUR MEDS BY ELIG CLIN: HCPCS | Performed by: FAMILY MEDICINE

## 2022-07-25 PROCEDURE — 36415 COLL VENOUS BLD VENIPUNCTURE: CPT

## 2022-07-25 PROCEDURE — G8400 PT W/DXA NO RESULTS DOC: HCPCS | Performed by: FAMILY MEDICINE

## 2022-07-25 PROCEDURE — 1036F TOBACCO NON-USER: CPT | Performed by: FAMILY MEDICINE

## 2022-07-25 PROCEDURE — 99214 OFFICE O/P EST MOD 30 MIN: CPT | Performed by: FAMILY MEDICINE

## 2022-07-25 PROCEDURE — 3017F COLORECTAL CA SCREEN DOC REV: CPT | Performed by: FAMILY MEDICINE

## 2022-07-25 PROCEDURE — G8417 CALC BMI ABV UP PARAM F/U: HCPCS | Performed by: FAMILY MEDICINE

## 2022-07-25 PROCEDURE — 73502 X-RAY EXAM HIP UNI 2-3 VIEWS: CPT

## 2022-07-25 PROCEDURE — 72100 X-RAY EXAM L-S SPINE 2/3 VWS: CPT

## 2022-07-25 PROCEDURE — 1090F PRES/ABSN URINE INCON ASSESS: CPT | Performed by: FAMILY MEDICINE

## 2022-07-25 ASSESSMENT — ENCOUNTER SYMPTOMS
SHORTNESS OF BREATH: 0
BACK PAIN: 1

## 2022-07-25 NOTE — PROGRESS NOTES
positive airway pressure) dependence     Diabetes mellitus (HCC)     Epigastric pain     GERD (gastroesophageal reflux disease)     History of anesthesia complications 2545'[id number]    was told after gallbladder surgery to never have anesthesia again \"since it was hard for me to come out of it\"    History of blood transfusion     Hx of blood clots     lung     Hyperlipidemia     Hypertension     Prolonged emergence from general anesthesia     Right arm pain     Right shoulder pain     Sleep apnea     uses CPAP nightly    Tachycardia     hx of    Thyroid disease     TIA (transient ischemic attack)       Past Surgical History:   Procedure Laterality Date    ANESTHESIA NERVE BLOCK Left 11/7/2019    NERVE BLOCK (DEFINE) - INTERCOSTAL NERVE BLOCK performed by Zheng Villela MD at [address] Bilateral 5/15/2020    NERVE BLOCK BILATERAL - MBB  L4-5, L5-S1 performed by Zheng Villela MD at [address] Left     ORIF    CHOLECYSTECTOMY      COLONOSCOPY  07/08/2019    5 yr recall.     COLONOSCOPY N/A 7/8/2019    COLONOSCOPY WITH BIOPSY performed by Marco Alejandro MD at [address] [facility] N/A 2/28/2022    COLONOSCOPY DIAGNOSTIC performed by Marco Alejandro MD at [address] [facility] Right     repair tendon    [facility]. INJECTION PROCEDURE FOR SACROILIAC JOINT Bilateral 8/29/2019    SACROILIAC JOINT INJECTION performed by Zheng Villela MD at [address] Bilateral 10/10/2019    SACROILIAC JOINT INJECTION performed by Zheng Villela MD at [address] ([address] [facility])  2/2014    chris with bso    JOINT REPLACEMENT Right     shoulder    KNEE ARTHROSCOPY Right     NERVE BLOCK Bilateral 08/29/2019    SI joint injection    NERVE BLOCK Bilateral 10/10/2019    SI joint    NERVE BLOCK Bilateral 05/15/2020    Medial branch block L4-5, L5-S1    NERVE BLOCK  06/05/2020    SPINAL CORD TABLET BY MOUTH TWICE DAILY 60 tablet 10    traZODone (DESYREL) 100 MG tablet TAKE 1 TABLET BY MOUTH NIGHTLY AS NEEDED FOR SLEEP 30 tablet 10    omeprazole (PRILOSEC) 40 MG delayed release capsule TAKE 1 CAPSULE BY MOUTH EVERY MORNING BEFORE BREAKFAST 30 capsule 3    amLODIPine (NORVASC) 10 MG tablet Take 1 tablet by mouth daily 30 tablet 5    insulin glargine (LANTUS) 100 UNIT/ML injection vial INJECT 75 UNITS SUBCUTANEOUSLY NIGHTLY (Patient taking differently: 32 units in the morning, 22 units nightly) 30 mL 10    levothyroxine (SYNTHROID) 88 MCG tablet TAKE (1) TABLET BY MOUTH EVERY DAY 30 tablet 10    blood glucose test strips (ACCU-CHEK TED PLUS) strip USE TO TEST BLOOD SUGAR 3 TIMES DAILY AND AS NEEDED FOR SYMPTOMS OF IRREGULAR BLOOD GLUCOSE 100 strip 10    tamsulosin (FLOMAX) 0.4 MG capsule TAKE 1 CAPSULE BY MOUTH NIGHTLY *EMERGENCY REFILL* 30 capsule 10    SURE COMFORT INSULIN SYRINGE 31G X 5/16\" 1 ML MISC USE AS DIRECTED FOUR TIMES A  each 10    EPINEPHrine (EPIPEN 2-LISA) 0.3 MG/0.3ML SOAJ injection EpiPen 2-Lisa 0.3 mg/0.3 mL injection, auto-injector 1 each 3    alendronate (FOSAMAX) 70 MG tablet TAKE 1 TABLET BY MOUTH EVERY MONDAY 5 tablet 10    insulin lispro (HUMALOG) 100 UNIT/ML injection vial INEJCT SUBCUTANEOUSLY UP TO THREE TIMES A DAY BASED ON SLIDING SCALE MAX 40 UNITS A DAY (Patient taking differently: INEJCT SUBCUTANEOUSLY UP TO THREE TIMES A DAY BASED ON SLIDING SCALE  MAX 40 UNITS A DAY  8 units each morning, 6 units at lunch, 8 units at night and sliding scale) 10 mL 10    DULoxetine (CYMBALTA) 60 MG extended release capsule TAKE 1 CAPSULE BY MOUTH DAILY 30 capsule 10    losartan (COZAAR) 100 MG tablet TAKE 1 TABLET BY MOUTH EVERY DAY 30 tablet 10    atorvastatin (LIPITOR) 40 MG tablet TAKE (1) TABLET BY MOUTH EVERY DAY 30 tablet 11    hydroCHLOROthiazide (HYDRODIURIL) 25 MG tablet TAKE 1 TABLET BY MOUTH EVERY MORNING 30 tablet 11    vitamin C (ASCORBIC ACID) 500 MG tablet Take 500 mg by mouth daily      glucose monitoring kit (FREESTYLE) monitoring kit 1 kit by Does not apply route daily Please provide accuchek meter for patient, not freestyle 1 kit 0    calcium carbonate (TUMS) 500 MG chewable tablet Take 1 tablet by mouth daily as needed for Heartburn      SUPER B COMPLEX/C CAPS Take by mouth      Cholecalciferol (VITAMIN D3) 5000 units TABS Take by mouth      fexofenadine (ALLEGRA) 60 MG tablet Take 60 mg by mouth daily      magnesium oxide (MAG-OX) 400 MG tablet Take 400 mg by mouth daily       No current facility-administered medications for this visit. Allergies   Allergen Reactions    Aspirin Anaphylaxis     Only thing she can take is tylenol    Benadryl [Diphenhydramine] Other (See Comments)     Dystonic movement    Ibuprofen Anaphylaxis    Lidocaine Anaphylaxis     CAN NOT TOLERATE IV    Penicillins Anaphylaxis    Hydrocodone-Acetaminophen Other (See Comments)     Unsure of allergy    Tramadol Other (See Comments)     Unsure of allergy       Health Maintenance   Topic Date Due    Hepatitis C screen  Never done    DTaP/Tdap/Td vaccine (1 - Tdap) Never done    DEXA (modify frequency per FRAX score)  Never done    Shingles vaccine (2 of 2) 12/18/2020    Lipids  09/29/2021    Diabetic microalbuminuria test  01/25/2022    Diabetic foot exam  02/25/2022    COVID-19 Vaccine (4 - Booster for Moderna series) 04/28/2022    Pneumococcal 65+ years Vaccine (1 - PCV) 04/17/2023 (Originally 9/14/1956)    Flu vaccine (1) 09/01/2022    Breast cancer screen  10/21/2022    Annual Wellness Visit (AWV)  11/03/2022    Depression Screen  07/06/2023    A1C test (Diabetic or Prediabetic)  07/13/2023    Diabetic retinal exam  07/13/2023    Colorectal Cancer Screen  02/28/2027    Hepatitis A vaccine  Aged Out    Hib vaccine  Aged Out    Meningococcal (ACWY) vaccine  Aged Out       Subjective:      Review of Systems   Constitutional:  Negative for chills and fever.    Respiratory:  Negative for shortness of breath. Musculoskeletal:  Positive for arthralgias and back pain. Objective:     Physical Exam  Constitutional:       Appearance: She is well-developed. HENT:      Head: Normocephalic and atraumatic. Right Ear: External ear normal.      Left Ear: External ear normal.   Eyes:      Conjunctiva/sclera: Conjunctivae normal.      Pupils: Pupils are equal, round, and reactive to light. Cardiovascular:      Rate and Rhythm: Normal rate and regular rhythm. Heart sounds: Normal heart sounds. Pulmonary:      Effort: Pulmonary effort is normal.      Breath sounds: Normal breath sounds. Musculoskeletal:      Cervical back: Neck supple. Comments: Ttp R lower back/paraspinal area   Skin:     General: Skin is warm and dry. Neurological:      Mental Status: She is alert and oriented to person, place, and time. Psychiatric:         Mood and Affect: Mood normal.         Behavior: Behavior normal.         Thought Content: Thought content normal.     /80   Pulse 74   SpO2 97%     Assessment:      1. Right-sided low back pain with right-sided sciatica, unspecified chronicity  - recommend checking xray of hip and lower back due to the sudden onset of severe pain she had and continues to have. Recommend follow up with pain management as well. - XR LUMBAR SPINE (2-3 VIEWS); Future    2. Hip pain  - XR HIP RIGHT (2-3 VIEWS); Future    3. Hypokalemia  - re check labs. Pt is on lasix. - Basic Metabolic Panel; Future         Plan:      Return for follow up. Orders Placed This Encounter   Procedures    XR HIP RIGHT (2-3 VIEWS)     Standing Status:   Future     Standing Expiration Date:   7/25/2023     Order Specific Question:   Reason for exam:     Answer:   low back and hip pain. severe pain.     XR LUMBAR SPINE (2-3 VIEWS)     Standing Status:   Future     Standing Expiration Date:   7/25/2023     Order Specific Question:   Reason for exam:     Answer:   R hip pain and low back pain, severe Basic Metabolic Panel     Standing Status:   Future     Standing Expiration Date:   7/25/2023     No orders of the defined types were placed in this encounter. Patient given educational materials - see patient instructions. Discussed use, benefit, and side effects of prescribed medications. All patient questions answered. Pt voiced understanding. Reviewed healthmaintenance. Instructed to continue current medications, diet and exercise. Patient agreed with treatment plan. Follow up as directed.      Electronically signed by Sarah Hollis DO on 7/25/2022 at 2:56 PM

## 2022-07-27 RX ORDER — POTASSIUM CHLORIDE 20 MEQ/1
20 TABLET, EXTENDED RELEASE ORAL 3 TIMES DAILY
Qty: 90 TABLET | Refills: 5 | Status: SHIPPED | OUTPATIENT
Start: 2022-07-27 | End: 2022-08-12 | Stop reason: SDUPTHER

## 2022-07-27 NOTE — RESULT ENCOUNTER NOTE
Please let pt know her potassium is mildly low so I recommend taking her potassium three times a day instead of twice a day. I have sent a new script to [facility]. Kidney function showed some improvement as well.

## 2022-07-27 NOTE — RESULT ENCOUNTER NOTE
Please let pt know her xrays did not show any new new fractures. She has old chronic compression fractures that are stable and mild at L3 and L4 Level. Hip shows no evidence of fracture or dislocation.

## 2022-07-28 ENCOUNTER — OFFICE VISIT (OUTPATIENT)
Dept: PAIN MANAGEMENT | Age: 72
End: 2022-07-28
Payer: MEDICARE

## 2022-07-28 VITALS — WEIGHT: 213 LBS | BODY MASS INDEX: 36.37 KG/M2 | HEIGHT: 64 IN

## 2022-07-28 DIAGNOSIS — M46.1 SACROILIITIS, NOT ELSEWHERE CLASSIFIED (HCC): ICD-10-CM

## 2022-07-28 DIAGNOSIS — M54.6 ACUTE LEFT-SIDED THORACIC BACK PAIN: ICD-10-CM

## 2022-07-28 DIAGNOSIS — M47.817 LUMBOSACRAL SPONDYLOSIS WITHOUT MYELOPATHY: Primary | Chronic | ICD-10-CM

## 2022-07-28 PROCEDURE — 1123F ACP DISCUSS/DSCN MKR DOCD: CPT | Performed by: NURSE PRACTITIONER

## 2022-07-28 PROCEDURE — 99213 OFFICE O/P EST LOW 20 MIN: CPT | Performed by: NURSE PRACTITIONER

## 2022-07-28 ASSESSMENT — ENCOUNTER SYMPTOMS
COUGH: 0
BOWEL INCONTINENCE: 1
CONSTIPATION: 0
BACK PAIN: 1
SHORTNESS OF BREATH: 0
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
## 2022-07-29 ENCOUNTER — ANESTHESIA EVENT (OUTPATIENT)
Dept: OPERATING ROOM | Age: 72
End: 2022-07-29
Payer: MEDICARE

## 2022-08-01 ENCOUNTER — HOSPITAL ENCOUNTER (OUTPATIENT)
Age: 72
Setting detail: OUTPATIENT SURGERY
Discharge: HOME | End: 2022-08-01
Attending: ORTHOPAEDIC SURGERY | Admitting: ORTHOPAEDIC SURGERY
Payer: MEDICARE

## 2022-08-01 ENCOUNTER — ANESTHESIA (OUTPATIENT)
Dept: OPERATING ROOM | Age: 72
End: 2022-08-01
Payer: MEDICARE

## 2022-08-01 ENCOUNTER — APPOINTMENT (OUTPATIENT)
Dept: GENERAL RADIOLOGY | Age: 72
End: 2022-08-01
Attending: ORTHOPAEDIC SURGERY
Payer: MEDICARE

## 2022-08-01 VITALS
HEART RATE: 74 BPM | BODY MASS INDEX: 36.37 KG/M2 | HEIGHT: 64 IN | WEIGHT: 213 LBS | OXYGEN SATURATION: 96 % | DIASTOLIC BLOOD PRESSURE: 73 MMHG | RESPIRATION RATE: 18 BRPM | SYSTOLIC BLOOD PRESSURE: 123 MMHG | TEMPERATURE: 97 F

## 2022-08-01 DIAGNOSIS — M54.6 ACUTE MIDLINE THORACIC BACK PAIN: Primary | ICD-10-CM

## 2022-08-01 LAB
GLUCOSE BLD-MCNC: 160 MG/DL (ref 65–105)
GLUCOSE BLD-MCNC: 176 MG/DL (ref 65–105)

## 2022-08-01 PROCEDURE — 7100000000 HC PACU RECOVERY - FIRST 15 MIN: Performed by: ORTHOPAEDIC SURGERY

## 2022-08-01 PROCEDURE — 2500000003 HC RX 250 WO HCPCS: Performed by: ANESTHESIOLOGY

## 2022-08-01 PROCEDURE — 2709999900 HC NON-CHARGEABLE SUPPLY: Performed by: ORTHOPAEDIC SURGERY

## 2022-08-01 PROCEDURE — 6360000002 HC RX W HCPCS: Performed by: ORTHOPAEDIC SURGERY

## 2022-08-01 PROCEDURE — 3600000012 HC SURGERY LEVEL 2 ADDTL 15MIN: Performed by: ORTHOPAEDIC SURGERY

## 2022-08-01 PROCEDURE — 7100000001 HC PACU RECOVERY - ADDTL 15 MIN: Performed by: ORTHOPAEDIC SURGERY

## 2022-08-01 PROCEDURE — 7100000030 HC ASPR PHASE II RECOVERY - FIRST 15 MIN: Performed by: ORTHOPAEDIC SURGERY

## 2022-08-01 PROCEDURE — 6360000002 HC RX W HCPCS: Performed by: NURSE ANESTHETIST, CERTIFIED REGISTERED

## 2022-08-01 PROCEDURE — 7100000031 HC ASPR PHASE II RECOVERY - ADDTL 15 MIN: Performed by: ORTHOPAEDIC SURGERY

## 2022-08-01 PROCEDURE — 2580000003 HC RX 258: Performed by: ANESTHESIOLOGY

## 2022-08-01 PROCEDURE — 3700000000 HC ANESTHESIA ATTENDED CARE: Performed by: ORTHOPAEDIC SURGERY

## 2022-08-01 PROCEDURE — 82947 ASSAY GLUCOSE BLOOD QUANT: CPT

## 2022-08-01 PROCEDURE — 7100000011 HC PHASE II RECOVERY - ADDTL 15 MIN: Performed by: ORTHOPAEDIC SURGERY

## 2022-08-01 PROCEDURE — 3600000002 HC SURGERY LEVEL 2 BASE: Performed by: ORTHOPAEDIC SURGERY

## 2022-08-01 PROCEDURE — 3209999900 FLUORO FOR SURGICAL PROCEDURES

## 2022-08-01 PROCEDURE — 2500000003 HC RX 250 WO HCPCS: Performed by: NURSE ANESTHETIST, CERTIFIED REGISTERED

## 2022-08-01 PROCEDURE — 3700000001 HC ADD 15 MINUTES (ANESTHESIA): Performed by: ORTHOPAEDIC SURGERY

## 2022-08-01 PROCEDURE — 7100000010 HC PHASE II RECOVERY - FIRST 15 MIN: Performed by: ORTHOPAEDIC SURGERY

## 2022-08-01 RX ORDER — SODIUM CHLORIDE 0.9 % (FLUSH) 0.9 %
5-40 SYRINGE (ML) INJECTION EVERY 12 HOURS SCHEDULED
Status: DISCONTINUED | OUTPATIENT
Start: 2022-08-01 | End: 2022-08-01 | Stop reason: HOSPADM

## 2022-08-01 RX ORDER — SODIUM CHLORIDE 9 MG/ML
INJECTION, SOLUTION INTRAVENOUS PRN
Status: DISCONTINUED | OUTPATIENT
Start: 2022-08-01 | End: 2022-08-01 | Stop reason: HOSPADM

## 2022-08-01 RX ORDER — FENTANYL CITRATE 50 UG/ML
INJECTION, SOLUTION INTRAMUSCULAR; INTRAVENOUS PRN
Status: DISCONTINUED | OUTPATIENT
Start: 2022-08-01 | End: 2022-08-01 | Stop reason: SDUPTHER

## 2022-08-01 RX ORDER — DEXAMETHASONE SODIUM PHOSPHATE 4 MG/ML
INJECTION, SOLUTION INTRA-ARTICULAR; INTRALESIONAL; INTRAMUSCULAR; INTRAVENOUS; SOFT TISSUE PRN
Status: DISCONTINUED | OUTPATIENT
Start: 2022-08-01 | End: 2022-08-01 | Stop reason: SDUPTHER

## 2022-08-01 RX ORDER — PROPOFOL 10 MG/ML
INJECTION, EMULSION INTRAVENOUS PRN
Status: DISCONTINUED | OUTPATIENT
Start: 2022-08-01 | End: 2022-08-01 | Stop reason: SDUPTHER

## 2022-08-01 RX ORDER — SODIUM CHLORIDE 9 MG/ML
INJECTION, SOLUTION INTRAVENOUS CONTINUOUS
Status: DISCONTINUED | OUTPATIENT
Start: 2022-08-01 | End: 2022-08-01 | Stop reason: HOSPADM

## 2022-08-01 RX ORDER — DIPHENHYDRAMINE HYDROCHLORIDE 50 MG/ML
12.5 INJECTION INTRAMUSCULAR; INTRAVENOUS
Status: DISCONTINUED | OUTPATIENT
Start: 2022-08-01 | End: 2022-08-01 | Stop reason: HOSPADM

## 2022-08-01 RX ORDER — GLYCOPYRROLATE 0.2 MG/ML
INJECTION INTRAMUSCULAR; INTRAVENOUS PRN
Status: DISCONTINUED | OUTPATIENT
Start: 2022-08-01 | End: 2022-08-01 | Stop reason: SDUPTHER

## 2022-08-01 RX ORDER — KETOROLAC TROMETHAMINE 30 MG/ML
INJECTION, SOLUTION INTRAMUSCULAR; INTRAVENOUS PRN
Status: DISCONTINUED | OUTPATIENT
Start: 2022-08-01 | End: 2022-08-01 | Stop reason: SDUPTHER

## 2022-08-01 RX ORDER — MIDAZOLAM HYDROCHLORIDE 1 MG/ML
INJECTION INTRAMUSCULAR; INTRAVENOUS PRN
Status: DISCONTINUED | OUTPATIENT
Start: 2022-08-01 | End: 2022-08-01 | Stop reason: SDUPTHER

## 2022-08-01 RX ORDER — TRANEXAMIC ACID 100 MG/ML
INJECTION, SOLUTION INTRAVENOUS PRN
Status: DISCONTINUED | OUTPATIENT
Start: 2022-08-01 | End: 2022-08-01 | Stop reason: SDUPTHER

## 2022-08-01 RX ORDER — SODIUM CHLORIDE 0.9 % (FLUSH) 0.9 %
5-40 SYRINGE (ML) INJECTION PRN
Status: DISCONTINUED | OUTPATIENT
Start: 2022-08-01 | End: 2022-08-01 | Stop reason: HOSPADM

## 2022-08-01 RX ORDER — ROCURONIUM BROMIDE 10 MG/ML
INJECTION, SOLUTION INTRAVENOUS PRN
Status: DISCONTINUED | OUTPATIENT
Start: 2022-08-01 | End: 2022-08-01 | Stop reason: SDUPTHER

## 2022-08-01 RX ORDER — ONDANSETRON 2 MG/ML
INJECTION INTRAMUSCULAR; INTRAVENOUS PRN
Status: DISCONTINUED | OUTPATIENT
Start: 2022-08-01 | End: 2022-08-01 | Stop reason: SDUPTHER

## 2022-08-01 RX ORDER — ONDANSETRON 2 MG/ML
4 INJECTION INTRAMUSCULAR; INTRAVENOUS
Status: DISCONTINUED | OUTPATIENT
Start: 2022-08-01 | End: 2022-08-01 | Stop reason: HOSPADM

## 2022-08-01 RX ORDER — OXYCODONE HYDROCHLORIDE AND ACETAMINOPHEN 5; 325 MG/1; MG/1
1 TABLET ORAL EVERY 6 HOURS PRN
Qty: 28 TABLET | Refills: 0 | Status: SHIPPED | OUTPATIENT
Start: 2022-08-01 | End: 2022-08-08

## 2022-08-01 RX ADMIN — ROCURONIUM BROMIDE 50 MG: 10 INJECTION, SOLUTION INTRAVENOUS at 11:37

## 2022-08-01 RX ADMIN — PROPOFOL 50 MG: 10 INJECTION, EMULSION INTRAVENOUS at 12:32

## 2022-08-01 RX ADMIN — GLYCOPYRROLATE 0.2 MG: 0.2 INJECTION, SOLUTION INTRAMUSCULAR; INTRAVENOUS at 11:37

## 2022-08-01 RX ADMIN — ONDANSETRON 4 MG: 2 INJECTION INTRAMUSCULAR; INTRAVENOUS at 11:57

## 2022-08-01 RX ADMIN — DEXAMETHASONE SODIUM PHOSPHATE 4 MG: 4 INJECTION, SOLUTION INTRAMUSCULAR; INTRAVENOUS at 11:57

## 2022-08-01 RX ADMIN — KETOROLAC TROMETHAMINE 30 MG: 30 INJECTION, SOLUTION INTRAMUSCULAR at 12:26

## 2022-08-01 RX ADMIN — HYDROMORPHONE HYDROCHLORIDE 0.5 MG: 1 INJECTION, SOLUTION INTRAMUSCULAR; INTRAVENOUS; SUBCUTANEOUS at 13:36

## 2022-08-01 RX ADMIN — CEFAZOLIN 2000 MG: 10 INJECTION, POWDER, FOR SOLUTION INTRAVENOUS at 11:50

## 2022-08-01 RX ADMIN — MIDAZOLAM 2 MG: 1 INJECTION INTRAMUSCULAR; INTRAVENOUS at 11:29

## 2022-08-01 RX ADMIN — SUGAMMADEX 500 MG: 100 INJECTION, SOLUTION INTRAVENOUS at 12:36

## 2022-08-01 RX ADMIN — HYDROMORPHONE HYDROCHLORIDE 0.25 MG: 1 INJECTION, SOLUTION INTRAMUSCULAR; INTRAVENOUS; SUBCUTANEOUS at 13:28

## 2022-08-01 RX ADMIN — SODIUM CHLORIDE: 9 INJECTION, SOLUTION INTRAVENOUS at 11:06

## 2022-08-01 RX ADMIN — PROPOFOL 150 MG: 10 INJECTION, EMULSION INTRAVENOUS at 11:37

## 2022-08-01 RX ADMIN — TRANEXAMIC ACID 1000 MG: 100 INJECTION INTRAVENOUS at 11:55

## 2022-08-01 RX ADMIN — FENTANYL CITRATE 100 MCG: 50 INJECTION, SOLUTION INTRAMUSCULAR; INTRAVENOUS at 11:37

## 2022-08-01 ASSESSMENT — PAIN DESCRIPTION - ORIENTATION
ORIENTATION: MID
ORIENTATION: MID

## 2022-08-01 ASSESSMENT — PAIN DESCRIPTION - LOCATION
LOCATION: BACK
LOCATION: BACK

## 2022-08-01 ASSESSMENT — PAIN DESCRIPTION - PAIN TYPE
TYPE: SURGICAL PAIN
TYPE: SURGICAL PAIN

## 2022-08-01 ASSESSMENT — PAIN SCALES - GENERAL
PAINLEVEL_OUTOF10: 10
PAINLEVEL_OUTOF10: 10

## 2022-08-01 ASSESSMENT — PAIN DESCRIPTION - DESCRIPTORS
DESCRIPTORS: SHARP
DESCRIPTORS: STABBING
DESCRIPTORS: ACHING

## 2022-08-01 ASSESSMENT — PAIN - FUNCTIONAL ASSESSMENT: PAIN_FUNCTIONAL_ASSESSMENT: 0-10
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
## 2022-08-01 NOTE — INTERVAL H&P NOTE
Update History & Physical    The patient's History and Physical of July 20, 2022 was reviewed with the patient and I examined the patient. There was no change. The surgical site was confirmed by the patient and me. Pt under going for  SPINAL CORD STIMULATOR REMOVAL. Pt denies fever/chills, chest pain or SOB  Pt Npo since the past midnight , no am medication today   Pt stopped taking Xarelto since last Tuesday  Patient has hx of prolonged emergence with general anesthesia . Denies hx of MRSA infection   Denies has hx of blood clots in the lungs   Physical exam remains unchanged   See nursing flow sheet for vital signs   Medical  clearance obtain in the pt's chart     Lab Results   Component Value Date    WBC 6.0 07/20/2022    HGB 11.2 (L) 07/20/2022    HCT 34.4 (L) 07/20/2022    MCV 87.5 07/20/2022     07/20/2022     Lab Results   Component Value Date/Time     07/25/2022 03:33 PM    K 3.6 07/25/2022 03:33 PM     07/25/2022 03:33 PM    CO2 33 07/25/2022 03:33 PM    BUN 24 07/25/2022 03:33 PM    CREATININE 0.94 07/25/2022 03:33 PM    GLUCOSE 134 07/25/2022 03:33 PM    GLUCOSE 104 04/06/2019 10:19 AM    CALCIUM 9.1 07/25/2022 03:33 PM        Plan: The risks, benefits, expected outcome, and alternative to the recommended procedure have been discussed with the patient. Patient understands and wants to proceed with the procedure.      Electronically signed by LUPE Haywood CNP on 8/1/2022 at 10:32 AM

## 2022-08-01 NOTE — DISCHARGE INSTRUCTIONS
DISCHARGE INSTRUCTIONS FOR OUTPATIENT SURGERY    In order to continue your care at home, please follow the instructions below. For General Anesthesia  Do not drink any alcoholic beverages or make any legal or important decisions for 24 hours. Diet    Drink plenty of fluids after surgery, unless you are on a fluid restriction. After general anesthesia, start out eating lightly (broth, soup, crackers, toast, etc.) advancing as tolerated to your usual diet. Try to avoid spicy or greasy/fatty foods for 24 hours. Avoid milk/milk product for several hours. Medications  Take medications as instructed by your surgeon. Please do not take prescribed pain medication with alcoholic beverages. When cleared to drive by your surgeon, please do not drive or operate machinery while taking any prescribed pain medication. Activities  As instructed by your surgeon. Limit your activities for 24 hours. Avoid heavy work or sports until surgeon approves. No lifting, pushing, pulling, straining until approved by your surgeon and not more than 10 pounds. No driving or operating machinery until cleared by surgeon. Surgery Area  Always keep dressings/incisions clean and dry  You may remove dressing in 3-5 days  Apply ice pack 20-30 min 4 times a day for 48 hours to help decrease swelling and pain. Make sure to have a piece of cloth between the ice bag and your skin. Call your surgeon for the following: You have pain that does not get better after you take pain medicine. For an oral temperature (by mouth) is 101 degrees or higher, chills, or excessive sweating. You have increasing and progressive bleeding or drainage from surgery site. Signs of an infection:  increased swelling, redness, warmth, or hardness around surgery area or yellow or green drainage. Persistent nausea or vomiting and cant keep fluids down. If you are unable to urinate within 8 hours of surgery. Redness or swelling at IV site.   For any questions or concerns you may have.

## 2022-08-01 NOTE — BRIEF OP NOTE
Brief Postoperative Note      Patient: Tony Castaneda  YOB: 1950  MRN: [id number]    Date of Procedure: 8/1/2022    Pre-Op Diagnosis: Pain due to nervous system prosthetic devices, implants and grafts, subsequent encounter [T85.840D]    Post-Op Diagnosis: Same       Procedure(s):  SPINAL CORD STIMULATOR REMOVAL    Surgeon(s):  Finn Mast MD    Assistant:  * No surgical staff found *    Anesthesia: General    Estimated Blood Loss (mL): Minimal    Complications: None    Specimens:   * No specimens in log *    Implants:  * No implants in log *      Drains: * No LDAs found *    Findings: see dictation    Electronically signed by Finn Mast MD on 8/1/2022 at 12:39 PM
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
## 2022-08-02 NOTE — OP NOTE
which it  resided in. This was then removed with the wires that were still  connected to it. Final lateral and AP fluoroscopic images were obtained  verifying the hardware was removed. Deep fascia at the thoracic site  was reapproximated with #2 Vicryl sutures, subcuticular layer with 2-0  Vicryl, followed by skin staples. At the IPG site, the subcuticular  layer was reapproximated with 2-0 Vicryl suture, followed by skin  staples. Sterile dressings were applied. The patient was awakened from  anesthesia and taken to recovery room in stable condition. COMPLICATIONS ARISING DURING THE OPERATION:  None noted.         ANIKA Doran    D: 08/01/2022 12:52:48       T: 08/01/2022 12:56:23     [email or web address]  Job#: [id number]     Doc#: [id number]    CC:

## 2022-08-11 RX ORDER — TAMSULOSIN HYDROCHLORIDE 0.4 MG/1
CAPSULE ORAL
Qty: 30 CAPSULE | Refills: 10 | Status: SHIPPED | OUTPATIENT
Start: 2022-08-11

## 2022-08-11 RX ORDER — SYRINGE AND NEEDLE,INSULIN,1ML 31 GX5/16"
SYRINGE, EMPTY DISPOSABLE MISCELLANEOUS
Qty: 120 EACH | Refills: 10 | Status: SHIPPED | OUTPATIENT
Start: 2022-08-11

## 2022-08-11 RX ORDER — ALENDRONATE SODIUM 70 MG/1
TABLET ORAL
Qty: 4 TABLET | Refills: 10 | Status: SHIPPED | OUTPATIENT
Start: 2022-08-11

## 2022-08-12 RX ORDER — POTASSIUM CHLORIDE 20 MEQ/1
20 TABLET, EXTENDED RELEASE ORAL 3 TIMES DAILY
Qty: 90 TABLET | Refills: 5 | Status: SHIPPED | OUTPATIENT
Start: 2022-08-12

## 2022-08-18 ENCOUNTER — OFFICE VISIT (OUTPATIENT)
Dept: ORTHOPEDIC SURGERY | Age: 72
End: 2022-08-18

## 2022-08-18 VITALS — BODY MASS INDEX: 36.37 KG/M2 | HEIGHT: 64 IN | WEIGHT: 213 LBS

## 2022-08-18 DIAGNOSIS — T84.84XA PAINFUL ORTHOPAEDIC HARDWARE (HCC): Primary | ICD-10-CM

## 2022-08-18 PROCEDURE — 99024 POSTOP FOLLOW-UP VISIT: CPT | Performed by: ORTHOPAEDIC SURGERY
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
## 2022-08-19 ENCOUNTER — TELEMEDICINE (OUTPATIENT)
Dept: PAIN MANAGEMENT | Age: 72
End: 2022-08-19
Payer: MEDICARE

## 2022-08-19 DIAGNOSIS — G89.29 CHRONIC BILATERAL THORACIC BACK PAIN: Primary | Chronic | ICD-10-CM

## 2022-08-19 DIAGNOSIS — M46.1 SACROILIITIS, NOT ELSEWHERE CLASSIFIED (HCC): ICD-10-CM

## 2022-08-19 DIAGNOSIS — M47.817 LUMBOSACRAL SPONDYLOSIS WITHOUT MYELOPATHY: Chronic | ICD-10-CM

## 2022-08-19 DIAGNOSIS — M54.6 CHRONIC BILATERAL THORACIC BACK PAIN: Primary | Chronic | ICD-10-CM

## 2022-08-19 PROCEDURE — 99421 OL DIG E/M SVC 5-10 MIN: CPT | Performed by: NURSE PRACTITIONER

## 2022-08-19 ASSESSMENT — ENCOUNTER SYMPTOMS
COUGH: 0
BACK PAIN: 1
BOWEL INCONTINENCE: 0
CONSTIPATION: 0
SHORTNESS OF BREATH: 0
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
## 2022-08-25 ENCOUNTER — TELEPHONE (OUTPATIENT)
Dept: PRIMARY CARE CLINIC | Age: 72
End: 2022-08-25

## 2022-08-26 DIAGNOSIS — U07.1 COVID-19: ICD-10-CM

## 2022-08-26 RX ORDER — EPINEPHRINE 1 MG/ML
0.3 INJECTION, SOLUTION, CONCENTRATE INTRAVENOUS PRN
OUTPATIENT
Start: 2022-08-26

## 2022-08-26 RX ORDER — BENZONATATE 100 MG/1
100 CAPSULE ORAL 3 TIMES DAILY PRN
Qty: 30 CAPSULE | Refills: 1 | Status: SHIPPED | OUTPATIENT
Start: 2022-08-26 | End: 2022-10-03

## 2022-08-26 RX ORDER — SODIUM CHLORIDE 0.9 % (FLUSH) 0.9 %
5-40 SYRINGE (ML) INJECTION PRN
OUTPATIENT
Start: 2022-08-26

## 2022-08-26 RX ORDER — SODIUM CHLORIDE 9 MG/ML
INJECTION, SOLUTION INTRAVENOUS CONTINUOUS
OUTPATIENT
Start: 2022-08-26

## 2022-08-26 RX ORDER — ONDANSETRON 2 MG/ML
8 INJECTION INTRAMUSCULAR; INTRAVENOUS
OUTPATIENT
Start: 2022-08-26

## 2022-08-26 RX ORDER — ACETAMINOPHEN 325 MG/1
650 TABLET ORAL
OUTPATIENT
Start: 2022-08-26

## 2022-08-26 RX ORDER — SODIUM CHLORIDE 9 MG/ML
5-250 INJECTION, SOLUTION INTRAVENOUS PRN
OUTPATIENT
Start: 2022-08-26

## 2022-08-26 RX ORDER — BEBTELOVIMAB 87.5 MG/ML
175 INJECTION, SOLUTION INTRAVENOUS ONCE
OUTPATIENT
Start: 2022-08-26 | End: 2022-08-26

## 2022-08-26 RX ORDER — HEPARIN SODIUM (PORCINE) LOCK FLUSH IV SOLN 100 UNIT/ML 100 UNIT/ML
500 SOLUTION INTRAVENOUS PRN
OUTPATIENT
Start: 2022-08-26

## 2022-08-26 RX ORDER — ALBUTEROL SULFATE 90 UG/1
4 AEROSOL, METERED RESPIRATORY (INHALATION) PRN
OUTPATIENT
Start: 2022-08-26

## 2022-08-26 NOTE — TELEPHONE ENCOUNTER
I spoke to pharmacy at [facility] regarding monoclonal antibody infusion since pt cannot be on paxlovid. The infusion center will call and get her scheduled. I tried to call pt to see if interested in getting the infusion and left voicemail that she is not able to gt paxlovid due to her medications (xarelto specifically) so if she would like infusion to be aware that infusion center will be calling her to get this scheduled. I will also send cough medicine for her. Please also if she calls let her know I would like for her to touch base with endocrinology to see if they want her to adjust her insulin.

## 2022-08-30 RX ORDER — FUROSEMIDE 20 MG/1
20 TABLET ORAL DAILY
Qty: 30 TABLET | Refills: 10 | Status: SHIPPED | OUTPATIENT
Start: 2022-08-30

## 2022-09-09 ENCOUNTER — HOSPITAL ENCOUNTER (OUTPATIENT)
Age: 72
Discharge: HOME | End: 2022-09-09
Payer: MEDICARE

## 2022-09-09 DIAGNOSIS — C56.9 MALIGNANT NEOPLASM OF OVARY, UNSPECIFIED LATERALITY (HCC): ICD-10-CM

## 2022-09-09 DIAGNOSIS — D50.8 IRON DEFICIENCY ANEMIA SECONDARY TO INADEQUATE DIETARY IRON INTAKE: ICD-10-CM

## 2022-09-09 DIAGNOSIS — K90.9 IRON MALABSORPTION: ICD-10-CM

## 2022-09-09 DIAGNOSIS — D64.9 LOW HEMOGLOBIN: ICD-10-CM

## 2022-09-09 DIAGNOSIS — D50.9 MICROCYTIC ANEMIA: ICD-10-CM

## 2022-09-09 LAB
ABSOLUTE EOS #: 0.2 K/UL (ref 0–0.44)
ABSOLUTE IMMATURE GRANULOCYTE: 0.03 K/UL (ref 0–0.3)
ABSOLUTE LYMPH #: 2.77 K/UL (ref 1.1–3.7)
ABSOLUTE MONO #: 0.47 K/UL (ref 0.1–1.2)
BASOPHILS # BLD: 1 % (ref 0–2)
BASOPHILS ABSOLUTE: 0.06 K/UL (ref 0–0.2)
EOSINOPHILS RELATIVE PERCENT: 2 % (ref 1–4)
HCT VFR BLD CALC: 33.8 % (ref 36.3–47.1)
HEMOGLOBIN: 11 G/DL (ref 11.9–15.1)
IMMATURE GRANULOCYTES: 0 %
IRON SATURATION: 14 % (ref 20–55)
IRON: 41 UG/DL (ref 37–145)
LYMPHOCYTES # BLD: 31 % (ref 24–43)
MCH RBC QN AUTO: 29.1 PG (ref 25.2–33.5)
MCHC RBC AUTO-ENTMCNC: 32.5 G/DL (ref 28.4–34.8)
MCV RBC AUTO: 89.4 FL (ref 82.6–102.9)
MONOCYTES # BLD: 5 % (ref 3–12)
NRBC AUTOMATED: 0 PER 100 WBC
PDW BLD-RTO: 13.3 % (ref 11.8–14.4)
PLATELET # BLD: 244 K/UL (ref 138–453)
PMV BLD AUTO: 12.3 FL (ref 8.1–13.5)
RBC # BLD: 3.78 M/UL (ref 3.95–5.11)
SEG NEUTROPHILS: 61 % (ref 36–65)
SEGMENTED NEUTROPHILS ABSOLUTE COUNT: 5.28 K/UL (ref 1.5–8.1)
TOTAL IRON BINDING CAPACITY: 296 UG/DL (ref 250–450)
UNSATURATED IRON BINDING CAPACITY: 255 UG/DL (ref 112–347)
WBC # BLD: 8.8 K/UL (ref 3.5–11.3)

## 2022-09-09 PROCEDURE — 85025 COMPLETE CBC W/AUTO DIFF WBC: CPT

## 2022-09-09 PROCEDURE — 83540 ASSAY OF IRON: CPT

## 2022-09-09 PROCEDURE — 83550 IRON BINDING TEST: CPT

## 2022-09-09 PROCEDURE — 86304 IMMUNOASSAY TUMOR CA 125: CPT

## 2022-09-09 PROCEDURE — 36415 COLL VENOUS BLD VENIPUNCTURE: CPT

## 2022-09-09 PROCEDURE — 82728 ASSAY OF FERRITIN: CPT

## 2022-09-10 LAB
CA 125: 6 U/ML
FERRITIN: 51 NG/ML (ref 13–150)

## 2022-09-12 ENCOUNTER — OFFICE VISIT (OUTPATIENT)
Dept: ONCOLOGY | Age: 72
End: 2022-09-12
Payer: MEDICARE

## 2022-09-12 ENCOUNTER — TELEPHONE (OUTPATIENT)
Dept: ONCOLOGY | Age: 72
End: 2022-09-12

## 2022-09-12 VITALS
BODY MASS INDEX: 38.19 KG/M2 | WEIGHT: 222.5 LBS | HEART RATE: 79 BPM | SYSTOLIC BLOOD PRESSURE: 106 MMHG | DIASTOLIC BLOOD PRESSURE: 64 MMHG | TEMPERATURE: 96.9 F

## 2022-09-12 DIAGNOSIS — C56.9 MALIGNANT NEOPLASM OF OVARY, UNSPECIFIED LATERALITY (HCC): ICD-10-CM

## 2022-09-12 DIAGNOSIS — D50.9 MICROCYTIC ANEMIA: Primary | ICD-10-CM

## 2022-09-12 PROBLEM — N18.30 CHRONIC RENAL DISEASE, STAGE III (HCC): Status: ACTIVE | Noted: 2022-09-12

## 2022-09-12 PROCEDURE — G8427 DOCREV CUR MEDS BY ELIG CLIN: HCPCS | Performed by: INTERNAL MEDICINE

## 2022-09-12 PROCEDURE — 99214 OFFICE O/P EST MOD 30 MIN: CPT | Performed by: INTERNAL MEDICINE

## 2022-09-12 PROCEDURE — 99211 OFF/OP EST MAY X REQ PHY/QHP: CPT | Performed by: INTERNAL MEDICINE

## 2022-09-12 PROCEDURE — 1123F ACP DISCUSS/DSCN MKR DOCD: CPT | Performed by: INTERNAL MEDICINE

## 2022-09-12 PROCEDURE — G8417 CALC BMI ABV UP PARAM F/U: HCPCS | Performed by: INTERNAL MEDICINE

## 2022-09-12 PROCEDURE — 1090F PRES/ABSN URINE INCON ASSESS: CPT | Performed by: INTERNAL MEDICINE

## 2022-09-12 PROCEDURE — G8400 PT W/DXA NO RESULTS DOC: HCPCS | Performed by: INTERNAL MEDICINE

## 2022-09-12 PROCEDURE — 3017F COLORECTAL CA SCREEN DOC REV: CPT | Performed by: INTERNAL MEDICINE

## 2022-09-12 PROCEDURE — 1036F TOBACCO NON-USER: CPT | Performed by: INTERNAL MEDICINE

## 2022-09-12 NOTE — TELEPHONE ENCOUNTER
AVS from 9/12/22    RV 4-6 months with CBC, iron studies before RV      Rv scheduled for 1/9 @ 1:30 pm    Pt will have labs drawn one week prior to RV    Pt was given AVS and appointment schedule    Electronically signed by Otilio Severs on 9/12/2022 at 3:55 PM
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
## 2022-09-19 ENCOUNTER — OFFICE VISIT (OUTPATIENT)
Dept: PRIMARY CARE CLINIC | Age: 72
End: 2022-09-19
Payer: MEDICARE

## 2022-09-19 VITALS
SYSTOLIC BLOOD PRESSURE: 118 MMHG | BODY MASS INDEX: 38.28 KG/M2 | OXYGEN SATURATION: 98 % | WEIGHT: 223 LBS | DIASTOLIC BLOOD PRESSURE: 76 MMHG | HEART RATE: 78 BPM

## 2022-09-19 DIAGNOSIS — B02.29 HZV (HERPES ZOSTER VIRUS) POST HERPETIC NEURALGIA: ICD-10-CM

## 2022-09-19 DIAGNOSIS — D50.9 IRON DEFICIENCY ANEMIA, UNSPECIFIED IRON DEFICIENCY ANEMIA TYPE: ICD-10-CM

## 2022-09-19 DIAGNOSIS — E11.40 TYPE 2 DIABETES MELLITUS WITH DIABETIC NEUROPATHY, WITH LONG-TERM CURRENT USE OF INSULIN (HCC): Primary | ICD-10-CM

## 2022-09-19 DIAGNOSIS — Z12.31 ENCOUNTER FOR SCREENING MAMMOGRAM FOR MALIGNANT NEOPLASM OF BREAST: ICD-10-CM

## 2022-09-19 DIAGNOSIS — Z79.4 TYPE 2 DIABETES MELLITUS WITH DIABETIC NEUROPATHY, WITH LONG-TERM CURRENT USE OF INSULIN (HCC): Primary | ICD-10-CM

## 2022-09-19 DIAGNOSIS — Z78.0 POST-MENOPAUSAL: ICD-10-CM

## 2022-09-19 DIAGNOSIS — I10 ESSENTIAL HYPERTENSION: ICD-10-CM

## 2022-09-19 PROCEDURE — 3017F COLORECTAL CA SCREEN DOC REV: CPT | Performed by: FAMILY MEDICINE

## 2022-09-19 PROCEDURE — 1090F PRES/ABSN URINE INCON ASSESS: CPT | Performed by: FAMILY MEDICINE

## 2022-09-19 PROCEDURE — 2022F DILAT RTA XM EVC RTNOPTHY: CPT | Performed by: FAMILY MEDICINE

## 2022-09-19 PROCEDURE — 1123F ACP DISCUSS/DSCN MKR DOCD: CPT | Performed by: FAMILY MEDICINE

## 2022-09-19 PROCEDURE — G8427 DOCREV CUR MEDS BY ELIG CLIN: HCPCS | Performed by: FAMILY MEDICINE

## 2022-09-19 PROCEDURE — 1036F TOBACCO NON-USER: CPT | Performed by: FAMILY MEDICINE

## 2022-09-19 PROCEDURE — G8400 PT W/DXA NO RESULTS DOC: HCPCS | Performed by: FAMILY MEDICINE

## 2022-09-19 PROCEDURE — G8417 CALC BMI ABV UP PARAM F/U: HCPCS | Performed by: FAMILY MEDICINE

## 2022-09-19 PROCEDURE — 99214 OFFICE O/P EST MOD 30 MIN: CPT | Performed by: FAMILY MEDICINE

## 2022-09-19 PROCEDURE — 3051F HG A1C>EQUAL 7.0%<8.0%: CPT | Performed by: FAMILY MEDICINE

## 2022-09-19 SDOH — ECONOMIC STABILITY: FOOD INSECURITY: WITHIN THE PAST 12 MONTHS, YOU WORRIED THAT YOUR FOOD WOULD RUN OUT BEFORE YOU GOT MONEY TO BUY MORE.: NEVER TRUE

## 2022-09-19 SDOH — ECONOMIC STABILITY: FOOD INSECURITY: WITHIN THE PAST 12 MONTHS, THE FOOD YOU BOUGHT JUST DIDN'T LAST AND YOU DIDN'T HAVE MONEY TO GET MORE.: NEVER TRUE

## 2022-09-19 ASSESSMENT — PATIENT HEALTH QUESTIONNAIRE - PHQ9
SUM OF ALL RESPONSES TO PHQ QUESTIONS 1-9: 0
SUM OF ALL RESPONSES TO PHQ9 QUESTIONS 1 & 2: 0
SUM OF ALL RESPONSES TO PHQ QUESTIONS 1-9: 0
SUM OF ALL RESPONSES TO PHQ QUESTIONS 1-9: 0
1. LITTLE INTEREST OR PLEASURE IN DOING THINGS: 0
SUM OF ALL RESPONSES TO PHQ QUESTIONS 1-9: 0
2. FEELING DOWN, DEPRESSED OR HOPELESS: 0

## 2022-09-19 ASSESSMENT — SOCIAL DETERMINANTS OF HEALTH (SDOH): HOW HARD IS IT FOR YOU TO PAY FOR THE VERY BASICS LIKE FOOD, HOUSING, MEDICAL CARE, AND HEATING?: NOT HARD AT ALL
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
## 2022-09-20 ASSESSMENT — ENCOUNTER SYMPTOMS
SHORTNESS OF BREATH: 0
VOMITING: 0

## 2022-09-26 ENCOUNTER — HOSPITAL ENCOUNTER (OUTPATIENT)
Dept: PHYSICAL THERAPY | Facility: CLINIC | Age: 72
Setting detail: THERAPIES SERIES
Discharge: HOME | End: 2022-09-26
Payer: MEDICARE

## 2022-09-26 PROCEDURE — 97113 AQUATIC THERAPY/EXERCISES: CPT

## 2022-09-26 PROCEDURE — 97162 PT EVAL MOD COMPLEX 30 MIN: CPT

## 2022-09-26 NOTE — FLOWSHEET NOTE
[x] [facility]  [address] [facility]  P: [phone]  F: [phone]     Physical Therapy Daily  Aquatic Treatment Note    Date:  2022  Patient Name:  Graeme Gordon    :    MRN: [id number]  Physician: Raghav Bah 177: [facility] MEDICARE              Eligibility Status:  Eligible     Secondary Insurance (if applicable) [place]              Eligibility Status: Eligible     DOS:   # of visits allowed/remaining: Based on med nec;  / Based on med [place];   Source: Website  Spoke To:  [place]  Reference: [id number] / [id number]  Auth: [address] yr; visit limit based on med nec; No Copay; Ded$203-met  OPP$7550-$5930.13 remain ; Coinsurance 20%  Medicaid - [place] yr; visit limit based on med nec; No patient liability   Medical Diagnosis: L Lumbar pain                 Rehab Codes: M54.2  Onset Date: 22               Visit# / total visits:   Cancels/No Shows:     Subjective:    Pain:  [x] Yes  [] No Location: L hip/buttock Pain Rating: (0-10 scale) 8/10  Pain altered Tx:  [] No  [x] Yes  Action:aquatics  Comments:Pt with L hip pain after eval.  To start aquatics.     Objective:     KEY  B = Belt G = Gloves N = Noodle   C = Cuffs K = Kickboard P = Paddles   CC = Cervical Collar L = Laps T = Theratube   DB = Dumbells M = Minutes W = Weights     Exercises/Activities  Warm-up/Amb  Dynamic Exercises    Forward 3L March    Sideways 3L Squat    Backwards  Retro HS curls      Retro SLR    Stretches  Braiding    Gastroc/Soleus  Heel to Toe amb    Hamstring  Toe amb    Hip flexor  Heel amb    Piriformis      SKTC      Pec Stretch      Post Deltoid  Static Exercises UE      Shoulder flex/ext    Static Exercises LE  Shoulder abd/add    Heel/toe raises 10 Shoulder H.  abd/add    Marches 10 Shoulder IR/ER    Mini-squats  Rowing    4-way hip  10 abd/flex Arm Circles    Hamstring curls 10 UT
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
## 2022-10-03 ENCOUNTER — TELEPHONE (OUTPATIENT)
Dept: PRIMARY CARE CLINIC | Age: 72
End: 2022-10-03

## 2022-10-03 ENCOUNTER — HOSPITAL ENCOUNTER (OUTPATIENT)
Age: 72
Discharge: HOME | End: 2022-10-03
Payer: MEDICARE

## 2022-10-03 ENCOUNTER — HOSPITAL ENCOUNTER (OUTPATIENT)
Age: 72
Discharge: HOME | End: 2022-10-05
Payer: MEDICARE

## 2022-10-03 ENCOUNTER — HOSPITAL ENCOUNTER (OUTPATIENT)
Dept: PHYSICAL THERAPY | Facility: CLINIC | Age: 72
Setting detail: THERAPIES SERIES
Discharge: HOME | End: 2022-10-03
Payer: MEDICARE

## 2022-10-03 ENCOUNTER — OFFICE VISIT (OUTPATIENT)
Dept: FAMILY MEDICINE CLINIC | Age: 72
End: 2022-10-03
Payer: MEDICARE

## 2022-10-03 ENCOUNTER — HOSPITAL ENCOUNTER (OUTPATIENT)
Dept: GENERAL RADIOLOGY | Age: 72
Discharge: HOME | End: 2022-10-05
Payer: MEDICARE

## 2022-10-03 VITALS
BODY MASS INDEX: 37.39 KG/M2 | WEIGHT: 219 LBS | HEART RATE: 65 BPM | HEIGHT: 64 IN | TEMPERATURE: 97.4 F | DIASTOLIC BLOOD PRESSURE: 59 MMHG | SYSTOLIC BLOOD PRESSURE: 119 MMHG

## 2022-10-03 DIAGNOSIS — R10.84 GENERALIZED ABDOMINAL PAIN: ICD-10-CM

## 2022-10-03 DIAGNOSIS — R14.0 ABDOMINAL BLOATING: ICD-10-CM

## 2022-10-03 DIAGNOSIS — R10.84 GENERALIZED ABDOMINAL PAIN: Primary | ICD-10-CM

## 2022-10-03 PROBLEM — M80.88XD: Status: ACTIVE | Noted: 2022-09-21

## 2022-10-03 PROBLEM — Z97.8 PRESENCE OF OTHER SPECIFIED DEVICES: Status: ACTIVE | Noted: 2022-09-21

## 2022-10-03 LAB
ALBUMIN SERPL-MCNC: 4 G/DL (ref 3.5–5.2)
ALBUMIN/GLOBULIN RATIO: 1.4 (ref 1–2.5)
ALP BLD-CCNC: 62 U/L (ref 35–104)
ALT SERPL-CCNC: 22 U/L (ref 5–33)
ANION GAP SERPL CALCULATED.3IONS-SCNC: 14 MMOL/L (ref 9–17)
AST SERPL-CCNC: 23 U/L
BILIRUB SERPL-MCNC: 0.3 MG/DL (ref 0.3–1.2)
BUN BLDV-MCNC: 17 MG/DL (ref 8–23)
CALCIUM SERPL-MCNC: 8.9 MG/DL (ref 8.6–10.4)
CHLORIDE BLD-SCNC: 107 MMOL/L (ref 98–107)
CO2: 24 MMOL/L (ref 20–31)
CREAT SERPL-MCNC: 0.97 MG/DL (ref 0.5–0.9)
GFR SERPL CREATININE-BSD FRML MDRD: >60 ML/MIN/1.73M2
GLUCOSE BLD-MCNC: 92 MG/DL (ref 70–99)
HCT VFR BLD CALC: 33.6 % (ref 36.3–47.1)
HEMOGLOBIN: 10.5 G/DL (ref 11.9–15.1)
LIPASE: 16 U/L (ref 13–60)
MCH RBC QN AUTO: 28.2 PG (ref 25.2–33.5)
MCHC RBC AUTO-ENTMCNC: 31.3 G/DL (ref 28.4–34.8)
MCV RBC AUTO: 90.1 FL (ref 82.6–102.9)
NRBC AUTOMATED: 0 PER 100 WBC
PDW BLD-RTO: 14.3 % (ref 11.8–14.4)
PLATELET # BLD: 200 K/UL (ref 138–453)
PMV BLD AUTO: 12.5 FL (ref 8.1–13.5)
POTASSIUM SERPL-SCNC: 3.9 MMOL/L (ref 3.7–5.3)
RBC # BLD: 3.73 M/UL (ref 3.95–5.11)
SODIUM BLD-SCNC: 145 MMOL/L (ref 135–144)
TOTAL PROTEIN: 6.8 G/DL (ref 6.4–8.3)
WBC # BLD: 7.5 K/UL (ref 3.5–11.3)

## 2022-10-03 PROCEDURE — G8427 DOCREV CUR MEDS BY ELIG CLIN: HCPCS | Performed by: NURSE PRACTITIONER

## 2022-10-03 PROCEDURE — 83690 ASSAY OF LIPASE: CPT

## 2022-10-03 PROCEDURE — 85027 COMPLETE CBC AUTOMATED: CPT

## 2022-10-03 PROCEDURE — 74019 RADEX ABDOMEN 2 VIEWS: CPT

## 2022-10-03 PROCEDURE — 1036F TOBACCO NON-USER: CPT | Performed by: NURSE PRACTITIONER

## 2022-10-03 PROCEDURE — 1123F ACP DISCUSS/DSCN MKR DOCD: CPT | Performed by: NURSE PRACTITIONER

## 2022-10-03 PROCEDURE — 99214 OFFICE O/P EST MOD 30 MIN: CPT | Performed by: NURSE PRACTITIONER

## 2022-10-03 PROCEDURE — 1090F PRES/ABSN URINE INCON ASSESS: CPT | Performed by: NURSE PRACTITIONER

## 2022-10-03 PROCEDURE — G8400 PT W/DXA NO RESULTS DOC: HCPCS | Performed by: NURSE PRACTITIONER

## 2022-10-03 PROCEDURE — 80053 COMPREHEN METABOLIC PANEL: CPT

## 2022-10-03 PROCEDURE — 3017F COLORECTAL CA SCREEN DOC REV: CPT | Performed by: NURSE PRACTITIONER

## 2022-10-03 PROCEDURE — G8417 CALC BMI ABV UP PARAM F/U: HCPCS | Performed by: NURSE PRACTITIONER

## 2022-10-03 PROCEDURE — 36415 COLL VENOUS BLD VENIPUNCTURE: CPT

## 2022-10-03 PROCEDURE — G8484 FLU IMMUNIZE NO ADMIN: HCPCS | Performed by: NURSE PRACTITIONER

## 2022-10-03 ASSESSMENT — ENCOUNTER SYMPTOMS
DIARRHEA: 1
SHORTNESS OF BREATH: 0
ABDOMINAL DISTENTION: 1
RHINORRHEA: 0
VOMITING: 0
ABDOMINAL PAIN: 1
EYE PAIN: 0
COUGH: 0
NAUSEA: 1

## 2022-10-03 NOTE — FLOWSHEET NOTE
[] [address] [facility] &  Therapy  [address]    [phone]  F: [phone]   [] [address]  [facility] [address]   [address]  P: [phone]  F: [phone]  [] [address] &  Therapy  [address]  P: [phone]  F: [phone] [] [address]  P: [phone]  F: [phone]  [] [address]  [address] [facility] [address]   [address]   [place]: [phone]  F: [phone]   [] [facility]  [address] [facility] [address]  [place]: [phone]   F: [phone]     Physical Therapy Cancel/No Show note    Date: 10/3/2022  Patient: Donna Blackwood  : 1950  MRN: [id number]    Cancels/No Shows to date:     For today's appointment patient:    [x]  Cancelled    [] Rescheduled appointment    [] No-show     Reason given by patient:    [x]  Patient ill    []  Conflicting appointment    [] No transportation      [] Conflict with work    [] No reason given    [] Weather related    [] [id number]    [] Other:      Comments:        [] Next appointment was confirmed    Electronically signed by: Dominic Whitney
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
## 2022-10-03 NOTE — TELEPHONE ENCOUNTER
Pt called in stated that she has been having abdominal pain x 7 days not getting any better . Pt stated it can get so severe that it's hard for her to breathe . PCP has no available appointments pt stated she will come to walk in .       BERNICE

## 2022-10-05 ENCOUNTER — HOSPITAL ENCOUNTER (OUTPATIENT)
Dept: PHYSICAL THERAPY | Facility: CLINIC | Age: 72
Setting detail: THERAPIES SERIES
Discharge: HOME | End: 2022-10-05
Payer: MEDICARE

## 2022-10-05 PROCEDURE — 97113 AQUATIC THERAPY/EXERCISES: CPT

## 2022-10-05 NOTE — FLOWSHEET NOTE
[x] [facility]  [address] [facility]  P: [phone]  F: [phone]     Physical Therapy Daily  Aquatic Treatment Note    Date:  10/5/2022  Patient Name:  Luz Cadena    :  1897  MRN: [id number]  Physician: Raghav Bah 177: [facility] MEDICARE              Eligibility Status:  Eligible     Secondary Insurance (if applicable) [place]              Eligibility Status: Eligible     DOS:   # of visits allowed/remaining: Based on med nec;  / Based on med [place];   Source: Website  Spoke To:  [place]  Reference: [id number] / [id number]  Auth: [address] yr; visit limit based on med nec; No Copay; Ded$203-met  OPP$7550-$5930.13 remain ; Coinsurance 20%  Medicaid - [place] yr; visit limit based on med nec; No patient liability   Medical Diagnosis: L Lumbar pain                 Rehab Codes: M54.2  Onset Date: 22               Visit# / total visits: 2/12  Cancels/No Shows: 2 CX    Subjective:    Pain:  [x] Yes  [] No Location: L hip/buttock Pain Rating: (0-10 scale) 8/10  Pain altered Tx:  [] No  [x] Yes  Action:aquatics  Comments:Pt with cont LB pain and has had stomach problems the last week. To the dr and having testing and CT next week.     Objective:     KEY  B = Belt G = Gloves N = Noodle   C = Cuffs K = Kickboard P = Paddles   CC = Cervical Collar L = Laps T = Theratube   DB = Dumbells M = Minutes W = Weights     Exercises/Activities  Warm-up/Amb 10/5 Dynamic Exercises 10/5   Forward 3L March    Sideways 3L Squat    Backwards 3L Retro HS curls      Retro SLR    Stretches  Braiding    Gastroc/Soleus  Heel to Toe amb    Hamstring  Toe amb    Hip flexor  Heel amb    Piriformis      SKTC      Pec Stretch      Post Deltoid  Static Exercises UE      Shoulder flex/ext 10   Static Exercises LE  Shoulder abd/add 10   Heel/toe raises 10 Shoulder H.  abd/add 10   Marches 10 Shoulder IR/ER    Mini-squats 10 Rowing 10   4-way hip  10 ea Arm Circles    Hamstring curls 10 UT shrugs/rolls    Hip Circles/Fig 8  Scap squeezes    Ankle ROM  Diagonals 1/2    Lunges   Elbow flex/ext      Pron/Sup    Functional Exercise  Wrist AROM    Step      Wall Push-ups  Deep H20/    SLS  Bike 3m   Breast Stroke on Noodle  Hip abd/add    Noodle Twist  Hip flex/ext    Noodle Push down  Hip IR/ER    Kickboard push/pull  Knee flex/ext      Push/pull on [employer] 5m   Other:    Specific Instructions for next treatment:    Treatment Charges: Mins Units   []  Modalities     []  Ther Exercise     []  Manual Therapy     []  Ther Activities     [x]  Aquatics 30 2   []  Other       Assessment: [x] Progressing toward goals. Cont light aquatic ex per flow sheet with fair mahsa. VC for upright posture, proper tech and to keep all ex in comfortable range. Progressed static LE ex with hip ext and squats and added UE ex for cont core and postural strengthening. To deep water for lumbar decompression. Pt feels less pain after tx. Will cont to monitor sx and progress as pt mahsa. [] No change. [] Other:  SHORT TERM GOALS ( 8 visits)  Lumbar pain = 0  Lumbar ROM = WNL  Lumbar, hip strength = 4+/5  Lumbar function: walk, sit, bend, lift w/o pain     LONG TERM GOALS ( 12 visits)  Independent Home Exercise program  Return to normal activity     PATIENT GOAL  Get rid of pain    Pt. Education:  [x] Yes  [] No  [] Reviewed Prior HEP/Ed  Method of Education: [x] Verbal  [] Demo  [] Written  Comprehension of Education:  [x] Verbalizes understanding. [] Demonstrates understanding. [] Needs review. [] Demonstrates/verbalizes HEP/Ed previously given. Plan: [x] Continue per plan of care.    [] Other:      Time In:2:05pm            Time Out: 2: 40  pm    Electronically signed by:  Keith Brownlee PTA

## 2022-10-07 ENCOUNTER — HOSPITAL ENCOUNTER (OUTPATIENT)
Dept: PHYSICAL THERAPY | Facility: CLINIC | Age: 72
Setting detail: THERAPIES SERIES
Discharge: HOME | End: 2022-10-07
Payer: MEDICARE

## 2022-10-07 PROCEDURE — 97113 AQUATIC THERAPY/EXERCISES: CPT

## 2022-10-07 NOTE — FLOWSHEET NOTE
[x] [address] [facility]  [address] [place] [address]  P: [phone]  F: [phone]     Physical Therapy Daily  Aquatic Treatment Note    Date:  10/7/2022  Patient Name:  Omaira Montelongo    :    MRN: [id number]  Physician: Raghav Bah 177: [facility] MEDICARE              Eligibility Status:  Eligible     Secondary Insurance (if applicable) [place]              Eligibility Status: Eligible     DOS:   # of visits allowed/remaining: Based on med nec;  / Based on med [place];   Source: Website  Spoke To:  [place]  Reference: [id number] / [id number]  Auth: [address] yr; visit limit based on med nec; No Copay; Ded$203-met  OPP$7550-$5930.13 remain ; Coinsurance 20%  Medicaid - [place] yr; visit limit based on med nec; No patient liability   Medical Diagnosis: L Lumbar pain                 Rehab Codes: M54.2  Onset Date: 22               Visit# / total visits: 3/12  Cancels/No Shows: 2 CX    Subjective:    Pain:  [x] Yes  [] No Location: L hip/buttock Pain Rating: (0-10 scale) 7/10  Pain altered Tx:  [] No  [x] Yes  Action:aquatics  Comments:Pt with cont LB pain but less than last visit. Pt with cont abdominal pain and sched for CT Monday 10/10/22.     Objective:     KEY  B = Belt G = Gloves N = Noodle   C = Cuffs K = Kickboard P = Paddles   CC = Cervical Collar L = Laps T = Theratube   DB = Dumbells M = Minutes W = Weights     Exercises/Activities  Warm-up/Amb 10/7 Dynamic Exercises 10/7   Forward 3L March    Sideways 3L Squat    Backwards 3L Retro HS curls      Retro SLR    Stretches  Braiding    Gastroc/Soleus  Heel to Toe amb    Hamstring  Toe amb    Hip flexor  Heel amb    Piriformis      SKTC      Pec Stretch      Post Deltoid  Static Exercises UE      Shoulder flex/ext 15   Static Exercises LE  Shoulder abd/add 15   Heel/toe raises 15 Shoulder H.  abd/add 15   Marches 15 Shoulder IR/ER    Mini-squats 15 Rowing 15   4-way hip  15 Arm Circles    Hamstring curls 15 UT shrugs/rolls    Hip Circles/Fig 8  Scap squeezes    Ankle ROM  Diagonals 1/2    Lunges   Elbow flex/ext      Pron/Sup    Functional Exercise  Wrist AROM    Step      Wall Push-ups  Deep H20/    SLS  Bike 3m   Breast Stroke on Noodle  Hip abd/add    Noodle Twist  Hip flex/ext    Noodle Push down  Hip IR/ER    Kickboard push/pull  Knee flex/ext      Push/pull on [employer] 5m   Other:    Specific Instructions for next treatment:    Treatment Charges: Mins Units   []  Modalities     []  Ther Exercise     []  Manual Therapy     []  Ther Activities     [x]  Aquatics 30 2   []  Other       Assessment: [x] Progressing toward goals. Cont light aquatic ex per flow sheet with fair mahsa. VC for upright posture, proper tech and to keep all ex in comfortable range. Progressed static LE ex with hip ext and squats and added UE ex for cont core and postural strengthening. To deep water for lumbar decompression. Pt feels less pain after tx. Will cont to monitor sx and progress as pt mahsa. [] No change. [] Other:  SHORT TERM GOALS ( 8 visits)  Lumbar pain = 0  Lumbar ROM = WNL  Lumbar, hip strength = 4+/5  Lumbar function: walk, sit, bend, lift w/o pain     LONG TERM GOALS ( 12 visits)  Independent Home Exercise program  Return to normal activity     PATIENT GOAL  Get rid of pain    Pt. Education:  [x] Yes  [] No  [] Reviewed Prior HEP/Ed  Method of Education: [x] Verbal  [] Demo  [] Written  Comprehension of Education:  [x] Verbalizes understanding. [] Demonstrates understanding. [] Needs review. [] Demonstrates/verbalizes HEP/Ed previously given. Plan: [x] Continue per plan of care.    [] Other:      Time In:2:30pm            Time Out: 3:05  pm    Electronically signed by:  Hilary Kelly PTA

## 2022-10-10 ENCOUNTER — HOSPITAL ENCOUNTER (OUTPATIENT)
Age: 72
Discharge: HOME | End: 2022-10-10
Payer: MEDICARE

## 2022-10-10 ENCOUNTER — HOSPITAL ENCOUNTER (OUTPATIENT)
Dept: CT IMAGING | Age: 72
Discharge: HOME | End: 2022-10-12
Payer: MEDICARE

## 2022-10-10 DIAGNOSIS — D50.8 IRON DEFICIENCY ANEMIA SECONDARY TO INADEQUATE DIETARY IRON INTAKE: ICD-10-CM

## 2022-10-10 DIAGNOSIS — R14.0 ABDOMINAL BLOATING: ICD-10-CM

## 2022-10-10 DIAGNOSIS — R10.84 GENERALIZED ABDOMINAL PAIN: ICD-10-CM

## 2022-10-10 DIAGNOSIS — D50.8 IRON DEFICIENCY ANEMIA SECONDARY TO INADEQUATE DIETARY IRON INTAKE: Primary | ICD-10-CM

## 2022-10-10 LAB
FERRITIN: 20 NG/ML (ref 13–150)
FOLATE: 15 NG/ML
IRON SATURATION: 13 % (ref 20–55)
IRON: 44 UG/DL (ref 37–145)
TOTAL IRON BINDING CAPACITY: 333 UG/DL (ref 250–450)
UNSATURATED IRON BINDING CAPACITY: 289 UG/DL (ref 112–347)
VITAMIN B-12: 387 PG/ML (ref 232–1245)

## 2022-10-10 PROCEDURE — 83550 IRON BINDING TEST: CPT

## 2022-10-10 PROCEDURE — 83540 ASSAY OF IRON: CPT

## 2022-10-10 PROCEDURE — 74176 CT ABD & PELVIS W/O CONTRAST: CPT

## 2022-10-10 PROCEDURE — 82607 VITAMIN B-12: CPT

## 2022-10-10 PROCEDURE — 82746 ASSAY OF FOLIC ACID SERUM: CPT

## 2022-10-10 PROCEDURE — 36415 COLL VENOUS BLD VENIPUNCTURE: CPT

## 2022-10-10 PROCEDURE — 6360000004 HC RX CONTRAST MEDICATION: Performed by: NURSE PRACTITIONER

## 2022-10-10 PROCEDURE — 82728 ASSAY OF FERRITIN: CPT

## 2022-10-10 PROCEDURE — A4641 RADIOPHARM DX AGENT NOC: HCPCS | Performed by: NURSE PRACTITIONER

## 2022-10-10 RX ADMIN — BARIUM SULFATE 450 ML: 20 SUSPENSION ORAL at 16:45

## 2022-10-12 ENCOUNTER — HOSPITAL ENCOUNTER (OUTPATIENT)
Dept: PHYSICAL THERAPY | Facility: CLINIC | Age: 72
Setting detail: THERAPIES SERIES
Discharge: HOME | End: 2022-10-12
Payer: MEDICARE

## 2022-10-12 PROCEDURE — 97113 AQUATIC THERAPY/EXERCISES: CPT

## 2022-10-12 NOTE — FLOWSHEET NOTE
[x] [facility]  [address]  P: [phone]  F: [phone]     Physical Therapy Daily  Aquatic Treatment Note    Date:  10/12/2022  Patient Name:  Luz Cadena    :    MRN: [id number]  Physician: Raghav Bah 177: [facility] MEDICARE              Eligibility Status:  Eligible     Secondary Insurance (if applicable) [place]              Eligibility Status: Eligible     DOS:   # of visits allowed/remaining: Based on med nec;  / Based on med [facility];   Source: Website  Spoke To:  [place]  Reference: [id number] / [id number]  Auth: [address] yr; visit limit based on med nec; No Copay; Ded$203-met  OPP$7550-$5930.13 remain ; Coinsurance 20%  Medicaid - [place] yr; visit limit based on med nec; No patient liability   Medical Diagnosis: L Lumbar pain                 Rehab Codes: M54.2  Onset Date: 22               Visit# / total visits:   Cancels/No Shows: 2 CX    Subjective:    Pain:  [x] Yes  [] No Location: L hip/buttock Pain Rating: (0-10 scale) 6-7/10  Pain altered Tx:  [] No  [x] Yes  Action:aquatics  Comments:Pt with cont LB pain and feels some days it calms down and other days it increases depending on her activity. Had CT on abdomen and awaiting results. Cont abdominal pain.     Objective:     KEY  B = Belt G = Gloves N = Noodle   C = Cuffs K = Kickboard P = Paddles   CC = Cervical Collar L = Laps T = Theratube   DB = Dumbells M = Minutes W = Weights     Exercises/Activities  Warm-up/Amb 10/12 Dynamic Exercises 10/12   Forward 3L March 3L   Sideways 3L Squat    Backwards 3L Retro HS curls      Retro SLR    Stretches  Braiding    Gastroc/Soleus  Heel to Toe amb    Hamstring  Toe amb    Hip flexor  Heel amb    Piriformis      SKTC      Pec Stretch      Post Deltoid  Static Exercises UE      Shoulder flex/ext 15   Static Exercises LE  Shoulder abd/add 15   Heel/toe raises 15 Shoulder H.  abd/add 15   Marches 15 Shoulder IR/ER    Mini-squats 15 Rowing 15   4-way hip  15 Arm Circles    Hamstring curls 15 UT shrugs/rolls    Hip Circles/Fig 8  Scap squeezes    Ankle ROM  Diagonals 1/2    Lunges   Elbow flex/ext      Pron/Sup    Functional Exercise  Wrist AROM    Step      Wall Push-ups  Deep H20/    SLS  Bike 3m   Breast Stroke on Noodle  Hip abd/add    Noodle Twist  Hip flex/ext    Noodle Push down  Hip IR/ER    Kickboard push/pull  Knee flex/ext      Push/pull on [employer] 5m   Other:    Specific Instructions for next treatment:    Treatment Charges: Mins Units   []  Modalities     []  Ther Exercise     []  Manual Therapy     []  Ther Activities     [x]  Aquatics 30 2   []  Other       Assessment: [x] Progressing toward goals. Cont aquatic ex per flow sheet with fair mahsa. VC for upright posture, proper tech and to keep all ex in comfortable range. Progressed with dynamic march with some discomfort in LB after. To deep water for lumbar decompression. Pt feels less pain after tx. Will cont to monitor sx and progress as pt mahsa. [] No change. [] Other:  SHORT TERM GOALS ( 8 visits)  Lumbar pain = 0  Lumbar ROM = WNL  Lumbar, hip strength = 4+/5  Lumbar function: walk, sit, bend, lift w/o pain     LONG TERM GOALS ( 12 visits)  Independent Home Exercise program  Return to normal activity     PATIENT GOAL  Get rid of pain    Pt. Education:  [x] Yes  [] No  [] Reviewed Prior HEP/Ed  Method of Education: [x] Verbal  [] Demo  [] Written  Comprehension of Education:  [x] Verbalizes understanding. [] Demonstrates understanding. [] Needs review. [] Demonstrates/verbalizes HEP/Ed previously given. Plan: [x] Continue per plan of care.    [] Other:      Time In:3:00pm            Time Out: 3:30  pm    Electronically signed by:  Keshia Koehler PTA

## 2022-10-13 ENCOUNTER — TELEPHONE (OUTPATIENT)
Dept: PRIMARY CARE CLINIC | Age: 72
End: 2022-10-13

## 2022-10-13 ENCOUNTER — HOSPITAL ENCOUNTER (OUTPATIENT)
Dept: MAMMOGRAPHY | Age: 72
Discharge: HOME | End: 2022-10-15
Payer: MEDICARE

## 2022-10-13 DIAGNOSIS — Z78.0 POST-MENOPAUSAL: ICD-10-CM

## 2022-10-13 PROCEDURE — 77080 DXA BONE DENSITY AXIAL: CPT

## 2022-10-13 NOTE — TELEPHONE ENCOUNTER
PT called and asked about CT results. I see in process but it says \"incomplete\" not sure if you can see anything or need to do anything ?

## 2022-10-14 ENCOUNTER — HOSPITAL ENCOUNTER (OUTPATIENT)
Dept: PHYSICAL THERAPY | Facility: CLINIC | Age: 72
Setting detail: THERAPIES SERIES
Discharge: HOME | End: 2022-10-14
Payer: MEDICARE

## 2022-10-14 PROCEDURE — 97113 AQUATIC THERAPY/EXERCISES: CPT

## 2022-10-14 RX ORDER — DIPHENHYDRAMINE HYDROCHLORIDE 50 MG/ML
50 INJECTION INTRAMUSCULAR; INTRAVENOUS
Status: CANCELLED | OUTPATIENT
Start: 2022-10-14

## 2022-10-14 RX ORDER — ALBUTEROL SULFATE 90 UG/1
4 AEROSOL, METERED RESPIRATORY (INHALATION) PRN
Status: CANCELLED | OUTPATIENT
Start: 2022-10-14

## 2022-10-14 RX ORDER — HEPARIN SODIUM (PORCINE) LOCK FLUSH IV SOLN 100 UNIT/ML 100 UNIT/ML
500 SOLUTION INTRAVENOUS PRN
Status: CANCELLED | OUTPATIENT
Start: 2022-10-14

## 2022-10-14 RX ORDER — SODIUM CHLORIDE 9 MG/ML
5-250 INJECTION, SOLUTION INTRAVENOUS PRN
Status: CANCELLED | OUTPATIENT
Start: 2022-10-14

## 2022-10-14 RX ORDER — SODIUM CHLORIDE 9 MG/ML
INJECTION, SOLUTION INTRAVENOUS CONTINUOUS
Status: CANCELLED | OUTPATIENT
Start: 2022-10-14

## 2022-10-14 RX ORDER — ACETAMINOPHEN 325 MG/1
650 TABLET ORAL
Status: CANCELLED | OUTPATIENT
Start: 2022-10-14

## 2022-10-14 RX ORDER — ONDANSETRON 2 MG/ML
8 INJECTION INTRAMUSCULAR; INTRAVENOUS
Status: CANCELLED | OUTPATIENT
Start: 2022-10-14

## 2022-10-14 RX ORDER — EPINEPHRINE 1 MG/ML
0.3 INJECTION, SOLUTION, CONCENTRATE INTRAVENOUS PRN
Status: CANCELLED | OUTPATIENT
Start: 2022-10-14

## 2022-10-14 RX ORDER — SODIUM CHLORIDE 0.9 % (FLUSH) 0.9 %
5-40 SYRINGE (ML) INJECTION PRN
Status: CANCELLED | OUTPATIENT
Start: 2022-10-14

## 2022-10-14 RX ORDER — FAMOTIDINE 10 MG/ML
20 INJECTION, SOLUTION INTRAVENOUS
Status: CANCELLED | OUTPATIENT
Start: 2022-10-14

## 2022-10-14 NOTE — FLOWSHEET NOTE
[x] [facility] Outpatient Rehabilitation & Therapy  [address] [facility]  P: [phone]  F: [phone]     Physical Therapy Daily  Aquatic Treatment Note    Date:  10/14/2022  Patient Name:  Rin Millan    :    MRN: [id number]  Physician: Raghav Bah 177: [place] MEDICARE              Eligibility Status:  Eligible     Secondary Insurance (if applicable) [place]              Eligibility Status: Eligible     DOS:   # of visits allowed/remaining: Based on med nec;  / Based on med Benjie Fontanie;   Source: Website  Spoke To:  [place]  Reference: [id number] / [id number]  Auth: [address] yr; visit limit based on med nec; No Copay; Ded$203-met  OPP$7550-$5930.13 remain ; Coinsurance 20%  Medicaid - [place] yr; visit limit based on med nec; No patient liability   Medical Diagnosis: L Lumbar pain                 Rehab Codes: M54.2  Onset Date: 22               Visit# / total visits:   Cancels/No Shows: 2 CX    Subjective:    Pain:  [x] Yes  [] No Location: L hip/buttock Pain Rating: (0-10 scale) 5/10  Pain altered Tx:  [] No  [x] Yes  Action:aquatics  Comments:Pt with less LB pain. Cont abdominal pain that keeps her up at night.     Objective:     KEY  B = Belt G = Gloves N = Noodle   C = Cuffs K = Kickboard P = Paddles   CC = Cervical Collar L = Laps T = Theratube   DB = Dumbells M = Minutes W = Weights     Exercises/Activities  Warm-up/Amb 10/14 Dynamic Exercises 10/14   Forward 3L March 3L   Sideways 3L Squat    Backwards 3L Retro HS curls      Retro SLR    Stretches  Braiding    Gastroc/Soleus  Heel to Toe amb    Hamstring  Toe amb    Hip flexor  Heel amb    Piriformis      SKTC      Pec Stretch      Post Deltoid  Static Exercises UE      Shoulder flex/ext 15   Static Exercises LE  Shoulder abd/add 15   Heel/toe raises 15 Shoulder H.  abd/add 15   Marches 15 Shoulder IR/ER    Mini-squats 15 Rowing 15   4-way hip  15 Arm Circles    Hamstring curls 15 UT shrugs/rolls    Hip Circles/Fig 8  Scap squeezes    Ankle ROM  Diagonals 1/2    Lunges   Elbow flex/ext      Pron/Sup    Functional Exercise  Wrist AROM    Step 10 fwd     Wall Push-ups  Deep H20/    SLS  Bike 3m   Breast Stroke on Noodle  Hip abd/add 15x   Noodle Twist  Hip flex/ext    Noodle Push down  Hip IR/ER    Kickboard push/pull  Knee flex/ext      Push/pull on [employer] 5m   Other:    Specific Instructions for next treatment:    Treatment Charges: Mins Units   []  Modalities     []  Ther Exercise     []  Manual Therapy     []  Ther Activities     [x]  Aquatics 30 2   []  Other       Assessment: [x] Progressing toward goals. Cont aquatic ex per flow sheet with fair mahsa. VC for upright posture, proper tech and to keep all ex in comfortable range. Progressed with step ups and deep water hip abd/add with good mahsa. To deep water for lumbar decompression. Pt feels less pain after tx. Will cont to monitor sx and progress as pt mahsa. [] No change. [] Other:  SHORT TERM GOALS ( 8 visits)  Lumbar pain = 0  Lumbar ROM = WNL  Lumbar, hip strength = 4+/5  Lumbar function: walk, sit, bend, lift w/o pain     LONG TERM GOALS ( 12 visits)  Independent Home Exercise program  Return to normal activity     PATIENT GOAL  Get rid of pain    Pt. Education:  [x] Yes  [] No  [] Reviewed Prior HEP/Ed  Method of Education: [x] Verbal  [] Demo  [] Written  Comprehension of Education:  [x] Verbalizes understanding. [] Demonstrates understanding. [] Needs review. [] Demonstrates/verbalizes HEP/Ed previously given. Plan: [x] Continue per plan of care.    [] Other:      Time In:2:15 pm            Time Out: 2:51 pm    Electronically signed by:  Alba Suazo PTA

## 2022-10-17 ENCOUNTER — TELEPHONE (OUTPATIENT)
Dept: PRIMARY CARE CLINIC | Age: 72
End: 2022-10-17

## 2022-10-17 ENCOUNTER — OFFICE VISIT (OUTPATIENT)
Dept: PAIN MANAGEMENT | Age: 72
End: 2022-10-17
Payer: MEDICARE

## 2022-10-17 ENCOUNTER — TELEPHONE (OUTPATIENT)
Dept: INFUSION THERAPY | Age: 72
End: 2022-10-17

## 2022-10-17 VITALS — WEIGHT: 219 LBS | HEIGHT: 64 IN | BODY MASS INDEX: 37.39 KG/M2

## 2022-10-17 DIAGNOSIS — G89.29 CHRONIC LEFT-SIDED THORACIC BACK PAIN: Primary | ICD-10-CM

## 2022-10-17 DIAGNOSIS — M54.6 CHRONIC LEFT-SIDED THORACIC BACK PAIN: Primary | ICD-10-CM

## 2022-10-17 PROCEDURE — G8417 CALC BMI ABV UP PARAM F/U: HCPCS | Performed by: PAIN MEDICINE

## 2022-10-17 PROCEDURE — G8427 DOCREV CUR MEDS BY ELIG CLIN: HCPCS | Performed by: PAIN MEDICINE

## 2022-10-17 PROCEDURE — 1123F ACP DISCUSS/DSCN MKR DOCD: CPT | Performed by: PAIN MEDICINE

## 2022-10-17 PROCEDURE — G8399 PT W/DXA RESULTS DOCUMENT: HCPCS | Performed by: PAIN MEDICINE

## 2022-10-17 PROCEDURE — 99213 OFFICE O/P EST LOW 20 MIN: CPT | Performed by: PAIN MEDICINE

## 2022-10-17 PROCEDURE — 1090F PRES/ABSN URINE INCON ASSESS: CPT | Performed by: PAIN MEDICINE

## 2022-10-17 PROCEDURE — G8484 FLU IMMUNIZE NO ADMIN: HCPCS | Performed by: PAIN MEDICINE

## 2022-10-17 PROCEDURE — 1036F TOBACCO NON-USER: CPT | Performed by: PAIN MEDICINE

## 2022-10-17 PROCEDURE — 3017F COLORECTAL CA SCREEN DOC REV: CPT | Performed by: PAIN MEDICINE

## 2022-10-17 ASSESSMENT — ENCOUNTER SYMPTOMS
BOWEL INCONTINENCE: 0
BACK PAIN: 1

## 2022-10-17 NOTE — TELEPHONE ENCOUNTER
Tab[facility] 45 Transitions Initial Follow Up Call    Outreach made within 2 business days of discharge: Yes    Patient: Breezy Levy Patient : 1950   MRN: [id number]  Reason for Admission: There are no discharge diagnoses documented for the most recent discharge. Discharge Date: 22       Spoke with: Padmaja Pennington    Discharge department/facility: [facility] [address] Patient Contact:  Was patient able to fill all prescriptions: Yes  Was patient instructed to bring all medications to the follow-up visit: Yes  Is patient taking all medications as directed in the discharge summary?  Yes  Does patient understand their discharge instructions: Yes  Does patient have questions or concerns that need addressed prior to 7-14 day follow up office visit: no    Scheduled appointment with PCP within 7-14 days    Follow Up  Future Appointments   Date Time Provider Rodolfo Hyman   10/17/2022  2:30 PM Trina Baum MD [address] [place] [address]   10/19/2022  3:30 PM Rashmi Pineda[address]   10/21/2022  3:30 PM Anne-Marie Silva [address]   10/26/2022 11:15 AM Simi Zavala DO [facility]   2022  1:20 PM ST[facility] BRYN MARIE [facility] Radiolog   2022  1:00 PM DO Marzena [facility]   2023  1:30 PM Mirna Garcia  [facility] 22       [place], [place]

## 2022-10-17 NOTE — TELEPHONE ENCOUNTER
Pt concerned why CT result says gallbladder unremarkable as she has had her gallbladder removed. I have called [facility] radiology and have asked they review and addend their read.
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
## 2022-10-18 NOTE — TELEPHONE ENCOUNTER
Nothing further needed at this point. Nothing acute was found on CT scan.   I would recommend she follow up if her symptoms still persist.  Thank you

## 2022-10-19 ENCOUNTER — APPOINTMENT (OUTPATIENT)
Dept: PHYSICAL THERAPY | Facility: CLINIC | Age: 72
End: 2022-10-19
Payer: MEDICARE

## 2022-10-19 ENCOUNTER — TELEPHONE (OUTPATIENT)
Dept: ONCOLOGY | Age: 72
End: 2022-10-19

## 2022-10-21 ENCOUNTER — HOSPITAL ENCOUNTER (OUTPATIENT)
Dept: PHYSICAL THERAPY | Facility: CLINIC | Age: 72
Setting detail: THERAPIES SERIES
Discharge: HOME | End: 2022-10-21
Payer: MEDICARE

## 2022-10-21 NOTE — FLOWSHEET NOTE
[] [facility] &  Therapy  [address]    [phone]  F: [phone]   [] [address]  [facility]   [address]  P: [phone]  F: [phone]  [] [address] &  Therapy  [address]  P: [phone]  F: [phone] [] [address]  P: [phone]  F: [phone]  [] [address]  [address] [facility] [address]   [address]   [place]: [phone]  F: [phone]   [] [facility]  [address] [facility] [address]  [place]: [phone]   F: [phone]     Physical Therapy Cancel/No Show note    Date: 10/21/2022  Patient: Taniya Wei  : 1950  MRN: [id number]    Cancels/No Shows to date: 0    For today's appointment patient:    [x]  Cancelled    [] Rescheduled appointment    [] No-show     Reason given by patient:    [x]  Patient ill    []  Conflicting appointment    [] No transportation      [] Conflict with work    [] No reason given    [] Weather related    [] [id number]    [] Other:      Comments:        [] Next appointment was confirmed    Electronically signed by: Monika Linares

## 2022-10-24 ENCOUNTER — OFFICE VISIT (OUTPATIENT)
Dept: PRIMARY CARE CLINIC | Age: 72
End: 2022-10-24
Payer: MEDICARE

## 2022-10-24 ENCOUNTER — HOSPITAL ENCOUNTER (OUTPATIENT)
Age: 72
Setting detail: SPECIMEN
Discharge: HOME | End: 2022-10-24

## 2022-10-24 VITALS
SYSTOLIC BLOOD PRESSURE: 134 MMHG | HEART RATE: 70 BPM | DIASTOLIC BLOOD PRESSURE: 82 MMHG | WEIGHT: 219 LBS | OXYGEN SATURATION: 98 % | BODY MASS INDEX: 37.59 KG/M2

## 2022-10-24 DIAGNOSIS — E03.9 HYPOTHYROIDISM, UNSPECIFIED TYPE: ICD-10-CM

## 2022-10-24 DIAGNOSIS — R07.9 CHEST PAIN, UNSPECIFIED TYPE: Primary | ICD-10-CM

## 2022-10-24 DIAGNOSIS — N89.8 VAGINAL DISCHARGE: ICD-10-CM

## 2022-10-24 DIAGNOSIS — Z09 HOSPITAL DISCHARGE FOLLOW-UP: ICD-10-CM

## 2022-10-24 DIAGNOSIS — Z23 NEED FOR INFLUENZA VACCINATION: ICD-10-CM

## 2022-10-24 DIAGNOSIS — Z23 NEED FOR PNEUMOCOCCAL VACCINE: ICD-10-CM

## 2022-10-24 LAB
CANDIDA SPECIES, DNA PROBE: NEGATIVE
GARDNERELLA VAGINALIS, DNA PROBE: NEGATIVE
SOURCE: NORMAL
TRICHOMONAS VAGINALIS DNA: NEGATIVE

## 2022-10-24 PROCEDURE — G0008 ADMIN INFLUENZA VIRUS VAC: HCPCS | Performed by: FAMILY MEDICINE

## 2022-10-24 PROCEDURE — 99495 TRANSJ CARE MGMT MOD F2F 14D: CPT | Performed by: FAMILY MEDICINE

## 2022-10-24 PROCEDURE — 90677 PCV20 VACCINE IM: CPT | Performed by: FAMILY MEDICINE

## 2022-10-24 PROCEDURE — G0009 ADMIN PNEUMOCOCCAL VACCINE: HCPCS | Performed by: FAMILY MEDICINE

## 2022-10-24 PROCEDURE — 1111F DSCHRG MED/CURRENT MED MERGE: CPT | Performed by: FAMILY MEDICINE

## 2022-10-24 PROCEDURE — 90694 VACC AIIV4 NO PRSRV 0.5ML IM: CPT | Performed by: FAMILY MEDICINE

## 2022-10-24 RX ORDER — SUCRALFATE 1 G/1
1 TABLET ORAL 4 TIMES DAILY
Qty: 120 TABLET | Refills: 1 | Status: SHIPPED | OUTPATIENT
Start: 2022-10-24

## 2022-10-24 NOTE — PROGRESS NOTES
Post-Discharge Transitional Care  Follow Up      Luz Cadena   YOB: 1950    Date of Office Visit:  10/24/2022  Date of Hospital Admission: 10/15/22  Date of Hospital Discharge: 10/15/22  Risk of hospital readmission (high >=14%. Medium >=10%) :No data recorded    Care management risk score Rising risk (score 2-5) and Complex Care (Scores >=6): No Risk Score On File     Non face to face  following discharge, date last encounter closed (first attempt may have been earlier): 10/17/2022    Call initiated 2 business days of discharge: Yes        ASSESSMENT/PLAN:   Chest pain, unspecified type  - recommend seeing cardiology for further testing.  - recommend taking omeprazole BID for a few weeks and then back to her usual once a day dosing,  and continue carafate.   -     [facility] - Eric Gatica MD, Cardiology, [place]  Need for influenza vaccination  -     Influenza, FLUAD, (age 72 y+), IM, Preservative Free, 0.5 mL  Hospital discharge follow-up  -     [place] DISCHARGE MEDS RECONCILED W/ CURRENT OUTPATIENT MED LIST  Need for pneumococcal vaccine  -     Pneumococcal, PCV20, PREVNAR 20, (age 25 yrs+), IM, PF  Vaginal disharge  -     Vaginitis DNA Probe; Future      Medical Decision Making: moderate complexity  No follow-ups on file. Subjective:   HPI:  Follow up of Hospital problems/diagnosis(es):     Inpatient course: Discharge summary reviewed- see chart. Pt was admitted to [facility] due to epigastric pain that radiated to her back. states her BP was 160/100s. She was seen by cardiology but deemed to be atypical chest pain but did recommend she get outpt noninvastive cardiac testing . Pt as placed on carafate and pantoprazole and notes some improvement since discharge of the midepigastric pain as well.      Does note some discharge that has strong smell, no vaginal itchiness or vaginal pain        Patient Active Problem List   Diagnosis    Ovarian mass    Abdominal pain    Partial bowel obstruction (HCC)    Epigastric pain    GERD (gastroesophageal reflux disease)    Lumbosacral spondylosis without myelopathy    Chronic bilateral thoracic back pain    Chronic anticoagulation    BMI 38.0-38.9,adult    Type 2 diabetes mellitus    Low hemoglobin    Nausea    Absolute anemia    Microcytic anemia    Iron deficiency anemia secondary to inadequate dietary iron intake    Iron malabsorption    Malignant neoplasm of ovary (HCC)    Occult blood in stools    Sacroiliitis, not elsewhere classified ([facility] [address]    Diverticulosis of colon    Acute left-sided thoracic back pain    COVID-19    Chronic renal disease, stage III ([address] [198327]    Other osteoporosis with current pathological fracture, vertebra(e), subsequent encounter for fracture with routine healing    Presence of other specified devices       Medications listed as ordered at the time of discharge from hospital     Medication List            Accurate as of October 24, 2022 10:19 AM. If you have any questions, ask your nurse or doctor.                 START taking these medications      sucralfate 1 GM tablet  Commonly known as: Carafate  Take 1 tablet by mouth 4 times daily  Started by: Fabi Sanabria, DO            CHANGE how you take these medications      insulin glargine 100 UNIT/ML injection vial  Commonly known as: Lantus  INJECT 75 UNITS SUBCUTANEOUSLY NIGHTLY  What changed: additional instructions     insulin lispro 100 UNIT/ML injection vial  Commonly known as: HUMALOG  INEJCT SUBCUTANEOUSLY UP TO THREE TIMES A DAY BASED ON SLIDING SCALE  MAX 40 UNITS A DAY  What changed: additional instructions            CONTINUE taking these medications      Accu-Chek Zora Plus strip  Generic drug: blood glucose test strips  USE TO TEST BLOOD SUGAR 3 TIMES DAILY AND AS NEEDED FOR SYMPTOMS OF IRREGULAR BLOOD GLUCOSE     alendronate 70 MG tablet  Commonly known as: FOSAMAX  TAKE 1 TABLET A DAY EVERY MON      amLODIPine 10 MG tablet  Commonly known as: NORVASC  Take 1 tablet by mouth daily     atorvastatin 40 MG tablet  Commonly known as: LIPITOR  TAKE (1) TABLET BY MOUTH EVERY DAY     calcium carbonate 500 MG chewable tablet  Commonly known as: TUMS     DULoxetine 60 MG extended release capsule  Commonly known as: CYMBALTA  TAKE 1 CAPSULE BY MOUTH DAILY     EPINEPHrine 0.3 MG/0.3ML Soaj injection  Commonly known as: EpiPen 2-Fernando  EpiPen 2-Fernando 0.3 mg/0.3 mL injection, auto-injector     fexofenadine 60 MG tablet  Commonly known as: ALLEGRA     furosemide 20 MG tablet  Commonly known as: LASIX  TAKE 1 TABLET BY MOUTH DAILY     gabapentin 600 MG tablet  Commonly known as: NEURONTIN  TAKE 1 TABLET BY MOUTH FOUR TIMES DAILY     glucose monitoring kit  1 kit by Does not apply route daily Please provide accuchek meter for patient, not freestyle     hydroCHLOROthiazide 25 MG tablet  Commonly known as: HYDRODIURIL  TAKE 1 TABLET BY MOUTH EVERY MORNING     levothyroxine 88 MCG tablet  Commonly known as: SYNTHROID  TAKE (1) TABLET BY MOUTH EVERY DAY     losartan 100 MG tablet  Commonly known as: COZAAR  TAKE 1 TABLET BY MOUTH EVERY DAY     magnesium oxide 400 MG tablet  Commonly known as: MAG-OX     omeprazole 40 MG delayed release capsule  Commonly known as: PRILOSEC  TAKE 1 CAPSULE BY MOUTH EVERY MORNING BEFORE BREAKFAST     potassium chloride 20 MEQ extended release tablet  Commonly known as: KLOR-CON M  Take 1 tablet by mouth in the morning and 1 tablet at noon and 1 tablet before bedtime.      Super B Complex/C Caps     Sure Comfort Insulin Syringe 31G X 5/16\" 1 ML Misc  Generic drug: Insulin Syringe-Needle U-100  USE AS DIRECTED FOUR TIMES A DAY     tamsulosin 0.4 MG capsule  Commonly known as: FLOMAX  TAKE 1 CAPSULE BY MOUTH NIGHTLY     timolol 0.5 % ophthalmic solution  Commonly known as: TIMOPTIC     tiZANidine 2 MG tablet  Commonly known as: ZANAFLEX  Take 1 tablet by mouth every 8 hours as needed (pain)     traZODone 100 MG tablet  Commonly known as: DESYREL  TAKE 1 TABLET BY MOUTH NIGHTLY AS NEEDED FOR SLEEP     vitamin C 500 MG tablet  Commonly known as: ASCORBIC ACID     Vitamin D3 125 MCG (5000 UT) Tabs     Xarelto 20 MG Tabs tablet  Generic drug: rivaroxaban  TAKE 1 TABLET BY MOUTH ONCE DAILY               Where to Get Your Medications        These medications were sent to [facility] [place] DORE[place] Sheikh [address]      Phone: [phone]   sucralfate 1 GM tablet           Medications marked \"taking\" at this time  Outpatient Medications Marked as Taking for the 10/24/22 encounter (Office Visit) with Fátima Jensen DO   Medication Sig Dispense Refill    sucralfate (CARAFATE) 1 GM tablet Take 1 tablet by mouth 4 times daily 120 tablet 1        Medications patient taking as of now reconciled against medications ordered at time of hospital discharge: Yes    ROS: no vomiting, fevers or chills. No more chest pain. Midepigastric pain improving somewhat with medications. +vaginal discharge. Objective:    /82   Pulse 70   Wt 219 lb (99.3 kg)   SpO2 98%   BMI 37.59 kg/m²   General Appearance: alert and oriented to person, place and time, well developed and well- nourished, in no acute distress  Skin: warm and dry, no rash or erythema  Head: normocephalic and atraumatic  Eyes: pupils equal, round, and reactive to light, extraocular eye movements intact, conjunctivae normal  Neck: supple   Pulmonary/Chest: clear to auscultation bilaterally- no wheezes, rales or rhonchi, normal air movement, no respiratory distress  Cardiovascular: normal rate, regular rhythm, normal S1 and S2, no murmurs,   Extremities:no  edema  Neurologic: speech normal      An electronic signature was used to authenticate this note.   --Fátima Jensen DO

## 2022-10-25 RX ORDER — LEVOTHYROXINE SODIUM 88 UG/1
TABLET ORAL
Qty: 30 TABLET | Refills: 10 | Status: SHIPPED | OUTPATIENT
Start: 2022-10-25

## 2022-10-28 ENCOUNTER — HOSPITAL ENCOUNTER (OUTPATIENT)
Dept: INFUSION THERAPY | Age: 72
Discharge: HOME | End: 2022-10-28
Payer: MEDICARE

## 2022-10-28 VITALS
RESPIRATION RATE: 18 BRPM | TEMPERATURE: 97.6 F | SYSTOLIC BLOOD PRESSURE: 152 MMHG | HEART RATE: 70 BPM | DIASTOLIC BLOOD PRESSURE: 79 MMHG

## 2022-10-28 DIAGNOSIS — D50.9 MICROCYTIC ANEMIA: Primary | ICD-10-CM

## 2022-10-28 DIAGNOSIS — K90.9 IRON MALABSORPTION: ICD-10-CM

## 2022-10-28 DIAGNOSIS — D50.8 IRON DEFICIENCY ANEMIA SECONDARY TO INADEQUATE DIETARY IRON INTAKE: ICD-10-CM

## 2022-10-28 PROCEDURE — 96365 THER/PROPH/DIAG IV INF INIT: CPT

## 2022-10-28 PROCEDURE — 6360000002 HC RX W HCPCS: Performed by: INTERNAL MEDICINE

## 2022-10-28 PROCEDURE — 2580000003 HC RX 258: Performed by: INTERNAL MEDICINE

## 2022-10-28 RX ORDER — EPINEPHRINE 1 MG/ML
0.3 INJECTION, SOLUTION, CONCENTRATE INTRAVENOUS PRN
Status: CANCELLED | OUTPATIENT
Start: 2022-11-04

## 2022-10-28 RX ORDER — SODIUM CHLORIDE 9 MG/ML
5-250 INJECTION, SOLUTION INTRAVENOUS PRN
Status: DISCONTINUED | OUTPATIENT
Start: 2022-10-28 | End: 2022-10-29 | Stop reason: HOSPADM

## 2022-10-28 RX ORDER — SODIUM CHLORIDE 0.9 % (FLUSH) 0.9 %
5-40 SYRINGE (ML) INJECTION PRN
Status: CANCELLED | OUTPATIENT
Start: 2022-11-04

## 2022-10-28 RX ORDER — ALBUTEROL SULFATE 90 UG/1
4 AEROSOL, METERED RESPIRATORY (INHALATION) PRN
Status: CANCELLED | OUTPATIENT
Start: 2022-11-04

## 2022-10-28 RX ORDER — SODIUM CHLORIDE 9 MG/ML
INJECTION, SOLUTION INTRAVENOUS CONTINUOUS
Status: CANCELLED | OUTPATIENT
Start: 2022-11-04

## 2022-10-28 RX ORDER — ONDANSETRON 2 MG/ML
8 INJECTION INTRAMUSCULAR; INTRAVENOUS
Status: CANCELLED | OUTPATIENT
Start: 2022-11-04

## 2022-10-28 RX ORDER — FAMOTIDINE 10 MG/ML
20 INJECTION, SOLUTION INTRAVENOUS
Status: CANCELLED | OUTPATIENT
Start: 2022-11-04

## 2022-10-28 RX ORDER — HEPARIN SODIUM (PORCINE) LOCK FLUSH IV SOLN 100 UNIT/ML 100 UNIT/ML
500 SOLUTION INTRAVENOUS PRN
Status: CANCELLED | OUTPATIENT
Start: 2022-11-04

## 2022-10-28 RX ORDER — DIPHENHYDRAMINE HYDROCHLORIDE 50 MG/ML
50 INJECTION INTRAMUSCULAR; INTRAVENOUS
Status: CANCELLED | OUTPATIENT
Start: 2022-11-04

## 2022-10-28 RX ORDER — SODIUM CHLORIDE 9 MG/ML
5-250 INJECTION, SOLUTION INTRAVENOUS PRN
Status: CANCELLED | OUTPATIENT
Start: 2022-11-04

## 2022-10-28 RX ORDER — ACETAMINOPHEN 325 MG/1
650 TABLET ORAL
Status: CANCELLED | OUTPATIENT
Start: 2022-11-04

## 2022-10-28 RX ADMIN — SODIUM CHLORIDE 150 ML/HR: 9 INJECTION, SOLUTION INTRAVENOUS at 13:24

## 2022-10-28 RX ADMIN — FERRIC CARBOXYMALTOSE INJECTION 750 MG: 50 INJECTION, SOLUTION INTRAVENOUS at 13:27

## 2022-10-28 ASSESSMENT — PAIN DESCRIPTION - PAIN TYPE: TYPE: CHRONIC PAIN

## 2022-10-28 ASSESSMENT — PAIN DESCRIPTION - LOCATION: LOCATION: BACK

## 2022-10-28 ASSESSMENT — PAIN SCALES - GENERAL: PAINLEVEL_OUTOF10: 8

## 2022-10-28 NOTE — PROGRESS NOTES
Patient here for injectafer 1 of 2. Arrives ambulatory. Denies any complaints. Infusion complete without incident. 30 minute post observation complete. Discharged in stable condition. Returns 11/4/2022 for injectafer 2 of 2.

## 2022-10-31 ENCOUNTER — OFFICE VISIT (OUTPATIENT)
Dept: PAIN MANAGEMENT | Age: 72
End: 2022-10-31
Payer: MEDICARE

## 2022-10-31 VITALS — BODY MASS INDEX: 37.39 KG/M2 | WEIGHT: 219 LBS | HEIGHT: 64 IN

## 2022-10-31 DIAGNOSIS — M54.6 CHRONIC LEFT-SIDED THORACIC BACK PAIN: Primary | ICD-10-CM

## 2022-10-31 DIAGNOSIS — G89.29 CHRONIC LEFT-SIDED THORACIC BACK PAIN: Primary | ICD-10-CM

## 2022-10-31 PROCEDURE — 20552 NJX 1/MLT TRIGGER POINT 1/2: CPT | Performed by: PAIN MEDICINE

## 2022-10-31 ASSESSMENT — ENCOUNTER SYMPTOMS
BACK PAIN: 1
BOWEL INCONTINENCE: 0
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
## 2022-11-03 RX ORDER — DEXAMETHASONE SODIUM PHOSPHATE 10 MG/ML
10 INJECTION, SOLUTION INTRAMUSCULAR; INTRAVENOUS ONCE
Status: COMPLETED | OUTPATIENT
Start: 2022-11-03 | End: 2022-11-03

## 2022-11-03 RX ORDER — BUPIVACAINE HYDROCHLORIDE 2.5 MG/ML
30 INJECTION, SOLUTION EPIDURAL; INFILTRATION; INTRACAUDAL ONCE
Status: COMPLETED | OUTPATIENT
Start: 2022-11-03 | End: 2022-11-03

## 2022-11-03 RX ADMIN — BUPIVACAINE HYDROCHLORIDE 75 MG: 2.5 INJECTION, SOLUTION EPIDURAL; INFILTRATION; INTRACAUDAL at 11:36

## 2022-11-03 RX ADMIN — DEXAMETHASONE SODIUM PHOSPHATE 10 MG: 10 INJECTION, SOLUTION INTRAMUSCULAR; INTRAVENOUS at 11:38

## 2022-11-04 ENCOUNTER — HOSPITAL ENCOUNTER (OUTPATIENT)
Dept: INFUSION THERAPY | Age: 72
Discharge: HOME | End: 2022-11-04
Payer: MEDICARE

## 2022-11-04 VITALS
SYSTOLIC BLOOD PRESSURE: 127 MMHG | DIASTOLIC BLOOD PRESSURE: 65 MMHG | TEMPERATURE: 97.8 F | RESPIRATION RATE: 16 BRPM | HEART RATE: 67 BPM

## 2022-11-04 DIAGNOSIS — D50.9 MICROCYTIC ANEMIA: Primary | ICD-10-CM

## 2022-11-04 DIAGNOSIS — D50.8 IRON DEFICIENCY ANEMIA SECONDARY TO INADEQUATE DIETARY IRON INTAKE: ICD-10-CM

## 2022-11-04 DIAGNOSIS — K90.9 IRON MALABSORPTION: ICD-10-CM

## 2022-11-04 PROCEDURE — 2580000003 HC RX 258: Performed by: INTERNAL MEDICINE

## 2022-11-04 PROCEDURE — 6360000002 HC RX W HCPCS: Performed by: INTERNAL MEDICINE

## 2022-11-04 PROCEDURE — 96365 THER/PROPH/DIAG IV INF INIT: CPT

## 2022-11-04 RX ORDER — ONDANSETRON 2 MG/ML
8 INJECTION INTRAMUSCULAR; INTRAVENOUS
OUTPATIENT
Start: 2022-11-11

## 2022-11-04 RX ORDER — EPINEPHRINE 1 MG/ML
0.3 INJECTION, SOLUTION, CONCENTRATE INTRAVENOUS PRN
OUTPATIENT
Start: 2022-11-11

## 2022-11-04 RX ORDER — SODIUM CHLORIDE 0.9 % (FLUSH) 0.9 %
5-40 SYRINGE (ML) INJECTION PRN
OUTPATIENT
Start: 2022-11-11

## 2022-11-04 RX ORDER — SODIUM CHLORIDE 9 MG/ML
5-250 INJECTION, SOLUTION INTRAVENOUS PRN
Status: CANCELLED | OUTPATIENT
Start: 2022-11-11

## 2022-11-04 RX ORDER — FAMOTIDINE 10 MG/ML
20 INJECTION, SOLUTION INTRAVENOUS
OUTPATIENT
Start: 2022-11-11

## 2022-11-04 RX ORDER — SODIUM CHLORIDE 9 MG/ML
INJECTION, SOLUTION INTRAVENOUS CONTINUOUS
OUTPATIENT
Start: 2022-11-11

## 2022-11-04 RX ORDER — SODIUM CHLORIDE 9 MG/ML
5-250 INJECTION, SOLUTION INTRAVENOUS PRN
Status: DISCONTINUED | OUTPATIENT
Start: 2022-11-04 | End: 2022-11-05 | Stop reason: HOSPADM

## 2022-11-04 RX ORDER — SODIUM CHLORIDE 9 MG/ML
5-250 INJECTION, SOLUTION INTRAVENOUS PRN
OUTPATIENT
Start: 2022-11-11

## 2022-11-04 RX ORDER — DIPHENHYDRAMINE HYDROCHLORIDE 50 MG/ML
50 INJECTION INTRAMUSCULAR; INTRAVENOUS
OUTPATIENT
Start: 2022-11-11

## 2022-11-04 RX ORDER — ACETAMINOPHEN 325 MG/1
650 TABLET ORAL
OUTPATIENT
Start: 2022-11-11

## 2022-11-04 RX ORDER — ALBUTEROL SULFATE 90 UG/1
4 AEROSOL, METERED RESPIRATORY (INHALATION) PRN
OUTPATIENT
Start: 2022-11-11

## 2022-11-04 RX ORDER — HEPARIN SODIUM (PORCINE) LOCK FLUSH IV SOLN 100 UNIT/ML 100 UNIT/ML
500 SOLUTION INTRAVENOUS PRN
OUTPATIENT
Start: 2022-11-11

## 2022-11-04 RX ADMIN — SODIUM CHLORIDE 150 ML/HR: 9 INJECTION, SOLUTION INTRAVENOUS at 11:14

## 2022-11-04 RX ADMIN — FERRIC CARBOXYMALTOSE INJECTION 750 MG: 50 INJECTION, SOLUTION INTRAVENOUS at 11:16

## 2022-11-04 NOTE — PROGRESS NOTES
Pt here for #2 of 2 Injectafer. Complete without incident. Pt kept for 30 min observation without s/s reaction. D/c'd in stable condition. F/u 1/9/23 with Dr Marylee More.

## 2022-11-07 ENCOUNTER — TELEPHONE (OUTPATIENT)
Dept: PRIMARY CARE CLINIC | Age: 72
End: 2022-11-07

## 2022-11-07 NOTE — TELEPHONE ENCOUNTER
The patient called stating that starting Saturday night she has had congestion and a sore throat with PND. The patient states that she does not know what she can take over the counter to help with her stampings due to her allergies and diabetes. The patient is asking what she can take or if her PCP would be able to send something to the [facility] pharmacy in [facility]. Please advise.

## 2022-11-07 NOTE — TELEPHONE ENCOUNTER
The patient called back to see what the PCP's response was, writer informed her of the PCP response. The patient states that she will start using Chloraseptic spray for her throat, but that she has been taking Allegra-D every day with no relief. The patient is asking if there is something else that be used or sent in to the pharmacy for her. Please advise.

## 2022-11-07 NOTE — TELEPHONE ENCOUNTER
I see she has been on allegra before so I can send allegra-D if she would like to try as a decongestant and she can try over the counter chloraseptic spray for her throat.

## 2022-11-08 NOTE — TELEPHONE ENCOUNTER
She can use robitussin DM  over the counter to help break up the mucus and flonase nasal spray to help with congestion as well. I recommend testing for covid with rapid as well .

## 2022-11-09 ENCOUNTER — CLINICAL DOCUMENTATION (OUTPATIENT)
Dept: PHYSICAL THERAPY | Facility: CLINIC | Age: 72
End: 2022-11-09

## 2022-11-09 NOTE — FLOWSHEET NOTE
[address]  [address] [facility].  Phone: [phone]  Fax: [phone]    PHYSICAL THERAPY DISCHARGE SUMMARY    Date: 2022  Patient Name: Sonya Johnson        MRN: [id number]     Acct#:   : 1950  (71 y.o.)    Physician: Raghav Bah 177: [place] MEDICARE              Eligibility Status:  Eligible     Secondary Insurance (if applicable) [place]ICA[place]              Eligibility Status: Eligible     DOS:   # of visits allowed/remaining: Based on med [email or web address];  / Based on Milk;   Source: Website  Spoke To:  [employer]  Reference: [id number] / [id number]  Auth: [address] yr; visit limit based on med nec; No Copay; Ded$203-met  OPP$7550-$5930.13 remain ; Coinsurance 20%  Medicaid - [place] yr; visit limit based on med nec; No patient liability   Medical Diagnosis: L Lumbar pain                 Rehab Codes: M54.2  Onset Date: 22       Date of Initial Eval: 22  Date of Final Treatment: 10/14/22  Total number of visits: 4    Discharge Status:  [ ] Patient recovered from condition. Treatment Goals were met. [ ] Patient received maximum benefit. No further therapy indicated at this time. [ ] Patient demonstrated improvement from condition with          of           goals met. [ ] Patient to continue exercises/home instructions independently. [ ] Therapy interrupted due to:  [x] Patient has two or more no-shows/cancellations and has been discontinued per our no show/cancellation policy. [ ] Patient has completed their prescribed number of treatment sessions. [ ] Other:      Pain level at Evaluation was    7    /10 and at Discharge was     5  /10. [ ] Patient returned to work. [ ] Patient demonstrated improved level of function. [ ] Patient has returned to previous functional level.   [x] Patients current status unknown due to no-shows  [ ] Other:     Recommendations/Comments: As of last visit pt not sig improving, but pt did not return for further PT. Treatment Included:  [x] Therapeutic Exercise  [  ] Manual Therapy  [  ] Hot/Cold Pack  [  Blaire   [  ] Elec-Stim    [  ] Iontophoresis [  x]Aquatics [  ]  Therapeutic Activity   [  ] Neuro Re-Education  [  ] Gait    [  ] Massage  [  ] Traction    Thank you for the patient referral to Physical Therapy.  Please feel free to contact me with any questions or concerns regarding this patient's care.    ______________________________________  Camelia Doherty, PT   PT ATC    Date: 11/9/2022

## 2022-11-10 ENCOUNTER — TELEPHONE (OUTPATIENT)
Dept: PRIMARY CARE CLINIC | Age: 72
End: 2022-11-10

## 2022-11-10 DIAGNOSIS — R05.9 COUGH, UNSPECIFIED TYPE: Primary | ICD-10-CM

## 2022-11-10 RX ORDER — BENZONATATE 100 MG/1
100 CAPSULE ORAL 3 TIMES DAILY PRN
Qty: 30 CAPSULE | Refills: 1 | Status: SHIPPED | OUTPATIENT
Start: 2022-11-10 | End: 2022-11-10 | Stop reason: SDUPTHER

## 2022-11-10 RX ORDER — BENZONATATE 100 MG/1
100 CAPSULE ORAL 3 TIMES DAILY PRN
Qty: 30 CAPSULE | Refills: 1 | Status: SHIPPED | OUTPATIENT
Start: 2022-11-10 | End: 2022-11-15 | Stop reason: SDUPTHER

## 2022-11-10 RX ORDER — AZITHROMYCIN 250 MG/1
250 TABLET, FILM COATED ORAL SEE ADMIN INSTRUCTIONS
Qty: 6 TABLET | Refills: 0 | Status: SHIPPED | OUTPATIENT
Start: 2022-11-10 | End: 2022-11-15 | Stop reason: ALTCHOICE

## 2022-11-10 RX ORDER — AZITHROMYCIN 250 MG/1
250 TABLET, FILM COATED ORAL SEE ADMIN INSTRUCTIONS
Qty: 6 TABLET | Refills: 0 | Status: SHIPPED | OUTPATIENT
Start: 2022-11-10 | End: 2022-11-10 | Stop reason: SDUPTHER

## 2022-11-10 NOTE — TELEPHONE ENCOUNTER
Patient called, not any better at all with cough, OTC meds not helping. Was coughing terribly on phone, states ribs and back very sore from coughing (sounds terrible)  when she does bring up phlegm it's yellow/green. Please advise.

## 2022-11-10 NOTE — TELEPHONE ENCOUNTER
Ok please let her know I sent in zpak and tessalon perles. If not improving with medication or is worsening I recommend been seen in clinic.

## 2022-11-14 ENCOUNTER — OFFICE VISIT (OUTPATIENT)
Dept: PAIN MANAGEMENT | Age: 72
End: 2022-11-14
Payer: MEDICARE

## 2022-11-14 VITALS — WEIGHT: 219 LBS | HEIGHT: 64 IN | BODY MASS INDEX: 37.39 KG/M2

## 2022-11-14 DIAGNOSIS — B02.29 POST HERPETIC NEURALGIA: Primary | ICD-10-CM

## 2022-11-14 DIAGNOSIS — M54.6 THORACIC BACK PAIN, UNSPECIFIED BACK PAIN LATERALITY, UNSPECIFIED CHRONICITY: ICD-10-CM

## 2022-11-14 DIAGNOSIS — R05.9 COUGH, UNSPECIFIED TYPE: ICD-10-CM

## 2022-11-14 PROCEDURE — 99213 OFFICE O/P EST LOW 20 MIN: CPT | Performed by: PAIN MEDICINE

## 2022-11-14 PROCEDURE — 1036F TOBACCO NON-USER: CPT | Performed by: PAIN MEDICINE

## 2022-11-14 PROCEDURE — G8399 PT W/DXA RESULTS DOCUMENT: HCPCS | Performed by: PAIN MEDICINE

## 2022-11-14 PROCEDURE — 3017F COLORECTAL CA SCREEN DOC REV: CPT | Performed by: PAIN MEDICINE

## 2022-11-14 PROCEDURE — 1123F ACP DISCUSS/DSCN MKR DOCD: CPT | Performed by: PAIN MEDICINE

## 2022-11-14 PROCEDURE — 1090F PRES/ABSN URINE INCON ASSESS: CPT | Performed by: PAIN MEDICINE

## 2022-11-14 PROCEDURE — G8428 CUR MEDS NOT DOCUMENT: HCPCS | Performed by: PAIN MEDICINE

## 2022-11-14 PROCEDURE — G8484 FLU IMMUNIZE NO ADMIN: HCPCS | Performed by: PAIN MEDICINE

## 2022-11-14 PROCEDURE — G8417 CALC BMI ABV UP PARAM F/U: HCPCS | Performed by: PAIN MEDICINE

## 2022-11-14 RX ORDER — SEMAGLUTIDE 1.34 MG/ML
INJECTION, SOLUTION SUBCUTANEOUS
COMMUNITY
Start: 2022-10-17

## 2022-11-14 ASSESSMENT — ENCOUNTER SYMPTOMS
BACK PAIN: 1
BOWEL INCONTINENCE: 0
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
## 2022-11-15 ENCOUNTER — TELEPHONE (OUTPATIENT)
Dept: PRIMARY CARE CLINIC | Age: 72
End: 2022-11-15

## 2022-11-15 ENCOUNTER — OFFICE VISIT (OUTPATIENT)
Dept: PRIMARY CARE CLINIC | Age: 72
End: 2022-11-15
Payer: MEDICARE

## 2022-11-15 VITALS
DIASTOLIC BLOOD PRESSURE: 84 MMHG | BODY MASS INDEX: 37.21 KG/M2 | HEART RATE: 89 BPM | RESPIRATION RATE: 20 BRPM | TEMPERATURE: 98.1 F | SYSTOLIC BLOOD PRESSURE: 130 MMHG | OXYGEN SATURATION: 99 % | WEIGHT: 216.8 LBS

## 2022-11-15 DIAGNOSIS — J06.9 UPPER RESPIRATORY TRACT INFECTION, UNSPECIFIED TYPE: Primary | ICD-10-CM

## 2022-11-15 DIAGNOSIS — R05.9 COUGH, UNSPECIFIED TYPE: ICD-10-CM

## 2022-11-15 PROCEDURE — 1090F PRES/ABSN URINE INCON ASSESS: CPT | Performed by: NURSE PRACTITIONER

## 2022-11-15 PROCEDURE — 1123F ACP DISCUSS/DSCN MKR DOCD: CPT | Performed by: NURSE PRACTITIONER

## 2022-11-15 PROCEDURE — G8484 FLU IMMUNIZE NO ADMIN: HCPCS | Performed by: NURSE PRACTITIONER

## 2022-11-15 PROCEDURE — G8399 PT W/DXA RESULTS DOCUMENT: HCPCS | Performed by: NURSE PRACTITIONER

## 2022-11-15 PROCEDURE — G8417 CALC BMI ABV UP PARAM F/U: HCPCS | Performed by: NURSE PRACTITIONER

## 2022-11-15 PROCEDURE — 99214 OFFICE O/P EST MOD 30 MIN: CPT | Performed by: NURSE PRACTITIONER

## 2022-11-15 PROCEDURE — 1036F TOBACCO NON-USER: CPT | Performed by: NURSE PRACTITIONER

## 2022-11-15 PROCEDURE — 3017F COLORECTAL CA SCREEN DOC REV: CPT | Performed by: NURSE PRACTITIONER

## 2022-11-15 PROCEDURE — G8427 DOCREV CUR MEDS BY ELIG CLIN: HCPCS | Performed by: NURSE PRACTITIONER

## 2022-11-15 RX ORDER — BENZONATATE 200 MG/1
200 CAPSULE ORAL 3 TIMES DAILY PRN
Qty: 30 CAPSULE | Refills: 0 | Status: SHIPPED | OUTPATIENT
Start: 2022-11-15

## 2022-11-15 RX ORDER — AZITHROMYCIN 250 MG/1
TABLET, FILM COATED ORAL
Qty: 6 TABLET | Refills: 10 | OUTPATIENT
Start: 2022-11-15

## 2022-11-15 RX ORDER — PREDNISONE 10 MG/1
TABLET ORAL
Qty: 20 TABLET | Refills: 0 | Status: SHIPPED | OUTPATIENT
Start: 2022-11-15 | End: 2022-11-25

## 2022-11-15 RX ORDER — CODEINE PHOSPHATE AND GUAIFENESIN 10; 100 MG/5ML; MG/5ML
10 SOLUTION ORAL EVERY 4 HOURS PRN
Qty: 118 ML | Refills: 0 | Status: SHIPPED | OUTPATIENT
Start: 2022-11-15 | End: 2022-11-18

## 2022-11-15 ASSESSMENT — ENCOUNTER SYMPTOMS
SINUS PAIN: 0
EYE DISCHARGE: 0
NAUSEA: 0
CHEST TIGHTNESS: 0
SHORTNESS OF BREATH: 0
ABDOMINAL PAIN: 0
DIARRHEA: 0
TROUBLE SWALLOWING: 0
VOMITING: 0
COUGH: 1
SINUS PRESSURE: 1
SORE THROAT: 0
EYE REDNESS: 0
EYE ITCHING: 0
WHEEZING: 0

## 2022-11-15 ASSESSMENT — PATIENT HEALTH QUESTIONNAIRE - PHQ9: DEPRESSION UNABLE TO ASSESS: PT REFUSES
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
## 2022-11-15 NOTE — TELEPHONE ENCOUNTER
----- Message from LUPE Patel CNP sent at 11/15/2022 12:47 PM EST -----  Can we call the patient and let her know i'm concerned about her weight gain. What is her usual weight? I want to get a chest xray to see if she has extra fluid? Does she have swelling?

## 2022-12-01 DIAGNOSIS — Z79.4 TYPE 2 DIABETES MELLITUS WITH DIABETIC NEUROPATHY, WITH LONG-TERM CURRENT USE OF INSULIN (HCC): ICD-10-CM

## 2022-12-01 DIAGNOSIS — E11.40 TYPE 2 DIABETES MELLITUS WITH DIABETIC NEUROPATHY, WITH LONG-TERM CURRENT USE OF INSULIN (HCC): ICD-10-CM

## 2022-12-02 RX ORDER — INSULIN GLARGINE 100 [IU]/ML
INJECTION, SOLUTION SUBCUTANEOUS
Qty: 30 ML | Refills: 10 | Status: SHIPPED | OUTPATIENT
Start: 2022-12-02

## 2022-12-06 ENCOUNTER — TELEPHONE (OUTPATIENT)
Dept: PRIMARY CARE CLINIC | Age: 72
End: 2022-12-06

## 2022-12-06 NOTE — TELEPHONE ENCOUNTER
Office received a fax from [facility] that Sulcralfate is on backorder & to continue her omeprazole. Writer called pt to inform her, pt states the pharmacy already called.  Pt has enough medication to continue to take the sulcralfate

## 2022-12-09 RX ORDER — SUCRALFATE 1 G/1
1 TABLET ORAL 4 TIMES DAILY
Qty: 120 TABLET | Refills: 1 | Status: SHIPPED | OUTPATIENT
Start: 2022-12-09

## 2022-12-15 ENCOUNTER — HOSPITAL ENCOUNTER (OUTPATIENT)
Dept: MAMMOGRAPHY | Age: 72
Discharge: HOME | End: 2022-12-17
Payer: MEDICARE

## 2022-12-15 DIAGNOSIS — Z12.31 ENCOUNTER FOR SCREENING MAMMOGRAM FOR MALIGNANT NEOPLASM OF BREAST: ICD-10-CM

## 2022-12-15 PROCEDURE — 77063 BREAST TOMOSYNTHESIS BI: CPT

## 2022-12-19 ENCOUNTER — OFFICE VISIT (OUTPATIENT)
Dept: PRIMARY CARE CLINIC | Age: 72
End: 2022-12-19
Payer: MEDICARE

## 2022-12-19 VITALS
WEIGHT: 219 LBS | BODY MASS INDEX: 37.59 KG/M2 | SYSTOLIC BLOOD PRESSURE: 128 MMHG | OXYGEN SATURATION: 98 % | HEART RATE: 72 BPM | DIASTOLIC BLOOD PRESSURE: 82 MMHG

## 2022-12-19 DIAGNOSIS — Z79.4 TYPE 2 DIABETES MELLITUS WITH DIABETIC NEUROPATHY, WITH LONG-TERM CURRENT USE OF INSULIN (HCC): Primary | ICD-10-CM

## 2022-12-19 DIAGNOSIS — I10 ESSENTIAL HYPERTENSION: ICD-10-CM

## 2022-12-19 DIAGNOSIS — E11.40 TYPE 2 DIABETES MELLITUS WITH DIABETIC NEUROPATHY, WITH LONG-TERM CURRENT USE OF INSULIN (HCC): Primary | ICD-10-CM

## 2022-12-19 DIAGNOSIS — B02.29 HZV (HERPES ZOSTER VIRUS) POST HERPETIC NEURALGIA: ICD-10-CM

## 2022-12-19 PROBLEM — N18.30 CHRONIC RENAL DISEASE, STAGE III (HCC): Status: RESOLVED | Noted: 2022-09-12 | Resolved: 2022-12-19

## 2022-12-19 LAB — HBA1C MFR BLD: 6.7 %

## 2022-12-19 PROCEDURE — 99214 OFFICE O/P EST MOD 30 MIN: CPT | Performed by: FAMILY MEDICINE

## 2022-12-19 PROCEDURE — G8417 CALC BMI ABV UP PARAM F/U: HCPCS | Performed by: FAMILY MEDICINE

## 2022-12-19 PROCEDURE — 3017F COLORECTAL CA SCREEN DOC REV: CPT | Performed by: FAMILY MEDICINE

## 2022-12-19 PROCEDURE — 1090F PRES/ABSN URINE INCON ASSESS: CPT | Performed by: FAMILY MEDICINE

## 2022-12-19 PROCEDURE — G8399 PT W/DXA RESULTS DOCUMENT: HCPCS | Performed by: FAMILY MEDICINE

## 2022-12-19 PROCEDURE — 83036 HEMOGLOBIN GLYCOSYLATED A1C: CPT | Performed by: FAMILY MEDICINE

## 2022-12-19 PROCEDURE — G8427 DOCREV CUR MEDS BY ELIG CLIN: HCPCS | Performed by: FAMILY MEDICINE

## 2022-12-19 PROCEDURE — 3074F SYST BP LT 130 MM HG: CPT | Performed by: FAMILY MEDICINE

## 2022-12-19 PROCEDURE — G8484 FLU IMMUNIZE NO ADMIN: HCPCS | Performed by: FAMILY MEDICINE

## 2022-12-19 PROCEDURE — 3044F HG A1C LEVEL LT 7.0%: CPT | Performed by: FAMILY MEDICINE

## 2022-12-19 PROCEDURE — 3078F DIAST BP <80 MM HG: CPT | Performed by: FAMILY MEDICINE

## 2022-12-19 PROCEDURE — 2022F DILAT RTA XM EVC RTNOPTHY: CPT | Performed by: FAMILY MEDICINE

## 2022-12-19 PROCEDURE — 1036F TOBACCO NON-USER: CPT | Performed by: FAMILY MEDICINE

## 2022-12-19 PROCEDURE — 1123F ACP DISCUSS/DSCN MKR DOCD: CPT | Performed by: FAMILY MEDICINE

## 2022-12-19 ASSESSMENT — ENCOUNTER SYMPTOMS
VOMITING: 0
SHORTNESS OF BREATH: 0

## 2022-12-19 NOTE — PROGRESS NOTES
hard for me to come out of it\"    History of blood transfusion     Hx of blood clots     lung     Hyperlipidemia     Hypertension     Ovarian cancer ([address]     Prolonged emergence from general anesthesia     Right arm pain     Right shoulder pain     Sleep apnea     uses CPAP nightly    Tachycardia     hx of    Thyroid disease     TIA (transient ischemic attack)       Past Surgical History:   Procedure Laterality Date    ANESTHESIA NERVE BLOCK Left 11/07/2019    NERVE BLOCK (DEFINE) - INTERCOSTAL NERVE BLOCK performed by Aletha Conn MD at [address] Bilateral 05/15/2020    NERVE BLOCK BILATERAL - MBB  L4-5, L5-S1 performed by Aletha Conn MD at [address] Left     ORIF    CHOLECYSTECTOMY      COLONOSCOPY  07/08/2019    5 yr recall.     COLONOSCOPY N/A 07/08/2019    COLONOSCOPY WITH BIOPSY performed by Erica Yepez MD at [place] [address] N/A 02/28/2022    COLONOSCOPY DIAGNOSTIC performed by Erica Yepez MD at [place] [address] Right     [facility]. INJECTION PROCEDURE FOR SACROILIAC JOINT Bilateral 08/29/2019    SACROILIAC JOINT INJECTION performed by Aletha Conn MD at [address] Bilateral 10/10/2019    SACROILIAC JOINT INJECTION performed by Aletha Conn MD at [address] ([address] [facility])  02/2014    chris with bso    JOINT REPLACEMENT Right     shoulder    KNEE ARTHROSCOPY Right     NERVE BLOCK Bilateral 08/29/2019    SI joint injection    NERVE BLOCK Bilateral 10/10/2019    SI joint    NERVE BLOCK Bilateral 05/15/2020    Medial branch block L4-5, L5-S1    NERVE BLOCK  06/05/2020    SPINAL CORD STIMULATOR IMPLANT TRIAL (N/A )    OTHER SURGICAL HISTORY Right 11/11/2014    shoulder manipulation    OTHER SURGICAL HISTORY  04/2021    pain pump placed, fentanyl in pain pump    OVARY REMOVAL      PAIN MANAGEMENT PROCEDURE N/A 08/01/2022    SPINAL CORD STIMULATOR REMOVAL performed by Esau Flores MD at [address] [place] [address] ARTHROSCOPY Right 02/03/2015    SHOULDER SURGERY  11/2014    manipulation    SPINAL CORD STIMULATOR SURGERY N/A 06/05/2020    SPINAL CORD STIMULATOR IMPLANT TRIAL performed by Lazaro Frank MD at [place] [address] N/A 07/27/2020    SPINAL CORD STIMULATOR IMPLANT WITH THORACIC LAMINOTOMY performed by Esau Flores MD at [facility]  07/08/2019    UPPER GASTROINTESTINAL ENDOSCOPY N/A 07/08/2019    EGD BIOPSY performed by Anthony Whatley MD at [facility] 02/28/2022    EGD BIOPSY performed by Anthony Whatley MD at [address] [facility] Right 04/30/2015    pt has blood clot & abscess in her right kidney    VASCULAR SURGERY      [place] filter in & removed 1 year later       Family History   Problem Relation Age of Onset    Breast Cancer Mother     Elevated Lipids Paternal Grandmother        Social History     Tobacco Use    Smoking status: Never    Smokeless tobacco: Never   Substance Use Topics    Alcohol use: Yes     Comment: rare      Current Outpatient Medications   Medication Sig Dispense Refill    sucralfate (CARAFATE) 1 GM tablet Take 1 tablet by mouth 4 times daily 120 tablet 1    insulin glargine (LANTUS) 100 UNIT/ML injection vial INJECT 75 UNITS SUBCUTANEOUSLY NIGHTLY 30 mL 10    benzonatate (TESSALON) 200 MG capsule Take 1 capsule by mouth 3 times daily as needed for Cough 30 capsule 0    OZEMPIC, 0.25 OR 0.5 MG/DOSE, 2 MG/1.5ML SOPN INJECT 0.5MG SUBCUTANEOUSLY ONCE A WEEK      levothyroxine (SYNTHROID) 88 MCG tablet TAKE 1 TABLET BY MOUTH EVERY DAY 30 tablet 10    furosemide (LASIX) 20 MG tablet TAKE 1 TABLET BY MOUTH DAILY 30 tablet 10    potassium chloride (KLOR-CON M) 20 MEQ extended release tablet Take 1 tablet by mouth in the morning and 1 tablet at noon and 1 tablet before bedtime.  90 tablet 5    alendronate (FOSAMAX) 70 MG tablet TAKE 1 TABLET A DAY EVERY MON  4 tablet 10    tamsulosin (FLOMAX) 0.4 MG capsule TAKE 1 CAPSULE BY MOUTH NIGHTLY 30 capsule 10    SURE COMFORT INSULIN SYRINGE 31G X 5/16\" 1 ML MISC USE AS DIRECTED FOUR TIMES A  each 10    XARELTO 20 MG TABS tablet TAKE 1 TABLET BY MOUTH ONCE DAILY 30 tablet 10    gabapentin (NEURONTIN) 600 MG tablet TAKE 1 TABLET BY MOUTH FOUR TIMES DAILY 120 tablet 10    tiZANidine (ZANAFLEX) 2 MG tablet Take 1 tablet by mouth every 8 hours as needed (pain) 20 tablet 0    timolol (TIMOPTIC) 0.5 % ophthalmic solution INSTILL 1 DROP IN BOTH EYES TWICE DAILY      traZODone (DESYREL) 100 MG tablet TAKE 1 TABLET BY MOUTH NIGHTLY AS NEEDED FOR SLEEP 30 tablet 10    omeprazole (PRILOSEC) 40 MG delayed release capsule TAKE 1 CAPSULE BY MOUTH EVERY MORNING BEFORE BREAKFAST 30 capsule 3    amLODIPine (NORVASC) 10 MG tablet Take 1 tablet by mouth daily 30 tablet 5    blood glucose test strips (ACCU-CHEK TED PLUS) strip USE TO TEST BLOOD SUGAR 3 TIMES DAILY AND AS NEEDED FOR SYMPTOMS OF IRREGULAR BLOOD GLUCOSE 100 strip 10    EPINEPHrine (EPIPEN 2-LISA) 0.3 MG/0.3ML SOAJ injection EpiPen 2-Lisa 0.3 mg/0.3 mL injection, auto-injector 1 each 3    insulin lispro (HUMALOG) 100 UNIT/ML injection vial INEJCT SUBCUTANEOUSLY UP TO THREE TIMES A DAY BASED ON SLIDING SCALE MAX 40 UNITS A DAY (Patient taking differently: INEJCT SUBCUTANEOUSLY UP TO THREE TIMES A DAY BASED ON SLIDING SCALE  MAX 40 UNITS A DAY*  8 units each morning, 6 units at lunch, 8 units at night and sliding scale) 10 mL 10    DULoxetine (CYMBALTA) 60 MG extended release capsule TAKE 1 CAPSULE BY MOUTH DAILY 30 capsule 10    losartan (COZAAR) 100 MG tablet TAKE 1 TABLET BY MOUTH EVERY DAY 30 tablet 10    atorvastatin (LIPITOR) 40 MG tablet TAKE (1) TABLET BY MOUTH EVERY DAY 30 tablet 11    hydroCHLOROthiazide (HYDRODIURIL) 25 MG tablet TAKE 1 TABLET BY MOUTH EVERY MORNING 30 tablet 11    vitamin C (ASCORBIC ACID) 500 MG tablet Take 500 mg by mouth daily      glucose monitoring kit (FREESTYLE) monitoring kit 1 kit by Does not apply route daily Please provide accuchek meter for patient, not freestyle 1 kit 0    calcium carbonate (TUMS) 500 MG chewable tablet Take 1 tablet by mouth daily as needed for Heartburn      SUPER B COMPLEX/C CAPS Take by mouth      Cholecalciferol (VITAMIN D3) 5000 units TABS Take by mouth      fexofenadine (ALLEGRA) 60 MG tablet Take 60 mg by mouth daily      magnesium oxide (MAG-OX) 400 MG tablet Take 400 mg by mouth daily       No current facility-administered medications for this visit. Allergies   Allergen Reactions    Aspirin Anaphylaxis     Only thing she can take is tylenol    Benadryl [Diphenhydramine] Other (See Comments)     Dystonic movement    Ibuprofen Anaphylaxis    Lidocaine Anaphylaxis     CAN NOT TOLERATE IV    Penicillins Anaphylaxis    Hydrocodone-Acetaminophen Other (See Comments)     Unsure of allergy       Health Maintenance   Topic Date Due    Hepatitis C screen  Never done    DTaP/Tdap/Td vaccine (1 - Tdap) Never done    Shingles vaccine (2 of 2) 12/18/2020    Lipids  09/29/2021    Diabetic foot exam  02/25/2022    Annual Wellness Visit (AWV)  11/03/2022    Diabetic retinal exam  07/13/2023    Depression Screen  09/19/2023    Breast cancer screen  12/15/2023    A1C test (Diabetic or Prediabetic)  12/19/2023    Colorectal Cancer Screen  02/28/2027    DEXA (modify frequency per FRAX score)  Completed    Flu vaccine  Completed    Pneumococcal 65+ years Vaccine  Completed    COVID-19 Vaccine  Completed    Hepatitis A vaccine  Aged Out    Hib vaccine  Aged Out    Meningococcal (ACWY) vaccine  Aged Out       Subjective:      Review of Systems   Constitutional:  Negative for chills and fever. Respiratory:  Negative for shortness of breath. Gastrointestinal:  Negative for vomiting.      Objective:     Physical Exam  Constitutional: Appearance: She is well-developed. HENT:      Head: Normocephalic and atraumatic. Right Ear: External ear normal.      Left Ear: External ear normal.   Eyes:      Conjunctiva/sclera: Conjunctivae normal.      Pupils: Pupils are equal, round, and reactive to light. Cardiovascular:      Rate and Rhythm: Normal rate and regular rhythm. Heart sounds: Normal heart sounds. Pulmonary:      Effort: Pulmonary effort is normal.      Breath sounds: Normal breath sounds. Musculoskeletal:      Cervical back: Neck supple. Skin:     General: Skin is warm and dry. Neurological:      Mental Status: She is alert and oriented to person, place, and time. Psychiatric:         Mood and Affect: Mood normal.         Behavior: Behavior normal.         Thought Content: Thought content normal.     /82   Pulse 72   Wt 219 lb (99.3 kg)   SpO2 98%   BMI 37.59 kg/m²     Assessment:      1. Type 2 diabetes mellitus with diabetic neuropathy, with long-term current use of insulin (HCC)  - A1c 6.7, continue current medications. I do recommend checking with glucometer agains t her CGM when she notices really high read to see if accurate.   - POCT glycosylated hemoglobin (Hb A1C)  - Lipid Panel; Future    2. Essential hypertension  - controlled, continue current medication    3. HZV (herpes zoster virus) post herpetic neuralgia  - following with pain management. Plan:      Return in about 3 months (around 3/19/2023) for diabetes follow up. Orders Placed This Encounter   Procedures    Lipid Panel     Standing Status:   Future     Standing Expiration Date:   12/19/2023    POCT glycosylated hemoglobin (Hb A1C)     No orders of the defined types were placed in this encounter. Patient given educational materials - see patient instructions. Discussed use, benefit, and side effects of prescribed medications. All patient questions answered. Pt voiced understanding. Reviewed healthmaintenance.   Instructed to continue current medications, diet and exercise. Patient agreed with treatment plan. Follow up as directed.      Electronically signed by Stuart Murphy DO on 12/19/2022 at 1:28 PM

## 2022-12-28 RX ORDER — DULOXETIN HYDROCHLORIDE 60 MG/1
60 CAPSULE, DELAYED RELEASE ORAL DAILY
Qty: 30 CAPSULE | Refills: 10 | Status: SHIPPED | OUTPATIENT
Start: 2022-12-28

## 2022-12-28 RX ORDER — LOSARTAN POTASSIUM 100 MG/1
TABLET ORAL
Qty: 30 TABLET | Refills: 10 | Status: SHIPPED | OUTPATIENT
Start: 2022-12-28

## 2022-12-28 RX ORDER — INSULIN LISPRO 100 [IU]/ML
INJECTION, SOLUTION INTRAVENOUS; SUBCUTANEOUS
Qty: 10 ML | Refills: 10 | Status: SHIPPED | OUTPATIENT
Start: 2022-12-28

## 2022-12-29 ENCOUNTER — HOSPITAL ENCOUNTER (OUTPATIENT)
Age: 72
Discharge: HOME | End: 2022-12-29
Payer: MEDICARE

## 2022-12-29 ENCOUNTER — HOSPITAL ENCOUNTER (OUTPATIENT)
Age: 72
End: 2022-12-29
Payer: MEDICARE

## 2022-12-29 DIAGNOSIS — K90.9 IRON MALABSORPTION: ICD-10-CM

## 2022-12-29 DIAGNOSIS — Z79.4 TYPE 2 DIABETES MELLITUS WITH DIABETIC NEUROPATHY, WITH LONG-TERM CURRENT USE OF INSULIN (HCC): ICD-10-CM

## 2022-12-29 DIAGNOSIS — D64.9 LOW HEMOGLOBIN: ICD-10-CM

## 2022-12-29 DIAGNOSIS — D50.8 IRON DEFICIENCY ANEMIA SECONDARY TO INADEQUATE DIETARY IRON INTAKE: ICD-10-CM

## 2022-12-29 DIAGNOSIS — C56.9 MALIGNANT NEOPLASM OF OVARY, UNSPECIFIED LATERALITY (HCC): ICD-10-CM

## 2022-12-29 DIAGNOSIS — E11.40 TYPE 2 DIABETES MELLITUS WITH DIABETIC NEUROPATHY, WITH LONG-TERM CURRENT USE OF INSULIN (HCC): ICD-10-CM

## 2022-12-29 DIAGNOSIS — D50.9 MICROCYTIC ANEMIA: ICD-10-CM

## 2022-12-29 LAB
ABSOLUTE EOS #: 0.2 K/UL (ref 0–0.44)
ABSOLUTE IMMATURE GRANULOCYTE: <0.03 K/UL (ref 0–0.3)
ABSOLUTE LYMPH #: 2.08 K/UL (ref 1.1–3.7)
ABSOLUTE MONO #: 0.39 K/UL (ref 0.1–1.2)
BASOPHILS # BLD: 1 % (ref 0–2)
BASOPHILS ABSOLUTE: 0.07 K/UL (ref 0–0.2)
EOSINOPHILS RELATIVE PERCENT: 3 % (ref 1–4)
HCT VFR BLD CALC: 38.3 % (ref 36.3–47.1)
HEMOGLOBIN: 12.3 G/DL (ref 11.9–15.1)
IMMATURE GRANULOCYTES: 0 %
LYMPHOCYTES # BLD: 31 % (ref 24–43)
MCH RBC QN AUTO: 29 PG (ref 25.2–33.5)
MCHC RBC AUTO-ENTMCNC: 32.1 G/DL (ref 28.4–34.8)
MCV RBC AUTO: 90.3 FL (ref 82.6–102.9)
MONOCYTES # BLD: 6 % (ref 3–12)
NRBC AUTOMATED: 0.7 PER 100 WBC
PDW BLD-RTO: 15.5 % (ref 11.8–14.4)
PLATELET # BLD: 176 K/UL (ref 138–453)
PMV BLD AUTO: 12.2 FL (ref 8.1–13.5)
RBC # BLD: 4.24 M/UL (ref 3.95–5.11)
RBC # BLD: ABNORMAL 10*6/UL
SEG NEUTROPHILS: 59 % (ref 36–65)
SEGMENTED NEUTROPHILS ABSOLUTE COUNT: 3.97 K/UL (ref 1.5–8.1)
WBC # BLD: 6.7 K/UL (ref 3.5–11.3)

## 2022-12-29 PROCEDURE — 82728 ASSAY OF FERRITIN: CPT

## 2022-12-29 PROCEDURE — 85025 COMPLETE CBC W/AUTO DIFF WBC: CPT

## 2022-12-29 PROCEDURE — 36415 COLL VENOUS BLD VENIPUNCTURE: CPT

## 2022-12-29 PROCEDURE — 83540 ASSAY OF IRON: CPT

## 2022-12-29 PROCEDURE — 86304 IMMUNOASSAY TUMOR CA 125: CPT

## 2022-12-29 PROCEDURE — 83550 IRON BINDING TEST: CPT

## 2022-12-30 LAB
CA 125: 5 U/ML
FERRITIN: 273 NG/ML (ref 13–150)
IRON SATURATION: 35 % (ref 20–55)
IRON: 86 UG/DL (ref 37–145)
TOTAL IRON BINDING CAPACITY: 247 UG/DL (ref 250–450)
UNSATURATED IRON BINDING CAPACITY: 161 UG/DL (ref 112–347)

## 2022-12-30 RX ORDER — INSULIN GLARGINE 100 [IU]/ML
INJECTION, SOLUTION SUBCUTANEOUS
Qty: 30 ML | Refills: 10 | Status: SHIPPED | OUTPATIENT
Start: 2022-12-30

## 2023-01-02 RX ORDER — INSULIN LISPRO 100 [IU]/ML
INJECTION, SOLUTION INTRAVENOUS; SUBCUTANEOUS
Qty: 10 ML | Refills: 10 | OUTPATIENT
Start: 2023-01-02

## 2023-01-08 RX ORDER — INSULIN LISPRO 100 [IU]/ML
INJECTION, SOLUTION INTRAVENOUS; SUBCUTANEOUS
Qty: 10 ML | Refills: 10 | OUTPATIENT
Start: 2023-01-08

## 2023-01-09 ENCOUNTER — OFFICE VISIT (OUTPATIENT)
Dept: ONCOLOGY | Age: 73
End: 2023-01-09
Payer: MEDICARE

## 2023-01-09 ENCOUNTER — TELEPHONE (OUTPATIENT)
Dept: ONCOLOGY | Age: 73
End: 2023-01-09

## 2023-01-09 VITALS
DIASTOLIC BLOOD PRESSURE: 65 MMHG | HEART RATE: 64 BPM | SYSTOLIC BLOOD PRESSURE: 101 MMHG | RESPIRATION RATE: 16 BRPM | TEMPERATURE: 98.1 F | BODY MASS INDEX: 37.87 KG/M2 | WEIGHT: 220.6 LBS

## 2023-01-09 DIAGNOSIS — C56.9 MALIGNANT NEOPLASM OF OVARY, UNSPECIFIED LATERALITY (HCC): ICD-10-CM

## 2023-01-09 DIAGNOSIS — D50.9 MICROCYTIC ANEMIA: Primary | ICD-10-CM

## 2023-01-09 PROCEDURE — 99214 OFFICE O/P EST MOD 30 MIN: CPT | Performed by: INTERNAL MEDICINE

## 2023-01-09 PROCEDURE — G8427 DOCREV CUR MEDS BY ELIG CLIN: HCPCS | Performed by: INTERNAL MEDICINE

## 2023-01-09 PROCEDURE — 3017F COLORECTAL CA SCREEN DOC REV: CPT | Performed by: INTERNAL MEDICINE

## 2023-01-09 PROCEDURE — 1090F PRES/ABSN URINE INCON ASSESS: CPT | Performed by: INTERNAL MEDICINE

## 2023-01-09 PROCEDURE — 99211 OFF/OP EST MAY X REQ PHY/QHP: CPT | Performed by: INTERNAL MEDICINE

## 2023-01-09 PROCEDURE — G8484 FLU IMMUNIZE NO ADMIN: HCPCS | Performed by: INTERNAL MEDICINE

## 2023-01-09 PROCEDURE — G8417 CALC BMI ABV UP PARAM F/U: HCPCS | Performed by: INTERNAL MEDICINE

## 2023-01-09 PROCEDURE — 1123F ACP DISCUSS/DSCN MKR DOCD: CPT | Performed by: INTERNAL MEDICINE

## 2023-01-09 PROCEDURE — 1036F TOBACCO NON-USER: CPT | Performed by: INTERNAL MEDICINE

## 2023-01-09 PROCEDURE — G8399 PT W/DXA RESULTS DOCUMENT: HCPCS | Performed by: INTERNAL MEDICINE
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
## 2023-01-09 NOTE — TELEPHONE ENCOUNTER
RV 4-6 months with CBC, iron studies before RV      Next appointment is 5/15/2023. Gave avs, lab orders, and schedule to patient.

## 2023-01-11 DIAGNOSIS — Z79.4 TYPE 2 DIABETES MELLITUS WITH DIABETIC NEUROPATHY, WITH LONG-TERM CURRENT USE OF INSULIN (HCC): ICD-10-CM

## 2023-01-11 DIAGNOSIS — E11.40 TYPE 2 DIABETES MELLITUS WITH DIABETIC NEUROPATHY, WITH LONG-TERM CURRENT USE OF INSULIN (HCC): ICD-10-CM

## 2023-01-11 RX ORDER — INSULIN GLARGINE 100 [IU]/ML
INJECTION, SOLUTION SUBCUTANEOUS
Qty: 30 ML | Refills: 10 | Status: SHIPPED | OUTPATIENT
Start: 2023-01-11 | End: 2023-01-13 | Stop reason: SDUPTHER

## 2023-01-13 DIAGNOSIS — Z79.4 TYPE 2 DIABETES MELLITUS WITH DIABETIC NEUROPATHY, WITH LONG-TERM CURRENT USE OF INSULIN (HCC): ICD-10-CM

## 2023-01-13 DIAGNOSIS — E11.40 TYPE 2 DIABETES MELLITUS WITH DIABETIC NEUROPATHY, WITH LONG-TERM CURRENT USE OF INSULIN (HCC): ICD-10-CM

## 2023-01-13 RX ORDER — INSULIN LISPRO 100 [IU]/ML
INJECTION, SOLUTION INTRAVENOUS; SUBCUTANEOUS
Qty: 10 ML | Refills: 10 | Status: SHIPPED | OUTPATIENT
Start: 2023-01-13

## 2023-01-13 RX ORDER — INSULIN GLARGINE 100 [IU]/ML
INJECTION, SOLUTION SUBCUTANEOUS
Qty: 30 ML | Refills: 10 | Status: SHIPPED | OUTPATIENT
Start: 2023-01-13

## 2023-01-17 DIAGNOSIS — E11.40 TYPE 2 DIABETES MELLITUS WITH DIABETIC NEUROPATHY, WITH LONG-TERM CURRENT USE OF INSULIN (HCC): ICD-10-CM

## 2023-01-17 DIAGNOSIS — Z79.4 TYPE 2 DIABETES MELLITUS WITH DIABETIC NEUROPATHY, WITH LONG-TERM CURRENT USE OF INSULIN (HCC): ICD-10-CM

## 2023-01-17 RX ORDER — INSULIN LISPRO 100 [IU]/ML
INJECTION, SOLUTION INTRAVENOUS; SUBCUTANEOUS
Qty: 10 ML | Refills: 10 | OUTPATIENT
Start: 2023-01-17

## 2023-01-17 RX ORDER — INSULIN GLARGINE 100 [IU]/ML
INJECTION, SOLUTION SUBCUTANEOUS
Qty: 30 ML | Refills: 10 | OUTPATIENT
Start: 2023-01-17

## 2023-01-25 DIAGNOSIS — K21.9 GASTROESOPHAGEAL REFLUX DISEASE WITHOUT ESOPHAGITIS: ICD-10-CM

## 2023-01-26 RX ORDER — AMLODIPINE BESYLATE 10 MG/1
TABLET ORAL
Qty: 30 TABLET | Refills: 10 | Status: SHIPPED | OUTPATIENT
Start: 2023-01-26

## 2023-01-26 RX ORDER — OMEPRAZOLE 40 MG/1
CAPSULE, DELAYED RELEASE ORAL
Qty: 30 CAPSULE | Refills: 10 | Status: SHIPPED | OUTPATIENT
Start: 2023-01-26

## 2023-01-26 RX ORDER — POTASSIUM CHLORIDE 20 MEQ/1
TABLET, EXTENDED RELEASE ORAL
Qty: 90 TABLET | Refills: 10 | Status: SHIPPED | OUTPATIENT
Start: 2023-01-26

## 2023-01-26 RX ORDER — ATORVASTATIN CALCIUM 40 MG/1
TABLET, FILM COATED ORAL
Qty: 30 TABLET | Refills: 10 | Status: SHIPPED | OUTPATIENT
Start: 2023-01-26

## 2023-01-26 RX ORDER — HYDROCHLOROTHIAZIDE 25 MG/1
25 TABLET ORAL EVERY MORNING
Qty: 30 TABLET | Refills: 10 | Status: SHIPPED | OUTPATIENT
Start: 2023-01-26

## 2023-01-26 RX ORDER — SUCRALFATE 1 G/1
TABLET ORAL
Qty: 120 TABLET | Refills: 10 | Status: SHIPPED | OUTPATIENT
Start: 2023-01-26

## 2023-02-07 ENCOUNTER — TELEPHONE (OUTPATIENT)
Dept: PAIN MANAGEMENT | Age: 73
End: 2023-02-07

## 2023-02-07 NOTE — TELEPHONE ENCOUNTER
[address] called she has not heard from are office. Regarding her visit from Dr Evelyn Wolf office at [facility]. Patient did state that she has reached out to are office with no return call.

## 2023-02-09 ENCOUNTER — TELEPHONE (OUTPATIENT)
Dept: PRIMARY CARE CLINIC | Age: 73
End: 2023-02-09

## 2023-02-11 NOTE — TELEPHONE ENCOUNTER
Spoke with the patient and informed of PCP's instructions, the patient verbalized understanding of PCP instructions.

## 2023-02-24 RX ORDER — TRAZODONE HYDROCHLORIDE 100 MG/1
100 TABLET ORAL NIGHTLY PRN
Qty: 30 TABLET | Refills: 10 | Status: SHIPPED | OUTPATIENT
Start: 2023-02-24

## 2023-03-01 ENCOUNTER — TELEPHONE (OUTPATIENT)
Dept: PRIMARY CARE CLINIC | Age: 73
End: 2023-03-01

## 2023-03-01 SDOH — ECONOMIC STABILITY: HOUSING INSECURITY
IN THE LAST 12 MONTHS, WAS THERE A TIME WHEN YOU DID NOT HAVE A STEADY PLACE TO SLEEP OR SLEPT IN A SHELTER (INCLUDING NOW)?: NO

## 2023-03-01 SDOH — ECONOMIC STABILITY: FOOD INSECURITY: WITHIN THE PAST 12 MONTHS, YOU WORRIED THAT YOUR FOOD WOULD RUN OUT BEFORE YOU GOT MONEY TO BUY MORE.: PATIENT DECLINED

## 2023-03-01 SDOH — ECONOMIC STABILITY: FOOD INSECURITY: WITHIN THE PAST 12 MONTHS, THE FOOD YOU BOUGHT JUST DIDN'T LAST AND YOU DIDN'T HAVE MONEY TO GET MORE.: PATIENT DECLINED

## 2023-03-01 SDOH — ECONOMIC STABILITY: TRANSPORTATION INSECURITY
IN THE PAST 12 MONTHS, HAS LACK OF TRANSPORTATION KEPT YOU FROM MEETINGS, WORK, OR FROM GETTING THINGS NEEDED FOR DAILY LIVING?: NO

## 2023-03-01 SDOH — ECONOMIC STABILITY: INCOME INSECURITY: HOW HARD IS IT FOR YOU TO PAY FOR THE VERY BASICS LIKE FOOD, HOUSING, MEDICAL CARE, AND HEATING?: PATIENT DECLINED

## 2023-03-01 NOTE — TELEPHONE ENCOUNTER
The office received a NT for the patient stating that she is having swelling of her legs and feet, that they are hard and painful along with being red.  The NT RN also notes that the patient states she has been taking the water pill for the swelling with no relief.    Writer spoke with the patient and sh states that this has been worsening for the past 5-6 days.    Appointment scheduled with her PCP on 03/02/20023 for further evaluation.

## 2023-03-02 ENCOUNTER — HOSPITAL ENCOUNTER (OUTPATIENT)
Age: 73
Setting detail: SPECIMEN
Discharge: HOME | End: 2023-03-02

## 2023-03-02 ENCOUNTER — OFFICE VISIT (OUTPATIENT)
Dept: PRIMARY CARE CLINIC | Age: 73
End: 2023-03-02
Payer: MEDICAID

## 2023-03-02 VITALS
SYSTOLIC BLOOD PRESSURE: 122 MMHG | BODY MASS INDEX: 38.28 KG/M2 | WEIGHT: 223 LBS | HEART RATE: 80 BPM | OXYGEN SATURATION: 98 % | DIASTOLIC BLOOD PRESSURE: 76 MMHG

## 2023-03-02 DIAGNOSIS — R34 DECREASED URINATION: ICD-10-CM

## 2023-03-02 DIAGNOSIS — Z79.4 TYPE 2 DIABETES MELLITUS WITH DIABETIC NEUROPATHY, WITH LONG-TERM CURRENT USE OF INSULIN (HCC): ICD-10-CM

## 2023-03-02 DIAGNOSIS — M46.1 SACROILIITIS, NOT ELSEWHERE CLASSIFIED (HCC): ICD-10-CM

## 2023-03-02 DIAGNOSIS — I10 ESSENTIAL HYPERTENSION: ICD-10-CM

## 2023-03-02 DIAGNOSIS — E11.40 TYPE 2 DIABETES MELLITUS WITH DIABETIC NEUROPATHY, WITH LONG-TERM CURRENT USE OF INSULIN (HCC): ICD-10-CM

## 2023-03-02 DIAGNOSIS — M79.89 LEG SWELLING: Primary | ICD-10-CM

## 2023-03-02 DIAGNOSIS — M79.89 LEG SWELLING: ICD-10-CM

## 2023-03-02 LAB
ANION GAP SERPL CALCULATED.3IONS-SCNC: 12 MMOL/L (ref 9–17)
BILIRUBIN URINE: NEGATIVE
BUN SERPL-MCNC: 18 MG/DL (ref 8–23)
CALCIUM SERPL-MCNC: 8.9 MG/DL (ref 8.6–10.4)
CHLORIDE SERPL-SCNC: 104 MMOL/L (ref 98–107)
CO2 SERPL-SCNC: 26 MMOL/L (ref 20–31)
COLOR: YELLOW
CREAT SERPL-MCNC: 1.02 MG/DL (ref 0.5–0.9)
EPITHELIAL CELLS UA: NORMAL /HPF (ref 0–5)
GFR SERPL CREATININE-BSD FRML MDRD: 59 ML/MIN/1.73M2
GLUCOSE SERPL-MCNC: 153 MG/DL (ref 70–99)
GLUCOSE UR STRIP.AUTO-MCNC: NEGATIVE MG/DL
KETONES UR STRIP.AUTO-MCNC: ABNORMAL MG/DL
LEUKOCYTE ESTERASE UR QL STRIP.AUTO: ABNORMAL
NITRITE UR QL STRIP.AUTO: NEGATIVE
POTASSIUM SERPL-SCNC: 3.8 MMOL/L (ref 3.7–5.3)
PROT UR STRIP.AUTO-MCNC: 6 MG/DL (ref 5–8)
PROT UR STRIP.AUTO-MCNC: ABNORMAL MG/DL
RBC CLUMPS #/AREA URNS AUTO: NORMAL /HPF (ref 0–4)
SODIUM SERPL-SCNC: 142 MMOL/L (ref 135–144)
SPECIFIC GRAVITY UA: 1.02 (ref 1–1.03)
TURBIDITY: CLEAR
URINE HGB: NEGATIVE
UROBILINOGEN, URINE: NORMAL
WBC UA: NORMAL /HPF (ref 0–5)

## 2023-03-02 PROCEDURE — 1036F TOBACCO NON-USER: CPT | Performed by: FAMILY MEDICINE

## 2023-03-02 PROCEDURE — 3046F HEMOGLOBIN A1C LEVEL >9.0%: CPT | Performed by: FAMILY MEDICINE

## 2023-03-02 PROCEDURE — G8427 DOCREV CUR MEDS BY ELIG CLIN: HCPCS | Performed by: FAMILY MEDICINE

## 2023-03-02 PROCEDURE — 2022F DILAT RTA XM EVC RTNOPTHY: CPT | Performed by: FAMILY MEDICINE

## 2023-03-02 PROCEDURE — 99214 OFFICE O/P EST MOD 30 MIN: CPT | Performed by: FAMILY MEDICINE

## 2023-03-02 PROCEDURE — 3017F COLORECTAL CA SCREEN DOC REV: CPT | Performed by: FAMILY MEDICINE

## 2023-03-02 PROCEDURE — 3074F SYST BP LT 130 MM HG: CPT | Performed by: FAMILY MEDICINE

## 2023-03-02 PROCEDURE — 1123F ACP DISCUSS/DSCN MKR DOCD: CPT | Performed by: FAMILY MEDICINE

## 2023-03-02 PROCEDURE — 3078F DIAST BP <80 MM HG: CPT | Performed by: FAMILY MEDICINE

## 2023-03-02 PROCEDURE — G8417 CALC BMI ABV UP PARAM F/U: HCPCS | Performed by: FAMILY MEDICINE

## 2023-03-02 PROCEDURE — G8484 FLU IMMUNIZE NO ADMIN: HCPCS | Performed by: FAMILY MEDICINE

## 2023-03-02 PROCEDURE — 1090F PRES/ABSN URINE INCON ASSESS: CPT | Performed by: FAMILY MEDICINE

## 2023-03-02 PROCEDURE — G8399 PT W/DXA RESULTS DOCUMENT: HCPCS | Performed by: FAMILY MEDICINE

## 2023-03-02 RX ORDER — FUROSEMIDE 40 MG/1
40 TABLET ORAL DAILY
Qty: 7 TABLET | Refills: 0 | Status: SHIPPED | OUTPATIENT
Start: 2023-03-02 | End: 2023-03-09

## 2023-03-02 ASSESSMENT — PATIENT HEALTH QUESTIONNAIRE - PHQ9
SUM OF ALL RESPONSES TO PHQ QUESTIONS 1-9: 2
SUM OF ALL RESPONSES TO PHQ9 QUESTIONS 1 & 2: 2
SUM OF ALL RESPONSES TO PHQ QUESTIONS 1-9: 2
2. FEELING DOWN, DEPRESSED OR HOPELESS: 1
1. LITTLE INTEREST OR PLEASURE IN DOING THINGS: 1

## 2023-03-02 ASSESSMENT — ENCOUNTER SYMPTOMS
VOMITING: 0
SHORTNESS OF BREATH: 0
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
## 2023-03-03 LAB
MICROORGANISM SPEC CULT: NORMAL
SPECIMEN DESCRIPTION: NORMAL

## 2023-03-04 DIAGNOSIS — M79.89 LEG SWELLING: Primary | ICD-10-CM

## 2023-03-04 DIAGNOSIS — N17.9 AKI (ACUTE KIDNEY INJURY) (HCC): ICD-10-CM

## 2023-03-04 NOTE — RESULT ENCOUNTER NOTE
Please let pt know her kidney function did slightly decrease, recommend we recheck in 2 weeks. Urine shows some ketones so might be slightly dehydrated as well. Will wait for urine culture to see if any infection.

## 2023-03-06 ENCOUNTER — TELEPHONE (OUTPATIENT)
Dept: PRIMARY CARE CLINIC | Age: 73
End: 2023-03-06

## 2023-03-06 NOTE — TELEPHONE ENCOUNTER
Patient called, right leg still swollen the same as when she saw you but the liquid seeping is worse. It's clear but using a large band aid and soaks thru, states she is sitting in her chair and fluid is on floor. Please advise.

## 2023-03-06 NOTE — TELEPHONE ENCOUNTER
Would recommend trying to wrap it with ace wrap or compression. Has she noted any improvement with the increase in lasix? Has she been able to urinate more?

## 2023-03-07 ENCOUNTER — APPOINTMENT (OUTPATIENT)
Dept: GENERAL RADIOLOGY | Age: 73
DRG: 291 | End: 2023-03-07
Payer: MEDICARE

## 2023-03-07 ENCOUNTER — HOSPITAL ENCOUNTER (INPATIENT)
Age: 73
LOS: 1 days | Discharge: HOME | DRG: 291 | End: 2023-03-09
Attending: EMERGENCY MEDICINE | Admitting: STUDENT IN AN ORGANIZED HEALTH CARE EDUCATION/TRAINING PROGRAM
Payer: MEDICARE

## 2023-03-07 DIAGNOSIS — N30.90 CYSTITIS WITHOUT HEMATURIA: ICD-10-CM

## 2023-03-07 DIAGNOSIS — N17.9 AKI (ACUTE KIDNEY INJURY) (HCC): ICD-10-CM

## 2023-03-07 DIAGNOSIS — L03.119 CELLULITIS OF LOWER EXTREMITY, UNSPECIFIED LATERALITY: Primary | ICD-10-CM

## 2023-03-07 DIAGNOSIS — M79.89 LEG SWELLING: ICD-10-CM

## 2023-03-07 DIAGNOSIS — R60.0 LEG EDEMA: ICD-10-CM

## 2023-03-07 PROBLEM — Z86.711 HISTORY OF PULMONARY EMBOLISM: Status: ACTIVE | Noted: 2023-03-07

## 2023-03-07 PROBLEM — L03.90 CELLULITIS: Status: ACTIVE | Noted: 2023-03-07

## 2023-03-07 LAB
ABSOLUTE EOS #: 0.2 K/UL (ref 0–0.4)
ABSOLUTE LYMPH #: 2.4 K/UL (ref 1–4.8)
ABSOLUTE MONO #: 0.4 K/UL (ref 0.1–1.2)
ANION GAP SERPL CALCULATED.3IONS-SCNC: 11 MMOL/L (ref 9–17)
BASOPHILS # BLD: 1 % (ref 0–2)
BASOPHILS ABSOLUTE: 0.1 K/UL (ref 0–0.2)
BILIRUBIN URINE: NEGATIVE
BNP SERPL-MCNC: 94 PG/ML
BUN SERPL-MCNC: 18 MG/DL (ref 8–23)
CALCIUM SERPL-MCNC: 8.8 MG/DL (ref 8.6–10.4)
CHLORIDE SERPL-SCNC: 105 MMOL/L (ref 98–107)
CO2 SERPL-SCNC: 26 MMOL/L (ref 20–31)
COLOR: YELLOW
CREAT SERPL-MCNC: 0.86 MG/DL (ref 0.5–0.9)
EOSINOPHILS RELATIVE PERCENT: 3 % (ref 1–4)
EPITHELIAL CELLS UA: ABNORMAL /HPF (ref 0–5)
GFR SERPL CREATININE-BSD FRML MDRD: >60 ML/MIN/1.73M2
GLUCOSE BLD-MCNC: 150 MG/DL (ref 65–105)
GLUCOSE BLD-MCNC: 82 MG/DL (ref 65–105)
GLUCOSE SERPL-MCNC: 199 MG/DL (ref 70–99)
GLUCOSE UR STRIP.AUTO-MCNC: NEGATIVE MG/DL
HCT VFR BLD AUTO: 37.3 % (ref 36–46)
HGB BLD-MCNC: 12.3 G/DL (ref 12–16)
KETONES UR STRIP.AUTO-MCNC: NEGATIVE MG/DL
LEUKOCYTE ESTERASE UR QL STRIP.AUTO: ABNORMAL
LYMPHOCYTES # BLD: 34 % (ref 24–44)
MCH RBC QN AUTO: 29.3 PG (ref 26–34)
MCHC RBC AUTO-ENTMCNC: 32.9 G/DL (ref 31–37)
MCV RBC AUTO: 88.9 FL (ref 80–100)
MONOCYTES # BLD: 6 % (ref 2–11)
MUCUS: ABNORMAL
NITRITE UR QL STRIP.AUTO: NEGATIVE
PDW BLD-RTO: 14.4 % (ref 12.5–15.4)
PLATELET # BLD AUTO: 211 K/UL (ref 140–450)
PMV BLD AUTO: 9.5 FL (ref 6–12)
POTASSIUM SERPL-SCNC: 3.8 MMOL/L (ref 3.7–5.3)
PROT UR STRIP.AUTO-MCNC: 6 MG/DL (ref 5–8)
PROT UR STRIP.AUTO-MCNC: ABNORMAL MG/DL
RBC # BLD: 4.19 M/UL (ref 4–5.2)
RBC CLUMPS #/AREA URNS AUTO: ABNORMAL /HPF (ref 0–2)
SEG NEUTROPHILS: 56 % (ref 36–66)
SEGMENTED NEUTROPHILS ABSOLUTE COUNT: 4.1 K/UL (ref 1.8–7.7)
SODIUM SERPL-SCNC: 142 MMOL/L (ref 135–144)
SPECIFIC GRAVITY UA: 1.03 (ref 1–1.03)
TROPONIN I SERPL DL<=0.01 NG/ML-MCNC: 15 NG/L (ref 0–14)
TURBIDITY: ABNORMAL
URINE HGB: ABNORMAL
UROBILINOGEN, URINE: NORMAL
WBC # BLD AUTO: 7.2 K/UL (ref 3.5–11)
WBC UA: ABNORMAL /HPF (ref 0–5)

## 2023-03-07 PROCEDURE — 36415 COLL VENOUS BLD VENIPUNCTURE: CPT

## 2023-03-07 PROCEDURE — G0378 HOSPITAL OBSERVATION PER HR: HCPCS

## 2023-03-07 PROCEDURE — 81001 URINALYSIS AUTO W/SCOPE: CPT

## 2023-03-07 PROCEDURE — 84484 ASSAY OF TROPONIN QUANT: CPT

## 2023-03-07 PROCEDURE — 85025 COMPLETE CBC W/AUTO DIFF WBC: CPT

## 2023-03-07 PROCEDURE — 6370000000 HC RX 637 (ALT 250 FOR IP): Performed by: STUDENT IN AN ORGANIZED HEALTH CARE EDUCATION/TRAINING PROGRAM

## 2023-03-07 PROCEDURE — 6370000000 HC RX 637 (ALT 250 FOR IP): Performed by: NURSE PRACTITIONER

## 2023-03-07 PROCEDURE — 2580000003 HC RX 258: Performed by: STUDENT IN AN ORGANIZED HEALTH CARE EDUCATION/TRAINING PROGRAM

## 2023-03-07 PROCEDURE — 96375 TX/PRO/DX INJ NEW DRUG ADDON: CPT

## 2023-03-07 PROCEDURE — 96361 HYDRATE IV INFUSION ADD-ON: CPT

## 2023-03-07 PROCEDURE — 83880 ASSAY OF NATRIURETIC PEPTIDE: CPT

## 2023-03-07 PROCEDURE — 93005 ELECTROCARDIOGRAM TRACING: CPT | Performed by: EMERGENCY MEDICINE

## 2023-03-07 PROCEDURE — 6370000000 HC RX 637 (ALT 250 FOR IP): Performed by: EMERGENCY MEDICINE

## 2023-03-07 PROCEDURE — 87086 URINE CULTURE/COLONY COUNT: CPT

## 2023-03-07 PROCEDURE — 99222 1ST HOSP IP/OBS MODERATE 55: CPT | Performed by: STUDENT IN AN ORGANIZED HEALTH CARE EDUCATION/TRAINING PROGRAM

## 2023-03-07 PROCEDURE — 6360000002 HC RX W HCPCS: Performed by: STUDENT IN AN ORGANIZED HEALTH CARE EDUCATION/TRAINING PROGRAM

## 2023-03-07 PROCEDURE — 80048 BASIC METABOLIC PNL TOTAL CA: CPT

## 2023-03-07 PROCEDURE — 71046 X-RAY EXAM CHEST 2 VIEWS: CPT

## 2023-03-07 PROCEDURE — 96365 THER/PROPH/DIAG IV INF INIT: CPT

## 2023-03-07 PROCEDURE — 82947 ASSAY GLUCOSE BLOOD QUANT: CPT

## 2023-03-07 PROCEDURE — 99285 EMERGENCY DEPT VISIT HI MDM: CPT

## 2023-03-07 RX ORDER — GABAPENTIN 300 MG/1
600 CAPSULE ORAL 4 TIMES DAILY
Status: DISCONTINUED | OUTPATIENT
Start: 2023-03-07 | End: 2023-03-09 | Stop reason: HOSPADM

## 2023-03-07 RX ORDER — FUROSEMIDE 10 MG/ML
40 INJECTION INTRAMUSCULAR; INTRAVENOUS 3 TIMES DAILY
Status: DISCONTINUED | OUTPATIENT
Start: 2023-03-07 | End: 2023-03-09

## 2023-03-07 RX ORDER — INSULIN GLARGINE 100 [IU]/ML
22 INJECTION, SOLUTION SUBCUTANEOUS NIGHTLY
Status: DISCONTINUED | OUTPATIENT
Start: 2023-03-07 | End: 2023-03-09 | Stop reason: HOSPADM

## 2023-03-07 RX ORDER — ATORVASTATIN CALCIUM 40 MG/1
40 TABLET, FILM COATED ORAL NIGHTLY
Status: DISCONTINUED | OUTPATIENT
Start: 2023-03-07 | End: 2023-03-09 | Stop reason: HOSPADM

## 2023-03-07 RX ORDER — TRAZODONE HYDROCHLORIDE 50 MG/1
100 TABLET ORAL NIGHTLY PRN
Status: DISCONTINUED | OUTPATIENT
Start: 2023-03-07 | End: 2023-03-09 | Stop reason: HOSPADM

## 2023-03-07 RX ORDER — PANTOPRAZOLE SODIUM 40 MG/1
40 TABLET, DELAYED RELEASE ORAL
Status: DISCONTINUED | OUTPATIENT
Start: 2023-03-08 | End: 2023-03-09 | Stop reason: HOSPADM

## 2023-03-07 RX ORDER — POTASSIUM CHLORIDE 20 MEQ/1
20 TABLET, EXTENDED RELEASE ORAL ONCE
Status: COMPLETED | OUTPATIENT
Start: 2023-03-07 | End: 2023-03-07

## 2023-03-07 RX ORDER — CLINDAMYCIN PHOSPHATE 600 MG/50ML
600 INJECTION INTRAVENOUS EVERY 8 HOURS
Status: CANCELLED | OUTPATIENT
Start: 2023-03-07

## 2023-03-07 RX ORDER — SODIUM CHLORIDE 9 MG/ML
INJECTION, SOLUTION INTRAVENOUS PRN
Status: DISCONTINUED | OUTPATIENT
Start: 2023-03-07 | End: 2023-03-09 | Stop reason: HOSPADM

## 2023-03-07 RX ORDER — INSULIN LISPRO 100 [IU]/ML
0-4 INJECTION, SOLUTION INTRAVENOUS; SUBCUTANEOUS NIGHTLY
Status: DISCONTINUED | OUTPATIENT
Start: 2023-03-07 | End: 2023-03-09 | Stop reason: HOSPADM

## 2023-03-07 RX ORDER — POLYETHYLENE GLYCOL 3350 17 G/17G
17 POWDER, FOR SOLUTION ORAL DAILY PRN
Status: DISCONTINUED | OUTPATIENT
Start: 2023-03-07 | End: 2023-03-09 | Stop reason: HOSPADM

## 2023-03-07 RX ORDER — LEVOTHYROXINE SODIUM 88 UG/1
88 TABLET ORAL DAILY
Status: DISCONTINUED | OUTPATIENT
Start: 2023-03-08 | End: 2023-03-09 | Stop reason: HOSPADM

## 2023-03-07 RX ORDER — POTASSIUM CHLORIDE 20 MEQ/1
20 TABLET, EXTENDED RELEASE ORAL 2 TIMES DAILY WITH MEALS
Status: DISCONTINUED | OUTPATIENT
Start: 2023-03-08 | End: 2023-03-09

## 2023-03-07 RX ORDER — DULOXETIN HYDROCHLORIDE 30 MG/1
60 CAPSULE, DELAYED RELEASE ORAL DAILY
Status: DISCONTINUED | OUTPATIENT
Start: 2023-03-08 | End: 2023-03-09 | Stop reason: HOSPADM

## 2023-03-07 RX ORDER — SULFAMETHOXAZOLE AND TRIMETHOPRIM 800; 160 MG/1; MG/1
1 TABLET ORAL ONCE
Status: COMPLETED | OUTPATIENT
Start: 2023-03-07 | End: 2023-03-07

## 2023-03-07 RX ORDER — TIMOLOL MALEATE 5 MG/ML
1 SOLUTION/ DROPS OPHTHALMIC DAILY
Status: DISCONTINUED | OUTPATIENT
Start: 2023-03-08 | End: 2023-03-09 | Stop reason: HOSPADM

## 2023-03-07 RX ORDER — HYDROCHLOROTHIAZIDE 25 MG/1
25 TABLET ORAL EVERY MORNING
Status: DISCONTINUED | OUTPATIENT
Start: 2023-03-08 | End: 2023-03-09 | Stop reason: HOSPADM

## 2023-03-07 RX ORDER — ACETAMINOPHEN 325 MG/1
650 TABLET ORAL EVERY 6 HOURS PRN
Status: DISCONTINUED | OUTPATIENT
Start: 2023-03-07 | End: 2023-03-09 | Stop reason: HOSPADM

## 2023-03-07 RX ORDER — SODIUM CHLORIDE 0.9 % (FLUSH) 0.9 %
5-40 SYRINGE (ML) INJECTION EVERY 12 HOURS SCHEDULED
Status: DISCONTINUED | OUTPATIENT
Start: 2023-03-07 | End: 2023-03-09 | Stop reason: HOSPADM

## 2023-03-07 RX ORDER — ONDANSETRON 4 MG/1
4 TABLET, ORALLY DISINTEGRATING ORAL EVERY 8 HOURS PRN
Status: DISCONTINUED | OUTPATIENT
Start: 2023-03-07 | End: 2023-03-09 | Stop reason: HOSPADM

## 2023-03-07 RX ORDER — ONDANSETRON 2 MG/ML
4 INJECTION INTRAMUSCULAR; INTRAVENOUS EVERY 6 HOURS PRN
Status: DISCONTINUED | OUTPATIENT
Start: 2023-03-07 | End: 2023-03-09 | Stop reason: HOSPADM

## 2023-03-07 RX ORDER — TAMSULOSIN HYDROCHLORIDE 0.4 MG/1
0.4 CAPSULE ORAL DAILY
Status: DISCONTINUED | OUTPATIENT
Start: 2023-03-08 | End: 2023-03-09 | Stop reason: HOSPADM

## 2023-03-07 RX ORDER — INSULIN LISPRO 100 [IU]/ML
0-8 INJECTION, SOLUTION INTRAVENOUS; SUBCUTANEOUS
Status: DISCONTINUED | OUTPATIENT
Start: 2023-03-07 | End: 2023-03-09 | Stop reason: HOSPADM

## 2023-03-07 RX ORDER — SODIUM CHLORIDE 0.9 % (FLUSH) 0.9 %
5-40 SYRINGE (ML) INJECTION PRN
Status: DISCONTINUED | OUTPATIENT
Start: 2023-03-07 | End: 2023-03-09 | Stop reason: HOSPADM

## 2023-03-07 RX ORDER — LEVOFLOXACIN 5 MG/ML
750 INJECTION, SOLUTION INTRAVENOUS EVERY 24 HOURS
Status: DISCONTINUED | OUTPATIENT
Start: 2023-03-07 | End: 2023-03-09

## 2023-03-07 RX ORDER — LOSARTAN POTASSIUM 50 MG/1
100 TABLET ORAL DAILY
Status: DISCONTINUED | OUTPATIENT
Start: 2023-03-08 | End: 2023-03-09 | Stop reason: HOSPADM

## 2023-03-07 RX ORDER — INSULIN GLARGINE 100 [IU]/ML
30 INJECTION, SOLUTION SUBCUTANEOUS EVERY MORNING
Status: DISCONTINUED | OUTPATIENT
Start: 2023-03-08 | End: 2023-03-09 | Stop reason: HOSPADM

## 2023-03-07 RX ORDER — DEXTROSE MONOHYDRATE 100 MG/ML
INJECTION, SOLUTION INTRAVENOUS CONTINUOUS PRN
Status: DISCONTINUED | OUTPATIENT
Start: 2023-03-07 | End: 2023-03-09 | Stop reason: HOSPADM

## 2023-03-07 RX ADMIN — SODIUM CHLORIDE: 9 INJECTION, SOLUTION INTRAVENOUS at 18:04

## 2023-03-07 RX ADMIN — FUROSEMIDE 40 MG: 10 INJECTION, SOLUTION INTRAMUSCULAR; INTRAVENOUS at 21:09

## 2023-03-07 RX ADMIN — POTASSIUM CHLORIDE 20 MEQ: 1500 TABLET, EXTENDED RELEASE ORAL at 23:15

## 2023-03-07 RX ADMIN — INSULIN GLARGINE 22 UNITS: 100 INJECTION, SOLUTION SUBCUTANEOUS at 21:08

## 2023-03-07 RX ADMIN — RIVAROXABAN 20 MG: 10 TABLET, FILM COATED ORAL at 23:15

## 2023-03-07 RX ADMIN — GABAPENTIN 600 MG: 300 CAPSULE ORAL at 23:15

## 2023-03-07 RX ADMIN — LEVOFLOXACIN 750 MG: 5 INJECTION, SOLUTION INTRAVENOUS at 18:06

## 2023-03-07 RX ADMIN — ATORVASTATIN CALCIUM 40 MG: 40 TABLET, FILM COATED ORAL at 21:09

## 2023-03-07 RX ADMIN — SULFAMETHOXAZOLE AND TRIMETHOPRIM 1 TABLET: 800; 160 TABLET ORAL at 15:03

## 2023-03-07 RX ADMIN — SODIUM CHLORIDE, PRESERVATIVE FREE 10 ML: 5 INJECTION INTRAVENOUS at 21:10

## 2023-03-07 ASSESSMENT — PAIN DESCRIPTION - LOCATION
LOCATION: BACK;LEG
LOCATION: LEG

## 2023-03-07 ASSESSMENT — ENCOUNTER SYMPTOMS
BACK PAIN: 0
COUGH: 0
DIARRHEA: 0
VOMITING: 0
CONSTIPATION: 0
SORE THROAT: 0
ABDOMINAL PAIN: 0
CHEST TIGHTNESS: 0
RHINORRHEA: 0
SHORTNESS OF BREATH: 0
PHOTOPHOBIA: 0
NAUSEA: 0

## 2023-03-07 ASSESSMENT — PAIN SCALES - GENERAL
PAINLEVEL_OUTOF10: 2
PAINLEVEL_OUTOF10: 3

## 2023-03-07 ASSESSMENT — PAIN DESCRIPTION - PAIN TYPE
TYPE: CHRONIC PAIN
TYPE: CHRONIC PAIN

## 2023-03-07 ASSESSMENT — PAIN DESCRIPTION - ORIENTATION
ORIENTATION: RIGHT;LEFT
ORIENTATION: RIGHT;LEFT

## 2023-03-07 ASSESSMENT — PAIN DESCRIPTION - DESCRIPTORS: DESCRIPTORS: SQUEEZING

## 2023-03-07 ASSESSMENT — PAIN - FUNCTIONAL ASSESSMENT
PAIN_FUNCTIONAL_ASSESSMENT: 0-10
PAIN_FUNCTIONAL_ASSESSMENT: ACTIVITIES ARE NOT PREVENTED

## 2023-03-07 ASSESSMENT — PAIN DESCRIPTION - FREQUENCY: FREQUENCY: CONTINUOUS

## 2023-03-07 NOTE — ED NOTES
RN completed a post void residual bladder scan. Patient voided 200ml's, post void scan showed 20ml's left in patient's bladder.      Dhruv Becerril, RN  03/07/23 0512

## 2023-03-07 NOTE — TELEPHONE ENCOUNTER
I would recommend ER eval to see if she is in need of any iv diuretics to help her since the lasix is not helping her and since she has not been urinating much.
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
## 2023-03-08 ENCOUNTER — APPOINTMENT (OUTPATIENT)
Dept: ULTRASOUND IMAGING | Age: 73
DRG: 291 | End: 2023-03-08
Payer: MEDICARE

## 2023-03-08 ENCOUNTER — APPOINTMENT (OUTPATIENT)
Dept: NON INVASIVE DIAGNOSTICS | Age: 73
DRG: 291 | End: 2023-03-08
Payer: MEDICARE

## 2023-03-08 PROBLEM — R33.9 URINARY RETENTION: Status: ACTIVE | Noted: 2023-03-08

## 2023-03-08 LAB
ABSOLUTE EOS #: 0.2 K/UL (ref 0–0.4)
ABSOLUTE LYMPH #: 2.2 K/UL (ref 1–4.8)
ABSOLUTE MONO #: 0.5 K/UL (ref 0.1–1.2)
ANION GAP SERPL CALCULATED.3IONS-SCNC: 10 MMOL/L (ref 9–17)
BASOPHILS # BLD: 1 % (ref 0–2)
BASOPHILS ABSOLUTE: 0.1 K/UL (ref 0–0.2)
BUN SERPL-MCNC: 16 MG/DL (ref 8–23)
CALCIUM SERPL-MCNC: 9.2 MG/DL (ref 8.6–10.4)
CHLORIDE SERPL-SCNC: 104 MMOL/L (ref 98–107)
CO2 SERPL-SCNC: 27 MMOL/L (ref 20–31)
CREAT SERPL-MCNC: 1.06 MG/DL (ref 0.5–0.9)
EKG ATRIAL RATE: 65 BPM
EKG P AXIS: -22 DEGREES
EKG P-R INTERVAL: 168 MS
EKG Q-T INTERVAL: 440 MS
EKG QRS DURATION: 88 MS
EKG QTC CALCULATION (BAZETT): 457 MS
EKG T AXIS: 44 DEGREES
EKG VENTRICULAR RATE: 65 BPM
EOSINOPHILS RELATIVE PERCENT: 3 % (ref 1–4)
GFR SERPL CREATININE-BSD FRML MDRD: 56 ML/MIN/1.73M2
GLUCOSE BLD-MCNC: 139 MG/DL (ref 65–105)
GLUCOSE BLD-MCNC: 155 MG/DL (ref 65–105)
GLUCOSE BLD-MCNC: 162 MG/DL (ref 65–105)
GLUCOSE BLD-MCNC: 205 MG/DL (ref 65–105)
GLUCOSE SERPL-MCNC: 128 MG/DL (ref 70–99)
HCT VFR BLD AUTO: 37.3 % (ref 36–46)
HGB BLD-MCNC: 12.3 G/DL (ref 12–16)
LV EF: 58 %
LVEF MODALITY: NORMAL
LYMPHOCYTES # BLD: 31 % (ref 24–44)
MCH RBC QN AUTO: 29.4 PG (ref 26–34)
MCHC RBC AUTO-ENTMCNC: 33.1 G/DL (ref 31–37)
MCV RBC AUTO: 89 FL (ref 80–100)
MICROORGANISM SPEC CULT: NORMAL
MONOCYTES # BLD: 6 % (ref 2–11)
PDW BLD-RTO: 13.9 % (ref 12.5–15.4)
PLATELET # BLD AUTO: 201 K/UL (ref 140–450)
PMV BLD AUTO: 10.1 FL (ref 6–12)
POTASSIUM SERPL-SCNC: 3.7 MMOL/L (ref 3.7–5.3)
RBC # BLD: 4.19 M/UL (ref 4–5.2)
SEG NEUTROPHILS: 59 % (ref 36–66)
SEGMENTED NEUTROPHILS ABSOLUTE COUNT: 4.2 K/UL (ref 1.8–7.7)
SODIUM SERPL-SCNC: 141 MMOL/L (ref 135–144)
SPECIMEN DESCRIPTION: NORMAL
WBC # BLD AUTO: 7.2 K/UL (ref 3.5–11)

## 2023-03-08 PROCEDURE — 1210000000 HC MED SURG R&B

## 2023-03-08 PROCEDURE — 76770 US EXAM ABDO BACK WALL COMP: CPT

## 2023-03-08 PROCEDURE — 96361 HYDRATE IV INFUSION ADD-ON: CPT

## 2023-03-08 PROCEDURE — 93306 TTE W/DOPPLER COMPLETE: CPT

## 2023-03-08 PROCEDURE — 6370000000 HC RX 637 (ALT 250 FOR IP): Performed by: NURSE PRACTITIONER

## 2023-03-08 PROCEDURE — 82947 ASSAY GLUCOSE BLOOD QUANT: CPT

## 2023-03-08 PROCEDURE — 2580000003 HC RX 258: Performed by: STUDENT IN AN ORGANIZED HEALTH CARE EDUCATION/TRAINING PROGRAM

## 2023-03-08 PROCEDURE — 36415 COLL VENOUS BLD VENIPUNCTURE: CPT

## 2023-03-08 PROCEDURE — 6360000002 HC RX W HCPCS: Performed by: STUDENT IN AN ORGANIZED HEALTH CARE EDUCATION/TRAINING PROGRAM

## 2023-03-08 PROCEDURE — 80048 BASIC METABOLIC PNL TOTAL CA: CPT

## 2023-03-08 PROCEDURE — 99232 SBSQ HOSP IP/OBS MODERATE 35: CPT | Performed by: STUDENT IN AN ORGANIZED HEALTH CARE EDUCATION/TRAINING PROGRAM

## 2023-03-08 PROCEDURE — 85025 COMPLETE CBC W/AUTO DIFF WBC: CPT

## 2023-03-08 PROCEDURE — 6370000000 HC RX 637 (ALT 250 FOR IP): Performed by: STUDENT IN AN ORGANIZED HEALTH CARE EDUCATION/TRAINING PROGRAM

## 2023-03-08 RX ORDER — DOXYCYCLINE HYCLATE 100 MG
100 TABLET ORAL 2 TIMES DAILY
Qty: 20 TABLET | Refills: 0 | Status: SHIPPED | OUTPATIENT
Start: 2023-03-08 | End: 2023-03-18

## 2023-03-08 RX ORDER — METOLAZONE 5 MG/1
5 TABLET ORAL DAILY
Qty: 2 TABLET | Refills: 0 | Status: SHIPPED | OUTPATIENT
Start: 2023-03-08 | End: 2023-03-09 | Stop reason: HOSPADM

## 2023-03-08 RX ORDER — METOLAZONE 5 MG/1
5 TABLET ORAL DAILY
Qty: 5 TABLET | Refills: 0 | Status: SHIPPED | OUTPATIENT
Start: 2023-03-08 | End: 2023-03-08 | Stop reason: SDUPTHER

## 2023-03-08 RX ADMIN — ATORVASTATIN CALCIUM 40 MG: 40 TABLET, FILM COATED ORAL at 20:35

## 2023-03-08 RX ADMIN — PANTOPRAZOLE SODIUM 40 MG: 40 TABLET, DELAYED RELEASE ORAL at 08:16

## 2023-03-08 RX ADMIN — HYDROCHLOROTHIAZIDE 25 MG: 25 TABLET ORAL at 08:16

## 2023-03-08 RX ADMIN — GABAPENTIN 600 MG: 300 CAPSULE ORAL at 20:36

## 2023-03-08 RX ADMIN — GABAPENTIN 600 MG: 300 CAPSULE ORAL at 16:56

## 2023-03-08 RX ADMIN — POTASSIUM CHLORIDE 20 MEQ: 1500 TABLET, EXTENDED RELEASE ORAL at 08:16

## 2023-03-08 RX ADMIN — TAMSULOSIN HYDROCHLORIDE 0.4 MG: 0.4 CAPSULE ORAL at 08:16

## 2023-03-08 RX ADMIN — FUROSEMIDE 40 MG: 10 INJECTION, SOLUTION INTRAMUSCULAR; INTRAVENOUS at 08:16

## 2023-03-08 RX ADMIN — GABAPENTIN 600 MG: 300 CAPSULE ORAL at 08:16

## 2023-03-08 RX ADMIN — FUROSEMIDE 40 MG: 10 INJECTION, SOLUTION INTRAMUSCULAR; INTRAVENOUS at 20:36

## 2023-03-08 RX ADMIN — GABAPENTIN 600 MG: 300 CAPSULE ORAL at 13:01

## 2023-03-08 RX ADMIN — SODIUM CHLORIDE, PRESERVATIVE FREE 10 ML: 5 INJECTION INTRAVENOUS at 08:16

## 2023-03-08 RX ADMIN — LEVOTHYROXINE SODIUM 88 MCG: 0.09 TABLET ORAL at 08:16

## 2023-03-08 RX ADMIN — SODIUM CHLORIDE: 9 INJECTION, SOLUTION INTRAVENOUS at 17:35

## 2023-03-08 RX ADMIN — TRAZODONE HYDROCHLORIDE 100 MG: 50 TABLET ORAL at 01:45

## 2023-03-08 RX ADMIN — POTASSIUM CHLORIDE 20 MEQ: 1500 TABLET, EXTENDED RELEASE ORAL at 16:56

## 2023-03-08 RX ADMIN — FUROSEMIDE 40 MG: 10 INJECTION, SOLUTION INTRAMUSCULAR; INTRAVENOUS at 14:16

## 2023-03-08 RX ADMIN — DULOXETINE 60 MG: 30 CAPSULE, DELAYED RELEASE ORAL at 08:16

## 2023-03-08 RX ADMIN — LEVOFLOXACIN 750 MG: 5 INJECTION, SOLUTION INTRAVENOUS at 17:37

## 2023-03-08 RX ADMIN — RIVAROXABAN 20 MG: 10 TABLET, FILM COATED ORAL at 20:36

## 2023-03-08 RX ADMIN — INSULIN GLARGINE 30 UNITS: 100 INJECTION, SOLUTION SUBCUTANEOUS at 08:27

## 2023-03-08 RX ADMIN — SODIUM CHLORIDE, PRESERVATIVE FREE 10 ML: 5 INJECTION INTRAVENOUS at 20:44

## 2023-03-08 RX ADMIN — TIMOLOL MALEATE 1 DROP: 5 SOLUTION OPHTHALMIC at 08:16

## 2023-03-08 RX ADMIN — INSULIN LISPRO 2 UNITS: 100 INJECTION, SOLUTION INTRAVENOUS; SUBCUTANEOUS at 12:40

## 2023-03-08 RX ADMIN — LOSARTAN POTASSIUM 100 MG: 50 TABLET, FILM COATED ORAL at 08:16

## 2023-03-08 RX ADMIN — INSULIN GLARGINE 22 UNITS: 100 INJECTION, SOLUTION SUBCUTANEOUS at 20:44

## 2023-03-08 NOTE — CARE COORDINATION
Case Management Assessment  Initial Evaluation    Date/Time of Evaluation: 3/8/2023 9:55 AM  Assessment Completed by: Agustin Taylor RN    If patient is discharged prior to next notation, then this note serves as note for discharge by case management. Patient Name: Mare Sherman                   YOB: 1950  Diagnosis: Cellulitis [L03.90]  Leg edema [R60.0]  Cystitis without hematuria [N30.90]  Cellulitis of lower extremity, unspecified laterality [L03.119]  Urinary retention [R33.9]                   Date / Time: 3/7/2023 12:14 PM    Patient Admission Status: Inpatient   Readmission Risk (Low < 19, Mod (19-27), High > 27): Readmission Risk Score: 7.5    Current PCP: Venkat Bravo, DO  PCP verified by CM? Chart Reviewed: Yes      History Provided by: Patient  Patient Orientation: Alert and Oriented    Patient Cognition: Alert    Hospitalization in the last 30 days (Readmission):  No    If yes, Readmission Assessment in CM Navigator will be completed. Advance Directives:      Code Status: Full Code   Patient's Primary Decision Maker is: Legal Next of Kin    Primary Decision Maker: Marcio Davis - Spouse - [phone]    Discharge Planning:    Patient lives with: Spouse/Significant Other Type of Home: Apartment  Primary Care Giver: Self  Patient Support Systems include: Spouse/Significant Other, Family Members   Current Financial resources:    Current community resources:    Current services prior to admission: C-pap            Current DME:              Type of Home Care services:  None    ADLS  Prior functional level: Independent in ADLs/IADLs  Current functional level: Independent in ADLs/IADLs    PT AM-PAC:   /24  OT AM-PAC:   /24    Family can provide assistance at DC: Would you like Case Management to discuss the discharge plan with any other family members/significant others, and if so, who?     Plans to Return to Present Housing: Yes  Other Identified Issues/Barriers to RETURNING to current housing:   Potential Assistance needed at discharge: N/A            Potential DME:    Patient expects to discharge to: [address] [place] [address] for transportation at discharge: Family    Financial    Payor: Shawnee Jorge 136 / Plan: Jovanna 58 / Product Type: *No Product type* /     Does insurance require precert for SNF: Yes    Potential assistance Purchasing Medications:    Meds-to-Beds request:        Jaimie King, [address] [address]  Phone: [phone] Fax: [phone] [facility] Tammi Quintana [address] [facility] [phone] Orly Codykharibull [phone] [address] [facility] [address]  Phone: [phone] Fax: [phone]      Notes:    Factors facilitating achievement of predicted outcomes:     Barriers to discharge: Additional Case Management Notes: d/c home independent    The Plan for Transition of Care is related to the following treatment goals of Cellulitis [L03.90]  Leg edema [R60.0]  Cystitis without hematuria [N30.90]  Cellulitis of lower extremity, unspecified laterality [Y35.692]  Urinary retention [Y79.9]    IF APPLICABLE: The Patient and/or patient representative Karime Martinez and her family were provided with a choice of provider and agrees with the discharge plan. Freedom of choice list with basic dialogue that supports the patient's individualized plan of care/goals and shares the quality data associated with the providers was provided to: Patient   Patient Representative Name:       The Patient and/or Patient Representative Agree with the Discharge Plan?  Yes    Chioma Shah RN  Case Management Department  Ph: [phone] Fax: [phone]

## 2023-03-08 NOTE — PROGRESS NOTES
[facility]  Office: [address], DO, Zo Press, DO, Lary De La Pazing, DO, Ashutosh Pearldhruv Blood, DO, Kim Santamaria MD, Devika Pham MD, Zara Brown MD, Stacy Medina MD,  Elsy Aquino MD, Chikis Gonzalez MD, Kareen Root, DO, Nnamdi Cervantes MD,  Roman King MD, Joyce Jamil MD, Erica Brownlee, DO, Zeke Culp MD, Dominga Vanessa MD, Ann Weathers DO, Dori Parikh MD, Erica Aguirre MD, Merlyn Montgomery MD, Russell Brambila MD, Isreal Bo, DO, Jeramie Figueroa MD, Cara Ramos MD, Tamara Sousa, CNP,  Lisa Cardona, CNP, Uriah Ashford, CNP, Magalys Sawyer, CNP,  [place], [facility] [place] Jyoti Soto, CNP, Maricruz Mccord, CNP, January Ramirez, CNP, Qiana Guthrie, CNP, Kori Cornea, CNP, Roylene Goodell, PA-C, Elizabeth Sullivan, CNS, Vik Mendoza, CNP, [place], [address] [place] [address]    Progress Note    3/8/2023    10:11 AM    Name:   Elizabeth Carey  MRN:     [id number]     Acct:      [de-identified]   Room:   [address] [place] [address] Day:  0  Admit Date:  3/7/2023 12:14 PM    PCP:   Graham Caruso DO  Code Status:  Full Code    Subjective:     Patient was seen and examined at bedside this AM. She reports feeling \"not good\" and states she has been unable to urinate despite aggressive diuresis. Bladder is full on palpation. Plan to place quach catheter and consult urology at this time. Medications: Allergies:     Allergies   Allergen Reactions    Aspirin Anaphylaxis     Only thing she can take is tylenol    Benadryl [Diphenhydramine] Other (See Comments)     Dystonic movement    Ibuprofen Anaphylaxis    Lidocaine Anaphylaxis     CAN NOT TOLERATE IV    Penicillins Anaphylaxis    Hydrocodone-Acetaminophen Other (See Comments)     Unsure of allergy       Current Meds:   Scheduled Meds:    atorvastatin  40 mg Oral Nightly    DULoxetine  60 mg Oral Daily    hydroCHLOROthiazide  25 mg Oral QAM    insulin glargine  22 Units SubCUTAneous Nightly    insulin lispro  0-8 Units SubCUTAneous TID WC    insulin lispro  0-4 Units SubCUTAneous Nightly    levothyroxine  88 mcg Oral Daily    losartan  100 mg Oral Daily    pantoprazole  40 mg Oral QAM AC    tamsulosin  0.4 mg Oral Daily    timolol  1 drop Both Eyes Daily    furosemide  40 mg IntraVENous TID    sodium chloride flush  5-40 mL IntraVENous 2 times per day    levofloxacin  750 mg IntraVENous Q24H    insulin glargine  30 Units SubCUTAneous QAM    potassium chloride  20 mEq Oral BID     gabapentin  600 mg Oral 4x Daily    rivaroxaban  20 mg Oral Daily with breakfast     Continuous Infusions:    sodium chloride 20 mL/hr at 23 1804    dextrose       PRN Meds: traZODone, sodium chloride flush, sodium chloride, ondansetron **OR** ondansetron, polyethylene glycol, acetaminophen **OR** acetaminophen, glucose, dextrose bolus **OR** dextrose bolus, glucagon (rDNA), dextrose    Data:     Vitals:  /70   Pulse 65   Temp 98.1 °F (36.7 °C) (Oral)   Resp 14   Ht 5' 4\" (1.626 m)   Wt 199 lb 1.2 oz (90.3 kg)   SpO2 99%   BMI 34.17 kg/m²   Temp (24hrs), Av.8 °F (36.6 °C), Min:97.5 °F (36.4 °C), Max:98.2 °F (36.8 °C)    Recent Labs     23  1757 23  2107 23  0753   POCGLU 82 150* 139*       I/O (24Hr):     Intake/Output Summary (Last 24 hours) at 3/8/2023 1011  Last data filed at 3/8/2023 0947  Gross per 24 hour   Intake --   Output 675 ml   Net -675 ml       Labs:  Hematology:  Recent Labs     23  1325 23  0529   WBC 7.2 7.2   RBC 4.19 4.19   HGB 12.3 12.3   HCT 37.3 37.3   MCV 88.9 89.0   MCH 29.3 29.4   MCHC 32.9 33.1   RDW 14.4 13.9    201   MPV 9.5 10.1     Chemistry:  Recent Labs     23  1325 23  0529    141   K 3.8 3.7    104   CO2 26 27   GLUCOSE 199* 128*   BUN 18 16   CREATININE 0.86 1.06*   ANIONGAP 11 10   LABGLOM >60 56*   CALCIUM 8.8 9.2   PROBNP 94  --    TROPHS 15*  --      Recent Labs 03/07/23  1757 03/07/23  2107 03/08/23  0753   POCGLU 82 150* 139*     ABG:No results found for: POCPH, PHART, PH, POCPCO2, IBV7WOW, PCO2, POCPO2, PO2ART, PO2, POCHCO3, QZN0ZMC, HCO3, NBEA, PBEA, BEART, BE, THGBART, THB, CLX6VTF, JCJB1UBD, J8MVCGXS, O2SAT, FIO2  Lab Results   Component Value Date/Time    SPECIAL NOT REPORTED 11/18/2021 06:16 PM     Lab Results   Component Value Date/Time    CULTURE NO SIGNIFICANT GROWTH 03/02/2023 02:53 PM       Radiology:  XR CHEST (2 VW)    Result Date: 3/7/2023  Trace left basilar effusion with adjacent atelectasis. Physical Examination:     General appearance:  alert, cooperative and no distress  Mental Status:  oriented to person, place and time and normal affect  Lungs:  clear to auscultation bilaterally, normal effort  Heart:  regular rate and rhythm, no murmur  Abdomen:  soft, nontender, nondistended, normal bowel sounds, no masses, hepatomegaly, splenomegaly  Extremities:  2+ pitting edema appreciated in bilateral lower extremities. RLE cellulitis appreciated.    Skin:  no gross lesions, rashes, induration    Assessment:     Hospital Problems             Last Modified POA    * (Principal) Cellulitis 3/7/2023 Yes    History of pulmonary embolism 3/7/2023 Yes    Urinary retention 3/8/2023 Yes    Chronic anticoagulation (Chronic) 3/7/2023 Yes    Type 2 diabetes mellitus with diabetic neuropathy, with long-term current use of insulin ([facility] [address] 3/7/2023 Yes       Plan:     Urinary retention   -Patient states that she has not urinated despite aggressive diuresis   -Patient's bladder is full and tender to palpation   -Place quach catheter   -Renal ultrasound pending   -Consult urology; appreciate recommendations     Bilateral lower extremity edema   -Likely secondary to HFpEF   -Pro-BNP is falsely normal 2/2 obesity   -Continue IB Lasix   -Echocardiogram showing preserved EF       RLE cellulitis   -Levofloxacin      Acute cystitis without hematuria   -Urine culture pending -Levofloxacin as above      History of pulmonary embolism   -Continue home Xarelto      DM2   -Continue home Lantus   -Sliding scale insulin   -Hypoglycemia protocol         Medical Decision Making: Gray Edwards MD  3/8/2023  10:11 AM

## 2023-03-08 NOTE — FLOWSHEET NOTE
Inserted quach catheter with no complications. Urine is flowing well. Patient states feeling \"like her bladder is less full\".

## 2023-03-08 NOTE — PLAN OF CARE
Problem: Discharge Planning  Goal: Discharge to home or other facility with appropriate resources  3/8/2023 0041 by Coco Bearden RN  Outcome: Progressing   Patient actively participates in ADLs and decision making regarding plan of care     Problem: Pain  Goal: Verbalizes/displays adequate comfort level or baseline comfort level  3/8/2023 0041 by Coco Bearden RN  Outcome: Progressing   No new signs/symptoms of pain noted, pain rating < 3 on scale 0-10, pain controlled with medication/ repositioning     Problem: ABCDS Injury Assessment  Goal: Absence of physical injury  3/8/2023 0041 by Coco Bearden RN  Outcome: Progressing   Fall assessment preformed. Bed in low locked position with call light and tray table within reach. Education given.      Problem: Safety - Adult  Goal: Free from fall injury  3/8/2023 0041 by Coco Bearden RN  Outcome: Progressing   No falls/injuries this shift, bed in lowest position, breaks on, bed alarm on, call light within reach, side rails up X2
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
## 2023-03-09 ENCOUNTER — TELEPHONE (OUTPATIENT)
Dept: PRIMARY CARE CLINIC | Age: 73
End: 2023-03-09

## 2023-03-09 VITALS
SYSTOLIC BLOOD PRESSURE: 104 MMHG | OXYGEN SATURATION: 97 % | DIASTOLIC BLOOD PRESSURE: 67 MMHG | TEMPERATURE: 97.5 F | WEIGHT: 199.08 LBS | BODY MASS INDEX: 33.99 KG/M2 | HEIGHT: 64 IN | HEART RATE: 73 BPM | RESPIRATION RATE: 14 BRPM

## 2023-03-09 DIAGNOSIS — R33.9 URINARY RETENTION: Primary | ICD-10-CM

## 2023-03-09 LAB
ABSOLUTE EOS #: 0.24 K/UL (ref 0–0.4)
ABSOLUTE LYMPH #: 2.39 K/UL (ref 1–4.8)
ABSOLUTE MONO #: 0.64 K/UL (ref 0.1–1.2)
ANION GAP SERPL CALCULATED.3IONS-SCNC: 14 MMOL/L (ref 9–17)
BASOPHILS # BLD: 0 % (ref 0–2)
BASOPHILS ABSOLUTE: 0.02 K/UL (ref 0–0.2)
BUN SERPL-MCNC: 22 MG/DL (ref 8–23)
CALCIUM SERPL-MCNC: 9.1 MG/DL (ref 8.6–10.4)
CHLORIDE SERPL-SCNC: 101 MMOL/L (ref 98–107)
CO2 SERPL-SCNC: 26 MMOL/L (ref 20–31)
CREAT SERPL-MCNC: 1.44 MG/DL (ref 0.5–0.9)
EOSINOPHILS RELATIVE PERCENT: 3 % (ref 1–4)
GFR SERPL CREATININE-BSD FRML MDRD: 39 ML/MIN/1.73M2
GLUCOSE BLD-MCNC: 131 MG/DL (ref 65–105)
GLUCOSE SERPL-MCNC: 163 MG/DL (ref 70–99)
HCT VFR BLD AUTO: 37 % (ref 36–46)
HGB BLD-MCNC: 12 G/DL (ref 12–16)
LYMPHOCYTES # BLD: 29 % (ref 24–44)
MAGNESIUM SERPL-MCNC: 1.7 MG/DL (ref 1.6–2.6)
MCH RBC QN AUTO: 29.4 PG (ref 26–34)
MCHC RBC AUTO-ENTMCNC: 32.4 G/DL (ref 31–37)
MCV RBC AUTO: 90.7 FL (ref 80–100)
MONOCYTES # BLD: 8 % (ref 2–11)
PDW BLD-RTO: 14.1 % (ref 12.5–15.4)
PLATELET # BLD AUTO: 219 K/UL (ref 140–450)
PMV BLD AUTO: 11.8 FL (ref 8–14)
POTASSIUM SERPL-SCNC: 3.4 MMOL/L (ref 3.7–5.3)
RBC # BLD: 4.08 M/UL (ref 4–5.2)
SEG NEUTROPHILS: 60 % (ref 36–66)
SEGMENTED NEUTROPHILS ABSOLUTE COUNT: 5.06 K/UL (ref 1.8–7.7)
SODIUM SERPL-SCNC: 141 MMOL/L (ref 135–144)
WBC # BLD AUTO: 8.4 K/UL (ref 3.5–11)

## 2023-03-09 PROCEDURE — 2580000003 HC RX 258: Performed by: STUDENT IN AN ORGANIZED HEALTH CARE EDUCATION/TRAINING PROGRAM

## 2023-03-09 PROCEDURE — 83735 ASSAY OF MAGNESIUM: CPT

## 2023-03-09 PROCEDURE — 82947 ASSAY GLUCOSE BLOOD QUANT: CPT

## 2023-03-09 PROCEDURE — 6370000000 HC RX 637 (ALT 250 FOR IP): Performed by: STUDENT IN AN ORGANIZED HEALTH CARE EDUCATION/TRAINING PROGRAM

## 2023-03-09 PROCEDURE — 80048 BASIC METABOLIC PNL TOTAL CA: CPT

## 2023-03-09 PROCEDURE — 6370000000 HC RX 637 (ALT 250 FOR IP): Performed by: NURSE PRACTITIONER

## 2023-03-09 PROCEDURE — 85025 COMPLETE CBC W/AUTO DIFF WBC: CPT

## 2023-03-09 PROCEDURE — 36415 COLL VENOUS BLD VENIPUNCTURE: CPT

## 2023-03-09 PROCEDURE — 99238 HOSP IP/OBS DSCHRG MGMT 30/<: CPT | Performed by: STUDENT IN AN ORGANIZED HEALTH CARE EDUCATION/TRAINING PROGRAM

## 2023-03-09 RX ORDER — 0.9 % SODIUM CHLORIDE 0.9 %
500 INTRAVENOUS SOLUTION INTRAVENOUS ONCE
Status: COMPLETED | OUTPATIENT
Start: 2023-03-09 | End: 2023-03-09

## 2023-03-09 RX ORDER — POTASSIUM CHLORIDE 20 MEQ/1
40 TABLET, EXTENDED RELEASE ORAL 2 TIMES DAILY WITH MEALS
Status: DISCONTINUED | OUTPATIENT
Start: 2023-03-09 | End: 2023-03-09 | Stop reason: HOSPADM

## 2023-03-09 RX ADMIN — PANTOPRAZOLE SODIUM 40 MG: 40 TABLET, DELAYED RELEASE ORAL at 05:38

## 2023-03-09 RX ADMIN — INSULIN GLARGINE 30 UNITS: 100 INJECTION, SOLUTION SUBCUTANEOUS at 09:03

## 2023-03-09 RX ADMIN — TIMOLOL MALEATE 1 DROP: 5 SOLUTION OPHTHALMIC at 09:06

## 2023-03-09 RX ADMIN — DULOXETINE 60 MG: 30 CAPSULE, DELAYED RELEASE ORAL at 09:04

## 2023-03-09 RX ADMIN — SODIUM CHLORIDE, PRESERVATIVE FREE 10 ML: 5 INJECTION INTRAVENOUS at 09:05

## 2023-03-09 RX ADMIN — GABAPENTIN 600 MG: 300 CAPSULE ORAL at 09:05

## 2023-03-09 RX ADMIN — SODIUM CHLORIDE 500 ML: 9 INJECTION, SOLUTION INTRAVENOUS at 09:09

## 2023-03-09 RX ADMIN — TRAZODONE HYDROCHLORIDE 100 MG: 50 TABLET ORAL at 01:34

## 2023-03-09 RX ADMIN — LEVOTHYROXINE SODIUM 88 MCG: 0.09 TABLET ORAL at 05:37

## 2023-03-09 ASSESSMENT — PAIN DESCRIPTION - LOCATION: LOCATION: ABDOMEN

## 2023-03-09 ASSESSMENT — PAIN DESCRIPTION - ORIENTATION: ORIENTATION: LOWER

## 2023-03-09 ASSESSMENT — PAIN SCALES - GENERAL: PAINLEVEL_OUTOF10: 6

## 2023-03-09 ASSESSMENT — PAIN DESCRIPTION - DESCRIPTORS: DESCRIPTORS: PRESSURE

## 2023-03-09 NOTE — TELEPHONE ENCOUNTER
Care Transitions Initial Follow Up Call    Outreach made within 2 business days of discharge: Yes    Patient: Chayito Go Patient : 1950   MRN: [id number]  Reason for Admission: There are no discharge diagnoses documented for the most recent discharge. Discharge Date: 3/9/23       Spoke with: Patient    Discharge department/facility: [facility]    TCM Interactive Patient Contact:  Was patient able to fill all prescriptions: Yes  Was patient instructed to bring all medications to the follow-up visit: Yes  Is patient taking all medications as directed in the discharge summary?  Yes  Does patient understand their discharge instructions: Yes  Does patient have questions or concerns that need addressed prior to 7-14 day follow up office visit: no    Scheduled appointment with PCP within 7-14 days    Patient requested to keep 3/20/2023 appt to f/u    Follow Up  Future Appointments   Date Time Provider Rodolfo Hyman   3/20/2023  1:15 PM DO Raul Roca [id number]   2023  1:30 PM Claudell Morale, MD [address] [place] [address]
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
## 2023-03-09 NOTE — PROGRESS NOTES
[facility]  Office: [address], DO, Author Carolin, DO, Courtney Kelly, DO, Meliza Negrete, DO, Anna Marie Hooker MD, Toney King MD, Natasha Wagner MD, Carolan Schilder, MD,  Maine Mcguire MD, Elias Mckeon MD, Betzaida Choi, DO, Deb Goldberg MD,  Ar Garcia MD, Lamar Cervantes MD, Jann Mckeon, DO, Jp Anderson MD, Sameer Cruz MD, Mary Johnston, DO, Geoff Otto MD, Yolanda Callejas MD, Kevin Chavez MD, Erika Crenshaw MD, Andres Guaman DO, Rachael Malcolm MD, Pita Reed MD, Dyllan Patel, ALISA,  Bernard To, CNP, Vaibhav Ferrera, CNP, Josue Mayes, CNP,  Fuad De León, [facility], Carly Serrano, CNP, Aidan Trevino, CNP, Sherly Kay, CNP, Kylie Gonzales, CNP, Nubia Jensen, CNP, Corey Begum PA-C, Anupam Kim, CNS, Urszula Duarte, CNP, Ender Pierce, [address] [place] [address]    Progress Note    3/9/2023    9:01 AM    Name:   Luiz To  MRN:     [id number]     Acct:      [de-identified]   Room:   [address] [place] [address] Day:  1  Admit Date:  3/7/2023 12:14 PM    PCP:   Ania Dove DO  Code Status:  Full Code    Subjective:     Patient was seen and examined at bedside this AM. She reports feeling much better and states that her bilateral lower extremity swelling is significantly improved following diuresis. A discharge order has been placed for the patient but she began hypotensive and has not yet urinated following quach catheter removal. Plan to bolus the patient and monitor for improvement. Discharge may ultimately need to be canceled if she is unable to void. Medications: Allergies:     Allergies   Allergen Reactions    Aspirin Anaphylaxis     Only thing she can take is tylenol    Benadryl [Diphenhydramine] Other (See Comments)     Dystonic movement    Ibuprofen Anaphylaxis    Lidocaine Anaphylaxis     CAN NOT TOLERATE IV    Penicillins Anaphylaxis    Hydrocodone-Acetaminophen Other (See Comments)     Unsure of allergy       Current Meds:   Scheduled Meds:    sodium chloride  500 mL IntraVENous Once    potassium chloride  40 mEq Oral BID WC    atorvastatin  40 mg Oral Nightly    DULoxetine  60 mg Oral Daily    [Held by provider] hydroCHLOROthiazide  25 mg Oral QAM    insulin glargine  22 Units SubCUTAneous Nightly    insulin lispro  0-8 Units SubCUTAneous TID WC    insulin lispro  0-4 Units SubCUTAneous Nightly    levothyroxine  88 mcg Oral Daily    [Held by provider] losartan  100 mg Oral Daily    pantoprazole  40 mg Oral QAM AC    [Held by provider] tamsulosin  0.4 mg Oral Daily    timolol  1 drop Both Eyes Daily    sodium chloride flush  5-40 mL IntraVENous 2 times per day    levofloxacin  750 mg IntraVENous Q24H    insulin glargine  30 Units SubCUTAneous QAM    gabapentin  600 mg Oral 4x Daily    rivaroxaban  20 mg Oral Daily with breakfast     Continuous Infusions:    sodium chloride 100 mL/hr at 23 1735    dextrose       PRN Meds: traZODone, sodium chloride flush, sodium chloride, ondansetron **OR** ondansetron, polyethylene glycol, acetaminophen **OR** acetaminophen, glucose, dextrose bolus **OR** dextrose bolus, glucagon (rDNA), dextrose    Data:     Vitals:  BP (!) 87/45   Pulse 92   Temp 97.9 °F (36.6 °C) (Oral)   Resp 16   Ht 5' 4\" (1.626 m)   Wt 199 lb 1.2 oz (90.3 kg)   SpO2 93%   BMI 34.17 kg/m²   Temp (24hrs), Av.2 °F (36.8 °C), Min:97.9 °F (36.6 °C), Max:98.4 °F (36.9 °C)    Recent Labs     23  0753 23  1121 23  1614 23  2035   POCGLU 139* 205* 155* 162*       I/O (24Hr):    Intake/Output Summary (Last 24 hours) at 3/9/2023 0901  Last data filed at 3/9/2023 0541  Gross per 24 hour   Intake 490 ml   Output 4675 ml   Net -4185 ml       Labs:  Hematology:  Recent Labs     23  1325 23  0529 23  0503   WBC 7.2 7.2 8.4   RBC 4.19 4.19 4.08   HGB 12.3 12.3 12.0   HCT 37.3 37.3 37.0   MCV 88.9 89.0 90.7   MCH 29.3 29.4 29.4   MCHC  32.9 33.1 32.4   RDW 14.4 13.9 14.1    201 219   MPV 9.5 10.1 11.8     Chemistry:  Recent Labs     03/07/23  1325 03/08/23  0529 03/09/23  0503    141 141   K 3.8 3.7 3.4*    104 101   CO2 26 27 26   GLUCOSE 199* 128* 163*   BUN 18 16 22   CREATININE 0.86 1.06* 1.44*   MG  --   --  1.7   ANIONGAP 11 10 14   LABGLOM >60 56* 39*   CALCIUM 8.8 9.2 9.1   PROBNP 94  --   --    TROPHS 15*  --   --      Recent Labs     03/07/23  1757 03/07/23  2107 03/08/23  0753 03/08/23  1121 03/08/23  1614 03/08/23 2035   POCGLU 82 150* 139* 205* 155* 162*     ABG:No results found for: POCPH, PHART, PH, POCPCO2, JCO5SPO, PCO2, POCPO2, PO2ART, PO2, POCHCO3, XST0AKN, HCO3, NBEA, PBEA, BEART, BE, THGBART, THB, NJC7WYA, CSSZ5HMS, Z7PEKCJG, O2SAT, FIO2  Lab Results   Component Value Date/Time    SPECIAL NOT REPORTED 11/18/2021 06:16 PM     Lab Results   Component Value Date/Time    CULTURE NO SIGNIFICANT GROWTH 03/07/2023 07:45 PM       Radiology:  XR CHEST (2 VW)    Result Date: 3/7/2023  Trace left basilar effusion with adjacent atelectasis. Physical Examination:     General appearance:  alert, cooperative and no distress  Mental Status:  oriented to person, place and time and normal affect  Lungs:  clear to auscultation bilaterally, normal effort  Heart:  regular rate and rhythm, no murmur  Abdomen:  soft, nontender, nondistended, normal bowel sounds, no masses, hepatomegaly, splenomegaly  Extremities:  Trace edema in bilateral lower extremities.    Skin:  no gross lesions, rashes, induration    Assessment:     Hospital Problems             Last Modified POA    * (Principal) Cellulitis 3/7/2023 Yes    History of pulmonary embolism 3/7/2023 Yes    Urinary retention 3/8/2023 Yes    Chronic anticoagulation (Chronic) 3/7/2023 Yes    Type 2 diabetes mellitus with diabetic neuropathy, with long-term current use of insulin ([facility] 3/7/2023 Yes       Plan:     Urinary retention   -S/P quach catheter placement per urology -Voiding trial prior to discharge   -Recommend outpatient urology follow-up for cystoscopy   -Patient is discharged but this will need to be cancelled if she is unable to void     Bilateral lower extremity edema   -Significantly improved with diuresis   -Will stop diuresis 2/2 ZOILA as below     ZOILA  -Secondary to aggressive diuresis   -Will hold diuretics and give fluids at this time       RLE cellulitis   -Improving; doxycyline on discharge       Acute cystitis without hematuria   -Urine culture negative   -Voiding trial as above      History of pulmonary embolism   -Continue home Xarelto      DM2   -Continue home Lantus   -Sliding scale insulin   -Hypoglycemia protocol         Medical Decision Making: Phoebe Burrell MD  3/9/2023  9:01 AM

## 2023-03-09 NOTE — DISCHARGE SUMMARY
[facility]  Office: [address], DO, Marlys Demetrio, DO, Lorida Mail, DO, Julio César Joyner Blood, DO, Bjorn Michael MD, Dash Landaverde MD, Nikolas Eli MD, Darian Rene MD,  Femi Rolon MD, Naomi Hampton MD, Ben Armstrong, DO, Sri Carr MD,  Jarred Hogue MD, Felipe Marquis MD, Adriane Peters DO, Kong Hair MD, Mac Linton MD, Fide Memory, DO, Sherman Chauhan MD, Jackie Moreland MD, Xavier Edge MD, Tete Johnson MD, Denzel Marquis MD, Doyle Parikh, DO, Ja Regan MD, Angelia Jordan MD, Eli Banuelos, CNP,  Frances Johnson, CNP, Ольга Machado, CNP, Deena Sosaoty, CNP,  Carmela Gatica, DNP, Patrick Nelson, CNP, Abby Beat, CNP, Pablo Mcclure, CNP, Vaughn Felix, CNP, Dolores Terrazas, CNP, Ermelinda Barrera PA-C, Spike Delgado, CNS, Hair Chawla, CNP, Jonnie Lewis, CNP         [facility]    Discharge Summary     Patient ID: Jackson Neely  :  [birth year]   MRN: [id number]     ACCOUNT:  [de-identified]   Patient's PCP: Avelino Hollis DO  Admit Date: 3/7/2023   Discharge Date: 3/9/2023  Length of Stay: 1  Code Status:  Full Code  Admitting Physician: Felipe Marquis MD  Discharge Physician: Felipe Marquis MD     Active Discharge Diagnoses:     Hospital Problem Lists:  Principal Problem:    Cellulitis  Active Problems:    History of pulmonary embolism    Urinary retention    Chronic anticoagulation    Type 2 diabetes mellitus with diabetic neuropathy, with long-term current use of insulin ([facility]  Resolved Problems:    * No resolved hospital problems. *      Admission Condition:  fair     Discharged Condition: good    Hospital Stay:     Hospital Course:  Jackson Neely is a 71 y.o. female with a past medical history of pulmonary embolism (on Xarelto), DM2, HTN and HLD who presented to the emergency department on 3/7/2023 complaining of bilateral lower extremity edema.  The patient states that her symptoms began about two-weeks-ago and have progressively worsened. The patient reports that she has been taking oral furosemide 40 mg daily without relief. She called her PCP today and was instructed to go to the emergency department for admission for IV diuretics. In the ED, the patient was afebrile and nontoxic appearing. Pitting edema was appreciated in the bilateral lower extremities. The patient was also noted to have a RLE cellulitis. She is admitted to internal medicine for further management of a presumed HFpEF exacerbation. The patient's bilateral lower extremity edema significantly improved with diuresis. She developed urinary retention during admission and required quach catheter placement (this was removed prior to discharge). The patient is discharged home today (3/9) in stable condition. She developed an ZOILA from aggressive diuresis and has been advised to stop taking her home Lasix until she has a repeat BMP drawn in one-week. The patient is instructed to follow-up with urology and her primary care [address] [place] [address] as scheduled.      Significant therapeutic interventions: IV diuresis; quach catheter placement     Significant Diagnostic Studies:   Labs:  Hematology:  Recent Labs     03/07/23  1325 03/08/23  0529 03/09/23  0503   WBC 7.2 7.2 8.4   RBC 4.19 4.19 4.08   HGB 12.3 12.3 12.0   HCT 37.3 37.3 37.0   MCV 88.9 89.0 90.7   MCH 29.3 29.4 29.4   MCHC 32.9 33.1 32.4   RDW 14.4 13.9 14.1    201 219   MPV 9.5 10.1 11.8     Chemistry:  Recent Labs     03/07/23  1325 03/08/23  0529 03/09/23  0503    141 141   K 3.8 3.7 3.4*    104 101   CO2 26 27 26   GLUCOSE 199* 128* 163*   BUN 18 16 22   CREATININE 0.86 1.06* 1.44*   MG  --   --  1.7   ANIONGAP 11 10 14   LABGLOM >60 56* 39*   CALCIUM 8.8 9.2 9.1   PROBNP 94  --   --    TROPHS 15*  --   --      Recent Labs     03/07/23  1757 03/07/23  2107 03/08/23  0753 03/08/23  1121 03/08/23  1614 03/08/23 2035   POCU 82 150* 139* 205* 155* 162*     ABG:No results found for: POCPH, PHART, PH, POCPCO2, CEA2USY, PCO2, POCPO2, PO2ART, PO2, POCHCO3, XUZ3XGK, HCO3, NBEA, PBEA, BEART, BE, THGBART, THB, FPK6YVG, SVYB4MVI, L8TXWTSF, O2SAT, FIO2  Lab Results   Component Value Date/Time    SPECIAL NOT REPORTED 11/18/2021 06:16 PM     Lab Results   Component Value Date/Time    CULTURE NO SIGNIFICANT GROWTH 03/07/2023 07:45 PM       Radiology:  XR CHEST (2 VW)    Result Date: 3/7/2023  Trace left basilar effusion with adjacent atelectasis. US RENAL COMPLETE    Result Date: 3/8/2023  Unremarkable ultrasound of the kidneys       Consultations:    Consults:     Final Specialist Recommendations/Findings:   IP CONSULT TO UROLOGY      The patient was seen and examined on day of discharge and this discharge summary is in conjunction with any daily progress note from day of discharge. Discharge plan:     Disposition: Home    Physician Follow Up:   Caron Arevalo MD  [address] [place] [address]  [phone]    Schedule an appointment as soon as possible for a visit in 1 week(s)  Urinary retention; needs yukoo    Ana Jin, [address]  [phone]    Follow up       Requiring Further Evaluation/Follow Up POST HOSPITALIZATION/Incidental Findings: Patient will need to follow-up with urology and her primary care physician as scheduled. Diet: regular diet    Activity: As tolerated    Instructions to Patient: Take doxycycline for ten-days for RLE cellulitis. Follow-up with urology and your primary care physician as scheduled. Time spent on discharge is 20 mins in patient examination, evaluation, counseling as well as medication reconciliation, prescriptions for required medications, discharge plan and follow up. Electronically signed by   Jes Kruse MD  3/9/2023  7:41 AM      Thank you Dr. Ana Jin DO for the opportunity to be involved in this patient's care.

## 2023-03-09 NOTE — PLAN OF CARE
Problem: Discharge Planning  Goal: Discharge to home or other facility with appropriate resources  3/9/2023 1219 by Ramonita Alva RN  Outcome: Progressing  Flowsheets (Taken 3/9/2023 7104)  Discharge to home or other facility with appropriate resources:   Identify barriers to discharge with patient and caregiver   Arrange for needed discharge resources and transportation as appropriate   Identify discharge learning needs (meds, wound care, etc)     Problem: Pain  Goal: Verbalizes/displays adequate comfort level or baseline comfort level  3/9/2023 1219 by Ramonita Alva RN  Outcome: Progressing     Problem: ABCDS Injury Assessment  Goal: Absence of physical injury  3/9/2023 1219 by Ramonita Alva RN  Outcome: Progressing     Problem: Safety - Adult  Goal: Free from fall injury  3/9/2023 1219 by Ramonita Alva RN  Outcome: Progressing

## 2023-03-09 NOTE — PLAN OF CARE
Problem: Pain  Goal: Verbalizes/displays adequate comfort level or baseline comfort level  3/9/2023 0418 by Ysabel Damon RN  Outcome: Progressing   No new signs/symptoms of pain noted, pain rating < 3 on scale of 0-10, pain controlled with medication/ repositioning   Problem: Safety - Adult  Goal: Free from fall injury  3/9/2023 0418 by Ysabel Damon RN  Outcome: Progressing   No falls/ injuries this shift, bed in lowest position, brakes on, bed alarm on, call light in reach, side rails up x2

## 2023-03-09 NOTE — TELEPHONE ENCOUNTER
Pt called and said she just got out of the hospital and was told to see a urologist and asked if you could put a referral in for one for [facility]. Said they want her bladder checked out is the reason.  Please advise

## 2023-03-10 ENCOUNTER — CARE COORDINATION (OUTPATIENT)
Dept: CASE MANAGEMENT | Age: 73
End: 2023-03-10

## 2023-03-10 DIAGNOSIS — L03.90 CELLULITIS, UNSPECIFIED CELLULITIS SITE: Primary | ICD-10-CM

## 2023-03-10 PROCEDURE — 1111F DSCHRG MED/CURRENT MED MERGE: CPT | Performed by: FAMILY MEDICINE

## 2023-03-10 NOTE — CARE COORDINATION
[facility] Transitions Initial Follow Up Call    Call within 2 business days of discharge: Yes    Patient Current Location: [address] Transition Nurse contacted the patient by telephone to perform post hospital discharge assessment. Verified name and  with patient as identifiers. Provided introduction to self, and explanation of the Care Transition Nurse role. Patient: Simi Rivera Patient : 1950   MRN: [id number]  Reason for Admission: Rt lower extremity cellulitis  Discharge Date: 3/9/23 RARS: Readmission Risk Score: 10.9      Last Discharge  Street       Date Complaint Diagnosis Description Type Department Provider    3/7/23 Leg Swelling Cellulitis of lower extremity, unspecified laterality . .. ED to Hosp-Admission (Discharged) (ADMITTED) PB PB MS Leon Jaquez MD; Jh Rizo MD            Was this an external facility discharge? No Discharge Facility: [facility]    Challenges to be reviewed by the provider   Additional needs identified to be addressed with provider: No  none               Method of communication with provider: none. Was able to contact Karime Martinez for initial transitional outreach. She stated that she was not doing so well. She said that her feet are starting to swell up and she is having a hard time urinating. She said that she urinated this am, but has been unable to urinate since. She said that she does get pressure and when she goes to the bathroom, she only urinates a small amount. Just enough to relieve the pressure. She said that she is drinking water but it makes her sick to her stomach and she feels bloated. She denied any fever/chills, shortness of breath and her Rt leg is red and continues to leak fluid. She asked the PCP for a referral to urology and was waiting for a call back. Encouraged her to call urology again and advise them of her difficulty with urination. If she does not get a visit scheduled for today to call PCP and notify of issue. She stated that she had all her medications and did stop the medications that she was instructed to stop. She is aware of pending lab work and has follow up with PCP scheduled. Pepe Smith called back. She stated that she was able to get an appointment with urology for Monday. She said that she was doing all right, right now. She said that if she has any problems, she will return to the hospital.  Reviewed leg elevation and wearing compression stocking if she had them. She said that she has a recliner and does have stockings. She had no further questions or concerns. Care Transition Nurse reviewed discharge instructions with patient who verbalized understanding. The patient was given an opportunity to ask questions and does not have any further questions or concerns at this time. Were discharge instructions available to patient? Yes. Reviewed appropriate site of care based on symptoms and resources available to patient including: PCP  Specialist  When to call 911. The patient agrees to contact the PCP office for questions related to their healthcare. Medication reconciliation was performed with patient, who verbalizes understanding of administration of home medications. Medications reviewed, 1111F entered: yes    Was patient discharged with a pulse oximeter? no    Non-face-to-face services provided:  Obtained and reviewed discharge summary and/or continuity of care documents  Reviewed and followed up on pending diagnostic tests and treatments-[facility] 3/16/23    Offered patient enrollment in the Remote Patient Monitoring (RPM) program for in-home monitoring:  did not address at this timel .     Care Transitions 24 Hour Call    Do you have a copy of your discharge instructions?: Yes  Do you have all of your prescriptions and are they filled?: Yes  Have you been contacted by a [employer] [address] [employer]?: No  Have you scheduled your follow up appointment?: Yes  How are you going to get to your appointment?: Car - family or friend to transport  Do you feel like you have everything you need to keep you well at home?: Yes  Care Transitions Interventions         Discussed follow-up appointments. If no appointment was previously scheduled, appointment scheduling offered: Yes. Is follow up appointment scheduled within 7 days of discharge? No.    Follow Up  Future Appointments   Date Time Provider Rodolfo Hyman   3/20/2023  1:15 PM Simi Zavala DO [place]  [address] [facility]   6/19/2023  1:30 PM Selena Enrique MD [place] [address] Transition Nurse provided contact information. Plan for follow-up call in 5-7 days based on severity of symptoms and risk factors.   Plan for next call:  follow up on urology appointment/swelling Rt lower leg cellulitis    Maria Eugenia Erwin RN

## 2023-03-11 ENCOUNTER — HOSPITAL ENCOUNTER (EMERGENCY)
Age: 73
Discharge: HOME | End: 2023-03-11
Attending: EMERGENCY MEDICINE
Payer: MEDICARE

## 2023-03-11 VITALS
HEIGHT: 64 IN | SYSTOLIC BLOOD PRESSURE: 133 MMHG | TEMPERATURE: 97.9 F | HEART RATE: 85 BPM | WEIGHT: 222 LBS | DIASTOLIC BLOOD PRESSURE: 68 MMHG | RESPIRATION RATE: 18 BRPM | BODY MASS INDEX: 37.9 KG/M2 | OXYGEN SATURATION: 97 %

## 2023-03-11 DIAGNOSIS — R33.9 URINARY RETENTION: Primary | ICD-10-CM

## 2023-03-11 LAB
ABSOLUTE EOS #: 0.1 K/UL (ref 0–0.4)
ABSOLUTE LYMPH #: 0.9 K/UL (ref 1–4.8)
ABSOLUTE MONO #: 0.2 K/UL (ref 0.1–1.2)
ALBUMIN SERPL-MCNC: 3.7 G/DL (ref 3.5–5.2)
ALBUMIN/GLOBULIN RATIO: 1.4 (ref 1–2.5)
ALP SERPL-CCNC: 69 U/L (ref 35–104)
ALT SERPL-CCNC: 17 U/L (ref 5–33)
ANION GAP SERPL CALCULATED.3IONS-SCNC: 11 MMOL/L (ref 9–17)
AST SERPL-CCNC: 16 U/L
BACTERIA: ABNORMAL
BASOPHILS # BLD: 1 % (ref 0–2)
BASOPHILS ABSOLUTE: 0 K/UL (ref 0–0.2)
BILIRUB SERPL-MCNC: 0.4 MG/DL (ref 0.3–1.2)
BILIRUBIN URINE: NEGATIVE
BNP SERPL-MCNC: 152 PG/ML
BUN SERPL-MCNC: 25 MG/DL (ref 8–23)
CALCIUM SERPL-MCNC: 8.4 MG/DL (ref 8.6–10.4)
CHLORIDE SERPL-SCNC: 102 MMOL/L (ref 98–107)
CO2 SERPL-SCNC: 26 MMOL/L (ref 20–31)
COLOR: YELLOW
CREAT SERPL-MCNC: 1.01 MG/DL (ref 0.5–0.9)
CRYSTALS, UA: ABNORMAL /HPF
EOSINOPHILS RELATIVE PERCENT: 2 % (ref 1–4)
EPITHELIAL CELLS UA: ABNORMAL /HPF (ref 0–5)
GFR SERPL CREATININE-BSD FRML MDRD: 60 ML/MIN/1.73M2
GLUCOSE SERPL-MCNC: 218 MG/DL (ref 70–99)
GLUCOSE UR STRIP.AUTO-MCNC: NEGATIVE MG/DL
HCT VFR BLD AUTO: 37 % (ref 36–46)
HGB BLD-MCNC: 12.2 G/DL (ref 12–16)
KETONES UR STRIP.AUTO-MCNC: NEGATIVE MG/DL
LEUKOCYTE ESTERASE UR QL STRIP.AUTO: ABNORMAL
LYMPHOCYTES # BLD: 20 % (ref 24–44)
MAGNESIUM SERPL-MCNC: 1.7 MG/DL (ref 1.6–2.6)
MCH RBC QN AUTO: 29.5 PG (ref 26–34)
MCHC RBC AUTO-ENTMCNC: 33.1 G/DL (ref 31–37)
MCV RBC AUTO: 89.2 FL (ref 80–100)
MONOCYTES # BLD: 5 % (ref 2–11)
MUCUS: ABNORMAL
NITRITE UR QL STRIP.AUTO: NEGATIVE
OTHER OBSERVATIONS UA: ABNORMAL
PDW BLD-RTO: 14 % (ref 12.5–15.4)
PLATELET # BLD AUTO: 173 K/UL (ref 140–450)
PMV BLD AUTO: 9.2 FL (ref 6–12)
POTASSIUM SERPL-SCNC: 3.5 MMOL/L (ref 3.7–5.3)
PROT SERPL-MCNC: 6.4 G/DL (ref 6.4–8.3)
PROT UR STRIP.AUTO-MCNC: 5.5 MG/DL (ref 5–8)
PROT UR STRIP.AUTO-MCNC: ABNORMAL MG/DL
RBC # BLD: 4.14 M/UL (ref 4–5.2)
RBC CLUMPS #/AREA URNS AUTO: ABNORMAL /HPF (ref 0–2)
SEG NEUTROPHILS: 72 % (ref 36–66)
SEGMENTED NEUTROPHILS ABSOLUTE COUNT: 3.4 K/UL (ref 1.8–7.7)
SODIUM SERPL-SCNC: 139 MMOL/L (ref 135–144)
SPECIFIC GRAVITY UA: 1.02 (ref 1–1.03)
TURBIDITY: CLEAR
URINE HGB: NEGATIVE
UROBILINOGEN, URINE: NORMAL
WBC # BLD AUTO: 4.6 K/UL (ref 3.5–11)
WBC UA: ABNORMAL /HPF (ref 0–5)

## 2023-03-11 PROCEDURE — 80053 COMPREHEN METABOLIC PANEL: CPT

## 2023-03-11 PROCEDURE — 83880 ASSAY OF NATRIURETIC PEPTIDE: CPT

## 2023-03-11 PROCEDURE — 51798 US URINE CAPACITY MEASURE: CPT

## 2023-03-11 PROCEDURE — 99283 EMERGENCY DEPT VISIT LOW MDM: CPT

## 2023-03-11 PROCEDURE — 36415 COLL VENOUS BLD VENIPUNCTURE: CPT

## 2023-03-11 PROCEDURE — 83735 ASSAY OF MAGNESIUM: CPT

## 2023-03-11 PROCEDURE — 81001 URINALYSIS AUTO W/SCOPE: CPT

## 2023-03-11 PROCEDURE — 85025 COMPLETE CBC W/AUTO DIFF WBC: CPT

## 2023-03-11 ASSESSMENT — ENCOUNTER SYMPTOMS
EYES NEGATIVE: 1
RESPIRATORY NEGATIVE: 1
GASTROINTESTINAL NEGATIVE: 1

## 2023-03-11 ASSESSMENT — PAIN DESCRIPTION - LOCATION: LOCATION: ABDOMEN

## 2023-03-11 ASSESSMENT — PAIN - FUNCTIONAL ASSESSMENT: PAIN_FUNCTIONAL_ASSESSMENT: 0-10

## 2023-03-11 NOTE — ED NOTES
Catheter inserted into bladder, yellow urine returned approx 300 ml     Jayne Nichols, LUCIE  03/11/23 1371
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
## 2023-03-12 NOTE — ED NOTES
Bladder scan performed showing 162 ml urine in bladder .  Jatin Hanks NP notified     Jolene Camejo RN  03/11/23 2005

## 2023-03-12 NOTE — ED PROVIDER NOTES
I was present in the ED during this patient's evaluation and management by the Advance Practice Provider and was available to address any concerns about their medical management.     Alexandr Foster MD  Attending, Emergency Department       Shameka Pham MD  03/12/23 3220

## 2023-03-12 NOTE — ED NOTES
Drainage bag changed, teaching performed re catheter care with apparent pt understanding     Immanuel Garcia RN  03/11/23 1921

## 2023-03-14 ENCOUNTER — CARE COORDINATION (OUTPATIENT)
Dept: CASE MANAGEMENT | Age: 73
End: 2023-03-14

## 2023-03-14 NOTE — CARE COORDINATION
[address] Care Transitions Follow Up Call    Patient Current Location: [address] Transition Nurse contacted the patient by telephone to follow up after admission on 3/7/23. Verified name and  with patient as identifiers. Patient: Michael Paniagua  Patient : 1950   MRN: [id number]  Reason for Admission: Rt lower extremity cellulitis  Discharge Date: 3/11/23 RARS: Readmission Risk Score: 10.9      Needs to be reviewed by the provider   Additional needs identified to be addressed with provider: No  none             Method of communication with provider: none. Was able to contact Timmy Mccormick for transitional outreach. She stated that she on Saturday she was unable to urinate, so she went to the ED. She said that they inserted a quach and was sent home. She did make it to her urology appointment. She said at the time she had a little bit of blood in the urine. She said that Monday night her urine turned bloody and she was having blood draining, so she is currently in the ED at [facility]. She said that she just had a CT scan and she has noticed that the drainage in her quach is thinner and brown. She does not know what they are planning on doing. Will reach out tomorrow for update. Addressed changes since last contact:  none  Discussed follow-up appointments. If no appointment was previously scheduled, appointment scheduling offered: No.   Is follow up appointment scheduled within 7 days of discharge? Yes. Follow Up  Future Appointments   Date Time Provider Rodolfo Hyman   5/10/2023  2:15 PM DO Raul Mancilla [facility]   2023  1:30 PM Ynes Park MD [facility]     [facility] follow up appointment(s):     Care Transition Nurse reviewed medical action plan with patient and discussed any barriers to care and/or understanding of plan of care after discharge.  Discussed appropriate site of care based on symptoms and resources available to patient including: PCP  Specialist  When to call 911. The patient agrees to contact the PCP office for questions related to their healthcare. Patients top risk factors for readmission: medical condition-cellulitis/urine retention/ hematuria  Interventions to address risk factors: Obtained and reviewed discharge summary and/or continuity of care documents       Care Transitions Subsequent and Final Call    Subsequent and Final Calls  Do you have any ongoing symptoms?: Yes  Onset of Patient-reported symptoms: Yesterday  Patient-reported symptoms: Other  Have your medications changed?: No  Do you have any questions related to your medications?: No  Do you currently have any active services?: No  Do you have any needs or concerns that I can assist you with?: No  Care Transitions Interventions  Other Interventions:             Care Transition Nurse provided contact information for future needs. Plan for follow-up call in 1-2 days based on severity of symptoms and risk factors.   Plan for next call:  follow up on ED visit in [facility] or if was admitted    Jaiden Kraus RN

## 2023-03-15 ENCOUNTER — TELEPHONE (OUTPATIENT)
Dept: PRIMARY CARE CLINIC | Age: 73
End: 2023-03-15

## 2023-03-15 ENCOUNTER — CARE COORDINATION (OUTPATIENT)
Dept: CASE MANAGEMENT | Age: 73
End: 2023-03-15

## 2023-03-15 NOTE — TELEPHONE ENCOUNTER
Patient states she was at [facility], she had an appt with urology on Monday but at that time, there was a pinkish tint to her urine and they told her it wasn't too unusual as she may be irritated by the quach. However, she is not fully bleeding. She called her urologist and they are going to see her again on 3/27/23 to perform a test. 5 days of holding xarelto will bring her to Saturday.  She was advised to contact us on Friday to update us on her issue and that a message would be routed to her PCP    Records requested from [facility]

## 2023-03-15 NOTE — CARE COORDINATION
[facility] Transitions Follow Up Call    Patient Current Location:  Home: [address] [place] [address] [address]    Care Transition Nurse contacted the patient by telephone to follow up after admission on 3/11/2. Verified name and  with patient as identifiers. Patient: Ryder Trinh  Patient : 1950   MRN: [id number]  Reason for Admission: Rt lower extremity cellulitis  Discharge Date: 3/11/23 RARS: Readmission Risk Score: 10.9      Needs to be reviewed by the provider   Additional needs identified to be addressed with provider: No  none             Method of communication with provider: none. Received call from [address]. She stated That she was not admitted to the hospital in [facility]. She said that they took a CT and could not find any thing and then told her to stop her Xarelto for 7 days. She said that she continues with straight blood in the bag. She said that her Hg dropped to 11 from 12. She said that her lower legs continue with the swelling and the Rt leg is still red and constantly leaking fluid. Reviewed elevation and compression. She said that she does not like to elevate and the compression stocking get wet quickly from the drainage. She will be calling the urologist and attempt to get in to see him today. She will also be calling the PCP today. She had no further questions or concerns. Addressed changes since last contact:  none  Discussed follow-up appointments. If no appointment was previously scheduled, appointment scheduling offered: Yes. Is follow up appointment scheduled within 7 days of discharge? Yes.     Follow Up  Future Appointments   Date Time Provider Rodolfo Hyman   5/10/2023  2:15 PM DO [facility]   2023  1:30 PM Debbie Goode [facility]     Non-BS follow up appointment(s):     Care Transition Nurse reviewed medical action plan with patient and discussed any barriers to care and/or understanding of plan of care after discharge. Discussed appropriate site of care based on symptoms and resources available to patient including: PCP  Specialist  When to call 911. The patient agrees to contact the PCP office for questions related to their healthcare. Patients top risk factors for readmission: medical condition-cellulitis/urine retention/hematuria  Interventions to address risk factors: Obtained and reviewed discharge summary and/or continuity of care documents    Offered patient enrollment in the Remote Patient Monitoring (RPM) program for in-home monitoring: NA.     Care Transitions Subsequent and Final Call    Subsequent and Final Calls  Do you have any ongoing symptoms?: Yes  Onset of Patient-reported symptoms: Yesterday  Patient-reported symptoms: Other  Do you have any questions related to your medications?: No  Do you currently have any active services?: No  Do you have any needs or concerns that I can assist you with?: No  Care Transitions Interventions  Other Interventions:             Care Transition Nurse provided contact information for future needs. Plan for follow-up call in 1-2 days based on severity of symptoms and risk factors.   Plan for next call:  follow up on urology call    Axel Hendrix RN

## 2023-03-15 NOTE — TELEPHONE ENCOUNTER
Could you ask her which [facility] R[facility] went to? I don't see any recent notes. And was she able to see urology too? She can hold it for 5 days first and see how she does.

## 2023-03-15 NOTE — TELEPHONE ENCOUNTER
Patient states she was in ER last night due to having a Lazo and only having blood come out.  They advised her to contact Dr Arabella Guerra to see if it is ok to D/C Xarelto x 7 days

## 2023-03-17 ENCOUNTER — HOSPITAL ENCOUNTER (EMERGENCY)
Age: 73
Discharge: HOME | End: 2023-03-17
Attending: EMERGENCY MEDICINE
Payer: MEDICARE

## 2023-03-17 ENCOUNTER — CARE COORDINATION (OUTPATIENT)
Dept: CASE MANAGEMENT | Age: 73
End: 2023-03-17

## 2023-03-17 VITALS
WEIGHT: 223 LBS | RESPIRATION RATE: 18 BRPM | BODY MASS INDEX: 38.07 KG/M2 | OXYGEN SATURATION: 97 % | TEMPERATURE: 97.9 F | DIASTOLIC BLOOD PRESSURE: 74 MMHG | HEART RATE: 98 BPM | HEIGHT: 64 IN | SYSTOLIC BLOOD PRESSURE: 164 MMHG

## 2023-03-17 DIAGNOSIS — R31.9 URINARY TRACT INFECTION WITH HEMATURIA, SITE UNSPECIFIED: ICD-10-CM

## 2023-03-17 DIAGNOSIS — N32.89 BLADDER SPASMS: Primary | ICD-10-CM

## 2023-03-17 DIAGNOSIS — N39.0 URINARY TRACT INFECTION WITH HEMATURIA, SITE UNSPECIFIED: ICD-10-CM

## 2023-03-17 LAB
ABSOLUTE EOS #: 0.2 K/UL (ref 0–0.4)
ABSOLUTE LYMPH #: 2.2 K/UL (ref 1–4.8)
ABSOLUTE MONO #: 0.4 K/UL (ref 0.1–1.2)
ANION GAP SERPL CALCULATED.3IONS-SCNC: 11 MMOL/L (ref 9–17)
BACTERIA: ABNORMAL
BASOPHILS # BLD: 1 % (ref 0–2)
BASOPHILS ABSOLUTE: 0 K/UL (ref 0–0.2)
BILIRUBIN URINE: NEGATIVE
BUN SERPL-MCNC: 13 MG/DL (ref 8–23)
CALCIUM SERPL-MCNC: 9 MG/DL (ref 8.6–10.4)
CHLORIDE SERPL-SCNC: 108 MMOL/L (ref 98–107)
CO2 SERPL-SCNC: 26 MMOL/L (ref 20–31)
COLOR: ABNORMAL
CREAT SERPL-MCNC: 1.16 MG/DL (ref 0.5–0.9)
EOSINOPHILS RELATIVE PERCENT: 3 % (ref 1–4)
EPITHELIAL CELLS UA: ABNORMAL /HPF (ref 0–5)
GFR SERPL CREATININE-BSD FRML MDRD: 50 ML/MIN/1.73M2
GLUCOSE SERPL-MCNC: 207 MG/DL (ref 70–99)
GLUCOSE UR STRIP.AUTO-MCNC: ABNORMAL MG/DL
HCT VFR BLD AUTO: 35.4 % (ref 36–46)
HGB BLD-MCNC: 11.9 G/DL (ref 12–16)
KETONES UR STRIP.AUTO-MCNC: NEGATIVE MG/DL
LEUKOCYTE ESTERASE UR QL STRIP.AUTO: ABNORMAL
LYMPHOCYTES # BLD: 28 % (ref 24–44)
MCH RBC QN AUTO: 29.9 PG (ref 26–34)
MCHC RBC AUTO-ENTMCNC: 33.8 G/DL (ref 31–37)
MCV RBC AUTO: 88.6 FL (ref 80–100)
MONOCYTES # BLD: 5 % (ref 2–11)
NITRITE UR QL STRIP.AUTO: NEGATIVE
OTHER OBSERVATIONS UA: ABNORMAL
PDW BLD-RTO: 13.6 % (ref 12.5–15.4)
PLATELET # BLD AUTO: 226 K/UL (ref 140–450)
PMV BLD AUTO: 9.5 FL (ref 6–12)
POTASSIUM SERPL-SCNC: 3.7 MMOL/L (ref 3.7–5.3)
PROT UR STRIP.AUTO-MCNC: 6.5 MG/DL (ref 5–8)
PROT UR STRIP.AUTO-MCNC: ABNORMAL MG/DL
RBC # BLD: 3.99 M/UL (ref 4–5.2)
RBC CLUMPS #/AREA URNS AUTO: ABNORMAL /HPF (ref 0–2)
SEG NEUTROPHILS: 63 % (ref 36–66)
SEGMENTED NEUTROPHILS ABSOLUTE COUNT: 5.1 K/UL (ref 1.8–7.7)
SODIUM SERPL-SCNC: 145 MMOL/L (ref 135–144)
SPECIFIC GRAVITY UA: 1.01 (ref 1–1.03)
TURBIDITY: ABNORMAL
URINE HGB: ABNORMAL
UROBILINOGEN, URINE: NORMAL
WBC # BLD AUTO: 7.9 K/UL (ref 3.5–11)
WBC UA: ABNORMAL /HPF (ref 0–5)

## 2023-03-17 PROCEDURE — 81001 URINALYSIS AUTO W/SCOPE: CPT

## 2023-03-17 PROCEDURE — 51798 US URINE CAPACITY MEASURE: CPT

## 2023-03-17 PROCEDURE — 99283 EMERGENCY DEPT VISIT LOW MDM: CPT

## 2023-03-17 PROCEDURE — 87086 URINE CULTURE/COLONY COUNT: CPT

## 2023-03-17 PROCEDURE — 80048 BASIC METABOLIC PNL TOTAL CA: CPT

## 2023-03-17 PROCEDURE — 85025 COMPLETE CBC W/AUTO DIFF WBC: CPT

## 2023-03-17 RX ORDER — OXYBUTYNIN CHLORIDE 5 MG/1
5 TABLET, EXTENDED RELEASE ORAL ONCE
Status: DISCONTINUED | OUTPATIENT
Start: 2023-03-17 | End: 2023-03-17 | Stop reason: HOSPADM

## 2023-03-17 RX ORDER — TROSPIUM CHLORIDE 20 MG/1
20 TABLET, FILM COATED ORAL ONCE
Status: DISCONTINUED | OUTPATIENT
Start: 2023-03-17 | End: 2023-03-17

## 2023-03-17 RX ORDER — TROSPIUM CHLORIDE 20 MG/1
20 TABLET, FILM COATED ORAL 2 TIMES DAILY
Qty: 28 TABLET | Refills: 0 | Status: SHIPPED | OUTPATIENT
Start: 2023-03-17 | End: 2023-03-18

## 2023-03-17 RX ORDER — TROSPIUM CHLORIDE 20 MG/1
20 TABLET, FILM COATED ORAL 2 TIMES DAILY
Qty: 28 TABLET | Refills: 0 | Status: SHIPPED | OUTPATIENT
Start: 2023-03-17 | End: 2023-03-17 | Stop reason: SDUPTHER

## 2023-03-17 ASSESSMENT — PAIN SCALES - GENERAL: PAINLEVEL_OUTOF10: 8

## 2023-03-17 ASSESSMENT — PAIN - FUNCTIONAL ASSESSMENT: PAIN_FUNCTIONAL_ASSESSMENT: 0-10

## 2023-03-17 ASSESSMENT — PAIN DESCRIPTION - PAIN TYPE: TYPE: ACUTE PAIN

## 2023-03-17 ASSESSMENT — PAIN DESCRIPTION - DESCRIPTORS: DESCRIPTORS: DISCOMFORT

## 2023-03-17 NOTE — CARE COORDINATION
[address] Care Transitions Follow Up Call    Patient Current Location:  Home: [address] [place] [address] [address]    Care Transition Nurse contacted the patient by telephone to follow up after admission on 3/11/23. Verified name and  with patient as identifiers. Patient: Francisca Mims  Patient : 1950   MRN: [id number]  Reason for Admission: Rt lower extremity cellulitis  Discharge Date: 3/11/23 RARS: Readmission Risk Score: 10.9      Needs to be reviewed by the provider   Additional needs identified to be addressed with provider: No  none             Method of communication with provider: none. Was able to contact Maryanne Lujan for transitional outreach. She stated that she was instructed to hold her Xarelto by primary. She said that she continued to have bloody urine and his not having much output. She said that she did have 400 ml out from 9 pm last night to noon today. Now (2 pm) from noon she has only had 50 ml out of reddish brown urine. She said that she does have pressure in her bladder. She said that she is drinking about 2 liters of water a day. Her leg continue with the edema and are constantly weeping fluid. She said the nurse that came today to fill her pain pump, advised her to go to the ED after seeing her urine in the quach. She will be returning to the ED. Addressed changes since last contact:  none  Discussed follow-up appointments. If no appointment was previously scheduled, appointment scheduling offered: Yes. Is follow up appointment scheduled within 7 days of discharge? Yes. Follow Up  Future Appointments   Date Time Provider Rodolfo Hyman   5/10/2023  2:15 PM DO [facility]ul [facility]   2023  1:30 PM Tamanna Fuchs MD [facility]     Non-[facility] follow up appointment(s):     Care Transition Nurse reviewed medical action plan with patient and discussed any barriers to care and/or understanding of plan of care after discharge. Discussed appropriate site of care based on symptoms and resources available to patient including: PCP  Specialist  When to call 911. The patient agrees to contact the PCP office for questions related to their healthcare. Patients top risk factors for readmission: medical condition-cellulitis/urinary retention/hematuria  Interventions to address risk factors:  quach not draining, hemturia       Care Transitions Subsequent and Final Call    Subsequent and Final Calls  Do you have any ongoing symptoms?: Yes  Onset of Patient-reported symptoms: In the past 7 days  Patient-reported symptoms: Other  Interventions for patient-reported symptoms: Other  Have your medications changed?: Yes  Patient Reports: holding Xarelto  Do you have any questions related to your medications?: No  Do you currently have any active services?: No  Do you have any needs or concerns that I can assist you with?: No  Care Transitions Interventions  Other Interventions:             Care Transition Nurse provided contact information for future needs. Plan for follow-up call in 5-7 days based on severity of symptoms and risk factors.   Plan for next call:  follow up on ED visit     Alisha Randolph RN
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
## 2023-03-17 NOTE — ED NOTES
Writer pagelor solares per [facility] & HEART, SUZY.       Jp Smith LPN  41/80/36 [id number]

## 2023-03-17 NOTE — ED PROVIDER NOTES
[address] [facility] Emergency Department  [id number] [address] [facility] [address] [address]  Phone: [phone]  Fax: [phone]      Attending Physician [address]       Chief Complaint   Patient presents with    Urinary Retention     PT. STATES HAVING A quach cath place last Saturday and today not having urine drainage in her quach cath since 11am having bladder pain and blood in her urine        DIAGNOSTIC RESULTS     LABS:  Labs Reviewed   CBC WITH AUTO DIFFERENTIAL - Abnormal; Notable for the following components:       Result Value    RBC 3.99 (*)     Hemoglobin 11.9 (*)     Hematocrit 35.4 (*)     All other components within normal limits   BASIC METABOLIC PANEL - Abnormal; Notable for the following components:    Glucose 207 (*)     Creatinine 1.16 (*)     Est, Glom Filt Rate 50 (*)     Sodium 145 (*)     Chloride 108 (*)     All other components within normal limits   URINALYSIS WITH REFLEX TO CULTURE - Abnormal; Notable for the following components:    Color, UA RICKY (*)     Turbidity UA Cloudy (*)     Glucose, Ur 1+ (*)     Urine Hgb LARGE (*)     Protein, UA TRACE (*)     Leukocyte Esterase, Urine MODERATE (*)     All other components within normal limits   MICROSCOPIC URINALYSIS - Abnormal; Notable for the following components:    Bacteria, UA FEW (*)     Other Observations UA Culture ordered based on defined criteria. (*)     All other components within normal limits   CULTURE, URINE       All other labs were within normal range or not returned as of this dictation.     RADIOLOGY:  No orders to display         EMERGENCY DEPARTMENT COURSE:   Vitals:    Vitals:    03/17/23 1629 03/17/23 1728   BP: (!) 164/74    Pulse:  98   Resp: 18    SpO2: 97%    Weight: 101.2 kg (223 lb)    Height: 5' 4\" (1.626 m)      -------------------------  BP: (!) 164/74,  , Heart Rate: 98, Resp: 18      ED Course as of 03/17/23 1906   Fri Mar 17, 2023   1754 CBC demonstrates a mild anemia at 11.9 but no significant change from previous and is otherwise unremarkable. UA demonstrates UTI with 10-20 WBCs, TNTC RBCs, and moderate leukocytes. She has been on antibiotics for a week and it appears this is not improving. Bladder scan was obtained and she was actually able to void about 50 mL and postvoid residual done of only 59 mL so she was not retaining any significant urine however she is still having bladder spasms. BMP is pending. [MG]   1811 BMP demonstrates elevated glucose at 207, kidney function is 1. 16. Mild sodium and chloride abnormalities nothing significant. [MG]      ED Course User Index  [MG] Neena Carvajal PA-C         PERTINENT ATTENDING PHYSICIAN COMMENTS:    I performed a history and physical examination of the patient and discussed management with the mid level provider. I reviewed the mid level provider's note and agree with the documented findings and plan of care. Any areas of disagreement are noted on the chart. I was personally present for the key portions of any procedures. I have documented in the chart those procedures where I was not present during the key portions. I have reviewed the emergency nurses triage note. I agree with the chief complaint, past medical history, past surgical history, allergies, medications, social and family history as documented unless otherwise noted below. Documentation of the HPI, Physical Exam and Medical Decision Making performed by mid level providers is based on my personal performance of the HPI, PE and MDM. For Physician Assistant/ Nurse Practitioner cases/documentation I have personally evaluated this patient and have completed at least one if not all key elements of the E/M (history, physical exam, and MDM). Additional findings are as noted. 69-year-old female presents with urinary retention. Patient has a Laoz catheter in place. She noted hematuria, and her catheter was not draining.   Plan to irrigate her catheter and potentially replace it. We will check urinalysis.       (Please note that portions of this note were completed with a voice recognition program.  Efforts were made to edit the dictations but occasionally words are mis-transcribed.)    Jory Diaz MD  Attending Emergency Medicine Physician       Jory Diaz MD  03/17/23 7238

## 2023-03-17 NOTE — TELEPHONE ENCOUNTER
Patient called wanting to update provider. She has been off of her xerelto for 4 days now and is still having blood in her Urine. Patient also wanted to let provider know that she had her nurse out this morning to refill her pain pump and she was concerned with patient possible retaining fluid again. Patient states that in her catheter bag she hardly has any urine. It was changed last night around 9pm and she hardly has anything in it. Reports that she constantly feels pressure like she has to go.

## 2023-03-17 NOTE — ED NOTES
Patient ambulated to bathroom, urine specimen collected and sent to lab.  Patient voided 50mL     Reinier Subramanian LPN  91/16/35 [id number]

## 2023-03-17 NOTE — ED NOTES
Writer emptied patient quach, patient had 150mL of urine output.       Sixto Clark, MIRACLE  60/64/60 0149

## 2023-03-17 NOTE — TELEPHONE ENCOUNTER
Patient notified and verbalized understanding, she states the blood in her urine hasnt changed any and she will call the office on Monday and update us after holding the [address]

## 2023-03-17 NOTE — TELEPHONE ENCOUNTER
I would recommend she reach out to the urology office regarding the urine output being very low again. As for the bleeding, recommend holding for three more days ( so total of 7 days) xarelto.

## 2023-03-17 NOTE — DISCHARGE INSTRUCTIONS
PLEASE RETURN TO THE EMERGENCY DEPARTMENT IMMEDIATELY if your symptoms worsen in anyway or in 1-2 days if not improved for re-evaluation. You should immediately return to the ER for symptoms such as abdominal or back pain, fever, a feeling of passing out, light headed, dizziness, chest pain, shortness of breath, persistent nausea and/or vomiting, numbness or weakness to the arms or legs, coolness or color change of the arms or legs. Take your medication as indicated and prescribed. Continue your antibiotics. You should encourage fluids. Try to relax and don't strain to urinate. You can give a little pressure on your lower abdomen when urinating if needed. Please understand that at this time there is no evidence for a more serious underlying process, but that early in the process of an illness or injury, an emergency department workup can be falsely reassuring. You should contact your family doctor within the next 24 hours for a follow up appointment    Kalee Arroyo!!!    From [facility]) and [facility]    On behalf of the Emergency Department staff at [facility]), I would like to thank you for giving us the opportunity to address your health care needs and concerns. We hope that during your visit, our service was delivered in a professional and caring manner. Please keep [facility]) in mind as we walk with you down the path to your own personal wellness. Please expect an automated text message or email from us so we can ask a few questions about your health and progress. Based on your answers, a clinician may call you back to offer help and instructions. Please understand that early in the process of an illness or injury, an emergency department workup can be falsely reassuring. If you notice any worsening, changing or persistent symptoms please call your family doctor or return to the ER immediately.      Tell us how we did during your visit at [email or web address]   and let us know about your experience

## 2023-03-17 NOTE — ED NOTES
Writer at bedside for post void bladder scan, bladder scan of 59ml. Writer notified Bernard Israel PA-C.       Roman Hunter, MIRACLE  82/40/57 1122

## 2023-03-17 NOTE — ED NOTES
Writer straight cath patient at [facility].  Straight cath of 20mL      Sukumar Pineda, MIRACLE  07/66/59 4776

## 2023-03-18 LAB
MICROORGANISM SPEC CULT: NO GROWTH
SPECIMEN DESCRIPTION: NORMAL

## 2023-03-18 RX ORDER — OXYBUTYNIN CHLORIDE 5 MG/1
10 TABLET, EXTENDED RELEASE ORAL DAILY
Qty: 28 TABLET | Refills: 0 | Status: SHIPPED | OUTPATIENT
Start: 2023-03-18 | End: 2023-04-01

## 2023-03-20 ENCOUNTER — CARE COORDINATION (OUTPATIENT)
Dept: CASE MANAGEMENT | Age: 73
End: 2023-03-20

## 2023-03-20 DIAGNOSIS — R33.9 URINARY RETENTION: ICD-10-CM

## 2023-03-20 DIAGNOSIS — L03.90 CELLULITIS, UNSPECIFIED CELLULITIS SITE: Primary | ICD-10-CM

## 2023-03-20 DIAGNOSIS — N17.9 AKI (ACUTE KIDNEY INJURY) (HCC): ICD-10-CM

## 2023-03-20 NOTE — CARE COORDINATION
[facility] Follow Up Call    Patient Current Location: [address] Transition Nurse contacted the patient by telephone to follow up after admission on 3/11/23. Verified name and  with patient as identifiers. Patient: Emily Oneill  Patient : 1950   MRN: [id number]  Reason for Admission:  Rt lower extremity cellulitis  Discharge Date: 3/17/23 RARS: Readmission Risk Score: 10.9      Needs to be reviewed by the provider   Additional needs identified to be addressed with provider: Yes  swelling             Method of communication with provider: chart routing. Was able to contact Abida Delarosa for transitional outreach and ED follow up. She stated that when she went to the ED they ended up taking the quach out. She said that she has urinated but she is only urinating about once a day. She has attempted previously but will only have a few drops come out. She said that her urine is clear and no blood noted. She continues with the lower leg and abdominal swelling. She denied any cough or shortness of breath or fever/chills. Encouraged her to see PCP about retained fluid, but she said that with all the ED visits she said that she wanted to take a break. She said that she will see the urologist and have the cysto Monday and if he sees nothing, will ask PCP about a nephrologist referral.  No further questions or concerns. Addressed changes since last contact:  none  Discussed follow-up appointments. If no appointment was previously scheduled, appointment scheduling offered: Yes. Is follow up appointment scheduled within 7 days of discharge? Yes.     Follow Up  Future Appointments   Date Time Provider Rodolfo Hyman   5/10/2023  2:15 PM DO [facility]   2023  1:30 PM Misti Duke MD [facility]     Non-[facility] follow up appointment(s): urologist 3/27    Care Transition Nurse reviewed medical action plan with patient and discussed any barriers to care

## 2023-03-21 ENCOUNTER — TELEPHONE (OUTPATIENT)
Dept: PAIN MANAGEMENT | Age: 73
End: 2023-03-21

## 2023-03-21 NOTE — TELEPHONE ENCOUNTER
Patient called to inquire about whether or not Qutenza procedure was an option for her. Please advise. Patient last seen 11/14/22.

## 2023-03-22 NOTE — TELEPHONE ENCOUNTER
Patient aware we do not offer what was inquired about. Patient stated she will continue to go to [facility].

## 2023-03-22 NOTE — TELEPHONE ENCOUNTER
I would recommend rechecking her lab work to follow-up on kidney function and electrolytes as well if she is open to this I can order the lab. Let me know thank you. If her symptoms are worsening I would recommend getting checked out again.

## 2023-03-23 ENCOUNTER — HOSPITAL ENCOUNTER (OUTPATIENT)
Age: 73
Discharge: HOME | End: 2023-03-23
Payer: MEDICARE

## 2023-03-23 DIAGNOSIS — R33.9 URINARY RETENTION: ICD-10-CM

## 2023-03-23 DIAGNOSIS — N17.9 AKI (ACUTE KIDNEY INJURY) (HCC): ICD-10-CM

## 2023-03-23 PROCEDURE — 36415 COLL VENOUS BLD VENIPUNCTURE: CPT

## 2023-03-23 PROCEDURE — 80048 BASIC METABOLIC PNL TOTAL CA: CPT

## 2023-03-23 NOTE — TELEPHONE ENCOUNTER
Patient is willing to repeat labs, and would like a call when orders are placed. Patient states that nothing is worsening but it is also not improving. Patient went to the bathroom this morning for the first time in 24 hours. Her urine stream was not continuous, that she would go for a little bit and then stop, and then go again and then stop.

## 2023-03-23 NOTE — TELEPHONE ENCOUNTER
Pt called back to see if labs have been ordered. Pt was advised the labs are ordered. Pt plans to get them done today or tomorrow.

## 2023-03-23 NOTE — TELEPHONE ENCOUNTER
If she is on oxybutynin that was prescribed in ER I recommend she discontinue it. I have also ordered the labs. I would also recommend she touch base with urology if her symptoms are still same and not improving. If continues to not urinate much at all may need ER eval again for possible catheter again, but she can call urology and see what recommendation they have.

## 2023-03-24 ENCOUNTER — CARE COORDINATION (OUTPATIENT)
Dept: CASE MANAGEMENT | Age: 73
End: 2023-03-24

## 2023-03-24 LAB
ANION GAP SERPL CALCULATED.3IONS-SCNC: 15 MMOL/L (ref 9–17)
BUN SERPL-MCNC: 11 MG/DL (ref 8–23)
CALCIUM SERPL-MCNC: 9.2 MG/DL (ref 8.6–10.4)
CHLORIDE SERPL-SCNC: 106 MMOL/L (ref 98–107)
CO2 SERPL-SCNC: 23 MMOL/L (ref 20–31)
CREAT SERPL-MCNC: 0.98 MG/DL (ref 0.5–0.9)
GFR SERPL CREATININE-BSD FRML MDRD: >60 ML/MIN/1.73M2
GLUCOSE SERPL-MCNC: 197 MG/DL (ref 70–99)
POTASSIUM SERPL-SCNC: 4.1 MMOL/L (ref 3.7–5.3)
SODIUM SERPL-SCNC: 144 MMOL/L (ref 135–144)

## 2023-03-24 NOTE — CARE COORDINATION
[facility] Care Transitions Follow Up Call    Patient Current Location: [address] Transition Nurse contacted the patient by telephone to follow up after admission on 3/11/23. Verified name and  with patient as identifiers. Patient: Peggy Coleman  Patient : 1950   MRN: [id number]  Reason for Admission: Rt lower extremity cellulitis  Discharge Date: 3/17/23 RARS: Readmission Risk Score: 10.9      Needs to be reviewed by the provider   Additional needs identified to be addressed with provider: No  none             Method of communication with provider: none. Was able to contact Muna Root for transitional outreach. She stated that everything seemed to be doing about the same. She said that she did restart her Xarelto after the 7 th day. She denied any blood in her urine after starting the Xarelto. She said that she continues to urinate about once a day. She said that she does have the urge at other times, but she said that she just \"dribbles\". She said that she was told to get another quach inserted and she stated why have a quach in when she did not make much urine when she had it in. She said that she continues with the lower leg edema. Rt leg is still red, but the weeping has stopped. She said that she will be seeing the urologist on Monday and having a cysto then. When asked about her blood sugars, she said that she has not been checking it, because she is waiting on her supplies for her Dexcom. She then ended the call due to PCP office calling her. Addressed changes since last contact:  none  Discussed follow-up appointments. If no appointment was previously scheduled, appointment scheduling offered: Yes. Is follow up appointment scheduled within 7 days of discharge? Yes.     Follow Up  Future Appointments   Date Time Provider Rodolfo Hyman   5/10/2023  2:15 PM [facility], DO [facility]   2023  1:30 PM Nettie Carvajal MD [facility]     Non-[facility] follow up

## 2023-03-28 ENCOUNTER — CARE COORDINATION (OUTPATIENT)
Dept: CASE MANAGEMENT | Age: 73
End: 2023-03-28

## 2023-03-28 NOTE — CARE COORDINATION
[facility] Care Transitions Follow Up Call    Patient Current Location: [address]  Transition Nurse contacted the patient by telephone to follow up after admission on 3/11/23. Verified name and  with patient as identifiers. Patient: Javi Ace  Patient : 1950   MRN: [id number]  Reason for Admission: Rt lower extremity cellulitis  Discharge Date: 3/17/23 RARS: Readmission Risk Score: 10.9      Needs to be reviewed by the provider   Additional needs identified to be addressed with provider: No  none             Method of communication with provider: none. Was able to contact [facility] for transitional outreach. She stated that she did make it to the urology appointment and cysto. She said they put another quach inserted. The urologist told her that she had a weak bladder and she had three options:  quach to stay in, intermittent self cath or a bladder stimulator. She said that she will be opting to have the bladder stimulator. She will be going on vacation  and the plan is to remove the quach prior to vacation and then have the surgery or quach reinserted when she returns. She denied any blood in her urine. She said that her legs are still swollen and hard to touch. She said that the both legs are still red and hot to touch, but no drainage from the Rt leg. Reviewed possibly following up with a vascular surgeon for the swelling. Addressed changes since last contact:  none  Discussed follow-up appointments. If no appointment was previously scheduled, appointment scheduling offered: Yes. Is follow up appointment scheduled within 7 days of discharge? Yes.     Follow Up  Future Appointments   Date Time Provider Rodolfo Hyman   5/10/2023  2:15 PM DO Trino [facility]   2023  1:30 PM MD [facility]     Non-[facility] follow up appointment(s):     Care Transition Nurse reviewed medical action plan with patient and discussed any barriers to care and/or

## 2023-03-30 ENCOUNTER — HOSPITAL ENCOUNTER (EMERGENCY)
Age: 73
Discharge: HOME | End: 2023-03-30
Attending: EMERGENCY MEDICINE
Payer: MEDICARE

## 2023-03-30 VITALS
HEART RATE: 68 BPM | BODY MASS INDEX: 38.07 KG/M2 | TEMPERATURE: 97.9 F | SYSTOLIC BLOOD PRESSURE: 150 MMHG | HEIGHT: 64 IN | DIASTOLIC BLOOD PRESSURE: 62 MMHG | OXYGEN SATURATION: 93 % | WEIGHT: 223 LBS | RESPIRATION RATE: 12 BRPM

## 2023-03-30 DIAGNOSIS — R60.9 EDEMA, UNSPECIFIED TYPE: ICD-10-CM

## 2023-03-30 DIAGNOSIS — N39.0 URINARY TRACT INFECTION WITH HEMATURIA, SITE UNSPECIFIED: Primary | ICD-10-CM

## 2023-03-30 DIAGNOSIS — R31.9 URINARY TRACT INFECTION WITH HEMATURIA, SITE UNSPECIFIED: Primary | ICD-10-CM

## 2023-03-30 DIAGNOSIS — R73.9 HYPERGLYCEMIA: ICD-10-CM

## 2023-03-30 LAB
ABSOLUTE EOS #: 0.2 K/UL (ref 0–0.4)
ABSOLUTE LYMPH #: 1.8 K/UL (ref 1–4.8)
ABSOLUTE MONO #: 0.3 K/UL (ref 0.1–1.2)
ANION GAP SERPL CALCULATED.3IONS-SCNC: 8 MMOL/L (ref 9–17)
BACTERIA: ABNORMAL
BASOPHILS # BLD: 1 % (ref 0–2)
BASOPHILS ABSOLUTE: 0.1 K/UL (ref 0–0.2)
BILIRUBIN URINE: NEGATIVE
BUN SERPL-MCNC: 15 MG/DL (ref 8–23)
CALCIUM SERPL-MCNC: 9.1 MG/DL (ref 8.6–10.4)
CHLORIDE SERPL-SCNC: 104 MMOL/L (ref 98–107)
CO2 SERPL-SCNC: 28 MMOL/L (ref 20–31)
COLOR: ABNORMAL
COMMENT UA: ABNORMAL
CREAT SERPL-MCNC: 0.92 MG/DL (ref 0.5–0.9)
EOSINOPHILS RELATIVE PERCENT: 3 % (ref 1–4)
EPITHELIAL CELLS UA: ABNORMAL /HPF (ref 0–5)
GFR SERPL CREATININE-BSD FRML MDRD: >60 ML/MIN/1.73M2
GLUCOSE SERPL-MCNC: 390 MG/DL (ref 70–99)
GLUCOSE UR STRIP.AUTO-MCNC: ABNORMAL MG/DL
HCT VFR BLD AUTO: 35 % (ref 36–46)
HGB BLD-MCNC: 11.3 G/DL (ref 12–16)
KETONES UR STRIP.AUTO-MCNC: ABNORMAL MG/DL
LEUKOCYTE ESTERASE UR QL STRIP.AUTO: ABNORMAL
LYMPHOCYTES # BLD: 26 % (ref 24–44)
MCH RBC QN AUTO: 28.7 PG (ref 26–34)
MCHC RBC AUTO-ENTMCNC: 32.3 G/DL (ref 31–37)
MCV RBC AUTO: 88.7 FL (ref 80–100)
MONOCYTES # BLD: 5 % (ref 2–11)
NITRITE UR QL STRIP.AUTO: POSITIVE
OTHER OBSERVATIONS UA: ABNORMAL
PDW BLD-RTO: 13.7 % (ref 12.5–15.4)
PLATELET # BLD AUTO: 234 K/UL (ref 140–450)
PMV BLD AUTO: 9.6 FL (ref 6–12)
POTASSIUM SERPL-SCNC: 4.2 MMOL/L (ref 3.7–5.3)
PROT UR STRIP.AUTO-MCNC: 5 MG/DL (ref 5–8)
PROT UR STRIP.AUTO-MCNC: ABNORMAL MG/DL
RBC # BLD: 3.95 M/UL (ref 4–5.2)
RBC CLUMPS #/AREA URNS AUTO: ABNORMAL /HPF (ref 0–2)
SEG NEUTROPHILS: 65 % (ref 36–66)
SEGMENTED NEUTROPHILS ABSOLUTE COUNT: 4.4 K/UL (ref 1.8–7.7)
SODIUM SERPL-SCNC: 140 MMOL/L (ref 135–144)
SPECIFIC GRAVITY UA: 1.02 (ref 1–1.03)
TURBIDITY: ABNORMAL
URINE HGB: ABNORMAL
UROBILINOGEN, URINE: ABNORMAL
WBC # BLD AUTO: 6.7 K/UL (ref 3.5–11)
WBC UA: ABNORMAL /HPF (ref 0–5)

## 2023-03-30 PROCEDURE — 81001 URINALYSIS AUTO W/SCOPE: CPT

## 2023-03-30 PROCEDURE — 87086 URINE CULTURE/COLONY COUNT: CPT

## 2023-03-30 PROCEDURE — 87077 CULTURE AEROBIC IDENTIFY: CPT

## 2023-03-30 PROCEDURE — 80048 BASIC METABOLIC PNL TOTAL CA: CPT

## 2023-03-30 PROCEDURE — 85025 COMPLETE CBC W/AUTO DIFF WBC: CPT

## 2023-03-30 PROCEDURE — 99283 EMERGENCY DEPT VISIT LOW MDM: CPT

## 2023-03-30 PROCEDURE — 87186 SC STD MICRODIL/AGAR DIL: CPT

## 2023-03-30 PROCEDURE — 86403 PARTICLE AGGLUT ANTBDY SCRN: CPT

## 2023-03-30 RX ORDER — FUROSEMIDE 20 MG/1
20 TABLET ORAL DAILY
Qty: 3 TABLET | Refills: 0 | Status: SHIPPED | OUTPATIENT
Start: 2023-03-30

## 2023-03-30 RX ORDER — CIPROFLOXACIN 500 MG/1
500 TABLET, FILM COATED ORAL 2 TIMES DAILY
Qty: 14 TABLET | Refills: 0 | Status: ON HOLD | OUTPATIENT
Start: 2023-03-30 | End: 2023-04-07 | Stop reason: HOSPADM

## 2023-03-30 ASSESSMENT — PAIN DESCRIPTION - DESCRIPTORS: DESCRIPTORS: PRESSURE

## 2023-03-30 ASSESSMENT — PAIN - FUNCTIONAL ASSESSMENT: PAIN_FUNCTIONAL_ASSESSMENT: 0-10

## 2023-03-30 ASSESSMENT — PAIN DESCRIPTION - PAIN TYPE: TYPE: ACUTE PAIN

## 2023-03-30 ASSESSMENT — PAIN SCALES - GENERAL: PAINLEVEL_OUTOF10: 4

## 2023-03-30 ASSESSMENT — PAIN DESCRIPTION - LOCATION: LOCATION: ABDOMEN

## 2023-03-30 ASSESSMENT — PAIN DESCRIPTION - ORIENTATION: ORIENTATION: LOWER

## 2023-03-30 NOTE — DISCHARGE INSTRUCTIONS
Cipro as directed. May take Lasix for the next few days to try to remove some fluid. See the urologist as soon as possible. Elevate legs. Return for worsening fever, difficulty urinating, decreased urine output, vomiting, or if worse in any way. be falsely reassuring. If you notice any worsening, changing or persistent symptoms please call your family doctor or return to the ER immediately. Tell us how we did during your visit at [email or web address]   and let us know about your experience
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
## 2023-03-30 NOTE — ED NOTES
Patient to the ER with c/c of abdominal pressure with blood in urine (quach catheter) since last night. Patient advised to come to ER for eval by urologist. Patient denies any fevers/chills. Patient is a/o x 3, patent airway, speaking clearly in complete sentences. Patient denies any CP or dyspnea. Lungs are clear and equal bilaterally to auscultation, HT are S/R, A=R. Abdomen is soft, non-tender. No NVD in last 24 hours. Normal Bowel habits. Patient has strong PMS x 4. No peripheral edema is appreciated. Patient was ambulatory into the ER today without distress.       Wilfredo Hoang, RN  03/30/23 5502

## 2023-03-31 ENCOUNTER — HOSPITAL ENCOUNTER (EMERGENCY)
Age: 73
Discharge: HOME | End: 2023-03-31
Attending: EMERGENCY MEDICINE
Payer: MEDICARE

## 2023-03-31 ENCOUNTER — TELEPHONE (OUTPATIENT)
Dept: PRIMARY CARE CLINIC | Age: 73
End: 2023-03-31

## 2023-03-31 VITALS
WEIGHT: 223 LBS | RESPIRATION RATE: 12 BRPM | DIASTOLIC BLOOD PRESSURE: 78 MMHG | HEIGHT: 64 IN | OXYGEN SATURATION: 98 % | HEART RATE: 66 BPM | BODY MASS INDEX: 38.07 KG/M2 | SYSTOLIC BLOOD PRESSURE: 164 MMHG | TEMPERATURE: 97.7 F

## 2023-03-31 DIAGNOSIS — T83.091A OBSTRUCTION OF FOLEY CATHETER, INITIAL ENCOUNTER (HCC): Primary | ICD-10-CM

## 2023-03-31 PROCEDURE — 51798 US URINE CAPACITY MEASURE: CPT

## 2023-03-31 PROCEDURE — 99283 EMERGENCY DEPT VISIT LOW MDM: CPT

## 2023-03-31 ASSESSMENT — ENCOUNTER SYMPTOMS
GASTROINTESTINAL NEGATIVE: 1
EYES NEGATIVE: 1
RESPIRATORY NEGATIVE: 1

## 2023-03-31 ASSESSMENT — PAIN SCALES - GENERAL: PAINLEVEL_OUTOF10: 9

## 2023-03-31 NOTE — ED PROVIDER NOTES
I was present in the ED during this patient's evaluation and management by the Advance Practice Provider and was available to address any concerns about their medical management.     Dionte Marley MD  Attending, Emergency Department       Rizwan Wood MD  03/31/23 5238

## 2023-03-31 NOTE — TELEPHONE ENCOUNTER
Patient reports that she has a catheter placed and has noticed very little urine output since last night. Patient states that she hasn't had to empty her bag since last night which was not normal for her. Patient is worried and wanted further advisement    Spoke with Zeke Becker CNP and advised patient to go to ER to have catheter evaluated for correct placement.  Patient verbalized understanding

## 2023-03-31 NOTE — ED PROVIDER NOTES
REVIEW OF SYSTEMS  (2-9 systems for level 4, 10 ormore for level 5)      Review of Systems   Constitutional: Negative. HENT: Negative. Eyes: Negative. Respiratory: Negative. Cardiovascular: Negative. Gastrointestinal: Negative. Endocrine: Negative. Genitourinary:  Positive for difficulty urinating and hematuria. Negative for decreased urine volume, dyspareunia, dysuria, enuresis, flank pain, frequency, genital sores, menstrual problem, pelvic pain, urgency, vaginal bleeding, vaginal discharge and vaginal pain. Musculoskeletal: Negative. Skin: Negative. Neurological: Negative. Hematological: Negative. Psychiatric/Behavioral: Negative. All other systems negative except as marked. PHYSICAL EXAM  (up to 7 for level 4, 8 or more for level 5)      INITIAL VITALS:  height is 5' 4\" (1.626 m) and weight is 101.2 kg (223 lb). Her oral temperature is 97.7 °F (36.5 °C). Her blood pressure is 164/78 (abnormal) and her pulse is 66. Her respiration is 12 and oxygen saturation is 98%. Vital signs reviewed. Physical Exam  Constitutional:       Appearance: Normal appearance. HENT:      Head: Normocephalic. Right Ear: External ear normal.      Left Ear: External ear normal.      Nose: Nose normal.      Mouth/Throat:      Mouth: Mucous membranes are moist.      Pharynx: Oropharynx is clear. Eyes:      Extraocular Movements: Extraocular movements intact. Conjunctiva/sclera: Conjunctivae normal.      Pupils: Pupils are equal, round, and reactive to light. Cardiovascular:      Rate and Rhythm: Normal rate and regular rhythm. Pulses: Normal pulses. Heart sounds: Normal heart sounds. Pulmonary:      Effort: Pulmonary effort is normal.      Breath sounds: Normal breath sounds. Abdominal:      General: Bowel sounds are normal. There is no distension. Palpations: Abdomen is soft. Tenderness: There is no abdominal tenderness.  There is no right Monitoring:     DIAGNOSTIC RESULTS     EKG: All EKG's are interpreted by the Emergency Department Physician who either signs or Co-signs this chart in the [address] a cardiologist.      RADIOLOGY: All images are read by the radiologist and their interpretations are reviewed. No orders to display     LABS:  No results found for this visit on 03/31/23. EMERGENCY DEPARTMENT COURSE           Vitals:    Vitals:    03/31/23 1524   BP: (!) 164/78   Pulse: 66   Resp: 12   Temp: 97.7 °F (36.5 °C)   TempSrc: Oral   SpO2: 98%   Weight: 101.2 kg (223 lb)   Height: 5' 4\" (1.626 m)     -------------------------  BP: (!) 164/78, Temp: 97.7 °F (36.5 °C), Heart Rate: 66, Resp: 12      RE-EVALUATION:  See ED Course notes above. CONSULTS:  None    PROCEDURES:  None    FINAL IMPRESSION      1. Obstruction of Lazo catheter, initial encounter ([facility] [address]          DISPOSITION / PLAN     CONDITION ON DISPOSITION:   Good / Stable for discharge. PATIENT REFERRED TO:  Zulema Melissa DO  [facility]  [phone]    Schedule an appointment as soon as possible for a visit       [facility] Emergency Department  [address] [address]  [phone]  Go to   If symptoms worsen    Urology    Schedule an appointment as soon as possible for a visit       DISCHARGE MEDICATIONS:  Discharge Medication List as of 3/31/2023  4:48 PM          LUPE Aldridge NP   Emergency Medicine Nurse Practitioner    (Please note that portions of this note were completed with a voice recognition program.  Efforts were made to edit the dictations but occasionally words aremis-transcribed.)      LUPE Aldridge NP  03/31/23 4695

## 2023-04-01 LAB
MICROORGANISM SPEC CULT: ABNORMAL
MICROORGANISM SPEC CULT: ABNORMAL
SPECIMEN DESCRIPTION: ABNORMAL

## 2023-04-03 NOTE — PROGRESS NOTES
Spoke with patient in regards to positive urine culture enterococcus/staphlococcus. Discussed discontinuation of cipro and begin taking Macrobid 100 mg PO BID x7 days.  Rx called into [facility] in [place]

## 2023-04-05 ENCOUNTER — HOSPITAL ENCOUNTER (INPATIENT)
Age: 73
LOS: 2 days | Discharge: HOME | End: 2023-04-07
Attending: EMERGENCY MEDICINE
Payer: MEDICARE

## 2023-04-05 ENCOUNTER — APPOINTMENT (OUTPATIENT)
Dept: GENERAL RADIOLOGY | Age: 73
End: 2023-04-05
Payer: MEDICARE

## 2023-04-05 ENCOUNTER — APPOINTMENT (OUTPATIENT)
Dept: ULTRASOUND IMAGING | Age: 73
End: 2023-04-05
Payer: MEDICARE

## 2023-04-05 ENCOUNTER — APPOINTMENT (OUTPATIENT)
Dept: CT IMAGING | Age: 73
End: 2023-04-05
Payer: MEDICARE

## 2023-04-05 DIAGNOSIS — L03.119 CELLULITIS OF LOWER EXTREMITY, UNSPECIFIED LATERALITY: Primary | ICD-10-CM

## 2023-04-05 LAB
ABSOLUTE EOS #: 0.16 K/UL (ref 0–0.4)
ABSOLUTE LYMPH #: 0.5 K/UL (ref 1–4.8)
ABSOLUTE MONO #: 0.45 K/UL (ref 0.1–1.2)
ALBUMIN SERPL-MCNC: 3.7 G/DL (ref 3.5–5.2)
ALBUMIN/GLOBULIN RATIO: 1.2 (ref 1–2.5)
ALP SERPL-CCNC: 84 U/L (ref 35–104)
ALT SERPL-CCNC: 13 U/L (ref 5–33)
ANION GAP SERPL CALCULATED.3IONS-SCNC: 12 MMOL/L (ref 9–17)
AST SERPL-CCNC: 13 U/L
BACTERIA: ABNORMAL
BASOPHILS # BLD: 0 % (ref 0–2)
BASOPHILS ABSOLUTE: 0.01 K/UL (ref 0–0.2)
BILIRUB SERPL-MCNC: 0.9 MG/DL (ref 0.3–1.2)
BILIRUBIN URINE: NEGATIVE
BUN SERPL-MCNC: 19 MG/DL (ref 8–23)
CALCIUM SERPL-MCNC: 8.9 MG/DL (ref 8.6–10.4)
CHLORIDE SERPL-SCNC: 100 MMOL/L (ref 98–107)
CO2 SERPL-SCNC: 23 MMOL/L (ref 20–31)
COLOR: YELLOW
CREAT SERPL-MCNC: 0.93 MG/DL (ref 0.5–0.9)
EOSINOPHILS RELATIVE PERCENT: 2 % (ref 1–4)
EPITHELIAL CELLS UA: ABNORMAL /HPF (ref 0–5)
GFR SERPL CREATININE-BSD FRML MDRD: >60 ML/MIN/1.73M2
GLUCOSE BLD-MCNC: 245 MG/DL (ref 65–105)
GLUCOSE BLD-MCNC: 350 MG/DL (ref 65–105)
GLUCOSE SERPL-MCNC: 304 MG/DL (ref 70–99)
GLUCOSE UR STRIP.AUTO-MCNC: ABNORMAL MG/DL
HCT VFR BLD AUTO: 38 % (ref 36–46)
HGB BLD-MCNC: 12.5 G/DL (ref 12–16)
KETONES UR STRIP.AUTO-MCNC: ABNORMAL MG/DL
LACTATE PLASV-SCNC: 1.9 MMOL/L (ref 0.5–2.2)
LEUKOCYTE ESTERASE UR QL STRIP.AUTO: NEGATIVE
LYMPHOCYTES # BLD: 5 % (ref 24–44)
MAGNESIUM SERPL-MCNC: 1.6 MG/DL (ref 1.6–2.6)
MCH RBC QN AUTO: 28.9 PG (ref 26–34)
MCHC RBC AUTO-ENTMCNC: 32.9 G/DL (ref 31–37)
MCV RBC AUTO: 88 FL (ref 80–100)
MONOCYTES # BLD: 5 % (ref 2–11)
NITRITE UR QL STRIP.AUTO: NEGATIVE
OTHER OBSERVATIONS UA: ABNORMAL
PDW BLD-RTO: 13.5 % (ref 12.5–15.4)
PLATELET # BLD AUTO: 187 K/UL (ref 140–450)
PMV BLD AUTO: 11.2 FL (ref 8–14)
POTASSIUM SERPL-SCNC: 3.2 MMOL/L (ref 3.7–5.3)
PROT SERPL-MCNC: 6.8 G/DL (ref 6.4–8.3)
PROT UR STRIP.AUTO-MCNC: 6 MG/DL (ref 5–8)
PROT UR STRIP.AUTO-MCNC: ABNORMAL MG/DL
RBC # BLD: 4.32 M/UL (ref 4–5.2)
RBC CLUMPS #/AREA URNS AUTO: ABNORMAL /HPF (ref 0–2)
SARS-COV-2 RDRP RESP QL NAA+PROBE: NOT DETECTED
SEG NEUTROPHILS: 88 % (ref 36–66)
SEGMENTED NEUTROPHILS ABSOLUTE COUNT: 8.57 K/UL (ref 1.8–7.7)
SODIUM SERPL-SCNC: 135 MMOL/L (ref 135–144)
SPECIFIC GRAVITY UA: 1.02 (ref 1–1.03)
SPECIMEN DESCRIPTION: NORMAL
TROPONIN I SERPL DL<=0.01 NG/ML-MCNC: 12 NG/L (ref 0–14)
TROPONIN I SERPL DL<=0.01 NG/ML-MCNC: 17 NG/L (ref 0–14)
TURBIDITY: CLEAR
URINE HGB: ABNORMAL
UROBILINOGEN, URINE: NORMAL
WBC # BLD AUTO: 9.7 K/UL (ref 3.5–11)
WBC UA: ABNORMAL /HPF (ref 0–5)

## 2023-04-05 PROCEDURE — 2580000003 HC RX 258: Performed by: EMERGENCY MEDICINE

## 2023-04-05 PROCEDURE — 96374 THER/PROPH/DIAG INJ IV PUSH: CPT

## 2023-04-05 PROCEDURE — 2580000003 HC RX 258: Performed by: NURSE PRACTITIONER

## 2023-04-05 PROCEDURE — 93971 EXTREMITY STUDY: CPT

## 2023-04-05 PROCEDURE — 83605 ASSAY OF LACTIC ACID: CPT

## 2023-04-05 PROCEDURE — 87186 SC STD MICRODIL/AGAR DIL: CPT

## 2023-04-05 PROCEDURE — 6360000004 HC RX CONTRAST MEDICATION: Performed by: EMERGENCY MEDICINE

## 2023-04-05 PROCEDURE — 83735 ASSAY OF MAGNESIUM: CPT

## 2023-04-05 PROCEDURE — 93005 ELECTROCARDIOGRAM TRACING: CPT | Performed by: NURSE PRACTITIONER

## 2023-04-05 PROCEDURE — 2500000003 HC RX 250 WO HCPCS: Performed by: STUDENT IN AN ORGANIZED HEALTH CARE EDUCATION/TRAINING PROGRAM

## 2023-04-05 PROCEDURE — 87086 URINE CULTURE/COLONY COUNT: CPT

## 2023-04-05 PROCEDURE — 6360000002 HC RX W HCPCS: Performed by: STUDENT IN AN ORGANIZED HEALTH CARE EDUCATION/TRAINING PROGRAM

## 2023-04-05 PROCEDURE — 82947 ASSAY GLUCOSE BLOOD QUANT: CPT

## 2023-04-05 PROCEDURE — 84484 ASSAY OF TROPONIN QUANT: CPT

## 2023-04-05 PROCEDURE — 81001 URINALYSIS AUTO W/SCOPE: CPT

## 2023-04-05 PROCEDURE — 80053 COMPREHEN METABOLIC PANEL: CPT

## 2023-04-05 PROCEDURE — 74177 CT ABD & PELVIS W/CONTRAST: CPT

## 2023-04-05 PROCEDURE — 2060000000 HC ICU INTERMEDIATE R&B

## 2023-04-05 PROCEDURE — 87077 CULTURE AEROBIC IDENTIFY: CPT

## 2023-04-05 PROCEDURE — 36415 COLL VENOUS BLD VENIPUNCTURE: CPT

## 2023-04-05 PROCEDURE — 6360000002 HC RX W HCPCS: Performed by: NURSE PRACTITIONER

## 2023-04-05 PROCEDURE — 6370000000 HC RX 637 (ALT 250 FOR IP): Performed by: NURSE PRACTITIONER

## 2023-04-05 PROCEDURE — 85025 COMPLETE CBC W/AUTO DIFF WBC: CPT

## 2023-04-05 PROCEDURE — 6370000000 HC RX 637 (ALT 250 FOR IP)

## 2023-04-05 PROCEDURE — 87635 SARS-COV-2 COVID-19 AMP PRB: CPT

## 2023-04-05 PROCEDURE — 6370000000 HC RX 637 (ALT 250 FOR IP): Performed by: STUDENT IN AN ORGANIZED HEALTH CARE EDUCATION/TRAINING PROGRAM

## 2023-04-05 PROCEDURE — 71045 X-RAY EXAM CHEST 1 VIEW: CPT

## 2023-04-05 PROCEDURE — 2580000003 HC RX 258

## 2023-04-05 PROCEDURE — 99222 1ST HOSP IP/OBS MODERATE 55: CPT | Performed by: STUDENT IN AN ORGANIZED HEALTH CARE EDUCATION/TRAINING PROGRAM

## 2023-04-05 PROCEDURE — 99285 EMERGENCY DEPT VISIT HI MDM: CPT

## 2023-04-05 RX ORDER — GABAPENTIN 600 MG/1
600 TABLET ORAL 4 TIMES DAILY
Status: DISCONTINUED | OUTPATIENT
Start: 2023-04-06 | End: 2023-04-05

## 2023-04-05 RX ORDER — SODIUM CHLORIDE 0.9 % (FLUSH) 0.9 %
10 SYRINGE (ML) INJECTION PRN
Status: DISCONTINUED | OUTPATIENT
Start: 2023-04-05 | End: 2023-04-07 | Stop reason: HOSPADM

## 2023-04-05 RX ORDER — ACETAMINOPHEN 500 MG
1000 TABLET ORAL ONCE
Status: COMPLETED | OUTPATIENT
Start: 2023-04-05 | End: 2023-04-05

## 2023-04-05 RX ORDER — FUROSEMIDE 20 MG/1
20 TABLET ORAL DAILY
Status: DISCONTINUED | OUTPATIENT
Start: 2023-04-06 | End: 2023-04-06

## 2023-04-05 RX ORDER — SODIUM CHLORIDE 0.9 % (FLUSH) 0.9 %
5-40 SYRINGE (ML) INJECTION PRN
Status: DISCONTINUED | OUTPATIENT
Start: 2023-04-05 | End: 2023-04-07 | Stop reason: HOSPADM

## 2023-04-05 RX ORDER — SODIUM CHLORIDE 9 MG/ML
INJECTION, SOLUTION INTRAVENOUS PRN
Status: DISCONTINUED | OUTPATIENT
Start: 2023-04-05 | End: 2023-04-07 | Stop reason: HOSPADM

## 2023-04-05 RX ORDER — GABAPENTIN 600 MG/1
600 TABLET ORAL 4 TIMES DAILY
Status: DISCONTINUED | OUTPATIENT
Start: 2023-04-05 | End: 2023-04-05

## 2023-04-05 RX ORDER — GABAPENTIN 300 MG/1
600 CAPSULE ORAL 4 TIMES DAILY
Status: DISCONTINUED | OUTPATIENT
Start: 2023-04-05 | End: 2023-04-07 | Stop reason: HOSPADM

## 2023-04-05 RX ORDER — INSULIN GLARGINE 100 [IU]/ML
75 INJECTION, SOLUTION SUBCUTANEOUS NIGHTLY
Status: DISCONTINUED | OUTPATIENT
Start: 2023-04-05 | End: 2023-04-05

## 2023-04-05 RX ORDER — ATORVASTATIN CALCIUM 40 MG/1
40 TABLET, FILM COATED ORAL DAILY
Status: DISCONTINUED | OUTPATIENT
Start: 2023-04-06 | End: 2023-04-07 | Stop reason: HOSPADM

## 2023-04-05 RX ORDER — LOSARTAN POTASSIUM 50 MG/1
100 TABLET ORAL DAILY
Status: DISCONTINUED | OUTPATIENT
Start: 2023-04-06 | End: 2023-04-07 | Stop reason: HOSPADM

## 2023-04-05 RX ORDER — SODIUM CHLORIDE 0.9 % (FLUSH) 0.9 %
5-40 SYRINGE (ML) INJECTION EVERY 12 HOURS SCHEDULED
Status: DISCONTINUED | OUTPATIENT
Start: 2023-04-05 | End: 2023-04-07 | Stop reason: HOSPADM

## 2023-04-05 RX ORDER — PANTOPRAZOLE SODIUM 40 MG/1
40 TABLET, DELAYED RELEASE ORAL
Status: DISCONTINUED | OUTPATIENT
Start: 2023-04-06 | End: 2023-04-07 | Stop reason: HOSPADM

## 2023-04-05 RX ORDER — HYDROCHLOROTHIAZIDE 25 MG/1
25 TABLET ORAL EVERY MORNING
Status: DISCONTINUED | OUTPATIENT
Start: 2023-04-06 | End: 2023-04-06

## 2023-04-05 RX ORDER — INSULIN LISPRO 100 [IU]/ML
0-8 INJECTION, SOLUTION INTRAVENOUS; SUBCUTANEOUS
Status: DISCONTINUED | OUTPATIENT
Start: 2023-04-05 | End: 2023-04-07 | Stop reason: HOSPADM

## 2023-04-05 RX ORDER — TRAZODONE HYDROCHLORIDE 50 MG/1
100 TABLET ORAL NIGHTLY PRN
Status: DISCONTINUED | OUTPATIENT
Start: 2023-04-05 | End: 2023-04-07 | Stop reason: HOSPADM

## 2023-04-05 RX ORDER — ACETAMINOPHEN 325 MG/1
650 TABLET ORAL EVERY 6 HOURS PRN
Status: DISCONTINUED | OUTPATIENT
Start: 2023-04-05 | End: 2023-04-07 | Stop reason: HOSPADM

## 2023-04-05 RX ORDER — TIMOLOL MALEATE 5 MG/ML
1 SOLUTION/ DROPS OPHTHALMIC 2 TIMES DAILY
Status: DISCONTINUED | OUTPATIENT
Start: 2023-04-05 | End: 2023-04-07 | Stop reason: HOSPADM

## 2023-04-05 RX ORDER — TAMSULOSIN HYDROCHLORIDE 0.4 MG/1
0.4 CAPSULE ORAL NIGHTLY
Status: DISCONTINUED | OUTPATIENT
Start: 2023-04-05 | End: 2023-04-07 | Stop reason: HOSPADM

## 2023-04-05 RX ORDER — INSULIN GLARGINE 100 [IU]/ML
30 INJECTION, SOLUTION SUBCUTANEOUS 2 TIMES DAILY
Status: DISCONTINUED | OUTPATIENT
Start: 2023-04-05 | End: 2023-04-07 | Stop reason: HOSPADM

## 2023-04-05 RX ORDER — DEXTROSE MONOHYDRATE 100 MG/ML
INJECTION, SOLUTION INTRAVENOUS CONTINUOUS PRN
Status: DISCONTINUED | OUTPATIENT
Start: 2023-04-05 | End: 2023-04-07 | Stop reason: HOSPADM

## 2023-04-05 RX ORDER — DULOXETIN HYDROCHLORIDE 30 MG/1
60 CAPSULE, DELAYED RELEASE ORAL DAILY
Status: DISCONTINUED | OUTPATIENT
Start: 2023-04-06 | End: 2023-04-07 | Stop reason: HOSPADM

## 2023-04-05 RX ORDER — OXYBUTYNIN CHLORIDE 5 MG/1
5 TABLET, EXTENDED RELEASE ORAL ONCE
Status: COMPLETED | OUTPATIENT
Start: 2023-04-05 | End: 2023-04-05

## 2023-04-05 RX ORDER — 0.9 % SODIUM CHLORIDE 0.9 %
80 INTRAVENOUS SOLUTION INTRAVENOUS ONCE
Status: DISCONTINUED | OUTPATIENT
Start: 2023-04-05 | End: 2023-04-07 | Stop reason: HOSPADM

## 2023-04-05 RX ORDER — CLINDAMYCIN PHOSPHATE 600 MG/50ML
600 INJECTION INTRAVENOUS EVERY 8 HOURS
Status: DISCONTINUED | OUTPATIENT
Start: 2023-04-05 | End: 2023-04-07 | Stop reason: HOSPADM

## 2023-04-05 RX ORDER — AMLODIPINE BESYLATE 10 MG/1
10 TABLET ORAL DAILY
Status: DISCONTINUED | OUTPATIENT
Start: 2023-04-06 | End: 2023-04-07 | Stop reason: HOSPADM

## 2023-04-05 RX ORDER — LEVOTHYROXINE SODIUM 88 UG/1
88 TABLET ORAL DAILY
Status: DISCONTINUED | OUTPATIENT
Start: 2023-04-06 | End: 2023-04-07 | Stop reason: HOSPADM

## 2023-04-05 RX ORDER — SUCRALFATE 1 G/1
1 TABLET ORAL 4 TIMES DAILY
Status: DISCONTINUED | OUTPATIENT
Start: 2023-04-05 | End: 2023-04-07 | Stop reason: HOSPADM

## 2023-04-05 RX ORDER — POLYETHYLENE GLYCOL 3350 17 G/17G
17 POWDER, FOR SOLUTION ORAL DAILY PRN
Status: DISCONTINUED | OUTPATIENT
Start: 2023-04-05 | End: 2023-04-07 | Stop reason: HOSPADM

## 2023-04-05 RX ORDER — MAGNESIUM SULFATE IN WATER 40 MG/ML
2000 INJECTION, SOLUTION INTRAVENOUS ONCE
Status: COMPLETED | OUTPATIENT
Start: 2023-04-05 | End: 2023-04-05

## 2023-04-05 RX ORDER — ONDANSETRON 2 MG/ML
4 INJECTION INTRAMUSCULAR; INTRAVENOUS ONCE
Status: COMPLETED | OUTPATIENT
Start: 2023-04-05 | End: 2023-04-05

## 2023-04-05 RX ORDER — LANOLIN ALCOHOL/MO/W.PET/CERES
CREAM (GRAM) TOPICAL 2 TIMES DAILY
Status: DISCONTINUED | OUTPATIENT
Start: 2023-04-05 | End: 2023-04-05

## 2023-04-05 RX ORDER — POTASSIUM CHLORIDE 20 MEQ/1
40 TABLET, EXTENDED RELEASE ORAL ONCE
Status: COMPLETED | OUTPATIENT
Start: 2023-04-05 | End: 2023-04-05

## 2023-04-05 RX ORDER — 0.9 % SODIUM CHLORIDE 0.9 %
1000 INTRAVENOUS SOLUTION INTRAVENOUS ONCE
Status: COMPLETED | OUTPATIENT
Start: 2023-04-05 | End: 2023-04-05

## 2023-04-05 RX ORDER — INSULIN LISPRO 100 [IU]/ML
0-4 INJECTION, SOLUTION INTRAVENOUS; SUBCUTANEOUS NIGHTLY
Status: DISCONTINUED | OUTPATIENT
Start: 2023-04-05 | End: 2023-04-07 | Stop reason: HOSPADM

## 2023-04-05 RX ADMIN — OXYBUTYNIN CHLORIDE 5 MG: 5 TABLET, EXTENDED RELEASE ORAL at 13:02

## 2023-04-05 RX ADMIN — INSULIN GLARGINE 30 UNITS: 100 INJECTION, SOLUTION SUBCUTANEOUS at 21:41

## 2023-04-05 RX ADMIN — INSULIN LISPRO 4 UNITS: 100 INJECTION, SOLUTION INTRAVENOUS; SUBCUTANEOUS at 21:39

## 2023-04-05 RX ADMIN — MAGNESIUM SULFATE HEPTAHYDRATE 2000 MG: 40 INJECTION, SOLUTION INTRAVENOUS at 18:01

## 2023-04-05 RX ADMIN — WHITE PETROLATUM: 1.75 OINTMENT TOPICAL at 22:44

## 2023-04-05 RX ADMIN — SODIUM CHLORIDE, PRESERVATIVE FREE 10 ML: 5 INJECTION INTRAVENOUS at 21:23

## 2023-04-05 RX ADMIN — GABAPENTIN 600 MG: 300 CAPSULE ORAL at 22:42

## 2023-04-05 RX ADMIN — ONDANSETRON 4 MG: 2 INJECTION INTRAMUSCULAR; INTRAVENOUS at 11:49

## 2023-04-05 RX ADMIN — TRAZODONE HYDROCHLORIDE 100 MG: 50 TABLET ORAL at 21:24

## 2023-04-05 RX ADMIN — POTASSIUM CHLORIDE 40 MEQ: 1500 TABLET, EXTENDED RELEASE ORAL at 13:50

## 2023-04-05 RX ADMIN — TAMSULOSIN HYDROCHLORIDE 0.4 MG: 0.4 CAPSULE ORAL at 21:24

## 2023-04-05 RX ADMIN — INSULIN LISPRO 4 UNITS: 100 INJECTION, SOLUTION INTRAVENOUS; SUBCUTANEOUS at 17:58

## 2023-04-05 RX ADMIN — SODIUM CHLORIDE, PRESERVATIVE FREE 10 ML: 5 INJECTION INTRAVENOUS at 13:06

## 2023-04-05 RX ADMIN — VANCOMYCIN HYDROCHLORIDE 2000 MG: 1 INJECTION, POWDER, LYOPHILIZED, FOR SOLUTION INTRAVENOUS at 14:55

## 2023-04-05 RX ADMIN — IOPAMIDOL 75 ML: 755 INJECTION, SOLUTION INTRAVENOUS at 13:06

## 2023-04-05 RX ADMIN — SUCRALFATE 1 G: 1 TABLET ORAL at 21:24

## 2023-04-05 RX ADMIN — SODIUM CHLORIDE 1000 ML: 9 INJECTION, SOLUTION INTRAVENOUS at 11:16

## 2023-04-05 RX ADMIN — Medication 100 ML: at 13:07

## 2023-04-05 RX ADMIN — TIMOLOL MALEATE 1 DROP: 5 SOLUTION OPHTHALMIC at 21:43

## 2023-04-05 RX ADMIN — CLINDAMYCIN PHOSPHATE 600 MG: 600 INJECTION, SOLUTION INTRAVENOUS at 21:23

## 2023-04-05 RX ADMIN — ACETAMINOPHEN 1000 MG: 500 TABLET ORAL at 11:13

## 2023-04-05 ASSESSMENT — PAIN SCALES - GENERAL
PAINLEVEL_OUTOF10: 10
PAINLEVEL_OUTOF10: 0

## 2023-04-05 ASSESSMENT — ENCOUNTER SYMPTOMS
VOMITING: 0
RECTAL PAIN: 0
DIARRHEA: 1
EYES NEGATIVE: 1
NAUSEA: 1
ABDOMINAL DISTENTION: 0
ABDOMINAL PAIN: 1
RESPIRATORY NEGATIVE: 1
SHORTNESS OF BREATH: 0
COUGH: 0
ANAL BLEEDING: 0
BLOOD IN STOOL: 0
CONSTIPATION: 0

## 2023-04-05 ASSESSMENT — PAIN - FUNCTIONAL ASSESSMENT: PAIN_FUNCTIONAL_ASSESSMENT: 0-10

## 2023-04-05 NOTE — ED PROVIDER NOTES
Spiritual Care Assessment/Progress Note  [facility]      NAME: Chu Britt      MRN: [id number]  AGE: 77 y.o. SEX: male  Gnosticist Affiliation: No preference   Language: English     8/20/2019     Total Time (in minutes): 5     Spiritual Assessment begun in [facility] CARDIOPULMONARY CARE through conversation with:         []Patient        [] Family    [] Friend(s)        Reason for Consult: Initial/Spiritual assessment, patient floor     Spiritual beliefs: (Please include comment if needed)     [] Identifies with a poonam tradition:         [] Supported by a poonam community:            [] Claims no spiritual orientation:           [] Seeking spiritual identity:                [] Adheres to an individual form of spirituality:           [x] Not able to assess:                           Identified resources for coping:      [] Prayer                               [] Music                  [] Guided Imagery     [] Family/friends                 [] Pet visits     [] Devotional reading                         [x] Unknown     [] Other:                                              Interventions offered during this visit: (See comments for more details)                Plan of Care:     [] Support spiritual and/or cultural needs    [] Support AMD and/or advance care planning process      [] Support grieving process   [] Coordinate Rites and/or Rituals    [] Coordination with community clergy   [] No spiritual needs identified at this time   [] Detailed Plan of Care below (See Comments)  [] Make referral to Music Therapy  [] Make referral to Pet Therapy     [] Make referral to Addiction services  [] Make referral to [facility]  [] Make referral to Spiritual Care Partner  [] No future visits requested        [x] Follow up visits as needed     Comments:  visit for initial spiritual assessment. Patient discharged prior to this visit.   Will continue to follow up as needed and upon request as
able.    Visited by Rev. Marcy Rogers MDiv, [facility], [address]  Critical access hospital paging service: [phone]
personal performance of the HPI, PE and MDM. For Physician Assistant/ Nurse Practitioner cases/documentation I have personally evaluated this patient and have completed at least one if not all key elements of the E/M (history, physical exam, and MDM). Additional findings are as noted.       Savannah Barajas DO  04/05/23 1527
process identified. US DUP LOWER EXTREMITY RIGHT PAO   Final Result   No evidence of DVT in the bilateral lower extremities. US DUP LOWER EXTREMITY LEFT PAO   Final Result   No evidence of DVT in the bilateral lower extremities. LABS:  Results for orders placed or performed during the hospital encounter of 04/05/23   COVID-19, Rapid    Specimen: Nasopharyngeal Swab   Result Value Ref Range    Specimen Description . NASOPHARYNGEAL SWAB     SARS-CoV-2, Rapid Not Detected Not Detected   CBC with Auto Differential   Result Value Ref Range    WBC 9.7 3.5 - 11.0 k/uL    RBC 4.32 4.0 - 5.2 m/uL    Hemoglobin 12.5 12.0 - 16.0 g/dL    Hematocrit 38.0 36 - 46 %    MCV 88.0 80 - 100 fL    MCH 28.9 26 - 34 pg    MCHC 32.9 31 - 37 g/dL    RDW 13.5 12.5 - 15.4 %    Platelets 740 405 - 939 k/uL    MPV 11.2 8.0 - 14.0 fL    Seg Neutrophils 88 (H) 36 - 66 %    Lymphocytes 5 (L) 24 - 44 %    Monocytes 5 2 - 11 %    Eosinophils % 2 1 - 4 %    Basophils 0 0 - 2 %    Segs Absolute 8.57 (H) 1.8 - 7.7 k/uL    Absolute Lymph # 0.50 (L) 1.0 - 4.8 k/uL    Absolute Mono # 0.45 0.1 - 1.2 k/uL    Absolute Eos # 0.16 0.0 - 0.4 k/uL    Basophils Absolute 0.01 0.0 - 0.2 k/uL   Comprehensive Metabolic Panel w/ Reflex to MG   Result Value Ref Range    Glucose 304 (H) 70 - 99 mg/dL    BUN 19 8 - 23 mg/dL    Creatinine 0.93 (H) 0.50 - 0.90 mg/dL    Est, Glom Filt Rate >60 >60 mL/min/1.73m2    Calcium 8.9 8.6 - 10.4 mg/dL    Sodium 135 135 - 144 mmol/L    Potassium 3.2 (L) 3.7 - 5.3 mmol/L    Chloride 100 98 - 107 mmol/L    CO2 23 20 - 31 mmol/L    Anion Gap 12 9 - 17 mmol/L    Alkaline Phosphatase 84 35 - 104 U/L    ALT 13 5 - 33 U/L    AST 13 <32 U/L    Total Bilirubin 0.9 0.3 - 1.2 mg/dL    Total Protein 6.8 6.4 - 8.3 g/dL    Albumin 3.7 3.5 - 5.2 g/dL    Albumin/Globulin Ratio 1.2 1.0 - 2.5   Lactic Acid   Result Value Ref Range    Lactic Acid 1.9 0.5 - 2.2 mmol/L   Urinalysis with Reflex to Culture    Specimen: Urine,

## 2023-04-05 NOTE — H&P
admitted as inpatient status because of co-morbidities listed above, severity of signs and symptoms as outlined, requirement for current medical therapies and most importantly because of direct risk to patient if care not provided in a hospital setting. Expected length of stay > 48 hours.  Patient is admitted in the Med/Surge    Medical Decision Making: Julio César Bee MD  PGY-1 Family Medicine  4/5/2023  5:04 PM    Copy sent to Dr. [place] Medicine, DO

## 2023-04-06 PROBLEM — N32.89 BLADDER SPASMS: Status: ACTIVE | Noted: 2023-04-06

## 2023-04-06 LAB
ABSOLUTE EOS #: 0.4 K/UL (ref 0–0.4)
ABSOLUTE LYMPH #: 0.6 K/UL (ref 1–4.8)
ABSOLUTE MONO #: 0.4 K/UL (ref 0.1–1.2)
ANION GAP SERPL CALCULATED.3IONS-SCNC: 8 MMOL/L (ref 9–17)
BASOPHILS # BLD: 0 % (ref 0–2)
BASOPHILS ABSOLUTE: 0 K/UL (ref 0–0.2)
BUN SERPL-MCNC: 17 MG/DL (ref 8–23)
CALCIUM SERPL-MCNC: 8 MG/DL (ref 8.6–10.4)
CHLORIDE SERPL-SCNC: 104 MMOL/L (ref 98–107)
CO2 SERPL-SCNC: 25 MMOL/L (ref 20–31)
CREAT SERPL-MCNC: 1.01 MG/DL (ref 0.5–0.9)
EKG ATRIAL RATE: 89 BPM
EKG P-R INTERVAL: 148 MS
EKG Q-T INTERVAL: 384 MS
EKG QRS DURATION: 88 MS
EKG QTC CALCULATION (BAZETT): 467 MS
EKG R AXIS: -13 DEGREES
EKG T AXIS: 71 DEGREES
EKG VENTRICULAR RATE: 89 BPM
EOSINOPHILS RELATIVE PERCENT: 6 % (ref 1–4)
GFR SERPL CREATININE-BSD FRML MDRD: 60 ML/MIN/1.73M2
GLUCOSE BLD-MCNC: 231 MG/DL (ref 65–105)
GLUCOSE BLD-MCNC: 241 MG/DL (ref 65–105)
GLUCOSE BLD-MCNC: 253 MG/DL (ref 65–105)
GLUCOSE SERPL-MCNC: 225 MG/DL (ref 70–99)
HCT VFR BLD AUTO: 33.1 % (ref 36–46)
HGB BLD-MCNC: 10.9 G/DL (ref 12–16)
LYMPHOCYTES # BLD: 11 % (ref 24–44)
MAGNESIUM SERPL-MCNC: 2.2 MG/DL (ref 1.6–2.6)
MCH RBC QN AUTO: 28.9 PG (ref 26–34)
MCHC RBC AUTO-ENTMCNC: 33 G/DL (ref 31–37)
MCV RBC AUTO: 87.7 FL (ref 80–100)
MONOCYTES # BLD: 6 % (ref 2–11)
PDW BLD-RTO: 14 % (ref 12.5–15.4)
PLATELET # BLD AUTO: 168 K/UL (ref 140–450)
PMV BLD AUTO: 9.9 FL (ref 6–12)
POTASSIUM SERPL-SCNC: 3.5 MMOL/L (ref 3.7–5.3)
RBC # BLD: 3.77 M/UL (ref 4–5.2)
SEG NEUTROPHILS: 77 % (ref 36–66)
SEGMENTED NEUTROPHILS ABSOLUTE COUNT: 4.6 K/UL (ref 1.8–7.7)
SODIUM SERPL-SCNC: 137 MMOL/L (ref 135–144)
WBC # BLD AUTO: 6 K/UL (ref 3.5–11)

## 2023-04-06 PROCEDURE — 2060000000 HC ICU INTERMEDIATE R&B

## 2023-04-06 PROCEDURE — 2500000003 HC RX 250 WO HCPCS: Performed by: STUDENT IN AN ORGANIZED HEALTH CARE EDUCATION/TRAINING PROGRAM

## 2023-04-06 PROCEDURE — 6370000000 HC RX 637 (ALT 250 FOR IP): Performed by: STUDENT IN AN ORGANIZED HEALTH CARE EDUCATION/TRAINING PROGRAM

## 2023-04-06 PROCEDURE — 36415 COLL VENOUS BLD VENIPUNCTURE: CPT

## 2023-04-06 PROCEDURE — 83735 ASSAY OF MAGNESIUM: CPT

## 2023-04-06 PROCEDURE — 85025 COMPLETE CBC W/AUTO DIFF WBC: CPT

## 2023-04-06 PROCEDURE — 2580000003 HC RX 258

## 2023-04-06 PROCEDURE — 6370000000 HC RX 637 (ALT 250 FOR IP)

## 2023-04-06 PROCEDURE — 80048 BASIC METABOLIC PNL TOTAL CA: CPT

## 2023-04-06 PROCEDURE — 6370000000 HC RX 637 (ALT 250 FOR IP): Performed by: NURSE PRACTITIONER

## 2023-04-06 PROCEDURE — 82947 ASSAY GLUCOSE BLOOD QUANT: CPT

## 2023-04-06 RX ORDER — FUROSEMIDE 20 MG/1
40 TABLET ORAL DAILY
Status: DISCONTINUED | OUTPATIENT
Start: 2023-04-07 | End: 2023-04-06

## 2023-04-06 RX ORDER — POTASSIUM CHLORIDE 20 MEQ/1
20 TABLET, EXTENDED RELEASE ORAL
Status: DISCONTINUED | OUTPATIENT
Start: 2023-04-07 | End: 2023-04-07 | Stop reason: HOSPADM

## 2023-04-06 RX ORDER — POTASSIUM CHLORIDE 20 MEQ/1
40 TABLET, EXTENDED RELEASE ORAL ONCE
Status: COMPLETED | OUTPATIENT
Start: 2023-04-06 | End: 2023-04-06

## 2023-04-06 RX ORDER — FUROSEMIDE 20 MG/1
40 TABLET ORAL DAILY
Status: DISCONTINUED | OUTPATIENT
Start: 2023-04-06 | End: 2023-04-07 | Stop reason: HOSPADM

## 2023-04-06 RX ORDER — TROSPIUM CHLORIDE 20 MG/1
20 TABLET, FILM COATED ORAL
Status: DISCONTINUED | OUTPATIENT
Start: 2023-04-06 | End: 2023-04-07 | Stop reason: HOSPADM

## 2023-04-06 RX ADMIN — SUCRALFATE 1 G: 1 TABLET ORAL at 16:47

## 2023-04-06 RX ADMIN — TRAZODONE HYDROCHLORIDE 100 MG: 50 TABLET ORAL at 22:30

## 2023-04-06 RX ADMIN — SODIUM CHLORIDE, PRESERVATIVE FREE 10 ML: 5 INJECTION INTRAVENOUS at 20:44

## 2023-04-06 RX ADMIN — WHITE PETROLATUM: 1.75 OINTMENT TOPICAL at 20:50

## 2023-04-06 RX ADMIN — GABAPENTIN 600 MG: 300 CAPSULE ORAL at 16:47

## 2023-04-06 RX ADMIN — AMLODIPINE BESYLATE 10 MG: 10 TABLET ORAL at 09:27

## 2023-04-06 RX ADMIN — GABAPENTIN 600 MG: 300 CAPSULE ORAL at 20:46

## 2023-04-06 RX ADMIN — SODIUM CHLORIDE, PRESERVATIVE FREE 10 ML: 5 INJECTION INTRAVENOUS at 09:28

## 2023-04-06 RX ADMIN — TIMOLOL MALEATE 1 DROP: 5 SOLUTION OPHTHALMIC at 09:29

## 2023-04-06 RX ADMIN — INSULIN GLARGINE 30 UNITS: 100 INJECTION, SOLUTION SUBCUTANEOUS at 09:36

## 2023-04-06 RX ADMIN — INSULIN LISPRO 2 UNITS: 100 INJECTION, SOLUTION INTRAVENOUS; SUBCUTANEOUS at 18:58

## 2023-04-06 RX ADMIN — INSULIN GLARGINE 30 UNITS: 100 INJECTION, SOLUTION SUBCUTANEOUS at 20:47

## 2023-04-06 RX ADMIN — GABAPENTIN 600 MG: 300 CAPSULE ORAL at 09:27

## 2023-04-06 RX ADMIN — POTASSIUM CHLORIDE 40 MEQ: 1500 TABLET, EXTENDED RELEASE ORAL at 12:33

## 2023-04-06 RX ADMIN — RIVAROXABAN 20 MG: 10 TABLET, FILM COATED ORAL at 20:52

## 2023-04-06 RX ADMIN — CLINDAMYCIN PHOSPHATE 600 MG: 600 INJECTION, SOLUTION INTRAVENOUS at 20:45

## 2023-04-06 RX ADMIN — TROSPIUM CHLORIDE 20 MG: 20 TABLET, FILM COATED ORAL at 16:47

## 2023-04-06 RX ADMIN — LEVOTHYROXINE SODIUM 88 MCG: 0.09 TABLET ORAL at 09:27

## 2023-04-06 RX ADMIN — PANTOPRAZOLE SODIUM 40 MG: 40 TABLET, DELAYED RELEASE ORAL at 06:01

## 2023-04-06 RX ADMIN — TIMOLOL MALEATE 1 DROP: 5 SOLUTION OPHTHALMIC at 20:50

## 2023-04-06 RX ADMIN — GABAPENTIN 600 MG: 300 CAPSULE ORAL at 12:33

## 2023-04-06 RX ADMIN — CLINDAMYCIN PHOSPHATE 600 MG: 600 INJECTION, SOLUTION INTRAVENOUS at 04:05

## 2023-04-06 RX ADMIN — ATORVASTATIN CALCIUM 40 MG: 40 TABLET, FILM COATED ORAL at 20:52

## 2023-04-06 RX ADMIN — CLINDAMYCIN PHOSPHATE 600 MG: 600 INJECTION, SOLUTION INTRAVENOUS at 12:33

## 2023-04-06 RX ADMIN — LOSARTAN POTASSIUM 100 MG: 50 TABLET, FILM COATED ORAL at 09:27

## 2023-04-06 RX ADMIN — DULOXETINE 60 MG: 30 CAPSULE, DELAYED RELEASE ORAL at 09:26

## 2023-04-06 RX ADMIN — SUCRALFATE 1 G: 1 TABLET ORAL at 12:33

## 2023-04-06 RX ADMIN — FUROSEMIDE 40 MG: 20 TABLET ORAL at 12:36

## 2023-04-06 RX ADMIN — TAMSULOSIN HYDROCHLORIDE 0.4 MG: 0.4 CAPSULE ORAL at 20:46

## 2023-04-06 RX ADMIN — WHITE PETROLATUM: 1.75 OINTMENT TOPICAL at 09:29

## 2023-04-06 RX ADMIN — SUCRALFATE 1 G: 1 TABLET ORAL at 20:46

## 2023-04-06 RX ADMIN — SUCRALFATE 1 G: 1 TABLET ORAL at 09:27

## 2023-04-06 NOTE — CARE COORDINATION
transportation at discharge: Family    Financial    Payor: Prieto Elizondo Deepthi / Plan: Haseeb Lowe / Product Type: *No Product type* /     Does insurance require precert for SNF: Yes    Potential assistance Purchasing Medications:    Meds-to-Beds request:        [facility], [address] [facility]  [address] [address]  Phone: [phone] Fax: [address] [phone] Denia Alter [phone]  [address] [facility] [address]  Phone: [phone] Fax: [phone]    [id number]- [address]alin PickeringTammi [address] [place] [address] [phone] Wilson Alter [phone]  [address] [place] [address]. [address]  Phone: [phone] Fax: [phone]      Notes:    Factors facilitating achievement of predicted outcomes:     Barriers to discharge: Additional Case Management Notes: d/c home independent    The Plan for Transition of Care is related to the following treatment goals of Cellulitis [X66.19]    IF APPLICABLE: The Patient and/or patient representative Mily Tony and her family were provided with a choice of provider and agrees with the discharge plan. Freedom of choice list with basic dialogue that supports the patient's individualized plan of care/goals and shares the quality data associated with the providers was provided to: Patient   Patient Representative Name:       The Patient and/or Patient Representative Agree with the Discharge Plan?  Yes    Ceci Mary RN  Case Management Department  Ph: [phone] Fax: [phone]

## 2023-04-06 NOTE — PLAN OF CARE
Problem: Discharge Planning  Goal: Discharge to home or other facility with appropriate resources  Outcome: Progressing   Patient actively participates in ADLs and decision making regarding plan of care. Problem: Pain  Goal: Verbalizes/displays adequate comfort level or baseline comfort level  Outcome: Progressing   No new signs/symptoms of pain noted, pain rating < 3 on scale 0-10, pain controlled with medication/repositioning. Problem: Safety - Adult  Goal: Free from fall injury  Outcome: Progressing   No falls/injuries this shift, bed in lowest position, brakes on, bed alarm on, call light in reach, side rails up x2.

## 2023-04-07 VITALS
TEMPERATURE: 97.9 F | SYSTOLIC BLOOD PRESSURE: 108 MMHG | WEIGHT: 223 LBS | BODY MASS INDEX: 38.07 KG/M2 | HEART RATE: 80 BPM | DIASTOLIC BLOOD PRESSURE: 59 MMHG | HEIGHT: 64 IN | OXYGEN SATURATION: 95 % | RESPIRATION RATE: 18 BRPM

## 2023-04-07 LAB
ABSOLUTE EOS #: 0.4 K/UL (ref 0–0.4)
ABSOLUTE LYMPH #: 1.1 K/UL (ref 1–4.8)
ABSOLUTE MONO #: 0.4 K/UL (ref 0.1–1.2)
AMORPHOUS: ABNORMAL
ANION GAP SERPL CALCULATED.3IONS-SCNC: 11 MMOL/L (ref 9–17)
BACTERIA: ABNORMAL
BASOPHILS # BLD: 1 % (ref 0–2)
BASOPHILS ABSOLUTE: 0 K/UL (ref 0–0.2)
BILIRUBIN URINE: NEGATIVE
BUN SERPL-MCNC: 19 MG/DL (ref 8–23)
CALCIUM SERPL-MCNC: 8.5 MG/DL (ref 8.6–10.4)
CHLORIDE SERPL-SCNC: 106 MMOL/L (ref 98–107)
CO2 SERPL-SCNC: 23 MMOL/L (ref 20–31)
COLOR: ABNORMAL
CREAT SERPL-MCNC: 1.03 MG/DL (ref 0.5–0.9)
EOSINOPHILS RELATIVE PERCENT: 9 % (ref 1–4)
EPITHELIAL CELLS UA: ABNORMAL /HPF (ref 0–5)
GFR SERPL CREATININE-BSD FRML MDRD: 58 ML/MIN/1.73M2
GLUCOSE BLD-MCNC: 146 MG/DL (ref 65–105)
GLUCOSE BLD-MCNC: 152 MG/DL (ref 65–105)
GLUCOSE SERPL-MCNC: 181 MG/DL (ref 70–99)
GLUCOSE UR STRIP.AUTO-MCNC: NEGATIVE MG/DL
HCT VFR BLD AUTO: 32 % (ref 36–46)
HGB BLD-MCNC: 10.6 G/DL (ref 12–16)
KETONES UR STRIP.AUTO-MCNC: NEGATIVE MG/DL
LEUKOCYTE ESTERASE UR QL STRIP.AUTO: ABNORMAL
LYMPHOCYTES # BLD: 22 % (ref 24–44)
MCH RBC QN AUTO: 29.1 PG (ref 26–34)
MCHC RBC AUTO-ENTMCNC: 33.3 G/DL (ref 31–37)
MCV RBC AUTO: 87.4 FL (ref 80–100)
MICROORGANISM SPEC CULT: ABNORMAL
MONOCYTES # BLD: 8 % (ref 2–11)
NITRITE UR QL STRIP.AUTO: NEGATIVE
OTHER OBSERVATIONS UA: ABNORMAL
PDW BLD-RTO: 13.5 % (ref 12.5–15.4)
PLATELET # BLD AUTO: 175 K/UL (ref 140–450)
PMV BLD AUTO: 9.6 FL (ref 6–12)
POTASSIUM SERPL-SCNC: 3.8 MMOL/L (ref 3.7–5.3)
PROT UR STRIP.AUTO-MCNC: 6 MG/DL (ref 5–8)
PROT UR STRIP.AUTO-MCNC: ABNORMAL MG/DL
RBC # BLD: 3.66 M/UL (ref 4–5.2)
RBC CLUMPS #/AREA URNS AUTO: ABNORMAL /HPF (ref 0–2)
SEG NEUTROPHILS: 60 % (ref 36–66)
SEGMENTED NEUTROPHILS ABSOLUTE COUNT: 2.9 K/UL (ref 1.8–7.7)
SODIUM SERPL-SCNC: 140 MMOL/L (ref 135–144)
SPECIFIC GRAVITY UA: 1.01 (ref 1–1.03)
SPECIMEN DESCRIPTION: ABNORMAL
TURBIDITY: ABNORMAL
URINE HGB: ABNORMAL
UROBILINOGEN, URINE: NORMAL
WBC # BLD AUTO: 4.8 K/UL (ref 3.5–11)
WBC UA: ABNORMAL /HPF (ref 0–5)

## 2023-04-07 PROCEDURE — 36415 COLL VENOUS BLD VENIPUNCTURE: CPT

## 2023-04-07 PROCEDURE — 6370000000 HC RX 637 (ALT 250 FOR IP)

## 2023-04-07 PROCEDURE — 6370000000 HC RX 637 (ALT 250 FOR IP): Performed by: STUDENT IN AN ORGANIZED HEALTH CARE EDUCATION/TRAINING PROGRAM

## 2023-04-07 PROCEDURE — 82947 ASSAY GLUCOSE BLOOD QUANT: CPT

## 2023-04-07 PROCEDURE — 81001 URINALYSIS AUTO W/SCOPE: CPT

## 2023-04-07 PROCEDURE — 80048 BASIC METABOLIC PNL TOTAL CA: CPT

## 2023-04-07 PROCEDURE — 2500000003 HC RX 250 WO HCPCS: Performed by: STUDENT IN AN ORGANIZED HEALTH CARE EDUCATION/TRAINING PROGRAM

## 2023-04-07 PROCEDURE — 85025 COMPLETE CBC W/AUTO DIFF WBC: CPT

## 2023-04-07 PROCEDURE — 2580000003 HC RX 258

## 2023-04-07 PROCEDURE — 6370000000 HC RX 637 (ALT 250 FOR IP): Performed by: NURSE PRACTITIONER

## 2023-04-07 RX ORDER — TROSPIUM CHLORIDE 20 MG/1
20 TABLET, FILM COATED ORAL
Qty: 60 TABLET | Refills: 3 | Status: SHIPPED | OUTPATIENT
Start: 2023-04-07

## 2023-04-07 RX ORDER — CLINDAMYCIN HYDROCHLORIDE 300 MG/1
300 CAPSULE ORAL 4 TIMES DAILY
Qty: 40 CAPSULE | Refills: 0 | Status: SHIPPED | OUTPATIENT
Start: 2023-04-07 | End: 2023-04-17

## 2023-04-07 RX ORDER — CLINDAMYCIN HYDROCHLORIDE 300 MG/1
300 CAPSULE ORAL 4 TIMES DAILY
Qty: 40 CAPSULE | Refills: 0 | Status: SHIPPED | OUTPATIENT
Start: 2023-04-07 | End: 2023-04-07 | Stop reason: SDUPTHER

## 2023-04-07 RX ORDER — TROSPIUM CHLORIDE 20 MG/1
20 TABLET, FILM COATED ORAL
Qty: 60 TABLET | Refills: 3 | Status: SHIPPED | OUTPATIENT
Start: 2023-04-07 | End: 2023-04-07 | Stop reason: SDUPTHER

## 2023-04-07 RX ADMIN — SUCRALFATE 1 G: 1 TABLET ORAL at 12:37

## 2023-04-07 RX ADMIN — CLINDAMYCIN PHOSPHATE 600 MG: 600 INJECTION, SOLUTION INTRAVENOUS at 04:18

## 2023-04-07 RX ADMIN — LOSARTAN POTASSIUM 100 MG: 50 TABLET, FILM COATED ORAL at 09:15

## 2023-04-07 RX ADMIN — CLINDAMYCIN PHOSPHATE 600 MG: 600 INJECTION, SOLUTION INTRAVENOUS at 12:32

## 2023-04-07 RX ADMIN — INSULIN GLARGINE 30 UNITS: 100 INJECTION, SOLUTION SUBCUTANEOUS at 09:21

## 2023-04-07 RX ADMIN — TROSPIUM CHLORIDE 20 MG: 20 TABLET, FILM COATED ORAL at 05:42

## 2023-04-07 RX ADMIN — FUROSEMIDE 40 MG: 20 TABLET ORAL at 09:14

## 2023-04-07 RX ADMIN — GABAPENTIN 600 MG: 300 CAPSULE ORAL at 09:14

## 2023-04-07 RX ADMIN — TROSPIUM CHLORIDE 20 MG: 20 TABLET, FILM COATED ORAL at 16:56

## 2023-04-07 RX ADMIN — AMLODIPINE BESYLATE 10 MG: 10 TABLET ORAL at 09:15

## 2023-04-07 RX ADMIN — LEVOTHYROXINE SODIUM 88 MCG: 0.09 TABLET ORAL at 09:15

## 2023-04-07 RX ADMIN — SUCRALFATE 1 G: 1 TABLET ORAL at 09:14

## 2023-04-07 RX ADMIN — WHITE PETROLATUM: 1.75 OINTMENT TOPICAL at 09:16

## 2023-04-07 RX ADMIN — SODIUM CHLORIDE, PRESERVATIVE FREE 10 ML: 5 INJECTION INTRAVENOUS at 09:15

## 2023-04-07 RX ADMIN — POTASSIUM CHLORIDE 20 MEQ: 1500 TABLET, EXTENDED RELEASE ORAL at 09:31

## 2023-04-07 RX ADMIN — PANTOPRAZOLE SODIUM 40 MG: 40 TABLET, DELAYED RELEASE ORAL at 05:42

## 2023-04-07 RX ADMIN — TIMOLOL MALEATE 1 DROP: 5 SOLUTION OPHTHALMIC at 09:16

## 2023-04-07 RX ADMIN — DULOXETINE 60 MG: 30 CAPSULE, DELAYED RELEASE ORAL at 09:15

## 2023-04-07 RX ADMIN — GABAPENTIN 600 MG: 300 CAPSULE ORAL at 12:37

## 2023-04-07 NOTE — PROGRESS NOTES
.Patient admitted to room 342. VS taken, telemetry placed and call light given/within reach. Patient A&O and given room orientation. Orders released/reviewed, initial assessment completed and navigator started. See chart for more detail.
Patient called out to nurses station with new complaint of nasal congestion and not being able to breathe through her nose. Patients face and head appear much more red compared to initial assessment. Vanco (loading dose) finishing at time of patient c/o new symptoms. Vanco stopped. No other s/s or reactions noted at current time. Attending physician notified. Patient instructed to call out if she notices any further changes.
Physician Progress Note      PATIENT:               Colleen Mora  CSN #:                  [id number]  :                       1950  ADMIT DATE:       2023 10:41 AM  DISCH DATE:  RESPONDING  PROVIDER #:        Barbara Dick MD          QUERY TEXT:    Pt admitted with RLE cellulitis. Pt noted to have DM 2 with hyperglycemia. If   possible, please document in progress notes and discharge summary the   relationship, if any, between cellulitis and DM. The medical record reflects the following:  Risk Factors: DM2  Clinical Indicators: Serum glucose 304. Hemoglobin A1C 6.7 on 22. POC   Glucose 350 and 245. Treatment: Clindamycin. Labs/monitoring. Medium Dose Insulin SS. Giuliana Dumont RN, [email or web address]  Options provided:  -- RLE cellulitis associated with Diabetes  -- RLE cellulitis unrelated to Diabetes  -- Other - I will add my own diagnosis  -- Disagree - Not applicable / Not valid  -- Disagree - Clinically unable to determine / Unknown  -- Refer to Clinical Documentation Reviewer    PROVIDER RESPONSE TEXT:    RLE cellulitis unrelated to Diabetes.     Query created by: Nory Solares on 2023 6:46 AM      Electronically signed by:  Barbara Dick MD 2023 9:10 AM
Pt discharged via wheelchair with all belongings, scripts and discharge paperwork. Follow up appointments and discharge instructions reviewed with pt. All questions answered to satisfaction.
hospital with fever, bladder spasms and significant lower extremity redness and swelling. Patient states that she recorded fever of 106 at home. Patient had chills. Patient was noted to be febrile in the ER. Repositioning of Lazo was done which improvement of symptoms of bladder spasm. CT abdomen pelvis showed no acute findings, venous Doppler lower extremity showed no DVT. COVID was negative. Review of Systems:     Constitutional:  negative for chills, fevers, sweats  Respiratory:  negative for cough, dyspnea on exertion, shortness of breath, wheezing  Cardiovascular:  negative for chest pain, chest pressure/discomfort, positive lower extremity edema, palpitations  Gastrointestinal:  negative for abdominal pain, constipation, diarrhea, nausea, vomiting, positive for bladder spasms  Neurological:  negative for dizziness, headache  Lower extremity redness and tenderness  Medications: Allergies:     Allergies   Allergen Reactions    Aspirin Anaphylaxis     Only thing she can take is tylenol    Benadryl [Diphenhydramine] Other (See Comments)     Dystonic movement    Ibuprofen Anaphylaxis    Lidocaine Anaphylaxis     CAN NOT TOLERATE IV    Penicillins Anaphylaxis    Hydrocodone-Acetaminophen Other (See Comments)     Unsure of allergy    Vancomycin Rash       Current Meds:   Scheduled Meds:    sodium chloride  80 mL IntraVENous Once    atorvastatin  40 mg Oral Daily    amLODIPine  10 mg Oral Daily    DULoxetine  60 mg Oral Daily    furosemide  20 mg Oral Daily    hydroCHLOROthiazide  25 mg Oral QAM    levothyroxine  88 mcg Oral Daily    losartan  100 mg Oral Daily    pantoprazole  40 mg Oral QAM AC    sucralfate  1 g Oral 4x Daily    tamsulosin  0.4 mg Oral Nightly    timolol  1 drop Both Eyes BID    rivaroxaban  20 mg Oral Daily    sodium chloride flush  5-40 mL IntraVENous 2 times per day    insulin lispro  0-8 Units SubCUTAneous TID     insulin lispro  0-4 Units SubCUTAneous Nightly    clindamycin
04/07/23  0759 04/07/23  1148   PROT 6.8  --   --   --   --   --   --   --    LABALBU 3.7  --   --   --   --   --   --   --    AST 13  --   --   --   --   --   --   --    ALT 13  --   --   --   --   --   --   --    ALKPHOS 84  --   --   --   --   --   --   --    BILITOT 0.9  --   --   --   --   --   --   --    POCGLU  --    < > 350* 231* 241* 253* 146* 152*    < > = values in this interval not displayed. Radiology:  CT ABDOMEN PELVIS W IV CONTRAST Additional Contrast? None    Result Date: 4/5/2023  No acute findings within the abdomen or pelvis. Nonobstructing 2 mm right intrarenal calculus. XR CHEST PORTABLE    Result Date: 4/5/2023  No acute cardiopulmonary process identified. US DUP LOWER EXTREMITY LEFT PAO    Result Date: 4/5/2023  No evidence of DVT in the bilateral lower extremities. US DUP LOWER EXTREMITY RIGHT PAO    Result Date: 4/5/2023  No evidence of DVT in the bilateral lower extremities.        Physical Examination:       General Appearance:  alert, obese , and in no acute distress  Lungs: Bilateral equal air entry, clear to ausculation, no wheezing, rales or rhonchi, normal effort  Cardiovascular: normal rate, regular rhythm, no murmur, gallop, rub  Abdomen: Soft, diffusely tender, nondistended, normal bowel sounds, right-sided fentanyl pump implant  : Chronic Lazo catheter with adequate urine output, hematuria  Skin: Bilateral lower extremities up to mid shin, warm, erythematous, edematous, see clinical media  extremities:  peripheral pulses palpable, bilateral calf pain and anterior tibial with palpation improving , 2+ pitting edema bilaterally      Assessment:     Hospital Problems             Last Modified POA    * (Principal) Cellulitis 4/5/2023 Yes    Chronic indwelling Lazo catheter 4/6/2023 Yes    History of pulmonary embolism 4/5/2023 Yes    Chronic anticoagulation (Chronic) 4/5/2023 Yes    BMI 38.0-38.9,adult 4/5/2023 Yes    Type 2 diabetes mellitus with diabetic

## 2023-04-07 NOTE — DISCHARGE SUMMARY
Patient was afebrile upon presentation to the emergency department. Repositioning of Lazo in the ED showed improvement of symptoms with regards to bladder spasms. CT scan of abdomen pelvis showed no acute findings. Venous Doppler of bilateral lower extremity showed no signs of DVT. Chest x-ray showed no significant findings. Rapid COVID-19 test was negative. Patient did not have evidence of leukocytosis. Per discussion with ED practitioner patient had been on an outpatient course of Macrobid. UA was not strongly suggestive of a UTI. Patient was admitted to internal medicine for further work-up. Patient initially started on vancomycin but developed acute erythematous rash prompting alternative antibiotic selection and was switched to clindamycin. Throughout hospital course patient was treated with IV antibiotics as well as diuresis. Patient's Lasix was increased from 20 mg to 40 mg daily. Patient's hydrochlorothiazide was discontinued. Patient continued be afebrile throughout course. Patient's lower extremity cellulitis was improving with IV antibiotics. Patient will transition to oral antibiotics outpatient. Lasix was decreased down to 20mg prior to discharge. Patient hemodynamically stable with improving cellulitis and will be discharged home.           Significant therapeutic interventions: IV antibiotics    Significant Diagnostic Studies:   Labs:  Hematology:  Recent Labs     04/05/23  1117 04/06/23  0615 04/07/23  0646   WBC 9.7 6.0 4.8   RBC 4.32 3.77* 3.66*   HGB 12.5 10.9* 10.6*   HCT 38.0 33.1* 32.0*   MCV 88.0 87.7 87.4   MCH 28.9 28.9 29.1   MCHC 32.9 33.0 33.3   RDW 13.5 14.0 13.5    168 175   MPV 11.2 9.9 9.6     Chemistry:  Recent Labs     04/05/23  1117 04/05/23  1423 04/06/23  0615 04/07/23  0646     --  137 140   K 3.2*  --  3.5* 3.8     --  104 106   CO2 23  --  25 23   GLUCOSE 304*  --  225* 181*   BUN 19  --  17 19   CREATININE 0.93*  --  1.01* 1.03*   MG

## 2023-04-07 NOTE — PLAN OF CARE
Problem: Discharge Planning  Goal: Discharge to home or other facility with appropriate resources  4/7/2023 0038 by Idania Parmar RN  Outcome: Progressing   Patient actively participates in ADLs and decision making regarding plan of care. Problem: Pain  Goal: Verbalizes/displays adequate comfort level or baseline comfort level  4/7/2023 0038 by Idania Parmar RN  Outcome: Progressing   No new signs/symptoms of pain noted, pain rating < 3 on scale 0-10, pain controlled with medication/repositioning. Problem: Safety - Adult  Goal: Free from fall injury  4/7/2023 0038 by Idania Parmar RN  Outcome: Progressing   No falls/injuries this shift, bed in lowest position, brakes on, bed alarm on, call light in reach, side rails up x2.

## 2023-04-11 ENCOUNTER — OFFICE VISIT (OUTPATIENT)
Dept: PRIMARY CARE CLINIC | Age: 73
End: 2023-04-11

## 2023-04-11 VITALS — OXYGEN SATURATION: 97 % | SYSTOLIC BLOOD PRESSURE: 122 MMHG | HEART RATE: 73 BPM | DIASTOLIC BLOOD PRESSURE: 70 MMHG

## 2023-04-11 DIAGNOSIS — L03.119 CELLULITIS OF LOWER EXTREMITY, UNSPECIFIED LATERALITY: ICD-10-CM

## 2023-04-11 DIAGNOSIS — R33.9 URINARY RETENTION: ICD-10-CM

## 2023-04-11 DIAGNOSIS — M79.89 LEG SWELLING: Primary | ICD-10-CM

## 2023-04-11 DIAGNOSIS — Z09 HOSPITAL DISCHARGE FOLLOW-UP: ICD-10-CM

## 2023-04-11 DIAGNOSIS — I10 ESSENTIAL HYPERTENSION: ICD-10-CM

## 2023-04-11 RX ORDER — AMLODIPINE BESYLATE 5 MG/1
5 TABLET ORAL DAILY
Qty: 30 TABLET | Refills: 11 | Status: SHIPPED | OUTPATIENT
Start: 2023-04-11

## 2023-04-17 ENCOUNTER — TELEPHONE (OUTPATIENT)
Dept: PRIMARY CARE CLINIC | Age: 73
End: 2023-04-17

## 2023-04-17 NOTE — TELEPHONE ENCOUNTER
Patient calling in office states her  has bronchitis and now she has a sore throat. Patient also states that she wakes up with phlegm in the morning sometimes as well. Patient is asking for recommendations for over the counter medications, please advise.

## 2023-04-20 ENCOUNTER — CARE COORDINATION (OUTPATIENT)
Dept: CASE MANAGEMENT | Age: 73
End: 2023-04-20

## 2023-04-20 NOTE — CARE COORDINATION
their healthcare. Patients top risk factors for readmission: medical condition-cellulitis/urinary retention  Interventions to address risk factors: Obtained and reviewed discharge summary and/or continuity of care documents    Offered patient enrollment in the Remote Patient Monitoring (RPM) program for in-home monitoring: NA.     Care Transitions Subsequent and Final Call    Subsequent and Final Calls  Do you have any ongoing symptoms?: No  Have your medications changed?: No  Do you have any questions related to your medications?: No  Do you currently have any active services?: No  Do you have any needs or concerns that I can assist you with?: No  Care Transitions Interventions  Other Interventions:             Care Transition Nurse provided contact information for future needs. Plan for follow-up call in 10-14 days based on severity of symptoms and risk factors. Plan for next call:  follow up on lower leg cellulitis, if quach reinserted.      Nata Aguirre RN

## 2023-04-26 RX ORDER — ALENDRONATE SODIUM 70 MG/1
TABLET ORAL
Qty: 5 TABLET | Refills: 10 | Status: SHIPPED | OUTPATIENT
Start: 2023-04-26

## 2023-05-03 ENCOUNTER — CARE COORDINATION (OUTPATIENT)
Dept: CASE MANAGEMENT | Age: 73
End: 2023-05-03

## 2023-05-03 NOTE — CARE COORDINATION
[facility] Care Transitions Follow Up Call    Patient Current Location:  Home: [address] [place] [address] [address] [facility] [address]    Care Transition Nurse contacted the patient by telephone to follow up after admission on 23. Verified name and  with patient as identifiers. Patient: Janina Salazar  Patient : 1950   MRN: [id number]  Reason for Admission: Cellulitis of lower extremity  Discharge Date: 23 RARS: Readmission Risk Score: 18.4      Needs to be reviewed by the provider   Additional needs identified to be addressed with provider: No  none             Method of communication with provider: none. 1st attempt to reach patient for Care Transitions. [facility] requesting return call. Contact information provided. [phone]    Was able to contact Courtney Cronin for transitional outreach. She said that the urologist did not reinsert the quach and she will be having a procedure to insert a test bladder stimulator next Monday. She does not know how long the test device will be in, but will have the actual bladder stimulator put in on . She said after that she will be going to [facility] to have [place] treatment for her neuropathy pain from when she had shingles. She said that she is urinating every 10-12 hours, no blood noted in urine. She said that she continues with lower leg edema and her thighs are hard to the touch. She wanted to get a hold of her PCP to see if increasing her Lasix will help. She said that the redness and heat to her legs have gotten better. She does have a nurse that visits every 3 wks to fill her pain pump for her back pain. She had no further questions or concerns. Informed that next call will be final outreach. She was agreeable to [facility] referral.    Addressed changes since last contact:  none  Discussed follow-up appointments. If no appointment was previously scheduled, appointment scheduling offered: Yes. Is follow up appointment scheduled within 7 days of discharge?

## 2023-05-10 ENCOUNTER — OFFICE VISIT (OUTPATIENT)
Dept: PRIMARY CARE CLINIC | Age: 73
End: 2023-05-10
Payer: MEDICARE

## 2023-05-10 ENCOUNTER — CARE COORDINATION (OUTPATIENT)
Dept: CARE COORDINATION | Age: 73
End: 2023-05-10

## 2023-05-10 ENCOUNTER — HOSPITAL ENCOUNTER (OUTPATIENT)
Age: 73
Setting detail: SPECIMEN
Discharge: HOME | End: 2023-05-10

## 2023-05-10 VITALS
DIASTOLIC BLOOD PRESSURE: 82 MMHG | BODY MASS INDEX: 38.21 KG/M2 | WEIGHT: 222.6 LBS | SYSTOLIC BLOOD PRESSURE: 126 MMHG | OXYGEN SATURATION: 97 % | HEART RATE: 82 BPM

## 2023-05-10 DIAGNOSIS — I10 ESSENTIAL HYPERTENSION: ICD-10-CM

## 2023-05-10 DIAGNOSIS — E11.40 TYPE 2 DIABETES MELLITUS WITH DIABETIC NEUROPATHY, WITH LONG-TERM CURRENT USE OF INSULIN (HCC): Primary | ICD-10-CM

## 2023-05-10 DIAGNOSIS — M79.89 LEG SWELLING: ICD-10-CM

## 2023-05-10 DIAGNOSIS — E11.40 TYPE 2 DIABETES MELLITUS WITH DIABETIC NEUROPATHY, WITH LONG-TERM CURRENT USE OF INSULIN (HCC): ICD-10-CM

## 2023-05-10 DIAGNOSIS — D50.9 IRON DEFICIENCY ANEMIA, UNSPECIFIED IRON DEFICIENCY ANEMIA TYPE: ICD-10-CM

## 2023-05-10 DIAGNOSIS — R33.9 URINARY RETENTION: ICD-10-CM

## 2023-05-10 DIAGNOSIS — Z79.4 TYPE 2 DIABETES MELLITUS WITH DIABETIC NEUROPATHY, WITH LONG-TERM CURRENT USE OF INSULIN (HCC): ICD-10-CM

## 2023-05-10 DIAGNOSIS — Z79.4 TYPE 2 DIABETES MELLITUS WITH DIABETIC NEUROPATHY, WITH LONG-TERM CURRENT USE OF INSULIN (HCC): Primary | ICD-10-CM

## 2023-05-10 PROBLEM — N13.1 HYDRONEPHROSIS WITH URETERAL STRICTURE, NOT ELSEWHERE CLASSIFIED: Status: ACTIVE | Noted: 2023-05-10

## 2023-05-10 PROBLEM — G47.30 SLEEP APNEA: Status: ACTIVE | Noted: 2023-05-10

## 2023-05-10 PROBLEM — R60.0 LOWER LEG EDEMA: Status: ACTIVE | Noted: 2023-05-10

## 2023-05-10 PROBLEM — C56.1 OVARIAN CANCER, RIGHT (HCC): Status: ACTIVE | Noted: 2023-05-10

## 2023-05-10 PROBLEM — N39.41 URGE INCONTINENCE OF URINE: Status: ACTIVE | Noted: 2023-05-10

## 2023-05-10 LAB
ABSOLUTE EOS #: 0.17 K/UL (ref 0–0.44)
ABSOLUTE IMMATURE GRANULOCYTE: <0.03 K/UL (ref 0–0.3)
ABSOLUTE LYMPH #: 3.85 K/UL (ref 1.1–3.7)
ABSOLUTE MONO #: 0.46 K/UL (ref 0.1–1.2)
ALBUMIN SERPL-MCNC: 3.9 G/DL (ref 3.5–5.2)
ALBUMIN/GLOBULIN RATIO: 1.3 (ref 1–2.5)
ALP SERPL-CCNC: 69 U/L (ref 35–104)
ALT SERPL-CCNC: 24 U/L (ref 5–33)
ANION GAP SERPL CALCULATED.3IONS-SCNC: 12 MMOL/L (ref 9–17)
AST SERPL-CCNC: 24 U/L
BASOPHILS # BLD: 1 % (ref 0–2)
BASOPHILS ABSOLUTE: 0.09 K/UL (ref 0–0.2)
BILIRUB SERPL-MCNC: 0.3 MG/DL (ref 0.3–1.2)
BUN SERPL-MCNC: 17 MG/DL (ref 8–23)
CALCIUM SERPL-MCNC: 9.3 MG/DL (ref 8.6–10.4)
CHLORIDE SERPL-SCNC: 105 MMOL/L (ref 98–107)
CO2 SERPL-SCNC: 24 MMOL/L (ref 20–31)
CREAT SERPL-MCNC: 0.96 MG/DL (ref 0.5–0.9)
D DIMER BLD IA.RAPID-MCNC: 0.34 UG/ML FEU (ref 0–0.57)
EOSINOPHILS RELATIVE PERCENT: 2 % (ref 1–4)
GFR SERPL CREATININE-BSD FRML MDRD: >60 ML/MIN/1.73M2
GLUCOSE SERPL-MCNC: 133 MG/DL (ref 70–99)
HBA1C MFR BLD: 8 %
HCT VFR BLD AUTO: 36.8 % (ref 36.3–47.1)
HGB BLD-MCNC: 11.6 G/DL (ref 11.9–15.1)
IMMATURE GRANULOCYTES: 0 %
LYMPHOCYTES # BLD: 44 % (ref 24–43)
MCH RBC QN AUTO: 28.5 PG (ref 25.2–33.5)
MCHC RBC AUTO-ENTMCNC: 31.5 G/DL (ref 28.4–34.8)
MCV RBC AUTO: 90.4 FL (ref 82.6–102.9)
MONOCYTES # BLD: 5 % (ref 3–12)
NRBC AUTOMATED: 0 PER 100 WBC
PDW BLD-RTO: 13.5 % (ref 11.8–14.4)
PLATELET # BLD AUTO: 223 K/UL (ref 138–453)
PMV BLD AUTO: 12 FL (ref 8.1–13.5)
POTASSIUM SERPL-SCNC: 3.9 MMOL/L (ref 3.7–5.3)
PROT SERPL-MCNC: 6.9 G/DL (ref 6.4–8.3)
RBC # BLD: 4.07 M/UL (ref 3.95–5.11)
SEG NEUTROPHILS: 48 % (ref 36–65)
SEGMENTED NEUTROPHILS ABSOLUTE COUNT: 4.12 K/UL (ref 1.5–8.1)
SODIUM SERPL-SCNC: 141 MMOL/L (ref 135–144)
WBC # BLD AUTO: 8.7 K/UL (ref 3.5–11.3)

## 2023-05-10 PROCEDURE — 99214 OFFICE O/P EST MOD 30 MIN: CPT | Performed by: FAMILY MEDICINE

## 2023-05-10 PROCEDURE — 1090F PRES/ABSN URINE INCON ASSESS: CPT | Performed by: FAMILY MEDICINE

## 2023-05-10 PROCEDURE — 3017F COLORECTAL CA SCREEN DOC REV: CPT | Performed by: FAMILY MEDICINE

## 2023-05-10 PROCEDURE — 1123F ACP DISCUSS/DSCN MKR DOCD: CPT | Performed by: FAMILY MEDICINE

## 2023-05-10 PROCEDURE — 83036 HEMOGLOBIN GLYCOSYLATED A1C: CPT | Performed by: FAMILY MEDICINE

## 2023-05-10 PROCEDURE — 2022F DILAT RTA XM EVC RTNOPTHY: CPT | Performed by: FAMILY MEDICINE

## 2023-05-10 PROCEDURE — 1036F TOBACCO NON-USER: CPT | Performed by: FAMILY MEDICINE

## 2023-05-10 PROCEDURE — G8399 PT W/DXA RESULTS DOCUMENT: HCPCS | Performed by: FAMILY MEDICINE

## 2023-05-10 PROCEDURE — G8427 DOCREV CUR MEDS BY ELIG CLIN: HCPCS | Performed by: FAMILY MEDICINE

## 2023-05-10 PROCEDURE — G8417 CALC BMI ABV UP PARAM F/U: HCPCS | Performed by: FAMILY MEDICINE

## 2023-05-10 PROCEDURE — 3074F SYST BP LT 130 MM HG: CPT | Performed by: FAMILY MEDICINE

## 2023-05-10 PROCEDURE — 3052F HG A1C>EQUAL 8.0%<EQUAL 9.0%: CPT | Performed by: FAMILY MEDICINE

## 2023-05-10 PROCEDURE — 3078F DIAST BP <80 MM HG: CPT | Performed by: FAMILY MEDICINE

## 2023-05-10 RX ORDER — INSULIN GLARGINE 100 [IU]/ML
INJECTION, SOLUTION SUBCUTANEOUS
COMMUNITY
Start: 2023-05-02

## 2023-05-10 RX ORDER — DOXYCYCLINE HYCLATE 100 MG/1
CAPSULE ORAL
COMMUNITY
Start: 2023-05-08

## 2023-05-10 RX ORDER — INSULIN LISPRO 100 [IU]/ML
INJECTION, SOLUTION INTRAVENOUS; SUBCUTANEOUS
COMMUNITY
Start: 2023-04-25

## 2023-05-10 RX ORDER — OXYCODONE HYDROCHLORIDE AND ACETAMINOPHEN 5; 325 MG/1; MG/1
TABLET ORAL
COMMUNITY
Start: 2023-05-08

## 2023-05-10 RX ORDER — AMLODIPINE BESYLATE 10 MG/1
TABLET ORAL
COMMUNITY
Start: 2023-04-25

## 2023-05-10 NOTE — PROGRESS NOTES
hyclate (VIBRAMYCIN) 100 MG capsule       oxyCODONE-acetaminophen (PERCOCET) 5-325 MG per tablet       amLODIPine (NORVASC) 10 MG tablet       alendronate (FOSAMAX) 70 MG tablet TAKE 1 TABLET BY MOUTH EVERY MONDAY 5 tablet 10    amLODIPine (NORVASC) 5 MG tablet Take 1 tablet by mouth daily 30 tablet 11    trospium (SANCTURA) 20 MG tablet Take 1 tablet by mouth 2 times daily (before meals) 60 tablet 3    furosemide (LASIX) 20 MG tablet Take 1 tablet by mouth daily 3 tablet 0    traZODone (DESYREL) 100 MG tablet TAKE 1 TABLET BY MOUTH NIGHTLY AS NEEDED FOR SLEEP 30 tablet 10    sucralfate (CARAFATE) 1 GM tablet TAKE 1 TABLET BY MOUTH FOUR TIMES DAILY 120 tablet 10    atorvastatin (LIPITOR) 40 MG tablet TAKE 1 TABLET BY MOUTH EVERY DAY 30 tablet 10    hydroCHLOROthiazide (HYDRODIURIL) 25 MG tablet TAKE 1 TABLET BY MOUTH EVERY MORNING 30 tablet 10    potassium chloride (KLOR-CON M) 20 MEQ extended release tablet TAKE 1 TABLET BY MOUTH IN THE MORNING AND TAKE 1 TABLET AT NOON AND TAKE 1 TABLET BEFORE BEDTIME 90 tablet 10    omeprazole (PRILOSEC) 40 MG delayed release capsule TAKE 1 CAPSULE BY MOUTH EVERY MORNING BEFORE BREAKFAST 30 capsule 10    insulin glargine (LANTUS) 100 UNIT/ML injection vial INJECT 75 UNITS SUBCUTANEOUSLY NIGHTLY 30 mL 10    insulin lispro (HUMALOG) 100 UNIT/ML SOLN injection vial INJECT SUBCUTANEOUSLY UP TO THREE TIMES A DAY BASED ON SLIDING SCALE.  *MAX DAILY DOSE: 40 UNITS* 10 mL 10    losartan (COZAAR) 100 MG tablet TAKE 1 TABLET BY MOUTH EVERY DAY 30 tablet 10    DULoxetine (CYMBALTA) 60 MG extended release capsule TAKE 1 CAPSULE BY MOUTH DAILY 30 capsule 10    OZEMPIC, 0.25 OR 0.5 MG/DOSE, 2 MG/1.5ML SOPN INJECT 0.5MG SUBCUTANEOUSLY ONCE A WEEK      levothyroxine (SYNTHROID) 88 MCG tablet TAKE 1 TABLET BY MOUTH EVERY DAY 30 tablet 10    tamsulosin (FLOMAX) 0.4 MG capsule TAKE 1 CAPSULE BY MOUTH NIGHTLY 30 capsule 10    SURE COMFORT INSULIN SYRINGE 31G X 5/16\" 1 ML MISC USE AS DIRECTED FOUR

## 2023-05-10 NOTE — CARE COORDINATION
Ambulatory Care Coordination Note  5/10/2023          Challenges to be reviewed by the provider   Additional needs identified to be addressed with provider: No  none               Method of communication with provider: none. ACM: Daisy Willard RN    Hx; DM, HTN, neuropathy, urinary retention, GERD, ovarian cancer, anemia. CTN referral for care management. Patient to have trial stimulator placed 5/8. To have actual stimulator placement 5/22.    A1C 8, follows with endocrinology      Called patient and left M asking for call back      Future Appointments   Date Time Provider Rodolfo Hyman   6/19/2023  1:30 PM Jay Padron MD [address] [place] [address]   8/15/2023  1:30 PM DO Raul [id number]         Lab Results       None            Care Coordination Interventions    Referral from Primary Care Provider: No  Suggested Interventions and Community Resources          Goals Addressed    None         Future Appointments   Date Time Provider Rodolfo Hyman   5/10/2023  2:15 PM Simi Zavala DO [facility]   6/19/2023  1:30 PM Jay Padron MD [address] [place] [address]

## 2023-05-11 DIAGNOSIS — M79.89 LEG SWELLING: Primary | ICD-10-CM

## 2023-05-11 RX ORDER — FUROSEMIDE 40 MG/1
40 TABLET ORAL DAILY
Qty: 10 TABLET | Refills: 0 | Status: SHIPPED | OUTPATIENT
Start: 2023-05-11 | End: 2023-06-19

## 2023-05-11 NOTE — RESULT ENCOUNTER NOTE
Please let pt know her d dimer test was normal, does not need US for now. Kidney function stable so recommend she take 40 mg lasix for 3 days. Does she need a new script for this?

## 2023-05-11 NOTE — RESULT ENCOUNTER NOTE
Please let pt know I sent lasix ten tablets but want her to just do 3 days of the 40 mg. She can save the rest in future if we need it.

## 2023-05-12 ENCOUNTER — CARE COORDINATION (OUTPATIENT)
Dept: CARE COORDINATION | Age: 73
End: 2023-05-12

## 2023-05-12 SDOH — ECONOMIC STABILITY: HOUSING INSECURITY: IN THE LAST 12 MONTHS, HOW MANY PLACES HAVE YOU LIVED?: 1

## 2023-05-12 SDOH — HEALTH STABILITY: PHYSICAL HEALTH: ON AVERAGE, HOW MANY DAYS PER WEEK DO YOU ENGAGE IN MODERATE TO STRENUOUS EXERCISE (LIKE A BRISK WALK)?: 0 DAYS

## 2023-05-12 SDOH — ECONOMIC STABILITY: FOOD INSECURITY: WITHIN THE PAST 12 MONTHS, THE FOOD YOU BOUGHT JUST DIDN'T LAST AND YOU DIDN'T HAVE MONEY TO GET MORE.: NEVER TRUE

## 2023-05-12 SDOH — ECONOMIC STABILITY: TRANSPORTATION INSECURITY
IN THE PAST 12 MONTHS, HAS THE LACK OF TRANSPORTATION KEPT YOU FROM MEDICAL APPOINTMENTS OR FROM GETTING MEDICATIONS?: NO

## 2023-05-12 SDOH — ECONOMIC STABILITY: FOOD INSECURITY: WITHIN THE PAST 12 MONTHS, YOU WORRIED THAT YOUR FOOD WOULD RUN OUT BEFORE YOU GOT MONEY TO BUY MORE.: NEVER TRUE

## 2023-05-12 SDOH — HEALTH STABILITY: PHYSICAL HEALTH: ON AVERAGE, HOW MANY MINUTES DO YOU ENGAGE IN EXERCISE AT THIS LEVEL?: 0 MIN

## 2023-05-12 SDOH — ECONOMIC STABILITY: INCOME INSECURITY: IN THE LAST 12 MONTHS, WAS THERE A TIME WHEN YOU WERE NOT ABLE TO PAY THE MORTGAGE OR RENT ON TIME?: NO

## 2023-05-12 ASSESSMENT — SOCIAL DETERMINANTS OF HEALTH (SDOH)
DO YOU BELONG TO ANY CLUBS OR ORGANIZATIONS SUCH AS CHURCH GROUPS UNIONS, FRATERNAL OR ATHLETIC GROUPS, OR SCHOOL GROUPS?: NO
HOW HARD IS IT FOR YOU TO PAY FOR THE VERY BASICS LIKE FOOD, HOUSING, MEDICAL CARE, AND HEATING?: NOT HARD AT ALL
HOW OFTEN DO YOU GET TOGETHER WITH FRIENDS OR RELATIVES?: MORE THAN THREE TIMES A WEEK
IN A TYPICAL WEEK, HOW MANY TIMES DO YOU TALK ON THE PHONE WITH FAMILY, FRIENDS, OR NEIGHBORS?: MORE THAN THREE TIMES A WEEK
HOW OFTEN DO YOU ATTENT MEETINGS OF THE CLUB OR ORGANIZATION YOU BELONG TO?: MORE THAN 4 TIMES PER YEAR
HOW OFTEN DO YOU ATTEND CHURCH OR RELIGIOUS SERVICES?: 1 TO 4 TIMES PER YEAR

## 2023-05-14 ASSESSMENT — ENCOUNTER SYMPTOMS
SHORTNESS OF BREATH: 0
VOMITING: 0
BACK PAIN: 1

## 2023-05-16 ENCOUNTER — TELEPHONE (OUTPATIENT)
Dept: PRIMARY CARE CLINIC | Age: 73
End: 2023-05-16

## 2023-05-16 RX ORDER — CARVEDILOL 6.25 MG/1
6.25 TABLET ORAL 2 TIMES DAILY
Qty: 60 TABLET | Refills: 5 | Status: SHIPPED | OUTPATIENT
Start: 2023-05-16

## 2023-05-16 NOTE — TELEPHONE ENCOUNTER
Patient tried the higher dosage of Lasix for 3 days as advised by her PCP for leg and feet edema and has seen no improvement. Please advise.

## 2023-05-17 NOTE — TELEPHONE ENCOUNTER
I recommend discontinuing her amlodipine completely and switching to different BP med as amlodipine can worsen swelling . I will send in carvedilol to take twice a day instead of amlodipine and recommend she do 3 more days of lasix 40. Please call [facility] and cancel her amlodipine 5 mg.

## 2023-05-22 ENCOUNTER — CARE COORDINATION (OUTPATIENT)
Dept: CARE COORDINATION | Age: 73
End: 2023-05-22

## 2023-05-22 NOTE — CARE COORDINATION
Ambulatory Care Coordination Note  2023    Patient Current Location:  Home: [address] [place] [address] [address] [facility] [address]     ACM contacted the patient by telephone. Verified name and  with patient as identifiers. Provided introduction to self, and explanation of the ACM role. Challenges to be reviewed by the provider   Additional needs identified to be addressed with provider: No  none               Method of communication with provider: none. ACM: Curt Vázquez RN    still having BLE swelling and thighs still feel hard . Denies shorntess of breath or chest pain  she took the higher dose of Lasix, and didnt' see any changes. Still taking amlodipine. Has not started carvedilol  She's waiting for it from mail order pharmacy. She'll call the office if she doesn't get it tomorrow. May need script sent to the local pharmacy    no worsening back pain  and the pain pump helps to manage the pain   DM; blood sugar are better, today .  has Dexcom CGM. No symptoms of high or low blood sugar  had surgery today and placement of  bladder stimulator. She is urinating without since home from the [address] [place] [address] with endocrinology, cardiology, pain management, oncology  CM plan; review that carvedilol started and stopped amlodipine. tony [place]      Offered patient enrollment in the Remote Patient Monitoring (RPM) program for in-home monitoring: Patient declined.     Lab Results       None            Care Coordination Interventions    Referral from Primary Care Provider: No  Suggested Interventions and Community Resources  Registered Dietician: Declined  Transportation Support: Declined  Zone Management Tools: Completed (Comment: DM)          Goals Addressed    None         Future Appointments   Date Time Provider Rodolfo Bernali   2023  1:30 PM Alyssa Sullivan MD [address] [place] [address]   8/15/2023  1:30 PM DO Raul Mancilla [facility]   ,   Diabetes Assessment    Medic Alert ID:

## 2023-05-23 RX ORDER — GABAPENTIN 600 MG/1
TABLET ORAL
Qty: 120 TABLET | Refills: 10 | Status: SHIPPED | OUTPATIENT
Start: 2023-05-23 | End: 2023-06-22

## 2023-05-24 RX ORDER — RIVAROXABAN 20 MG/1
TABLET, FILM COATED ORAL
Qty: 30 TABLET | Refills: 10 | Status: SHIPPED | OUTPATIENT
Start: 2023-05-24

## 2023-06-19 ENCOUNTER — TELEPHONE (OUTPATIENT)
Dept: ONCOLOGY | Age: 73
End: 2023-06-19

## 2023-06-19 ENCOUNTER — OFFICE VISIT (OUTPATIENT)
Dept: ONCOLOGY | Age: 73
End: 2023-06-19
Payer: MEDICARE

## 2023-06-19 VITALS
HEART RATE: 67 BPM | DIASTOLIC BLOOD PRESSURE: 63 MMHG | BODY MASS INDEX: 38.17 KG/M2 | SYSTOLIC BLOOD PRESSURE: 126 MMHG | WEIGHT: 222.4 LBS | TEMPERATURE: 97.2 F

## 2023-06-19 DIAGNOSIS — E66.01 SEVERE OBESITY (BMI 35.0-39.9) WITH COMORBIDITY (HCC): ICD-10-CM

## 2023-06-19 DIAGNOSIS — D50.9 MICROCYTIC ANEMIA: Primary | ICD-10-CM

## 2023-06-19 DIAGNOSIS — C56.9 MALIGNANT NEOPLASM OF OVARY, UNSPECIFIED LATERALITY (HCC): ICD-10-CM

## 2023-06-19 PROCEDURE — G8399 PT W/DXA RESULTS DOCUMENT: HCPCS | Performed by: INTERNAL MEDICINE

## 2023-06-19 PROCEDURE — 3017F COLORECTAL CA SCREEN DOC REV: CPT | Performed by: INTERNAL MEDICINE

## 2023-06-19 PROCEDURE — 1036F TOBACCO NON-USER: CPT | Performed by: INTERNAL MEDICINE

## 2023-06-19 PROCEDURE — G8427 DOCREV CUR MEDS BY ELIG CLIN: HCPCS | Performed by: INTERNAL MEDICINE

## 2023-06-19 PROCEDURE — 1090F PRES/ABSN URINE INCON ASSESS: CPT | Performed by: INTERNAL MEDICINE

## 2023-06-19 PROCEDURE — 1123F ACP DISCUSS/DSCN MKR DOCD: CPT | Performed by: INTERNAL MEDICINE

## 2023-06-19 PROCEDURE — 99211 OFF/OP EST MAY X REQ PHY/QHP: CPT | Performed by: INTERNAL MEDICINE

## 2023-06-19 PROCEDURE — 99214 OFFICE O/P EST MOD 30 MIN: CPT | Performed by: INTERNAL MEDICINE

## 2023-06-19 PROCEDURE — G8417 CALC BMI ABV UP PARAM F/U: HCPCS | Performed by: INTERNAL MEDICINE

## 2023-06-19 NOTE — TELEPHONE ENCOUNTER
AVS from 6/19/23    RV 6 months with CBC, iron studies,  before RV    *rv is sched for [email or web address] w labs 1 week prior      PT was given AVS and appt schedule

## 2023-06-19 NOTE — PROGRESS NOTES
_           Chief Complaint   Patient presents with    Follow-up     Review status of disease    Discuss Labs     DIAGNOSIS:       Microcytic anemia  Severe iron deficiency anemia  No response to oral iron  Intolerable side effects to oral iron with severe constipation  Positive stool guaiac test in 2 out of 3 symptoms  History of diverticulosis  GERD  History of ovarian cancer in 2015  Strong family history of breast cancer with mother and grandmother and maternal aunt    CURRENT THERAPY:         IV iron infusion December 2021  GI work up negative. BRIEF CASE HISTORY:      Ms. Rebecca Singleton is a very pleasant 71 y.o. female with history of multiple co morbidities as listed. Patient is referred for further management of anemia. Patient has history of ovarian cancer in 2015. She was treated at [facility] with surgery and chemotherapy using Taxol carboplatin. Patient developed anemia at that time and she received blood transfusions. She continued to have mild anemia for several years. Patient had GI evaluation and July 2019. She had EGD for GERD and she had colonoscopy at that time. No source of bleeding was identified. Patient was recently noted to have increasing symptoms related to anemia. She has significant drop of her hemoglobin not responding to oral iron. She has generalized weakness and fatigue and dizziness. She has shortness of breath on exertion. Patient has significant muscle cramps and ice craving. Patient denies any vaginal bleeding. No hematuria. No hematochezia. No melena. No hematemesis. Stool guaiac test was positive for Hemoccult blood and 2 out of 3 samples. Patient has left upper abdominal pain on and off for several months. She has CT scan in November which was negative. Patient denies smoking or alcohol drinking. .     INTERIM HISTORY:   Seen for follow up anemia.  C/o weakness and

## 2023-06-20 ENCOUNTER — CARE COORDINATION (OUTPATIENT)
Dept: CARE COORDINATION | Age: 73
End: 2023-06-20

## 2023-06-20 NOTE — CARE COORDINATION
NAV spoke with patient while Juan Anne is out. Patient stated that her swelling in her legs is improving. She stated \"I can see my knees now\". She denied any SOB. Went to endocrinologist last week, working on her blood sugars. Denied any needs from [facility] or PCP today. Patient reminded that she can reach out with any needs while Michelle Hameed is out.

## 2023-06-26 RX ORDER — TAMSULOSIN HYDROCHLORIDE 0.4 MG/1
CAPSULE ORAL
Qty: 30 CAPSULE | Refills: 10 | Status: SHIPPED | OUTPATIENT
Start: 2023-06-26

## 2023-06-30 NOTE — ANESTHESIA PRE PROCEDURE
Department of Anesthesiology  Preprocedure Note       Name:  Saurav Minor   Age:  80 y.o.  :  1950                                          MRN:  [id number]         Date:  2022      Surgeon: Abigail Valverde):  Ernesto Cantu MD    Procedure: Procedure(s):  Colonoscopy      Medications prior to admission:     Current medications:    No current outpatient medications on file. No current facility-administered medications for this visit. Allergies:     Allergies   Allergen Reactions    Aspirin Anaphylaxis     Only thing she can take is tylenol    Benadryl [Diphenhydramine] Other (See Comments)     Dystonic movement    Ibuprofen Anaphylaxis    Lidocaine Anaphylaxis     CAN NOT TOLERATE IV    Penicillins Anaphylaxis    Hydrocodone-Acetaminophen Other (See Comments)     Unsure of allergy    Tramadol Other (See Comments)     Unsure of allergy       Problem List:    Patient Active Problem List   Diagnosis Code    Ovarian mass N83.8    Abdominal pain R10.9    Partial bowel obstruction (HCC) K56.600    Epigastric pain R10.13    GERD (gastroesophageal reflux disease) K21.9    Lumbosacral spondylosis without myelopathy M47.817    Chronic bilateral thoracic back pain M54.6, G89.29    Chronic anticoagulation Z79.01    BMI 38.0-38.9,adult Z68.38    Type 2 diabetes mellitus E11.9    Low hemoglobin D64.9    Nausea R11.0    Absolute anemia D64.9    Microcytic anemia D50.9    Iron deficiency anemia secondary to inadequate dietary iron intake D50.8    Iron malabsorption 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.9    Malignant neoplasm of ovary (HCC) C56.9    Occult blood in stools R19.5    Sacroiliitis, not elsewhere classified ([address] M46.1       Past Medical History:        Diagnosis Date    Arthritis     Bowel obstruction ([address]     Cancer ([address]     ovarian stage 2    Cerebral artery occlusion with cerebral infarction ([address] 2018    Mini strokes \"Tia's\"    Diabetes mellitus ([address]     Epigastric pain     GERD SHOULDER SURGERY  11/2014    manipulation    SPINAL CORD STIMULATOR SURGERY N/A 6/5/2020    SPINAL CORD STIMULATOR IMPLANT TRIAL performed by Martha Drummond MD at [facility] N/A 7/27/2020    SPINAL CORD STIMULATOR IMPLANT WITH THORACIC LAMINOTOMY performed by Camille Bland MD at [address] ENDOSCOPY  07/08/2019    UPPER GASTROINTESTINAL ENDOSCOPY N/A 7/8/2019    EGD BIOPSY performed by Deb Zapata MD at [address] Right 4/30/2015    pt has blood clot & abscess in her right kidney    VASCULAR SURGERY      Alesha filter in & removed 1 year later       Social History:    Social History     Tobacco Use    Smoking status: Never Smoker    Smokeless tobacco: Never Used   Substance Use Topics    Alcohol use: Yes     Comment: rare                                Counseling given: Not Answered      Vital Signs (Current): There were no vitals filed for this visit.                                            BP Readings from Last 3 Encounters:   02/28/22 (!) 160/73   02/14/22 (!) 196/80   02/09/22 (!) 162/102       NPO Status:                                                                                 BMI:   Wt Readings from Last 3 Encounters:   02/28/22 204 lb 4.8 oz (92.7 kg)   02/14/22 208 lb (94.3 kg)   02/09/22 207 lb 6.4 oz (94.1 kg)     There is no height or weight on file to calculate BMI.    CBC:   Lab Results   Component Value Date    WBC 4.4 02/14/2022    RBC 4.91 02/14/2022    RBC 5.33 01/07/2012    HGB 13.7 02/14/2022    HCT 41.5 02/14/2022    MCV 84.4 02/14/2022    RDW 20.2 02/14/2022     02/14/2022     01/07/2012       CMP:   Lab Results   Component Value Date     11/09/2021    K 4.0 11/09/2021     11/09/2021    CO2 31 11/09/2021    BUN 17 11/09/2021    CREATININE 1.02 11/09/2021    GFRAA >60 11/09/2021    LABGLOM 54 11/09/2021    GLUCOSE 350 11/09/2021 GLUCOSE 104 04/06/2019    PROT 6.4 04/06/2019    CALCIUM 8.7 11/09/2021    BILITOT 0.7 10/11/2021    ALKPHOS 47 10/11/2021    AST 41 10/11/2021    ALT 24 10/11/2021       POC Tests:   No results for input(s): POCGLU, POCNA, POCK, POCCL, POCBUN, POCHEMO, POCHCT in the last 72 hours. Coags:   Lab Results   Component Value Date    PROTIME 9.9 02/12/2020    INR 0.9 02/12/2020       HCG (If Applicable): No results found for: PREGTESTUR, PREGSERUM, HCG, HCGQUANT     ABGs: No results found for: PHART, PO2ART, NVC8UHM, SVU2LGT, BEART, R8YMHYTG     Type & Screen (If Applicable):  No results found for: LABABO, LABRH    Drug/Infectious Status (If Applicable):  No results found for: HIV, HEPCAB    COVID-19 Screening (If Applicable):   Lab Results   Component Value Date    COVID19 not detected 03/08/2021    COVID19 Not Detected 05/13/2020         Anesthesia Evaluation  Patient summary reviewed and Nursing notes reviewed history of anesthetic complications (prolonged emergence): Airway: Mallampati: III  TM distance: >3 FB   Neck ROM: full  Mouth opening: > = 3 FB Dental: normal exam         Pulmonary:normal exam  breath sounds clear to auscultation  (+) sleep apnea: on CPAP,                             Cardiovascular:  Exercise tolerance: no interval change,   (+) hypertension: no interval change,     (-)  angina    ECG reviewed  Rhythm: regular  Rate: normal                    Neuro/Psych:   (+) neuromuscular disease (chronic back pain, fentanyl pump r flank):, TIA (with hypertension),              ROS comment: SPONDYLOSIS GI/Hepatic/Renal:   (+) GERD: no interval change, morbid obesity          Endo/Other:    (+) DiabetesType II DM, poorly controlled, , hypothyroidism: arthritis:., malignancy/cancer (Ovarian cancer ). Abdominal:             Vascular: negative vascular ROS. Other Findings:             Anesthesia Plan      general and TIVA     ASA 4       Induction: intravenous.     MIPS: Prophylactic antiemetics administered. Anesthetic plan and risks discussed with patient. Plan discussed with CRNA.           NOTE - LIDOCAINE ALLERGY!!  Dinora Grijalva MD   2/28/2022 Finasteride Counseling:  I discussed with the patient the risks of use of finasteride including but not limited to decreased libido, decreased ejaculate volume, gynecomastia, and depression. Women should not handle medication.  All of the patient's questions and concerns were addressed. Finasteride Male Counseling: Finasteride Counseling:  I discussed with the patient the risks of use of finasteride including but not limited to decreased libido, decreased ejaculate volume, gynecomastia, and depression. Women should not handle medication.  All of the patient's questions and concerns were addressed.

## 2023-07-12 ENCOUNTER — CARE COORDINATION (OUTPATIENT)
Dept: CARE COORDINATION | Age: 73
End: 2023-07-12

## 2023-07-12 NOTE — CARE COORDINATION
Ambulatory Care Coordination Note  2023    Patient Current Location:  Home: [address] [facility] [address]     ACM contacted the patient by telephone. Verified name and  with patient as identifiers. Provided introduction to self, and explanation of the ACM role. Challenges to be reviewed by the provider   Additional needs identified to be addressed with provider: Yes  none               Method of communication with provider: phone. ACM: Dixie Baldwin, RN    Talked to patient, verbalizes still having lower and upper extremity swelling, but it's improved some. Breathing has been ok. Verbalizes taking meds as written   Has Dexcom CGM, today 119. Blood sugars fluctuate. Bladder stimulator's been placed. Meds are filled, taking as written  Has PCP appointment on 8/15. Discussed seeing PCP sooner. Verbalized understanding and that she plans to call the office to set it up      Offered patient enrollment in the Remote Patient Monitoring (RPM) program for in-home monitoring: Patient declined. Future Appointments   Date Time Provider [address] [place] [address] Ct   8/15/2023  1:30 PM [facility]   2023  1:15 PM Jatin Adair  [facility]             Lab Results       None            Care Coordination Interventions    Referral from Primary Care Provider: No  Suggested Interventions and Community Resources  Registered Dietician: Declined  Transportation Support: Declined  Zone Management Tools: Completed (Comment: DM)          Goals Addressed                   This Visit's Progress     Conditions and Symptoms   No change     I will schedule office visits, as directed by my provider. I will notify my provider of any barriers to my plan of care. I will notify my provider of any symptoms that indicate a worsening of my condition.     Barriers: lack of education  Plan for overcoming my barriers: education, care management  Confidence:

## 2023-07-13 ENCOUNTER — TELEPHONE (OUTPATIENT)
Dept: PRIMARY CARE CLINIC | Age: 73
End: 2023-07-13

## 2023-07-17 ENCOUNTER — CARE COORDINATION (OUTPATIENT)
Dept: CARE COORDINATION | Age: 73
End: 2023-07-17

## 2023-07-24 ENCOUNTER — OFFICE VISIT (OUTPATIENT)
Dept: FAMILY MEDICINE CLINIC | Age: 73
End: 2023-07-24
Payer: MEDICARE

## 2023-07-24 ENCOUNTER — HOSPITAL ENCOUNTER (OUTPATIENT)
Age: 73
Setting detail: SPECIMEN
Discharge: HOME | End: 2023-07-24

## 2023-07-24 VITALS
DIASTOLIC BLOOD PRESSURE: 74 MMHG | WEIGHT: 222 LBS | SYSTOLIC BLOOD PRESSURE: 126 MMHG | RESPIRATION RATE: 16 BRPM | OXYGEN SATURATION: 97 % | HEART RATE: 69 BPM | BODY MASS INDEX: 38.11 KG/M2

## 2023-07-24 DIAGNOSIS — S81.811A SKIN TEAR OF RIGHT LOWER LEG WITHOUT COMPLICATION, INITIAL ENCOUNTER: ICD-10-CM

## 2023-07-24 DIAGNOSIS — S81.811A SKIN TEAR OF RIGHT LOWER LEG WITHOUT COMPLICATION, INITIAL ENCOUNTER: Primary | ICD-10-CM

## 2023-07-24 PROBLEM — B02.23 POSTHERPETIC POLYNEUROPATHY: Status: ACTIVE | Noted: 2023-07-24

## 2023-07-24 PROBLEM — G89.18 POST-OP PAIN: Status: ACTIVE | Noted: 2023-07-24

## 2023-07-24 PROBLEM — E78.2 MIXED HYPERLIPIDEMIA: Status: ACTIVE | Noted: 2023-06-09

## 2023-07-24 PROBLEM — I10 PRIMARY HYPERTENSION: Status: ACTIVE | Noted: 2023-06-09

## 2023-07-24 PROCEDURE — 3017F COLORECTAL CA SCREEN DOC REV: CPT | Performed by: NURSE PRACTITIONER

## 2023-07-24 PROCEDURE — G8417 CALC BMI ABV UP PARAM F/U: HCPCS | Performed by: NURSE PRACTITIONER

## 2023-07-24 PROCEDURE — 1123F ACP DISCUSS/DSCN MKR DOCD: CPT | Performed by: NURSE PRACTITIONER

## 2023-07-24 PROCEDURE — 3074F SYST BP LT 130 MM HG: CPT | Performed by: NURSE PRACTITIONER

## 2023-07-24 PROCEDURE — 99213 OFFICE O/P EST LOW 20 MIN: CPT | Performed by: NURSE PRACTITIONER

## 2023-07-24 PROCEDURE — 3078F DIAST BP <80 MM HG: CPT | Performed by: NURSE PRACTITIONER

## 2023-07-24 PROCEDURE — 1090F PRES/ABSN URINE INCON ASSESS: CPT | Performed by: NURSE PRACTITIONER

## 2023-07-24 PROCEDURE — 1036F TOBACCO NON-USER: CPT | Performed by: NURSE PRACTITIONER

## 2023-07-24 PROCEDURE — A6260 WOUND CLEANSER ANY TYPE/SIZE: HCPCS | Performed by: NURSE PRACTITIONER

## 2023-07-24 PROCEDURE — G8427 DOCREV CUR MEDS BY ELIG CLIN: HCPCS | Performed by: NURSE PRACTITIONER

## 2023-07-24 PROCEDURE — G8399 PT W/DXA RESULTS DOCUMENT: HCPCS | Performed by: NURSE PRACTITIONER

## 2023-07-24 ASSESSMENT — ENCOUNTER SYMPTOMS
SHORTNESS OF BREATH: 0
VOMITING: 0
COLOR CHANGE: 1
NAUSEA: 0

## 2023-07-28 ENCOUNTER — CARE COORDINATION (OUTPATIENT)
Dept: CARE COORDINATION | Age: 73
End: 2023-07-28

## 2023-07-28 LAB
MICROORGANISM SPEC CULT: NORMAL
MICROORGANISM SPEC CULT: NORMAL
MICROORGANISM/AGENT SPEC: NORMAL
MICROORGANISM/AGENT SPEC: NORMAL
SPECIMEN DESCRIPTION: NORMAL

## 2023-07-28 NOTE — CARE COORDINATION
Outreach for care management.  [facility] with contact information      Future Appointments   Date Time Provider [address]   8/15/2023  1:30 PM DO Raul Mancilla   12/18/2023  1:15 PM Javier Garza MD [address] [place] [address]

## 2023-08-03 ENCOUNTER — CARE COORDINATION (OUTPATIENT)
Dept: CARE COORDINATION | Age: 73
End: 2023-08-03

## 2023-08-03 NOTE — CARE COORDINATION
Received call and voicemail message from patient    Called patient back, left voicemail message with contact information

## 2023-08-03 NOTE — CARE COORDINATION
Follow up call for care management.  [facility] with contact information     Future Appointments   Date Time Provider [address]   8/15/2023  1:30 PM DO Raul Mancilla PC [facility]   12/18/2023  1:15 PM Jairo Escalante MD [address] [place] [address]

## 2023-08-08 ENCOUNTER — OFFICE VISIT (OUTPATIENT)
Dept: FAMILY MEDICINE CLINIC | Age: 73
End: 2023-08-08
Payer: MEDICARE

## 2023-08-08 VITALS
HEART RATE: 74 BPM | BODY MASS INDEX: 37.05 KG/M2 | HEIGHT: 64 IN | WEIGHT: 217 LBS | SYSTOLIC BLOOD PRESSURE: 130 MMHG | DIASTOLIC BLOOD PRESSURE: 80 MMHG | RESPIRATION RATE: 14 BRPM | OXYGEN SATURATION: 97 % | TEMPERATURE: 97.1 F

## 2023-08-08 DIAGNOSIS — L03.116 CELLULITIS OF LEFT LOWER EXTREMITY: Primary | ICD-10-CM

## 2023-08-08 PROCEDURE — 3017F COLORECTAL CA SCREEN DOC REV: CPT | Performed by: NURSE PRACTITIONER

## 2023-08-08 PROCEDURE — 1123F ACP DISCUSS/DSCN MKR DOCD: CPT | Performed by: NURSE PRACTITIONER

## 2023-08-08 PROCEDURE — G8428 CUR MEDS NOT DOCUMENT: HCPCS | Performed by: NURSE PRACTITIONER

## 2023-08-08 PROCEDURE — 1036F TOBACCO NON-USER: CPT | Performed by: NURSE PRACTITIONER

## 2023-08-08 PROCEDURE — 99213 OFFICE O/P EST LOW 20 MIN: CPT | Performed by: NURSE PRACTITIONER

## 2023-08-08 PROCEDURE — G8417 CALC BMI ABV UP PARAM F/U: HCPCS | Performed by: NURSE PRACTITIONER

## 2023-08-08 PROCEDURE — 3079F DIAST BP 80-89 MM HG: CPT | Performed by: NURSE PRACTITIONER

## 2023-08-08 PROCEDURE — 1090F PRES/ABSN URINE INCON ASSESS: CPT | Performed by: NURSE PRACTITIONER

## 2023-08-08 PROCEDURE — 3075F SYST BP GE 130 - 139MM HG: CPT | Performed by: NURSE PRACTITIONER

## 2023-08-08 PROCEDURE — G8399 PT W/DXA RESULTS DOCUMENT: HCPCS | Performed by: NURSE PRACTITIONER

## 2023-08-08 RX ORDER — TIZANIDINE 2 MG/1
1 TABLET ORAL EVERY 8 HOURS PRN
COMMUNITY
Start: 2022-07-12

## 2023-08-08 RX ORDER — LIDOCAINE AND PRILOCAINE 25; 25 MG/G; MG/G
CREAM TOPICAL
COMMUNITY
Start: 2023-07-06

## 2023-08-08 RX ORDER — DOXYCYCLINE HYCLATE 100 MG/1
100 CAPSULE ORAL 2 TIMES DAILY
Qty: 20 CAPSULE | Refills: 0 | Status: SHIPPED | OUTPATIENT
Start: 2023-08-08 | End: 2023-08-18

## 2023-08-08 RX ORDER — SEMAGLUTIDE 1.34 MG/ML
INJECTION, SOLUTION SUBCUTANEOUS
COMMUNITY
Start: 2023-06-16

## 2023-08-08 ASSESSMENT — ENCOUNTER SYMPTOMS: VOMITING: 0

## 2023-08-15 ENCOUNTER — OFFICE VISIT (OUTPATIENT)
Dept: PRIMARY CARE CLINIC | Age: 73
End: 2023-08-15
Payer: MEDICARE

## 2023-08-15 ENCOUNTER — HOSPITAL ENCOUNTER (OUTPATIENT)
Age: 73
Setting detail: SPECIMEN
Discharge: HOME | End: 2023-08-15

## 2023-08-15 VITALS
OXYGEN SATURATION: 97 % | SYSTOLIC BLOOD PRESSURE: 126 MMHG | BODY MASS INDEX: 37.08 KG/M2 | WEIGHT: 216 LBS | HEART RATE: 83 BPM | DIASTOLIC BLOOD PRESSURE: 82 MMHG

## 2023-08-15 DIAGNOSIS — E11.40 TYPE 2 DIABETES MELLITUS WITH DIABETIC NEUROPATHY, WITH LONG-TERM CURRENT USE OF INSULIN (HCC): Primary | ICD-10-CM

## 2023-08-15 DIAGNOSIS — M79.10 MYALGIA: ICD-10-CM

## 2023-08-15 DIAGNOSIS — R60.0 BILATERAL LEG EDEMA: ICD-10-CM

## 2023-08-15 DIAGNOSIS — R29.898 LEG WEAKNESS, BILATERAL: ICD-10-CM

## 2023-08-15 DIAGNOSIS — Z79.4 TYPE 2 DIABETES MELLITUS WITH DIABETIC NEUROPATHY, WITH LONG-TERM CURRENT USE OF INSULIN (HCC): Primary | ICD-10-CM

## 2023-08-15 DIAGNOSIS — G62.9 NEUROPATHY: ICD-10-CM

## 2023-08-15 LAB
BASOPHILS # BLD: 0.05 K/UL (ref 0–0.2)
BASOPHILS NFR BLD: 1 % (ref 0–2)
EOSINOPHIL # BLD: 0.2 K/UL (ref 0–0.44)
EOSINOPHILS RELATIVE PERCENT: 3 % (ref 1–4)
ERYTHROCYTE [DISTWIDTH] IN BLOOD BY AUTOMATED COUNT: 15 % (ref 11.8–14.4)
ERYTHROCYTE [SEDIMENTATION RATE] IN BLOOD BY PHOTOMETRIC METHOD: 26 MM/HR (ref 0–30)
HBA1C MFR BLD: 8 %
HCT VFR BLD AUTO: 36.5 % (ref 36.3–47.1)
HGB BLD-MCNC: 11.3 G/DL (ref 11.9–15.1)
IMM GRANULOCYTES # BLD AUTO: <0.03 K/UL (ref 0–0.3)
IMM GRANULOCYTES NFR BLD: 0 %
LYMPHOCYTES NFR BLD: 2.64 K/UL (ref 1.1–3.7)
LYMPHOCYTES RELATIVE PERCENT: 34 % (ref 24–43)
MCH RBC QN AUTO: 27.3 PG (ref 25.2–33.5)
MCHC RBC AUTO-ENTMCNC: 31 G/DL (ref 28.4–34.8)
MCV RBC AUTO: 88.2 FL (ref 82.6–102.9)
MONOCYTES NFR BLD: 0.47 K/UL (ref 0.1–1.2)
MONOCYTES NFR BLD: 6 % (ref 3–12)
NEUTROPHILS NFR BLD: 56 % (ref 36–65)
NEUTS SEG NFR BLD: 4.42 K/UL (ref 1.5–8.1)
NRBC BLD-RTO: 0 PER 100 WBC
PLATELET # BLD AUTO: 215 K/UL (ref 138–453)
PMV BLD AUTO: 12.4 FL (ref 8.1–13.5)
RBC # BLD AUTO: 4.14 M/UL (ref 3.95–5.11)
RBC # BLD: ABNORMAL 10*6/UL
WBC OTHER # BLD: 7.8 K/UL (ref 3.5–11.3)

## 2023-08-15 PROCEDURE — 83037 HB GLYCOSYLATED A1C HOME DEV: CPT | Performed by: FAMILY MEDICINE

## 2023-08-15 PROCEDURE — 3078F DIAST BP <80 MM HG: CPT | Performed by: FAMILY MEDICINE

## 2023-08-15 PROCEDURE — G8399 PT W/DXA RESULTS DOCUMENT: HCPCS | Performed by: FAMILY MEDICINE

## 2023-08-15 PROCEDURE — 3052F HG A1C>EQUAL 8.0%<EQUAL 9.0%: CPT | Performed by: FAMILY MEDICINE

## 2023-08-15 PROCEDURE — G8417 CALC BMI ABV UP PARAM F/U: HCPCS | Performed by: FAMILY MEDICINE

## 2023-08-15 PROCEDURE — 3074F SYST BP LT 130 MM HG: CPT | Performed by: FAMILY MEDICINE

## 2023-08-15 PROCEDURE — 2022F DILAT RTA XM EVC RTNOPTHY: CPT | Performed by: FAMILY MEDICINE

## 2023-08-15 PROCEDURE — 1090F PRES/ABSN URINE INCON ASSESS: CPT | Performed by: FAMILY MEDICINE

## 2023-08-15 PROCEDURE — G8427 DOCREV CUR MEDS BY ELIG CLIN: HCPCS | Performed by: FAMILY MEDICINE

## 2023-08-15 PROCEDURE — 1036F TOBACCO NON-USER: CPT | Performed by: FAMILY MEDICINE

## 2023-08-15 PROCEDURE — 1123F ACP DISCUSS/DSCN MKR DOCD: CPT | Performed by: FAMILY MEDICINE

## 2023-08-15 PROCEDURE — 99214 OFFICE O/P EST MOD 30 MIN: CPT | Performed by: FAMILY MEDICINE

## 2023-08-15 PROCEDURE — 3017F COLORECTAL CA SCREEN DOC REV: CPT | Performed by: FAMILY MEDICINE

## 2023-08-15 NOTE — PROGRESS NOTES
TOLERATE IV    Nsaids Other (See Comments)    Penicillins Anaphylaxis    Hydrocodone-Acetaminophen Other (See Comments)     Unsure of allergy    Penicillamine Nausea And Vomiting    Penicillin G Pot In Dextrose [Penicillin G Potassium In D5w] Nausea And Vomiting    Vancomycin Rash       Health Maintenance   Topic Date Due    Hepatitis C screen  Never done    DTaP/Tdap/Td vaccine (1 - Tdap) Never done    Shingles vaccine (2 of 2) 12/18/2020    Lipids  09/29/2021    Diabetic Alb to Cr ratio (uACR) test  01/25/2022    Diabetic foot exam  02/25/2022    Annual Wellness Visit (AWV)  11/03/2022    Flu vaccine (1) 08/01/2023    Breast cancer screen  12/15/2023    Diabetic retinal exam  02/07/2024    Depression Screen  03/02/2024    A1C test (Diabetic or Prediabetic)  08/15/2024    GFR test (Diabetes, CKD 3-4, OR last GFR 15-59)  08/15/2024    Colorectal Cancer Screen  02/28/2027    DEXA (modify frequency per FRAX score)  Completed    Pneumococcal 65+ years Vaccine  Completed    COVID-19 Vaccine  Completed    Hepatitis A vaccine  Aged Out    Hib vaccine  Aged Out    Meningococcal (ACWY) vaccine  Aged Out    Depression Monitoring  Discontinued       Subjective:      Review of Systems   Constitutional:  Negative for chills and fever. Cardiovascular:  Positive for leg swelling. Gastrointestinal:  Negative for vomiting. Bloating   Neurological:  Positive for weakness. Objective:     Physical Exam  Constitutional:       Appearance: She is well-developed. HENT:      Head: Normocephalic and atraumatic. Right Ear: External ear normal.      Left Ear: External ear normal.   Eyes:      Conjunctiva/sclera: Conjunctivae normal.      Pupils: Pupils are equal, round, and reactive to light. Cardiovascular:      Rate and Rhythm: Normal rate and regular rhythm. Heart sounds: Normal heart sounds. Pulmonary:      Effort: Pulmonary effort is normal.      Breath sounds: Normal breath sounds.    Musculoskeletal:

## 2023-08-16 ENCOUNTER — TELEPHONE (OUTPATIENT)
Dept: PRIMARY CARE CLINIC | Age: 73
End: 2023-08-16

## 2023-08-16 DIAGNOSIS — R60.0 BILATERAL LOWER EXTREMITY EDEMA: Primary | ICD-10-CM

## 2023-08-16 LAB
ALBUMIN SERPL-MCNC: 4.1 G/DL (ref 3.5–5.2)
ALBUMIN/GLOB SERPL: 1.5 {RATIO} (ref 1–2.5)
ALP SERPL-CCNC: 72 U/L (ref 35–104)
ALT SERPL-CCNC: 20 U/L (ref 5–33)
ANION GAP SERPL CALCULATED.3IONS-SCNC: 18 MMOL/L (ref 9–17)
AST SERPL-CCNC: 16 U/L
BILIRUB SERPL-MCNC: 0.3 MG/DL (ref 0.3–1.2)
BUN SERPL-MCNC: 23 MG/DL (ref 8–23)
CALCIUM SERPL-MCNC: 9.9 MG/DL (ref 8.6–10.4)
CHLORIDE SERPL-SCNC: 105 MMOL/L (ref 98–107)
CK SERPL-CCNC: 66 U/L (ref 26–192)
CO2 SERPL-SCNC: 25 MMOL/L (ref 20–31)
CREAT SERPL-MCNC: 1.3 MG/DL (ref 0.5–0.9)
CRP SERPL HS-MCNC: <3 MG/L (ref 0–5)
FOLATE SERPL-MCNC: 8.3 NG/ML
GFR SERPL CREATININE-BSD FRML MDRD: 44 ML/MIN/1.73M2
GLUCOSE SERPL-MCNC: 202 MG/DL (ref 70–99)
POTASSIUM SERPL-SCNC: 3.9 MMOL/L (ref 3.7–5.3)
PROT SERPL-MCNC: 6.9 G/DL (ref 6.4–8.3)
SODIUM SERPL-SCNC: 148 MMOL/L (ref 135–144)
VIT B12 SERPL-MCNC: 272 PG/ML (ref 232–1245)

## 2023-08-16 NOTE — TELEPHONE ENCOUNTER
Patient called stating she called  office and they do not take her insurance. Patient was advised to call the back of her card to see what vascular surgeon her insurance will cover and she will call back to inform the office .

## 2023-08-17 NOTE — TELEPHONE ENCOUNTER
Patient called stating  office does not take her insurance patient was advised to called. Patient was advised to call the number on her insurance card.     Patient called back today stating that her insurance will cover any vascular surgeon at the [facility].

## 2023-08-18 LAB
ANA SER QL IA: NEGATIVE
DSDNA IGG SER QL IA: <0.5 IU/ML
NUCLEAR IGG SER IA-RTO: 0.2 U/ML

## 2023-08-18 NOTE — RESULT ENCOUNTER NOTE
Please let pt know her kidney function went down a little, l so I recommend we cut back to one tab a day and we can recheck in 2-3 weeks. Rest of labs were normal. Her anemia is stable at 11. 3.

## 2023-08-19 RX ORDER — TROSPIUM CHLORIDE 20 MG/1
20 TABLET, FILM COATED ORAL
Qty: 60 TABLET | Refills: 3 | Status: SHIPPED | OUTPATIENT
Start: 2023-08-19

## 2023-08-19 RX ORDER — SYRINGE-NEEDLE,INSULIN,0.5 ML 27GX1/2"
SYRINGE, EMPTY DISPOSABLE MISCELLANEOUS
Qty: 120 EACH | Refills: 10 | Status: SHIPPED | OUTPATIENT
Start: 2023-08-19

## 2023-08-21 ENCOUNTER — CARE COORDINATION (OUTPATIENT)
Dept: CARE COORDINATION | Age: 73
End: 2023-08-21

## 2023-08-21 ASSESSMENT — ENCOUNTER SYMPTOMS: VOMITING: 0

## 2023-08-22 ENCOUNTER — CARE COORDINATION (OUTPATIENT)
Dept: CARE COORDINATION | Age: 73
End: 2023-08-22

## 2023-08-22 ASSESSMENT — LIFESTYLE VARIABLES
HOW MANY STANDARD DRINKS CONTAINING ALCOHOL DO YOU HAVE ON A TYPICAL DAY: PATIENT DOES NOT DRINK
HOW OFTEN DO YOU HAVE A DRINK CONTAINING ALCOHOL: NEVER

## 2023-08-22 NOTE — CARE COORDINATION
Outreach for care management.  [facility] with contact information and requested call back       Future Appointments   Date Time Provider [address]   9/12/2023 11:30 AM Ketty Gomez MD [facility]   12/18/2023  1:15 PM Jessica Vick MD [address] [place] [address]

## 2023-08-22 NOTE — CARE COORDINATION
Ambulatory Care Coordination Note  2023    Patient Current Location:  Home: [address] [facility] [address]     ACM contacted the patient by telephone. Verified name and  with patient as identifiers. Provided introduction to self, and explanation of the ACM role. Challenges to be reviewed by the provider   Additional needs identified to be addressed with provider: No  none               Method of communication with provider: none. ACM: Saloni Forrest, RN      Spoke patient, no worsening BLE swelling or redness. Still having BLE numbness and weakness. Has an upcoming vascular appointment. She will schedule her cardiology appointment. Uses a quad cane at home and rollator for distances. Denies any falls. Blood sugars averaging 160. Has a Dexcom CGM. Reports the bladder stimulator's working out well   Medications are filled. No questions   CM plan; review s/s of ongoing BLE redness, swelling, follow up appointments       Future Appointments   Date Time Provider [address]   2023 11:30 AM Benedict Cerrato MD [facility]   2023  1:15 PM Lawrence Henson  [facility]             Lab Results       None            Care Coordination Interventions    Referral from Primary Care Provider: No  Suggested Interventions and Community Resources  Registered Dietician: Declined  Transportation Support: Declined  Zone Management Tools: Completed (Comment: DM)          Goals Addressed                   This Visit's Progress     Conditions and Symptoms   On track     I will schedule office visits, as directed by my provider. I will notify my provider of any barriers to my plan of care. I will notify my provider of any symptoms that indicate a worsening of my condition.     Barriers: lack of education  Plan for overcoming my barriers: education, care management  Confidence: 9/10  Anticipated Goal Completion Date: 8/10/2023                Future

## 2023-08-24 DIAGNOSIS — E03.9 HYPOTHYROIDISM, UNSPECIFIED TYPE: ICD-10-CM

## 2023-08-25 RX ORDER — LEVOTHYROXINE SODIUM 88 UG/1
TABLET ORAL
Qty: 30 TABLET | Refills: 10 | Status: SHIPPED | OUTPATIENT
Start: 2023-08-25

## 2023-09-12 ENCOUNTER — INITIAL CONSULT (OUTPATIENT)
Dept: VASCULAR SURGERY | Age: 73
End: 2023-09-12
Payer: MEDICARE

## 2023-09-12 VITALS
SYSTOLIC BLOOD PRESSURE: 131 MMHG | DIASTOLIC BLOOD PRESSURE: 71 MMHG | HEART RATE: 71 BPM | RESPIRATION RATE: 18 BRPM | BODY MASS INDEX: 36.98 KG/M2 | OXYGEN SATURATION: 98 % | WEIGHT: 216.6 LBS | HEIGHT: 64 IN

## 2023-09-12 DIAGNOSIS — I89.0 LYMPHEDEMA DUE TO VENOUS INSUFFICIENCY: ICD-10-CM

## 2023-09-12 DIAGNOSIS — I87.2 LYMPHEDEMA DUE TO VENOUS INSUFFICIENCY: ICD-10-CM

## 2023-09-12 DIAGNOSIS — I83.11 LIPODERMATOSCLEROSIS OF BOTH LOWER EXTREMITIES: ICD-10-CM

## 2023-09-12 DIAGNOSIS — I87.2 CHRONIC VENOUS INSUFFICIENCY OF LOWER EXTREMITY: Primary | ICD-10-CM

## 2023-09-12 DIAGNOSIS — I87.2 HEMOSIDEROSIS OF LOWER EXTREMITY DUE TO VENOUS INSUFFICIENCY: ICD-10-CM

## 2023-09-12 DIAGNOSIS — E83.19 HEMOSIDEROSIS OF LOWER EXTREMITY DUE TO VENOUS INSUFFICIENCY: ICD-10-CM

## 2023-09-12 DIAGNOSIS — M79.89 SWELLING OF LOWER EXTREMITY: ICD-10-CM

## 2023-09-12 DIAGNOSIS — I83.12 LIPODERMATOSCLEROSIS OF BOTH LOWER EXTREMITIES: ICD-10-CM

## 2023-09-12 PROBLEM — M79.3 LIPODERMATOSCLEROSIS OF BOTH LOWER EXTREMITIES: Status: ACTIVE | Noted: 2023-09-12

## 2023-09-12 PROCEDURE — G8399 PT W/DXA RESULTS DOCUMENT: HCPCS | Performed by: SURGERY

## 2023-09-12 PROCEDURE — 3075F SYST BP GE 130 - 139MM HG: CPT | Performed by: SURGERY

## 2023-09-12 PROCEDURE — 1036F TOBACCO NON-USER: CPT | Performed by: SURGERY

## 2023-09-12 PROCEDURE — 1123F ACP DISCUSS/DSCN MKR DOCD: CPT | Performed by: SURGERY

## 2023-09-12 PROCEDURE — G8427 DOCREV CUR MEDS BY ELIG CLIN: HCPCS | Performed by: SURGERY

## 2023-09-12 PROCEDURE — 1090F PRES/ABSN URINE INCON ASSESS: CPT | Performed by: SURGERY

## 2023-09-12 PROCEDURE — 3078F DIAST BP <80 MM HG: CPT | Performed by: SURGERY

## 2023-09-12 PROCEDURE — 99204 OFFICE O/P NEW MOD 45 MIN: CPT | Performed by: SURGERY

## 2023-09-12 PROCEDURE — 3017F COLORECTAL CA SCREEN DOC REV: CPT | Performed by: SURGERY

## 2023-09-12 PROCEDURE — G8417 CALC BMI ABV UP PARAM F/U: HCPCS | Performed by: SURGERY

## 2023-09-12 ASSESSMENT — ENCOUNTER SYMPTOMS
COLOR CHANGE: 1
SHORTNESS OF BREATH: 0
ABDOMINAL PAIN: 0
CHEST TIGHTNESS: 0
ALLERGIC/IMMUNOLOGIC NEGATIVE: 1

## 2023-09-12 NOTE — PROGRESS NOTES
[facility]    SPINAL CORD STIMULATOR SURGERY N/A 07/27/2020    SPINAL CORD STIMULATOR IMPLANT WITH THORACIC LAMINOTOMY performed by Deidra Barahona MD at  [address]  07/08/2019    UPPER GASTROINTESTINAL ENDOSCOPY N/A 07/08/2019    EGD BIOPSY performed by Foreign Jaquez MD at [address] N/A 02/28/2022    EGD BIOPSY performed by Foreign Jaquez MD at [address] [place] Right 04/30/2015    pt has blood clot & abscess in her right kidney    VASCULAR SURGERY      [place] filter in & removed 1 year later       Family History:     Family History   Problem Relation Age of Onset    Breast Cancer Mother     Elevated Lipids Paternal Grandmother        Allergies:       Aspirin, Benadryl [diphenhydramine], Ibuprofen, Lidocaine, Nsaids, Penicillins, Hydrocodone-acetaminophen, Penicillamine, Penicillin g pot in dextrose [penicillin g potassium in d5w], and Vancomycin    Medications:      Current Outpatient Medications   Medication Sig Dispense Refill    levothyroxine (SYNTHROID) 88 MCG tablet TAKE 1 TABLET BY MOUTH EVERY DAY 30 tablet 10    trospium (SANCTURA) 20 MG tablet Take 1 tablet by mouth 2 times daily (before meals) 60 tablet 3    Insulin Syringe-Needle U-100 (SURE COMFORT INSULIN SYRINGE) 31G X 5/16\" 1 ML MISC USE AS DIRECTED FOUR TIMES A  each 10    lidocaine-prilocaine (EMLA) 2.5-2.5 % cream APPLY TO TREATMENT AREA 1 HOUR PRIOR TO TREATMENT.      tiZANidine (ZANAFLEX) 2 MG tablet Take 1 tablet by mouth every 8 hours as needed      OZEMPIC, 1 MG/DOSE, 4 MG/3ML SOPN INJECT 1 MG UNDER THE SKIN EVERY 7 DAYS      tamsulosin (FLOMAX) 0.4 MG capsule TAKE 1 CAPSULE BY MOUTH NIGHTLY 30 capsule 10    bumetanide (BUMEX) 1 MG tablet Take 1 tablet by mouth daily 30 tablet 3    XARELTO 20 MG TABS tablet TAKE 1 TABLET BY MOUTH ONCE DAILY 30 tablet 10    carvedilol (COREG) 6.25 MG tablet Take 1 tablet by mouth 2 times daily 60

## 2023-09-13 DIAGNOSIS — I87.2 LYMPHEDEMA DUE TO VENOUS INSUFFICIENCY: Primary | ICD-10-CM

## 2023-09-13 DIAGNOSIS — I89.0 LYMPHEDEMA DUE TO VENOUS INSUFFICIENCY: Primary | ICD-10-CM

## 2023-09-22 ENCOUNTER — HOSPITAL ENCOUNTER (OUTPATIENT)
Dept: OCCUPATIONAL THERAPY | Age: 73
Setting detail: THERAPIES SERIES
Discharge: HOME | End: 2023-09-22
Payer: MEDICARE

## 2023-09-22 PROCEDURE — 97535 SELF CARE MNGMENT TRAINING: CPT

## 2023-09-22 PROCEDURE — 97165 OT EVAL LOW COMPLEX 30 MIN: CPT

## 2023-09-22 NOTE — CONSULTS
thigh      Proximal Thigh      Initial Total 9/22/2023:  NA *** ***            ASSESSMENT: Patient would benefit from skilled occupational therapy services in order to: *** Decrease edema that has accumulated in the extremity, decrease risk of infection, improve limb ROM, increase ease and independence with ADL, educate on long term management of condition, and improve overall quality of life. Pt is provided with a folder which included educational hand out on the basics of lymphedema, program details and what to expect during treatment for further review at home. Writer educates on an impaired lymphatic system vs. a healthy lymphatic system and how it relates to swelling and skin integrity ***. Pt is edu on a POC that would be of benefit to them given findings made during evaluation, including the items checked below. Treatment may include the following:     []Bandaging   []Self-Bandaging/Caregiver Training   [x]MLD    [x]Self-MLD   [x] Therapeutic Exercise    [x] Education/Instruction in home management program  [] Education and trial of a Vasopneumatic Pump    [x] Education and transition to long term management devices such as velcro compression garments and/or daytime compression sleeve/stocking(s)   [x]Discharge to Home Program     Response to treatment: Pt verbalizes and is agreeable to the instructions/ POC established at today's evaluation. PLAN FOR NEXT VISIT:    Basic pump type not specified  Compression bandage    EDUCATED: causes of her lymphedema, skin care, keep elevating.       Lymphedema Stage per ISL Guidelines    [] 0: Subclinical with no evidence on physical exam, pt may be feeling subjective symptoms  [] 1:  Early onset, swelling/heaviness, pitting edema, subsides with limb elevation  [] 2:  More advanced, fibrosis resulting in non-pitting edema, does not respond to elevation, thickening of the tissues  [] 3: Elephantiasis, pitting absent, huge limbs with dry/scaly, papillomatosis,

## 2023-09-23 RX ORDER — CARVEDILOL 6.25 MG/1
6.25 TABLET ORAL 2 TIMES DAILY
Qty: 60 TABLET | Refills: 10 | Status: SHIPPED | OUTPATIENT
Start: 2023-09-23

## 2023-09-26 ENCOUNTER — HOSPITAL ENCOUNTER (OUTPATIENT)
Dept: OCCUPATIONAL THERAPY | Age: 73
Setting detail: THERAPIES SERIES
Discharge: HOME | End: 2023-09-26
Payer: MEDICARE

## 2023-09-26 PROCEDURE — 97535 SELF CARE MNGMENT TRAINING: CPT

## 2023-09-26 PROCEDURE — 97140 MANUAL THERAPY 1/> REGIONS: CPT

## 2023-09-26 NOTE — FLOWSHEET NOTE
advised to remove Thursday or Friday morning and bring supplies back to the clinic. Educated to go to [facility] clinic this date d/t concerns for cellulitis to R LE as leg with wound and warm. 9/22/2023   ADL: Pt educated on contributing factors for lymphatic dysfunction as described above. Educated on skin care, wearing shoes for foot compression, elevation. Pt educated on the basics of lymphedema, program details and what to expect during treatment for further review at home. Writer educates on an impaired lymphatic system vs. a healthy lymphatic system and how it relates to swelling and skin integrity. [x] Progressing toward goals. [x] Patient would continue to benefit from skilled occupational therapy services in order to: Decrease edema that has accumulated in the extremity(ies), decrease risk of infection, improve limb ROM, increase ease and independence with ADL, educate on long term management of condition, and improve overall quality of life. [] No change. [] Treatment hold:   [] No further treatment/discharge  [] Other:    PLAN FOR NEXT VISIT:    Basic pump type not specified  Compression bandage  Discharge to PT difficulty walking, recent los of balance with knee buckling,   [employer] for coverage to try.  9/26/2023 Benefit [employer]  cancelled  9/26/2023 [employer] check [employer] vial email and via fax.  9/26/2023 [employer] benefit check check email    Pt. Education:  [x] Yes  [] No  [x] Reviewed Prior HEP/Ed  Method of Education: [] Verbal  [] Demo  [] Written  Comprehension of Education:  [x] Verbalizes/demonstrates understanding  [] Needs review  [] No understanding  Rehab potential:  [x] Good [] Fair [] Poor [x] With assist from family/caregiver    Circumferential Measurements  Measurements taken from nail base of D2 digit.   Measurements (cm) cm Right Left   Dorsum    7 22.0 22.0   Inframalleolar  (Y girth)     14 26.0 25.5   Supramalleolar  (ankle)   25 24.5 24.0   Distal

## 2023-09-29 ENCOUNTER — HOSPITAL ENCOUNTER (OUTPATIENT)
Dept: OCCUPATIONAL THERAPY | Age: 73
Setting detail: THERAPIES SERIES
Discharge: HOME | End: 2023-09-29
Payer: MEDICARE

## 2023-09-29 ENCOUNTER — OFFICE VISIT (OUTPATIENT)
Dept: FAMILY MEDICINE CLINIC | Age: 73
End: 2023-09-29
Payer: MEDICARE

## 2023-09-29 VITALS
RESPIRATION RATE: 16 BRPM | OXYGEN SATURATION: 97 % | TEMPERATURE: 98.7 F | DIASTOLIC BLOOD PRESSURE: 76 MMHG | WEIGHT: 216 LBS | HEART RATE: 71 BPM | BODY MASS INDEX: 37.08 KG/M2 | SYSTOLIC BLOOD PRESSURE: 124 MMHG

## 2023-09-29 DIAGNOSIS — S81.811A SKIN TEAR OF RIGHT LOWER LEG WITHOUT COMPLICATION, INITIAL ENCOUNTER: Primary | ICD-10-CM

## 2023-09-29 DIAGNOSIS — L03.115 CELLULITIS OF RIGHT LOWER LEG: ICD-10-CM

## 2023-09-29 PROCEDURE — 97535 SELF CARE MNGMENT TRAINING: CPT

## 2023-09-29 PROCEDURE — 3017F COLORECTAL CA SCREEN DOC REV: CPT | Performed by: NURSE PRACTITIONER

## 2023-09-29 PROCEDURE — A6260 WOUND CLEANSER ANY TYPE/SIZE: HCPCS | Performed by: NURSE PRACTITIONER

## 2023-09-29 PROCEDURE — 3074F SYST BP LT 130 MM HG: CPT | Performed by: NURSE PRACTITIONER

## 2023-09-29 PROCEDURE — G8427 DOCREV CUR MEDS BY ELIG CLIN: HCPCS | Performed by: NURSE PRACTITIONER

## 2023-09-29 PROCEDURE — 99214 OFFICE O/P EST MOD 30 MIN: CPT | Performed by: NURSE PRACTITIONER

## 2023-09-29 PROCEDURE — 1036F TOBACCO NON-USER: CPT | Performed by: NURSE PRACTITIONER

## 2023-09-29 PROCEDURE — 3078F DIAST BP <80 MM HG: CPT | Performed by: NURSE PRACTITIONER

## 2023-09-29 PROCEDURE — G8417 CALC BMI ABV UP PARAM F/U: HCPCS | Performed by: NURSE PRACTITIONER

## 2023-09-29 PROCEDURE — G8399 PT W/DXA RESULTS DOCUMENT: HCPCS | Performed by: NURSE PRACTITIONER

## 2023-09-29 PROCEDURE — 97140 MANUAL THERAPY 1/> REGIONS: CPT

## 2023-09-29 PROCEDURE — 1123F ACP DISCUSS/DSCN MKR DOCD: CPT | Performed by: NURSE PRACTITIONER

## 2023-09-29 PROCEDURE — 1090F PRES/ABSN URINE INCON ASSESS: CPT | Performed by: NURSE PRACTITIONER

## 2023-09-29 RX ORDER — DOXYCYCLINE HYCLATE 100 MG
100 TABLET ORAL 2 TIMES DAILY
Qty: 20 TABLET | Refills: 0 | Status: SHIPPED | OUTPATIENT
Start: 2023-09-29

## 2023-09-29 ASSESSMENT — ENCOUNTER SYMPTOMS
COLOR CHANGE: 1
VOMITING: 0
NAUSEA: 0

## 2023-09-29 NOTE — FLOWSHEET NOTE
and independence with ADL, educate on long term management of condition, and improve overall quality of life. [] No change. [] Treatment hold:   [] No further treatment/discharge  [] Other:    PLAN FOR NEXT VISIT:    Basic pump type not specified  Compression bandage  Discharge to PT difficulty walking, recent los of balance with knee buckling,   [employer] for coverage to try.  9/26/2023 [employer]  cancelled  9/26/2023 [employer] check [employer] vial email and via fax. Out of network. 9/26/2023 [employer] benefit check check email  9/29/2023 Resent to [facility] with addition of Medicaid Secondary  9/29/2023 Resent benefits with Medicaid secondary to [employer]    Pt. Education:  [x] Yes  [] No  [x] Reviewed Prior HEP/Ed  Method of Education: [] Verbal  [] Demo  [] Written  Comprehension of Education:  [x] Verbalizes/demonstrates understanding  [] Needs review  [] No understanding  Rehab potential:  [x] Good [] Fair [] Poor [x] With assist from family/caregiver    Circumferential Measurements  Measurements taken from nail base of D2 digit. Measurements (cm) cm Right Left   Dorsum    7 22.0 21.5   Inframalleolar  (Y girth)     14 26.0 25.0   Supramalleolar  (ankle)   25 24.5 23.5   Distal Calf    35 36.0 36.5   Mid Calf    42 38.0 38.0   Proximal Calf   54 40.5 40.0   Knee    64 49.5 49.0   Distal thigh   74 57.0 56.0   Mid thigh         Proximal Thigh         9/29/2023   289.5   Initial Total 9/22/2023:  .5 300.0        Therapy Goals  LTG - To be addressed within 10 visits     Pt will demonstrate compliance of maintaining lymphedema precautions to reduce the risks of infection and further exacerbations. Pt will demonstrate independence with decongestive exercise program in order to expedite fluid rerouting. Pt will demonstrate competence with SELF MLD (Manual lymphatic drainage) in order to reroute lymphatic pathways for decreased swelling.       Pt/Caregiver will demonstrate

## 2023-09-29 NOTE — PROGRESS NOTES
next week. Pt to return if symptoms are not improving or worsening. Go to the ER for any emergent concern. Patient given educational materials - see patientinstructions. Discussed use, benefit, and side effects of prescribed medications. All patient questions answered. Pt verbalized understanding. Instructed to continue current medications, diet and exercise. Patient agreed with treatment plan. Follow up as directed.      Electronically signed by LUPE Glover CNP on 9/29/2023 at 6:45 PM

## 2023-10-03 ENCOUNTER — HOSPITAL ENCOUNTER (OUTPATIENT)
Dept: OCCUPATIONAL THERAPY | Age: 73
Setting detail: THERAPIES SERIES
Discharge: HOME | End: 2023-10-03
Payer: MEDICARE

## 2023-10-03 PROCEDURE — 97535 SELF CARE MNGMENT TRAINING: CPT

## 2023-10-03 NOTE — FLOWSHEET NOTE
$35.63/ $25.68     Therapeutic Exercise (79730)                              $28.50/$ 22.29     Self care/home mgmt (67010)                              $31.61/ $23.84 503    Vasopneumatic Device (79254)                           $8.99     Total Treatment Time    50 3       Medicare Tracking - $2,230 cap Totals   Today 79.29   Previous  313.44   Grand Total 392.73        Time In:  1110  Time Out: 1200      Electronically signed by NIKOLE Pacheco, NOAM/L, SKYLER on 10/3/2023 at 11:13 AM

## 2023-10-06 ENCOUNTER — TELEPHONE (OUTPATIENT)
Dept: PRIMARY CARE CLINIC | Age: 73
End: 2023-10-06

## 2023-10-06 NOTE — TELEPHONE ENCOUNTER
[address] [facility] requesting an updated patient history and med list to be faxed to them at [phone]    Documents faxed to provided number

## 2023-10-16 ENCOUNTER — TELEPHONE (OUTPATIENT)
Dept: PRIMARY CARE CLINIC | Age: 73
End: 2023-10-16

## 2023-10-16 NOTE — TELEPHONE ENCOUNTER
She was seeing Dr. Mateus Hendrix who is GI , he would be the one to follow up with regarding diarrhea/ bowel issues.

## 2023-10-16 NOTE — TELEPHONE ENCOUNTER
Writer called patient and advised.  Gave her the phone number to the office    Last Visit Date: 8/15/2023   Next Visit Date: Visit date not found

## 2023-10-16 NOTE — TELEPHONE ENCOUNTER
Patient calling into the office, would like a referral for a specialist in regards to her constant diarrhea, and bowel issues. She does not know who she would like to see, just whoever provider would recommend. She would like a call once referral is placed.     Last Visit Date: 8/15/2023   Next Visit Date: Visit date not found

## 2023-10-20 ENCOUNTER — HOSPITAL ENCOUNTER (OUTPATIENT)
Dept: OCCUPATIONAL THERAPY | Age: 73
Setting detail: THERAPIES SERIES
Discharge: HOME | End: 2023-10-20
Payer: MEDICARE

## 2023-10-20 PROCEDURE — 97535 SELF CARE MNGMENT TRAINING: CPT

## 2023-10-20 NOTE — FLOWSHEET NOTE
Measurements taken, Educated on NLN Risk Reduction practices as below, advised to continue to pursue medical inspection of L LE given red, warm, wound despite taking antibiotic. Educated in use of Ready Wrap Donor calf piece size Medium regular length. Use of Tubigrip size E over foot to mid calf and double layer over dorsum. PT to use inelastic velcro compression until next visit. Pt is educated on lymphedema risk reduction techniques to reduce risk of further exacerbations of swelling and/or infections utilizing the NLN handout below. 9/29/2023  Manual: Skin care and inspection to L LE, inspection to R LE.  Measurements of L LE with reduction noted. Compression bandaging to L LE as on 9/29/2023 for further reduction and tissue softening. ADL: Educated on recheck of compression sent this date again with secondary insurance noted to [facility] and also to [facility]. Set pump trial date with [employer]. Educated on risk for infection, concerns that it be addressed immediately to reduce progression of possible cellulitis. 9/26/2023  Manual: Skin care and inspection. Examined wound to R calf. Placed  ABD 5 inch by 9 inch over the area and secured with a velcro ace bandage provided by pt. Compression bandaging L LE ankle to below the knee using 10cm foam and 10x5 bandage to reduce edema and soften tissue density. Use of tubigrip size E to mid calf and double layer over dorsum as tight shoes would not allow for bandaging over dorsum. ADL: Education on need to seek out Wound Care. Gave number to [phone] at [facility] and advised to call today. Explained need for proper care given diabetic and lymphedema. Educated on indications for removing bandages to L LE and advised to remove Thursday or Friday morning and bring supplies back to the clinic. Educated to go to [facility] clinic this date d/t concerns for cellulitis to R LE as leg with wound and warm.     9/22/2023   ADL: Pt educated on

## 2023-10-23 ENCOUNTER — OFFICE VISIT (OUTPATIENT)
Dept: GASTROENTEROLOGY | Age: 73
End: 2023-10-23
Payer: MEDICARE

## 2023-10-23 VITALS
DIASTOLIC BLOOD PRESSURE: 75 MMHG | WEIGHT: 213 LBS | TEMPERATURE: 97.5 F | BODY MASS INDEX: 36.56 KG/M2 | SYSTOLIC BLOOD PRESSURE: 139 MMHG

## 2023-10-23 DIAGNOSIS — K58.0 IRRITABLE BOWEL SYNDROME WITH DIARRHEA: ICD-10-CM

## 2023-10-23 DIAGNOSIS — E66.8 MODERATE OBESITY: ICD-10-CM

## 2023-10-23 DIAGNOSIS — R15.1 FECAL SMEARING: ICD-10-CM

## 2023-10-23 DIAGNOSIS — R19.7 DIARRHEA, UNSPECIFIED TYPE: Primary | ICD-10-CM

## 2023-10-23 PROBLEM — E66.9 MODERATE OBESITY: Status: ACTIVE | Noted: 2023-10-23

## 2023-10-23 PROCEDURE — G8417 CALC BMI ABV UP PARAM F/U: HCPCS | Performed by: INTERNAL MEDICINE

## 2023-10-23 PROCEDURE — G8484 FLU IMMUNIZE NO ADMIN: HCPCS | Performed by: INTERNAL MEDICINE

## 2023-10-23 PROCEDURE — 1036F TOBACCO NON-USER: CPT | Performed by: INTERNAL MEDICINE

## 2023-10-23 PROCEDURE — G8399 PT W/DXA RESULTS DOCUMENT: HCPCS | Performed by: INTERNAL MEDICINE

## 2023-10-23 PROCEDURE — 3017F COLORECTAL CA SCREEN DOC REV: CPT | Performed by: INTERNAL MEDICINE

## 2023-10-23 PROCEDURE — 1123F ACP DISCUSS/DSCN MKR DOCD: CPT | Performed by: INTERNAL MEDICINE

## 2023-10-23 PROCEDURE — 3075F SYST BP GE 130 - 139MM HG: CPT | Performed by: INTERNAL MEDICINE

## 2023-10-23 PROCEDURE — 3078F DIAST BP <80 MM HG: CPT | Performed by: INTERNAL MEDICINE

## 2023-10-23 PROCEDURE — G8427 DOCREV CUR MEDS BY ELIG CLIN: HCPCS | Performed by: INTERNAL MEDICINE

## 2023-10-23 PROCEDURE — 99214 OFFICE O/P EST MOD 30 MIN: CPT | Performed by: INTERNAL MEDICINE

## 2023-10-23 PROCEDURE — 1090F PRES/ABSN URINE INCON ASSESS: CPT | Performed by: INTERNAL MEDICINE

## 2023-10-23 RX ORDER — CHOLESTYRAMINE 4 G/9G
1 POWDER, FOR SUSPENSION ORAL 2 TIMES DAILY
Qty: 90 PACKET | Refills: 3 | Status: SHIPPED | OUTPATIENT
Start: 2023-10-23

## 2023-10-23 ASSESSMENT — ENCOUNTER SYMPTOMS
ABDOMINAL DISTENTION: 1
VOMITING: 0
SHORTNESS OF BREATH: 0
ANAL BLEEDING: 0
DIARRHEA: 1
ABDOMINAL PAIN: 0
WHEEZING: 0
VOICE CHANGE: 0
RECTAL PAIN: 0
SORE THROAT: 0
CHOKING: 0
NAUSEA: 1
COUGH: 0
CONSTIPATION: 0
BLOOD IN STOOL: 0
TROUBLE SWALLOWING: 0

## 2023-10-23 NOTE — PROGRESS NOTES
of education: Not on file    Highest education level: Not on file   Occupational History    Not on file   Tobacco Use    Smoking status: Never    Smokeless tobacco: Never   Vaping Use    Vaping Use: Never used   Substance and Sexual Activity    Alcohol use: Yes     Comment: rare    Drug use: No    Sexual activity: Yes   Other Topics Concern    Not on file   Social History Narrative    Not on file     Social Determinants of Health     Financial Resource Strain: Low Risk  (5/12/2023)    Overall Financial Resource Strain (CARDIA)     Difficulty of Paying Living Expenses: Not hard at all   Food Insecurity: No Food Insecurity (5/12/2023)    Hunger Vital Sign     Worried About Running Out of Food in the Last Year: Never true     Ran Out of Food in the Last Year: Never true   Transportation Needs: No Transportation Needs (5/12/2023)    PRAPARE - Transportation     Lack of Transportation (Medical): No     Lack of Transportation (Non-Medical):  No   Physical Activity: Inactive (5/12/2023)    Exercise Vital Sign     Days of Exercise per Week: 0 days     Minutes of Exercise per Session: 0 min   Stress: No Stress Concern Present (5/12/2023)    [facility]     Feeling of Stress : Not at all   Social Connections: [address] [facility] [place] (5/12/2023)    Social Connection and Isolation Panel [NHANES]     Frequency of Communication with Friends and Family: More than three times a week     Frequency of Social Gatherings with Friends and Family: More than three times a week     Attends Gnosticism Services: 1 to 4 times per year     Active Member of [employer] or Organizations: No     Attends Club or Organization Meetings: More than 4 times per year     Marital Status:    Intimate Partner Violence: Not on file   Housing Stability: [address]  (5/12/2023)    Housing Stability Vital Sign     Unable to Pay for Housing in the Last Year: No     Number of [address] in the Last

## 2023-10-28 PROBLEM — Z97.8 PRESENCE OF INTRATHECAL PUMP: Status: ACTIVE | Noted: 2023-10-28

## 2023-10-28 PROBLEM — M80.88XA: Status: ACTIVE | Noted: 2023-10-28

## 2023-10-28 RX ORDER — TIMOLOL MALEATE 5 MG/ML
SOLUTION/ DROPS OPHTHALMIC
Status: ON HOLD | COMMUNITY
Start: 2022-02-16 | End: 2024-02-08 | Stop reason: ALTCHOICE

## 2023-10-28 RX ORDER — GABAPENTIN 300 MG/1
600 CAPSULE ORAL 3 TIMES DAILY
COMMUNITY
Start: 2021-03-12

## 2023-10-28 RX ORDER — FERROUS SULFATE 325(65) MG
1 TABLET ORAL DAILY
Status: ON HOLD | COMMUNITY
End: 2024-02-08 | Stop reason: WASHOUT

## 2023-10-28 RX ORDER — AMLODIPINE BESYLATE 10 MG/1
TABLET ORAL
Status: ON HOLD | COMMUNITY
Start: 2022-03-08 | End: 2024-02-08

## 2023-10-28 RX ORDER — LATANOPROSTENE BUNOD 0.24 MG/ML
SOLUTION/ DROPS OPHTHALMIC
Status: ON HOLD | COMMUNITY
Start: 2022-01-11 | End: 2024-02-08 | Stop reason: ALTCHOICE

## 2023-10-28 RX ORDER — DOXYCYCLINE HYCLATE 100 MG
1 TABLET ORAL 2 TIMES DAILY
COMMUNITY
Start: 2021-04-09

## 2023-10-28 RX ORDER — LEVOTHYROXINE SODIUM 88 UG/1
TABLET ORAL
COMMUNITY
Start: 2020-02-14

## 2023-10-28 RX ORDER — CALCIUM CARBONATE 500(1250)
500 TABLET ORAL
COMMUNITY

## 2023-10-28 RX ORDER — POLYETHYLENE GLYCOL 3350 17 G/17G
POWDER, FOR SOLUTION ORAL
COMMUNITY
Start: 2022-02-09

## 2023-10-28 RX ORDER — LORAZEPAM 0.5 MG/1
TABLET ORAL
COMMUNITY
Start: 2020-05-19

## 2023-10-28 RX ORDER — CEPHALEXIN 500 MG/1
1 TABLET ORAL 3 TIMES DAILY
Status: ON HOLD | COMMUNITY
Start: 2021-04-09 | End: 2024-02-08 | Stop reason: ALTCHOICE

## 2023-10-28 RX ORDER — BIMATOPROST 0.1 MG/ML
SOLUTION/ DROPS OPHTHALMIC
Status: ON HOLD | COMMUNITY
Start: 2021-01-26 | End: 2024-02-08 | Stop reason: WASHOUT

## 2023-10-28 RX ORDER — ONDANSETRON 4 MG/1
TABLET, ORALLY DISINTEGRATING ORAL
Status: ON HOLD | COMMUNITY
Start: 2021-11-02 | End: 2024-02-08 | Stop reason: ALTCHOICE

## 2023-10-28 RX ORDER — HYDROCHLOROTHIAZIDE 25 MG/1
TABLET ORAL
COMMUNITY
Start: 2020-03-20

## 2023-10-28 RX ORDER — MUPIROCIN 20 MG/G
OINTMENT TOPICAL
COMMUNITY
Start: 2021-03-16

## 2023-10-28 RX ORDER — TRAZODONE HYDROCHLORIDE 100 MG/1
TABLET ORAL
COMMUNITY
Start: 2022-05-06

## 2023-10-28 RX ORDER — POTASSIUM CHLORIDE 20 MEQ/1
20 TABLET, EXTENDED RELEASE ORAL 2 TIMES DAILY
COMMUNITY

## 2023-10-28 RX ORDER — OXYCODONE HYDROCHLORIDE 5 MG/1
CAPSULE ORAL
Status: ON HOLD | COMMUNITY
Start: 2021-10-15 | End: 2024-02-08 | Stop reason: ALTCHOICE

## 2023-10-28 RX ORDER — BENZONATATE 100 MG/1
1 CAPSULE ORAL 3 TIMES DAILY PRN
Status: ON HOLD | COMMUNITY
Start: 2022-08-26 | End: 2024-02-08 | Stop reason: WASHOUT

## 2023-10-28 RX ORDER — OMEPRAZOLE 20 MG/1
CAPSULE, DELAYED RELEASE ORAL
COMMUNITY
Start: 2020-04-17

## 2023-10-28 RX ORDER — FUROSEMIDE 20 MG/1
TABLET ORAL
COMMUNITY
Start: 2020-08-10

## 2023-10-28 RX ORDER — ALENDRONATE SODIUM 70 MG/1
TABLET ORAL
COMMUNITY
Start: 2020-04-21

## 2023-10-28 RX ORDER — DULOXETIN HYDROCHLORIDE 60 MG/1
CAPSULE, DELAYED RELEASE ORAL
COMMUNITY
Start: 2020-04-17

## 2023-10-28 RX ORDER — ACETAMINOPHEN, DIPHENHYDRAMINE HCL, PHENYLEPHRINE HCL 325; 25; 5 MG/1; MG/1; MG/1
TABLET ORAL
Status: ON HOLD | COMMUNITY
End: 2024-02-08 | Stop reason: WASHOUT

## 2023-10-28 RX ORDER — LANOLIN ALCOHOL/MO/W.PET/CERES
1 CREAM (GRAM) TOPICAL DAILY
COMMUNITY

## 2023-10-28 RX ORDER — FEXOFENADINE HCL 60 MG
60 TABLET ORAL DAILY
COMMUNITY

## 2023-10-28 RX ORDER — TIZANIDINE 2 MG/1
1 TABLET ORAL EVERY 8 HOURS PRN
Status: ON HOLD | COMMUNITY
Start: 2022-07-12 | End: 2024-02-08 | Stop reason: ALTCHOICE

## 2023-10-28 RX ORDER — LOSARTAN POTASSIUM 100 MG/1
100 TABLET ORAL
COMMUNITY
Start: 2020-03-16

## 2023-10-28 RX ORDER — BLOOD SUGAR DIAGNOSTIC
STRIP MISCELLANEOUS
COMMUNITY
Start: 2019-11-14

## 2023-10-28 RX ORDER — INSULIN GLARGINE 100 [IU]/ML
25 INJECTION, SOLUTION SUBCUTANEOUS 2 TIMES DAILY
COMMUNITY
Start: 2020-07-04

## 2023-10-28 RX ORDER — BISACODYL 5 MG
5 TABLET, DELAYED RELEASE (ENTERIC COATED) ORAL DAILY PRN
COMMUNITY
Start: 2022-02-09

## 2023-10-30 ENCOUNTER — APPOINTMENT (OUTPATIENT)
Dept: PAIN MEDICINE | Facility: HOSPITAL | Age: 73
End: 2023-10-30
Payer: COMMERCIAL

## 2023-11-06 ENCOUNTER — TELEMEDICINE (OUTPATIENT)
Dept: BEHAVIORAL HEALTH | Facility: CLINIC | Age: 73
End: 2023-11-06
Payer: COMMERCIAL

## 2023-11-06 DIAGNOSIS — F45.42 PAIN DISORDER ASSOCIATED WITH PSYCHOLOGICAL AND PHYSICAL FACTORS: ICD-10-CM

## 2023-11-06 DIAGNOSIS — F32.89 OTHER DEPRESSION: ICD-10-CM

## 2023-11-06 PROCEDURE — 90791 PSYCH DIAGNOSTIC EVALUATION: CPT | Performed by: PSYCHOLOGIST

## 2023-11-13 ENCOUNTER — TELEMEDICINE (OUTPATIENT)
Dept: PAIN MEDICINE | Facility: HOSPITAL | Age: 73
End: 2023-11-13
Payer: COMMERCIAL

## 2023-11-13 DIAGNOSIS — B02.23 POSTHERPETIC POLYNEUROPATHY: Primary | ICD-10-CM

## 2023-11-13 PROCEDURE — 99213 OFFICE O/P EST LOW 20 MIN: CPT | Performed by: ANESTHESIOLOGY

## 2023-11-13 NOTE — PROGRESS NOTES
Subjective   Patient ID: Gaby Reagan is a 73 y.o. female presenting for follow-up regarding management of her left-sided thoracic postherpetic neuralgia.  Patient has a history of lumbar vertebral compression fractures and underwent an intrathecal pump implant in April 2021.  This has greatly controlled her mid to low back pain.  She has had a previous spinal cord stimulator, paddle lead type, that has been explanted for lack of efficacy.  The patient has had a previous shingles episode about 8 years ago and presented this past June with a complaint of pain in the left thoracic region in the area of the dermatomal rash with shingles.  She was recommended to undergo a Qutenza patch application which was done earlier this summer.  The note for the procedure is not suggested with patches were applied at the T4 dermatome, though the patient says that the pain radiates just underneath her left breast.  However, the Qutenza patch was not helpful for her.  She has been considered for a subcutaneous peripheral nerve stimulator and has recently seen Dr. Barker who has reportedly cleared her for the stimulator.  Of note, the patient has a history of fibromyalgia and lives in the [facility].  She is being seen today as a virtual telemedicine visit.    According to the patient, whenever she provides herself with the PTM bolus from her pain pump, she gets pain relief for about 1 hour.  She would like a more consistent pain control without having to do repeated boluses.    Review of Systems   Constitutional:  Negative for unexpected weight change.   HENT:  Negative for sinus pain.    Eyes:  Negative for visual disturbance.   Respiratory:  Negative for shortness of breath.    Cardiovascular:  Negative for chest pain.   Endocrine: Negative for polyphagia.   Genitourinary:  Negative for genital sores.   Hematological:  Negative for adenopathy.   Psychiatric/Behavioral:  Negative for hallucinations and suicidal ideas.       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  If this is not helpful, the patient may undergo revision of her intrathecal catheter as she is now reported pain relief with the boluses from the pain pump.  The catheter will be moved up from its current tip location at T9 to about T5/6.  This would necessitate opening the back incision and removing the old catheter and replacing it with a new catheter.  The patient's case will be discussed in the multidisciplinary group meeting and she will be subsequently contacted regarding potential trialing.    The patient was invited to contact us back anytime with any questions or concerns and follow-up with us in the office as needed.

## 2023-11-21 RX ORDER — TROSPIUM CHLORIDE 20 MG/1
20 TABLET, FILM COATED ORAL
Qty: 60 TABLET | Refills: 10 | Status: SHIPPED | OUTPATIENT
Start: 2023-11-21

## 2023-11-21 RX ORDER — LOSARTAN POTASSIUM 100 MG/1
TABLET ORAL
Qty: 30 TABLET | Refills: 10 | Status: SHIPPED | OUTPATIENT
Start: 2023-11-21

## 2023-11-21 RX ORDER — INSULIN LISPRO 100 [IU]/ML
INJECTION, SOLUTION INTRAVENOUS; SUBCUTANEOUS
Qty: 10 ML | Refills: 10 | Status: SHIPPED | OUTPATIENT
Start: 2023-11-21

## 2023-11-21 RX ORDER — DULOXETIN HYDROCHLORIDE 60 MG/1
60 CAPSULE, DELAYED RELEASE ORAL DAILY
Qty: 30 CAPSULE | Refills: 10 | Status: SHIPPED | OUTPATIENT
Start: 2023-11-21

## 2023-11-27 RX ORDER — INSULIN LISPRO 100 [IU]/ML
INJECTION, SOLUTION INTRAVENOUS; SUBCUTANEOUS
Qty: 10 ML | Refills: 10 | Status: SHIPPED | OUTPATIENT
Start: 2023-11-27

## 2023-11-29 ENCOUNTER — CARE COORDINATION (OUTPATIENT)
Dept: CARE COORDINATION | Age: 73
End: 2023-11-29

## 2023-11-29 NOTE — CARE COORDINATION
Outreach for care management, LVMM with contact information and requested call back   Will follow up 2-3 days      Future Appointments   Date Time Provider [address]   12/18/2023  1:15 PM Shi Dominguez  [facility]   1/4/2024 11:15 AM Randal Zavala DO [facility]   1/23/2024  1:00 PM [address] Pickett DO [facility]   3/12/2024 11:30 AM Benedict Cerrato MD [facility] Sahil Wilson

## 2023-11-30 ENCOUNTER — CARE COORDINATION (OUTPATIENT)
Dept: CARE COORDINATION | Age: 73
End: 2023-11-30

## 2023-11-30 NOTE — CARE COORDINATION
Received call back and voice mail message from patient.  Follow up call and LVMM with my contact information       Future Appointments   Date Time Provider [address]  [facility]   12/18/2023  1:15 PM Ilya Dominguez MD [address] [place] [address]   1/4/2024 11:15 AM Malena Zavala DO [facility]   1/23/2024  1:00 PM Medhkour, Lindon Olszewski [facility]   3/12/2024 11:30 AM Keira Nassar MD [facility] Shayna Jackson

## 2023-11-30 NOTE — CARE COORDINATION
Ambulatory Care Coordination Note  2023    Patient Current Location:  Home: [place] [address] [facility] [address]     ACM contacted the patient by telephone. Verified name and  with patient as identifiers. Provided introduction to self, and explanation of the ACM role. Challenges to be reviewed by the provider   Additional needs identified to be addressed with provider: No  none               Method of communication with provider: none. ACM: Rachael Zepeda, RN    Talked to patient, she doesn't feel well. Has pain under her breast and travels to her back, Reports, she will possibly have a nerve stimulator put in. Pain started about 8 years ago, after shingles. Her pain pump provides more relief for the low back pain. Has BLE swelling, has been to the lymphedema clinic. She wraps her legs and will start using Lymphedema compression boots. Has episodes of dizziness. Has increased stressors, her  has dementia and worse the past month and his 's license was suspended. Has supportive family and friends and can help with errands. She also uses [employer]. Meds are filled. Follows with pain management, vascular, endocrinology, oncology, urology. CM plan; review pain, BLE lymphedema and using compression boots, assessments. Follow up call next week. Offered patient enrollment in the Remote Patient Monitoring (RPM) program for in-home monitoring:  review next call  . Lab Results       None            Care Coordination Interventions    Referral from Primary Care Provider: Yes  Suggested Interventions and Community Resources  Transportation Support: Completed  Zone Management Tools: Completed          Goals Addressed                   This Visit's Progress     Conditions and Symptoms        I will schedule office visits, as directed by my provider. I will keep my appointment or reschedule if I have to cancel.   I will notify my provider of any barriers to my plan of

## 2023-12-04 RX ORDER — INSULIN LISPRO 100 [IU]/ML
INJECTION, SOLUTION INTRAVENOUS; SUBCUTANEOUS
Qty: 10 ML | Refills: 10 | OUTPATIENT
Start: 2023-12-04

## 2023-12-06 DIAGNOSIS — B02.23 POSTHERPETIC POLYNEUROPATHY: Primary | ICD-10-CM

## 2023-12-06 RX ORDER — INSULIN LISPRO 100 [IU]/ML
INJECTION, SOLUTION INTRAVENOUS; SUBCUTANEOUS
Qty: 10 ML | Refills: 10 | Status: CANCELLED | OUTPATIENT
Start: 2023-12-06

## 2023-12-06 NOTE — TELEPHONE ENCOUNTER
Last Visit Date: 8/15/2023   Next Visit Date: 1/4/2024     Received call from patient stating that the pharmacy informed her that her insulin refill was denied. Informed patient that the request on 12/3 was denied due to duplicate request. Patient states that this is the [facility] that [facility]. Informed patient that scripts where sent on 11/21 and 11/27. Patient states they never received these and is asking if a new script could be sent.

## 2023-12-06 NOTE — TELEPHONE ENCOUNTER
Patient called back stating that her pharmacy ended up finding the script and everything should be taken care of .  No need to send in

## 2023-12-12 ENCOUNTER — HOSPITAL ENCOUNTER (OUTPATIENT)
Dept: RADIOLOGY | Facility: HOSPITAL | Age: 73
Discharge: HOME | End: 2023-12-12
Payer: COMMERCIAL

## 2023-12-12 ENCOUNTER — APPOINTMENT (OUTPATIENT)
Dept: RADIOLOGY | Facility: HOSPITAL | Age: 73
End: 2023-12-12
Payer: COMMERCIAL

## 2023-12-12 VITALS
HEART RATE: 75 BPM | HEIGHT: 64 IN | DIASTOLIC BLOOD PRESSURE: 94 MMHG | RESPIRATION RATE: 18 BRPM | BODY MASS INDEX: 34.66 KG/M2 | OXYGEN SATURATION: 100 % | WEIGHT: 203 LBS | TEMPERATURE: 98.2 F | SYSTOLIC BLOOD PRESSURE: 181 MMHG

## 2023-12-12 DIAGNOSIS — B02.23 POSTHERPETIC POLYNEUROPATHY: ICD-10-CM

## 2023-12-12 PROCEDURE — 2780000003 HC OR 278 NO HCPCS

## 2023-12-12 PROCEDURE — 64555 IMPLANT NEUROELECTRODES: CPT | Mod: LT | Performed by: ANESTHESIOLOGY

## 2023-12-12 PROCEDURE — 2500000004 HC RX 250 GENERAL PHARMACY W/ HCPCS (ALT 636 FOR OP/ED): Mod: JZ | Performed by: STUDENT IN AN ORGANIZED HEALTH CARE EDUCATION/TRAINING PROGRAM

## 2023-12-12 PROCEDURE — 95972 ALYS CPLX SP/PN NPGT W/PRGRM: CPT | Mod: 59 | Performed by: ANESTHESIOLOGY

## 2023-12-12 PROCEDURE — 95972 ALYS CPLX SP/PN NPGT W/PRGRM: CPT | Performed by: ANESTHESIOLOGY

## 2023-12-12 PROCEDURE — 76000 FLUOROSCOPY <1 HR PHYS/QHP: CPT | Mod: 59

## 2023-12-12 PROCEDURE — 64555 IMPLANT NEUROELECTRODES: CPT | Performed by: ANESTHESIOLOGY

## 2023-12-12 PROCEDURE — 2500000004 HC RX 250 GENERAL PHARMACY W/ HCPCS (ALT 636 FOR OP/ED): Performed by: STUDENT IN AN ORGANIZED HEALTH CARE EDUCATION/TRAINING PROGRAM

## 2023-12-12 PROCEDURE — 2500000004 HC RX 250 GENERAL PHARMACY W/ HCPCS (ALT 636 FOR OP/ED): Performed by: ANESTHESIOLOGY

## 2023-12-12 RX ORDER — CLINDAMYCIN PHOSPHATE 900 MG/50ML
900 INJECTION, SOLUTION INTRAVENOUS ONCE
Status: COMPLETED | OUTPATIENT
Start: 2023-12-12 | End: 2023-12-12

## 2023-12-12 RX ORDER — MIDAZOLAM HYDROCHLORIDE 1 MG/ML
INJECTION INTRAMUSCULAR; INTRAVENOUS
Status: COMPLETED | OUTPATIENT
Start: 2023-12-12 | End: 2023-12-12

## 2023-12-12 RX ORDER — FENTANYL CITRATE 50 UG/ML
INJECTION, SOLUTION INTRAMUSCULAR; INTRAVENOUS
Status: COMPLETED | OUTPATIENT
Start: 2023-12-12 | End: 2023-12-12

## 2023-12-12 RX ADMIN — CLINDAMYCIN PHOSPHATE 900 MG: 900 INJECTION, SOLUTION INTRAVENOUS at 13:30

## 2023-12-12 RX ADMIN — FENTANYL CITRATE 50 MCG: 50 INJECTION, SOLUTION INTRAMUSCULAR; INTRAVENOUS at 14:15

## 2023-12-12 RX ADMIN — MIDAZOLAM HYDROCHLORIDE 1 MG: 1 INJECTION INTRAMUSCULAR; INTRAVENOUS at 14:14

## 2023-12-12 ASSESSMENT — PAIN SCALES - GENERAL
PAINLEVEL_OUTOF10: 3
PAINLEVEL_OUTOF10: 10 - WORST POSSIBLE PAIN
PAINLEVEL_OUTOF10: 5 - MODERATE PAIN
PAINLEVEL_OUTOF10: 3
PAINLEVEL_OUTOF10: 10 - WORST POSSIBLE PAIN
PAINLEVEL_OUTOF10: 3
PAINLEVEL_OUTOF10: 3
PAINLEVEL_OUTOF10: 5 - MODERATE PAIN

## 2023-12-12 NOTE — PROCEDURES
Pre-Op Diagnosis: Left chest wall postherpetic neuralgia  Post-Procedure Diagnosis: Same as preop diagnosis  Procedure:   1) Left intercostal nerve peripheral stimulator placement at T9 and T10  2) Physician guided fluoroscopy  3) Ultrasound guidance  4) complex intraoperative and postoperative programming  Surgeon: Anshul Staton MD, PhD   Resident/Fellow/Other Assistant: Yevgeniy Abrams MD     Procedure Note:     Ms. Gaby Reagan is a 73 y.o. female with history of lumbar vertebral compression fractures and chronic low back pain that has been controlled with an intrathecal pump presents today for a peripheral nerve stimulator trial for her intercostal neuralgia.  Unfortunately, the pump was controlling the low back pain but not the midthoracic pain.  She has had a previous spinal cord stimulator for her chronic low back pain that has been explanted due to inefficacy.  About a year ago, the patient developed shingles episode and has had persistent postherpetic neuralgia involving the left flank region and radiating to underneath the breast.    Following informed consent for an intercostal nerve stimulator trial, the patient was brought to the operating room and placed in the prone position.  She was given 2 g of cefazolin intravenously.  She had held her Xarelto for 3 days and understands to continue to hold it for another 3 days at which time the leads will be explanted.  Following appropriate timeout, the skin was prepped with chlorhexidine and draped in the usual sterile fashion overlying the mid left-sided thoracic wall.  Using ultrasound guidance, as well as fluoroscopic guidance, the appropriate target areas were identified and the needle trajectory planned.  The skin and subcutaneous tissue was anesthetized with a total of 4 cc 0.5% lidocaine.  15-gauge needles by Bisi were used to advance percutaneous octapolar neurostimulator leads I underneath the T10 and the other 1 underneath the T11 ribs and above the  pleural level.  Stimulation resulted in paresthesia in the appropriate areas especially at T10.  The needles were removed along with the lead stylette send the leads were secured in place with 2-0 Ethilon using a drain like stitch.  Additional anchoring was performed using Steri-Strips with Mastisol which were also covered with Tegaderm and paper tape.  The patient was transferred to the recovery room where she underwent further reprogramming.    The patient understands to stay off the Xarelto for about 72 hours.  She will have her leads taken out by Dr. Marcio Rodríguez in the [place] area as the patient lives in that region and does not have a ride to come back in 3 days.  Postop discharge instructions were discussed in detail with the patient.  She understands to contact us should she have any questions or concerns.

## 2023-12-12 NOTE — H&P
History Of Present Illness  Gaby Reagan is a 73 y.o. female presents for procedure state below. Endorses no changes in past medical history or medical health since last seen in clinic.     Past Medical History  She has no past medical history on file.    Surgical History  She has no past surgical history on file.     Social History  She has no history on file for tobacco use, alcohol use, and drug use.    Family History  No family history on file.     Allergies  Aspirin, Lidocaine, Penicillins, Diphenhydramine hcl, and Ibuprofen    Review of Symptoms:   Constitutional: Negative for chills, diaphoresis or fever  HENT: Negative for neck swelling  Eyes:.  Negative for eye pain  Respiratory:.  Negative for cough, shortness of breath or wheezing    Cardiovascular:.  Negative for chest pain or palpitations  Gastrointestinal:.  Negative for abdominal pain, nausea and vomiting  Genitourinary:.  Negative for urgency  Musculoskeletal:  Positive for pain in the left chest wall. Denies falls within the past 3 months.  Skin: Negative for wounds or itching   Neurological: Negative for dizziness, seizures, loss of consciousness and weakness  Endo/Heme/Allergies: Does not bruise/bleed easily  Psychiatric/Behavioral: Negative for depression. The patient does not appear anxious.       PHYSICAL EXAM  Vitals signs reviewed  Constitutional:       General: Not in acute distress     Appearance: Normal appearance. Not ill-appearing.  HENT:     Head: Normocephalic and atraumatic  Eyes:     Conjunctiva/sclera: Conjunctivae normal  Cardiovascular:     Rate and Rhythm: Normal rate and regular rhythm  Pulmonary:     Effort: No respiratory distress  Abdominal:     Palpations: Abdomen is soft  Musculoskeletal: MATHIS  Skin:     General: Skin is warm and dry  Neurological:     General: No focal deficit present  Psychiatric:         Mood and Affect: Mood normal         Behavior: Behavior normal     Last Recorded Vitals  There were no vitals taken for  this visit.    Relevant Results  Current Outpatient Medications   Medication Instructions    alendronate (Fosamax) 70 mg tablet oral    amLODIPine (Norvasc) 10 mg tablet oral    atorvastatin calcium (ATORVASTATIN ORAL) Atorvastatin Calcium   Quantity: 0   Refills: 0   Ordered: 9-Apr-2021  Satish Stewart Generic Substitution Allowed    benzonatate (Tessalon) 100 mg capsule 1 capsule, oral, 3 times daily PRN    bimatoprost (Lumigan) 0.01 % ophthalmic solution ophthalmic (eye)    bisacodyl (DULCOLAX) 5 mg, oral, Daily PRN    blood sugar diagnostic (Accu-Chek Karol Plus test strp) strip Accu-Chek Karol Plus In Vitro Strip   Quantity: 100  Refills: 0        Start : 14-Nov-2019   Active    calcium carbonate (OYSTER SHELL CALCIUM 500) 500 mg, oral    cephalexin (Keftab) 500 mg tablet 1 tablet, oral, 3 times daily    doxycycline (Vibra-Tabs) 100 mg tablet 1 tablet, oral, 2 times daily    DULoxetine (Cymbalta) 60 mg DR capsule DULoxetine HCl - 60 MG Oral Capsule Delayed Release Particles   Quantity: 30  Refills: 0        Start : 17-Apr-2020   Active    ferrous sulfate 325 (65 Fe) MG tablet 1 tablet, oral, Daily    fexofenadine (ALLEGRA) 60 mg, oral, Daily    furosemide (Lasix) 20 mg tablet Furosemide 20 MG Oral Tablet   Quantity: 30  Refills: 0        Start : 10-Aug-2020   Active    gabapentin (NEURONTIN) 600 mg, oral, 3 times daily    hydroCHLOROthiazide (HYDRODiuril) 25 mg tablet oral    insulin glargine (Lantus U-100 Insulin) 100 unit/mL injection subcutaneous    INSULIN LISPRO SUBQ Insulin lispro   Quantity: 0   Refills: 0   Ordered: 9-Apr-2021  Satish Stewart Generic Substitution Allowed    latanoprostene bunod (Vyzulta) 0.024 % drops ophthalmic (eye)    levothyroxine (Synthroid, Levoxyl) 88 mcg tablet Levothyroxine Sodium 88 MCG Oral Tablet   Quantity: 30  Refills: 0        Start : 14-Feb-2020   Active    LORazepam (Ativan) 0.5 mg tablet oral    losartan (COZAAR) 100 mg, oral    magnesium oxide (Mag-Ox) 400 mg (241.3  mg magnesium) tablet 1 tablet, oral, Daily    melatonin 10 mg tablet oral    mupirocin (Bactroban) 2 % ointment Mupirocin 2 % External Ointment   Quantity: 22  Refills: 0        Start : 16-Mar-2021   Active    omeprazole (PriLOSEC) 20 mg DR capsule oral    ondansetron ODT (Zofran-ODT) 4 mg disintegrating tablet oral    oxyCODONE (Oxy-IR) 5 mg immediate release capsule oral    polyethylene glycol (Glycolax, Miralax) 17 gram/dose powder oral    potassium chloride CR 20 mEq ER tablet 20 mEq, oral, 2 times daily    rivaroxaban (Xarelto) 20 mg tablet oral    tamsulosin HCl (TAMSULOSIN ORAL) Tamsulosin Hydrochloride   Quantity: 0   Refills: 0   Ordered: 9-Apr-2021  Satish Stewart Generic Substitution Allowed    timolol (Timoptic) 0.5 % ophthalmic solution ophthalmic (eye)    tiZANidine (Zanaflex) 2 mg tablet 1 tablet, oral, Every 8 hours PRN    traZODone (Desyrel) 100 mg tablet oral       No results found for this or any previous visit from the past 1000 days.     No image results found.       1. Postherpetic polyneuropathy  Stimulator Lead Trial    Stimulator Lead Trial    FL pain management TC    FL pain management TC           ASSESSMENT/PLAN  Gaby Reagan is a 73 y.o. female presents for left-sided thoracic Nalu nerve stimulator trial    Our plan is as follows:  - Follow In pain clinic  - Continue to participate in physical therapy as well as physician directed home exercises  - Continue pain medications as prescribed       Yevgeniy Abrams MD

## 2023-12-13 ENCOUNTER — CARE COORDINATION (OUTPATIENT)
Dept: CARE COORDINATION | Age: 73
End: 2023-12-13

## 2023-12-13 NOTE — CARE COORDINATION
Outreach for care management.  LVMM with contact information and requested call back   Await call back or will follow up 2 days     Future Appointments   Date Time Provider [address]   12/18/2023  1:15 PM Eva Dominguez  [facility]   1/4/2024 11:15 AM Leonel Zavala, DO [facility]   1/23/2024  1:00 PM [address] Pickett DO [facility]   3/12/2024 11:30 AM Winter Duffy MD [facility]

## 2023-12-15 ENCOUNTER — CARE COORDINATION (OUTPATIENT)
Dept: CARE COORDINATION | Age: 73
End: 2023-12-15

## 2023-12-15 SDOH — ECONOMIC STABILITY: FOOD INSECURITY: WITHIN THE PAST 12 MONTHS, YOU WORRIED THAT YOUR FOOD WOULD RUN OUT BEFORE YOU GOT MONEY TO BUY MORE.: NEVER TRUE

## 2023-12-15 SDOH — ECONOMIC STABILITY: FOOD INSECURITY: WITHIN THE PAST 12 MONTHS, THE FOOD YOU BOUGHT JUST DIDN'T LAST AND YOU DIDN'T HAVE MONEY TO GET MORE.: NEVER TRUE

## 2023-12-15 NOTE — CARE COORDINATION
Attempted outreach for care management.  LVMM with contact information for call back    Follow up call next week

## 2023-12-18 ENCOUNTER — HOSPITAL ENCOUNTER (OUTPATIENT)
Age: 73
Discharge: HOME | End: 2023-12-18
Payer: MEDICARE

## 2023-12-18 DIAGNOSIS — D50.9 MICROCYTIC ANEMIA: ICD-10-CM

## 2023-12-18 DIAGNOSIS — D50.9 MICROCYTIC ANEMIA: Primary | ICD-10-CM

## 2023-12-18 LAB
BASOPHILS # BLD: 0 K/UL (ref 0–0.2)
BASOPHILS NFR BLD: 1 % (ref 0–2)
EOSINOPHIL # BLD: 0.2 K/UL (ref 0–0.4)
EOSINOPHILS RELATIVE PERCENT: 3 % (ref 1–4)
ERYTHROCYTE [DISTWIDTH] IN BLOOD BY AUTOMATED COUNT: 14.8 % (ref 12.5–15.4)
HCT VFR BLD AUTO: 35.9 % (ref 36–46)
HGB BLD-MCNC: 11.9 G/DL (ref 12–16)
LYMPHOCYTES NFR BLD: 2 K/UL (ref 1–4.8)
LYMPHOCYTES RELATIVE PERCENT: 35 % (ref 24–44)
MCH RBC QN AUTO: 28.1 PG (ref 26–34)
MCHC RBC AUTO-ENTMCNC: 33.2 G/DL (ref 31–37)
MCV RBC AUTO: 84.7 FL (ref 80–100)
MONOCYTES NFR BLD: 0.4 K/UL (ref 0.1–1.2)
MONOCYTES NFR BLD: 7 % (ref 2–11)
NEUTROPHILS NFR BLD: 54 % (ref 36–66)
NEUTS SEG NFR BLD: 3.1 K/UL (ref 1.8–7.7)
PLATELET # BLD AUTO: 183 K/UL (ref 140–450)
PMV BLD AUTO: 10.6 FL (ref 6–12)
RBC # BLD AUTO: 4.24 M/UL (ref 4–5.2)
WBC OTHER # BLD: 5.7 K/UL (ref 3.5–11)

## 2023-12-18 PROCEDURE — 83550 IRON BINDING TEST: CPT

## 2023-12-18 PROCEDURE — 82728 ASSAY OF FERRITIN: CPT

## 2023-12-18 PROCEDURE — 83540 ASSAY OF IRON: CPT

## 2023-12-18 PROCEDURE — 85025 COMPLETE CBC W/AUTO DIFF WBC: CPT

## 2023-12-18 PROCEDURE — 36415 COLL VENOUS BLD VENIPUNCTURE: CPT

## 2023-12-18 PROCEDURE — 86304 IMMUNOASSAY TUMOR CA 125: CPT

## 2023-12-19 LAB
CANCER AG125 SERPL-ACNC: 5 U/ML
FERRITIN SERPL-MCNC: 25 NG/ML (ref 13–150)
IRON SATN MFR SERPL: 26 % (ref 20–55)
IRON SERPL-MCNC: 86 UG/DL (ref 37–145)
TIBC SERPL-MCNC: 329 UG/DL (ref 250–450)
UNSATURATED IRON BINDING CAPACITY: 243 UG/DL (ref 112–347)

## 2023-12-21 ENCOUNTER — APPOINTMENT (OUTPATIENT)
Dept: PAIN MEDICINE | Facility: HOSPITAL | Age: 73
End: 2023-12-21
Payer: COMMERCIAL

## 2023-12-22 ENCOUNTER — APPOINTMENT (OUTPATIENT)
Dept: PAIN MEDICINE | Facility: HOSPITAL | Age: 73
End: 2023-12-22
Payer: COMMERCIAL

## 2024-01-02 DIAGNOSIS — Z79.4 TYPE 2 DIABETES MELLITUS WITH DIABETIC NEUROPATHY, WITH LONG-TERM CURRENT USE OF INSULIN (HCC): Primary | ICD-10-CM

## 2024-01-02 DIAGNOSIS — E11.40 TYPE 2 DIABETES MELLITUS WITH DIABETIC NEUROPATHY, WITH LONG-TERM CURRENT USE OF INSULIN (HCC): Primary | ICD-10-CM

## 2024-01-02 NOTE — TELEPHONE ENCOUNTER
Last Visit Date: 8/15/2023   Next Visit Date: 1/4/2024      Pt reports being in between endocrinologists due to ins change. Pt reported will discuss a referral at appt this week but does need medication prior to appt.   Thank you

## 2024-01-03 RX ORDER — INSULIN GLARGINE 100 [IU]/ML
INJECTION, SOLUTION SUBCUTANEOUS
Qty: 30 ML | Refills: 1 | Status: SHIPPED | OUTPATIENT
Start: 2024-01-03

## 2024-01-04 ENCOUNTER — OFFICE VISIT (OUTPATIENT)
Dept: PRIMARY CARE CLINIC | Age: 74
End: 2024-01-04
Payer: COMMERCIAL

## 2024-01-04 VITALS
OXYGEN SATURATION: 96 % | BODY MASS INDEX: 35.7 KG/M2 | HEART RATE: 82 BPM | DIASTOLIC BLOOD PRESSURE: 80 MMHG | WEIGHT: 208 LBS | SYSTOLIC BLOOD PRESSURE: 146 MMHG

## 2024-01-04 DIAGNOSIS — I10 PRIMARY HYPERTENSION: ICD-10-CM

## 2024-01-04 DIAGNOSIS — Z12.31 ENCOUNTER FOR SCREENING MAMMOGRAM FOR MALIGNANT NEOPLASM OF BREAST: ICD-10-CM

## 2024-01-04 DIAGNOSIS — E11.40 TYPE 2 DIABETES MELLITUS WITH DIABETIC NEUROPATHY, WITH LONG-TERM CURRENT USE OF INSULIN (HCC): Primary | ICD-10-CM

## 2024-01-04 DIAGNOSIS — Z79.4 TYPE 2 DIABETES MELLITUS WITH DIABETIC NEUROPATHY, WITH LONG-TERM CURRENT USE OF INSULIN (HCC): Primary | ICD-10-CM

## 2024-01-04 DIAGNOSIS — Z01.419 WELL WOMAN EXAM: ICD-10-CM

## 2024-01-04 DIAGNOSIS — Z85.43 HX OF OVARIAN CANCER: ICD-10-CM

## 2024-01-04 DIAGNOSIS — I89.0 LYMPHEDEMA: ICD-10-CM

## 2024-01-04 LAB — HBA1C MFR BLD: 8.4 %

## 2024-01-04 PROCEDURE — 1090F PRES/ABSN URINE INCON ASSESS: CPT | Performed by: FAMILY MEDICINE

## 2024-01-04 PROCEDURE — 3052F HG A1C>EQUAL 8.0%<EQUAL 9.0%: CPT | Performed by: FAMILY MEDICINE

## 2024-01-04 PROCEDURE — G8427 DOCREV CUR MEDS BY ELIG CLIN: HCPCS | Performed by: FAMILY MEDICINE

## 2024-01-04 PROCEDURE — 3077F SYST BP >= 140 MM HG: CPT | Performed by: FAMILY MEDICINE

## 2024-01-04 PROCEDURE — G8417 CALC BMI ABV UP PARAM F/U: HCPCS | Performed by: FAMILY MEDICINE

## 2024-01-04 PROCEDURE — G8484 FLU IMMUNIZE NO ADMIN: HCPCS | Performed by: FAMILY MEDICINE

## 2024-01-04 PROCEDURE — 3017F COLORECTAL CA SCREEN DOC REV: CPT | Performed by: FAMILY MEDICINE

## 2024-01-04 PROCEDURE — G8399 PT W/DXA RESULTS DOCUMENT: HCPCS | Performed by: FAMILY MEDICINE

## 2024-01-04 PROCEDURE — 2022F DILAT RTA XM EVC RTNOPTHY: CPT | Performed by: FAMILY MEDICINE

## 2024-01-04 PROCEDURE — 1036F TOBACCO NON-USER: CPT | Performed by: FAMILY MEDICINE

## 2024-01-04 PROCEDURE — 1123F ACP DISCUSS/DSCN MKR DOCD: CPT | Performed by: FAMILY MEDICINE

## 2024-01-04 PROCEDURE — 3079F DIAST BP 80-89 MM HG: CPT | Performed by: FAMILY MEDICINE

## 2024-01-04 PROCEDURE — 83036 HEMOGLOBIN GLYCOSYLATED A1C: CPT | Performed by: FAMILY MEDICINE

## 2024-01-04 PROCEDURE — 99214 OFFICE O/P EST MOD 30 MIN: CPT | Performed by: FAMILY MEDICINE

## 2024-01-04 ASSESSMENT — ENCOUNTER SYMPTOMS
SHORTNESS OF BREATH: 0
VOMITING: 0

## 2024-01-04 ASSESSMENT — PATIENT HEALTH QUESTIONNAIRE - PHQ9
2. FEELING DOWN, DEPRESSED OR HOPELESS: 1
SUM OF ALL RESPONSES TO PHQ QUESTIONS 1-9: 2
1. LITTLE INTEREST OR PLEASURE IN DOING THINGS: 1
SUM OF ALL RESPONSES TO PHQ QUESTIONS 1-9: 2
SUM OF ALL RESPONSES TO PHQ9 QUESTIONS 1 & 2: 2
SUM OF ALL RESPONSES TO PHQ QUESTIONS 1-9: 2
SUM OF ALL RESPONSES TO PHQ QUESTIONS 1-9: 2
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
## 2024-01-09 ENCOUNTER — CARE COORDINATION (OUTPATIENT)
Dept: CARE COORDINATION | Age: 74
End: 2024-01-09

## 2024-01-09 NOTE — CARE COORDINATION
Outreach for care management. LV with contact information and requested call back    Await call back or follow up call next week     Future Appointments   Date Time Provider Department Center   1/23/2024  1:00 PM Simi Zavala DO [facility]   2/2/2024  9:00 AM Patricia Posey PA-C [facility]   3/19/2024 11:30 AM Ketty Maza MD [facility]   6/17/2024  2:45 PM Kayden Dominguez MD [facility]

## 2024-01-09 NOTE — CARE COORDINATION
Ambulatory Care Coordination Note  2024    Patient Current Location:  Home: [facility] [address]  [place] [address]     ACM contacted the patient by telephone. Verified name and  with patient as identifiers. Provided introduction to self, and explanation of the ACM role.     Challenges to be reviewed by the provider   Additional needs identified to be addressed with provider: No  none               Method of communication with provider: none.    ACM: Jigna Ribeiro RN    Talked to patient, she is waiting to hear if insurance approved the permanent nerve stimulator at [facility]. A1C; 8.4. has a CGM, blood sugar is 172. She hasn't received Lantus by her mail order, she plans to call and check on this. BLE swelling is better. Uses Lymphedema compression boots, for one hour every night. She's using BG transit. No questions or concerns   Follows with pain management, vascular, endocrinology, oncology  CM plan; next call review blood sugars, BLE lymphedema, outcome if insurance approved nerve stimulator   Follow up call 2 weeks     Offered patient enrollment in the Remote Patient Monitoring (RPM) program for in-home monitoring: Patient declined.    Future Appointments   Date Time Provider Department Center   2024  1:00 PM Simi Zavala DO [facility]   2024  9:00 AM Patricia Posey PA-C [facility]   3/19/2024 11:30 AM Ketty Maza MD [facility]   2024  2:45 PM Kayden Dominguez MD [facility]         Lab Results       None            Care Coordination Interventions    Referral from Primary Care Provider: Yes  Suggested Interventions and Community Resources  Transportation Support: Completed  Zone Management Tools: Completed          Goals Addressed                   This Visit's Progress     Conditions and Symptoms   Improving     I will schedule office visits, as directed by my provider.  I will keep my appointment or reschedule

## 2024-01-12 NOTE — PROGRESS NOTES
Televideo Informed Consent for psychological evaluation was reviewed with the patient as follows:  There are potential benefits and risks of the use of telephone or video-conferencing that differ from in-person sessions. Specifically, the telephone or televideo system we are using may not be HIPPA compliant and may present limits to patient confidentiality. Confidentiality still applies for telepsychology services, and nobody will record the session without your permission.     Understanding and verbal agreement was attested to by the patient. Patient identity was confirmed using 3 sources, including telephone number, email address and date of birth.     Non-secure Note: The patient has consented to an unrestricted note.    I had the pleasure of seeing Gaby Reagan for a psychological evaluation to help determine the appropriateness of a trial of neurostimulation.  As you know, she is a 73 y.o.  year old woman with a history of chronic low back and left flank pain.  Current pain and psychoactive medications include: Gabapentin, intrathecal pump.    Pain Status  Ms. Reagan reports that she has chronic low back pain and post-herpetic neuralgia pain of her left flank from her left breast to the left back.  She quantifies her pain as 8/10 in intensity with a range of 8-10/10.  Her pain is alleviated briefly when she gives herself a bolus from her intrathecal pump but the relief of her pain lasts only about 45 minutes.  Her pain is exacerbated by doing housework and other activities of daily living.    As noted above, she had an intrathecal pump implanted which has been helpful for her low back pain, and had a trial of a spinal cord stimulator in 2019.  She reports that the trial worked but the implanted stimulator did not.    Psychosocial Status  She is  and is a retired .  She reports that she feels safe and well supported at home.    Mental Status Exam  Orientation:  Alert. Oriented x3.  Memory:  "Grossly intact.  Attention/Concentration: Normal. Distractible.  Appearance:  Well-groomed. Neat.   Behavior/Attitude: Cooperative. Pleasant. Good eye contact.  Motor: Relaxed. Calm. Normal motor activity.   Speech: Regular rate and volume. Fluent. No pressure.   Mood: \"I am not very pleasant when I am in pain\"  Affect: Congruent to stated mood. Broad. Reactive.  Thought process: Goal-directed. Linear. Organized.  Thought content: No paranoia, delusion or ideas of reference. No hallucinations in auditory, visual or other sensory modalities.   Suicidal ideation: denied.  Homicidal ideation: denied.   Insight: Good    Psychological Status  She reports that she is frequently irritable as a function of her pain and that she is \"not very pleasant\" when she is in pain.  She is distressed by loss of function and frequently experiences sadness and tearfulness.  She reports that her energy and motivation are intact and she denies anhedonia.  She has no history of suicidal or homicidal ideation and no history of substance misuse.  She has a history of some psychological support following her diagnosis of cancer but otherwise she has no history of psychiatric treatment.  Family history is free of psychopathology.    Sleep is rarely restorative for her characterized by middle insomnia.  Her appetite is intact and her weight is stable.  She has no complaints of problems with memory nor concentration.  Reason and judgment seem fully intact.      Behavioral Screening for Neurostimulation    1) Active psychosis:  There is no evidence of delusions, hallucinations or somatic preoccupation, and, as such, the validity of the patient's pain complaints is not suspect.    2) Major Affective Disorder: Patients with severe mood disturbance are at risk for responding poorly to treatment.  In this case, the moderate mood disturbance that is present is situational, reactive to pain and loss of function.  As such, I do not believe that mood " issues preclude either accuracy of pain report nor adequate response to neurostimulation.    3) Suicidal or homicidal ideation:  There is no history of either suicidal or homicidal ideation.    4) Substance misuse or addiction:  There is no reported history of substance misuse, including alcohol, street drugs and pharmaceuticals.    5) Somatization or somatoform disorder:  There is no evidence of preoccupation with physical complaints that are unsupported by or exceed evidence obtained in diagnostic evaluations.    6) Unresolved compensation or litigation: There is no evidence of the presence of significant disincentive to accurately report positive response to interventional treatment.    7) Adequate social support:  To be successful, pain treatment and the accompanying rehabilitative efforts require the daily practical and psychological support of family and friends.  By patient report, there is adequate social support present.    8)  Serious cognitive deficits: Careful mental status exam reveals no evidence of problems with memory, concentration, attention, reason and judgment.    9) Self-efficacy:  Adequate self-efficacy expresses itself in the form of active engagement in the behavior change efforts that are the necessary companions to intervention.  She expresses willingness and inclination to pair any relief obtained through neurostimulation with her own rehabilitative efforts.    10) Informed consent: Ms. Reagan is able to describe the interventional process, including the trial period, pain reduction levels that permit permanent installation, and installation procedures.  Understanding is expressed that stimulation is not likely to eliminate pain but rather to reduce it to the point that self-managed strategies, such as behavior change efforts and rehabilitative strategies, are likely to be additionally helpful.    Impression:    In my opinion, Gaby Reagan  is able to provide informed consent and is an  acceptable candidate for a trial of neurostimulation from the behavioral perspective.    Please fell free to contact me with any questions.  Thank you for allowing me to collaborate in the care of your patient.      Eliseo Barker, Ph.D.  Professor, Department of Psychiatry  Director, Division of Psychology  [facility]  (o) [phone]

## 2024-01-20 SDOH — HEALTH STABILITY: PHYSICAL HEALTH: ON AVERAGE, HOW MANY MINUTES DO YOU ENGAGE IN EXERCISE AT THIS LEVEL?: 0 MIN

## 2024-01-20 SDOH — HEALTH STABILITY: PHYSICAL HEALTH: ON AVERAGE, HOW MANY DAYS PER WEEK DO YOU ENGAGE IN MODERATE TO STRENUOUS EXERCISE (LIKE A BRISK WALK)?: 0 DAYS

## 2024-01-20 ASSESSMENT — PATIENT HEALTH QUESTIONNAIRE - PHQ9
2. FEELING DOWN, DEPRESSED OR HOPELESS: 0
1. LITTLE INTEREST OR PLEASURE IN DOING THINGS: 0
SUM OF ALL RESPONSES TO PHQ QUESTIONS 1-9: 0
SUM OF ALL RESPONSES TO PHQ QUESTIONS 1-9: 0
SUM OF ALL RESPONSES TO PHQ9 QUESTIONS 1 & 2: 0
SUM OF ALL RESPONSES TO PHQ QUESTIONS 1-9: 0
SUM OF ALL RESPONSES TO PHQ QUESTIONS 1-9: 0

## 2024-01-22 RX ORDER — TRAZODONE HYDROCHLORIDE 100 MG/1
100 TABLET ORAL NIGHTLY PRN
Qty: 30 TABLET | Refills: 10 | Status: SHIPPED | OUTPATIENT
Start: 2024-01-22

## 2024-01-22 RX ORDER — SUCRALFATE 1 G/1
TABLET ORAL
Qty: 120 TABLET | Refills: 10 | Status: SHIPPED | OUTPATIENT
Start: 2024-01-22

## 2024-01-25 ENCOUNTER — TELEPHONE (OUTPATIENT)
Dept: PRIMARY CARE CLINIC | Age: 74
End: 2024-01-25

## 2024-01-25 NOTE — TELEPHONE ENCOUNTER
Office received a fax from [facility] requesting diabetic supplies and medication on behalf of the patient. PCP asked writer to reach out to confirm with pt she had requested these prescriptions. Writer spoke with pt, she has never heard of this company nor did she requested any refills through them. Writer will shred fax.

## 2024-01-26 ENCOUNTER — PREP FOR PROCEDURE (OUTPATIENT)
Dept: PAIN MEDICINE | Facility: HOSPITAL | Age: 74
End: 2024-01-26
Payer: COMMERCIAL

## 2024-01-26 DIAGNOSIS — B02.23 POSTHERPETIC POLYNEUROPATHY: Primary | ICD-10-CM

## 2024-01-26 DIAGNOSIS — B02.29 PHN (POSTHERPETIC NEURALGIA): Primary | ICD-10-CM

## 2024-01-26 RX ORDER — CHLORHEXIDINE GLUCONATE 40 MG/ML
SOLUTION TOPICAL
Qty: 1 ML | Refills: 0 | Status: SHIPPED | OUTPATIENT
Start: 2024-01-26

## 2024-01-26 RX ORDER — MUPIROCIN 20 MG/G
OINTMENT TOPICAL
Qty: 1 G | Refills: 0 | Status: SHIPPED | OUTPATIENT
Start: 2024-01-26

## 2024-01-30 ENCOUNTER — CARE COORDINATION (OUTPATIENT)
Dept: CARE COORDINATION | Age: 74
End: 2024-01-30

## 2024-01-30 NOTE — CARE COORDINATION
Ambulatory Care Coordination Note  1/30/2024  ACM: Jigna Ribeiro, LUCIE    Outreach for care management. LVMM with call back information   Follow up 3-5 days     Future Appointments   Date Time Provider Department Center   2/2/2024  9:00 AM Patricia Posey PA-C [facility]   2/29/2024  1:00 PM Simi Zavala DO [facility]   3/19/2024 11:30 AM Ketty Maza MD [facility]   6/17/2024  2:45 PM Kayden Dominguez MD [facility]

## 2024-02-02 ENCOUNTER — TELEPHONE (OUTPATIENT)
Dept: GYNECOLOGIC ONCOLOGY | Age: 74
End: 2024-02-02

## 2024-02-02 NOTE — TELEPHONE ENCOUNTER
Pt called to cancel 2/2/24 appt due to being sick. Pt requested to only be seen in [facility]. Pt was offered next available on 2/16/24 @8a, but pt declined because that was to early. Pt rescheduled to next available on 3/1/24.

## 2024-02-08 ENCOUNTER — ANESTHESIA EVENT (OUTPATIENT)
Dept: OPERATING ROOM | Facility: HOSPITAL | Age: 74
End: 2024-02-08
Payer: MEDICARE

## 2024-02-08 ENCOUNTER — HOSPITAL ENCOUNTER (OUTPATIENT)
Facility: HOSPITAL | Age: 74
Setting detail: OUTPATIENT SURGERY
Discharge: HOME | End: 2024-02-08
Attending: ANESTHESIOLOGY | Admitting: ANESTHESIOLOGY
Payer: MEDICARE

## 2024-02-08 ENCOUNTER — ANESTHESIA (OUTPATIENT)
Dept: OPERATING ROOM | Facility: HOSPITAL | Age: 74
End: 2024-02-08
Payer: MEDICARE

## 2024-02-08 ENCOUNTER — APPOINTMENT (OUTPATIENT)
Dept: RADIOLOGY | Facility: HOSPITAL | Age: 74
End: 2024-02-08
Payer: MEDICARE

## 2024-02-08 VITALS
WEIGHT: 209 LBS | HEIGHT: 63 IN | OXYGEN SATURATION: 95 % | DIASTOLIC BLOOD PRESSURE: 61 MMHG | RESPIRATION RATE: 21 BRPM | TEMPERATURE: 97.3 F | BODY MASS INDEX: 37.03 KG/M2 | HEART RATE: 88 BPM | SYSTOLIC BLOOD PRESSURE: 124 MMHG

## 2024-02-08 DIAGNOSIS — G57.72 COMPLEX REGIONAL PAIN SYNDROME TYPE 2 OF LEFT LOWER EXTREMITY: ICD-10-CM

## 2024-02-08 DIAGNOSIS — B02.23 POSTHERPETIC POLYNEUROPATHY: Primary | ICD-10-CM

## 2024-02-08 DIAGNOSIS — B02.29 PHN (POSTHERPETIC NEURALGIA): Primary | ICD-10-CM

## 2024-02-08 LAB
GLUCOSE BLD MANUAL STRIP-MCNC: 108 MG/DL (ref 74–99)
GLUCOSE BLD MANUAL STRIP-MCNC: 151 MG/DL (ref 74–99)

## 2024-02-08 PROCEDURE — 64555 IMPLANT NEUROELECTRODES: CPT | Performed by: ANESTHESIOLOGY

## 2024-02-08 PROCEDURE — 2500000004 HC RX 250 GENERAL PHARMACY W/ HCPCS (ALT 636 FOR OP/ED): Performed by: NURSE ANESTHETIST, CERTIFIED REGISTERED

## 2024-02-08 PROCEDURE — 3600000004 HC OR TIME - INITIAL BASE CHARGE - PROCEDURE LEVEL FOUR: Performed by: ANESTHESIOLOGY

## 2024-02-08 PROCEDURE — 64590 INS/RPL PRPH SAC/GSTR NPG/R: CPT | Performed by: ANESTHESIOLOGY

## 2024-02-08 PROCEDURE — 2500000004 HC RX 250 GENERAL PHARMACY W/ HCPCS (ALT 636 FOR OP/ED)

## 2024-02-08 PROCEDURE — A4217 STERILE WATER/SALINE, 500 ML: HCPCS | Performed by: ANESTHESIOLOGY

## 2024-02-08 PROCEDURE — 7100000010 HC PHASE TWO TIME - EACH INCREMENTAL 1 MINUTE: Performed by: ANESTHESIOLOGY

## 2024-02-08 PROCEDURE — 7100000002 HC RECOVERY ROOM TIME - EACH INCREMENTAL 1 MINUTE: Performed by: ANESTHESIOLOGY

## 2024-02-08 PROCEDURE — 2780000003 HC OR 278 NO HCPCS: Performed by: ANESTHESIOLOGY

## 2024-02-08 PROCEDURE — 99100 ANES PT EXTEME AGE<1 YR&>70: CPT | Performed by: ANESTHESIOLOGY

## 2024-02-08 PROCEDURE — 95972 ALYS CPLX SP/PN NPGT W/PRGRM: CPT | Performed by: ANESTHESIOLOGY

## 2024-02-08 PROCEDURE — 7100000001 HC RECOVERY ROOM TIME - INITIAL BASE CHARGE: Performed by: ANESTHESIOLOGY

## 2024-02-08 PROCEDURE — 3700000001 HC GENERAL ANESTHESIA TIME - INITIAL BASE CHARGE: Performed by: ANESTHESIOLOGY

## 2024-02-08 PROCEDURE — 2500000004 HC RX 250 GENERAL PHARMACY W/ HCPCS (ALT 636 FOR OP/ED): Performed by: ANESTHESIOLOGY

## 2024-02-08 PROCEDURE — 2720000007 HC OR 272 NO HCPCS: Performed by: ANESTHESIOLOGY

## 2024-02-08 PROCEDURE — 82947 ASSAY GLUCOSE BLOOD QUANT: CPT

## 2024-02-08 PROCEDURE — C1721 AICD, DUAL CHAMBER: HCPCS | Performed by: ANESTHESIOLOGY

## 2024-02-08 PROCEDURE — A64590 PR IMPLANT PERIPH/GASTRIC NEUROSTIM/RECEIVER: Performed by: NURSE ANESTHETIST, CERTIFIED REGISTERED

## 2024-02-08 PROCEDURE — 2500000004 HC RX 250 GENERAL PHARMACY W/ HCPCS (ALT 636 FOR OP/ED): Performed by: STUDENT IN AN ORGANIZED HEALTH CARE EDUCATION/TRAINING PROGRAM

## 2024-02-08 PROCEDURE — A64590 PR IMPLANT PERIPH/GASTRIC NEUROSTIM/RECEIVER: Performed by: ANESTHESIOLOGY

## 2024-02-08 PROCEDURE — 3600000009 HC OR TIME - EACH INCREMENTAL 1 MINUTE - PROCEDURE LEVEL FOUR: Performed by: ANESTHESIOLOGY

## 2024-02-08 PROCEDURE — 2500000001 HC RX 250 WO HCPCS SELF ADMINISTERED DRUGS (ALT 637 FOR MEDICARE OP): Performed by: ANESTHESIOLOGY

## 2024-02-08 PROCEDURE — C1889 IMPLANT/INSERT DEVICE, NOC: HCPCS | Performed by: ANESTHESIOLOGY

## 2024-02-08 PROCEDURE — 2500000005 HC RX 250 GENERAL PHARMACY W/O HCPCS: Performed by: ANESTHESIOLOGY

## 2024-02-08 PROCEDURE — 3700000002 HC GENERAL ANESTHESIA TIME - EACH INCREMENTAL 1 MINUTE: Performed by: ANESTHESIOLOGY

## 2024-02-08 PROCEDURE — 7100000009 HC PHASE TWO TIME - INITIAL BASE CHARGE: Performed by: ANESTHESIOLOGY

## 2024-02-08 DEVICE — IMPLANTABLE DEVICE: Type: IMPLANTABLE DEVICE | Site: BACK | Status: FUNCTIONAL

## 2024-02-08 DEVICE — IMPLANTABLE DEVICE: Type: IMPLANTABLE DEVICE | Site: FLANK | Status: FUNCTIONAL

## 2024-02-08 RX ORDER — VANCOMYCIN HYDROCHLORIDE 1 G/20ML
INJECTION, POWDER, LYOPHILIZED, FOR SOLUTION INTRAVENOUS AS NEEDED
Status: DISCONTINUED | OUTPATIENT
Start: 2024-02-08 | End: 2024-02-08 | Stop reason: HOSPADM

## 2024-02-08 RX ORDER — SODIUM CHLORIDE 0.9 G/100ML
IRRIGANT IRRIGATION AS NEEDED
Status: DISCONTINUED | OUTPATIENT
Start: 2024-02-08 | End: 2024-02-08 | Stop reason: HOSPADM

## 2024-02-08 RX ORDER — DOXYCYCLINE 100 MG/1
100 CAPSULE ORAL 2 TIMES DAILY
Qty: 2 CAPSULE | Refills: 0
Start: 2024-02-08

## 2024-02-08 RX ORDER — DEXMEDETOMIDINE HYDROCHLORIDE 4 UG/ML
INJECTION, SOLUTION INTRAVENOUS CONTINUOUS PRN
Status: DISCONTINUED | OUTPATIENT
Start: 2024-02-08 | End: 2024-02-08

## 2024-02-08 RX ORDER — DOXYCYCLINE 100 MG/1
100 CAPSULE ORAL 2 TIMES DAILY
Qty: 2 CAPSULE | Refills: 0 | Status: SHIPPED | OUTPATIENT
Start: 2024-02-08 | End: 2024-02-09

## 2024-02-08 RX ORDER — MIDAZOLAM HYDROCHLORIDE 1 MG/ML
1 INJECTION INTRAMUSCULAR; INTRAVENOUS ONCE AS NEEDED
Status: DISCONTINUED | OUTPATIENT
Start: 2024-02-08 | End: 2024-02-08 | Stop reason: HOSPADM

## 2024-02-08 RX ORDER — OXYCODONE HYDROCHLORIDE 5 MG/1
5 TABLET ORAL EVERY 4 HOURS PRN
Status: DISCONTINUED | OUTPATIENT
Start: 2024-02-08 | End: 2024-02-08 | Stop reason: HOSPADM

## 2024-02-08 RX ORDER — HYDRALAZINE HYDROCHLORIDE 20 MG/ML
INJECTION INTRAMUSCULAR; INTRAVENOUS AS NEEDED
Status: DISCONTINUED | OUTPATIENT
Start: 2024-02-08 | End: 2024-02-08

## 2024-02-08 RX ORDER — METHOCARBAMOL 100 MG/ML
1000 INJECTION, SOLUTION INTRAMUSCULAR; INTRAVENOUS ONCE
Status: DISCONTINUED | OUTPATIENT
Start: 2024-02-08 | End: 2024-02-08 | Stop reason: HOSPADM

## 2024-02-08 RX ORDER — MEPERIDINE HYDROCHLORIDE 25 MG/ML
12.5 INJECTION INTRAMUSCULAR; INTRAVENOUS; SUBCUTANEOUS EVERY 10 MIN PRN
Status: DISCONTINUED | OUTPATIENT
Start: 2024-02-08 | End: 2024-02-08 | Stop reason: HOSPADM

## 2024-02-08 RX ORDER — CEPHALEXIN 500 MG/1
500 CAPSULE ORAL 3 TIMES DAILY
Qty: 3 CAPSULE | Refills: 0 | Status: SHIPPED | OUTPATIENT
Start: 2024-02-08 | End: 2024-02-09

## 2024-02-08 RX ORDER — CEFAZOLIN 1 G/1
INJECTION, POWDER, FOR SOLUTION INTRAVENOUS AS NEEDED
Status: DISCONTINUED | OUTPATIENT
Start: 2024-02-08 | End: 2024-02-08

## 2024-02-08 RX ORDER — MIDAZOLAM HYDROCHLORIDE 1 MG/ML
INJECTION, SOLUTION INTRAMUSCULAR; INTRAVENOUS AS NEEDED
Status: DISCONTINUED | OUTPATIENT
Start: 2024-02-08 | End: 2024-02-08

## 2024-02-08 RX ORDER — LIDOCAINE HYDROCHLORIDE 10 MG/ML
0.1 INJECTION, SOLUTION EPIDURAL; INFILTRATION; INTRACAUDAL; PERINEURAL ONCE
Status: DISCONTINUED | OUTPATIENT
Start: 2024-02-08 | End: 2024-02-08 | Stop reason: HOSPADM

## 2024-02-08 RX ORDER — LABETALOL HYDROCHLORIDE 5 MG/ML
5 INJECTION, SOLUTION INTRAVENOUS ONCE AS NEEDED
Status: DISCONTINUED | OUTPATIENT
Start: 2024-02-08 | End: 2024-02-08 | Stop reason: HOSPADM

## 2024-02-08 RX ORDER — SODIUM CHLORIDE, SODIUM LACTATE, POTASSIUM CHLORIDE, CALCIUM CHLORIDE 600; 310; 30; 20 MG/100ML; MG/100ML; MG/100ML; MG/100ML
100 INJECTION, SOLUTION INTRAVENOUS CONTINUOUS
Status: DISCONTINUED | OUTPATIENT
Start: 2024-02-08 | End: 2024-02-08 | Stop reason: HOSPADM

## 2024-02-08 RX ORDER — SEMAGLUTIDE 2.68 MG/ML
2 INJECTION, SOLUTION SUBCUTANEOUS WEEKLY
COMMUNITY

## 2024-02-08 RX ORDER — DROPERIDOL 2.5 MG/ML
0.62 INJECTION, SOLUTION INTRAMUSCULAR; INTRAVENOUS ONCE AS NEEDED
Status: DISCONTINUED | OUTPATIENT
Start: 2024-02-08 | End: 2024-02-08 | Stop reason: HOSPADM

## 2024-02-08 RX ORDER — CEPHALEXIN 500 MG/1
500 CAPSULE ORAL 3 TIMES DAILY
Qty: 3 CAPSULE | Refills: 0 | Status: SHIPPED | OUTPATIENT
Start: 2024-02-08

## 2024-02-08 RX ORDER — HYDROMORPHONE HYDROCHLORIDE 1 MG/ML
0.5 INJECTION, SOLUTION INTRAMUSCULAR; INTRAVENOUS; SUBCUTANEOUS EVERY 5 MIN PRN
Status: DISCONTINUED | OUTPATIENT
Start: 2024-02-08 | End: 2024-02-08 | Stop reason: HOSPADM

## 2024-02-08 RX ORDER — MEPERIDINE HYDROCHLORIDE 25 MG/ML
12.5 INJECTION INTRAMUSCULAR; INTRAVENOUS; SUBCUTANEOUS ONCE
Status: COMPLETED | OUTPATIENT
Start: 2024-02-08 | End: 2024-02-08

## 2024-02-08 RX ORDER — LABETALOL HYDROCHLORIDE 5 MG/ML
INJECTION, SOLUTION INTRAVENOUS AS NEEDED
Status: DISCONTINUED | OUTPATIENT
Start: 2024-02-08 | End: 2024-02-08

## 2024-02-08 RX ORDER — LIDOCAINE HYDROCHLORIDE AND EPINEPHRINE 20; 10 MG/ML; UG/ML
INJECTION, SOLUTION INFILTRATION; PERINEURAL AS NEEDED
Status: DISCONTINUED | OUTPATIENT
Start: 2024-02-08 | End: 2024-02-08 | Stop reason: HOSPADM

## 2024-02-08 RX ORDER — FENTANYL CITRATE 50 UG/ML
INJECTION, SOLUTION INTRAMUSCULAR; INTRAVENOUS AS NEEDED
Status: DISCONTINUED | OUTPATIENT
Start: 2024-02-08 | End: 2024-02-08

## 2024-02-08 RX ORDER — OXYCODONE HYDROCHLORIDE 5 MG/1
5 TABLET ORAL 3 TIMES DAILY PRN
Qty: 12 TABLET | Refills: 0 | Status: SHIPPED | OUTPATIENT
Start: 2024-02-08 | End: 2024-02-12

## 2024-02-08 RX ORDER — PROPOFOL 10 MG/ML
INJECTION, EMULSION INTRAVENOUS CONTINUOUS PRN
Status: DISCONTINUED | OUTPATIENT
Start: 2024-02-08 | End: 2024-02-08

## 2024-02-08 RX ORDER — GABAPENTIN 300 MG/1
600 CAPSULE ORAL ONCE
Status: COMPLETED | OUTPATIENT
Start: 2024-02-08 | End: 2024-02-08

## 2024-02-08 RX ORDER — ALBUTEROL SULFATE 0.83 MG/ML
2.5 SOLUTION RESPIRATORY (INHALATION) ONCE AS NEEDED
Status: DISCONTINUED | OUTPATIENT
Start: 2024-02-08 | End: 2024-02-08 | Stop reason: HOSPADM

## 2024-02-08 RX ORDER — HYDROMORPHONE HYDROCHLORIDE 1 MG/ML
0.2 INJECTION, SOLUTION INTRAMUSCULAR; INTRAVENOUS; SUBCUTANEOUS EVERY 5 MIN PRN
Status: DISCONTINUED | OUTPATIENT
Start: 2024-02-08 | End: 2024-02-08 | Stop reason: HOSPADM

## 2024-02-08 RX ORDER — BUPIVACAINE HYDROCHLORIDE 5 MG/ML
INJECTION, SOLUTION EPIDURAL; INTRACAUDAL AS NEEDED
Status: DISCONTINUED | OUTPATIENT
Start: 2024-02-08 | End: 2024-02-08 | Stop reason: HOSPADM

## 2024-02-08 RX ADMIN — LABETALOL HYDROCHLORIDE 10 MG: 5 INJECTION, SOLUTION INTRAVENOUS at 14:34

## 2024-02-08 RX ADMIN — FENTANYL CITRATE 25 MCG: 50 INJECTION, SOLUTION INTRAMUSCULAR; INTRAVENOUS at 14:51

## 2024-02-08 RX ADMIN — GABAPENTIN 600 MG: 300 CAPSULE ORAL at 16:49

## 2024-02-08 RX ADMIN — FENTANYL CITRATE 25 MCG: 50 INJECTION, SOLUTION INTRAMUSCULAR; INTRAVENOUS at 15:03

## 2024-02-08 RX ADMIN — MIDAZOLAM 0.5 MG: 1 INJECTION INTRAMUSCULAR; INTRAVENOUS at 15:28

## 2024-02-08 RX ADMIN — PROPOFOL 75 MCG/KG/MIN: 10 INJECTION, EMULSION INTRAVENOUS at 13:54

## 2024-02-08 RX ADMIN — FENTANYL CITRATE 25 MCG: 50 INJECTION, SOLUTION INTRAMUSCULAR; INTRAVENOUS at 13:54

## 2024-02-08 RX ADMIN — MEPERIDINE HYDROCHLORIDE 12.5 MG: 25 INJECTION INTRAMUSCULAR; INTRAVENOUS; SUBCUTANEOUS at 16:27

## 2024-02-08 RX ADMIN — FENTANYL CITRATE 25 MCG: 50 INJECTION, SOLUTION INTRAMUSCULAR; INTRAVENOUS at 13:50

## 2024-02-08 RX ADMIN — HYDRALAZINE HYDROCHLORIDE 5 MG: 20 INJECTION INTRAMUSCULAR; INTRAVENOUS at 15:35

## 2024-02-08 RX ADMIN — DEXMEDETOMIDINE HYDROCHLORIDE 0.5 MCG/KG/HR: 4 INJECTION, SOLUTION INTRAVENOUS at 15:09

## 2024-02-08 RX ADMIN — HYDRALAZINE HYDROCHLORIDE 5 MG: 20 INJECTION INTRAMUSCULAR; INTRAVENOUS at 15:20

## 2024-02-08 RX ADMIN — SODIUM CHLORIDE, SODIUM LACTATE, POTASSIUM CHLORIDE, AND CALCIUM CHLORIDE: 600; 310; 30; 20 INJECTION, SOLUTION INTRAVENOUS at 13:37

## 2024-02-08 RX ADMIN — MIDAZOLAM 0.5 MG: 1 INJECTION INTRAMUSCULAR; INTRAVENOUS at 14:51

## 2024-02-08 RX ADMIN — CEFAZOLIN 2 G: 330 INJECTION, POWDER, FOR SOLUTION INTRAMUSCULAR; INTRAVENOUS at 14:04

## 2024-02-08 RX ADMIN — LABETALOL HYDROCHLORIDE 5 MG: 5 INJECTION, SOLUTION INTRAVENOUS at 14:40

## 2024-02-08 RX ADMIN — MIDAZOLAM 0.5 MG: 1 INJECTION INTRAMUSCULAR; INTRAVENOUS at 13:41

## 2024-02-08 RX ADMIN — MIDAZOLAM 0.5 MG: 1 INJECTION INTRAMUSCULAR; INTRAVENOUS at 13:45

## 2024-02-08 ASSESSMENT — PAIN SCALES - GENERAL
PAINLEVEL_OUTOF10: 0 - NO PAIN
PAIN_LEVEL: 2

## 2024-02-08 ASSESSMENT — PAIN - FUNCTIONAL ASSESSMENT
PAIN_FUNCTIONAL_ASSESSMENT: 0-10

## 2024-02-08 ASSESSMENT — COLUMBIA-SUICIDE SEVERITY RATING SCALE - C-SSRS
1. IN THE PAST MONTH, HAVE YOU WISHED YOU WERE DEAD OR WISHED YOU COULD GO TO SLEEP AND NOT WAKE UP?: NO
2. HAVE YOU ACTUALLY HAD ANY THOUGHTS OF KILLING YOURSELF?: NO
6. HAVE YOU EVER DONE ANYTHING, STARTED TO DO ANYTHING, OR PREPARED TO DO ANYTHING TO END YOUR LIFE?: NO

## 2024-02-08 NOTE — DISCHARGE INSTRUCTIONS
Post-surgery instructions:    Blood thinners: You may resume them tomorrow evening at the earliest    Antibiotics: Take cephalexin and doxycycline as directed for one day to prevent infection. We will send this to your pharmacy.     Pain Medications: These have been sent to your pharmacy. You will be getting 5-10 pills for post-surgery pain. You will not be getting a refill, so don't lose them.     Activity: Avoid strenuous activity until the follow up appointment. No lifting over 10 lbs for 24hrs. After that return to your normal activity level.     Bandages:  Leave the bandages in place until the follow up appointment. Keep them dry. Remove the outer bandage after 24 hours and expose the incision to air. The Steri strips will fall off on their own. Do not pick at them.     Showering/Bathing:     Do not shower until the follow up appointment, sponge bath only.     Do not completely submerge incision (no bath tub, hot tub, pool, lake, etc)  until skin has fully healed over (approximately one month).    Follow up: With Dr. Staton in clinic in one week to discuss how you are doing  Call Dr. Shemar Huitron's nurse ([phone]), to Schedule if this is not already scheduled.      Scheduling for pain management: [phone]    After hours, call the   ([phone]) and ask for the pain management answering service if you notice any of the below:     Call the doctor immediately: if you notice:    Excessive bleeding from procedure site (brisk bright red bleeding from the site or bleeding that soaks the bandages or does not stop)   Severe headache  Inability to walk, leg or arm weakness or numbness that is significantly worse after the procedure   Uncontrolled pain   Inability to control your bowels or bladder   Signs of infection: Fever above 101.5F, redness, swelling, pus or drainage from the site

## 2024-02-08 NOTE — ANESTHESIA POSTPROCEDURE EVALUATION
Patient: Gaby Reagan    Procedure Summary       Date: 02/08/24 Room / Location: [facility] OR 01 / [facility] OR    Anesthesia Start: 1337 Anesthesia Stop: 1551    Procedures:       Insertion Stimulator Lead Peripheral Nerve chest wall (Left: Chest)      Insertion Stimulator Peripheral Nerve Upper Body (Left: Chest) Diagnosis:       PHN (postherpetic neuralgia)      (PHN (postherpetic neuralgia) [B02.29])    Surgeons: Anshul Staton MD PhD Responsible Provider: Saurav Acevedo MD    Anesthesia Type: MAC ASA Status: 3            Anesthesia Type: MAC    Vitals Value Taken Time   /73 02/08/24 1556   Temp 35.9 02/08/24 1556   Pulse 78 02/08/24 1556   Resp 16 02/08/24 1556   SpO2 94 02/08/24 1556       Anesthesia Post Evaluation    Patient location during evaluation: PACU  Patient participation: complete - patient participated  Level of consciousness: awake and alert  Pain score: 2  Pain management: adequate  Airway patency: patent  Cardiovascular status: acceptable  Respiratory status: acceptable  Hydration status: acceptable  Postoperative Nausea and Vomiting: none        No notable events documented.

## 2024-02-08 NOTE — SIGNIFICANT EVENT
Surgical team to bedside; patient states she has shivering, cold feet, numbness, discomfort. 12.5mg demerol ordered.

## 2024-02-08 NOTE — ANESTHESIA PREPROCEDURE EVALUATION
Patient: Gaby Reagan    Procedure Information       Anesthesia Start Date/Time: 02/08/24 1337    Procedures:       Insertion Stimulator Lead Peripheral Nerve chest wall (Right: Chest)      Insertion Stimulator Peripheral Nerve Upper Body (Right: Chest)    Location: [facility] OR  / [facility] OR    Surgeons: Anshul Staton MD PhD            Relevant Problems   Neuro/Psych   (+) Postherpetic polyneuropathy       Clinical information reviewed:   Tobacco  Allergies  Meds   Med Hx  Surg Hx   Fam Hx  Soc Hx        NPO Detail:  NPO/Void Status  Date of Last Liquid: 02/08/24  Time of Last Liquid: 0000  Date of Last Solid: 02/08/24  Time of Last Solid: 0000         Physical Exam    Airway  Mallampati: II     Cardiovascular - normal exam  Rhythm: regular  Rate: normal     Dental    Pulmonary - normal exam     Abdominal - normal exam             Anesthesia Plan    History of general anesthesia?: yes  History of complications of general anesthesia?: no    ASA 3     MAC     Anesthetic plan and risks discussed with patient.    Plan discussed with CAA and CRNA.

## 2024-02-08 NOTE — OP NOTE
Insertion Stimulator Lead Peripheral Nerve chest wall (L), Insertion Stimulator Peripheral Nerve Upper Body (L) Operative Note     Date: 2024  OR Location: [facility] [address] OR    Name: Gaby Reagan YOB: 1950, Age: 73 y.o., MRN: [id number], Sex: female    Diagnosis  Pre-op Diagnosis     * PHN (postherpetic neuralgia) [B02.29] Post-op Diagnosis     * PHN (postherpetic neuralgia) [B02.29]     Procedures  Insertion Stimulator Lead Peripheral Nerve chest wall  44042 - MS PRQ IMPLTJ NEUROSTIMULATOR ELTRD PERIPHERAL NRV    Insertion Stimulator Peripheral Nerve Upper Body  96186 - MS INS/RPLC PERPH SAC/GSTRC NPG/RCVR PCKT CRTJ&CONN      Surgeons      * Anshul Staton - Primary    Resident/Fellow/Other Assistant:  Surgeon(s) and Role:    Procedure Summary  Anesthesia: Monitor Anesthesia Care  ASA: III  Anesthesia Staff: Anesthesiologist: Saurav Acevedo MD  CRNA: Cornel Zhao, APRN-CRNA; Janet Davis APRN-CRNA  C-AA: FARZAD Adhikari  Estimated Blood Loss: 5 mL  Intra-op Medications:   Administrations occurring from 1343 to 1541 on 24:   Medication Name Total Dose   vancomycin (Vancocin) vial for injection 1 g   lidocaine-epinephrine (Xylocaine W/EPI) 2 %-1:100,000 injection 5 mL   bupivacaine PF (Marcaine) 0.5 % (5 mg/mL) injection 5 mL   sodium chloride 0.9 % irrigation solution 2,000 mL              Anesthesia Record               Intraprocedure I/O Totals          Intake    Dexmedetomidine 0.00 mL    The total shown is the total volume documented since Anesthesia Start was filed.    LR bolus 500.00 mL    Propofol Drip 0.00 mL    The total shown is the total volume documented since Anesthesia Start was filed.    Total Intake 500 mL          Specimen: No specimens collected     Staff:   Circulator: Silvia Rangel RN  Relief Circulator: Robert Avery RN  Relief Scrub: Kim Lovett  Scrub Person: Teresa Elise RN         Drains and/or Catheters: * None in log *    Tourniquet Times:          Implants:  Implants       Type Name Action Serial No.      Neuro Interventional Implant KIT, PNS TINED LEAD, 4 CONTACT, 40CM - [id number] - [id number] Implanted      Neuro Interventional Implant KIT, PNS TINED LEAD, 4 CONTACT, 40CM - [id number] - [id number] Implanted      [employer]urable [employer] neurostimulator kit Implanted [id number]              Findings: See operative note    Indications: Gaby Reagan is an 73 y.o. female who is having surgery for PHN (postherpetic neuralgia) [B02.29].     The patient was seen in the preoperative area. The risks, benefits, complications, treatment options, non-operative alternatives, expected recovery and outcomes were discussed with the patient. The possibilities of reaction to medication, pulmonary aspiration, injury to surrounding structures, bleeding, recurrent infection, the need for additional procedures, failure to diagnose a condition, and creating a complication requiring transfusion or operation were discussed with the patient. The patient concurred with the proposed plan, giving informed consent.  The site of surgery was properly noted/marked if necessary per policy. The patient has been actively warmed in preoperative area. Preoperative antibiotics have been ordered and given within 1 hours of incision. Venous thrombosis prophylaxis have been ordered including bilateral sequential compression devices    Procedure Details: Following informed consent, the patient was brought to the operating room and placed in the prone position.  Huddle and timeout where carried out and monitored anesthesia care was induced.  The patient received intravenous antibiotics.  Following proper chlorhexidine prep and drape, the target areas were identified using a combination of fluoroscopy and ultrasound guidance.  The skin and subcutaneous tissue overlying needle trajectory to the intercostal space below the left T9 rib and that below the left T10 rib and anesthetized with a solution  containing 0.25% bupivacaine and 1% lidocaine with 1: 400,000 epinephrine, a total of 2 cc. Nalu plastic introducers with stylette of the needles and side of them were introduced under ultrasound and fluoroscopic guidance to the appropriate target areas.  The leads were advanced under fluoroscopic guidance and were tested but there was lead movement after trying to remove the plastic sheet.  As such the introducers were repositioned under fluoroscopic and ultrasound guidance..  Quadripolar tined leads were then deployed after obtaining satisfactory stimulation in the T9 and T10 dermatomes on the left side.  The target area for the generator in the left flank had been marked preoperatively.  An incision 1.5 cm long was carried out horizontally about the medial lower aspect of the cervical for the transmitting antenna.  This area was dissected at a depth of about 0.5 cm below the skin and the plastic tunneling device was used to create space for the Nalu .  The skin and subcutaneous tissue between the lead exit points at the skin and the incision for the  were anesthetized with a total of 8 cc of the aforementioned local anesthetic solution.  A tunneling tool was used to advance the leads subcutaneously into the incision for the procedure.  The leads were connected to the  connections and the  was advanced with a proper tool into the middle of the area for the transmitter.  Fluoroscopy confirmed appropriate placement.  There was appropriate signal capture between the  and transmitter.  Dermabond was applied to the lead entry sites.  The  incision was then closed in a layered fashion with 2-0 Vicryl for the deeper layer and 4-0 Monocryl for the superficial layer.  An island dressing was placed over the incision with underlying topical antibiotic.  Complications:  None; patient tolerated the procedure well.    Disposition: PACU - hemodynamically stable.  Condition: stable          Additional Details: In the recovery area, the patient had shaking in the lower extremities, worse on the right lower extremity.  She states she has had a similar reaction following receiving Benadryl.  However the patient did not receive any antihistamines today.  She was given 12.5 mg of meperidine intravenously with improvement in her symptoms.  She was also given 600 mg of gabapentin which she takes daily and has not taken it since last night.  The patient will be monitored until meeting all discharge criteria.    Attending Attestation: I was present and scrubbed for the entire procedure.    Anshul Staton  Phone Number: [phone]

## 2024-02-08 NOTE — H&P
History Of Present Illness  Gaby Reagan is a 73 y.o. female presenting with left-sided thoracic postherpetic neuralgia     Past Medical History  No past medical history on file.    Surgical History  No past surgical history on file.     Social History  She has no history on file for tobacco use, alcohol use, and drug use.    Family History  No family history on file.     Allergies  Aspirin, Lidocaine, Penicillins, Diphenhydramine hcl, and Ibuprofen    Review of Systems   13 point ROS done and negative except for the above.   Physical Exam     General: NAD, well nourished   Eyes: Non-icteric sclera, EOMI  Ears, Nose, Mouth, and Throat: External ears and nose appear to be without deformity or rash. No lesions or masses noted. Hearing is grossly intact.   Neck: Trachea midline  Respiratory: Nonlabored breathing   Cardiovascular: No JVD  Skin: No rashes or open lesions/ulcers identified on skin.    Last Recorded Vitals  There were no vitals taken for this visit.    Relevant Results           Assessment/Plan   Problem List Items Addressed This Visit    None    Patient is 73-year-old female with a past medical history significant for IDDS implantation on 4/2021 for multiple compression fractures, s/p SCS paddle implant/explant, now presenting for peripheral nerve stimulator implant for left-sided thoracic postherpetic neuralgia, most likely T6 dermatome.    Risks, benefits, alternatives to the procedure were discussed in detail and patient was amenable to proceeding.    Ken Pedroza MD

## 2024-02-14 ENCOUNTER — OFFICE VISIT (OUTPATIENT)
Dept: PAIN MEDICINE | Facility: HOSPITAL | Age: 74
End: 2024-02-14
Payer: MEDICARE

## 2024-02-14 DIAGNOSIS — B02.29 PHN (POSTHERPETIC NEURALGIA): Primary | ICD-10-CM

## 2024-02-14 PROCEDURE — 1036F TOBACCO NON-USER: CPT | Performed by: ANESTHESIOLOGY

## 2024-02-14 PROCEDURE — 99024 POSTOP FOLLOW-UP VISIT: CPT | Performed by: ANESTHESIOLOGY

## 2024-02-14 PROCEDURE — 1125F AMNT PAIN NOTED PAIN PRSNT: CPT | Performed by: ANESTHESIOLOGY

## 2024-02-14 PROCEDURE — 1159F MED LIST DOCD IN RCRD: CPT | Performed by: ANESTHESIOLOGY

## 2024-02-14 ASSESSMENT — PAIN SCALES - GENERAL: PAINLEVEL_OUTOF10: 5 - MODERATE PAIN

## 2024-02-14 ASSESSMENT — PAIN - FUNCTIONAL ASSESSMENT: PAIN_FUNCTIONAL_ASSESSMENT: 0-10

## 2024-02-14 NOTE — PROGRESS NOTES
Pain Management Clinic Note     History Of Present Illness  Gaby Reagan is a 73 y.o. female with a past medical history of right-sided postherpetic neuralgia who is s/p Nalu peripheral nerve stimulator implant covering Right T9 and T10 dermatomes on 2/8/24 who presents for post-op visit.     She is doing well since surgery. Denies any bleeding, drainage, or erythema. Today she is getting programmed.     Past Medical History  She has a past medical history of Chronic pain disorder, Diabetes mellitus (CMS/HCC), DVT (deep venous thrombosis) (CMS/HCC), GERD (gastroesophageal reflux disease), Glaucoma, Hyperlipidemia, Hypertension, Hypothyroidism, Ovarian cancer (CMS/HCC), PE (pulmonary thromboembolism) (CMS/HCC), Shingles, Sleep apnea, and TIA (transient ischemic attack).    Surgical History  She has a past surgical history that includes Hysterectomy; Cholecystectomy; Other surgical history; Total shoulder arthroplasty (Right); Humerus fracture surgery; and Other surgical history (Right).     Social History  She reports that she has never smoked. She has never used smokeless tobacco. She reports that she does not drink alcohol and does not use drugs.    Family History  No family history on file.     Allergies  Aspirin, Lidocaine, Penicillins, Diphenhydramine hcl, and Ibuprofen    Review of Symptoms:   Constitutional: Negative for chills, diaphoresis or fever  HENT: Negative for neck swelling  Eyes:.  Negative for eye pain  Respiratory:.  Negative for cough, shortness of breath or wheezing    Cardiovascular:.  Negative for chest pain or palpitations  Gastrointestinal:.  Negative for abdominal pain, nausea and vomiting  Genitourinary:.  Negative for urgency  Musculoskeletal:  Positive for thoracic pain. Positive for joint pain. Denies falls within the past 3 months.  Skin: incisional pain  Neurological: Negative for dizziness, seizures, loss of consciousness and weakness  Endo/Heme/Allergies: Does not bruise/bleed  easily  Psychiatric/Behavioral: Negative for depression. The patient does not appear anxious.       PHYSICAL EXAM  Vitals signs reviewed  Constitutional:       General: Not in acute distress     Appearance: Normal appearance. Not ill-appearing.  HENT:     Head: Normocephalic and atraumatic  Eyes:     Conjunctiva/sclera: Conjunctivae normal  Cardiovascular:     Rate and Rhythm: Normal rate and regular rhythm  Pulmonary:     Effort: No respiratory distress  Abdominal:     Palpations: Abdomen is soft  Musculoskeletal: MATHIS  Skin:     General: Skin is warm and dry  Neurological:     General: No focal deficit present  Psychiatric:         Mood and Affect: Mood normal         Behavior: Behavior normal    Advanced Exam   3 incision sites well healing. Inferior incision site still open but healing. Steri strips were removed.      Last Recorded Vitals  There were no vitals taken for this visit.    Relevant Results  Current Outpatient Medications   Medication Instructions    alendronate (Fosamax) 70 mg tablet oral    atorvastatin calcium (ATORVASTATIN ORAL) Atorvastatin Calcium   Quantity: 0   Refills: 0   Ordered: 9-Apr-2021  Satish Stewart Generic Substitution Allowed    bisacodyl (DULCOLAX) 5 mg, oral, Daily PRN    blood sugar diagnostic (Accu-Chek Karol Plus test strp) strip Accu-Chek Karol Plus In Vitro Strip   Quantity: 100  Refills: 0        Start : 14-Nov-2019   Active    calcium carbonate (OYSTER SHELL CALCIUM 500) 500 mg, oral    cephalexin (KEFLEX) 500 mg, oral, 3 times daily    chlorhexidine (Hibiclens) 4 % external liquid Shower with the night before surgery    doxycycline (Vibra-Tabs) 100 mg tablet 1 tablet, oral, 2 times daily    doxycycline (VIBRAMYCIN) 100 mg, oral, 2 times daily, Take with at least 8 ounces (large glass) of water, do not lie down for 30 minutes after    DULoxetine (Cymbalta) 60 mg DR capsule DULoxetine HCl - 60 MG Oral Capsule Delayed Release Particles   Quantity: 30  Refills: 0        Start  : 17-Apr-2020   Active    fexofenadine (ALLEGRA) 60 mg, oral, Daily    furosemide (Lasix) 20 mg tablet Furosemide 20 MG Oral Tablet   Quantity: 30  Refills: 0        Start : 10-Aug-2020   Active    gabapentin (NEURONTIN) 600 mg, oral, 3 times daily    hydroCHLOROthiazide (HYDRODiuril) 25 mg tablet oral    insulin glargine (LANTUS U-100 INSULIN) 25 Units, subcutaneous, 2 times daily    INSULIN LISPRO SUBQ Insulin lispro   Quantity: 0   Refills: 0   Ordered: 9-Apr-2021  Satish Stewart Generic Substitution Allowed    levothyroxine (Synthroid, Levoxyl) 88 mcg tablet Levothyroxine Sodium 88 MCG Oral Tablet   Quantity: 30  Refills: 0        Start : 14-Feb-2020   Active    LORazepam (Ativan) 0.5 mg tablet oral    losartan (COZAAR) 100 mg, oral    magnesium oxide (Mag-Ox) 400 mg (241.3 mg magnesium) tablet 1 tablet, oral, Daily    mupirocin (Bactroban) 2 % ointment Mupirocin 2 % External Ointment   Quantity: 22  Refills: 0        Start : 16-Mar-2021   Active    mupirocin (Bactroban) 2 % ointment Apply a pea-sized amount to Q-tip and swab nostrils at morning and nighttime for 3 days prior to surgery    omeprazole (PriLOSEC) 20 mg DR capsule oral    Ozempic 2 mg, subcutaneous, Weekly    polyethylene glycol (Glycolax, Miralax) 17 gram/dose powder oral    potassium chloride CR 20 mEq ER tablet 20 mEq, oral, 2 times daily    rivaroxaban (Xarelto) 20 mg tablet oral    tamsulosin HCl (TAMSULOSIN ORAL) Tamsulosin Hydrochloride   Quantity: 0   Refills: 0   Ordered: 9-Apr-2021  Satish Stewart Generic Substitution Allowed    traZODone (Desyrel) 100 mg tablet oral       No results found for this or any previous visit from the past 1000 days.     No image results found.       No diagnosis found.     ASSESSMENT/PLAN  Gaby Reagan is a 73 y.o. female with a past medical history of right-sided postherpetic neuralgia who is s/p Nalu peripheral nerve stimulator implant covering Right T9 and T10 dermatomes on 2/8/24 who presents for post-op  visit. She is doing well since surgery, and today she was happy with the programming of her stimulator today. Programming provided adequate coverage of her painful region. Wounds are healing as expected.    The patient was invited to contact us back anytime with any questions or concerns and follow-up with us in the office as needed.     DO JOCE Cain Pain fellow

## 2024-02-19 ENCOUNTER — CARE COORDINATION (OUTPATIENT)
Dept: CARE COORDINATION | Age: 74
End: 2024-02-19

## 2024-02-19 NOTE — CARE COORDINATION
Attempted outreach for care management. LVMM with contact information and requested call back  Follow up call 2-4 days       Future Appointments   Date Time Provider Department Center   2/29/2024  1:00 PM [facility]   3/1/2024 11:00 AM Patricia [facility]osey [facility]-C [facility]   3/19/2024 11:30 AM [facility]   6/17/2024  2:45 PM [facility]

## 2024-02-23 DIAGNOSIS — E03.9 HYPOTHYROIDISM, UNSPECIFIED TYPE: ICD-10-CM

## 2024-02-23 DIAGNOSIS — K21.9 GASTROESOPHAGEAL REFLUX DISEASE WITHOUT ESOPHAGITIS: ICD-10-CM

## 2024-02-23 RX ORDER — SUCRALFATE 1 G/1
1 TABLET ORAL 4 TIMES DAILY
Qty: 120 TABLET | Refills: 2 | Status: SHIPPED | OUTPATIENT
Start: 2024-02-23

## 2024-02-23 RX ORDER — LEVOTHYROXINE SODIUM 88 UG/1
88 TABLET ORAL DAILY
Qty: 90 TABLET | Refills: 2 | Status: SHIPPED | OUTPATIENT
Start: 2024-02-23

## 2024-02-23 RX ORDER — HYDROCHLOROTHIAZIDE 25 MG/1
25 TABLET ORAL EVERY MORNING
Qty: 90 TABLET | Refills: 2 | Status: SHIPPED | OUTPATIENT
Start: 2024-02-23

## 2024-02-23 RX ORDER — GABAPENTIN 600 MG/1
600 TABLET ORAL 4 TIMES DAILY
Qty: 120 TABLET | Refills: 2 | Status: SHIPPED | OUTPATIENT
Start: 2024-02-23 | End: 2024-05-23

## 2024-02-23 RX ORDER — ALENDRONATE SODIUM 70 MG/1
TABLET ORAL
Qty: 5 TABLET | Refills: 2 | Status: SHIPPED | OUTPATIENT
Start: 2024-02-23

## 2024-02-23 RX ORDER — DULOXETIN HYDROCHLORIDE 60 MG/1
60 CAPSULE, DELAYED RELEASE ORAL DAILY
Qty: 90 CAPSULE | Refills: 2 | Status: SHIPPED | OUTPATIENT
Start: 2024-02-23

## 2024-02-23 RX ORDER — POTASSIUM CHLORIDE 20 MEQ/1
TABLET, EXTENDED RELEASE ORAL
Qty: 90 TABLET | Refills: 2 | Status: SHIPPED | OUTPATIENT
Start: 2024-02-23

## 2024-02-23 RX ORDER — LOSARTAN POTASSIUM 100 MG/1
100 TABLET ORAL DAILY
Qty: 90 TABLET | Refills: 2 | Status: SHIPPED | OUTPATIENT
Start: 2024-02-23

## 2024-02-23 RX ORDER — TRAZODONE HYDROCHLORIDE 100 MG/1
100 TABLET ORAL NIGHTLY PRN
Qty: 90 TABLET | Refills: 2 | Status: SHIPPED | OUTPATIENT
Start: 2024-02-23

## 2024-02-23 RX ORDER — CARVEDILOL 6.25 MG/1
6.25 TABLET ORAL 2 TIMES DAILY
Qty: 180 TABLET | Refills: 2 | Status: SHIPPED | OUTPATIENT
Start: 2024-02-23

## 2024-02-23 RX ORDER — INSULIN LISPRO 100 [IU]/ML
INJECTION, SOLUTION INTRAVENOUS; SUBCUTANEOUS
Qty: 10 ML | Refills: 10 | Status: SHIPPED | OUTPATIENT
Start: 2024-02-23

## 2024-02-23 RX ORDER — TAMSULOSIN HYDROCHLORIDE 0.4 MG/1
0.4 CAPSULE ORAL NIGHTLY
Qty: 90 CAPSULE | Refills: 2 | Status: SHIPPED | OUTPATIENT
Start: 2024-02-23

## 2024-02-23 RX ORDER — ATORVASTATIN CALCIUM 40 MG/1
40 TABLET, FILM COATED ORAL DAILY
Qty: 90 TABLET | Refills: 2 | Status: SHIPPED | OUTPATIENT
Start: 2024-02-23

## 2024-02-23 RX ORDER — OMEPRAZOLE 40 MG/1
40 CAPSULE, DELAYED RELEASE ORAL
Qty: 90 CAPSULE | Refills: 2 | Status: SHIPPED | OUTPATIENT
Start: 2024-02-23

## 2024-02-28 SDOH — HEALTH STABILITY: PHYSICAL HEALTH: ON AVERAGE, HOW MANY DAYS PER WEEK DO YOU ENGAGE IN MODERATE TO STRENUOUS EXERCISE (LIKE A BRISK WALK)?: 0 DAYS

## 2024-02-28 SDOH — HEALTH STABILITY: PHYSICAL HEALTH: ON AVERAGE, HOW MANY MINUTES DO YOU ENGAGE IN EXERCISE AT THIS LEVEL?: 0 MIN

## 2024-02-28 ASSESSMENT — PATIENT HEALTH QUESTIONNAIRE - PHQ9
SUM OF ALL RESPONSES TO PHQ QUESTIONS 1-9: 0
SUM OF ALL RESPONSES TO PHQ QUESTIONS 1-9: 0
2. FEELING DOWN, DEPRESSED OR HOPELESS: 0
SUM OF ALL RESPONSES TO PHQ9 QUESTIONS 1 & 2: 0
SUM OF ALL RESPONSES TO PHQ QUESTIONS 1-9: 0
SUM OF ALL RESPONSES TO PHQ QUESTIONS 1-9: 0
1. LITTLE INTEREST OR PLEASURE IN DOING THINGS: 0

## 2024-02-28 ASSESSMENT — LIFESTYLE VARIABLES
HOW MANY STANDARD DRINKS CONTAINING ALCOHOL DO YOU HAVE ON A TYPICAL DAY: PATIENT DOES NOT DRINK
HOW MANY STANDARD DRINKS CONTAINING ALCOHOL DO YOU HAVE ON A TYPICAL DAY: 0
HOW OFTEN DO YOU HAVE SIX OR MORE DRINKS ON ONE OCCASION: 1
HOW OFTEN DO YOU HAVE A DRINK CONTAINING ALCOHOL: NEVER
HOW OFTEN DO YOU HAVE A DRINK CONTAINING ALCOHOL: 1

## 2024-02-29 ENCOUNTER — OFFICE VISIT (OUTPATIENT)
Dept: PRIMARY CARE CLINIC | Age: 74
End: 2024-02-29

## 2024-02-29 VITALS
HEIGHT: 63 IN | HEART RATE: 70 BPM | WEIGHT: 205 LBS | OXYGEN SATURATION: 96 % | BODY MASS INDEX: 36.32 KG/M2 | DIASTOLIC BLOOD PRESSURE: 68 MMHG | SYSTOLIC BLOOD PRESSURE: 124 MMHG

## 2024-02-29 DIAGNOSIS — C56.9 MALIGNANT NEOPLASM OF OVARY, UNSPECIFIED LATERALITY (HCC): ICD-10-CM

## 2024-02-29 DIAGNOSIS — Z79.4 TYPE 2 DIABETES MELLITUS WITH DIABETIC NEUROPATHY, WITH LONG-TERM CURRENT USE OF INSULIN (HCC): Primary | ICD-10-CM

## 2024-02-29 DIAGNOSIS — Z79.4 TYPE 2 DIABETES MELLITUS WITH DIABETIC NEUROPATHY, WITH LONG-TERM CURRENT USE OF INSULIN (HCC): ICD-10-CM

## 2024-02-29 DIAGNOSIS — E11.40 TYPE 2 DIABETES MELLITUS WITH DIABETIC NEUROPATHY, WITH LONG-TERM CURRENT USE OF INSULIN (HCC): ICD-10-CM

## 2024-02-29 DIAGNOSIS — I10 PRIMARY HYPERTENSION: ICD-10-CM

## 2024-02-29 DIAGNOSIS — Z00.00 MEDICARE ANNUAL WELLNESS VISIT, SUBSEQUENT: Primary | ICD-10-CM

## 2024-02-29 DIAGNOSIS — M46.1 SACROILIITIS, NOT ELSEWHERE CLASSIFIED (HCC): ICD-10-CM

## 2024-02-29 DIAGNOSIS — E11.40 TYPE 2 DIABETES MELLITUS WITH DIABETIC NEUROPATHY, WITH LONG-TERM CURRENT USE OF INSULIN (HCC): Primary | ICD-10-CM

## 2024-02-29 RX ORDER — INSULIN LISPRO 100 [IU]/ML
INJECTION, SOLUTION INTRAVENOUS; SUBCUTANEOUS
Qty: 10 ML | Refills: 10 | Status: SHIPPED | OUTPATIENT
Start: 2024-02-29

## 2024-02-29 SDOH — ECONOMIC STABILITY: INCOME INSECURITY: HOW HARD IS IT FOR YOU TO PAY FOR THE VERY BASICS LIKE FOOD, HOUSING, MEDICAL CARE, AND HEATING?: NOT HARD AT ALL

## 2024-02-29 SDOH — ECONOMIC STABILITY: FOOD INSECURITY: WITHIN THE PAST 12 MONTHS, YOU WORRIED THAT YOUR FOOD WOULD RUN OUT BEFORE YOU GOT MONEY TO BUY MORE.: NEVER TRUE

## 2024-02-29 SDOH — ECONOMIC STABILITY: FOOD INSECURITY: WITHIN THE PAST 12 MONTHS, THE FOOD YOU BOUGHT JUST DIDN'T LAST AND YOU DIDN'T HAVE MONEY TO GET MORE.: NEVER TRUE

## 2024-02-29 NOTE — PROGRESS NOTES
Medicare Annual Wellness Visit    Apple Jean Baptiste is here for Medicare AWV and Discuss Medications (Discuss Trospium regarding insurance won't pay for it )    Assessment & Plan   Medicare annual wellness visit, subsequent  Type 2 diabetes mellitus with diabetic neuropathy, with long-term current use of insulin (HCC)- sugar has been improving, following with endocrinology.   Primary hypertension- BP well controlled.   Sacroiliitis, not elsewhere classified (HCC)- back pain has been improving.   Malignant neoplasm of ovary, unspecified laterality (HCC)- hx of this years ago.     Recommendations for Preventive Services Due: see orders and patient instructions/AVS.  Recommended screening schedule for the next 5-10 years is provided to the patient in written form: see Patient Instructions/AVS.     Return in about 3 months (around 5/29/2024) for follow up.     Subjective       Patient's complete Health Risk Assessment and screening values have been reviewed and are found in Flowsheets. The following problems were reviewed today and where indicated follow up appointments were made and/or referrals ordered.    Sugars are improving with endocrinology.   Went to [facility] had new procedure to help with pain.   Has pain pump from before and a nalu peripheral nerve stimulator. Pain has been better.     Follows with eye doctor for her glaucoma.    Trospium not covered by insurance anymore. Recommend she can stop it after she runs out and let me know if she needs anything different if she becomes symptomatic with overactive bladder.     Positive Risk Factor Screenings with Interventions:                Activity, Diet, and Weight:  On average, how many days per week do you engage in moderate to strenuous exercise (like a brisk walk)?: 0 days  On average, how many minutes do you engage in exercise at this level?: 0 min    Do you eat balanced/healthy meals regularly?: Yes    Body mass index is 36.31 kg/m². (!)

## 2024-02-29 NOTE — PATIENT INSTRUCTIONS
Learning About Being Active as an Older Adult  Why is being active important as you get older?     Being active is one of the best things you can do for your health. And it's never too late to start. Being active--or getting active, if you aren't already--has definite benefits. It can:  Give you more energy,  Keep your mind sharp.  Improve balance to reduce your risk of falls.  Help you manage chronic illness with fewer medicines.  No matter how old you are, how fit you are, or what health problems you have, there is a form of activity that will work for you. And the more physical activity you can do, the better your overall health will be.  What kinds of activity can help you stay healthy?  Being more active will make your daily activities easier. Physical activity includes planned exercise and things you do in daily life. There are four types of activity:  Aerobic.  Doing aerobic activity makes your heart and lungs strong.  Includes walking, dancing, and gardening.  Aim for at least 2½ hours spread throughout the week.  It improves your energy and can help you sleep better.  Muscle-strengthening.  This type of activity can help maintain muscle and strengthen bones.  Includes climbing stairs, using resistance bands, and lifting or carrying heavy loads.  Aim for at least twice a week.  It can help protect the knees and other joints.  Stretching.  Stretching gives you better range of motion in joints and muscles.  Includes upper arm stretches, calf stretches, and gentle yoga.  Aim for at least twice a week, preferably after your muscles are warmed up from other activities.  It can help you function better in daily life.  Balancing.  This helps you stay coordinated and have good posture.  Includes heel-to-toe walking, everton chi, and certain types of yoga.  Aim for at least 3 days a week.  It can reduce your risk of falling.  Even if you have a hard time meeting the recommendations, it's better to be more active

## 2024-03-11 ENCOUNTER — OFFICE VISIT (OUTPATIENT)
Dept: PAIN MANAGEMENT | Age: 74
End: 2024-03-11
Payer: MEDICARE

## 2024-03-11 ENCOUNTER — CARE COORDINATION (OUTPATIENT)
Dept: CARE COORDINATION | Age: 74
End: 2024-03-11

## 2024-03-11 VITALS — WEIGHT: 205 LBS | HEIGHT: 63 IN | BODY MASS INDEX: 36.32 KG/M2

## 2024-03-11 DIAGNOSIS — M47.817 LUMBOSACRAL SPONDYLOSIS WITHOUT MYELOPATHY: Primary | ICD-10-CM

## 2024-03-11 DIAGNOSIS — M79.2 NEUROPATHIC PAIN: ICD-10-CM

## 2024-03-11 PROCEDURE — G8427 DOCREV CUR MEDS BY ELIG CLIN: HCPCS | Performed by: PAIN MEDICINE

## 2024-03-11 PROCEDURE — 99212 OFFICE O/P EST SF 10 MIN: CPT | Performed by: PAIN MEDICINE

## 2024-03-11 PROCEDURE — G8399 PT W/DXA RESULTS DOCUMENT: HCPCS | Performed by: PAIN MEDICINE

## 2024-03-11 PROCEDURE — 3017F COLORECTAL CA SCREEN DOC REV: CPT | Performed by: PAIN MEDICINE

## 2024-03-11 PROCEDURE — 1123F ACP DISCUSS/DSCN MKR DOCD: CPT | Performed by: PAIN MEDICINE

## 2024-03-11 PROCEDURE — G8417 CALC BMI ABV UP PARAM F/U: HCPCS | Performed by: PAIN MEDICINE

## 2024-03-11 PROCEDURE — G8484 FLU IMMUNIZE NO ADMIN: HCPCS | Performed by: PAIN MEDICINE

## 2024-03-11 PROCEDURE — 1090F PRES/ABSN URINE INCON ASSESS: CPT | Performed by: PAIN MEDICINE

## 2024-03-11 PROCEDURE — 1036F TOBACCO NON-USER: CPT | Performed by: PAIN MEDICINE

## 2024-03-11 RX ORDER — BIMATOPROST 0.3 MG/ML
SOLUTION/ DROPS OPHTHALMIC
COMMUNITY
Start: 2024-03-04

## 2024-03-11 ASSESSMENT — ENCOUNTER SYMPTOMS
BOWEL INCONTINENCE: 0
BACK PAIN: 1
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
## 2024-03-11 NOTE — CARE COORDINATION
Outreaches for care management. [facility] with contact information.   Episode resolved     Future Appointments   Date Time Provider Department Center   3/19/2024 11:30 AM Ketty Maza MD [facility]   4/26/2024  9:30 AM Zhen Youssef MD [facility]   5/13/2024 10:00 AM Elinor Garrett APRN - CNP [facility]   5/29/2024  1:30 PM Simi Zavala DO [facility]   6/17/2024  2:45 PM Kayden Dominguez MD [facility]

## 2024-04-02 ENCOUNTER — TELEPHONE (OUTPATIENT)
Dept: PRIMARY CARE CLINIC | Age: 74
End: 2024-04-02

## 2024-04-02 DIAGNOSIS — B35.1 TOENAIL FUNGUS: Primary | ICD-10-CM

## 2024-04-02 NOTE — TELEPHONE ENCOUNTER
Pt called into office stating she needs a referral to a podiatry and she thinks she is developing fungus on her left foot.     Please advise  Thanks

## 2024-04-12 ENCOUNTER — TELEPHONE (OUTPATIENT)
Dept: PRIMARY CARE CLINIC | Age: 74
End: 2024-04-12

## 2024-04-12 NOTE — TELEPHONE ENCOUNTER
Wound healing center called into office requesting patient's last office note with med/problem list. Writer faxed to [phone].     Phone: [phone]

## 2024-04-18 ENCOUNTER — OFFICE VISIT (OUTPATIENT)
Dept: PODIATRY | Age: 74
End: 2024-04-18
Payer: MEDICARE

## 2024-04-18 VITALS — HEIGHT: 63 IN | WEIGHT: 205 LBS | BODY MASS INDEX: 36.32 KG/M2

## 2024-04-18 DIAGNOSIS — B35.1 DERMATOPHYTOSIS OF NAIL: Primary | ICD-10-CM

## 2024-04-18 DIAGNOSIS — M79.605 PAIN IN BOTH LOWER EXTREMITIES: ICD-10-CM

## 2024-04-18 DIAGNOSIS — M79.604 PAIN IN BOTH LOWER EXTREMITIES: ICD-10-CM

## 2024-04-18 DIAGNOSIS — E11.51 TYPE II DIABETES MELLITUS WITH PERIPHERAL CIRCULATORY DISORDER (HCC): ICD-10-CM

## 2024-04-18 PROCEDURE — 3052F HG A1C>EQUAL 8.0%<EQUAL 9.0%: CPT | Performed by: PODIATRIST

## 2024-04-18 PROCEDURE — G8417 CALC BMI ABV UP PARAM F/U: HCPCS | Performed by: PODIATRIST

## 2024-04-18 PROCEDURE — 1123F ACP DISCUSS/DSCN MKR DOCD: CPT | Performed by: PODIATRIST

## 2024-04-18 PROCEDURE — G8427 DOCREV CUR MEDS BY ELIG CLIN: HCPCS | Performed by: PODIATRIST

## 2024-04-18 PROCEDURE — 99203 OFFICE O/P NEW LOW 30 MIN: CPT | Performed by: PODIATRIST

## 2024-04-18 PROCEDURE — 1090F PRES/ABSN URINE INCON ASSESS: CPT | Performed by: PODIATRIST

## 2024-04-18 PROCEDURE — 1036F TOBACCO NON-USER: CPT | Performed by: PODIATRIST

## 2024-04-18 PROCEDURE — 11721 DEBRIDE NAIL 6 OR MORE: CPT | Performed by: PODIATRIST

## 2024-04-18 PROCEDURE — G8399 PT W/DXA RESULTS DOCUMENT: HCPCS | Performed by: PODIATRIST

## 2024-04-18 PROCEDURE — 2022F DILAT RTA XM EVC RTNOPTHY: CPT | Performed by: PODIATRIST

## 2024-04-18 PROCEDURE — 3017F COLORECTAL CA SCREEN DOC REV: CPT | Performed by: PODIATRIST
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
## 2024-05-02 ENCOUNTER — OFFICE VISIT (OUTPATIENT)
Dept: PODIATRY | Age: 74
End: 2024-05-02
Payer: MEDICARE

## 2024-05-02 VITALS — BODY MASS INDEX: 36.32 KG/M2 | WEIGHT: 205 LBS | HEIGHT: 63 IN

## 2024-05-02 DIAGNOSIS — B35.1 DERMATOPHYTOSIS OF NAIL: Primary | ICD-10-CM

## 2024-05-02 DIAGNOSIS — M79.604 PAIN IN BOTH LOWER EXTREMITIES: ICD-10-CM

## 2024-05-02 DIAGNOSIS — M79.605 PAIN IN BOTH LOWER EXTREMITIES: ICD-10-CM

## 2024-05-02 DIAGNOSIS — L60.0 INGROWN NAIL: ICD-10-CM

## 2024-05-02 PROCEDURE — G8399 PT W/DXA RESULTS DOCUMENT: HCPCS | Performed by: PODIATRIST

## 2024-05-02 PROCEDURE — 3017F COLORECTAL CA SCREEN DOC REV: CPT | Performed by: PODIATRIST

## 2024-05-02 PROCEDURE — 11750 EXCISION NAIL&NAIL MATRIX: CPT | Performed by: PODIATRIST

## 2024-05-02 PROCEDURE — 99214 OFFICE O/P EST MOD 30 MIN: CPT | Performed by: PODIATRIST

## 2024-05-02 PROCEDURE — 1090F PRES/ABSN URINE INCON ASSESS: CPT | Performed by: PODIATRIST

## 2024-05-02 PROCEDURE — G8427 DOCREV CUR MEDS BY ELIG CLIN: HCPCS | Performed by: PODIATRIST

## 2024-05-02 PROCEDURE — 1123F ACP DISCUSS/DSCN MKR DOCD: CPT | Performed by: PODIATRIST

## 2024-05-02 PROCEDURE — G8417 CALC BMI ABV UP PARAM F/U: HCPCS | Performed by: PODIATRIST

## 2024-05-02 PROCEDURE — 1036F TOBACCO NON-USER: CPT | Performed by: PODIATRIST

## 2024-05-02 NOTE — PROGRESS NOTES
Medial longitudinal arch, Bilateral WNL.  Ankle ROM WNL,Bilateral.    Dorsally contracted digits absent digits 1-5 Bilateral.     Vascular: DP and PT pulses palpable 2/4, Bilateral.  CFT <3 seconds, Bilateral.  Hair growth present to the level of the digits, Bilateral.  Edema absent, Bilateral.  Varicosities absent, Bilateral. Erythema absent, Bilateral    Neurological: Sensation intact to light touch to level of digits, Bilateral.  Protective sensation intact 10/10 sites via 5.07/10g [place] Monofilament, Bilateral.  negative Tinel's, Bilateral.  negative Valleix sign, Bilateral.      Integument: Warm, dry, supple, Bilateral.  Open lesion absent, Bilateral.  Interdigital maceration absent to web spaces 1-4, Bilateral.   Fissures absent, Bilateral.     Asessment: Patient is a 73 y.o. female with:    Diagnosis Orders   1. Dermatophytosis of nail  WI EXCISION NAIL MATRIX PERMANENT REMOVAL      2. Ingrown nail  WI EXCISION NAIL MATRIX PERMANENT REMOVAL      3. Pain in both lower extremities  WI EXCISION NAIL MATRIX PERMANENT REMOVAL            Plan: Patient examined and evaluated.  Current condition and treatment options discussed in detail.      Verbal consent obtained for chemical matrixectomy after all questions answered.  Local anesthesia obtained via Hallux block to the Left hallux utilizing 5 cc of 1% Lidocaine plain.  Next the Left hallux was prepped with Betadine.  A digital tourniquet was applied.  A freer elevator was used to free the bilateral nail border.  An english anvil was used to remove the offending border in total.  A curette was used to ensure the offending border was free of any remaining nail.  Next, three 30 second applications of 89% Phenol were applied to the offending nail border followed by an isopropyl alcohol flush.  Triple antibiotic ointment was applied to the nail border followed by a semi-compressive dressing.      The patient was instructed to leave this dressing

## 2024-05-06 NOTE — TELEPHONE ENCOUNTER
Received call from [facility] at [facility] with [facility]. Subjective: Caller states \"Feet/hand swelling. Last time this happened was prescribed a water pill about 3-4 months ago. \"     Current Symptoms: bilateral feet swelling a little past the ankle and on top of foot. Onset: 2 days ago; gradual    Associated Symptoms: reduced activity    Pain Severity: 0/10; N/A; none    Temperature: Denies      What has been tried: elevation tries, helps a little bit but not a lot. LMP: NA Pregnant: NA    Recommended disposition: See PCP within 24 Hours    Care advice provided, patient verbalizes understanding; denies any other questions or concerns; instructed to call back for any new or worsening symptoms. Patient/Caller agrees with recommended disposition; writer provided warm transfer to Isaak Muniz at [facility] for appointment scheduling     Attention Provider: Thank you for allowing me to participate in the care of your patient. The patient was connected to triage in response to information provided to the ECC/PSC. Please do not respond through this encounter as the response is not directed to a shared pool.       Reason for Disposition   [1] Very swollen ankle AND [2] no fever    Protocols used: ANKLE SWELLING-ADULT-AH
Patient/Caregiver provided printed discharge information.

## 2024-05-09 ENCOUNTER — OFFICE VISIT (OUTPATIENT)
Dept: PODIATRY | Age: 74
End: 2024-05-09

## 2024-05-09 DIAGNOSIS — Z98.890 POST-OPERATIVE STATE: Primary | ICD-10-CM

## 2024-05-09 PROCEDURE — 99024 POSTOP FOLLOW-UP VISIT: CPT | Performed by: PODIATRIST

## 2024-05-09 NOTE — PROGRESS NOTES
[facility] PHYSICIANS [facility], [facility] PODIATRY  [address] [place], [place] [address]  Dept: [phone]    POST-OP PROGRESS NOTE  Date of patient's visit: 5/9/2024  Patient's Name:  Apple Jean Baptiste YOB: 1950            Patient Care Team:  Simi Zavala DO as PCP - General (Family Medicine)  Simi Zavala DO as PCP - Empaneled Provider  Anum Max MD as Consulting Physician (Gastroenterology)  Jigna Ribeiro, RN as Ambulatory Care Manager  Jigna Ribeiro, RN as Ambulatory Care Manager        Chief Complaint   Patient presents with    Post-Op Check     Post op x 1 week       Pt's primary care physician is Simi Zavala DO last seen February 29 2024     Subjective: Apple Jean Baptiste is a 73 y.o. female who presents to the office today 1week(s)  S/P chemical matrixectomy left great toe for correction of ingrown toenail  Problem List Items Addressed This Visit    None  . Patient relates pain is Present and IMPROVED.  Pain is rated 1 out of 10 and is described as intermittent. Currently denies F/C/N/V.  Is patient taking pain medications as prescribed and is controlling pain no    Physical Examination:  Minimal bleeding post operatively. Edema present. No erythema. No Pus.   Operative correction is satisfactory.    Assessment: Apple Jean Baptiste is status post chemical matrixectomy left great toe  Normal post operative course.  Doing well        No diagnosis found.      Plan:  Patient examined and evaluated.  Current condition and treatment options discussed in detail.  Advised pt to monitor daily for infection.  Verbal and written instructions given to patient.  Orders: No orders of the defined types were placed in this encounter.     Contact office with any questions/problems/concerns.  RTC in 2month(s).     Electronically signed by Yany Singh DPM on 5/9/2024 at 1:21 PM  5/9/2024

## 2024-05-13 ENCOUNTER — OFFICE VISIT (OUTPATIENT)
Dept: PAIN MANAGEMENT | Age: 74
End: 2024-05-13
Payer: MEDICARE

## 2024-05-13 VITALS — HEIGHT: 63 IN | BODY MASS INDEX: 35.97 KG/M2 | WEIGHT: 203 LBS

## 2024-05-13 DIAGNOSIS — M47.817 LUMBOSACRAL SPONDYLOSIS WITHOUT MYELOPATHY: Primary | ICD-10-CM

## 2024-05-13 DIAGNOSIS — G89.29 CHRONIC LEFT-SIDED THORACIC BACK PAIN: ICD-10-CM

## 2024-05-13 DIAGNOSIS — M54.6 CHRONIC LEFT-SIDED THORACIC BACK PAIN: ICD-10-CM

## 2024-05-13 DIAGNOSIS — B02.29 POST HERPETIC NEURALGIA: ICD-10-CM

## 2024-05-13 DIAGNOSIS — M54.6 THORACIC BACK PAIN, UNSPECIFIED BACK PAIN LATERALITY, UNSPECIFIED CHRONICITY: ICD-10-CM

## 2024-05-13 PROCEDURE — 1090F PRES/ABSN URINE INCON ASSESS: CPT | Performed by: NURSE PRACTITIONER

## 2024-05-13 PROCEDURE — G8427 DOCREV CUR MEDS BY ELIG CLIN: HCPCS | Performed by: NURSE PRACTITIONER

## 2024-05-13 PROCEDURE — G8417 CALC BMI ABV UP PARAM F/U: HCPCS | Performed by: NURSE PRACTITIONER

## 2024-05-13 PROCEDURE — 3017F COLORECTAL CA SCREEN DOC REV: CPT | Performed by: NURSE PRACTITIONER

## 2024-05-13 PROCEDURE — 1036F TOBACCO NON-USER: CPT | Performed by: NURSE PRACTITIONER

## 2024-05-13 PROCEDURE — G8399 PT W/DXA RESULTS DOCUMENT: HCPCS | Performed by: NURSE PRACTITIONER

## 2024-05-13 PROCEDURE — 99213 OFFICE O/P EST LOW 20 MIN: CPT | Performed by: NURSE PRACTITIONER

## 2024-05-13 PROCEDURE — 1123F ACP DISCUSS/DSCN MKR DOCD: CPT | Performed by: NURSE PRACTITIONER

## 2024-05-13 RX ORDER — SUCRALFATE 1 G/1
1 TABLET ORAL 4 TIMES DAILY
Qty: 360 TABLET | Refills: 3 | Status: SHIPPED | OUTPATIENT
Start: 2024-05-13

## 2024-05-13 RX ORDER — ALENDRONATE SODIUM 70 MG/1
TABLET ORAL
Qty: 12 TABLET | Refills: 3 | Status: SHIPPED | OUTPATIENT
Start: 2024-05-13

## 2024-05-13 RX ORDER — POTASSIUM CHLORIDE 20 MEQ/1
TABLET, EXTENDED RELEASE ORAL
Qty: 270 TABLET | Refills: 3 | Status: SHIPPED | OUTPATIENT
Start: 2024-05-13

## 2024-05-13 RX ORDER — DORZOLAMIDE HCL 20 MG/ML
SOLUTION/ DROPS OPHTHALMIC
COMMUNITY
Start: 2024-04-10

## 2024-05-13 ASSESSMENT — ENCOUNTER SYMPTOMS
BOWEL INCONTINENCE: 0
SHORTNESS OF BREATH: 0
BACK PAIN: 1
COUGH: 0
CONSTIPATION: 0
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
## 2024-05-20 DIAGNOSIS — G62.9 NEUROPATHY: Primary | ICD-10-CM

## 2024-05-20 RX ORDER — GABAPENTIN 600 MG/1
600 TABLET ORAL 4 TIMES DAILY
Qty: 28 TABLET | Refills: 0 | Status: SHIPPED | OUTPATIENT
Start: 2024-05-20 | End: 2024-05-27

## 2024-05-20 RX ORDER — GABAPENTIN 600 MG/1
600 TABLET ORAL 4 TIMES DAILY
Qty: 120 TABLET | Refills: 3 | Status: SHIPPED | OUTPATIENT
Start: 2024-05-20 | End: 2024-06-19

## 2024-05-20 NOTE — TELEPHONE ENCOUNTER
Zulma from [facility] called into office stating patient would like her gabapentin sent in for a refill to [facility] and she needs a 7 days sent into [facility] pharmacy because she forgot to send in a refill request audi and she is out of the medication. She states it takes up to 7 to 10 days for mail order send to patient so since patient is out of medication she would like a short term supply.     Please advise   Thanks   Last Visit Date: 2/29/2024   Next Visit Date: 5/31/2024   Pharmacy: [facility]

## 2024-05-24 ENCOUNTER — HOSPITAL ENCOUNTER (EMERGENCY)
Age: 74
Discharge: HOME | End: 2024-05-24
Attending: EMERGENCY MEDICINE
Payer: MEDICARE

## 2024-05-24 VITALS
DIASTOLIC BLOOD PRESSURE: 77 MMHG | OXYGEN SATURATION: 96 % | TEMPERATURE: 97.2 F | RESPIRATION RATE: 18 BRPM | SYSTOLIC BLOOD PRESSURE: 151 MMHG | HEART RATE: 66 BPM

## 2024-05-24 DIAGNOSIS — L08.9 TOE INFECTION: Primary | ICD-10-CM

## 2024-05-24 PROCEDURE — 99283 EMERGENCY DEPT VISIT LOW MDM: CPT

## 2024-05-24 PROCEDURE — 6370000000 HC RX 637 (ALT 250 FOR IP): Performed by: PHYSICIAN ASSISTANT

## 2024-05-24 RX ORDER — DOXYCYCLINE HYCLATE 100 MG
100 TABLET ORAL ONCE
Status: COMPLETED | OUTPATIENT
Start: 2024-05-24 | End: 2024-05-24

## 2024-05-24 RX ORDER — DOXYCYCLINE HYCLATE 100 MG
100 TABLET ORAL 2 TIMES DAILY
Qty: 14 TABLET | Refills: 0 | Status: SHIPPED | OUTPATIENT
Start: 2024-05-24 | End: 2024-05-31

## 2024-05-24 RX ADMIN — DOXYCYCLINE HYCLATE 100 MG: 100 TABLET, COATED ORAL at 19:16

## 2024-05-24 ASSESSMENT — PAIN - FUNCTIONAL ASSESSMENT: PAIN_FUNCTIONAL_ASSESSMENT: NONE - DENIES PAIN
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
## 2024-05-24 NOTE — ED PROVIDER NOTES
[facility] EMERGENCY DEPARTMENT  eMERGENCY dEPARTMENT eNCOUnter   Independent Attestation     Pt Name: Apple Jean Baptiste  MRN: [id number]  Birthdate 1950  Date of evaluation: 5/24/24       Apple Jean Baptiste is a 73 y.o. female who presents with Toe Pain (Left toe)        Based on the medical record, the care appears appropriate. I was personally available for consultation in the Emergency Department.    Juan Joe MD  Attending Emergency  Physician               Juan Joe MD  05/24/24 2250

## 2024-05-31 ENCOUNTER — OFFICE VISIT (OUTPATIENT)
Dept: PRIMARY CARE CLINIC | Age: 74
End: 2024-05-31
Payer: MEDICARE

## 2024-05-31 VITALS
DIASTOLIC BLOOD PRESSURE: 82 MMHG | HEART RATE: 64 BPM | OXYGEN SATURATION: 97 % | BODY MASS INDEX: 35.61 KG/M2 | WEIGHT: 201 LBS | SYSTOLIC BLOOD PRESSURE: 136 MMHG

## 2024-05-31 DIAGNOSIS — D22.9 CHANGE IN MOLE: ICD-10-CM

## 2024-05-31 DIAGNOSIS — E11.40 TYPE 2 DIABETES MELLITUS WITH DIABETIC NEUROPATHY, WITH LONG-TERM CURRENT USE OF INSULIN (HCC): Primary | ICD-10-CM

## 2024-05-31 DIAGNOSIS — I10 PRIMARY HYPERTENSION: ICD-10-CM

## 2024-05-31 DIAGNOSIS — L03.032 CELLULITIS OF TOE OF LEFT FOOT: ICD-10-CM

## 2024-05-31 DIAGNOSIS — Z79.4 TYPE 2 DIABETES MELLITUS WITH DIABETIC NEUROPATHY, WITH LONG-TERM CURRENT USE OF INSULIN (HCC): Primary | ICD-10-CM

## 2024-05-31 PROCEDURE — 1036F TOBACCO NON-USER: CPT | Performed by: FAMILY MEDICINE

## 2024-05-31 PROCEDURE — 3075F SYST BP GE 130 - 139MM HG: CPT | Performed by: FAMILY MEDICINE

## 2024-05-31 PROCEDURE — 99214 OFFICE O/P EST MOD 30 MIN: CPT | Performed by: FAMILY MEDICINE

## 2024-05-31 PROCEDURE — 3017F COLORECTAL CA SCREEN DOC REV: CPT | Performed by: FAMILY MEDICINE

## 2024-05-31 PROCEDURE — 83036 HEMOGLOBIN GLYCOSYLATED A1C: CPT | Performed by: FAMILY MEDICINE

## 2024-05-31 PROCEDURE — 3052F HG A1C>EQUAL 8.0%<EQUAL 9.0%: CPT | Performed by: FAMILY MEDICINE

## 2024-05-31 PROCEDURE — G8399 PT W/DXA RESULTS DOCUMENT: HCPCS | Performed by: FAMILY MEDICINE

## 2024-05-31 PROCEDURE — G8417 CALC BMI ABV UP PARAM F/U: HCPCS | Performed by: FAMILY MEDICINE

## 2024-05-31 PROCEDURE — 3079F DIAST BP 80-89 MM HG: CPT | Performed by: FAMILY MEDICINE

## 2024-05-31 PROCEDURE — 1090F PRES/ABSN URINE INCON ASSESS: CPT | Performed by: FAMILY MEDICINE

## 2024-05-31 PROCEDURE — 2022F DILAT RTA XM EVC RTNOPTHY: CPT | Performed by: FAMILY MEDICINE

## 2024-05-31 PROCEDURE — 1123F ACP DISCUSS/DSCN MKR DOCD: CPT | Performed by: FAMILY MEDICINE

## 2024-05-31 PROCEDURE — G8427 DOCREV CUR MEDS BY ELIG CLIN: HCPCS | Performed by: FAMILY MEDICINE

## 2024-05-31 RX ORDER — DOXYCYCLINE HYCLATE 100 MG
100 TABLET ORAL 2 TIMES DAILY
Qty: 10 TABLET | Refills: 0 | Status: SHIPPED | OUTPATIENT
Start: 2024-05-31 | End: 2024-06-05

## 2024-05-31 NOTE — PROGRESS NOTES
Robb Cannon    4. Primary hypertension  - controlled. Continue current medication.            Plan:   Assessment & Plan   Return in about 3 months (around 8/31/2024) for follow up.    Orders Placed This Encounter   Procedures    Kathleen Rogers MD, DermatologyRobb     Referral Priority:   Routine     Referral Type:   Eval and Treat     Referral Reason:   Specialty Services Required     Referred to Provider:   Kathleen Yen MD     Requested Specialty:   Dermatology     Number of Visits Requested:   1    POCT glycosylated hemoglobin (Hb A1C)     Orders Placed This Encounter   Medications    doxycycline hyclate (VIBRA-TABS) 100 MG tablet     Sig: Take 1 tablet by mouth 2 times daily for 5 days     Dispense:  10 tablet     Refill:  0        Patient given educational materials - see patient instructions.  Discussed use, benefit, and side effects of prescribed medications.  All patient questions answered. Pt voiced understanding. Reviewed healthmaintenance.  Instructed to continue current medications, diet and exercise.  Patient agreed with treatment plan. Follow up as directed.     Electronically signed by Simi Zavala DO on 6/9/2024 at 8:05 PM

## 2024-06-04 ENCOUNTER — OFFICE VISIT (OUTPATIENT)
Dept: PODIATRY | Age: 74
End: 2024-06-04

## 2024-06-04 VITALS — BODY MASS INDEX: 35.61 KG/M2 | WEIGHT: 201 LBS | HEIGHT: 63 IN

## 2024-06-04 DIAGNOSIS — Z98.890 POST-OPERATIVE STATE: Primary | ICD-10-CM

## 2024-06-04 PROCEDURE — 99024 POSTOP FOLLOW-UP VISIT: CPT | Performed by: PODIATRIST

## 2024-06-04 NOTE — PROGRESS NOTES
[facility] PHYSICIANS [facility] PODIATRY  [address] [place], [place] [id number]  Dept: [phone]    POST-OP PROGRESS NOTE  Date of patient's visit: 6/4/2024  Patient's Name:  Apple Jean Baptiste YOB: 1950            Patient Care Team:  Simi Zavala DO as PCP - General (Family Medicine)  Simi Zavala DO as PCP - Empaneled Provider  Anum Max MD as Consulting Physician (Gastroenterology)  Jigna Ribeiro, RN as Ambulatory Care Manager  Jigna Ribeiro, RN as Ambulatory Care Manager        Chief Complaint   Patient presents with    Post-Op Check     Post op x 4 weeks igt       Pt's primary care physician is Simi Zavala DO last seen February 29 2024     Subjective: Apple Jean Baptiste is a 73 y.o. female who presents to the office today 4 week(s)  S/P chemical matrixectomy left great toe for correction of ingrown toenail  Problem List Items Addressed This Visit    None  . Patient relates pain is Present and IMPROVED.  Pain is rated 4 out of 10 and is described as intermittent. Currently denies F/C/N/V.  Is patient taking pain medications as prescribed and is controlling pain no    Physical Examination:  Minimal bleeding post operatively. Edema present. No erythema. No Pus. +serous drainage  to left hallux nail bed.  Operative correction is satisfactory.    Assessment: Apple Jean Baptiste is status post chemical matrixectomy left great toe  Normal post operative course.  Doing well        No diagnosis found.      Plan:  Patient examined and evaluated.  Current condition and treatment options discussed in detail. Verbal and written instructions given to patient. Monitor daily for infection. Keep clean and dry.  Orders: No orders of the defined types were placed in this encounter.     Contact office with any questions/problems/concerns.  RTC in 2 weeks     Electronically signed by Yany Singh DPM on 6/4/2024 at 3:48 PM  6/4/2024

## 2024-06-14 ENCOUNTER — HOSPITAL ENCOUNTER (OUTPATIENT)
Age: 74
Discharge: HOME | End: 2024-06-14
Payer: MEDICARE

## 2024-06-14 DIAGNOSIS — D50.9 MICROCYTIC ANEMIA: ICD-10-CM

## 2024-06-14 DIAGNOSIS — K90.9 IRON MALABSORPTION: ICD-10-CM

## 2024-06-14 DIAGNOSIS — C56.9 MALIGNANT NEOPLASM OF OVARY, UNSPECIFIED LATERALITY (HCC): ICD-10-CM

## 2024-06-14 DIAGNOSIS — D50.8 IRON DEFICIENCY ANEMIA SECONDARY TO INADEQUATE DIETARY IRON INTAKE: ICD-10-CM

## 2024-06-14 DIAGNOSIS — D64.9 LOW HEMOGLOBIN: ICD-10-CM

## 2024-06-14 LAB
BASOPHILS # BLD: 0.06 K/UL (ref 0–0.2)
BASOPHILS NFR BLD: 1 % (ref 0–2)
CANCER AG125 SERPL-ACNC: 5 U/ML (ref 0–38)
EOSINOPHIL # BLD: 0.28 K/UL (ref 0–0.44)
EOSINOPHILS RELATIVE PERCENT: 3 % (ref 1–4)
ERYTHROCYTE [DISTWIDTH] IN BLOOD BY AUTOMATED COUNT: 14.2 % (ref 11.8–14.4)
FERRITIN SERPL-MCNC: 20 NG/ML (ref 13–150)
HCT VFR BLD AUTO: 38.2 % (ref 36.3–47.1)
HGB BLD-MCNC: 12.1 G/DL (ref 11.9–15.1)
IMM GRANULOCYTES # BLD AUTO: <0.03 K/UL (ref 0–0.3)
IMM GRANULOCYTES NFR BLD: 0 %
IRON SATN MFR SERPL: 19 % (ref 20–55)
IRON SERPL-MCNC: 66 UG/DL (ref 37–145)
LYMPHOCYTES NFR BLD: 3.35 K/UL (ref 1.1–3.7)
LYMPHOCYTES RELATIVE PERCENT: 37 % (ref 24–43)
MCH RBC QN AUTO: 27.5 PG (ref 25.2–33.5)
MCHC RBC AUTO-ENTMCNC: 31.7 G/DL (ref 28.4–34.8)
MCV RBC AUTO: 86.8 FL (ref 82.6–102.9)
MONOCYTES NFR BLD: 0.62 K/UL (ref 0.1–1.2)
MONOCYTES NFR BLD: 7 % (ref 3–12)
NEUTROPHILS NFR BLD: 52 % (ref 36–65)
NEUTS SEG NFR BLD: 4.83 K/UL (ref 1.5–8.1)
NRBC BLD-RTO: 0 PER 100 WBC
PLATELET # BLD AUTO: 218 K/UL (ref 138–453)
PMV BLD AUTO: 12.5 FL (ref 8.1–13.5)
RBC # BLD AUTO: 4.4 M/UL (ref 3.95–5.11)
TIBC SERPL-MCNC: 354 UG/DL (ref 250–450)
UNSATURATED IRON BINDING CAPACITY: 288 UG/DL (ref 112–347)
WBC OTHER # BLD: 9.2 K/UL (ref 3.5–11.3)

## 2024-06-14 PROCEDURE — 86304 IMMUNOASSAY TUMOR CA 125: CPT

## 2024-06-14 PROCEDURE — 36415 COLL VENOUS BLD VENIPUNCTURE: CPT

## 2024-06-14 PROCEDURE — 83540 ASSAY OF IRON: CPT

## 2024-06-14 PROCEDURE — 85025 COMPLETE CBC W/AUTO DIFF WBC: CPT

## 2024-06-14 PROCEDURE — 83550 IRON BINDING TEST: CPT

## 2024-06-14 PROCEDURE — 82728 ASSAY OF FERRITIN: CPT

## 2024-06-17 ENCOUNTER — TELEPHONE (OUTPATIENT)
Dept: ONCOLOGY | Age: 74
End: 2024-06-17

## 2024-06-17 ENCOUNTER — OFFICE VISIT (OUTPATIENT)
Dept: ONCOLOGY | Age: 74
End: 2024-06-17
Payer: MEDICARE

## 2024-06-17 VITALS
HEART RATE: 62 BPM | SYSTOLIC BLOOD PRESSURE: 135 MMHG | RESPIRATION RATE: 18 BRPM | BODY MASS INDEX: 36.01 KG/M2 | OXYGEN SATURATION: 97 % | TEMPERATURE: 97.6 F | WEIGHT: 203.3 LBS | DIASTOLIC BLOOD PRESSURE: 82 MMHG

## 2024-06-17 DIAGNOSIS — D50.9 MICROCYTIC ANEMIA: Primary | ICD-10-CM

## 2024-06-17 DIAGNOSIS — D50.8 IRON DEFICIENCY ANEMIA SECONDARY TO INADEQUATE DIETARY IRON INTAKE: ICD-10-CM

## 2024-06-17 DIAGNOSIS — C56.9 MALIGNANT NEOPLASM OF OVARY, UNSPECIFIED LATERALITY (HCC): ICD-10-CM

## 2024-06-17 PROCEDURE — 1036F TOBACCO NON-USER: CPT | Performed by: INTERNAL MEDICINE

## 2024-06-17 PROCEDURE — 3017F COLORECTAL CA SCREEN DOC REV: CPT | Performed by: INTERNAL MEDICINE

## 2024-06-17 PROCEDURE — G8399 PT W/DXA RESULTS DOCUMENT: HCPCS | Performed by: INTERNAL MEDICINE

## 2024-06-17 PROCEDURE — 99214 OFFICE O/P EST MOD 30 MIN: CPT | Performed by: INTERNAL MEDICINE

## 2024-06-17 PROCEDURE — G8427 DOCREV CUR MEDS BY ELIG CLIN: HCPCS | Performed by: INTERNAL MEDICINE

## 2024-06-17 PROCEDURE — 1090F PRES/ABSN URINE INCON ASSESS: CPT | Performed by: INTERNAL MEDICINE

## 2024-06-17 PROCEDURE — G8417 CALC BMI ABV UP PARAM F/U: HCPCS | Performed by: INTERNAL MEDICINE

## 2024-06-17 PROCEDURE — 3075F SYST BP GE 130 - 139MM HG: CPT | Performed by: INTERNAL MEDICINE

## 2024-06-17 PROCEDURE — 99211 OFF/OP EST MAY X REQ PHY/QHP: CPT | Performed by: INTERNAL MEDICINE

## 2024-06-17 PROCEDURE — 3079F DIAST BP 80-89 MM HG: CPT | Performed by: INTERNAL MEDICINE

## 2024-06-17 PROCEDURE — 1123F ACP DISCUSS/DSCN MKR DOCD: CPT | Performed by: INTERNAL MEDICINE

## 2024-06-17 NOTE — TELEPHONE ENCOUNTER
Instructions   from Dr. Kayden Dominguez MD    RV 6 months with CBC, iron studies,  before RV    RV scheduled for 12/9/2024 at 1:00 PM.

## 2024-06-17 NOTE — PROGRESS NOTES
_           Chief Complaint   Patient presents with    Follow-up     6 month     DIAGNOSIS:       Microcytic anemia  Severe iron deficiency anemia  No response to oral iron  Intolerable side effects to oral iron with severe constipation  Positive stool guaiac test in 2 out of 3 symptoms  History of diverticulosis  GERD  History of ovarian cancer in 2015  Strong family history of breast cancer with mother and grandmother and maternal aunt    CURRENT THERAPY:         IV iron infusion December 2021  GI work up negative.   BRIEF CASE HISTORY:      Ms. Apple Jean Baptiste is a very pleasant 73 y.o. female with history of multiple co morbidities as listed.  Patient is referred for further management of anemia.  Patient has history of ovarian cancer in 2015.  She was treated at [facility] with surgery and chemotherapy using Taxol carboplatin.  Patient developed anemia at that time and she received blood transfusions.  She continued to have mild anemia for several years.  Patient had GI evaluation and July 2019.  She had EGD for GERD and she had colonoscopy at that time.  No source of bleeding was identified.  Patient was recently noted to have increasing symptoms related to anemia.  She has significant drop of her hemoglobin not responding to oral iron.  She has generalized weakness and fatigue and dizziness.  She has shortness of breath on exertion.  Patient has significant muscle cramps and ice craving.  Patient denies any vaginal bleeding.  No hematuria.  No hematochezia.  No melena.  No hematemesis.  Stool guaiac test was positive for Hemoccult blood and 2 out of 3 samples.  Patient has left upper abdominal pain on and off for several months.  She has CT scan in November which was negative.  Patient denies smoking or alcohol drinking..     INTERIM HISTORY:   Seen for follow up anemia.  No weakness or fatigue.  No dizziness.  No

## 2024-06-20 ENCOUNTER — CARE COORDINATION (OUTPATIENT)
Dept: CARE COORDINATION | Age: 74
End: 2024-06-20

## 2024-07-08 ENCOUNTER — APPOINTMENT (OUTPATIENT)
Dept: CT IMAGING | Age: 74
End: 2024-07-08
Payer: MEDICARE

## 2024-07-08 ENCOUNTER — HOSPITAL ENCOUNTER (INPATIENT)
Age: 74
LOS: 3 days | Discharge: NURSING, REHAB OR LONG-TERM CARE | End: 2024-07-11
Attending: EMERGENCY MEDICINE | Admitting: HOSPITALIST
Payer: MEDICARE

## 2024-07-08 ENCOUNTER — OFFICE VISIT (OUTPATIENT)
Dept: PRIMARY CARE CLINIC | Age: 74
End: 2024-07-08
Payer: MEDICARE

## 2024-07-08 VITALS — HEART RATE: 99 BPM | SYSTOLIC BLOOD PRESSURE: 142 MMHG | DIASTOLIC BLOOD PRESSURE: 88 MMHG | OXYGEN SATURATION: 96 %

## 2024-07-08 DIAGNOSIS — Z79.4 TYPE 2 DIABETES MELLITUS WITH DIABETIC NEUROPATHY, WITH LONG-TERM CURRENT USE OF INSULIN (HCC): ICD-10-CM

## 2024-07-08 DIAGNOSIS — S32.040A COMPRESSION FRACTURE OF L4 VERTEBRA, INITIAL ENCOUNTER (HCC): ICD-10-CM

## 2024-07-08 DIAGNOSIS — M47.817 LUMBOSACRAL SPONDYLOSIS WITHOUT MYELOPATHY: Chronic | ICD-10-CM

## 2024-07-08 DIAGNOSIS — S22.080A COMPRESSION FRACTURE OF T12 VERTEBRA, INITIAL ENCOUNTER (HCC): Primary | ICD-10-CM

## 2024-07-08 DIAGNOSIS — E11.40 TYPE 2 DIABETES MELLITUS WITH DIABETIC NEUROPATHY, WITH LONG-TERM CURRENT USE OF INSULIN (HCC): ICD-10-CM

## 2024-07-08 DIAGNOSIS — S22.080D COMPRESSION FRACTURE OF T12 VERTEBRA WITH ROUTINE HEALING, SUBSEQUENT ENCOUNTER: ICD-10-CM

## 2024-07-08 DIAGNOSIS — N30.01 ACUTE CYSTITIS WITH HEMATURIA: ICD-10-CM

## 2024-07-08 DIAGNOSIS — M54.9 SEVERE BACK PAIN: Primary | ICD-10-CM

## 2024-07-08 LAB
ALBUMIN SERPL-MCNC: 4.2 G/DL (ref 3.5–5.2)
ALBUMIN/GLOB SERPL: 1.2 {RATIO} (ref 1–2.5)
ALP SERPL-CCNC: 86 U/L (ref 35–104)
ALT SERPL-CCNC: 19 U/L (ref 5–33)
ANION GAP SERPL CALCULATED.3IONS-SCNC: 12 MMOL/L (ref 9–17)
AST SERPL-CCNC: 21 U/L
BACTERIA URNS QL MICRO: ABNORMAL
BASOPHILS # BLD: 0.1 K/UL (ref 0–0.2)
BASOPHILS NFR BLD: 1 % (ref 0–2)
BILIRUB SERPL-MCNC: 0.5 MG/DL (ref 0.3–1.2)
BILIRUB UR QL STRIP: NEGATIVE
BUN SERPL-MCNC: 23 MG/DL (ref 8–23)
CALCIUM SERPL-MCNC: 9.4 MG/DL (ref 8.6–10.4)
CHLORIDE SERPL-SCNC: 102 MMOL/L (ref 98–107)
CLARITY UR: CLEAR
CO2 SERPL-SCNC: 25 MMOL/L (ref 20–31)
COLOR UR: YELLOW
CREAT SERPL-MCNC: 1 MG/DL (ref 0.5–0.9)
EOSINOPHIL # BLD: 0.3 K/UL (ref 0–0.4)
EOSINOPHILS RELATIVE PERCENT: 3 % (ref 1–4)
EPI CELLS #/AREA URNS HPF: ABNORMAL /HPF (ref 0–5)
ERYTHROCYTE [DISTWIDTH] IN BLOOD BY AUTOMATED COUNT: 14.7 % (ref 12.5–15.4)
GFR, ESTIMATED: 60 ML/MIN/1.73M2
GLUCOSE BLD-MCNC: 165 MG/DL (ref 65–105)
GLUCOSE SERPL-MCNC: 232 MG/DL (ref 70–99)
GLUCOSE UR STRIP-MCNC: NEGATIVE MG/DL
HCT VFR BLD AUTO: 38.6 % (ref 36–46)
HGB BLD-MCNC: 12.8 G/DL (ref 12–16)
HGB UR QL STRIP.AUTO: NEGATIVE
KETONES UR STRIP-MCNC: NEGATIVE MG/DL
LEUKOCYTE ESTERASE UR QL STRIP: ABNORMAL
LYMPHOCYTES NFR BLD: 2.2 K/UL (ref 1–4.8)
LYMPHOCYTES RELATIVE PERCENT: 22 % (ref 24–44)
MCH RBC QN AUTO: 27.6 PG (ref 26–34)
MCHC RBC AUTO-ENTMCNC: 33.2 G/DL (ref 31–37)
MCV RBC AUTO: 83.1 FL (ref 80–100)
MONOCYTES NFR BLD: 0.4 K/UL (ref 0.1–1.2)
MONOCYTES NFR BLD: 4 % (ref 2–11)
NEUTROPHILS NFR BLD: 70 % (ref 36–66)
NEUTS SEG NFR BLD: 7 K/UL (ref 1.8–7.7)
NITRITE UR QL STRIP: NEGATIVE
PH UR STRIP: 6 [PH] (ref 5–8)
PLATELET # BLD AUTO: 216 K/UL (ref 140–450)
PMV BLD AUTO: 9.7 FL (ref 6–12)
POTASSIUM SERPL-SCNC: 3.7 MMOL/L (ref 3.7–5.3)
PROT SERPL-MCNC: 7.6 G/DL (ref 6.4–8.3)
PROT UR STRIP-MCNC: NEGATIVE MG/DL
RBC # BLD AUTO: 4.65 M/UL (ref 4–5.2)
RBC #/AREA URNS HPF: ABNORMAL /HPF (ref 0–2)
SODIUM SERPL-SCNC: 139 MMOL/L (ref 135–144)
SP GR UR STRIP: 1.02 (ref 1–1.03)
UROBILINOGEN UR STRIP-ACNC: NORMAL EU/DL (ref 0–1)
WBC #/AREA URNS HPF: ABNORMAL /HPF (ref 0–5)
WBC OTHER # BLD: 9.9 K/UL (ref 3.5–11)

## 2024-07-08 PROCEDURE — 1036F TOBACCO NON-USER: CPT | Performed by: FAMILY MEDICINE

## 2024-07-08 PROCEDURE — 72128 CT CHEST SPINE W/O DYE: CPT

## 2024-07-08 PROCEDURE — 74177 CT ABD & PELVIS W/CONTRAST: CPT

## 2024-07-08 PROCEDURE — 6360000002 HC RX W HCPCS: Performed by: NURSE PRACTITIONER

## 2024-07-08 PROCEDURE — 87086 URINE CULTURE/COLONY COUNT: CPT

## 2024-07-08 PROCEDURE — G8417 CALC BMI ABV UP PARAM F/U: HCPCS | Performed by: FAMILY MEDICINE

## 2024-07-08 PROCEDURE — 2580000003 HC RX 258: Performed by: HOSPITALIST

## 2024-07-08 PROCEDURE — 96375 TX/PRO/DX INJ NEW DRUG ADDON: CPT

## 2024-07-08 PROCEDURE — 81001 URINALYSIS AUTO W/SCOPE: CPT

## 2024-07-08 PROCEDURE — 96374 THER/PROPH/DIAG INJ IV PUSH: CPT

## 2024-07-08 PROCEDURE — 3052F HG A1C>EQUAL 8.0%<EQUAL 9.0%: CPT | Performed by: FAMILY MEDICINE

## 2024-07-08 PROCEDURE — 1123F ACP DISCUSS/DSCN MKR DOCD: CPT | Performed by: FAMILY MEDICINE

## 2024-07-08 PROCEDURE — 99222 1ST HOSP IP/OBS MODERATE 55: CPT | Performed by: HOSPITALIST

## 2024-07-08 PROCEDURE — 2580000003 HC RX 258: Performed by: NURSE PRACTITIONER

## 2024-07-08 PROCEDURE — 2022F DILAT RTA XM EVC RTNOPTHY: CPT | Performed by: FAMILY MEDICINE

## 2024-07-08 PROCEDURE — 36415 COLL VENOUS BLD VENIPUNCTURE: CPT

## 2024-07-08 PROCEDURE — 80053 COMPREHEN METABOLIC PANEL: CPT

## 2024-07-08 PROCEDURE — G8427 DOCREV CUR MEDS BY ELIG CLIN: HCPCS | Performed by: FAMILY MEDICINE

## 2024-07-08 PROCEDURE — 6370000000 HC RX 637 (ALT 250 FOR IP): Performed by: HOSPITALIST

## 2024-07-08 PROCEDURE — 1210000000 HC MED SURG R&B

## 2024-07-08 PROCEDURE — 96372 THER/PROPH/DIAG INJ SC/IM: CPT | Performed by: FAMILY MEDICINE

## 2024-07-08 PROCEDURE — 99213 OFFICE O/P EST LOW 20 MIN: CPT | Performed by: FAMILY MEDICINE

## 2024-07-08 PROCEDURE — 72131 CT LUMBAR SPINE W/O DYE: CPT

## 2024-07-08 PROCEDURE — 96376 TX/PRO/DX INJ SAME DRUG ADON: CPT

## 2024-07-08 PROCEDURE — G8399 PT W/DXA RESULTS DOCUMENT: HCPCS | Performed by: FAMILY MEDICINE

## 2024-07-08 PROCEDURE — 99285 EMERGENCY DEPT VISIT HI MDM: CPT

## 2024-07-08 PROCEDURE — 3077F SYST BP >= 140 MM HG: CPT | Performed by: FAMILY MEDICINE

## 2024-07-08 PROCEDURE — 3017F COLORECTAL CA SCREEN DOC REV: CPT | Performed by: FAMILY MEDICINE

## 2024-07-08 PROCEDURE — 85025 COMPLETE CBC W/AUTO DIFF WBC: CPT

## 2024-07-08 PROCEDURE — 6360000002 HC RX W HCPCS: Performed by: HOSPITALIST

## 2024-07-08 PROCEDURE — 82947 ASSAY GLUCOSE BLOOD QUANT: CPT

## 2024-07-08 PROCEDURE — 6360000004 HC RX CONTRAST MEDICATION: Performed by: NURSE PRACTITIONER

## 2024-07-08 PROCEDURE — 3079F DIAST BP 80-89 MM HG: CPT | Performed by: FAMILY MEDICINE

## 2024-07-08 PROCEDURE — 1090F PRES/ABSN URINE INCON ASSESS: CPT | Performed by: FAMILY MEDICINE

## 2024-07-08 RX ORDER — SUCRALFATE 1 G/1
1 TABLET ORAL 4 TIMES DAILY
Status: DISCONTINUED | OUTPATIENT
Start: 2024-07-09 | End: 2024-07-11 | Stop reason: HOSPADM

## 2024-07-08 RX ORDER — INSULIN GLARGINE 100 [IU]/ML
30 INJECTION, SOLUTION SUBCUTANEOUS 2 TIMES DAILY
Status: DISCONTINUED | OUTPATIENT
Start: 2024-07-08 | End: 2024-07-11 | Stop reason: HOSPADM

## 2024-07-08 RX ORDER — ATORVASTATIN CALCIUM 40 MG/1
40 TABLET, FILM COATED ORAL DAILY
Status: DISCONTINUED | OUTPATIENT
Start: 2024-07-09 | End: 2024-07-11 | Stop reason: HOSPADM

## 2024-07-08 RX ORDER — INSULIN LISPRO 100 [IU]/ML
0-4 INJECTION, SOLUTION INTRAVENOUS; SUBCUTANEOUS NIGHTLY
Status: DISCONTINUED | OUTPATIENT
Start: 2024-07-08 | End: 2024-07-11 | Stop reason: HOSPADM

## 2024-07-08 RX ORDER — ACETAMINOPHEN 325 MG/1
650 TABLET ORAL EVERY 6 HOURS PRN
Status: DISCONTINUED | OUTPATIENT
Start: 2024-07-08 | End: 2024-07-11 | Stop reason: HOSPADM

## 2024-07-08 RX ORDER — ASCORBIC ACID 500 MG
500 TABLET ORAL DAILY
Status: DISCONTINUED | OUTPATIENT
Start: 2024-07-09 | End: 2024-07-11 | Stop reason: HOSPADM

## 2024-07-08 RX ORDER — GABAPENTIN 300 MG/1
600 CAPSULE ORAL 4 TIMES DAILY
Status: DISCONTINUED | OUTPATIENT
Start: 2024-07-08 | End: 2024-07-11 | Stop reason: HOSPADM

## 2024-07-08 RX ORDER — 0.9 % SODIUM CHLORIDE 0.9 %
80 INTRAVENOUS SOLUTION INTRAVENOUS ONCE
Status: DISCONTINUED | OUTPATIENT
Start: 2024-07-08 | End: 2024-07-11 | Stop reason: HOSPADM

## 2024-07-08 RX ORDER — CETIRIZINE HYDROCHLORIDE 10 MG/1
10 TABLET ORAL DAILY
Status: DISCONTINUED | OUTPATIENT
Start: 2024-07-09 | End: 2024-07-11 | Stop reason: HOSPADM

## 2024-07-08 RX ORDER — SODIUM CHLORIDE 9 MG/ML
INJECTION, SOLUTION INTRAVENOUS PRN
Status: DISCONTINUED | OUTPATIENT
Start: 2024-07-08 | End: 2024-07-11 | Stop reason: HOSPADM

## 2024-07-08 RX ORDER — CARVEDILOL 3.12 MG/1
6.25 TABLET ORAL 2 TIMES DAILY
Status: DISCONTINUED | OUTPATIENT
Start: 2024-07-08 | End: 2024-07-11 | Stop reason: HOSPADM

## 2024-07-08 RX ORDER — METHYLPREDNISOLONE ACETATE 40 MG/ML
40 INJECTION, SUSPENSION INTRA-ARTICULAR; INTRALESIONAL; INTRAMUSCULAR; SOFT TISSUE ONCE
Status: COMPLETED | OUTPATIENT
Start: 2024-07-08 | End: 2024-07-08

## 2024-07-08 RX ORDER — ACETAMINOPHEN 650 MG/1
650 SUPPOSITORY RECTAL EVERY 6 HOURS PRN
Status: DISCONTINUED | OUTPATIENT
Start: 2024-07-08 | End: 2024-07-11 | Stop reason: HOSPADM

## 2024-07-08 RX ORDER — LEVOTHYROXINE SODIUM 88 UG/1
88 TABLET ORAL DAILY
Status: DISCONTINUED | OUTPATIENT
Start: 2024-07-09 | End: 2024-07-11 | Stop reason: HOSPADM

## 2024-07-08 RX ORDER — POLYETHYLENE GLYCOL 3350 17 G/17G
17 POWDER, FOR SOLUTION ORAL DAILY PRN
Status: DISCONTINUED | OUTPATIENT
Start: 2024-07-08 | End: 2024-07-11 | Stop reason: HOSPADM

## 2024-07-08 RX ORDER — DULOXETIN HYDROCHLORIDE 30 MG/1
60 CAPSULE, DELAYED RELEASE ORAL DAILY
Status: DISCONTINUED | OUTPATIENT
Start: 2024-07-09 | End: 2024-07-11 | Stop reason: HOSPADM

## 2024-07-08 RX ORDER — LANOLIN ALCOHOL/MO/W.PET/CERES
400 CREAM (GRAM) TOPICAL DAILY
Status: DISCONTINUED | OUTPATIENT
Start: 2024-07-09 | End: 2024-07-11 | Stop reason: HOSPADM

## 2024-07-08 RX ORDER — SODIUM CHLORIDE 0.9 % (FLUSH) 0.9 %
10 SYRINGE (ML) INJECTION ONCE
Status: COMPLETED | OUTPATIENT
Start: 2024-07-08 | End: 2024-07-08

## 2024-07-08 RX ORDER — SODIUM CHLORIDE 0.9 % (FLUSH) 0.9 %
5-40 SYRINGE (ML) INJECTION PRN
Status: DISCONTINUED | OUTPATIENT
Start: 2024-07-08 | End: 2024-07-11 | Stop reason: HOSPADM

## 2024-07-08 RX ORDER — PANTOPRAZOLE SODIUM 40 MG/1
40 TABLET, DELAYED RELEASE ORAL
Status: DISCONTINUED | OUTPATIENT
Start: 2024-07-09 | End: 2024-07-11 | Stop reason: HOSPADM

## 2024-07-08 RX ORDER — TAMSULOSIN HYDROCHLORIDE 0.4 MG/1
0.4 CAPSULE ORAL NIGHTLY
Status: DISCONTINUED | OUTPATIENT
Start: 2024-07-08 | End: 2024-07-11 | Stop reason: HOSPADM

## 2024-07-08 RX ORDER — POTASSIUM CHLORIDE 20 MEQ/1
20 TABLET, EXTENDED RELEASE ORAL
Status: DISCONTINUED | OUTPATIENT
Start: 2024-07-09 | End: 2024-07-11 | Stop reason: HOSPADM

## 2024-07-08 RX ORDER — INSULIN LISPRO 100 [IU]/ML
0-16 INJECTION, SOLUTION INTRAVENOUS; SUBCUTANEOUS
Status: DISCONTINUED | OUTPATIENT
Start: 2024-07-09 | End: 2024-07-11 | Stop reason: HOSPADM

## 2024-07-08 RX ORDER — TRAZODONE HYDROCHLORIDE 100 MG/1
100 TABLET ORAL NIGHTLY PRN
Status: DISCONTINUED | OUTPATIENT
Start: 2024-07-08 | End: 2024-07-11 | Stop reason: HOSPADM

## 2024-07-08 RX ORDER — MORPHINE SULFATE 4 MG/ML
4 INJECTION, SOLUTION INTRAMUSCULAR; INTRAVENOUS ONCE
Status: COMPLETED | OUTPATIENT
Start: 2024-07-08 | End: 2024-07-08

## 2024-07-08 RX ORDER — LEVOFLOXACIN 5 MG/ML
750 INJECTION, SOLUTION INTRAVENOUS EVERY 24 HOURS
Status: DISCONTINUED | OUTPATIENT
Start: 2024-07-08 | End: 2024-07-09

## 2024-07-08 RX ORDER — HYDROCHLOROTHIAZIDE 25 MG/1
25 TABLET ORAL EVERY MORNING
Status: DISCONTINUED | OUTPATIENT
Start: 2024-07-09 | End: 2024-07-11 | Stop reason: HOSPADM

## 2024-07-08 RX ORDER — GLUCAGON 1 MG/ML
1 KIT INJECTION PRN
Status: DISCONTINUED | OUTPATIENT
Start: 2024-07-08 | End: 2024-07-11 | Stop reason: HOSPADM

## 2024-07-08 RX ORDER — ENOXAPARIN SODIUM 100 MG/ML
40 INJECTION SUBCUTANEOUS DAILY
Status: DISCONTINUED | OUTPATIENT
Start: 2024-07-09 | End: 2024-07-11 | Stop reason: HOSPADM

## 2024-07-08 RX ORDER — HYDRALAZINE HYDROCHLORIDE 20 MG/ML
10 INJECTION INTRAMUSCULAR; INTRAVENOUS EVERY 6 HOURS PRN
Status: DISCONTINUED | OUTPATIENT
Start: 2024-07-08 | End: 2024-07-11 | Stop reason: HOSPADM

## 2024-07-08 RX ORDER — LOSARTAN POTASSIUM 50 MG/1
100 TABLET ORAL DAILY
Status: DISCONTINUED | OUTPATIENT
Start: 2024-07-09 | End: 2024-07-11 | Stop reason: HOSPADM

## 2024-07-08 RX ORDER — SODIUM CHLORIDE 0.9 % (FLUSH) 0.9 %
5-40 SYRINGE (ML) INJECTION EVERY 12 HOURS SCHEDULED
Status: DISCONTINUED | OUTPATIENT
Start: 2024-07-08 | End: 2024-07-11 | Stop reason: HOSPADM

## 2024-07-08 RX ORDER — ONDANSETRON 2 MG/ML
4 INJECTION INTRAMUSCULAR; INTRAVENOUS ONCE
Status: COMPLETED | OUTPATIENT
Start: 2024-07-08 | End: 2024-07-08

## 2024-07-08 RX ORDER — DEXTROSE MONOHYDRATE 100 MG/ML
INJECTION, SOLUTION INTRAVENOUS CONTINUOUS PRN
Status: DISCONTINUED | OUTPATIENT
Start: 2024-07-08 | End: 2024-07-11 | Stop reason: HOSPADM

## 2024-07-08 RX ORDER — ONDANSETRON 2 MG/ML
4 INJECTION INTRAMUSCULAR; INTRAVENOUS EVERY 6 HOURS PRN
Status: DISCONTINUED | OUTPATIENT
Start: 2024-07-08 | End: 2024-07-11 | Stop reason: HOSPADM

## 2024-07-08 RX ORDER — CALCIUM CARBONATE 500 MG/1
1 TABLET, CHEWABLE ORAL DAILY PRN
Status: DISCONTINUED | OUTPATIENT
Start: 2024-07-08 | End: 2024-07-11 | Stop reason: HOSPADM

## 2024-07-08 RX ORDER — ONDANSETRON 4 MG/1
4 TABLET, ORALLY DISINTEGRATING ORAL EVERY 8 HOURS PRN
Status: DISCONTINUED | OUTPATIENT
Start: 2024-07-08 | End: 2024-07-11 | Stop reason: HOSPADM

## 2024-07-08 RX ADMIN — TAMSULOSIN HYDROCHLORIDE 0.4 MG: 0.4 CAPSULE ORAL at 20:33

## 2024-07-08 RX ADMIN — TRAZODONE HYDROCHLORIDE 100 MG: 100 TABLET ORAL at 23:45

## 2024-07-08 RX ADMIN — Medication 100 ML: at 14:17

## 2024-07-08 RX ADMIN — MORPHINE SULFATE 4 MG: 4 INJECTION INTRAVENOUS at 13:20

## 2024-07-08 RX ADMIN — SODIUM CHLORIDE, PRESERVATIVE FREE 10 ML: 5 INJECTION INTRAVENOUS at 23:45

## 2024-07-08 RX ADMIN — GABAPENTIN 600 MG: 300 CAPSULE ORAL at 20:32

## 2024-07-08 RX ADMIN — HYDROMORPHONE HYDROCHLORIDE 0.5 MG: 1 INJECTION, SOLUTION INTRAMUSCULAR; INTRAVENOUS; SUBCUTANEOUS at 14:13

## 2024-07-08 RX ADMIN — ACETAMINOPHEN 650 MG: 325 TABLET ORAL at 23:45

## 2024-07-08 RX ADMIN — LEVOFLOXACIN 750 MG: 5 INJECTION, SOLUTION INTRAVENOUS at 17:59

## 2024-07-08 RX ADMIN — IOPAMIDOL 75 ML: 755 INJECTION, SOLUTION INTRAVENOUS at 14:17

## 2024-07-08 RX ADMIN — HYDROMORPHONE HYDROCHLORIDE 0.5 MG: 1 INJECTION, SOLUTION INTRAMUSCULAR; INTRAVENOUS; SUBCUTANEOUS at 19:23

## 2024-07-08 RX ADMIN — SODIUM CHLORIDE, PRESERVATIVE FREE 10 ML: 5 INJECTION INTRAVENOUS at 14:17

## 2024-07-08 RX ADMIN — INSULIN GLARGINE 30 UNITS: 100 INJECTION, SOLUTION SUBCUTANEOUS at 23:45

## 2024-07-08 RX ADMIN — CARVEDILOL 6.25 MG: 3.12 TABLET, FILM COATED ORAL at 23:45

## 2024-07-08 RX ADMIN — HYDROMORPHONE HYDROCHLORIDE 0.5 MG: 1 INJECTION, SOLUTION INTRAMUSCULAR; INTRAVENOUS; SUBCUTANEOUS at 22:29

## 2024-07-08 RX ADMIN — HYDROMORPHONE HYDROCHLORIDE 0.5 MG: 1 INJECTION, SOLUTION INTRAMUSCULAR; INTRAVENOUS; SUBCUTANEOUS at 15:47

## 2024-07-08 RX ADMIN — ONDANSETRON 4 MG: 2 INJECTION INTRAMUSCULAR; INTRAVENOUS at 13:16

## 2024-07-08 RX ADMIN — METHYLPREDNISOLONE ACETATE 40 MG: 40 INJECTION, SUSPENSION INTRA-ARTICULAR; INTRALESIONAL; INTRAMUSCULAR; SOFT TISSUE at 11:13

## 2024-07-08 ASSESSMENT — PAIN SCALES - GENERAL
PAINLEVEL_OUTOF10: 7
PAINLEVEL_OUTOF10: 10
PAINLEVEL_OUTOF10: 10
PAINLEVEL_OUTOF10: 6
PAINLEVEL_OUTOF10: 10
PAINLEVEL_OUTOF10: 4
PAINLEVEL_OUTOF10: 10

## 2024-07-08 ASSESSMENT — ENCOUNTER SYMPTOMS
VOMITING: 0
SHORTNESS OF BREATH: 0
BACK PAIN: 1
SORE THROAT: 0
ABDOMINAL PAIN: 1
NAUSEA: 1
DIARRHEA: 0
VOMITING: 0
COUGH: 0

## 2024-07-08 ASSESSMENT — PAIN - FUNCTIONAL ASSESSMENT: PAIN_FUNCTIONAL_ASSESSMENT: 0-10

## 2024-07-08 ASSESSMENT — PAIN DESCRIPTION - LOCATION
LOCATION: BACK

## 2024-07-08 ASSESSMENT — PAIN DESCRIPTION - ORIENTATION: ORIENTATION: RIGHT

## 2024-07-08 NOTE — ED PROVIDER NOTES
[address]  EMERGENCY DEPARTMENT ENCOUNTER      Pt Name: Apple Jean Baptiste  MRN: [id number]  Birthdate 1950  Date of evaluation: 7/8/2024  Provider: LUPE Moise CNP  10:08 AM    CHIEF COMPLAINT       Chief Complaint   Patient presents with    Back Pain    Fall     Pt arrives dt co fall on 7/3/24 injuring back. Pt states she did go to [facility] for evaluation and was told everything was normal and would be getting donna pain wise. Pt states the pain has become more severe. Pt does have pain pump with fentanyl for chronic back pain          HISTORY OF PRESENT ILLNESS    Apple Jean Baptiste is a 73 y.o. female who presents to the emergency department      This is a nontoxic-appearing 73-year-old female presenting via private auto with female  by her side, the patient was referred to the emergency department by her family care provider, the patient had a mechanical trip and fall on 7/3/2024 described as falling backwards landing on her buttocks back and she hit her head on the cupboard, she was unable to get up and called EMS and was evaluated at [facility] ER; patient is in moderate pain distress on arrival and unable to state exactly which scans that she had completed she does have chronic pain, follows with pain management and specialist also at [facility] has a pain pump that she states has fentanyl and a muscle relaxant and it; the patient states that she is anticoagulated with Xarelto due to previous history of venous thrombosis, she does not have any headaches since the fall, and she believes that her head was scanned at [facility], the patient states she is having severe mid and lower back pain with radiculopathy to the right hip, and she is also having lower abdominal pain, she has had no recent fevers chills; states she was at her family care provider office prior to the emergency department and the family care provider stated that she needs to be reevaluated in the    Vaping Use   • Vaping Use: Never used   Substance and Sexual Activity   • Alcohol use: Yes     Comment: rare   • Drug use: No   • Sexual activity: Yes     Social Determinants of Health     Financial Resource Strain: Low Risk  (2/29/2024)    Overall Financial Resource Strain (CARDIA)    • Difficulty of Paying Living Expenses: Not hard at all   Food Insecurity: No Food Insecurity (2/29/2024)    Hunger Vital Sign    • Worried About Running Out of Food in the Last Year: Never true    • Ran Out of Food in the Last Year: Never true   Transportation Needs: Unknown (2/29/2024)    PRAPARE - Transportation    • Lack of Transportation (Non-Medical): No   Physical Activity: Inactive (2/28/2024)    Exercise Vital Sign    • Days of Exercise per Week: 0 days    • Minutes of Exercise per Session: 0 min   Stress: No Stress Concern Present (5/12/2023)    Citizen of [place] of Occupational Health - Occupational Stress Questionnaire    • Feeling of Stress : Not at all   Social Connections: Socially Integrated (5/12/2023)    Social Connection and Isolation Panel [NHANES]    • Frequency of Communication with Friends and Family: More than three times a week    • Frequency of Social Gatherings with Friends and Family: More than three times a week    • Attends Sabianist Services: 1 to 4 times per year    • Active Member of Clubs or Organizations: No    • Attends Club or Organization Meetings: More than 4 times per year    • Marital Status:    Intimate Partner Violence: Not At Risk (12/1/2023)    Humiliation, Afraid, Rape, and Kick questionnaire    • Fear of Current or Ex-Partner: No    • Emotionally Abused: No    • Physically Abused: No    • Sexually Abused: No   Housing Stability: Unknown (2/29/2024)    Housing Stability Vital Sign    • Unstable Housing in the Last Year: No       SCREENINGS         Latricia Coma Scale  Eye Opening: Spontaneous  Best Verbal Response: Oriented  Best Motor Response: Obeys commands  Latricia Coma Scale
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
## 2024-07-08 NOTE — PROGRESS NOTES
All patient questions answered. Pt voiced understanding. Reviewed healthmaintenance.  Instructed to continue current medications, diet and exercise.  Patient agreed with treatment plan. Follow up as directed.     Electronically signed by Simi Zavala DO on 7/8/2024 at 11:33 AM

## 2024-07-08 NOTE — ED PROVIDER NOTES
[facility] EMERGENCY DEPARTMENT  eMERGENCY dEPARTMENT eNCOUnter   Independent Attestation     Pt Name: Apple Jean Baptiste  MRN: [id number]  Birthdate 1950  Date of evaluation: 7/8/24       Apple Jean Baptiste is a 73 y.o. female who presents with Back Pain and Fall (Pt arrives dt co fall on 7/3/24 injuring back. Pt states she did go to [facility] for evaluation and was told everything was normal and would be getting donna pain wise. Pt states the pain has become more severe. Pt does have pain pump with fentanyl for chronic back pain )        Based on the medical record, the care appears appropriate. I was personally available for consultation in the Emergency Department.    Juan Joe MD  Attending Emergency  Physician                Juan Joe MD  07/08/24 2009

## 2024-07-08 NOTE — H&P
Basophils % 1 0 - 2 %    Neutrophils Absolute 7.00 1.8 - 7.7 k/uL    Lymphocytes Absolute 2.20 1.0 - 4.8 k/uL    Monocytes Absolute 0.40 0.1 - 1.2 k/uL    Eosinophils Absolute 0.30 0.0 - 0.4 k/uL    Basophils Absolute 0.10 0.0 - 0.2 k/uL   CMP    Collection Time: 07/08/24 12:27 PM   Result Value Ref Range    Sodium 139 135 - 144 mmol/L    Potassium 3.7 3.7 - 5.3 mmol/L    Chloride 102 98 - 107 mmol/L    CO2 25 20 - 31 mmol/L    Anion Gap 12 9 - 17 mmol/L    Glucose 232 (H) 70 - 99 mg/dL    BUN 23 8 - 23 mg/dL    Creatinine 1.0 (H) 0.5 - 0.9 mg/dL    Est, Glom Filt Rate 60 (L) >60 mL/min/1.73m2    Calcium 9.4 8.6 - 10.4 mg/dL    Total Protein 7.6 6.4 - 8.3 g/dL    Albumin 4.2 3.5 - 5.2 g/dL    Albumin/Globulin Ratio 1.2 1.0 - 2.5    Total Bilirubin 0.5 0.3 - 1.2 mg/dL    Alkaline Phosphatase 86 35 - 104 U/L    ALT 19 5 - 33 U/L    AST 21 <32 U/L       Imaging/Diagnostics:  CT THORACIC SPINE WO CONTRAST    Result Date: 7/8/2024  1. T12 compression injury involving superior endplate with mild retropulsion. This is likely acute. 2. L4 superior endplate compression with mild compression injury. Acuity indeterminate. 3. No evidence of traumatic injury to the abdomen or pelvis.  No hemoperitoneum. 4. Right nephrolithiasis with mild right hydronephrosis. No evidence of ureteric stones. 5. Status post cholecystectomy and hysterectomy. 6. No acute pelvic traumatic injury.     CT ABDOMEN PELVIS W IV CONTRAST Additional Contrast? None    Result Date: 7/8/2024  1. T12 compression injury involving superior endplate with mild retropulsion. This is likely acute. 2. L4 superior endplate compression with mild compression injury. Acuity indeterminate. 3. No evidence of traumatic injury to the abdomen or pelvis.  No hemoperitoneum. 4. Right nephrolithiasis with mild right hydronephrosis. No evidence of ureteric stones. 5. Status post cholecystectomy and hysterectomy. 6. No acute pelvic traumatic injury.     CT LUMBAR SPINE WO  CONTRAST    Result Date: 7/8/2024  Acute superior endplate fracture at T12 with mild loss of vertebral body height. Stable remote compression deformity at L3 and L4.     XR PELVIS (1-2 VW)    Result Date: 7/4/2024  No acute bony abnormalities on single AP view of the pelvis.    CT LUMBAR SPINE WO CONTRAST    Result Date: 7/4/2024  No acute bony abnormalities demonstrated.    CT HEAD W WO CONTRAST    Result Date: 7/4/2024  No acute intracranial abnormalities demonstrated.      Assessment :      Hospital Problems             Last Modified POA    * (Principal) Back pain due to injury 7/8/2024 Yes       Plan:     Intractable back pain  Secondary to acute fall  Dilaudid for breakthrough pain  Orthopedic surgery consult  Acute T12 compression fracture  Orthopedic surgery consult  Possible acute cystitis without hematuria  Urinalysis weakly positive for possible infection  Levaquin for now  History of pulmonary embolism  Okay to hold Xarelto for possible kyphoplasty  Lovenox 40 mg subcu daily  Diabetes mellitus  Resume Lantus  High intensity sliding scale with hypoglycemia protocol  Acute on chronic pain  Patient has a pain pump, continue home dosing of Neurontin  Dilaudid for breakthrough pain for now  Essential hypertension  Resume home medications  As needed hydralazine  Gastroesophageal reflux disease  Continue PPI  Hypothyroidism  Continue Synthroid    Patient is admitted as inpatient status because of co-morbidities listed above, severity of signs and symptoms as outlined, requirement for current medical therapies and most importantly because of direct risk to patient if care not provided in a hospital setting.  Expected length of stay > 48 hours. Patient is admitted in the Med/Surge    Medical Decision Making: Raeann Lnog DO  7/8/2024  5:01 PM    Copy sent to Simi Krueger DO

## 2024-07-09 ENCOUNTER — APPOINTMENT (OUTPATIENT)
Dept: GENERAL RADIOLOGY | Age: 74
End: 2024-07-09
Payer: MEDICARE

## 2024-07-09 ENCOUNTER — ANESTHESIA EVENT (OUTPATIENT)
Dept: OPERATING ROOM | Age: 74
End: 2024-07-09
Payer: MEDICARE

## 2024-07-09 ENCOUNTER — ANESTHESIA (OUTPATIENT)
Dept: OPERATING ROOM | Age: 74
End: 2024-07-09
Payer: MEDICARE

## 2024-07-09 ENCOUNTER — APPOINTMENT (OUTPATIENT)
Dept: NUCLEAR MEDICINE | Age: 74
End: 2024-07-09
Payer: MEDICARE

## 2024-07-09 PROBLEM — S22.080A T12 COMPRESSION FRACTURE (HCC): Status: ACTIVE | Noted: 2024-07-09

## 2024-07-09 LAB
GLUCOSE BLD-MCNC: 146 MG/DL (ref 65–105)
GLUCOSE BLD-MCNC: 146 MG/DL (ref 65–105)
GLUCOSE BLD-MCNC: 147 MG/DL (ref 65–105)
GLUCOSE BLD-MCNC: 196 MG/DL (ref 65–105)
MICROORGANISM SPEC CULT: NORMAL
SPECIMEN DESCRIPTION: NORMAL

## 2024-07-09 PROCEDURE — 3430000000 HC RX DIAGNOSTIC RADIOPHARMACEUTICAL: Performed by: STUDENT IN AN ORGANIZED HEALTH CARE EDUCATION/TRAINING PROGRAM

## 2024-07-09 PROCEDURE — C1894 INTRO/SHEATH, NON-LASER: HCPCS | Performed by: STUDENT IN AN ORGANIZED HEALTH CARE EDUCATION/TRAINING PROGRAM

## 2024-07-09 PROCEDURE — 7100000000 HC PACU RECOVERY - FIRST 15 MIN: Performed by: STUDENT IN AN ORGANIZED HEALTH CARE EDUCATION/TRAINING PROGRAM

## 2024-07-09 PROCEDURE — 3600000002 HC SURGERY LEVEL 2 BASE: Performed by: STUDENT IN AN ORGANIZED HEALTH CARE EDUCATION/TRAINING PROGRAM

## 2024-07-09 PROCEDURE — 6360000002 HC RX W HCPCS: Performed by: STUDENT IN AN ORGANIZED HEALTH CARE EDUCATION/TRAINING PROGRAM

## 2024-07-09 PROCEDURE — 6360000002 HC RX W HCPCS

## 2024-07-09 PROCEDURE — A9503 TC99M MEDRONATE: HCPCS | Performed by: STUDENT IN AN ORGANIZED HEALTH CARE EDUCATION/TRAINING PROGRAM

## 2024-07-09 PROCEDURE — 0PU43JZ SUPPLEMENT THORACIC VERTEBRA WITH SYNTHETIC SUBSTITUTE, PERCUTANEOUS APPROACH: ICD-10-PCS | Performed by: STUDENT IN AN ORGANIZED HEALTH CARE EDUCATION/TRAINING PROGRAM

## 2024-07-09 PROCEDURE — 2580000003 HC RX 258: Performed by: HOSPITALIST

## 2024-07-09 PROCEDURE — 6370000000 HC RX 637 (ALT 250 FOR IP): Performed by: HOSPITALIST

## 2024-07-09 PROCEDURE — C1713 ANCHOR/SCREW BN/BN,TIS/BN: HCPCS | Performed by: STUDENT IN AN ORGANIZED HEALTH CARE EDUCATION/TRAINING PROGRAM

## 2024-07-09 PROCEDURE — 6360000002 HC RX W HCPCS: Performed by: HOSPITALIST

## 2024-07-09 PROCEDURE — 99232 SBSQ HOSP IP/OBS MODERATE 35: CPT | Performed by: STUDENT IN AN ORGANIZED HEALTH CARE EDUCATION/TRAINING PROGRAM

## 2024-07-09 PROCEDURE — 1210000000 HC MED SURG R&B

## 2024-07-09 PROCEDURE — 82947 ASSAY GLUCOSE BLOOD QUANT: CPT

## 2024-07-09 PROCEDURE — 3700000000 HC ANESTHESIA ATTENDED CARE: Performed by: STUDENT IN AN ORGANIZED HEALTH CARE EDUCATION/TRAINING PROGRAM

## 2024-07-09 PROCEDURE — 6360000004 HC RX CONTRAST MEDICATION: Performed by: STUDENT IN AN ORGANIZED HEALTH CARE EDUCATION/TRAINING PROGRAM

## 2024-07-09 PROCEDURE — 6370000000 HC RX 637 (ALT 250 FOR IP)

## 2024-07-09 PROCEDURE — 2500000003 HC RX 250 WO HCPCS: Performed by: STUDENT IN AN ORGANIZED HEALTH CARE EDUCATION/TRAINING PROGRAM

## 2024-07-09 PROCEDURE — 7100000001 HC PACU RECOVERY - ADDTL 15 MIN: Performed by: STUDENT IN AN ORGANIZED HEALTH CARE EDUCATION/TRAINING PROGRAM

## 2024-07-09 PROCEDURE — 78803 RP LOCLZJ TUM SPECT 1 AREA: CPT

## 2024-07-09 PROCEDURE — 3600000012 HC SURGERY LEVEL 2 ADDTL 15MIN: Performed by: STUDENT IN AN ORGANIZED HEALTH CARE EDUCATION/TRAINING PROGRAM

## 2024-07-09 PROCEDURE — 0PS43ZZ REPOSITION THORACIC VERTEBRA, PERCUTANEOUS APPROACH: ICD-10-PCS | Performed by: STUDENT IN AN ORGANIZED HEALTH CARE EDUCATION/TRAINING PROGRAM

## 2024-07-09 PROCEDURE — 2580000003 HC RX 258: Performed by: STUDENT IN AN ORGANIZED HEALTH CARE EDUCATION/TRAINING PROGRAM

## 2024-07-09 PROCEDURE — 2709999900 HC NON-CHARGEABLE SUPPLY: Performed by: STUDENT IN AN ORGANIZED HEALTH CARE EDUCATION/TRAINING PROGRAM

## 2024-07-09 PROCEDURE — 2700000000 HC OXYGEN THERAPY PER DAY

## 2024-07-09 PROCEDURE — 3700000001 HC ADD 15 MINUTES (ANESTHESIA): Performed by: STUDENT IN AN ORGANIZED HEALTH CARE EDUCATION/TRAINING PROGRAM

## 2024-07-09 PROCEDURE — 2720000010 HC SURG SUPPLY STERILE: Performed by: STUDENT IN AN ORGANIZED HEALTH CARE EDUCATION/TRAINING PROGRAM

## 2024-07-09 RX ORDER — HYDRALAZINE HYDROCHLORIDE 20 MG/ML
10 INJECTION INTRAMUSCULAR; INTRAVENOUS
Status: DISCONTINUED | OUTPATIENT
Start: 2024-07-09 | End: 2024-07-09 | Stop reason: HOSPADM

## 2024-07-09 RX ORDER — SODIUM CHLORIDE 0.9 % (FLUSH) 0.9 %
5-40 SYRINGE (ML) INJECTION EVERY 12 HOURS SCHEDULED
Status: DISCONTINUED | OUTPATIENT
Start: 2024-07-09 | End: 2024-07-09 | Stop reason: HOSPADM

## 2024-07-09 RX ORDER — LABETALOL HYDROCHLORIDE 5 MG/ML
10 INJECTION, SOLUTION INTRAVENOUS
Status: DISCONTINUED | OUTPATIENT
Start: 2024-07-09 | End: 2024-07-09 | Stop reason: HOSPADM

## 2024-07-09 RX ORDER — PROCHLORPERAZINE EDISYLATE 5 MG/ML
5 INJECTION INTRAMUSCULAR; INTRAVENOUS
Status: DISCONTINUED | OUTPATIENT
Start: 2024-07-09 | End: 2024-07-09 | Stop reason: HOSPADM

## 2024-07-09 RX ORDER — DEXAMETHASONE SODIUM PHOSPHATE 10 MG/ML
INJECTION, SOLUTION INTRAMUSCULAR; INTRAVENOUS PRN
Status: DISCONTINUED | OUTPATIENT
Start: 2024-07-09 | End: 2024-07-09 | Stop reason: SDUPTHER

## 2024-07-09 RX ORDER — OXYCODONE HYDROCHLORIDE AND ACETAMINOPHEN 5; 325 MG/1; MG/1
1 TABLET ORAL ONCE
Status: COMPLETED | OUTPATIENT
Start: 2024-07-09 | End: 2024-07-09

## 2024-07-09 RX ORDER — SODIUM CHLORIDE 0.9 % (FLUSH) 0.9 %
5-40 SYRINGE (ML) INJECTION PRN
Status: DISCONTINUED | OUTPATIENT
Start: 2024-07-09 | End: 2024-07-09 | Stop reason: HOSPADM

## 2024-07-09 RX ORDER — SODIUM CHLORIDE 9 MG/ML
INJECTION, SOLUTION INTRAVENOUS PRN
Status: DISCONTINUED | OUTPATIENT
Start: 2024-07-09 | End: 2024-07-09 | Stop reason: HOSPADM

## 2024-07-09 RX ORDER — OXYCODONE HYDROCHLORIDE AND ACETAMINOPHEN 5; 325 MG/1; MG/1
TABLET ORAL
Status: COMPLETED
Start: 2024-07-09 | End: 2024-07-09

## 2024-07-09 RX ORDER — CLINDAMYCIN PHOSPHATE 150 MG/ML
INJECTION, SOLUTION INTRAVENOUS PRN
Status: DISCONTINUED | OUTPATIENT
Start: 2024-07-09 | End: 2024-07-09 | Stop reason: SDUPTHER

## 2024-07-09 RX ORDER — PROPOFOL 10 MG/ML
INJECTION, EMULSION INTRAVENOUS PRN
Status: DISCONTINUED | OUTPATIENT
Start: 2024-07-09 | End: 2024-07-09 | Stop reason: SDUPTHER

## 2024-07-09 RX ORDER — SODIUM CHLORIDE 9 MG/ML
INJECTION, SOLUTION INTRAVENOUS CONTINUOUS PRN
Status: DISCONTINUED | OUTPATIENT
Start: 2024-07-09 | End: 2024-07-09 | Stop reason: SDUPTHER

## 2024-07-09 RX ORDER — SUCCINYLCHOLINE/SOD CL,ISO/PF 100 MG/5ML
SYRINGE (ML) INTRAVENOUS PRN
Status: DISCONTINUED | OUTPATIENT
Start: 2024-07-09 | End: 2024-07-09 | Stop reason: SDUPTHER

## 2024-07-09 RX ORDER — SODIUM CHLORIDE, SODIUM LACTATE, POTASSIUM CHLORIDE, CALCIUM CHLORIDE 600; 310; 30; 20 MG/100ML; MG/100ML; MG/100ML; MG/100ML
INJECTION, SOLUTION INTRAVENOUS CONTINUOUS
Status: DISCONTINUED | OUTPATIENT
Start: 2024-07-09 | End: 2024-07-09 | Stop reason: HOSPADM

## 2024-07-09 RX ORDER — TC 99M MEDRONATE 20 MG/10ML
25 INJECTION, POWDER, LYOPHILIZED, FOR SOLUTION INTRAVENOUS
Status: COMPLETED | OUTPATIENT
Start: 2024-07-09 | End: 2024-07-09

## 2024-07-09 RX ORDER — LIDOCAINE HYDROCHLORIDE 10 MG/ML
1 INJECTION, SOLUTION EPIDURAL; INFILTRATION; INTRACAUDAL; PERINEURAL
Status: DISCONTINUED | OUTPATIENT
Start: 2024-07-09 | End: 2024-07-09 | Stop reason: HOSPADM

## 2024-07-09 RX ORDER — NALOXONE HYDROCHLORIDE 0.4 MG/ML
INJECTION, SOLUTION INTRAMUSCULAR; INTRAVENOUS; SUBCUTANEOUS PRN
Status: DISCONTINUED | OUTPATIENT
Start: 2024-07-09 | End: 2024-07-09 | Stop reason: HOSPADM

## 2024-07-09 RX ORDER — FENTANYL CITRATE 50 UG/ML
INJECTION, SOLUTION INTRAMUSCULAR; INTRAVENOUS PRN
Status: DISCONTINUED | OUTPATIENT
Start: 2024-07-09 | End: 2024-07-09 | Stop reason: SDUPTHER

## 2024-07-09 RX ORDER — ONDANSETRON 2 MG/ML
INJECTION INTRAMUSCULAR; INTRAVENOUS PRN
Status: DISCONTINUED | OUTPATIENT
Start: 2024-07-09 | End: 2024-07-09 | Stop reason: SDUPTHER

## 2024-07-09 RX ORDER — BUPIVACAINE HYDROCHLORIDE AND EPINEPHRINE 5; 5 MG/ML; UG/ML
INJECTION, SOLUTION EPIDURAL; INTRACAUDAL; PERINEURAL PRN
Status: DISCONTINUED | OUTPATIENT
Start: 2024-07-09 | End: 2024-07-09 | Stop reason: ALTCHOICE

## 2024-07-09 RX ORDER — SODIUM CHLORIDE 0.9 % (FLUSH) 0.9 %
10 SYRINGE (ML) INJECTION ONCE
Status: COMPLETED | OUTPATIENT
Start: 2024-07-09 | End: 2024-07-09

## 2024-07-09 RX ADMIN — GABAPENTIN 600 MG: 300 CAPSULE ORAL at 22:41

## 2024-07-09 RX ADMIN — INSULIN GLARGINE 30 UNITS: 100 INJECTION, SOLUTION SUBCUTANEOUS at 22:40

## 2024-07-09 RX ADMIN — FENTANYL CITRATE 50 MCG: 50 INJECTION, SOLUTION INTRAMUSCULAR; INTRAVENOUS at 16:31

## 2024-07-09 RX ADMIN — LEVOTHYROXINE SODIUM 88 MCG: 88 TABLET ORAL at 08:33

## 2024-07-09 RX ADMIN — SODIUM CHLORIDE, PRESERVATIVE FREE 10 ML: 5 INJECTION INTRAVENOUS at 10:55

## 2024-07-09 RX ADMIN — HYDROMORPHONE HYDROCHLORIDE 0.5 MG: 1 INJECTION, SOLUTION INTRAMUSCULAR; INTRAVENOUS; SUBCUTANEOUS at 07:48

## 2024-07-09 RX ADMIN — CARVEDILOL 6.25 MG: 3.12 TABLET, FILM COATED ORAL at 08:30

## 2024-07-09 RX ADMIN — CLINDAMYCIN PHOSPHATE 900 MG: 150 INJECTION, SOLUTION INTRAMUSCULAR; INTRAVENOUS at 16:46

## 2024-07-09 RX ADMIN — CETIRIZINE HYDROCHLORIDE 10 MG: 10 TABLET, FILM COATED ORAL at 08:30

## 2024-07-09 RX ADMIN — DULOXETINE HYDROCHLORIDE 60 MG: 30 CAPSULE, DELAYED RELEASE ORAL at 08:30

## 2024-07-09 RX ADMIN — POTASSIUM CHLORIDE 20 MEQ: 1500 TABLET, EXTENDED RELEASE ORAL at 08:31

## 2024-07-09 RX ADMIN — Medication 400 MG: at 08:30

## 2024-07-09 RX ADMIN — SODIUM CHLORIDE, PRESERVATIVE FREE 10 ML: 5 INJECTION INTRAVENOUS at 23:26

## 2024-07-09 RX ADMIN — Medication 500 MG: at 08:30

## 2024-07-09 RX ADMIN — HYDROMORPHONE HYDROCHLORIDE 0.5 MG: 1 INJECTION, SOLUTION INTRAMUSCULAR; INTRAVENOUS; SUBCUTANEOUS at 10:54

## 2024-07-09 RX ADMIN — GABAPENTIN 600 MG: 300 CAPSULE ORAL at 08:30

## 2024-07-09 RX ADMIN — OXYCODONE HYDROCHLORIDE AND ACETAMINOPHEN 1 TABLET: 5; 325 TABLET ORAL at 18:54

## 2024-07-09 RX ADMIN — ACETAMINOPHEN 650 MG: 325 TABLET ORAL at 22:41

## 2024-07-09 RX ADMIN — DEXAMETHASONE SODIUM PHOSPHATE 10 MG: 10 INJECTION INTRAMUSCULAR; INTRAVENOUS at 17:00

## 2024-07-09 RX ADMIN — ATORVASTATIN CALCIUM 40 MG: 40 TABLET, FILM COATED ORAL at 08:30

## 2024-07-09 RX ADMIN — SODIUM CHLORIDE: 9 INJECTION, SOLUTION INTRAVENOUS at 16:32

## 2024-07-09 RX ADMIN — SUCRALFATE 1 G: 1 TABLET ORAL at 08:40

## 2024-07-09 RX ADMIN — TAMSULOSIN HYDROCHLORIDE 0.4 MG: 0.4 CAPSULE ORAL at 22:41

## 2024-07-09 RX ADMIN — SODIUM CHLORIDE, PRESERVATIVE FREE 10 ML: 5 INJECTION INTRAVENOUS at 10:31

## 2024-07-09 RX ADMIN — Medication 100 MG: at 16:38

## 2024-07-09 RX ADMIN — PROPOFOL 200 MG: 10 INJECTION, EMULSION INTRAVENOUS at 16:37

## 2024-07-09 RX ADMIN — HYDROMORPHONE HYDROCHLORIDE 0.5 MG: 1 INJECTION, SOLUTION INTRAMUSCULAR; INTRAVENOUS; SUBCUTANEOUS at 02:40

## 2024-07-09 RX ADMIN — HYDROCHLOROTHIAZIDE 25 MG: 25 TABLET ORAL at 08:30

## 2024-07-09 RX ADMIN — Medication 0.3 MG: at 18:02

## 2024-07-09 RX ADMIN — ONDANSETRON 4 MG: 2 INJECTION INTRAMUSCULAR; INTRAVENOUS at 17:32

## 2024-07-09 RX ADMIN — HYDROMORPHONE HYDROCHLORIDE 0.3 MG: 1 INJECTION, SOLUTION INTRAMUSCULAR; INTRAVENOUS; SUBCUTANEOUS at 18:02

## 2024-07-09 RX ADMIN — PANTOPRAZOLE SODIUM 40 MG: 40 TABLET, DELAYED RELEASE ORAL at 08:30

## 2024-07-09 RX ADMIN — TC 99M MEDRONATE 26.4 MILLICURIE: 20 INJECTION, POWDER, LYOPHILIZED, FOR SOLUTION INTRAVENOUS at 10:32

## 2024-07-09 RX ADMIN — FENTANYL CITRATE 50 MCG: 50 INJECTION, SOLUTION INTRAMUSCULAR; INTRAVENOUS at 16:37

## 2024-07-09 RX ADMIN — LOSARTAN POTASSIUM 100 MG: 50 TABLET, FILM COATED ORAL at 08:30

## 2024-07-09 ASSESSMENT — PAIN DESCRIPTION - ORIENTATION
ORIENTATION: LOWER;MID
ORIENTATION: MID
ORIENTATION: MID
ORIENTATION: LOWER;MID

## 2024-07-09 ASSESSMENT — PAIN SCALES - GENERAL
PAINLEVEL_OUTOF10: 9
PAINLEVEL_OUTOF10: 8
PAINLEVEL_OUTOF10: 2
PAINLEVEL_OUTOF10: 9
PAINLEVEL_OUTOF10: 9
PAINLEVEL_OUTOF10: 6
PAINLEVEL_OUTOF10: 10
PAINLEVEL_OUTOF10: 9
PAINLEVEL_OUTOF10: 3
PAINLEVEL_OUTOF10: 9
PAINLEVEL_OUTOF10: 4
PAINLEVEL_OUTOF10: 2
PAINLEVEL_OUTOF10: 9
PAINLEVEL_OUTOF10: 9

## 2024-07-09 ASSESSMENT — PAIN DESCRIPTION - PAIN TYPE
TYPE: ACUTE PAIN

## 2024-07-09 ASSESSMENT — PAIN DESCRIPTION - DIRECTION: RADIATING_TOWARDS: PT DENIES

## 2024-07-09 ASSESSMENT — PAIN DESCRIPTION - LOCATION
LOCATION: BACK

## 2024-07-09 ASSESSMENT — PAIN - FUNCTIONAL ASSESSMENT
PAIN_FUNCTIONAL_ASSESSMENT: PREVENTS OR INTERFERES SOME ACTIVE ACTIVITIES AND ADLS

## 2024-07-09 ASSESSMENT — PAIN DESCRIPTION - DESCRIPTORS
DESCRIPTORS: ACHING
DESCRIPTORS: ACHING
DESCRIPTORS: ACHING;SORE
DESCRIPTORS: ACHING

## 2024-07-09 ASSESSMENT — PAIN DESCRIPTION - FREQUENCY
FREQUENCY: CONTINUOUS

## 2024-07-09 ASSESSMENT — PAIN DESCRIPTION - ONSET
ONSET: GRADUAL

## 2024-07-09 NOTE — BRIEF OP NOTE
Brief Postoperative Note      Patient: Apple Jean Baptiste  YOB: 1950  MRN: [id number]    Date of Procedure: 7/9/2024    Pre-Op Diagnosis Codes:     * Compression fracture of T12 vertebra, initial encounter ([facility]) [S22.080A]     * Compression fracture of L4 lumbar vertebra, closed, initial encounter ([facility]) [S32.040A]    Post-Op Diagnosis: Same       Procedure(s):  T12 KYPHOPLASTY WITH KYPHON    Surgeon(s):  Aldo Eng DO    Assistant:  Resident: Cory Lopez DO    Anesthesia: General    Estimated Blood Loss (mL): Minimal    Complications: None    Specimens:   * No specimens in log *    Implants:  Implant Name Type Inv. Item Serial No.  Lot No. LRB No. Used Action   KYPHON BONE CEMENT JORDEN     [id number] N/A 1 Implanted         Drains: * No LDAs found *    Findings:  Infection Present At Time Of Surgery (PATOS) (choose all levels that have infection present):  No infection present  Other Findings: T12 compression fracture, see op note for details.     Electronically signed by Cory Lopez DO on 7/9/2024 at 5:35 PM

## 2024-07-09 NOTE — ANESTHESIA POSTPROCEDURE EVALUATION
Department of Anesthesiology  Postprocedure Note    Patient: Apple Jean Baptiste  MRN: [id number]  YOB: 1950  Date of evaluation: 7/9/2024    Procedure Summary       Date: 07/09/24 Room / Location: [address]    Anesthesia Start: 1632 Anesthesia Stop: 1744    Procedure: T12 KYPHOPLASTY WITH KYPHON Diagnosis:       Compression fracture of T12 vertebra, initial encounter ([facility])      Compression fracture of L4 lumbar vertebra, closed, initial encounter ([facility])    Surgeons: Aldo Eng DO Responsible Provider: Liliana Hadley MD    Anesthesia Type: general ASA Status: 3            Anesthesia Type: No value filed.    Quang Phase I: Quang Score: 10    Quang Phase II:      Anesthesia Post Evaluation    Patient location during evaluation: PACU  Patient participation: complete - patient participated  Level of consciousness: awake and awake and alert  Pain score: 3  Nausea & Vomiting: no nausea and no vomiting  Cardiovascular status: hemodynamically stable and blood pressure returned to baseline  Respiratory status: acceptable  Hydration status: euvolemic  Multimodal analgesia pain management approach  Pain management: adequate    No notable events documented.

## 2024-07-09 NOTE — ANESTHESIA PRE PROCEDURE
IntraVENous Q3H PRN Manuel Long DO   0.5 mg at 07/09/24 1054    hydrALAZINE (APRESOLINE) injection 10 mg  10 mg IntraVENous Q6H PRN Manuel Long DO           Allergies:    Allergies   Allergen Reactions    Aspirin Anaphylaxis and Nausea And Vomiting     Only thing she can take is tylenol    Benadryl [Diphenhydramine] Other (See Comments)     Dystonic movement    Ibuprofen Anaphylaxis    Lidocaine Anaphylaxis and Nausea And Vomiting     CAN NOT TOLERATE IV    Nsaids Other (See Comments)    Penicillins Anaphylaxis    Hydrocodone-Acetaminophen Other (See Comments)     Unsure of allergy    Penicillamine Nausea And Vomiting    Penicillin G Pot In Dextrose [Penicillin G Potassium In D5w] Nausea And Vomiting    Vancomycin Rash       Problem List:    Patient Active Problem List   Diagnosis Code    Ovarian mass N83.8    Abdominal pain R10.9    Partial bowel obstruction (HCC) K56.600    Epigastric pain R10.13    GERD (gastroesophageal reflux disease) K21.9    Lumbosacral spondylosis without myelopathy M47.817    Chronic bilateral thoracic back pain M54.6, G89.29    Chronic anticoagulation Z79.01    BMI 38.0-38.9,adult Z68.38    Type 2 diabetes mellitus with diabetic neuropathy, with long-term current use of insulin ([facility]) E11.40, Z79.4    Low hemoglobin D64.9    Nausea R11.0    Absolute anemia D64.9    Microcytic anemia D50.9    Iron deficiency anemia secondary to inadequate dietary iron intake D50.8    Iron malabsorption K90.9    Malignant neoplasm of ovary ([facility]) C56.9    Occult blood in stools R19.5    Sacroiliitis, not elsewhere classified ([facility]) M46.1    Diverticulosis of colon K57.30    Acute left-sided thoracic back pain M54.6    COVID-19 U07.1    Other osteoporosis with current pathological fracture, vertebra(e), subsequent encounter for fracture with routine healing M80.88XD    Chronic indwelling Lazo catheter Z97.8    Cellulitis L03.90    History of pulmonary embolism Z86.711    Urinary retention

## 2024-07-09 NOTE — DISCHARGE INSTRUCTIONS
Orthopedic Spine Discharge Instructions:  -Mobilize as tolerated. Avoid Bending, Lifting, and Twisting (BLT) as much .  -Maintain surgical dressing in place until follow-up if possible. May shower in 48 hours, let water run over dressing, but do not scrub. If dressing comes off with activity and hygiene, may leave uncovered if no drainage. Otherwise may replace with simple gauze and tape dressing.  -Ice (20 minutes on and off 1 hour) as needed for swelling/pain.  -Drink plenty of fluids.  -Call the office or come to Emergency Room if signs of infection appear (hot, swollen, red, draining pus, fever).  -Take medications as prescribed.  -Wean off narcotics (Percocet/Norco) as soon as possible. Do not take Tylenol if still taking narcotics.  -No alcoholic beverages or driving/operating machinery while on narcotics  -Follow up with Dr. Eng in his office 2 weeks after surgery/discharge. Call [phone] to schedule.    Please resume Xarelto on 7/11.

## 2024-07-09 NOTE — PROGRESS NOTES
[facility]  Office: [phone]  Enrrique Long DO, Trey Horn DO, Akash Lux DO, Gee Negrete, DO, Elizabeth Gleason MD, Desi Ruvalcaba MD, Loan Max MD, Diana Morel MD,  Dago Vo MD, Minerva Ramirez MD, Saji Ornelas MD,  Artie Dorna DO, Bri Sanchez MD, Robbi Randolph MD, Manuel Long DO, Lou Johnson MD,  Joe Gamboa DO, Ophelia Terry MD, Leigha Goins MD, Herlinda Rodriguez MD, Deborah Bueno MD,  Griffin Mccracken MD, Elicia Jacome MD, Tess Livingston MD, Aleah Bergman MD, Toni Hernandez MD, Kee Ochoa MD, Zhen Smith DO, Cedrick Victor DO, Mel Ricks MD,  Eliseo Rodriguez MD, Shirley Waterhouse, CNP,  Cesilia Ochoa CNP, Liban Machado, CNP,  Raquel Felton, DNP, Rachel Schumacher, CNP, Meliza Mckeon, CNP, Trupti Romo, CNP, Magy Liu, CNP, Vicky Duvall, PA-C, Summer Anton PA-C, Richa Torres, CNP, Suzanna Elias, CNP, Katherin Rodriguez, CNP, Aubree Painting, CNP, Radha Jackson, CNP, Naz Moses, CNS, Rola Segundo, CNP, Мария Simmons CNP, Tracy Schwab, CNP         [facility]   IN-PATIENT SERVICE   [facility]    Progress Note    7/9/2024    12:45 PM    Name:   Apple Jean Baptiste  MRN:     [id number]     Acct:      [id number]   Room:   322/322-01   Day:  1  Admit Date:  7/8/2024 11:33 AM    PCP:   Simi Zavala DO  Code Status:  Full Code    Subjective:     Patient was seen and examined at bedside this AM. She reports feeling \"not good\" and continues to complain of low back pain. Orthopedic surgery is following; appreciate recommendations.     Medications:     Allergies:    Allergies   Allergen Reactions    Aspirin Anaphylaxis and Nausea And Vomiting     Only thing she can take is tylenol    Benadryl [Diphenhydramine] Other (See Comments)     Dystonic movement    Ibuprofen Anaphylaxis    Lidocaine Anaphylaxis and Nausea And Vomiting     CAN NOT TOLERATE IV    Nsaids Other (See Comments)    Penicillins Anaphylaxis     height. Stable remote compression deformity at L3 and L4.     XR PELVIS (1-2 VW)    Result Date: 7/4/2024  No acute bony abnormalities on single AP view of the pelvis.    CT LUMBAR SPINE WO CONTRAST    Result Date: 7/4/2024  No acute bony abnormalities demonstrated.    CT HEAD W WO CONTRAST    Result Date: 7/4/2024  No acute intracranial abnormalities demonstrated.      Physical Examination:     General appearance:  alert, cooperative and no distress  Mental Status:  oriented to person, place and time and normal affect  Lungs:  clear to auscultation bilaterally, normal effort  Heart:  regular rate and rhythm, no murmur  Abdomen:  soft, nontender, nondistended, normal bowel sounds, no masses, hepatomegaly, splenomegaly  Extremities:  no edema, redness, tenderness in the calves  Skin:  no gross lesions, rashes, induration    Assessment:     Hospital Problems             Last Modified POA    * (Principal) Back pain due to injury 7/8/2024 Yes    Chronic anticoagulation (Chronic) 7/9/2024 Yes    Type 2 diabetes mellitus with diabetic neuropathy, with long-term current use of insulin (HCC) 7/9/2024 Yes    Severe obesity (BMI 35.0-39.9) with comorbidity ([facility]) 7/9/2024 Yes    Mixed hyperlipidemia 7/9/2024 Yes    T12 compression fracture ([facility]) 7/9/2024 Yes       Plan:     T12 compression fracture   -CT abdomen and pelvis showing an acute T12 compression fracture involving the superior endplate with mild retropulsion   -Orthopedic surgery is following; appreciate recommendations   -Patient is unable to have an MRI due to spinal stimulator   -The patient will likely require kyphoplasty prior to discharge   -NM bone scan pending     Hypothyroidism   -Continue home levothyroxine     DM2   -Continue home Lantus   -Sliding scale insulin   -Hypoglycemia protocol     HTN   -Continue home carvedilol     HLD   -Continue home atorvastatin       Robbi Randolph MD  7/9/2024  12:45 PM

## 2024-07-09 NOTE — CARE COORDINATION
Case Management Assessment  Initial Evaluation    Date/Time of Evaluation: 7/9/2024 12:08 PM  Assessment Completed by: Shazia Mitchell    If patient is discharged prior to next notation, then this note serves as note for discharge by case management.    Patient Name: Apple Jean Baptiste                   YOB: 1950  Diagnosis: Acute cystitis with hematuria [N30.01]  Compression fracture of T12 vertebra, initial encounter ([facility]) [S22.080A]  Back pain due to injury [M54.9]  Compression fracture of L4 vertebra, initial encounter ([facility]) [S32.040A]                   Date / Time: 7/8/2024 11:33 AM    Patient Admission Status: Inpatient   Readmission Risk (Low < 19, Mod (19-27), High > 27): Readmission Risk Score: 14.8    Current PCP: Simi Zavala, DO  PCP verified by CM? Yes    Chart Reviewed: Yes      History Provided by: Patient  Patient Orientation: Alert and Oriented    Patient Cognition: Alert    Hospitalization in the last 30 days (Readmission):  No    If yes, Readmission Assessment in  Navigator will be completed.    Advance Directives:      Code Status: Full Code   Patient's Primary Decision Maker is: Legal Next of Kin (daughter Ebony Meng)    Primary Decision Maker: Carson JeanB aptiste - Spouse - [phone]    Discharge Planning:    Patient lives with: Spouse/Significant Other ( has dementia) Type of Home: Apartment (independent senior living)  Primary Care Giver: Self  Patient Support Systems include: Family Members, Friends/Neighbors   Current Financial resources: Medicare  Current community resources: None  Current services prior to admission: Durable Medical Equipment, C-pap            Current DME: Cane, Walker, Glucometer (dexacon 6, CPAP from [employer] she thinks)            Type of Home Care services:  None    ADLS  Prior functional level: Assistance with the following:, Shopping, Mobility, Other (see comment) (patient and her  do not drive, rely on daughter and neighbors to

## 2024-07-09 NOTE — PLAN OF CARE
Problem: Discharge Planning  Goal: Discharge to home or other facility with appropriate resources  7/9/2024 1224 by Marline Brandon RN  Outcome: Progressing  7/9/2024 0602 by Toña Betancur RN  Outcome: Progressing     Problem: Pain  Goal: Verbalizes/displays adequate comfort level or baseline comfort level  7/9/2024 1224 by Marline Brandon RN  Outcome: Progressing  Note: Patient calls out for pain medication appropriately   7/9/2024 0602 by Toña Betancur RN  Outcome: Progressing     Problem: Safety - Adult  Goal: Free from fall injury  7/9/2024 1224 by Marline Brandon RN  Outcome: Progressing  Note: Call light within reach non slip socks   7/9/2024 0602 by Toña Betancur RN  Outcome: Progressing     Problem: ABCDS Injury Assessment  Goal: Absence of physical injury  7/9/2024 1224 by Marline Brandon RN  Outcome: Progressing  7/9/2024 0602 by Toña Betancur RN  Outcome: Progressing     Problem: Skin/Tissue Integrity  Goal: Absence of new skin breakdown  Description: 1.  Monitor for areas of redness and/or skin breakdown  2.  Assess vascular access sites hourly  3.  Every 4-6 hours minimum:  Change oxygen saturation probe site  4.  Every 4-6 hours:  If on nasal continuous positive airway pressure, respiratory therapy assess nares and determine need for appliance change or resting period.  Outcome: Progressing

## 2024-07-10 LAB
GLUCOSE BLD-MCNC: 105 MG/DL (ref 65–105)
GLUCOSE BLD-MCNC: 209 MG/DL (ref 65–105)
GLUCOSE BLD-MCNC: 211 MG/DL (ref 65–105)
GLUCOSE BLD-MCNC: 293 MG/DL (ref 65–105)

## 2024-07-10 PROCEDURE — 1210000000 HC MED SURG R&B

## 2024-07-10 PROCEDURE — 97535 SELF CARE MNGMENT TRAINING: CPT

## 2024-07-10 PROCEDURE — 6370000000 HC RX 637 (ALT 250 FOR IP): Performed by: HOSPITALIST

## 2024-07-10 PROCEDURE — 97166 OT EVAL MOD COMPLEX 45 MIN: CPT

## 2024-07-10 PROCEDURE — 97116 GAIT TRAINING THERAPY: CPT

## 2024-07-10 PROCEDURE — 2580000003 HC RX 258: Performed by: HOSPITALIST

## 2024-07-10 PROCEDURE — 82947 ASSAY GLUCOSE BLOOD QUANT: CPT

## 2024-07-10 PROCEDURE — 6370000000 HC RX 637 (ALT 250 FOR IP): Performed by: STUDENT IN AN ORGANIZED HEALTH CARE EDUCATION/TRAINING PROGRAM

## 2024-07-10 PROCEDURE — 97162 PT EVAL MOD COMPLEX 30 MIN: CPT

## 2024-07-10 PROCEDURE — 99231 SBSQ HOSP IP/OBS SF/LOW 25: CPT | Performed by: STUDENT IN AN ORGANIZED HEALTH CARE EDUCATION/TRAINING PROGRAM

## 2024-07-10 RX ORDER — OXYCODONE HYDROCHLORIDE AND ACETAMINOPHEN 5; 325 MG/1; MG/1
1 TABLET ORAL EVERY 6 HOURS PRN
Qty: 12 TABLET | Refills: 0 | Status: SHIPPED | OUTPATIENT
Start: 2024-07-10 | End: 2024-07-11

## 2024-07-10 RX ORDER — OXYCODONE HYDROCHLORIDE AND ACETAMINOPHEN 5; 325 MG/1; MG/1
1 TABLET ORAL EVERY 4 HOURS PRN
Status: DISCONTINUED | OUTPATIENT
Start: 2024-07-10 | End: 2024-07-11 | Stop reason: HOSPADM

## 2024-07-10 RX ORDER — CYCLOBENZAPRINE HCL 10 MG
10 TABLET ORAL 3 TIMES DAILY PRN
Status: DISCONTINUED | OUTPATIENT
Start: 2024-07-10 | End: 2024-07-11 | Stop reason: HOSPADM

## 2024-07-10 RX ADMIN — CYCLOBENZAPRINE 10 MG: 10 TABLET, FILM COATED ORAL at 09:35

## 2024-07-10 RX ADMIN — LEVOTHYROXINE SODIUM 88 MCG: 88 TABLET ORAL at 09:58

## 2024-07-10 RX ADMIN — INSULIN LISPRO 4 UNITS: 100 INJECTION, SOLUTION INTRAVENOUS; SUBCUTANEOUS at 17:31

## 2024-07-10 RX ADMIN — GABAPENTIN 600 MG: 300 CAPSULE ORAL at 14:09

## 2024-07-10 RX ADMIN — SODIUM CHLORIDE, PRESERVATIVE FREE 10 ML: 5 INJECTION INTRAVENOUS at 10:13

## 2024-07-10 RX ADMIN — CARVEDILOL 6.25 MG: 3.12 TABLET, FILM COATED ORAL at 21:00

## 2024-07-10 RX ADMIN — POTASSIUM CHLORIDE 20 MEQ: 1500 TABLET, EXTENDED RELEASE ORAL at 14:10

## 2024-07-10 RX ADMIN — LOSARTAN POTASSIUM 100 MG: 50 TABLET, FILM COATED ORAL at 09:58

## 2024-07-10 RX ADMIN — GABAPENTIN 600 MG: 300 CAPSULE ORAL at 09:58

## 2024-07-10 RX ADMIN — ATORVASTATIN CALCIUM 40 MG: 40 TABLET, FILM COATED ORAL at 09:59

## 2024-07-10 RX ADMIN — SUCRALFATE 1 G: 1 TABLET ORAL at 14:09

## 2024-07-10 RX ADMIN — DULOXETINE HYDROCHLORIDE 60 MG: 30 CAPSULE, DELAYED RELEASE ORAL at 09:59

## 2024-07-10 RX ADMIN — OXYCODONE HYDROCHLORIDE AND ACETAMINOPHEN 1 TABLET: 5; 325 TABLET ORAL at 09:34

## 2024-07-10 RX ADMIN — PANTOPRAZOLE SODIUM 40 MG: 40 TABLET, DELAYED RELEASE ORAL at 09:59

## 2024-07-10 RX ADMIN — CETIRIZINE HYDROCHLORIDE 10 MG: 10 TABLET, FILM COATED ORAL at 09:58

## 2024-07-10 RX ADMIN — Medication 400 MG: at 10:00

## 2024-07-10 RX ADMIN — TAMSULOSIN HYDROCHLORIDE 0.4 MG: 0.4 CAPSULE ORAL at 21:00

## 2024-07-10 RX ADMIN — ACETAMINOPHEN 650 MG: 325 TABLET ORAL at 08:42

## 2024-07-10 RX ADMIN — INSULIN GLARGINE 30 UNITS: 100 INJECTION, SOLUTION SUBCUTANEOUS at 21:01

## 2024-07-10 RX ADMIN — SUCRALFATE 1 G: 1 TABLET ORAL at 10:10

## 2024-07-10 RX ADMIN — POTASSIUM CHLORIDE 20 MEQ: 1500 TABLET, EXTENDED RELEASE ORAL at 17:31

## 2024-07-10 RX ADMIN — INSULIN LISPRO 8 UNITS: 100 INJECTION, SOLUTION INTRAVENOUS; SUBCUTANEOUS at 14:10

## 2024-07-10 RX ADMIN — INSULIN GLARGINE 30 UNITS: 100 INJECTION, SOLUTION SUBCUTANEOUS at 10:10

## 2024-07-10 RX ADMIN — SUCRALFATE 1 G: 1 TABLET ORAL at 17:31

## 2024-07-10 RX ADMIN — INSULIN LISPRO 4 UNITS: 100 INJECTION, SOLUTION INTRAVENOUS; SUBCUTANEOUS at 10:10

## 2024-07-10 RX ADMIN — GABAPENTIN 600 MG: 300 CAPSULE ORAL at 21:00

## 2024-07-10 RX ADMIN — Medication 500 MG: at 09:58

## 2024-07-10 RX ADMIN — GABAPENTIN 600 MG: 300 CAPSULE ORAL at 17:31

## 2024-07-10 RX ADMIN — HYDROCHLOROTHIAZIDE 25 MG: 25 TABLET ORAL at 09:58

## 2024-07-10 RX ADMIN — SUCRALFATE 1 G: 1 TABLET ORAL at 21:00

## 2024-07-10 RX ADMIN — SODIUM CHLORIDE, PRESERVATIVE FREE 10 ML: 5 INJECTION INTRAVENOUS at 21:05

## 2024-07-10 RX ADMIN — POTASSIUM CHLORIDE 20 MEQ: 1500 TABLET, EXTENDED RELEASE ORAL at 09:58

## 2024-07-10 RX ADMIN — CARVEDILOL 6.25 MG: 3.12 TABLET, FILM COATED ORAL at 09:58

## 2024-07-10 ASSESSMENT — PAIN DESCRIPTION - DESCRIPTORS
DESCRIPTORS: ACHING
DESCRIPTORS: SORE

## 2024-07-10 ASSESSMENT — PAIN SCALES - GENERAL
PAINLEVEL_OUTOF10: 3
PAINLEVEL_OUTOF10: 5
PAINLEVEL_OUTOF10: 8
PAINLEVEL_OUTOF10: 5

## 2024-07-10 ASSESSMENT — PAIN DESCRIPTION - LOCATION
LOCATION: BACK;ABDOMEN
LOCATION: BACK

## 2024-07-10 ASSESSMENT — PAIN DESCRIPTION - ORIENTATION
ORIENTATION: MID;LOWER
ORIENTATION: RIGHT

## 2024-07-10 NOTE — PROGRESS NOTES
SPIRITUAL CARE DEPARTMENT - [facility]  PROGRESS NOTE    Room # 322/322-01   Name: Apple Jean Baptiste               Reason for visit: Routine    I visited the patient.    Admit Date & Time: 7/8/2024 11:33 AM    Assessment:  Apple Jean Baptiste is a 73 y.o. female in the hospital because \"back pain\". Upon entering the room pt was sitting in chair. Pt shared about her current medical challenge. Pt also shared about her spouse's ongoing medical challenges. Pt also engaged in life review sharing about meaningful memories. Pt also shared about her friends whom she said are very supportive of her. Pt appeared to be calm and coping at this time. Pt welcomed prayer      Intervention:  I introduced myself and my title as  I offered space for pt  to express feelings, needs, and concerns and provided a ministry presence. Writer engaged in conversation with pt and actively listened to pt. Writer also offered words of affirmation and prayer.     Outcome:  Pt thanked writer for visit.     Plan:  Chaplains will remain available to offer spiritual and emotional support as needed.    Electronically signed by Chaplain Kaye, on 7/10/2024 at 2:26 PM.  Spiritual Care Department  [facility] -        07/10/24 1424   Encounter Summary   Encounter Overview/Reason Spiritual/Emotional Needs   Service Provided For Patient   Referral/Consult From [facility]   Support System Spouse;Friends/neighbors   Last Encounter  07/10/24   Complexity of Encounter Moderate   Begin Time 1315   End Time  1335   Total Time Calculated 20 min   Spiritual/Emotional needs   Type Spiritual Support   Assessment/Intervention/Outcome   Assessment Calm;Coping   Intervention Active listening;Life review/Legacy;Sustaining Presence/Ministry of presence;Prayer (assurance of)/[place];Explored/Affirmed feelings, thoughts, concerns   Outcome Engaged in conversation;Expressed Gratitude;Coping;Expressed feelings, needs, and concerns;Receptive   Plan and Referrals    Plan/Referrals Continue Support (comment)

## 2024-07-10 NOTE — PLAN OF CARE
Problem: Discharge Planning  Goal: Discharge to home or other facility with appropriate resources  7/10/2024 1548 by Geri Lazaro RN  Outcome: Progressing  Flowsheets (Taken 7/10/2024 0839)  Discharge to home or other facility with appropriate resources: Identify barriers to discharge with patient and caregiver  7/10/2024 0822 by Toña Betancur RN  Outcome: Progressing     Problem: Pain  Goal: Verbalizes/displays adequate comfort level or baseline comfort level  7/10/2024 1548 by Geri Lazaro RN  Outcome: Progressing  7/10/2024 0822 by Toña Betancur RN  Outcome: Progressing     Problem: Safety - Adult  Goal: Free from fall injury  7/10/2024 1548 by Geri Lazaro RN  Outcome: Progressing  7/10/2024 0822 by Toña Betancur RN  Outcome: Progressing     Problem: ABCDS Injury Assessment  Goal: Absence of physical injury  7/10/2024 1548 by Geri Lazaro RN  Outcome: Progressing  7/10/2024 0822 by Toña Betancur RN  Outcome: Progressing     Problem: Skin/Tissue Integrity  Goal: Absence of new skin breakdown  Description: 1.  Monitor for areas of redness and/or skin breakdown  2.  Assess vascular access sites hourly  3.  Every 4-6 hours minimum:  Change oxygen saturation probe site  4.  Every 4-6 hours:  If on nasal continuous positive airway pressure, respiratory therapy assess nares and determine need for appliance change or resting period.  7/10/2024 1548 by Geri Lazaro RN  Outcome: Progressing  7/10/2024 0822 by Toña Betancur RN  Outcome: Progressing     Problem: Chronic Conditions and Co-morbidities  Goal: Patient's chronic conditions and co-morbidity symptoms are monitored and maintained or improved  7/10/2024 1548 by Geri Lazaro RN  Outcome: Progressing  Flowsheets (Taken 7/10/2024 0839)  Care Plan - Patient's Chronic Conditions and Co-Morbidity Symptoms are Monitored and Maintained or Improved: Monitor and assess patient's chronic conditions and comorbid symptoms for stability,
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
## 2024-07-10 NOTE — CARE COORDINATION
CM spoke with patient at bedside to discuss transitional planning. SNF and HHC options discussed and hospital lists provided for patient to review. Patient is requesting SNF for rehab prior to returning home. Patient requests [facility]. Referral sent to [facility]. Await OT note.                       Post Acute Facility/Agency List     Provided patient with the following list, the list includes the overall star ratings obtained from CMS per the Medicare Web site (www.Medicare.gov):     [] Long Term Acute Care Facilities  [] Acute Inpatient Rehabilitation Facilities  [x] Skilled Nursing Facilities  [] Home Care    Provided verbal instructions on how to utilize the QR Code to obtain additional detailed star ratings from www.Medicare.gov     offered to print and provide the detailed list:    []Accepted   [x]Declined    1220- Call received from Leticia with [facility] stating she will be out to do onsite eval this afternoon.     1530- CM spoke with patient and [facility] has still not been out for onsite eval. Patient provided CM with additional SNF choices of [facility] and [facility]. Referrals sent to both.

## 2024-07-10 NOTE — DISCHARGE SUMMARY
[facility]  Office: [phone]  Enrrique Long DO, Trey Horn DO, Akash Lux DO, Gee Negrete DO, Elizabeth Gleason MD, Desi Ruvalcaba MD, Loan Max MD, Diana Morel MD,  Dago Vo MD, Minerva Ramirez MD, Saji Ornelas MD,  Artie Doran DO, Bri Sanchez MD, Robbi Randolph MD, Manuel Long DO, Lou Johnson MD,  Joe Gamboa DO, Ophelia Terry MD, Leigha Goins MD, Herlinda Rodriguez MD, Deborah Bueno MD,  Griffin Mccracken MD, Elicia Jacome MD, Tess Livingston MD, Aleah Bergman MD, Toni Hernandez MD, Kee Ochoa MD, Zhen Smith DO, Cedrick Victor DO, Mel Ricks MD,  Eliseo Rodriguez MD, Shirley Waterhouse, CNP,  Cesilia Ochoa CNP, Liban Machado, CNP,  Raquel Felton, DNP, Rachel Schumacher, CNP, Meliza Mckeon, CNP, Trupti Romo CNP, Magy Liu, CNP, Vicky Duvall, PA-C, Summer Anton PA-C, Richa Torres, CNP, Suzanna Elias, CNP, Katherin Rodriguez, CNP, Aubree Painting, CNP, Radha Jackson, CNP, Naz Moses, CNS, Rola Segundo, CNP, Мария Simmons CNP, Tracy Schwab, CNP         [facility]   IN-PATIENT SERVICE   [facility]    Discharge Summary     Patient ID: Apple Jean Baptiste  :  1950   MRN: [id number]     ACCOUNT:  [id number]   Patient's PCP: Simi Zavala DO  Admit Date: 2024   Discharge Date: 2024  Length of Stay: 3  Code Status:  Full Code  Admitting Physician: Manuel Long DO  Discharge Physician: Robbi Randolph MD     Active Discharge Diagnoses:     Hospital Problem Lists:  Principal Problem:    Back pain due to injury  Active Problems:    Chronic anticoagulation    Type 2 diabetes mellitus with diabetic neuropathy, with long-term current use of insulin ([facility])    Severe obesity (BMI 35.0-39.9) with comorbidity ([facility])    Mixed hyperlipidemia    T12 compression fracture ([facility])  Resolved Problems:    * No resolved hospital problems. *      Admission Condition:  fair     Discharged Condition: good    Hospital Stay:

## 2024-07-10 NOTE — PROGRESS NOTES
[facility]  Office: [phone]  Enrrique Long DO, Trey Horn DO, Akash Lux DO, Gee Negrete DO, Elizabeth Gleason MD, Desi Ruvalcaba MD, Loan Max MD, Diana Morel MD,  Dago Vo MD, Minerva Ramirez MD, Saji Ornelas MD,  Artie Doran DO, Bri Sanchez MD, Robbi Randolph MD, Manuel Long DO, Lou Johnson MD,  Joe Gamboa DO, Ophelia Terry MD, Leigha Goins MD, Helrinda Rodriguez MD, Deborah Bueno MD,  Griffin Mccracken MD, Elicia Jacome MD, Tess Livingston MD, Aleah Bergman MD, Toni Hernandez MD, Kee Ochoa MD, Zhen Smith DO, Cedrick Victor DO, Mel Ricks MD,  Eliseo Rodriguez MD, Shirley Waterhouse, CNP,  Cesilia Ochoa CNP, Liban Machado, CNP,  Raquel Felton, DNP, Rachel Schumacher, CNP, Meliza Mckeon, CNP, Trupti Romo CNP, Magy Liu, CNP, Vikcy Duvall, PA-C, Summer Anton PA-C, Richa Torres, CNP, Suzanna Elias, CNP, aKtherin Rodriguez, CNP, Aubree Painting, CNP, Radha Jackson, CNP, Naz Moses, CNS, Rola Segundo, CNP, Мария Simmons CNP, Tracy Schwab, CNP         [facility]   IN-PATIENT SERVICE   [facility]    Progress Note    7/10/2024    11:36 AM    Name:   Apple Jean Baptiste  MRN:     [id number]     Acct:      [id number]   Room:   322/322-01   Day:  2  Admit Date:  7/8/2024 11:33 AM    PCP:   Simi Zavala DO  Code Status:  Full Code    Subjective:     Patient was seen and examined at bedside this AM. She reports feeling \"okay\" but endorses generalized weakness and has requested SNF placement. Awaiting placement.     Medications:     Allergies:    Allergies   Allergen Reactions    Aspirin Anaphylaxis and Nausea And Vomiting     Only thing she can take is tylenol    Benadryl [Diphenhydramine] Other (See Comments)     Dystonic movement    Ibuprofen Anaphylaxis    Lidocaine Anaphylaxis and Nausea And Vomiting     CAN NOT TOLERATE IV    Nsaids Other (See Comments)    Penicillins Anaphylaxis    Hydrocodone-Acetaminophen Other
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
## 2024-07-10 NOTE — DISCHARGE INSTR - COC
Continuity of Care Form    Patient Name: Apple Jean Baptiste   :  1950  MRN:  [id number]    Admit date:  2024  Discharge date:  ***    Code Status Order: Full Code   Advance Directives:   Advance Care Flowsheet Documentation       Date/Time Healthcare Directive Type of Healthcare Directive Copy in Chart Healthcare Agent Appointed Healthcare Agent's Name Healthcare Agent's Phone Number    [phone] No, patient does not have an advance directive for healthcare treatment -- -- -- -- --            Admitting Physician:  Manuel Long DO  PCP: Simi Zavala DO    Discharging Nurse: ***  Discharging Hospital Unit/Room#: 322/322-01  Discharging Unit Phone Number: ***    Emergency Contact:   Extended Emergency Contact Information  Primary Emergency Contact: Carson Jean Baptiste  Address: [address] [place] [address]  Home Phone: [phone]  Work Phone: [phone]  Mobile Phone: [phone]  Relation: Spouse  Secondary Emergency Contact: Ebony Meng  Home Phone: [phone]  Relation: Child   needed? No    Past Surgical History:  Past Surgical History:   Procedure Laterality Date    ANESTHESIA NERVE BLOCK Left 2019    NERVE BLOCK (DEFINE) - INTERCOSTAL NERVE BLOCK performed by Evelia Monsalve MD at [facility] OR    ANESTHESIA NERVE BLOCK Bilateral 05/15/2020    NERVE BLOCK BILATERAL - MBB  L4-5, L5-S1 performed by Evelia Monsalve MD at [facility] OR    APPENDECTOMY      ARM SURGERY Left     ORIF    CHOLECYSTECTOMY      COLONOSCOPY  2019    5 yr recall.    COLONOSCOPY N/A 2019    COLONOSCOPY WITH BIOPSY performed by Anum Max MD at [facility] OR    COLONOSCOPY N/A 2022    COLONOSCOPY DIAGNOSTIC performed by Anum Max MD at [facility] OR    HAND SURGERY Right     repair tendon    HC INJECTION PROCEDURE FOR SACROILIAC JOINT Bilateral 2019    SACROILIAC JOINT INJECTION performed by Evelia Monsalve MD at [facility]  MODERNA Booster BLUE border, (age 18y+), IM, 50mcg/0.25mL 12/28/2021    COVID-19, [place] Vaccine, Vaccine Unspecified 02/15/2021, 03/15/2021, 12/28/2021    Influenza, FLUAD, (age 65 y+), Adjuvanted, 0.5mL 09/10/2020, 11/02/2021, 10/24/2022    Influenza, Triv, inactivated, subunit, adjuvanted, IM (Fluad 65 yrs and older) 09/09/2019    Pneumococcal, PCV20, PREVNAR 20, (age 6w+), IM, 0.5mL 10/24/2022    Zoster Recombinant (Shingrix) 10/23/2020       Active Problems:  Patient Active Problem List   Diagnosis Code    Ovarian mass N83.8    Abdominal pain R10.9    Partial bowel obstruction (HCC) K56.600    Epigastric pain R10.13    GERD (gastroesophageal reflux disease) K21.9    Lumbosacral spondylosis without myelopathy M47.817    Chronic bilateral thoracic back pain M54.6, G89.29    Chronic anticoagulation Z79.01    BMI 38.0-38.9,adult Z68.38    Type 2 diabetes mellitus with diabetic neuropathy, with long-term current use of insulin (HCC) E11.40, Z79.4    Low hemoglobin D64.9    Nausea R11.0    Absolute anemia D64.9    Microcytic anemia D50.9    Iron deficiency anemia secondary to inadequate dietary iron intake D50.8    Iron malabsorption K90.9    Malignant neoplasm of ovary (HCC) C56.9    Occult blood in stools R19.5    Sacroiliitis, not elsewhere classified ([facility]) M46.1    Diverticulosis of colon K57.30    Acute left-sided thoracic back pain M54.6    COVID-19 U07.1    Other osteoporosis with current pathological fracture, vertebra(e), subsequent encounter for fracture with routine healing M80.88XD    Chronic indwelling Lazo catheter Z97.8    Cellulitis L03.90    History of pulmonary embolism Z86.711    Urinary retention R33.9    Bladder spasms N32.89    Ovarian cancer, right (HCC) C56.1    Urge incontinence of urine N39.41    Sleep apnea G47.30    Lower leg edema R60.0    Hydronephrosis with ureteral stricture, not elsewhere classified N13.1    Severe obesity (BMI 35.0-39.9) with comorbidity (HCC) E66.01    Mixed

## 2024-07-11 VITALS
WEIGHT: 202.38 LBS | SYSTOLIC BLOOD PRESSURE: 141 MMHG | HEART RATE: 65 BPM | OXYGEN SATURATION: 93 % | HEIGHT: 63 IN | TEMPERATURE: 97.9 F | RESPIRATION RATE: 17 BRPM | BODY MASS INDEX: 35.86 KG/M2 | DIASTOLIC BLOOD PRESSURE: 74 MMHG

## 2024-07-11 LAB
GLUCOSE BLD-MCNC: 114 MG/DL (ref 65–105)
GLUCOSE BLD-MCNC: 152 MG/DL (ref 65–105)

## 2024-07-11 PROCEDURE — 6360000002 HC RX W HCPCS: Performed by: HOSPITALIST

## 2024-07-11 PROCEDURE — 99238 HOSP IP/OBS DSCHRG MGMT 30/<: CPT | Performed by: STUDENT IN AN ORGANIZED HEALTH CARE EDUCATION/TRAINING PROGRAM

## 2024-07-11 PROCEDURE — 6370000000 HC RX 637 (ALT 250 FOR IP): Performed by: HOSPITALIST

## 2024-07-11 PROCEDURE — 82947 ASSAY GLUCOSE BLOOD QUANT: CPT

## 2024-07-11 PROCEDURE — 2580000003 HC RX 258: Performed by: HOSPITALIST

## 2024-07-11 RX ORDER — OXYCODONE HYDROCHLORIDE AND ACETAMINOPHEN 5; 325 MG/1; MG/1
1 TABLET ORAL EVERY 6 HOURS PRN
Qty: 12 TABLET | Refills: 0 | Status: SHIPPED | OUTPATIENT
Start: 2024-07-11 | End: 2024-07-14

## 2024-07-11 RX ADMIN — DULOXETINE HYDROCHLORIDE 60 MG: 30 CAPSULE, DELAYED RELEASE ORAL at 09:49

## 2024-07-11 RX ADMIN — LOSARTAN POTASSIUM 100 MG: 50 TABLET, FILM COATED ORAL at 09:49

## 2024-07-11 RX ADMIN — SODIUM CHLORIDE, PRESERVATIVE FREE 10 ML: 5 INJECTION INTRAVENOUS at 09:58

## 2024-07-11 RX ADMIN — SUCRALFATE 1 G: 1 TABLET ORAL at 13:29

## 2024-07-11 RX ADMIN — CETIRIZINE HYDROCHLORIDE 10 MG: 10 TABLET, FILM COATED ORAL at 09:49

## 2024-07-11 RX ADMIN — ENOXAPARIN SODIUM 40 MG: 100 INJECTION SUBCUTANEOUS at 09:48

## 2024-07-11 RX ADMIN — GABAPENTIN 600 MG: 300 CAPSULE ORAL at 13:29

## 2024-07-11 RX ADMIN — SUCRALFATE 1 G: 1 TABLET ORAL at 10:03

## 2024-07-11 RX ADMIN — POTASSIUM CHLORIDE 20 MEQ: 1500 TABLET, EXTENDED RELEASE ORAL at 09:49

## 2024-07-11 RX ADMIN — Medication 500 MG: at 09:49

## 2024-07-11 RX ADMIN — GABAPENTIN 600 MG: 300 CAPSULE ORAL at 09:49

## 2024-07-11 RX ADMIN — ATORVASTATIN CALCIUM 40 MG: 40 TABLET, FILM COATED ORAL at 09:49

## 2024-07-11 RX ADMIN — POTASSIUM CHLORIDE 20 MEQ: 1500 TABLET, EXTENDED RELEASE ORAL at 13:29

## 2024-07-11 RX ADMIN — HYDROCHLOROTHIAZIDE 25 MG: 25 TABLET ORAL at 09:49

## 2024-07-11 RX ADMIN — CARVEDILOL 6.25 MG: 3.12 TABLET, FILM COATED ORAL at 09:49

## 2024-07-11 RX ADMIN — PANTOPRAZOLE SODIUM 40 MG: 40 TABLET, DELAYED RELEASE ORAL at 05:28

## 2024-07-11 RX ADMIN — Medication 400 MG: at 09:49

## 2024-07-11 RX ADMIN — INSULIN GLARGINE 30 UNITS: 100 INJECTION, SOLUTION SUBCUTANEOUS at 09:49

## 2024-07-11 RX ADMIN — LEVOTHYROXINE SODIUM 88 MCG: 88 TABLET ORAL at 05:28

## 2024-07-11 NOTE — PLAN OF CARE
Problem: Discharge Planning  Goal: Discharge to home or other facility with appropriate resources  Outcome: Progressing     Problem: Pain  Goal: Verbalizes/displays adequate comfort level or baseline comfort level  Outcome: Progressing     Problem: Safety - Adult  Goal: Free from fall injury  Outcome: Progressing     Problem: ABCDS Injury Assessment  Goal: Absence of physical injury  Outcome: Progressing     Problem: Skin/Tissue Integrity  Goal: Absence of new skin breakdown  Description: 1.  Monitor for areas of redness and/or skin breakdown  2.  Assess vascular access sites hourly  3.  Every 4-6 hours minimum:  Change oxygen saturation probe site  4.  Every 4-6 hours:  If on nasal continuous positive airway pressure, respiratory therapy assess nares and determine need for appliance change or resting period.  Outcome: Progressing     Problem: Chronic Conditions and Co-morbidities  Goal: Patient's chronic conditions and co-morbidity symptoms are monitored and maintained or improved  Outcome: Progressing     Problem: Neurosensory - Adult  Goal: Achieves stable or improved neurological status  Outcome: Progressing     Problem: Respiratory - Adult  Goal: Achieves optimal ventilation and oxygenation  Outcome: Progressing     Problem: Cardiovascular - Adult  Goal: Maintains optimal cardiac output and hemodynamic stability  Outcome: Progressing     Problem: Skin/Tissue Integrity - Adult  Goal: Skin integrity remains intact  Outcome: Progressing     Problem: Musculoskeletal - Adult  Goal: Return mobility to safest level of function  Outcome: Progressing     Problem: Gastrointestinal - Adult  Goal: Minimal or absence of nausea and vomiting  Outcome: Progressing     Problem: Genitourinary - Adult  Goal: Absence of urinary retention  Outcome: Progressing     Problem: Infection - Adult  Goal: Absence of infection at discharge  Outcome: Progressing     Problem: Metabolic/Fluid and Electrolytes - Adult  Goal: Electrolytes  maintained within normal limits  Outcome: Progressing     Problem: Hematologic - Adult  Goal: Maintains hematologic stability  Outcome: Progressing

## 2024-07-11 NOTE — PLAN OF CARE
Problem: Discharge Planning  Goal: Discharge to home or other facility with appropriate resources  7/11/2024 0053 by Mark Romero RN  Outcome: Progressing  Flowsheets (Taken 7/10/2024 2105)  Discharge to home or other facility with appropriate resources:   Identify barriers to discharge with patient and caregiver   Arrange for needed discharge resources and transportation as appropriate   Identify discharge learning needs (meds, wound care, etc)  7/10/2024 1548 by Geri Lazaro RN  Outcome: Progressing  Flowsheets (Taken 7/10/2024 0839)  Discharge to home or other facility with appropriate resources: Identify barriers to discharge with patient and caregiver     Problem: Pain  Goal: Verbalizes/displays adequate comfort level or baseline comfort level  7/11/2024 0053 by Mark Romero RN  Outcome: Progressing  7/10/2024 1548 by Geri Lazaro RN  Outcome: Progressing     Problem: Safety - Adult  Goal: Free from fall injury  7/11/2024 0053 by Mark Romero RN  Outcome: Progressing  7/10/2024 1548 by Geri Lazaro RN  Outcome: Progressing     Problem: ABCDS Injury Assessment  Goal: Absence of physical injury  7/11/2024 0053 by Mark Romero RN  Outcome: Progressing  7/10/2024 1548 by Geri Lazaro RN  Outcome: Progressing     Problem: Skin/Tissue Integrity  Goal: Absence of new skin breakdown  Description: 1.  Monitor for areas of redness and/or skin breakdown  2.  Assess vascular access sites hourly  3.  Every 4-6 hours minimum:  Change oxygen saturation probe site  4.  Every 4-6 hours:  If on nasal continuous positive airway pressure, respiratory therapy assess nares and determine need for appliance change or resting period.  7/11/2024 0053 by Mark Romero RN  Outcome: Progressing  7/10/2024 1548 by Geri Lazaro RN  Outcome: Progressing     Problem: Chronic Conditions and Co-morbidities  Goal: Patient's chronic conditions and co-morbidity symptoms are monitored and maintained or

## 2024-07-11 NOTE — PROGRESS NOTES
Patient discharged to [facility] by [facility] transport. Report called to [facility]. Patient in stable condition.

## 2024-07-11 NOTE — CARE COORDINATION
Message received from Francis Sharma with [facility] [phone] stating they are able to accept patient. CM initiated [employer] precert for [facility]. [facility] can be changed to different [facility] facility once approved.    Leticia with [facility] at bedside for onsite evaluation. Leticia states that they currently have no beds at [facility] but are able to accept patient to [facility]. Patient is agreeable with [facility]. CM will need to change facility on precert once authorization is received.    1130- Patient has approved [employer] precert to admit to [facility] [place]. CM 5pm transportation scheduled with [facility]. CM spoke with Yenni with [facility] and she confirmed they are able to confirm with 5p transport time. ADILENE needs to be completed. Patient updated and she verbalizes being agreeable with discharging to [facility] [place] today.    RN to call report to [phone]    Discharge Report    [facility]  Clinical Case Management Department  Written by: Jami Birch RN    Patient Name: Apple Jean Baptiste  Attending Provider: Robbi Randolph MD  Admit Date: 2024 11:33 AM  MRN: [id number]  Account: [id number]                     : 1950  Discharge Date: 24      Disposition: SNF- [facility] [place]    Jami Birch RN

## 2024-07-16 ENCOUNTER — HOSPITAL ENCOUNTER (OUTPATIENT)
Age: 74
Setting detail: SPECIMEN
Discharge: HOME | End: 2024-07-16
Payer: MEDICARE

## 2024-07-16 LAB
BACTERIA URNS QL MICRO: NORMAL
BILIRUB UR QL STRIP: NEGATIVE
CASTS #/AREA URNS LPF: NORMAL /LPF (ref 0–8)
CLARITY UR: ABNORMAL
COLOR UR: YELLOW
EPI CELLS #/AREA URNS HPF: NORMAL /HPF (ref 0–5)
GLUCOSE UR STRIP-MCNC: NEGATIVE MG/DL
HGB UR QL STRIP.AUTO: NEGATIVE
KETONES UR STRIP-MCNC: NEGATIVE MG/DL
LEUKOCYTE ESTERASE UR QL STRIP: ABNORMAL
NITRITE UR QL STRIP: NEGATIVE
PH UR STRIP: 5.5 [PH] (ref 5–8)
PROT UR STRIP-MCNC: NEGATIVE MG/DL
RBC #/AREA URNS HPF: NORMAL /HPF (ref 0–4)
SP GR UR STRIP: 1.02 (ref 1–1.03)
UROBILINOGEN UR STRIP-ACNC: NORMAL EU/DL (ref 0–1)
WBC #/AREA URNS HPF: NORMAL /HPF (ref 0–5)

## 2024-07-16 PROCEDURE — 81001 URINALYSIS AUTO W/SCOPE: CPT

## 2024-07-16 PROCEDURE — 87086 URINE CULTURE/COLONY COUNT: CPT

## 2024-07-17 LAB
MICROORGANISM SPEC CULT: NORMAL
SPECIMEN DESCRIPTION: NORMAL

## 2024-07-17 NOTE — OP NOTE
death. Given the significant pain prompting presentation to ER multiple times and her pain affecting mobility and daily quality of life, patient elected to proceed with operative intervention.    Technique:    Patient was met in the preoperative holding area at which time a history and physical was written, informed consent was obtained, and operative site was marked. IV antibiotic was administered for surgical prophylaxis. Patient was then transported from the preoperative holding area to the operating theater and placed under general anesthesia without issue by the anesthesia team. Patient was then transferred from the hospital bed to the spinal Tai table and placed in the prone position. Axillae, eyes, and upper extremities were well padded. Sequential compression devices were placed to bilateral lower extremities. A formal time-out procedure was then performed and agreed upon by all personnel present in the room confirming correct patient, operative procedures, and operative site.    Operative site was then prepped and draped in standard sterile fashion. Two C arm fluoroscopic machines were then appropriately placed to obtain AP and lateral biplanar fluoroscopy throughout the procedure. The image intensifier was then brought into position and fracture site T12 was identified and confirmed. Then T12 pedicles were identified with AP fluoroscopy and skin marker used to indicate starting points just lateral to the pedicles in the AP view for taking a transpedicular approach. Local anesthetic 0.5 % Marcaine with epinephrine was then used to raise a skin wheal at bilateral starting points. Kyphon needles were then used and advanced through the T12 pedicle on the left confirming trajectory and safe final position on AP and lateral fluoroscopy spot images. Needle was then removed from the cannula and drill used with fluoroscopic assistance to create a channel for the balloon. A 15 mm inflatable bone tamp was then

## 2024-07-22 DIAGNOSIS — S22.080D COMPRESSION FRACTURE OF T12 VERTEBRA WITH ROUTINE HEALING, SUBSEQUENT ENCOUNTER: ICD-10-CM

## 2024-07-22 RX ORDER — OXYCODONE HYDROCHLORIDE AND ACETAMINOPHEN 5; 325 MG/1; MG/1
1 TABLET ORAL EVERY 6 HOURS PRN
Refills: 0 | Status: CANCELLED | OUTPATIENT
Start: 2024-07-22 | End: 2024-07-25

## 2024-07-22 NOTE — TELEPHONE ENCOUNTER
Pt called into office, was discharged from hospital 7/11 d/t fracture in back. Pt states she was sent home with Percocet 5-325mg TID. Pt states she has only been using 1 daily at night time. Pt only has 2 pills remaining. Pt is asking if PCP can send in a refill to [facility] in [place]. Pt states she does have f/u scheduled at the end of the month with Dr. nEg. Please advise.    Pt also states she may have to f/u with [facility] d/t pain near her pain pump. States they did imagine of the area while hospitalized and everything was in place.

## 2024-07-23 ENCOUNTER — CARE COORDINATION (OUTPATIENT)
Dept: CASE MANAGEMENT | Age: 74
End: 2024-07-23

## 2024-07-23 NOTE — CARE COORDINATION
Care Transitions Note    Initial Call - Call within 2 business days of discharge: Yes    Attempted to reach patient for transitions of care follow up. Unable to reach patient.    Outreach Attempts:   Unable to leave message. Per recording vm was full.    Patient: Apple Jean Baptiste    Patient : 1950   MRN: <[id number]>    Reason for Admission: Compression fx of t12 vertebra/Kyphoplasty  Discharge Date: 24  RURS: Readmission Risk Score: 11.3    Last Discharge Facility       Date Complaint Diagnosis Description Type Department Provider    24 Back Pain; Fall Compression fracture of T12 vertebra, initial encounter (HCC) ... ED to Hosp-Admission (Discharged) (ADMITTED) [facility] Robbi Mccray MD; Juan Joe,...            Was this an external facility discharge? Yes. Discharge Date: 24. Facility Name: [facility]    Follow Up Appointment:   Patient does not have a follow up appointment scheduled at time of call.  Unable to reach patient  Future Appointments         Provider Specialty Dept Phone    2024 12:40 PM Elinor Garrett APRN - CNP Pain Management [phone]    2024 1:00 PM Jomar Carrera PA-C Dermatology [phone]    9/3/2024 1:30 PM Simi Zavala DO Primary Care [phone]    2024 1:00 PM Kayden Dominguez MD Oncology [phone]            Plan for follow-up on next business day.      Magaly Macdonald RN

## 2024-07-24 ENCOUNTER — CARE COORDINATION (OUTPATIENT)
Dept: CASE MANAGEMENT | Age: 74
End: 2024-07-24

## 2024-07-24 NOTE — CARE COORDINATION
Care Transitions Note    Initial Call - Call within 2 business days of discharge: Yes    Patient Current Location:  Home: [address] [place] [address] [address] [place] [id number]    Care Transition Nurse contacted the patient by telephone to perform post hospital discharge assessment, verified name and  as identifiers. Provided introduction to self, and explanation of the Care Transition Nurse role.     Patient: Apple Jean Baptiste    Patient : 1950   MRN: <[id number]>    Reason for Admission: Fall/Compression fx of T12 compression fx/Kyphoplasty  Discharge Date: 24  RURS: Readmission Risk Score: 11.3      Last Discharge Facility       Date Complaint Diagnosis Description Type Department Provider    24 Back Pain; Fall Compression fracture of T12 vertebra, initial encounter (HCC) ... ED to Hosp-Admission (Discharged) (ADMITTED) [facility] PB Robbi Mccray MD; Juan Joe,...            Was this an external facility discharge? Yes. Discharge Date: 24. Facility Name: [facility]    Care Summary Note: Patient states she is not doing too good. States she has not used Percocet for pain the first night home. States the second night she woke up two hours after sleeping and could not move d/t the pain. States she lives alone. She has been taking Percocet at bed time. [facility] was at her door. Caller agreed to call back at a later time.  Care Transition Nurse provided contact information.  Plan for follow-up call in 2-5 days based on severity of symptoms and risk factors.  Plan for next call: symptom management-   Completion of initial BALJINDER call    Magaly Macdonald RN

## 2024-07-25 ENCOUNTER — CARE COORDINATION (OUTPATIENT)
Dept: CASE MANAGEMENT | Age: 74
End: 2024-07-25

## 2024-07-25 NOTE — CARE COORDINATION
Care Transitions Note    Initial Call - Call within 2 business days of discharge: Yes    Attempted to reach patient for transitions of care follow up. Unable to reach patient.    Outreach Attempts:   Multiple attempts to contact patient at phone numbers on file.     Patient: Apple Jean Baptiste    Patient : 1950   MRN: [id number]    Reason for Admission: Fall/compression fx of T12 vertebra  Discharge Date: 24  RURS: Readmission Risk Score: 11.3    Last Discharge Facility       Date Complaint Diagnosis Description Type Department Provider    24 Back Pain; Fall Compression fracture of T12 vertebra, initial encounter (HCC) ... ED to Hosp-Admission (Discharged) (ADMITTED) [facility] Robbi Mccray MD; Juan Joe,...            Was this an external facility discharge? Yes. Discharge Date: 24. Facility Name: [facility]    Follow Up Appointment:   Patient does not have a follow up appointment scheduled at time of call.  Unable to reach patient to schedule PCP f/u  Future Appointments         Provider Specialty Dept Phone    2024 12:40 PM Elinor Garrett APRN - CNP Pain Management [phone]    2024 1:00 PM Jomar Carrera PA-C Dermatology [phone]    9/3/2024 1:30 PM Simi Zavala DO Primary Care [phone]    2024 1:00 PM Kayden Dominguez MD Oncology [phone]            Patient referred back to [facility]  for continued management.     Magaly Macdonald RN

## 2024-08-02 ENCOUNTER — CARE COORDINATION (OUTPATIENT)
Dept: CARE COORDINATION | Age: 74
End: 2024-08-02

## 2024-08-02 NOTE — CARE COORDINATION
Ambulatory Care Coordination Note     8/2/2024 4:17 PM     Patient outreach attempt by this ACM today to perform care management follow up . ACM was unable to reach the patient by telephone today; voicemail full and unable to leave a message.      ACM: Jigna Ribeiro RN     Care Summary Note:     PCP/Specialist follow up:   Future Appointments         Provider Specialty Dept Phone    8/19/2024 12:40 PM Elinor Garrett APRN - CNP Pain Management [phone]    8/21/2024 1:00 PM Jomar Carrera PA-C Dermatology [phone]    9/3/2024 1:30 PM Simi Zavala DO Primary Care [phone]    12/9/2024 1:00 PM Kayden Dominguez MD Oncology [phone]            Follow Up:   Plan for next ACM outreach in approximately  next week   to complete:  - disease specific assessments  - education .

## 2024-08-02 NOTE — CARE COORDINATION
Ambulatory Care Coordination Note     8/2/2024 10:44 AM     Patient outreach attempt by this ACM today to perform care management follow up . ACM was unable to reach the patient by telephone today; voicemail full and unable to leave a message.      ACM: Jigna Ribeiro RN     Care Summary Note:     PCP/Specialist follow up:   Future Appointments         Provider Specialty Dept Phone    8/19/2024 12:40 PM Elinor Garrett APRN - CNP Pain Management [phone]    8/21/2024 1:00 PM Jomar Carrera PA-C Dermatology [phone]    9/3/2024 1:30 PM Simi Zavala DO Primary Care [phone]    12/9/2024 1:00 PM Kayden Dominguez MD Oncology [phone]            Follow Up:   Plan for next ACM outreach in approximately 1-2 days  to complete:  - outreach attempt to offer care management services.

## 2024-08-06 ENCOUNTER — CARE COORDINATION (OUTPATIENT)
Dept: CARE COORDINATION | Age: 74
End: 2024-08-06

## 2024-08-06 NOTE — CARE COORDINATION
Ambulatory Care Coordination Note     8/6/2024 11:55 AM     outreach attempt to patient by this AC today to perform care management follow up . [facility] was unable to reach the patient by telephone today; left voice message requesting a return phone call to this ACM.     ACM: Jigna Ribeiro RN       PCP/Specialist follow up:   Future Appointments         Provider Specialty Dept Phone    8/19/2024 12:40 PM Elinor Garrett APRN - CNP Pain Management [phone]    8/21/2024 1:00 PM Jomar Carrera PA-C Dermatology [phone]    9/3/2024 1:30 PM Simi Zavala DO Primary Care [phone]    12/9/2024 1:00 PM Kayden Dominguez MD Oncology [phone]            Follow Up:   Plan for next ACM outreach in approximately 1 week to complete:  - disease specific assessments.

## 2024-08-07 LAB
ALBUMIN: 3.9 G/DL
ALP BLD-CCNC: 100 U/L
ALT SERPL-CCNC: 28 U/L
ANION GAP SERPL CALCULATED.3IONS-SCNC: 8 MMOL/L
AST SERPL-CCNC: 25 U/L
BASOPHILS ABSOLUTE: 0 /ΜL
BASOPHILS RELATIVE PERCENT: 0.4 %
BILIRUB SERPL-MCNC: 0.9 MG/DL (ref 0.1–1.4)
BUN BLDV-MCNC: 16 MG/DL
CALCIUM SERPL-MCNC: 9.2 MG/DL
CHLORIDE BLD-SCNC: 108 MMOL/L
CO2: 24 MMOL/L
CREAT SERPL-MCNC: 1 MG/DL
EOSINOPHILS ABSOLUTE: 0.2 /ΜL
EOSINOPHILS RELATIVE PERCENT: 1.8 %
GFR, ESTIMATED: 54
GLUCOSE BLD-MCNC: 257 MG/DL
HCT VFR BLD CALC: 37.8 % (ref 36–46)
HEMOGLOBIN: 12.5 G/DL (ref 12–16)
LYMPHOCYTES ABSOLUTE: 2.8 /ΜL
LYMPHOCYTES RELATIVE PERCENT: 30.6 %
MCH RBC QN AUTO: 27.9 PG
MCHC RBC AUTO-ENTMCNC: 33.1 G/DL
MCV RBC AUTO: 84.4 FL
MICROSCOPIC URINE: NORMAL
MONOCYTES ABSOLUTE: 0.6 /ΜL
MONOCYTES RELATIVE PERCENT: 5.9 %
NEUTROPHILS ABSOLUTE: 5.6 /ΜL
NEUTROPHILS RELATIVE PERCENT: 61.2 %
PLATELET # BLD: 186 K/ΜL
PMV BLD AUTO: 11.4 FL
POTASSIUM SERPL-SCNC: 3.1 MMOL/L
RBC # BLD: 4.48 10^6/ΜL
SODIUM BLD-SCNC: 140 MMOL/L
TOTAL PROTEIN: 6.8 G/DL (ref 6.4–8.2)
WBC # BLD: 9.2 10^3/ML

## 2024-08-08 ENCOUNTER — CARE COORDINATION (OUTPATIENT)
Dept: CARE COORDINATION | Age: 74
End: 2024-08-08

## 2024-08-08 ENCOUNTER — OFFICE VISIT (OUTPATIENT)
Dept: PRIMARY CARE CLINIC | Age: 74
End: 2024-08-08
Payer: MEDICARE

## 2024-08-08 VITALS
OXYGEN SATURATION: 98 % | WEIGHT: 192 LBS | BODY MASS INDEX: 34.01 KG/M2 | HEART RATE: 75 BPM | SYSTOLIC BLOOD PRESSURE: 152 MMHG | DIASTOLIC BLOOD PRESSURE: 82 MMHG

## 2024-08-08 DIAGNOSIS — K59.00 CONSTIPATION, UNSPECIFIED CONSTIPATION TYPE: ICD-10-CM

## 2024-08-08 DIAGNOSIS — I10 ESSENTIAL HYPERTENSION: Primary | ICD-10-CM

## 2024-08-08 PROBLEM — U07.1 COVID-19: Status: RESOLVED | Noted: 2022-08-26 | Resolved: 2024-08-08

## 2024-08-08 PROCEDURE — G8399 PT W/DXA RESULTS DOCUMENT: HCPCS | Performed by: NURSE PRACTITIONER

## 2024-08-08 PROCEDURE — 1111F DSCHRG MED/CURRENT MED MERGE: CPT | Performed by: NURSE PRACTITIONER

## 2024-08-08 PROCEDURE — G8427 DOCREV CUR MEDS BY ELIG CLIN: HCPCS | Performed by: NURSE PRACTITIONER

## 2024-08-08 PROCEDURE — 1090F PRES/ABSN URINE INCON ASSESS: CPT | Performed by: NURSE PRACTITIONER

## 2024-08-08 PROCEDURE — 3079F DIAST BP 80-89 MM HG: CPT | Performed by: NURSE PRACTITIONER

## 2024-08-08 PROCEDURE — 1123F ACP DISCUSS/DSCN MKR DOCD: CPT | Performed by: NURSE PRACTITIONER

## 2024-08-08 PROCEDURE — 3077F SYST BP >= 140 MM HG: CPT | Performed by: NURSE PRACTITIONER

## 2024-08-08 PROCEDURE — 3017F COLORECTAL CA SCREEN DOC REV: CPT | Performed by: NURSE PRACTITIONER

## 2024-08-08 PROCEDURE — 99214 OFFICE O/P EST MOD 30 MIN: CPT | Performed by: NURSE PRACTITIONER

## 2024-08-08 PROCEDURE — G8417 CALC BMI ABV UP PARAM F/U: HCPCS | Performed by: NURSE PRACTITIONER

## 2024-08-08 PROCEDURE — 1036F TOBACCO NON-USER: CPT | Performed by: NURSE PRACTITIONER

## 2024-08-08 ASSESSMENT — ENCOUNTER SYMPTOMS
WHEEZING: 0
NAUSEA: 0
COUGH: 0
BLOOD IN STOOL: 0
DIARRHEA: 0
SHORTNESS OF BREATH: 0
SORE THROAT: 0
SINUS PRESSURE: 0
BACK PAIN: 1
CONSTIPATION: 1
ABDOMINAL PAIN: 0
TROUBLE SWALLOWING: 0
VOMITING: 0

## 2024-08-08 NOTE — PROGRESS NOTES
Tobacco Use    Smoking status: Never    Smokeless tobacco: Never   Substance Use Topics    Alcohol use: Yes     Comment: rare      Current Outpatient Medications   Medication Sig Dispense Refill    fentaNYL (SUBLIMAZE) 0.05 MG/ML injection Infuse intravenously.      gabapentin (NEURONTIN) 600 MG tablet Take 1 tablet by mouth in the morning, at noon, in the evening, and at bedtime for 30 days. 120 tablet 3    potassium chloride (KLOR-CON M) 20 MEQ extended release tablet TAKE 1 TABLET BY MOUTH 3 TIMES DAILY IN THE MORNING, AT NOON, AND BEFORE BEDTIME (Patient taking differently: Take 1 tablet by mouth 2 times daily TAKE 1 TABLET BY MOUTH 3 TIMES DAILY IN THE MORNING, AT NOON, AND BEFORE BEDTIME) 270 tablet 3    sucralfate (CARAFATE) 1 GM tablet TAKE 1 TABLET BY MOUTH IN THE MORNING, AT NOON, IN THE EVENING, AND AT BEDTIME 360 tablet 3    alendronate (FOSAMAX) 70 MG tablet TAKE 1 TABLET EVERY WEEK ON MONDAY 12 tablet 3    insulin lispro (HUMALOG) 100 UNIT/ML SOLN injection vial INJECT SUBCUTANEOUSLY UP TO 3 TIMES A DAY BASED ON SLIDING SCALE *MAX OF 40 UNITS DAILY*.vials not pen 10 mL 10    tamsulosin (FLOMAX) 0.4 MG capsule Take 1 capsule by mouth nightly 90 capsule 2    atorvastatin (LIPITOR) 40 MG tablet Take 1 tablet by mouth daily 90 tablet 2    carvedilol (COREG) 6.25 MG tablet Take 1 tablet by mouth 2 times daily 180 tablet 2    traZODone (DESYREL) 100 MG tablet Take 1 tablet by mouth nightly as needed for Sleep 90 tablet 2    rivaroxaban (XARELTO) 20 MG TABS tablet Take 1 tablet by mouth daily 90 tablet 2    levothyroxine (SYNTHROID) 88 MCG tablet Take 1 tablet by mouth daily 90 tablet 2    losartan (COZAAR) 100 MG tablet Take 1 tablet by mouth daily 90 tablet 2    omeprazole (PRILOSEC) 40 MG delayed release capsule Take 1 capsule by mouth every morning (before breakfast) 90 capsule 2    DULoxetine (CYMBALTA) 60 MG extended release capsule Take 1 capsule by mouth daily 90 capsule 2    hydroCHLOROthiazide

## 2024-08-09 ENCOUNTER — TELEPHONE (OUTPATIENT)
Dept: PRIMARY CARE CLINIC | Age: 74
End: 2024-08-09

## 2024-08-09 DIAGNOSIS — K59.00 CONSTIPATION, UNSPECIFIED CONSTIPATION TYPE: Primary | ICD-10-CM

## 2024-08-09 RX ORDER — POLYETHYLENE GLYCOL 3350 17 G/17G
17 POWDER, FOR SOLUTION ORAL 2 TIMES DAILY
Qty: 1530 G | Refills: 1 | Status: SHIPPED | OUTPATIENT
Start: 2024-08-09

## 2024-08-09 NOTE — TELEPHONE ENCOUNTER
At her appointment yesterday she was advised to start MiraLAX twice a day.  Has she done this?  As the weekend is approaching I have sent in the MiraLAX for her as a prescription in case she did not have this at home.  I have also sent in 1 bottle of magnesium citrate for her to reserve in case the MiraLAX does not work

## 2024-08-09 NOTE — TELEPHONE ENCOUNTER
The patient called stating that she was told by the hosptial when she was discharged the hospital was sending over a stool softener.  The patient went the pharmacy and they have not received anything and what she was given by the hospital is not something they have in stock or can order.  Also what the pharmacy has OTC is not the same dose as what the patient received in the hosptial.    The patient has still not had a bowel movement and is asking for a medication to be sent in to [facility] in [place].

## 2024-08-09 NOTE — TELEPHONE ENCOUNTER
Spoke with the patient and informed of the provider's instructions, the patient verbalized understanding of the provider's instructions.     Patient states that her symptoms worsened in the time of both calls and is on her way to ED.

## 2024-08-12 ENCOUNTER — CARE COORDINATION (OUTPATIENT)
Dept: CARE COORDINATION | Age: 74
End: 2024-08-12

## 2024-08-12 ENCOUNTER — TELEPHONE (OUTPATIENT)
Dept: PRIMARY CARE CLINIC | Age: 74
End: 2024-08-12

## 2024-08-12 RX ORDER — BISACODYL 10 MG
10 SUPPOSITORY, RECTAL RECTAL DAILY
Qty: 30 SUPPOSITORY | Refills: 0 | Status: SHIPPED | OUTPATIENT
Start: 2024-08-12 | End: 2024-09-11

## 2024-08-12 NOTE — TELEPHONE ENCOUNTER
Pt called into office stating she saw Apple last week while PCP was away for constipation issues and she is still having the same symptoms. She states she has tried everything to help but but no improvement and she had her last Bowel Movement on Friday and since then nothing.   She is what other options she has to help with sx.   Please advise  Thanks

## 2024-08-12 NOTE — CARE COORDINATION
Ambulatory Care Coordination Note     8/12/2024 2:21 PM     ACM outreach attempt by this ACM today to perform care management follow up . ACM was unable to reach the patient by telephone today; left voice message requesting a return phone call to this ACM.     ACM: Jigna Ribeiro RN     Care Summary Note;    PCP/Specialist follow up:   Future Appointments         Provider Specialty Dept Phone    8/19/2024 12:40 PM Elinor Garrett APRN - [facility] Pain Management [phone]    8/21/2024 1:00 PM Jomar Carrera PA-C Dermatology [phone]    9/3/2024 1:30 PM Simi Zavala DO Primary Care [phone]    12/9/2024 1:00 PM Kayden Dominguez MD Oncology [phone]            Follow Up:   Plan for next ACM outreach in approximately 1 week to complete:  - disease specific assessments.

## 2024-08-12 NOTE — TELEPHONE ENCOUNTER
Recommend continue miralax and also add dulcolax to see if this helps ( this is a suppository). I would only use the suppository a few days- not daily.

## 2024-08-13 ENCOUNTER — CARE COORDINATION (OUTPATIENT)
Dept: CARE COORDINATION | Age: 74
End: 2024-08-13

## 2024-08-13 NOTE — CARE COORDINATION
Ambulatory Care Coordination Note     8/13/2024 2:26 PM     Patient outreach attempt by this AC today to perform care management follow up . [facility] was unable to reach the patient by telephone today; left voice message requesting a return phone call to this ACM.          PCP/Specialist follow up:   Future Appointments         Provider Specialty Dept Phone    8/19/2024 12:40 PM Elinor Garrett APRN - [facility] Pain Management [phone]    8/21/2024 1:00 PM Jomar Carrera PA-C Dermatology [phone]    9/3/2024 1:30 PM Simi Zavala DO Primary Care [phone]    12/9/2024 1:00 PM Kayden Dominguez MD Oncology [phone]            Follow Up:   Plan for next ACM outreach in approximately  2-3 days   to complete:  - outreach attempt to offer care management services.

## 2024-08-13 NOTE — TELEPHONE ENCOUNTER
Last Visit Date: 7/8/2024   Next Visit Date: 9/3/2024     Pt called to enquire about medication sent to the pharmacy for constipation stating \"the pharamcy says they contacted your office with questions.\" Writer did not locate interaction from pharmacy noted in pt's chart. Writer noted 3 constipation medications that have been sent to pharmacy Magnesium citarte, dulcolax, and glycolax. Pt reports she has not picked any of the medication up from the pharmacy and has not had a bowel movement since Friday. Pt asked if she should take all 3 medications. Writer advised pt to check with pharmacist regarding instructions for medications. Pt verbalized understanding.    Please advise if alternate information for pt is indicated.

## 2024-08-13 NOTE — TELEPHONE ENCOUNTER
Writer spoke with pt reported PCP information regarding medication. Caller verbalized understanding.

## 2024-08-13 NOTE — TELEPHONE ENCOUNTER
I was not aware that she was not taking the medication at night and sent.  I would recommend she can just take MiraLAX which is a powder and can do Dulcolax suppository.  Does not need to take mag citrate since she will be taking the other medications.

## 2024-08-15 ENCOUNTER — CARE COORDINATION (OUTPATIENT)
Dept: CARE COORDINATION | Age: 74
End: 2024-08-15

## 2024-08-15 NOTE — CARE COORDINATION
Ambulatory Care Coordination Note     8/15/2024 10:43 AM     patient outreach attempt by this ACM today to perform care management follow up . ACM was unable to reach the patient by telephone today; left voice message requesting a return phone call to this ACM.     Patient closed (unable to reach patient) from the High Risk Care Management program on 8/15/2024.  No further Ambulatory Care Manager follow up scheduled.

## 2024-08-16 NOTE — TELEPHONE ENCOUNTER
Yes she can try an over the counter fleet enema if needed but if it is severe and has abdominal pain may be best to go to ER.

## 2024-08-16 NOTE — TELEPHONE ENCOUNTER
Pt reports she has tried prunes, extra water, medication and an enema at home with no bowel movement. Pt informed of PCP information and advised to be seen in the ED. Pt verbalized understanding and reported she would go to [facility] expressing dissatisfaction with [facility].

## 2024-08-16 NOTE — TELEPHONE ENCOUNTER
Patient states she has tried everything to help with constipation but but no improvement and she had her last Bowel Movement on Friday and since then nothing.     Patient has tried a few different stool softeners.     Patient also stated she has also had a lot of dizziness.     Pt has been staying hydrated and drinking water.     Pt stated she she had a enema at the ER last week and may go back.    Writer suggested ER for pt if she does not have bowel movement.     Pt stated\" Marielena even put gloves on and tried to pull it out.\"     Pt scheduled to see another provider in office.

## 2024-08-21 ENCOUNTER — TELEPHONE (OUTPATIENT)
Dept: PAIN MANAGEMENT | Age: 74
End: 2024-08-21

## 2024-08-21 ENCOUNTER — OFFICE VISIT (OUTPATIENT)
Dept: DERMATOLOGY | Age: 74
End: 2024-08-21
Payer: MEDICARE

## 2024-08-21 VITALS
HEIGHT: 64 IN | WEIGHT: 194 LBS | HEART RATE: 83 BPM | TEMPERATURE: 97 F | OXYGEN SATURATION: 96 % | DIASTOLIC BLOOD PRESSURE: 82 MMHG | SYSTOLIC BLOOD PRESSURE: 134 MMHG | BODY MASS INDEX: 33.12 KG/M2

## 2024-08-21 DIAGNOSIS — D48.5 NEOPLASM OF UNCERTAIN BEHAVIOR OF SKIN: Primary | ICD-10-CM

## 2024-08-21 DIAGNOSIS — L82.1 SEBORRHEIC KERATOSES: ICD-10-CM

## 2024-08-21 PROCEDURE — 1123F ACP DISCUSS/DSCN MKR DOCD: CPT | Performed by: PHYSICIAN ASSISTANT

## 2024-08-21 PROCEDURE — G8417 CALC BMI ABV UP PARAM F/U: HCPCS | Performed by: PHYSICIAN ASSISTANT

## 2024-08-21 PROCEDURE — 1036F TOBACCO NON-USER: CPT | Performed by: PHYSICIAN ASSISTANT

## 2024-08-21 PROCEDURE — G8427 DOCREV CUR MEDS BY ELIG CLIN: HCPCS | Performed by: PHYSICIAN ASSISTANT

## 2024-08-21 PROCEDURE — 1090F PRES/ABSN URINE INCON ASSESS: CPT | Performed by: PHYSICIAN ASSISTANT

## 2024-08-21 PROCEDURE — 3017F COLORECTAL CA SCREEN DOC REV: CPT | Performed by: PHYSICIAN ASSISTANT

## 2024-08-21 PROCEDURE — G8399 PT W/DXA RESULTS DOCUMENT: HCPCS | Performed by: PHYSICIAN ASSISTANT

## 2024-08-21 PROCEDURE — 3075F SYST BP GE 130 - 139MM HG: CPT | Performed by: PHYSICIAN ASSISTANT

## 2024-08-21 PROCEDURE — 3079F DIAST BP 80-89 MM HG: CPT | Performed by: PHYSICIAN ASSISTANT

## 2024-08-21 PROCEDURE — 99203 OFFICE O/P NEW LOW 30 MIN: CPT | Performed by: PHYSICIAN ASSISTANT

## 2024-08-21 RX ORDER — LIDOCAINE HYDROCHLORIDE AND EPINEPHRINE 10; 10 MG/ML; UG/ML
0.3 INJECTION, SOLUTION INFILTRATION; PERINEURAL ONCE
Status: COMPLETED | OUTPATIENT
Start: 2024-08-21 | End: 2024-08-21

## 2024-08-21 RX ADMIN — LIDOCAINE HYDROCHLORIDE AND EPINEPHRINE 0.3 ML: 10; 10 INJECTION, SOLUTION INFILTRATION; PERINEURAL at 15:52

## 2024-08-21 NOTE — TELEPHONE ENCOUNTER
Patient called wanting to know if we would take on pain pump refill due to JEAN saying she needed to go back to  in [place], patient was left message that she will need to go to [place] until local alternative can be found.

## 2024-08-22 ENCOUNTER — LAB (OUTPATIENT)
Dept: LAB | Age: 74
End: 2024-08-22

## 2024-08-22 NOTE — PROGRESS NOTES
Dermatology Patient Note  [facility], [facility] DERMATOLOGY  [address]  [address]  [facility] [address]  Dept: [phone]  Dept Fax: [phone]      VISITDATE: 8/21/2024   REFERRING PROVIDER: Simi Zavala DO Diane K  is a 73 y.o. female  who presents today in the office for:    New Patient (Patient presents in the office as a new patient.  Patient has a few lesion on her left forearm. She noticed these 2-3 years ago. These do not itch and are not painful. These have become bigger and changed to a red color from white. No hx of skin but did have ovarian cancer 2015 )      HISTORY OF PRESENT ILLNESS:  As above.    MEDICAL PROBLEMS:  Patient Active Problem List    Diagnosis Date Noted    Urinary retention 03/08/2023     Priority: Medium    Cellulitis 03/07/2023     Priority: Medium    History of pulmonary embolism 03/07/2023     Priority: Medium    Other osteoporosis with current pathological fracture, vertebra(e), subsequent encounter for fracture with routine healing 09/21/2022     Priority: Medium    Chronic indwelling Lazo catheter 09/21/2022     Priority: Medium    Acute left-sided thoracic back pain 07/08/2022     Priority: Medium    T12 compression fracture (HCC) 07/09/2024    Back pain due to injury 07/08/2024    Fecal smearing 10/23/2023    Irritable bowel syndrome with diarrhea 10/23/2023    Diarrhea 10/23/2023    Moderate obesity 10/23/2023    Chronic venous insufficiency of lower extremity 09/12/2023    Lymphedema due to venous insufficiency 09/12/2023    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

## 2024-08-27 NOTE — RESULT ENCOUNTER NOTE
The lesion we biopsied is a common form of skin cancer called squamous cell carcinoma in situ. It can be removed and cured by electroessication and curettage, a simple in-office procedure with local anesthesia. After numbing, the lesion will be scraped and burned to completely remove it. Please schedule for half an hour.

## 2024-09-10 ENCOUNTER — OFFICE VISIT (OUTPATIENT)
Dept: PRIMARY CARE CLINIC | Age: 74
End: 2024-09-10

## 2024-09-10 VITALS
BODY MASS INDEX: 32.61 KG/M2 | HEART RATE: 78 BPM | DIASTOLIC BLOOD PRESSURE: 84 MMHG | WEIGHT: 190 LBS | SYSTOLIC BLOOD PRESSURE: 124 MMHG | OXYGEN SATURATION: 98 %

## 2024-09-10 DIAGNOSIS — E11.40 TYPE 2 DIABETES MELLITUS WITH DIABETIC NEUROPATHY, WITH LONG-TERM CURRENT USE OF INSULIN (HCC): Primary | ICD-10-CM

## 2024-09-10 DIAGNOSIS — I10 ESSENTIAL HYPERTENSION: ICD-10-CM

## 2024-09-10 DIAGNOSIS — K59.00 CONSTIPATION, UNSPECIFIED CONSTIPATION TYPE: ICD-10-CM

## 2024-09-10 DIAGNOSIS — Z79.4 TYPE 2 DIABETES MELLITUS WITH DIABETIC NEUROPATHY, WITH LONG-TERM CURRENT USE OF INSULIN (HCC): Primary | ICD-10-CM

## 2024-09-10 RX ORDER — ABALOPARATIDE 2000 UG/ML
80 INJECTION, SOLUTION SUBCUTANEOUS DAILY
COMMUNITY
Start: 2024-08-28

## 2024-10-05 ENCOUNTER — OFFICE VISIT (OUTPATIENT)
Dept: FAMILY MEDICINE CLINIC | Age: 74
End: 2024-10-05

## 2024-10-05 VITALS
OXYGEN SATURATION: 96 % | RESPIRATION RATE: 16 BRPM | TEMPERATURE: 99.3 F | SYSTOLIC BLOOD PRESSURE: 124 MMHG | DIASTOLIC BLOOD PRESSURE: 84 MMHG | HEART RATE: 96 BPM

## 2024-10-05 DIAGNOSIS — U07.1 COVID-19: Primary | ICD-10-CM

## 2024-10-05 DIAGNOSIS — R05.9 COUGH IN ADULT: ICD-10-CM

## 2024-10-05 LAB
Lab: ABNORMAL
QC PASS/FAIL: ABNORMAL
SARS-COV-2 RDRP RESP QL NAA+PROBE: POSITIVE

## 2024-10-05 RX ORDER — BENZONATATE 200 MG/1
200 CAPSULE ORAL 3 TIMES DAILY PRN
Qty: 30 CAPSULE | Refills: 0 | Status: SHIPPED | OUTPATIENT
Start: 2024-10-05 | End: 2024-10-12

## 2024-10-05 RX ORDER — PREDNISONE 20 MG/1
40 TABLET ORAL DAILY
Qty: 10 TABLET | Refills: 0 | Status: SHIPPED | OUTPATIENT
Start: 2024-10-05 | End: 2024-10-10

## 2024-10-05 ASSESSMENT — ENCOUNTER SYMPTOMS
SHORTNESS OF BREATH: 0
VOMITING: 0
NAUSEA: 0
RHINORRHEA: 1
WHEEZING: 0
VOICE CHANGE: 0
SORE THROAT: 1
COUGH: 1
TROUBLE SWALLOWING: 0

## 2024-10-05 NOTE — PROGRESS NOTES
MD Robert   fexofenadine (ALLEGRA) 60 MG tablet Take 1 tablet by mouth daily Yes Provider, MD Robert   magnesium oxide (MAG-OX) 400 MG tablet Take 1 tablet by mouth daily Yes Provider, MD Robert   magnesium citrate solution Take 296 mLs by mouth once for 1 dose  Apple Fuentes, APRN - CNP   gabapentin (NEURONTIN) 600 MG tablet Take 1 tablet by mouth in the morning, at noon, in the evening, and at bedtime for 30 days.  Simi Zavala, DO       Allergies   Allergen Reactions    Aspirin Anaphylaxis and Nausea And Vomiting     Only thing she can take is tylenol    Benadryl [Diphenhydramine] Other (See Comments)     Dystonic movement    Ibuprofen Anaphylaxis    Lidocaine Anaphylaxis and Nausea And Vomiting     CAN NOT TOLERATE IV    Nsaids Other (See Comments)    Penicillins Anaphylaxis    Hydrocodone-Acetaminophen Other (See Comments)     Unsure of allergy    Penicillamine Nausea And Vomiting    Penicillin G Pot In Dextrose [Penicillin G Potassium In D5w] Nausea And Vomiting    Vancomycin Rash         Subjective:      Review of Systems   HENT:  Positive for congestion, postnasal drip, rhinorrhea and sore throat. Negative for ear discharge, ear pain, trouble swallowing and voice change.    Respiratory:  Positive for cough. Negative for shortness of breath and wheezing.    Gastrointestinal:  Negative for nausea and vomiting.   Skin:  Negative for rash.   Neurological:  Positive for headaches. Negative for dizziness.       Objective:     Physical Exam  Vitals reviewed.   Constitutional:       General: She is not in acute distress.     Appearance: Normal appearance. She is normal weight. She is not ill-appearing, toxic-appearing or diaphoretic.   HENT:      Head: Normocephalic.      Nose: Congestion and rhinorrhea present. Rhinorrhea is purulent.      Right Turbinates: Swollen.      Left Turbinates: Swollen.      Mouth/Throat:      Mouth: Mucous membranes are moist.      Pharynx: Oropharynx is clear.

## 2024-10-23 ENCOUNTER — PROCEDURE VISIT (OUTPATIENT)
Dept: DERMATOLOGY | Age: 74
End: 2024-10-23
Payer: MEDICARE

## 2024-10-23 VITALS
OXYGEN SATURATION: 95 % | HEART RATE: 73 BPM | WEIGHT: 190 LBS | BODY MASS INDEX: 32.44 KG/M2 | HEIGHT: 64 IN | TEMPERATURE: 97.6 F | SYSTOLIC BLOOD PRESSURE: 134 MMHG | DIASTOLIC BLOOD PRESSURE: 80 MMHG

## 2024-10-23 DIAGNOSIS — D04.62 SQUAMOUS CELL CARCINOMA IN SITU (SCCIS) OF SKIN OF LEFT FOREARM: Primary | ICD-10-CM

## 2024-10-23 PROCEDURE — 17262 DSTRJ MAL LES T/A/L 1.1-2.0: CPT | Performed by: PHYSICIAN ASSISTANT

## 2024-10-23 RX ORDER — NETARSUDIL AND LATANOPROST OPHTHALMIC SOLUTION, 0.02%/0.005% .2; .05 MG/ML; MG/ML
SOLUTION/ DROPS OPHTHALMIC; TOPICAL
COMMUNITY
Start: 2024-10-17

## 2024-10-23 NOTE — PROGRESS NOTES
Dermatology Surgery Pre-Visit Checklist    (Please complete with  Y (yes) / N (no) or explain)    Pathologic Diagnosis: SCC    Photo available of surgery site in media: yes    Competent to give informed consent? yes   If not, who is authorized to do so: no    Need for help for wound care: no   If so, who will do so: no    Are you diagnosed:   Diabetes: yes   If yes, last A1C: 6.8       HIV: no       Hepatitis: no       Current smoker: no  If yes, how much: NA    So you have any of the following: Pacemaker: no       Defibrillator: no       Artificial Heart valve: no                Artificial Joints: shoulder-right       Other Implantable Device: spinal cord stimulator        Organ Transplant: no       Other: NA    Are you on blood thinners such as: Asprin: no       Warfarin/Coumadin: no       Other: coreg, xarelto    Any allergies to the following:  Lidocaine: yes but last visit pt tolerated medication       Iodine: no       Adhesive: no       Other: no

## 2024-10-23 NOTE — PROGRESS NOTES
Dermatology Procedure Note   [facility] DERMATOLOGY  [address]  [address]  [facility] [address]  Dept: [phone]  Dept Fax: [phone]      Procedure Date: 10/23/2024  Procedure Time: 10:34 AM    Procedure Practitioner: Jomar Carrera PA-C    Procedure: Lesion Destruction    Pre-Procedure Diagnosis: Squamous Cell Carcinoma in situ    Post-Procedure Diagnosis: Same as Pre-Procedure Diagnosis    Informed Consent: The procedure and its risks were explained including but not limited to pain, bleeding, infection, permanent scar, permanent pigment alteration and recurrence. Consent to proceed with the procedure was obtained from the patient or the parent by the practitioner    Time Out:  A time out was conducted immediately before starting the procedure that confirmed a final verification of the correct patient, correct procedure, and correct site.    Procedure Details:  Malignant Destruction: The lesion on the left forearm was anaesthetized with 2 mL of buffered 1% lidocaine with epinephrine.  A 9 mm SCC in situ on the left forearm was treated with 2 rounds of curettage and fulguration. After the first round the defect was 12 mm. The defect was coated in Vaseline and a bandage was applied.     Procedure Performed By: Jomar Carrera PA-C    Estimated Blood Loss: Minimal    Pathologic Specimen: none sent    Procedure Tolerance: Good    Complication(s): None    Electronically signed by Jomar Carrera PA-C on 10/23/24 at 10:34 AM EDT

## 2024-11-06 ENCOUNTER — TELEPHONE (OUTPATIENT)
Dept: PRIMARY CARE CLINIC | Age: 74
End: 2024-11-06

## 2024-11-06 NOTE — TELEPHONE ENCOUNTER
Patient called to inform PCP that a provider with [facility] would be sending over results from a scan she had done and to inform PCP that they found something abnormal on her kidneys.     Writer spoke with WILLIAM Arroyo and was informed that results have been received.

## 2024-12-02 ENCOUNTER — TELEPHONE (OUTPATIENT)
Dept: PRIMARY CARE CLINIC | Age: 74
End: 2024-12-02

## 2024-12-02 NOTE — TELEPHONE ENCOUNTER
Can she come dec 10 at 10? I would recommend she make sure to follow up with dr. Rutherford as well since she is having issues with the pump and medication.

## 2024-12-02 NOTE — TELEPHONE ENCOUNTER
Last Visit Date: 9/10/2024   Next Visit Date: 12/11/2024   Pt requested appt, writer advised pt the one she has scheduled is sooner than PCP next available.   Caller stated she had surgery 11/22/24 at [facility] with Dr. Rutherford to replace/repair her pain pump that was damage after a fall in July. Pt stated medication was also changed reporting she previously had pump filled with fentanyl with no issues but this time it was filled with \"something with a D.\" Pt reports she was discharged the following day but does not remember anything after the surgery until Thanksgiving stating the only information she has is from her neighbor and daughter. Caller reports she was \"overdosed.\"    Stating she feels nauseous anytime she eats and her \"nerves are shot.\"  Pt reports having an appt Wednesday with Dr. Rutherford but is concerned about if she should go.     Please advise

## 2024-12-04 NOTE — TELEPHONE ENCOUNTER
BERNICE    Writer called and spoke with pt, she will f/u with PCP as scheduled next week. Pt has an appt today with Dr. Rutherford.     Pt states after surgery they thought there might be a leak in her pump so he was turned off to check and turned back on initially at a lower dose. Pt states a nurse told her that maybe anethesia interacted with one of her medication & could have caused the issues she was having with falling and amnesia because pt annemarie not recall anything from after surgery until Thanksgiving. Pt was discharged from [place] to [facility] and was discharged 11/29/24.    Writer advised pt to speak with Dr Rutherford to see if he believes she had some sort of allergic reaction to a medication incase that needs added to her list of allergies. Pt verbalized understanding.    Pt states he pain is minimal, dewey if she is sitting

## 2024-12-07 RX ORDER — LOSARTAN POTASSIUM 100 MG/1
100 TABLET ORAL DAILY
Qty: 90 TABLET | Refills: 3 | Status: SHIPPED | OUTPATIENT
Start: 2024-12-07

## 2024-12-07 RX ORDER — ATORVASTATIN CALCIUM 40 MG/1
40 TABLET, FILM COATED ORAL DAILY
Qty: 90 TABLET | Refills: 3 | Status: SHIPPED | OUTPATIENT
Start: 2024-12-07

## 2024-12-09 ENCOUNTER — TELEPHONE (OUTPATIENT)
Dept: ONCOLOGY | Age: 74
End: 2024-12-09

## 2024-12-09 ENCOUNTER — HOSPITAL ENCOUNTER (OUTPATIENT)
Age: 74
Discharge: HOME | End: 2024-12-09
Payer: MEDICARE

## 2024-12-09 ENCOUNTER — OFFICE VISIT (OUTPATIENT)
Dept: ONCOLOGY | Age: 74
End: 2024-12-09
Payer: MEDICARE

## 2024-12-09 VITALS
DIASTOLIC BLOOD PRESSURE: 84 MMHG | SYSTOLIC BLOOD PRESSURE: 146 MMHG | OXYGEN SATURATION: 99 % | RESPIRATION RATE: 18 BRPM | BODY MASS INDEX: 33.08 KG/M2 | WEIGHT: 192.7 LBS | TEMPERATURE: 97.2 F | HEART RATE: 72 BPM

## 2024-12-09 DIAGNOSIS — K90.9 IRON MALABSORPTION: ICD-10-CM

## 2024-12-09 DIAGNOSIS — D50.9 MICROCYTIC ANEMIA: Primary | ICD-10-CM

## 2024-12-09 DIAGNOSIS — D50.8 IRON DEFICIENCY ANEMIA SECONDARY TO INADEQUATE DIETARY IRON INTAKE: ICD-10-CM

## 2024-12-09 DIAGNOSIS — D64.9 LOW HEMOGLOBIN: ICD-10-CM

## 2024-12-09 DIAGNOSIS — D50.9 MICROCYTIC ANEMIA: ICD-10-CM

## 2024-12-09 DIAGNOSIS — C56.9 MALIGNANT NEOPLASM OF OVARY, UNSPECIFIED LATERALITY (HCC): ICD-10-CM

## 2024-12-09 LAB
BASOPHILS # BLD: 0.1 K/UL (ref 0–0.2)
BASOPHILS NFR BLD: 1 % (ref 0–2)
CANCER AG125 SERPL-ACNC: 11 U/ML (ref 0–38)
EOSINOPHIL # BLD: 0.3 K/UL (ref 0–0.4)
EOSINOPHILS RELATIVE PERCENT: 4 % (ref 1–4)
ERYTHROCYTE [DISTWIDTH] IN BLOOD BY AUTOMATED COUNT: 14.8 % (ref 12.5–15.4)
FERRITIN SERPL-MCNC: 37 NG/ML
HCT VFR BLD AUTO: 35.2 % (ref 36–46)
HGB BLD-MCNC: 11.5 G/DL (ref 12–16)
IRON SATN MFR SERPL: 15 % (ref 20–55)
IRON SERPL-MCNC: 52 UG/DL (ref 37–145)
LYMPHOCYTES NFR BLD: 2.3 K/UL (ref 1–4.8)
LYMPHOCYTES RELATIVE PERCENT: 29 % (ref 24–44)
MCH RBC QN AUTO: 27.3 PG (ref 26–34)
MCHC RBC AUTO-ENTMCNC: 32.8 G/DL (ref 31–37)
MCV RBC AUTO: 83.4 FL (ref 80–100)
MONOCYTES NFR BLD: 0.5 K/UL (ref 0.1–1.2)
MONOCYTES NFR BLD: 7 % (ref 2–11)
NEUTROPHILS NFR BLD: 59 % (ref 36–66)
NEUTS SEG NFR BLD: 4.8 K/UL (ref 1.8–7.7)
PLATELET # BLD AUTO: 285 K/UL (ref 140–450)
PMV BLD AUTO: 9.8 FL (ref 6–12)
RBC # BLD AUTO: 4.22 M/UL (ref 4–5.2)
TIBC SERPL-MCNC: 336 UG/DL (ref 250–450)
UNSATURATED IRON BINDING CAPACITY: 284 UG/DL (ref 112–347)
WBC OTHER # BLD: 8.1 K/UL (ref 3.5–11)

## 2024-12-09 PROCEDURE — 83540 ASSAY OF IRON: CPT

## 2024-12-09 PROCEDURE — 1123F ACP DISCUSS/DSCN MKR DOCD: CPT | Performed by: INTERNAL MEDICINE

## 2024-12-09 PROCEDURE — 1125F AMNT PAIN NOTED PAIN PRSNT: CPT | Performed by: INTERNAL MEDICINE

## 2024-12-09 PROCEDURE — 3077F SYST BP >= 140 MM HG: CPT | Performed by: INTERNAL MEDICINE

## 2024-12-09 PROCEDURE — 3017F COLORECTAL CA SCREEN DOC REV: CPT | Performed by: INTERNAL MEDICINE

## 2024-12-09 PROCEDURE — G8417 CALC BMI ABV UP PARAM F/U: HCPCS | Performed by: INTERNAL MEDICINE

## 2024-12-09 PROCEDURE — 3079F DIAST BP 80-89 MM HG: CPT | Performed by: INTERNAL MEDICINE

## 2024-12-09 PROCEDURE — 82728 ASSAY OF FERRITIN: CPT

## 2024-12-09 PROCEDURE — G8428 CUR MEDS NOT DOCUMENT: HCPCS | Performed by: INTERNAL MEDICINE

## 2024-12-09 PROCEDURE — 99214 OFFICE O/P EST MOD 30 MIN: CPT | Performed by: INTERNAL MEDICINE

## 2024-12-09 PROCEDURE — 86304 IMMUNOASSAY TUMOR CA 125: CPT

## 2024-12-09 PROCEDURE — 83550 IRON BINDING TEST: CPT

## 2024-12-09 PROCEDURE — 1090F PRES/ABSN URINE INCON ASSESS: CPT | Performed by: INTERNAL MEDICINE

## 2024-12-09 PROCEDURE — 1036F TOBACCO NON-USER: CPT | Performed by: INTERNAL MEDICINE

## 2024-12-09 PROCEDURE — G8399 PT W/DXA RESULTS DOCUMENT: HCPCS | Performed by: INTERNAL MEDICINE

## 2024-12-09 PROCEDURE — G8484 FLU IMMUNIZE NO ADMIN: HCPCS | Performed by: INTERNAL MEDICINE

## 2024-12-09 PROCEDURE — 85025 COMPLETE CBC W/AUTO DIFF WBC: CPT

## 2024-12-09 PROCEDURE — 36415 COLL VENOUS BLD VENIPUNCTURE: CPT
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
## 2024-12-09 NOTE — TELEPHONE ENCOUNTER
Instructions   from Dr. Kayden Dominguez MD    RV 6 months with CBC, iron studies,  before RV      RV 6/16/25 at 2 pm with labs to be done the week before.

## 2024-12-10 ENCOUNTER — HOSPITAL ENCOUNTER (OUTPATIENT)
Dept: CT IMAGING | Age: 74
Discharge: HOME | End: 2024-12-12
Payer: MEDICARE

## 2024-12-10 DIAGNOSIS — N28.9 KIDNEY LESION: ICD-10-CM

## 2024-12-10 PROCEDURE — 74170 CT ABD WO CNTRST FLWD CNTRST: CPT

## 2024-12-10 PROCEDURE — 2580000003 HC RX 258: Performed by: FAMILY MEDICINE

## 2024-12-10 PROCEDURE — 6360000004 HC RX CONTRAST MEDICATION: Performed by: FAMILY MEDICINE

## 2024-12-10 RX ORDER — 0.9 % SODIUM CHLORIDE 0.9 %
80 INTRAVENOUS SOLUTION INTRAVENOUS ONCE
Status: COMPLETED | OUTPATIENT
Start: 2024-12-10 | End: 2024-12-10

## 2024-12-10 RX ORDER — SODIUM CHLORIDE 0.9 % (FLUSH) 0.9 %
10 SYRINGE (ML) INJECTION PRN
Status: DISCONTINUED | OUTPATIENT
Start: 2024-12-10 | End: 2024-12-13 | Stop reason: HOSPADM

## 2024-12-10 RX ORDER — IOPAMIDOL 755 MG/ML
100 INJECTION, SOLUTION INTRAVASCULAR
Status: COMPLETED | OUTPATIENT
Start: 2024-12-10 | End: 2024-12-10

## 2024-12-10 RX ADMIN — IOPAMIDOL 100 ML: 755 INJECTION, SOLUTION INTRAVENOUS at 16:32

## 2024-12-10 RX ADMIN — SODIUM CHLORIDE, PRESERVATIVE FREE 10 ML: 5 INJECTION INTRAVENOUS at 16:33

## 2024-12-10 RX ADMIN — SODIUM CHLORIDE 80 ML: 9 INJECTION, SOLUTION INTRAVENOUS at 16:33

## 2024-12-11 ENCOUNTER — OFFICE VISIT (OUTPATIENT)
Dept: PRIMARY CARE CLINIC | Age: 74
End: 2024-12-11

## 2024-12-11 VITALS
BODY MASS INDEX: 32.27 KG/M2 | OXYGEN SATURATION: 99 % | WEIGHT: 188 LBS | SYSTOLIC BLOOD PRESSURE: 128 MMHG | DIASTOLIC BLOOD PRESSURE: 80 MMHG | HEART RATE: 72 BPM

## 2024-12-11 DIAGNOSIS — Z23 NEED FOR INFLUENZA VACCINATION: ICD-10-CM

## 2024-12-11 DIAGNOSIS — G89.29 CHRONIC BACK PAIN, UNSPECIFIED BACK LOCATION, UNSPECIFIED BACK PAIN LATERALITY: ICD-10-CM

## 2024-12-11 DIAGNOSIS — I10 PRIMARY HYPERTENSION: ICD-10-CM

## 2024-12-11 DIAGNOSIS — R93.429 ABNORMAL COMPUTED TOMOGRAPHY OF KIDNEY: ICD-10-CM

## 2024-12-11 DIAGNOSIS — E11.40 TYPE 2 DIABETES MELLITUS WITH DIABETIC NEUROPATHY, WITH LONG-TERM CURRENT USE OF INSULIN (HCC): Primary | ICD-10-CM

## 2024-12-11 DIAGNOSIS — M54.9 CHRONIC BACK PAIN, UNSPECIFIED BACK LOCATION, UNSPECIFIED BACK PAIN LATERALITY: ICD-10-CM

## 2024-12-11 DIAGNOSIS — Z79.4 TYPE 2 DIABETES MELLITUS WITH DIABETIC NEUROPATHY, WITH LONG-TERM CURRENT USE OF INSULIN (HCC): Primary | ICD-10-CM
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
## 2024-12-17 ENCOUNTER — TELEPHONE (OUTPATIENT)
Dept: PRIMARY CARE CLINIC | Age: 74
End: 2024-12-17

## 2024-12-17 NOTE — TELEPHONE ENCOUNTER
Patient called in regards to CT of abdomen completed on 12/10/24. Pt was wondering if you reached out to her oncologist- Dr. Dominguez in regards to what her next steps should be

## 2024-12-19 RX ORDER — TRAZODONE HYDROCHLORIDE 100 MG/1
TABLET ORAL
Qty: 90 TABLET | Refills: 3 | Status: SHIPPED | OUTPATIENT
Start: 2024-12-19

## 2024-12-19 RX ORDER — TAMSULOSIN HYDROCHLORIDE 0.4 MG/1
0.4 CAPSULE ORAL NIGHTLY
Qty: 90 CAPSULE | Refills: 3 | Status: SHIPPED | OUTPATIENT
Start: 2024-12-19

## 2024-12-19 RX ORDER — RIVAROXABAN 20 MG/1
20 TABLET, FILM COATED ORAL DAILY
Qty: 90 TABLET | Refills: 3 | Status: SHIPPED | OUTPATIENT
Start: 2024-12-19

## 2024-12-19 RX ORDER — CARVEDILOL 6.25 MG/1
6.25 TABLET ORAL 2 TIMES DAILY
Qty: 180 TABLET | Refills: 2 | Status: SHIPPED | OUTPATIENT
Start: 2024-12-19

## 2024-12-19 NOTE — TELEPHONE ENCOUNTER
Apple Jean Baptiste is calling to request a refill on the following medication(s):    Medication Request:  Requested Prescriptions     Pending Prescriptions Disp Refills    carvedilol (COREG) 6.25 MG tablet 180 tablet 2     Sig: Take 1 tablet by mouth 2 times daily       Last Visit Date (If Applicable):  12/11/2024    Next Visit Date:    3/11/2025        [facility]/pharmacy #[id number] - [place], [place] - [address]  [phone] - F [phone]

## 2025-01-02 ENCOUNTER — TELEPHONE (OUTPATIENT)
Dept: PRIMARY CARE CLINIC | Age: 75
End: 2025-01-02

## 2025-01-02 NOTE — TELEPHONE ENCOUNTER
JUST FYI      Patient called wanting to make appt with PCP due to \"having a hard time breathing sometimes\" writer made appt for next week but advised if things worsen between now and then she needs to go to the ER for further evaluation

## 2025-01-03 ASSESSMENT — PATIENT HEALTH QUESTIONNAIRE - PHQ9
SUM OF ALL RESPONSES TO PHQ9 QUESTIONS 1 & 2: 2
SUM OF ALL RESPONSES TO PHQ9 QUESTIONS 1 & 2: 2
SUM OF ALL RESPONSES TO PHQ QUESTIONS 1-9: 2
2. FEELING DOWN, DEPRESSED OR HOPELESS: SEVERAL DAYS
SUM OF ALL RESPONSES TO PHQ QUESTIONS 1-9: 2
1. LITTLE INTEREST OR PLEASURE IN DOING THINGS: SEVERAL DAYS
SUM OF ALL RESPONSES TO PHQ QUESTIONS 1-9: 2
1. LITTLE INTEREST OR PLEASURE IN DOING THINGS: SEVERAL DAYS
2. FEELING DOWN, DEPRESSED OR HOPELESS: SEVERAL DAYS
SUM OF ALL RESPONSES TO PHQ QUESTIONS 1-9: 2

## 2025-01-06 ENCOUNTER — HOSPITAL ENCOUNTER (OUTPATIENT)
Age: 75
Discharge: HOME | End: 2025-01-06
Payer: MEDICARE

## 2025-01-06 ENCOUNTER — HOSPITAL ENCOUNTER (OUTPATIENT)
Age: 75
Discharge: HOME | End: 2025-01-08
Payer: MEDICARE

## 2025-01-06 ENCOUNTER — HOSPITAL ENCOUNTER (OUTPATIENT)
Dept: GENERAL RADIOLOGY | Age: 75
Discharge: HOME | End: 2025-01-08
Payer: MEDICARE

## 2025-01-06 ENCOUNTER — OFFICE VISIT (OUTPATIENT)
Dept: PRIMARY CARE CLINIC | Age: 75
End: 2025-01-06
Payer: MEDICARE

## 2025-01-06 VITALS
OXYGEN SATURATION: 100 % | HEART RATE: 67 BPM | SYSTOLIC BLOOD PRESSURE: 176 MMHG | WEIGHT: 188 LBS | DIASTOLIC BLOOD PRESSURE: 92 MMHG | BODY MASS INDEX: 32.27 KG/M2

## 2025-01-06 DIAGNOSIS — R06.02 SHORTNESS OF BREATH: ICD-10-CM

## 2025-01-06 DIAGNOSIS — E11.40 TYPE 2 DIABETES MELLITUS WITH DIABETIC NEUROPATHY, WITH LONG-TERM CURRENT USE OF INSULIN (HCC): ICD-10-CM

## 2025-01-06 DIAGNOSIS — Z79.4 TYPE 2 DIABETES MELLITUS WITH DIABETIC NEUROPATHY, WITH LONG-TERM CURRENT USE OF INSULIN (HCC): ICD-10-CM

## 2025-01-06 DIAGNOSIS — R05.9 COUGH, UNSPECIFIED TYPE: ICD-10-CM

## 2025-01-06 DIAGNOSIS — R05.9 COUGH, UNSPECIFIED TYPE: Primary | ICD-10-CM

## 2025-01-06 DIAGNOSIS — I10 PRIMARY HYPERTENSION: ICD-10-CM

## 2025-01-06 PROCEDURE — 1123F ACP DISCUSS/DSCN MKR DOCD: CPT | Performed by: FAMILY MEDICINE

## 2025-01-06 PROCEDURE — 71046 X-RAY EXAM CHEST 2 VIEWS: CPT

## 2025-01-06 PROCEDURE — 1159F MED LIST DOCD IN RCRD: CPT | Performed by: FAMILY MEDICINE

## 2025-01-06 PROCEDURE — 1090F PRES/ABSN URINE INCON ASSESS: CPT | Performed by: FAMILY MEDICINE

## 2025-01-06 PROCEDURE — G8417 CALC BMI ABV UP PARAM F/U: HCPCS | Performed by: FAMILY MEDICINE

## 2025-01-06 PROCEDURE — G8399 PT W/DXA RESULTS DOCUMENT: HCPCS | Performed by: FAMILY MEDICINE

## 2025-01-06 PROCEDURE — 1036F TOBACCO NON-USER: CPT | Performed by: FAMILY MEDICINE

## 2025-01-06 PROCEDURE — 3017F COLORECTAL CA SCREEN DOC REV: CPT | Performed by: FAMILY MEDICINE

## 2025-01-06 PROCEDURE — 2022F DILAT RTA XM EVC RTNOPTHY: CPT | Performed by: FAMILY MEDICINE

## 2025-01-06 PROCEDURE — G8427 DOCREV CUR MEDS BY ELIG CLIN: HCPCS | Performed by: FAMILY MEDICINE

## 2025-01-06 PROCEDURE — 99214 OFFICE O/P EST MOD 30 MIN: CPT | Performed by: FAMILY MEDICINE

## 2025-01-06 PROCEDURE — 93005 ELECTROCARDIOGRAM TRACING: CPT | Performed by: FAMILY MEDICINE

## 2025-01-06 PROCEDURE — 3046F HEMOGLOBIN A1C LEVEL >9.0%: CPT | Performed by: FAMILY MEDICINE

## 2025-01-06 PROCEDURE — 3077F SYST BP >= 140 MM HG: CPT | Performed by: FAMILY MEDICINE

## 2025-01-06 PROCEDURE — 3080F DIAST BP >= 90 MM HG: CPT | Performed by: FAMILY MEDICINE

## 2025-01-06 RX ORDER — ALBUTEROL SULFATE 90 UG/1
2 INHALANT RESPIRATORY (INHALATION) 4 TIMES DAILY PRN
Qty: 18 G | Refills: 1 | Status: SHIPPED | OUTPATIENT
Start: 2025-01-06

## 2025-01-06 RX ORDER — AZITHROMYCIN 250 MG/1
TABLET, FILM COATED ORAL
Qty: 6 TABLET | Refills: 0 | Status: SHIPPED | OUTPATIENT
Start: 2025-01-06 | End: 2025-01-16

## 2025-01-06 NOTE — PROGRESS NOTES
inhaler.   - XR CHEST STANDARD (2 VW); Future  - azithromycin (ZITHROMAX) 250 MG tablet; 500mg on day 1 followed by 250mg on days 2 - 5  Dispense: 6 tablet; Refill: 0  - albuterol sulfate HFA (VENTOLIN HFA) 108 (90 Base) MCG/ACT inhaler; Inhale 2 puffs into the lungs 4 times daily as needed for Wheezing or Shortness of Breath  Dispense: 18 g; Refill: 1    2. Shortness of breath    - XR CHEST STANDARD (2 VW); Future  - azithromycin (ZITHROMAX) 250 MG tablet; 500mg on day 1 followed by 250mg on days 2 - 5  Dispense: 6 tablet; Refill: 0  - albuterol sulfate HFA (VENTOLIN HFA) 108 (90 Base) MCG/ACT inhaler; Inhale 2 puffs into the lungs 4 times daily as needed for Wheezing or Shortness of Breath  Dispense: 18 g; Refill: 1  - EKG 12 Lead    3. Primary hypertension  - increase carvedilol to 12.5 BID. Continue to monitor and let me know if still running high.      4. Type 2 diabetes mellitus with diabetic neuropathy, with long-term current use of insulin (HCC)  - following with endocrinology           Plan:   Assessment & Plan   Return for MEDICARE AWV.    Orders Placed This Encounter   Procedures    XR CHEST STANDARD (2 VW)     Standing Status:   Future     Number of Occurrences:   1     Standing Expiration Date:   2026     Order Specific Question:   Reason for exam:     Answer:   cough for few weeks.    EKG 12 Lead     Order Specific Question:   Reason for Exam?     Answer:   Shortness of Breath     Orders Placed This Encounter   Medications    azithromycin (ZITHROMAX) 250 MG tablet     Simg on day 1 followed by 250mg on days 2 - 5     Dispense:  6 tablet     Refill:  0    albuterol sulfate HFA (VENTOLIN HFA) 108 (90 Base) MCG/ACT inhaler     Sig: Inhale 2 puffs into the lungs 4 times daily as needed for Wheezing or Shortness of Breath     Dispense:  18 g     Refill:  1        Patient given educational materials - see patient instructions.  Discussed use, benefit, and side effects of prescribed medications.

## 2025-01-08 LAB
EKG ATRIAL RATE: 82 BPM
EKG P AXIS: 92 DEGREES
EKG P-R INTERVAL: 170 MS
EKG Q-T INTERVAL: 412 MS
EKG QRS DURATION: 84 MS
EKG QTC CALCULATION (BAZETT): 481 MS
EKG R AXIS: 14 DEGREES
EKG T AXIS: 52 DEGREES
EKG VENTRICULAR RATE: 82 BPM

## 2025-01-08 PROCEDURE — 93010 ELECTROCARDIOGRAM REPORT: CPT | Performed by: INTERNAL MEDICINE

## 2025-01-14 ASSESSMENT — ENCOUNTER SYMPTOMS
VOMITING: 0
COUGH: 1

## 2025-02-03 DIAGNOSIS — G62.9 NEUROPATHY: ICD-10-CM

## 2025-02-03 RX ORDER — GABAPENTIN 600 MG/1
600 TABLET ORAL 4 TIMES DAILY
Qty: 120 TABLET | Refills: 3 | Status: SHIPPED | OUTPATIENT
Start: 2025-02-03 | End: 2025-03-05

## 2025-02-03 NOTE — TELEPHONE ENCOUNTER
Last Visit Date: 1/6/2025   Next Visit Date: 3/11/2025   Pharmacy confirmed.   Pt reports she is out of medication.

## 2025-02-06 ENCOUNTER — TELEPHONE (OUTPATIENT)
Dept: PRIMARY CARE CLINIC | Age: 75
End: 2025-02-06

## 2025-02-07 NOTE — TELEPHONE ENCOUNTER
Writer spoke with pt, she stated she has previously taken the supplements without successful results. Pt would like injections.   Writer did attempt to reach endocrinology at  [phone] to have B 12 lab results faxed to office, writer unable to reach at this time, will re attempt.     *Office will fax lab results    No

## 2025-02-11 ENCOUNTER — NURSE ONLY (OUTPATIENT)
Dept: PRIMARY CARE CLINIC | Age: 75
End: 2025-02-11
Payer: MEDICARE

## 2025-02-11 DIAGNOSIS — E53.8 B12 DEFICIENCY: Primary | ICD-10-CM

## 2025-02-11 PROCEDURE — 96372 THER/PROPH/DIAG INJ SC/IM: CPT | Performed by: FAMILY MEDICINE

## 2025-02-11 RX ORDER — CYANOCOBALAMIN 1000 UG/ML
1000 INJECTION, SOLUTION INTRAMUSCULAR; SUBCUTANEOUS ONCE
Status: COMPLETED | OUTPATIENT
Start: 2025-02-11 | End: 2025-02-11

## 2025-02-11 RX ADMIN — CYANOCOBALAMIN 1000 MCG: 1000 INJECTION, SOLUTION INTRAMUSCULAR; SUBCUTANEOUS at 15:41

## 2025-02-11 NOTE — PROGRESS NOTES
After obtaining consent, and per orders of Simi Krueger DO, injection of Vit B12 given in Left deltoid by Kristy Pitt MA. Patient instructed to remain in clinic for 20 minutes afterwards, and to report any adverse reaction to me immediately.

## 2025-02-18 ENCOUNTER — NURSE ONLY (OUTPATIENT)
Dept: PRIMARY CARE CLINIC | Age: 75
End: 2025-02-18
Payer: MEDICARE

## 2025-02-18 DIAGNOSIS — E53.8 B12 DEFICIENCY: Primary | ICD-10-CM

## 2025-02-18 PROCEDURE — 96372 THER/PROPH/DIAG INJ SC/IM: CPT | Performed by: FAMILY MEDICINE

## 2025-02-18 RX ORDER — CYANOCOBALAMIN 1000 UG/ML
1000 INJECTION, SOLUTION INTRAMUSCULAR; SUBCUTANEOUS ONCE
Status: COMPLETED | OUTPATIENT
Start: 2025-02-18 | End: 2025-02-18

## 2025-02-18 RX ADMIN — CYANOCOBALAMIN 1000 MCG: 1000 INJECTION, SOLUTION INTRAMUSCULAR; SUBCUTANEOUS at 15:17

## 2025-02-18 NOTE — PROGRESS NOTES
After obtaining consent, and per orders of Simi Zavala DO, injection of Vitamin B12 given in Left deltoid by Kristy Pitt MA. Patient instructed to remain in clinic for 20 minutes afterwards, and to report any adverse reaction to me immediately.

## 2025-02-25 ENCOUNTER — NURSE ONLY (OUTPATIENT)
Dept: PRIMARY CARE CLINIC | Age: 75
End: 2025-02-25
Payer: MEDICARE

## 2025-02-25 VITALS — WEIGHT: 181 LBS | BODY MASS INDEX: 31.07 KG/M2

## 2025-02-25 DIAGNOSIS — E53.8 B12 DEFICIENCY: Primary | ICD-10-CM

## 2025-02-25 PROCEDURE — 96372 THER/PROPH/DIAG INJ SC/IM: CPT | Performed by: FAMILY MEDICINE

## 2025-02-25 RX ORDER — CYANOCOBALAMIN 1000 UG/ML
1000 INJECTION, SOLUTION INTRAMUSCULAR; SUBCUTANEOUS ONCE
Status: COMPLETED | OUTPATIENT
Start: 2025-02-25 | End: 2025-02-25

## 2025-02-25 RX ADMIN — CYANOCOBALAMIN 1000 MCG: 1000 INJECTION, SOLUTION INTRAMUSCULAR; SUBCUTANEOUS at 15:34

## 2025-02-25 NOTE — PROGRESS NOTES
After obtaining consent, and per orders of Dr. Simi Zavala, injection of b-12 given in Left deltoid by Rochelle Goldstein MA. Patient instructed to remain in clinic for 20 minutes afterwards, and to report any adverse reaction to me immediately.

## 2025-03-10 SDOH — HEALTH STABILITY: PHYSICAL HEALTH: ON AVERAGE, HOW MANY MINUTES DO YOU ENGAGE IN EXERCISE AT THIS LEVEL?: 50 MIN

## 2025-03-10 SDOH — ECONOMIC STABILITY: FOOD INSECURITY: WITHIN THE PAST 12 MONTHS, THE FOOD YOU BOUGHT JUST DIDN'T LAST AND YOU DIDN'T HAVE MONEY TO GET MORE.: NEVER TRUE

## 2025-03-10 SDOH — ECONOMIC STABILITY: FOOD INSECURITY: WITHIN THE PAST 12 MONTHS, YOU WORRIED THAT YOUR FOOD WOULD RUN OUT BEFORE YOU GOT MONEY TO BUY MORE.: NEVER TRUE

## 2025-03-10 SDOH — ECONOMIC STABILITY: INCOME INSECURITY: IN THE LAST 12 MONTHS, WAS THERE A TIME WHEN YOU WERE NOT ABLE TO PAY THE MORTGAGE OR RENT ON TIME?: NO

## 2025-03-10 SDOH — HEALTH STABILITY: PHYSICAL HEALTH: ON AVERAGE, HOW MANY DAYS PER WEEK DO YOU ENGAGE IN MODERATE TO STRENUOUS EXERCISE (LIKE A BRISK WALK)?: 2 DAYS

## 2025-03-10 ASSESSMENT — PATIENT HEALTH QUESTIONNAIRE - PHQ9
SUM OF ALL RESPONSES TO PHQ QUESTIONS 1-9: 2
SUM OF ALL RESPONSES TO PHQ QUESTIONS 1-9: 2
2. FEELING DOWN, DEPRESSED OR HOPELESS: MORE THAN HALF THE DAYS
SUM OF ALL RESPONSES TO PHQ QUESTIONS 1-9: 2
1. LITTLE INTEREST OR PLEASURE IN DOING THINGS: NOT AT ALL
SUM OF ALL RESPONSES TO PHQ QUESTIONS 1-9: 2

## 2025-03-10 ASSESSMENT — LIFESTYLE VARIABLES
HOW OFTEN DO YOU HAVE A DRINK CONTAINING ALCOHOL: MONTHLY OR LESS
HOW OFTEN DO YOU HAVE A DRINK CONTAINING ALCOHOL: 2
HOW MANY STANDARD DRINKS CONTAINING ALCOHOL DO YOU HAVE ON A TYPICAL DAY: 1
HOW MANY STANDARD DRINKS CONTAINING ALCOHOL DO YOU HAVE ON A TYPICAL DAY: 1 OR 2
HOW OFTEN DO YOU HAVE SIX OR MORE DRINKS ON ONE OCCASION: 1

## 2025-03-11 ENCOUNTER — OFFICE VISIT (OUTPATIENT)
Dept: PRIMARY CARE CLINIC | Age: 75
End: 2025-03-11
Payer: MEDICARE

## 2025-03-11 VITALS
BODY MASS INDEX: 31.07 KG/M2 | HEART RATE: 72 BPM | DIASTOLIC BLOOD PRESSURE: 82 MMHG | WEIGHT: 181 LBS | SYSTOLIC BLOOD PRESSURE: 142 MMHG | OXYGEN SATURATION: 98 %

## 2025-03-11 DIAGNOSIS — I10 PRIMARY HYPERTENSION: ICD-10-CM

## 2025-03-11 DIAGNOSIS — Z79.4 TYPE 2 DIABETES MELLITUS WITH DIABETIC NEUROPATHY, WITH LONG-TERM CURRENT USE OF INSULIN (HCC): ICD-10-CM

## 2025-03-11 DIAGNOSIS — Z00.00 MEDICARE ANNUAL WELLNESS VISIT, SUBSEQUENT: Primary | ICD-10-CM

## 2025-03-11 DIAGNOSIS — E11.40 TYPE 2 DIABETES MELLITUS WITH DIABETIC NEUROPATHY, WITH LONG-TERM CURRENT USE OF INSULIN (HCC): ICD-10-CM

## 2025-03-11 DIAGNOSIS — E53.8 B12 DEFICIENCY: ICD-10-CM

## 2025-03-11 PROCEDURE — G0439 PPPS, SUBSEQ VISIT: HCPCS | Performed by: FAMILY MEDICINE

## 2025-03-11 PROCEDURE — 3051F HG A1C>EQUAL 7.0%<8.0%: CPT | Performed by: FAMILY MEDICINE

## 2025-03-11 PROCEDURE — 3077F SYST BP >= 140 MM HG: CPT | Performed by: FAMILY MEDICINE

## 2025-03-11 PROCEDURE — 1159F MED LIST DOCD IN RCRD: CPT | Performed by: FAMILY MEDICINE

## 2025-03-11 PROCEDURE — 3079F DIAST BP 80-89 MM HG: CPT | Performed by: FAMILY MEDICINE

## 2025-03-11 PROCEDURE — 3017F COLORECTAL CA SCREEN DOC REV: CPT | Performed by: FAMILY MEDICINE

## 2025-03-11 PROCEDURE — 1123F ACP DISCUSS/DSCN MKR DOCD: CPT | Performed by: FAMILY MEDICINE

## 2025-03-11 PROCEDURE — 96372 THER/PROPH/DIAG INJ SC/IM: CPT | Performed by: FAMILY MEDICINE

## 2025-03-11 RX ORDER — CYANOCOBALAMIN 1000 UG/ML
1000 INJECTION, SOLUTION INTRAMUSCULAR; SUBCUTANEOUS ONCE
Status: COMPLETED | OUTPATIENT
Start: 2025-03-11 | End: 2025-03-11

## 2025-03-11 RX ORDER — CARVEDILOL 12.5 MG/1
12.5 TABLET ORAL 2 TIMES DAILY
Qty: 180 TABLET | Refills: 2 | Status: SHIPPED | OUTPATIENT
Start: 2025-03-11

## 2025-03-11 RX ADMIN — CYANOCOBALAMIN 1000 MCG: 1000 INJECTION, SOLUTION INTRAMUSCULAR; SUBCUTANEOUS at 14:34

## 2025-03-11 NOTE — PROGRESS NOTES
Medicare Annual Wellness Visit    Apple Jean Baptiste is here for Medicare AWV    Assessment & Plan   B12 deficiency  -     cyanocobalamin injection 1,000 mcg; 1,000 mcg, IntraMUSCular, ONCE, 1 dose, On Tue 3/11/25 at 1445  Type 2 diabetes mellitus with diabetic neuropathy, with long-term current use of insulin (HCC)  Primary hypertension  -     carvedilol (COREG) 12.5 MG tablet; Take 1 tablet by mouth 2 times daily, Disp-180 tablet, R-2Normal       Return in about 3 months (around 6/11/2025) for follow up.     Subjective       Patient's complete Health Risk Assessment and screening values have been reviewed and are found in Flowsheets. The following problems were reviewed today and where indicated follow up appointments were made and/or referrals ordered.    Positive Risk Factor Screenings with Interventions:          Controlled Medication Review:  {IMPORTANT! Click here to document Controlled Substance Monitoring and then refresh note (Optional):19695}  Today's Pain Level: No data recorded   Opioid Risk: (Low risk score <55) Opioid risk score: 17    Patient is low risk for opioid use disorder or overdose.    Last PDMP Gregory as Reviewed:  Review User Review Instant Review Result   BALJINDER MORGAN 5/20/2024  6:23 PM     Reviewed PDMP [1]     Last Controlled Substance Monitoring Documentation      Flowsheet [facility] ED from 3/28/2021 in [facility] Emergency Department   Comments Instructed on when to return to ED filed at 03/28/2021 8382            General HRA Questions:  Select all that apply: (!) (Patient-Rptd) New or Increased Pain, Stress  Interventions - Pain:  {Pain Interventions:985818142}  Interventions - Stress:  {Stress Interventions:072373607}      Inactivity:  On average, how many days per week do you engage in moderate to strenuous exercise (like a brisk walk)?: (Patient-Rptd) 2 days (!) Abnormal  On average, how many minutes do you engage in exercise at this level?: (Patient-Rptd) 50

## 2025-03-12 NOTE — PROGRESS NOTES
Medicare Annual Wellness Visit    Apple Jean Baptiste is here for Medicare AWV    Assessment & Plan  1. Back pain.  The patient's back pain is likely contributing to her slightly elevated blood pressure observed during this visit. She is advised to monitor her blood pressure closely.following with pain management.     2. Hypertension.  A prescription for carvedilol 12.5 mg, to be taken twice daily, has been provided. She is instructed to take one tablet twice daily.    3. Diabetes.  She is advised to maintain regular communication with her endocrinologist regarding her hypoglycemic episodes.    Follow-up  The patient will follow up in 3 months.  Medicare annual wellness visit, subsequent  B12 deficiency  -     cyanocobalamin injection 1,000 mcg; 1,000 mcg, IntraMUSCular, ONCE, 1 dose, On Tue 3/11/25 at 1445  Type 2 diabetes mellitus with diabetic neuropathy, with long-term current use of insulin ([facility])  Primary hypertension  -     carvedilol (COREG) 12.5 MG tablet; Take 1 tablet by mouth 2 times daily, Disp-180 tablet, R-2Normal    Results       Return in about 3 months (around 6/11/2025) for follow up.     Subjective     History of Present Illness  The patient presents for evaluation of back pain, hypertension, and diabetes.    She reports that her pain pump is malfunctioning, necessitating surgical intervention. She will follow up after her trip with pain management.     Her blood pressure is well-managed with her current medication regimen. However, she is nearing the end of her prescription and requires a refill. She is currently taking carvedilol 6.25 mg, 2 tablets twice daily- so I sent in 12.5 BID    She has been experiencing  few hypoglycemic episodes. She has been advised to reduce her long-acting insulin dosage by endocrinology and will be contacting her endocrinologist for further guidance.    Supplemental Information  She receives B12 injections.        Patient's complete Health Risk Assessment and screening

## 2025-03-12 NOTE — PATIENT INSTRUCTIONS

## 2025-05-01 ENCOUNTER — OFFICE VISIT (OUTPATIENT)
Age: 75
End: 2025-05-01

## 2025-05-01 VITALS
DIASTOLIC BLOOD PRESSURE: 85 MMHG | HEIGHT: 63 IN | WEIGHT: 180 LBS | TEMPERATURE: 98.3 F | OXYGEN SATURATION: 100 % | HEART RATE: 70 BPM | BODY MASS INDEX: 31.89 KG/M2 | SYSTOLIC BLOOD PRESSURE: 140 MMHG

## 2025-05-01 DIAGNOSIS — Z85.828 HISTORY OF SCC (SQUAMOUS CELL CARCINOMA) OF SKIN: Primary | ICD-10-CM

## 2025-05-01 DIAGNOSIS — L82.1 SEBORRHEIC KERATOSES: ICD-10-CM

## 2025-05-01 DIAGNOSIS — L81.4 LENTIGINES: ICD-10-CM

## 2025-05-01 DIAGNOSIS — I87.2 STASIS DERMATITIS OF BOTH LEGS: ICD-10-CM

## 2025-05-01 NOTE — PROGRESS NOTES
296 mLs by mouth once for 1 dose 296 mL 0     No current facility-administered medications for this visit.       ALLERGIES:   Allergies   Allergen Reactions    Aspirin Anaphylaxis and Nausea And Vomiting     Only thing she can take is tylenol    Benadryl [Diphenhydramine] Other (See Comments)     Dystonic movement    Ibuprofen Anaphylaxis    Lidocaine Anaphylaxis and Nausea And Vomiting     CAN NOT TOLERATE IV    Nsaids Other (See Comments)    Penicillins Anaphylaxis    Hydrocodone-Acetaminophen Other (See Comments)     Unsure of allergy    Penicillamine Nausea And Vomiting    Penicillin G Pot In Dextrose [Penicillin G Potassium In D5w] Nausea And Vomiting    Vancomycin Rash       SOCIAL HISTORY:  Social History     Tobacco Use    Smoking status: Never    Smokeless tobacco: Never   Substance Use Topics    Alcohol use: Yes     Comment: rare       Pertinent ROS:  Review of Systems  Skin: Denies any new changing, growing or bleeding lesions or rashes except as described in the HPI   Constitutional: Denies fevers, chills, and malaise.    PHYSICAL EXAM:   BP (!) 140/85   Pulse 70   Temp 98.3 °F (36.8 °C)   Ht 1.6 m (5' 3\")   Wt 81.6 kg (180 lb)   SpO2 100%   BMI 31.89 kg/m²     The patient is generally well appearing, well nourished, alert and conversational. Affect is normal.    Cutaneous Exam:  Physical Exam  Total body skin exam excluding external genitalia: head/face, neck, both arms, chest, back, abdomen, both legs, buttocks, digits and/or nails, was examined. Genital exam was deferred as patient denied having any lesions in this area. Complete visualization of scalp may be limited by hair density, length, and/or style    Facial covering was removed during examination.    Diagnoses/exam findings/medical history pertinent to this visit are listed below:    Assessment:   Diagnosis Orders   1. History of SCC (squamous cell carcinoma) of skin        2. Seborrheic keratoses        3. Lentigines        4. Stasis

## 2025-05-05 DIAGNOSIS — E03.9 HYPOTHYROIDISM, UNSPECIFIED TYPE: ICD-10-CM

## 2025-05-05 DIAGNOSIS — K21.9 GASTROESOPHAGEAL REFLUX DISEASE WITHOUT ESOPHAGITIS: ICD-10-CM

## 2025-05-05 RX ORDER — TAMSULOSIN HYDROCHLORIDE 0.4 MG/1
0.4 CAPSULE ORAL NIGHTLY
Qty: 90 CAPSULE | Refills: 3 | Status: SHIPPED | OUTPATIENT
Start: 2025-05-05

## 2025-05-05 RX ORDER — LEVOTHYROXINE SODIUM 88 UG/1
88 TABLET ORAL DAILY
Qty: 90 TABLET | Refills: 2 | Status: SHIPPED | OUTPATIENT
Start: 2025-05-05

## 2025-05-05 RX ORDER — DULOXETIN HYDROCHLORIDE 60 MG/1
60 CAPSULE, DELAYED RELEASE ORAL DAILY
Qty: 90 CAPSULE | Refills: 2 | Status: SHIPPED | OUTPATIENT
Start: 2025-05-05

## 2025-05-05 RX ORDER — HYDROCHLOROTHIAZIDE 25 MG/1
25 TABLET ORAL EVERY MORNING
Qty: 90 TABLET | Refills: 2 | Status: SHIPPED | OUTPATIENT
Start: 2025-05-05

## 2025-05-05 RX ORDER — TRAZODONE HYDROCHLORIDE 100 MG/1
TABLET ORAL
Qty: 90 TABLET | Refills: 3 | Status: SHIPPED | OUTPATIENT
Start: 2025-05-05

## 2025-05-05 RX ORDER — OMEPRAZOLE 40 MG/1
40 CAPSULE, DELAYED RELEASE ORAL
Qty: 90 CAPSULE | Refills: 2 | Status: SHIPPED | OUTPATIENT
Start: 2025-05-05

## 2025-05-05 NOTE — TELEPHONE ENCOUNTER
Last Visit Date: 3/11/2025   Next Visit Date: 6/11/2025        Patient was using a mail pharmacy and insurance no longer covers mail in. She would like them sent to [facility] which has been updated.

## 2025-05-07 ENCOUNTER — TELEPHONE (OUTPATIENT)
Dept: PRIMARY CARE CLINIC | Age: 75
End: 2025-05-07

## 2025-05-07 NOTE — TELEPHONE ENCOUNTER
Apple called in today stating her employer will be faxing in some paperwork to be filled out by you. The paperwork is in regards to her  recently passing. Just fyi.

## 2025-06-11 ENCOUNTER — HOSPITAL ENCOUNTER (OUTPATIENT)
Age: 75
Discharge: HOME | End: 2025-06-13
Payer: MEDICARE

## 2025-06-11 ENCOUNTER — OFFICE VISIT (OUTPATIENT)
Dept: PRIMARY CARE CLINIC | Age: 75
End: 2025-06-11

## 2025-06-11 ENCOUNTER — HOSPITAL ENCOUNTER (OUTPATIENT)
Dept: GENERAL RADIOLOGY | Age: 75
Discharge: HOME | End: 2025-06-13
Payer: MEDICARE

## 2025-06-11 VITALS
OXYGEN SATURATION: 96 % | BODY MASS INDEX: 32.42 KG/M2 | WEIGHT: 183 LBS | DIASTOLIC BLOOD PRESSURE: 82 MMHG | SYSTOLIC BLOOD PRESSURE: 136 MMHG | HEART RATE: 65 BPM

## 2025-06-11 DIAGNOSIS — Z12.31 ENCOUNTER FOR SCREENING MAMMOGRAM FOR BREAST CANCER: ICD-10-CM

## 2025-06-11 DIAGNOSIS — E11.40 TYPE 2 DIABETES MELLITUS WITH DIABETIC NEUROPATHY, WITH LONG-TERM CURRENT USE OF INSULIN (HCC): Primary | ICD-10-CM

## 2025-06-11 DIAGNOSIS — M79.675 PAIN IN LEFT TOE(S): ICD-10-CM

## 2025-06-11 DIAGNOSIS — E87.6 HYPOKALEMIA: ICD-10-CM

## 2025-06-11 DIAGNOSIS — C56.9 MALIGNANT NEOPLASM OF OVARY, UNSPECIFIED LATERALITY (HCC): ICD-10-CM

## 2025-06-11 DIAGNOSIS — Z79.4 TYPE 2 DIABETES MELLITUS WITH DIABETIC NEUROPATHY, WITH LONG-TERM CURRENT USE OF INSULIN (HCC): Primary | ICD-10-CM

## 2025-06-11 DIAGNOSIS — I10 PRIMARY HYPERTENSION: ICD-10-CM

## 2025-06-11 PROCEDURE — 73630 X-RAY EXAM OF FOOT: CPT

## 2025-06-11 RX ORDER — POTASSIUM CHLORIDE 1500 MG/1
TABLET, EXTENDED RELEASE ORAL
Qty: 270 TABLET | Refills: 3 | Status: CANCELLED | OUTPATIENT
Start: 2025-06-11

## 2025-06-11 ASSESSMENT — ENCOUNTER SYMPTOMS: VOMITING: 0

## 2025-06-11 NOTE — PROGRESS NOTES
4. Pain in left toe(s)  XR FOOT LEFT (MIN 3 VIEWS)      5. Encounter for screening mammogram for breast cancer  JOYCELYN DIGITAL SCREEN W OR WO CAD BILATERAL      6. Primary hypertension                  :   Assessment & Plan     Assessment & Plan  1. Left baby toe pain.  - Swelling noted, ttp.   - Increased pain upon walking.  - X-ray of the left foot will be ordered to confirm or rule out a fracture.      2. Hypokalemia.  - Potassium level reported to be 3.3.  - Currently on potassium supplements but has missed some doses .  - Recheck of potassium levels will be conducted to ensure they have returned to normal.      3. Health maintenance.  - Mammogram will be ordered     4. Diabetes management.  - Recent A1c results are pending from endocrinology.    - Encouraged adherence to a balanced diet and regular monitoring of blood glucose levels.    5. HTN controlled  6. Hx of ovarian cancer, follows with heme-onc      Return in about 3 months (around 9/11/2025) for follow up.     Patient given educational materials - see patient instructions.  Discussed use, benefit, and side effects of prescribed medications.  All patient questions answered. Pt voiced understanding. Reviewed health maintenance.  Instructed to continue current medications, diet and exercise.  Patient agreed with treatment plan. Follow up as directed.     Electronicallysigned by Simi Zavala DO on 6/11/2025 at 2:48 PM    The patient (or guardian, if applicable) and other individuals in attendance with the patient were advised that Artificial Intelligence will be utilized during this visit to record, process the conversation to generate a clinical note, and support improvement of the AI technology. The patient (or guardian, if applicable) and other individuals in attendance at the appointment consented to the use of AI, including the recording.

## 2025-06-16 ENCOUNTER — RESULTS FOLLOW-UP (OUTPATIENT)
Dept: PRIMARY CARE CLINIC | Age: 75
End: 2025-06-16

## 2025-06-17 RX ORDER — POTASSIUM CHLORIDE 1500 MG/1
TABLET, EXTENDED RELEASE ORAL
Qty: 270 TABLET | Refills: 3 | Status: SHIPPED | OUTPATIENT
Start: 2025-06-17

## 2025-06-17 RX ORDER — SUCRALFATE 1 G/1
1 TABLET ORAL 4 TIMES DAILY
Qty: 360 TABLET | Refills: 3 | Status: SHIPPED | OUTPATIENT
Start: 2025-06-17

## 2025-06-18 ENCOUNTER — RESULTS FOLLOW-UP (OUTPATIENT)
Dept: PRIMARY CARE CLINIC | Age: 75
End: 2025-06-18

## 2025-06-18 DIAGNOSIS — R91.1 LUNG NODULE: Primary | ICD-10-CM

## 2025-06-18 DIAGNOSIS — M79.89 LEG SWELLING: ICD-10-CM

## 2025-06-18 RX ORDER — FUROSEMIDE 40 MG/1
40 TABLET ORAL DAILY PRN
Qty: 10 TABLET | Refills: 0 | Status: SHIPPED | OUTPATIENT
Start: 2025-06-18

## 2025-06-18 NOTE — TELEPHONE ENCOUNTER
Last Visit Date: 6/11/2025   Next Visit Date: 9/15/2025        Patient requesting this for water retention

## 2025-06-19 NOTE — TELEPHONE ENCOUNTER
Recommend taking only for 3-4 days and save the rest in future if needed. She notes she had low potassium last time from endocrinology so recommend rechecking it as well when she can.

## 2025-06-23 ENCOUNTER — HOSPITAL ENCOUNTER (OUTPATIENT)
Age: 75
Discharge: HOME | End: 2025-06-23
Payer: MEDICARE

## 2025-06-23 ENCOUNTER — HOSPITAL ENCOUNTER (OUTPATIENT)
Age: 75
Setting detail: SPECIMEN
Discharge: HOME | End: 2025-06-23

## 2025-06-23 DIAGNOSIS — D50.8 IRON DEFICIENCY ANEMIA SECONDARY TO INADEQUATE DIETARY IRON INTAKE: ICD-10-CM

## 2025-06-23 DIAGNOSIS — C56.9 MALIGNANT NEOPLASM OF OVARY, UNSPECIFIED LATERALITY (HCC): ICD-10-CM

## 2025-06-23 DIAGNOSIS — D50.9 MICROCYTIC ANEMIA: ICD-10-CM

## 2025-06-23 DIAGNOSIS — E87.6 HYPOKALEMIA: ICD-10-CM

## 2025-06-23 DIAGNOSIS — D64.9 LOW HEMOGLOBIN: ICD-10-CM

## 2025-06-23 DIAGNOSIS — K90.9 IRON MALABSORPTION: ICD-10-CM

## 2025-06-23 LAB
ANION GAP SERPL CALCULATED.3IONS-SCNC: 12 MMOL/L (ref 9–16)
BASOPHILS # BLD: 0 K/UL (ref 0–0.2)
BASOPHILS NFR BLD: 1 % (ref 0–2)
BUN SERPL-MCNC: 16 MG/DL (ref 8–23)
CALCIUM SERPL-MCNC: 9.3 MG/DL (ref 8.6–10.4)
CANCER AG125 SERPL-ACNC: 7 U/ML (ref 0–38)
CHLORIDE SERPL-SCNC: 106 MMOL/L (ref 98–107)
CO2 SERPL-SCNC: 25 MMOL/L (ref 20–31)
CREAT SERPL-MCNC: 1 MG/DL (ref 0.6–0.9)
EOSINOPHIL # BLD: 0.4 K/UL (ref 0–0.4)
EOSINOPHILS RELATIVE PERCENT: 5 % (ref 1–4)
ERYTHROCYTE [DISTWIDTH] IN BLOOD BY AUTOMATED COUNT: 16 % (ref 12.5–15.4)
FERRITIN SERPL-MCNC: 17 NG/ML
GFR, ESTIMATED: 59 ML/MIN/1.73M2
GLUCOSE SERPL-MCNC: 98 MG/DL (ref 74–99)
HCT VFR BLD AUTO: 33 % (ref 36–46)
HGB BLD-MCNC: 10.7 G/DL (ref 12–16)
IRON SATN MFR SERPL: 10 % (ref 20–55)
IRON SERPL-MCNC: 37 UG/DL (ref 37–145)
LYMPHOCYTES NFR BLD: 2.1 K/UL (ref 1–4.8)
LYMPHOCYTES RELATIVE PERCENT: 29 % (ref 24–44)
MCH RBC QN AUTO: 26.9 PG (ref 26–34)
MCHC RBC AUTO-ENTMCNC: 32.4 G/DL (ref 31–37)
MCV RBC AUTO: 82.8 FL (ref 80–100)
MONOCYTES NFR BLD: 0.4 K/UL (ref 0.1–1.2)
MONOCYTES NFR BLD: 6 % (ref 2–11)
NEUTROPHILS NFR BLD: 59 % (ref 36–66)
NEUTS SEG NFR BLD: 4.3 K/UL (ref 1.8–7.7)
PLATELET # BLD AUTO: 190 K/UL (ref 140–450)
PMV BLD AUTO: 10.2 FL (ref 6–12)
POTASSIUM SERPL-SCNC: 3.5 MMOL/L (ref 3.7–5.3)
RBC # BLD AUTO: 3.99 M/UL (ref 4–5.2)
SODIUM SERPL-SCNC: 143 MMOL/L (ref 136–145)
TIBC SERPL-MCNC: 361 UG/DL (ref 250–450)
UNSATURATED IRON BINDING CAPACITY: 324 UG/DL (ref 112–347)
WBC OTHER # BLD: 7.2 K/UL (ref 3.5–11)

## 2025-06-23 PROCEDURE — 83550 IRON BINDING TEST: CPT

## 2025-06-23 PROCEDURE — 83540 ASSAY OF IRON: CPT

## 2025-06-23 PROCEDURE — 86304 IMMUNOASSAY TUMOR CA 125: CPT

## 2025-06-23 PROCEDURE — 36415 COLL VENOUS BLD VENIPUNCTURE: CPT

## 2025-06-23 PROCEDURE — 82728 ASSAY OF FERRITIN: CPT

## 2025-06-23 PROCEDURE — 85025 COMPLETE CBC W/AUTO DIFF WBC: CPT

## 2025-06-30 ENCOUNTER — TELEPHONE (OUTPATIENT)
Age: 75
End: 2025-06-30

## 2025-06-30 ENCOUNTER — OFFICE VISIT (OUTPATIENT)
Age: 75
End: 2025-06-30

## 2025-06-30 VITALS
HEART RATE: 65 BPM | SYSTOLIC BLOOD PRESSURE: 144 MMHG | RESPIRATION RATE: 15 BRPM | OXYGEN SATURATION: 93 % | WEIGHT: 181.7 LBS | TEMPERATURE: 98.1 F | BODY MASS INDEX: 32.19 KG/M2 | DIASTOLIC BLOOD PRESSURE: 81 MMHG

## 2025-06-30 DIAGNOSIS — D50.9 MICROCYTIC ANEMIA: Primary | ICD-10-CM

## 2025-06-30 RX ORDER — EPINEPHRINE 1 MG/ML
0.3 INJECTION, SOLUTION, CONCENTRATE INTRAVENOUS PRN
OUTPATIENT
Start: 2025-06-30

## 2025-06-30 RX ORDER — ACETAMINOPHEN 325 MG/1
650 TABLET ORAL
OUTPATIENT
Start: 2025-06-30

## 2025-06-30 RX ORDER — HYDROCORTISONE SODIUM SUCCINATE 100 MG/2ML
100 INJECTION INTRAMUSCULAR; INTRAVENOUS
OUTPATIENT
Start: 2025-06-30

## 2025-06-30 RX ORDER — SODIUM CHLORIDE 9 MG/ML
INJECTION, SOLUTION INTRAVENOUS PRN
OUTPATIENT
Start: 2025-06-30

## 2025-06-30 RX ORDER — ONDANSETRON 2 MG/ML
8 INJECTION INTRAMUSCULAR; INTRAVENOUS
OUTPATIENT
Start: 2025-06-30

## 2025-06-30 RX ORDER — DIPHENHYDRAMINE HYDROCHLORIDE 50 MG/ML
50 INJECTION, SOLUTION INTRAMUSCULAR; INTRAVENOUS
OUTPATIENT
Start: 2025-06-30

## 2025-06-30 RX ORDER — SODIUM CHLORIDE 9 MG/ML
5-250 INJECTION, SOLUTION INTRAVENOUS PRN
OUTPATIENT
Start: 2025-06-30

## 2025-06-30 RX ORDER — ALBUTEROL SULFATE 90 UG/1
4 INHALANT RESPIRATORY (INHALATION) PRN
OUTPATIENT
Start: 2025-06-30

## 2025-06-30 RX ORDER — FAMOTIDINE 10 MG/ML
20 INJECTION, SOLUTION INTRAVENOUS
OUTPATIENT
Start: 2025-06-30

## 2025-06-30 RX ORDER — HEPARIN SODIUM (PORCINE) LOCK FLUSH IV SOLN 100 UNIT/ML 100 UNIT/ML
500 SOLUTION INTRAVENOUS PRN
OUTPATIENT
Start: 2025-06-30

## 2025-06-30 RX ORDER — SODIUM CHLORIDE 0.9 % (FLUSH) 0.9 %
5-40 SYRINGE (ML) INJECTION PRN
OUTPATIENT
Start: 2025-06-30

## 2025-06-30 NOTE — PROGRESS NOTES
_           Chief Complaint   Patient presents with    Follow-up     Review status of disease    Discuss Labs     CBC, FERRITIN,IRON STUDIES,      DIAGNOSIS:       Microcytic anemia  Severe iron deficiency anemia  No response to oral iron  Intolerable side effects to oral iron with severe constipation  Positive stool guaiac test in 2 out of 3 symptoms  History of diverticulosis  GERD  History of ovarian cancer in 2015  Strong family history of breast cancer with mother and grandmother and maternal aunt    CURRENT THERAPY:         IV iron infusion December 2021  GI work up negative.   BRIEF CASE HISTORY:      Ms. Apple Jean Baptiste is a very pleasant 74 y.o. female with history of multiple co morbidities as listed.  Patient is referred for further management of anemia.  Patient has history of ovarian cancer in 2015.  She was treated at [facility] with surgery and chemotherapy using Taxol carboplatin.  Patient developed anemia at that time and she received blood transfusions.  She continued to have mild anemia for several years.  Patient had GI evaluation and July 2019.  She had EGD for GERD and she had colonoscopy at that time.  No source of bleeding was identified.  Patient was recently noted to have increasing symptoms related to anemia.  She has significant drop of her hemoglobin not responding to oral iron.  She has generalized weakness and fatigue and dizziness.  She has shortness of breath on exertion.  Patient has significant muscle cramps and ice craving.  Patient denies any vaginal bleeding.  No hematuria.  No hematochezia.  No melena.  No hematemesis.  Stool guaiac test was positive for Hemoccult blood and 2 out of 3 samples.  Patient has left upper abdominal pain on and off for several months.  She has CT scan in November which was negative.  Patient denies smoking or alcohol drinking..     INTERIM HISTORY:   Seen for

## 2025-06-30 NOTE — TELEPHONE ENCOUNTER
Instructions   from Dr. Kayden Dominguez MD    IV iron infusion soon  RV 6 months with CBC, iron studies,  before RV    RV 12-15-25 at 3:00pm    PT informed the iron infusion needs precert and could take 15 days

## 2025-07-10 DIAGNOSIS — G62.9 NEUROPATHY: ICD-10-CM

## 2025-07-10 RX ORDER — GABAPENTIN 600 MG/1
600 TABLET ORAL 4 TIMES DAILY
Qty: 120 TABLET | Refills: 3 | Status: SHIPPED | OUTPATIENT
Start: 2025-07-10 | End: 2025-08-09

## 2025-07-28 RX ORDER — ONDANSETRON 2 MG/ML
8 INJECTION INTRAMUSCULAR; INTRAVENOUS
OUTPATIENT
Start: 2025-07-28

## 2025-07-28 RX ORDER — FAMOTIDINE 10 MG/ML
20 INJECTION, SOLUTION INTRAVENOUS
OUTPATIENT
Start: 2025-07-28

## 2025-07-28 RX ORDER — ACETAMINOPHEN 325 MG/1
650 TABLET ORAL
OUTPATIENT
Start: 2025-07-28

## 2025-07-28 RX ORDER — EPINEPHRINE 1 MG/ML
0.3 INJECTION, SOLUTION INTRAMUSCULAR; SUBCUTANEOUS PRN
OUTPATIENT
Start: 2025-07-28

## 2025-07-28 RX ORDER — SODIUM CHLORIDE 9 MG/ML
INJECTION, SOLUTION INTRAVENOUS PRN
OUTPATIENT
Start: 2025-07-28

## 2025-07-28 RX ORDER — DIPHENHYDRAMINE HYDROCHLORIDE 50 MG/ML
50 INJECTION, SOLUTION INTRAMUSCULAR; INTRAVENOUS
OUTPATIENT
Start: 2025-07-28

## 2025-07-28 RX ORDER — SODIUM CHLORIDE 0.9 % (FLUSH) 0.9 %
5-40 SYRINGE (ML) INJECTION PRN
OUTPATIENT
Start: 2025-07-28

## 2025-07-28 RX ORDER — SODIUM CHLORIDE 9 MG/ML
5-250 INJECTION, SOLUTION INTRAVENOUS PRN
OUTPATIENT
Start: 2025-07-28

## 2025-07-28 RX ORDER — SODIUM CHLORIDE 9 MG/ML
5-250 INJECTION, SOLUTION INTRAVENOUS PRN
Status: CANCELLED | OUTPATIENT
Start: 2025-07-28

## 2025-07-28 RX ORDER — HYDROCORTISONE SODIUM SUCCINATE 100 MG/2ML
100 INJECTION INTRAMUSCULAR; INTRAVENOUS
OUTPATIENT
Start: 2025-07-28

## 2025-07-28 RX ORDER — ALBUTEROL SULFATE 90 UG/1
4 INHALANT RESPIRATORY (INHALATION) PRN
OUTPATIENT
Start: 2025-07-28

## 2025-07-28 RX ORDER — HEPARIN 100 UNIT/ML
500 SYRINGE INTRAVENOUS PRN
OUTPATIENT
Start: 2025-07-28

## 2025-07-31 ENCOUNTER — TELEPHONE (OUTPATIENT)
Dept: PRIMARY CARE CLINIC | Age: 75
End: 2025-07-31

## 2025-07-31 ENCOUNTER — HOSPITAL ENCOUNTER (EMERGENCY)
Age: 75
Discharge: HOME | End: 2025-07-31
Attending: EMERGENCY MEDICINE
Payer: MEDICARE

## 2025-07-31 ENCOUNTER — APPOINTMENT (OUTPATIENT)
Dept: GENERAL RADIOLOGY | Age: 75
End: 2025-07-31
Payer: MEDICARE

## 2025-07-31 VITALS
BODY MASS INDEX: 30.73 KG/M2 | SYSTOLIC BLOOD PRESSURE: 165 MMHG | TEMPERATURE: 97.6 F | DIASTOLIC BLOOD PRESSURE: 76 MMHG | HEART RATE: 62 BPM | OXYGEN SATURATION: 98 % | WEIGHT: 180 LBS | RESPIRATION RATE: 18 BRPM | HEIGHT: 64 IN

## 2025-07-31 DIAGNOSIS — N30.00 ACUTE CYSTITIS WITHOUT HEMATURIA: ICD-10-CM

## 2025-07-31 DIAGNOSIS — K59.00 CONSTIPATION, UNSPECIFIED CONSTIPATION TYPE: Primary | ICD-10-CM

## 2025-07-31 LAB
BACTERIA URNS QL MICRO: ABNORMAL
BILIRUB UR QL STRIP: NEGATIVE
CASTS #/AREA URNS LPF: ABNORMAL /LPF
CASTS #/AREA URNS LPF: ABNORMAL /LPF
CHARACTER UR: ABNORMAL
CLARITY UR: CLEAR
COLOR UR: YELLOW
CRYSTALS URNS MICRO: ABNORMAL /HPF
EPI CELLS #/AREA URNS HPF: ABNORMAL /HPF (ref 0–5)
GLUCOSE UR STRIP-MCNC: NEGATIVE MG/DL
HGB UR QL STRIP.AUTO: NEGATIVE
KETONES UR STRIP-MCNC: NEGATIVE MG/DL
LEUKOCYTE ESTERASE UR QL STRIP: ABNORMAL
MUCOUS THREADS URNS QL MICRO: ABNORMAL
NITRITE UR QL STRIP: NEGATIVE
PH UR STRIP: 6 [PH] (ref 5–8)
PROT UR STRIP-MCNC: NEGATIVE MG/DL
RBC #/AREA URNS HPF: ABNORMAL /HPF (ref 0–2)
SP GR UR STRIP: 1.02 (ref 1–1.03)
UROBILINOGEN UR STRIP-ACNC: NORMAL EU/DL (ref 0–1)
WBC #/AREA URNS HPF: ABNORMAL /HPF (ref 0–5)

## 2025-07-31 PROCEDURE — 74022 RADEX COMPL AQT ABD SERIES: CPT

## 2025-07-31 PROCEDURE — 81001 URINALYSIS AUTO W/SCOPE: CPT

## 2025-07-31 PROCEDURE — 99284 EMERGENCY DEPT VISIT MOD MDM: CPT

## 2025-07-31 RX ORDER — TRAMADOL HYDROCHLORIDE 50 MG/1
50 TABLET ORAL 2 TIMES DAILY
COMMUNITY
Start: 2025-07-25

## 2025-07-31 RX ORDER — NITROFURANTOIN 25; 75 MG/1; MG/1
100 CAPSULE ORAL 2 TIMES DAILY
Qty: 14 CAPSULE | Refills: 0 | Status: SHIPPED | OUTPATIENT
Start: 2025-07-31 | End: 2025-08-07

## 2025-07-31 RX ORDER — BISACODYL 10 MG
10 SUPPOSITORY, RECTAL RECTAL DAILY PRN
Qty: 30 SUPPOSITORY | Refills: 0 | Status: SHIPPED | OUTPATIENT
Start: 2025-07-31 | End: 2025-08-30

## 2025-07-31 ASSESSMENT — ENCOUNTER SYMPTOMS
EYE PAIN: 0
VOMITING: 0
CONSTIPATION: 1
SORE THROAT: 0
DIARRHEA: 0
BACK PAIN: 0
ABDOMINAL PAIN: 0
ABDOMINAL DISTENTION: 1
SHORTNESS OF BREATH: 0
BLOOD IN STOOL: 0
NAUSEA: 0
COUGH: 0

## 2025-07-31 ASSESSMENT — PAIN DESCRIPTION - PAIN TYPE: TYPE: CHRONIC PAIN

## 2025-07-31 ASSESSMENT — PAIN DESCRIPTION - FREQUENCY: FREQUENCY: CONTINUOUS

## 2025-07-31 ASSESSMENT — PAIN SCALES - GENERAL
PAINLEVEL_OUTOF10: 8
PAINLEVEL_OUTOF10: 8

## 2025-07-31 ASSESSMENT — PAIN - FUNCTIONAL ASSESSMENT
PAIN_FUNCTIONAL_ASSESSMENT: 0-10
PAIN_FUNCTIONAL_ASSESSMENT: 0-10

## 2025-07-31 ASSESSMENT — PAIN DESCRIPTION - LOCATION: LOCATION: BACK;ABDOMEN

## 2025-07-31 NOTE — ED PROVIDER NOTES
[facility] Emergency Department  [address]  [place] [facility] [address]  Phone: [phone]  Fax: [phone]      Patient Name:  Apple Jean Baptiste  Medical Record Number:  [id number]  YOB: 1950  Date of Service:  7/31/2025  Primary Care Physician:  Simi Zavala DO      CHIEF COMPLAINT:       Chief Complaint   Patient presents with    Constipation     \"I have constipation really bad, doctor told me to come to emergency room\"; reports last BM one week ago       HISTORY OF PRESENT ILLNESS:    Apple Jean Baptiste is a 74 y.o. female who presents with the complaint of constipation.  Past medical history of ovarian cancer, chronic back pain, PE on Xarelto, anemia, diverticulosis, hyperlipidemia, polyneuropathy, diarrhea, and hypertension.  Patient presents to the emergency room for evaluation of constipation.  Patient states has been 5 days since her last bowel movement.  Patient states she has a history of chronic constipation takes MiraLAX daily.  States she never has a normal bowel movement.  Denies nausea or vomiting.  Denies any abdominal pain.  States she has bloating and pressure to lower abdomen.  Does state some urinary frequency and dysuria.  Patient states she is eating and drinking per her norm.  Denies fever, chest pain, shortness of breath.  Patient ambulatory in the emergency room without difficulty.  Patient is alert and oriented.  No focal neurodeficits noted.  Vital signs stable.  Afebrile.  Nontachycardic.  Hemodynamically stable.    CURRENT MEDICATIONS:      Discharge Medication List as of 7/31/2025  6:24 PM        CONTINUE these medications which have NOT CHANGED    Details   traMADol (ULTRAM) 50 MG tablet Take 1 tablet by mouth in the morning and at bedtime.Historical Med      bisacodyl (DULCOLAX) 10 MG suppository Place 1 suppository rectally daily as needed for Constipation, Disp-30 suppository, R-0Normal      gabapentin (NEURONTIN) 600 MG tablet Take 1 tablet by mouth in the

## 2025-07-31 NOTE — DISCHARGE INSTRUCTIONS
You were evaluated in the emergency room for your symptoms. Thank you for allowing me to participate in your care today.     You are seen evaluated emergency room for constipation.  Imaging shows you have moderate stool in the colon consistent with constipation.  Urine testing shows concern for possible early UTI.  As we discussed we have started you on antibiotic treatment.  Prescription for Macrobid was sent to pharmacy please take twice a day for 7 days.  Take with food to help with any GI side effects.  Increase oral fluids with water and Gatorade.  Continue MiraLAX and Colace to help keep stools regular.  You can trial fiber supplement with increased fluids to help bulk the stool and keep regular.    Please follow up with your family doctor in the next few days if symptoms do not improve.   Please return to the emergency room for worsening symptoms or additional concerns as we discussed.

## 2025-07-31 NOTE — TELEPHONE ENCOUNTER
Patient called in reporting constipation for about 5+ days. She has tried Miralax, Senokot, and something else she doesn't remember the name of. Patient is asking for recommendations from PCP

## 2025-07-31 NOTE — TELEPHONE ENCOUNTER
Has she been taking the miralax once daily? If so she can try twice daily for the next few days and see if it helps.and I have sent dulcolax suppository can try for few days only. If severe  abdominal pain or worsening symptoms recommend being evaluated in ER.

## 2025-07-31 NOTE — ED PROVIDER NOTES
eMERGENCY dEPARTMENT  Attending Physician Attestation     Pt Name: Apple Jean Baptiste  MRN: [id number]  Birthdate 1950  Date of evaluation: 7/31/25     Apple Jean Baptiste is a 74 y.o. female with CC: Constipation (\"I have constipation really bad, doctor told me to come to emergency room\"; reports last BM one week ago)      I was available to render services if needed but did not directly participate in the care of the patient.    Vitals Reviewed:    Vitals:    07/31/25 1625   BP: (!) 149/78   Pulse: 65   Resp: 16   Temp: 98.2 °F (36.8 °C)   TempSrc: Oral   SpO2: 97%   Weight: 81.6 kg (180 lb)   Height: 1.626 m (5' 4\")       Initial Pain Score: Pain Level: 8 (07/31/25 1625)  Last Pain Score: Pain Level: 8 (07/31/25 1625)    Tim Chowdhury MD  Attending Emergency Physician                Tim Chowdhury MD  07/31/25 2543

## 2025-08-06 DIAGNOSIS — G62.9 NEUROPATHY: ICD-10-CM

## 2025-08-06 RX ORDER — GABAPENTIN 600 MG/1
600 TABLET ORAL 4 TIMES DAILY
Qty: 120 TABLET | Refills: 3 | Status: SHIPPED | OUTPATIENT
Start: 2025-08-06 | End: 2025-09-05

## 2025-08-07 ENCOUNTER — TRANSCRIBE ORDERS (OUTPATIENT)
Dept: ADMINISTRATIVE | Age: 75
End: 2025-08-07

## 2025-08-07 DIAGNOSIS — D44.0 NEOPLASM OF UNCERTAIN BEHAVIOR OF THYROID GLAND: Primary | ICD-10-CM

## 2025-08-07 DIAGNOSIS — R22.1 LOCALIZED SWELLING, MASS AND LUMP, NECK: ICD-10-CM

## 2025-08-08 ENCOUNTER — HOSPITAL ENCOUNTER (OUTPATIENT)
Dept: INFUSION THERAPY | Age: 75
Setting detail: INFUSION SERIES
Discharge: HOME | End: 2025-08-08
Payer: MEDICARE

## 2025-08-08 VITALS
TEMPERATURE: 98 F | RESPIRATION RATE: 16 BRPM | HEART RATE: 63 BPM | OXYGEN SATURATION: 98 % | SYSTOLIC BLOOD PRESSURE: 137 MMHG | DIASTOLIC BLOOD PRESSURE: 63 MMHG

## 2025-08-08 DIAGNOSIS — K90.9 IRON MALABSORPTION: ICD-10-CM

## 2025-08-08 DIAGNOSIS — D50.8 IRON DEFICIENCY ANEMIA SECONDARY TO INADEQUATE DIETARY IRON INTAKE: Primary | ICD-10-CM

## 2025-08-08 DIAGNOSIS — D50.9 MICROCYTIC ANEMIA: ICD-10-CM

## 2025-08-08 PROCEDURE — 96365 THER/PROPH/DIAG IV INF INIT: CPT

## 2025-08-08 PROCEDURE — 2580000003 HC RX 258: Performed by: INTERNAL MEDICINE

## 2025-08-08 PROCEDURE — 6360000002 HC RX W HCPCS: Performed by: INTERNAL MEDICINE

## 2025-08-08 RX ORDER — ACETAMINOPHEN 325 MG/1
650 TABLET ORAL
OUTPATIENT
Start: 2025-08-15

## 2025-08-08 RX ORDER — HEPARIN 100 UNIT/ML
500 SYRINGE INTRAVENOUS PRN
OUTPATIENT
Start: 2025-08-15

## 2025-08-08 RX ORDER — SODIUM CHLORIDE 9 MG/ML
5-250 INJECTION, SOLUTION INTRAVENOUS PRN
Status: DISCONTINUED | OUTPATIENT
Start: 2025-08-08 | End: 2025-08-09 | Stop reason: HOSPADM

## 2025-08-08 RX ORDER — ALBUTEROL SULFATE 90 UG/1
4 INHALANT RESPIRATORY (INHALATION) PRN
OUTPATIENT
Start: 2025-08-15

## 2025-08-08 RX ORDER — HYDROCORTISONE SODIUM SUCCINATE 100 MG/2ML
100 INJECTION INTRAMUSCULAR; INTRAVENOUS
OUTPATIENT
Start: 2025-08-15

## 2025-08-08 RX ORDER — SODIUM CHLORIDE 9 MG/ML
5-250 INJECTION, SOLUTION INTRAVENOUS PRN
OUTPATIENT
Start: 2025-08-15

## 2025-08-08 RX ORDER — SODIUM CHLORIDE 9 MG/ML
INJECTION, SOLUTION INTRAVENOUS PRN
OUTPATIENT
Start: 2025-08-15

## 2025-08-08 RX ORDER — EPINEPHRINE 1 MG/ML
0.3 INJECTION, SOLUTION, CONCENTRATE INTRAVENOUS PRN
OUTPATIENT
Start: 2025-08-15

## 2025-08-08 RX ORDER — ONDANSETRON 2 MG/ML
8 INJECTION INTRAMUSCULAR; INTRAVENOUS
OUTPATIENT
Start: 2025-08-15

## 2025-08-08 RX ORDER — DIPHENHYDRAMINE HYDROCHLORIDE 50 MG/ML
50 INJECTION, SOLUTION INTRAMUSCULAR; INTRAVENOUS
OUTPATIENT
Start: 2025-08-15

## 2025-08-08 RX ORDER — SODIUM CHLORIDE 0.9 % (FLUSH) 0.9 %
5-40 SYRINGE (ML) INJECTION PRN
OUTPATIENT
Start: 2025-08-15

## 2025-08-08 RX ADMIN — SODIUM CHLORIDE 200 ML/HR: 0.9 INJECTION, SOLUTION INTRAVENOUS at 14:48

## 2025-08-08 RX ADMIN — SODIUM CHLORIDE 125 MG: 9 INJECTION, SOLUTION INTRAVENOUS at 14:49

## 2025-08-15 ENCOUNTER — HOSPITAL ENCOUNTER (OUTPATIENT)
Dept: INFUSION THERAPY | Age: 75
Setting detail: INFUSION SERIES
Discharge: HOME | End: 2025-08-15
Payer: MEDICARE

## 2025-08-15 VITALS
DIASTOLIC BLOOD PRESSURE: 68 MMHG | HEART RATE: 65 BPM | RESPIRATION RATE: 16 BRPM | SYSTOLIC BLOOD PRESSURE: 142 MMHG | OXYGEN SATURATION: 99 % | TEMPERATURE: 98 F

## 2025-08-15 DIAGNOSIS — K90.9 IRON MALABSORPTION: ICD-10-CM

## 2025-08-15 DIAGNOSIS — D50.8 IRON DEFICIENCY ANEMIA SECONDARY TO INADEQUATE DIETARY IRON INTAKE: Primary | ICD-10-CM

## 2025-08-15 DIAGNOSIS — D50.9 MICROCYTIC ANEMIA: ICD-10-CM

## 2025-08-15 PROCEDURE — 96365 THER/PROPH/DIAG IV INF INIT: CPT

## 2025-08-15 PROCEDURE — 6360000002 HC RX W HCPCS: Performed by: INTERNAL MEDICINE

## 2025-08-15 PROCEDURE — 2580000003 HC RX 258: Performed by: INTERNAL MEDICINE

## 2025-08-15 RX ORDER — HEPARIN 100 UNIT/ML
500 SYRINGE INTRAVENOUS PRN
OUTPATIENT
Start: 2025-08-22

## 2025-08-15 RX ORDER — SODIUM CHLORIDE 9 MG/ML
INJECTION, SOLUTION INTRAVENOUS PRN
OUTPATIENT
Start: 2025-08-22

## 2025-08-15 RX ORDER — ALBUTEROL SULFATE 90 UG/1
4 INHALANT RESPIRATORY (INHALATION) PRN
OUTPATIENT
Start: 2025-08-22

## 2025-08-15 RX ORDER — SODIUM CHLORIDE 9 MG/ML
5-250 INJECTION, SOLUTION INTRAVENOUS PRN
Status: DISCONTINUED | OUTPATIENT
Start: 2025-08-15 | End: 2025-08-16 | Stop reason: HOSPADM

## 2025-08-15 RX ORDER — ACETAMINOPHEN 325 MG/1
650 TABLET ORAL
OUTPATIENT
Start: 2025-08-22

## 2025-08-15 RX ORDER — SODIUM CHLORIDE 0.9 % (FLUSH) 0.9 %
5-40 SYRINGE (ML) INJECTION PRN
OUTPATIENT
Start: 2025-08-22

## 2025-08-15 RX ORDER — EPINEPHRINE 1 MG/ML
0.3 INJECTION, SOLUTION, CONCENTRATE INTRAVENOUS PRN
OUTPATIENT
Start: 2025-08-22

## 2025-08-15 RX ORDER — ONDANSETRON 2 MG/ML
8 INJECTION INTRAMUSCULAR; INTRAVENOUS
OUTPATIENT
Start: 2025-08-22

## 2025-08-15 RX ORDER — DIPHENHYDRAMINE HYDROCHLORIDE 50 MG/ML
50 INJECTION, SOLUTION INTRAMUSCULAR; INTRAVENOUS
OUTPATIENT
Start: 2025-08-22

## 2025-08-15 RX ORDER — HYDROCORTISONE SODIUM SUCCINATE 100 MG/2ML
100 INJECTION INTRAMUSCULAR; INTRAVENOUS
OUTPATIENT
Start: 2025-08-22

## 2025-08-15 RX ORDER — SODIUM CHLORIDE 9 MG/ML
5-250 INJECTION, SOLUTION INTRAVENOUS PRN
OUTPATIENT
Start: 2025-08-22

## 2025-08-15 RX ADMIN — SODIUM CHLORIDE 125 MG: 9 INJECTION, SOLUTION INTRAVENOUS at 14:45

## 2025-08-19 ENCOUNTER — HOSPITAL ENCOUNTER (OUTPATIENT)
Dept: ULTRASOUND IMAGING | Age: 75
Discharge: HOME | End: 2025-08-21
Attending: OTOLARYNGOLOGY
Payer: MEDICARE

## 2025-08-19 DIAGNOSIS — R22.1 LOCALIZED SWELLING, MASS AND LUMP, NECK: ICD-10-CM

## 2025-08-19 DIAGNOSIS — D44.0 NEOPLASM OF UNCERTAIN BEHAVIOR OF THYROID GLAND: ICD-10-CM

## 2025-08-19 PROCEDURE — 76536 US EXAM OF HEAD AND NECK: CPT

## 2025-08-22 ENCOUNTER — HOSPITAL ENCOUNTER (OUTPATIENT)
Dept: INFUSION THERAPY | Age: 75
Setting detail: INFUSION SERIES
Discharge: HOME | End: 2025-08-22
Payer: MEDICARE

## 2025-08-22 VITALS
HEART RATE: 56 BPM | OXYGEN SATURATION: 95 % | RESPIRATION RATE: 16 BRPM | SYSTOLIC BLOOD PRESSURE: 166 MMHG | TEMPERATURE: 97.6 F | DIASTOLIC BLOOD PRESSURE: 66 MMHG

## 2025-08-22 DIAGNOSIS — D50.8 IRON DEFICIENCY ANEMIA SECONDARY TO INADEQUATE DIETARY IRON INTAKE: Primary | ICD-10-CM

## 2025-08-22 DIAGNOSIS — K90.9 IRON MALABSORPTION: ICD-10-CM

## 2025-08-22 DIAGNOSIS — D50.9 MICROCYTIC ANEMIA: ICD-10-CM

## 2025-08-22 PROCEDURE — 2580000003 HC RX 258: Performed by: INTERNAL MEDICINE

## 2025-08-22 PROCEDURE — 6360000002 HC RX W HCPCS: Performed by: INTERNAL MEDICINE

## 2025-08-22 PROCEDURE — 96365 THER/PROPH/DIAG IV INF INIT: CPT

## 2025-08-22 RX ORDER — DIPHENHYDRAMINE HYDROCHLORIDE 50 MG/ML
50 INJECTION, SOLUTION INTRAMUSCULAR; INTRAVENOUS
OUTPATIENT
Start: 2025-08-29

## 2025-08-22 RX ORDER — EPINEPHRINE 1 MG/ML
0.3 INJECTION, SOLUTION, CONCENTRATE INTRAVENOUS PRN
OUTPATIENT
Start: 2025-08-29

## 2025-08-22 RX ORDER — ACETAMINOPHEN 325 MG/1
650 TABLET ORAL
OUTPATIENT
Start: 2025-08-29

## 2025-08-22 RX ORDER — SODIUM CHLORIDE 9 MG/ML
5-250 INJECTION, SOLUTION INTRAVENOUS PRN
OUTPATIENT
Start: 2025-08-29

## 2025-08-22 RX ORDER — ALBUTEROL SULFATE 90 UG/1
4 INHALANT RESPIRATORY (INHALATION) PRN
OUTPATIENT
Start: 2025-08-29

## 2025-08-22 RX ORDER — HEPARIN 100 UNIT/ML
500 SYRINGE INTRAVENOUS PRN
OUTPATIENT
Start: 2025-08-29

## 2025-08-22 RX ORDER — SODIUM CHLORIDE 0.9 % (FLUSH) 0.9 %
5-40 SYRINGE (ML) INJECTION PRN
OUTPATIENT
Start: 2025-08-29

## 2025-08-22 RX ORDER — SODIUM CHLORIDE 9 MG/ML
INJECTION, SOLUTION INTRAVENOUS PRN
OUTPATIENT
Start: 2025-08-29

## 2025-08-22 RX ORDER — ONDANSETRON 2 MG/ML
8 INJECTION INTRAMUSCULAR; INTRAVENOUS
OUTPATIENT
Start: 2025-08-29

## 2025-08-22 RX ORDER — HYDROCORTISONE SODIUM SUCCINATE 100 MG/2ML
100 INJECTION INTRAMUSCULAR; INTRAVENOUS
OUTPATIENT
Start: 2025-08-29

## 2025-08-22 RX ORDER — SODIUM CHLORIDE 9 MG/ML
5-250 INJECTION, SOLUTION INTRAVENOUS PRN
Status: DISCONTINUED | OUTPATIENT
Start: 2025-08-22 | End: 2025-08-23 | Stop reason: HOSPADM

## 2025-08-22 RX ADMIN — SODIUM CHLORIDE 200 ML/HR: 0.9 INJECTION, SOLUTION INTRAVENOUS at 15:10

## 2025-08-22 RX ADMIN — SODIUM CHLORIDE 125 MG: 9 INJECTION, SOLUTION INTRAVENOUS at 15:11

## 2025-08-29 ENCOUNTER — HOSPITAL ENCOUNTER (OUTPATIENT)
Dept: INFUSION THERAPY | Age: 75
Setting detail: INFUSION SERIES
Discharge: HOME | End: 2025-08-29
Payer: MEDICARE

## 2025-08-29 VITALS
HEART RATE: 67 BPM | RESPIRATION RATE: 16 BRPM | DIASTOLIC BLOOD PRESSURE: 79 MMHG | SYSTOLIC BLOOD PRESSURE: 153 MMHG | TEMPERATURE: 97 F | OXYGEN SATURATION: 95 %

## 2025-08-29 DIAGNOSIS — D50.9 MICROCYTIC ANEMIA: ICD-10-CM

## 2025-08-29 DIAGNOSIS — K90.9 IRON MALABSORPTION: ICD-10-CM

## 2025-08-29 DIAGNOSIS — D50.8 IRON DEFICIENCY ANEMIA SECONDARY TO INADEQUATE DIETARY IRON INTAKE: Primary | ICD-10-CM

## 2025-08-29 PROCEDURE — 96365 THER/PROPH/DIAG IV INF INIT: CPT

## 2025-08-29 PROCEDURE — 6360000002 HC RX W HCPCS: Performed by: INTERNAL MEDICINE

## 2025-08-29 PROCEDURE — 2580000003 HC RX 258: Performed by: INTERNAL MEDICINE

## 2025-08-29 RX ORDER — ONDANSETRON 2 MG/ML
8 INJECTION INTRAMUSCULAR; INTRAVENOUS
OUTPATIENT
Start: 2025-09-05

## 2025-08-29 RX ORDER — ALBUTEROL SULFATE 90 UG/1
4 INHALANT RESPIRATORY (INHALATION) PRN
OUTPATIENT
Start: 2025-09-05

## 2025-08-29 RX ORDER — SODIUM CHLORIDE 0.9 % (FLUSH) 0.9 %
5-40 SYRINGE (ML) INJECTION PRN
OUTPATIENT
Start: 2025-09-05

## 2025-08-29 RX ORDER — HEPARIN 100 UNIT/ML
500 SYRINGE INTRAVENOUS PRN
OUTPATIENT
Start: 2025-09-05

## 2025-08-29 RX ORDER — SODIUM CHLORIDE 9 MG/ML
5-250 INJECTION, SOLUTION INTRAVENOUS PRN
Status: DISCONTINUED | OUTPATIENT
Start: 2025-08-29 | End: 2025-08-30 | Stop reason: HOSPADM

## 2025-08-29 RX ORDER — SODIUM CHLORIDE 9 MG/ML
INJECTION, SOLUTION INTRAVENOUS PRN
OUTPATIENT
Start: 2025-09-05

## 2025-08-29 RX ORDER — SODIUM CHLORIDE 9 MG/ML
5-250 INJECTION, SOLUTION INTRAVENOUS PRN
OUTPATIENT
Start: 2025-09-05

## 2025-08-29 RX ORDER — DIPHENHYDRAMINE HYDROCHLORIDE 50 MG/ML
50 INJECTION, SOLUTION INTRAMUSCULAR; INTRAVENOUS
OUTPATIENT
Start: 2025-09-05

## 2025-08-29 RX ORDER — HYDROCORTISONE SODIUM SUCCINATE 100 MG/2ML
100 INJECTION INTRAMUSCULAR; INTRAVENOUS
OUTPATIENT
Start: 2025-09-05

## 2025-08-29 RX ORDER — ACETAMINOPHEN 325 MG/1
650 TABLET ORAL
OUTPATIENT
Start: 2025-09-05

## 2025-08-29 RX ORDER — EPINEPHRINE 1 MG/ML
0.3 INJECTION, SOLUTION, CONCENTRATE INTRAVENOUS PRN
OUTPATIENT
Start: 2025-09-05

## 2025-08-29 RX ADMIN — SODIUM CHLORIDE 200 ML/HR: 900 INJECTION, SOLUTION INTRAVENOUS at 15:56

## 2025-08-29 RX ADMIN — SODIUM CHLORIDE 125 MG: 9 INJECTION, SOLUTION INTRAVENOUS at 14:45

## 2025-09-05 ENCOUNTER — HOSPITAL ENCOUNTER (OUTPATIENT)
Dept: INFUSION THERAPY | Age: 75
Setting detail: INFUSION SERIES
Discharge: HOME | End: 2025-09-05
Payer: MEDICARE

## 2025-09-05 VITALS
HEART RATE: 66 BPM | TEMPERATURE: 96.5 F | DIASTOLIC BLOOD PRESSURE: 69 MMHG | OXYGEN SATURATION: 97 % | SYSTOLIC BLOOD PRESSURE: 154 MMHG | RESPIRATION RATE: 17 BRPM

## 2025-09-05 DIAGNOSIS — K90.9 IRON MALABSORPTION: ICD-10-CM

## 2025-09-05 DIAGNOSIS — D50.8 IRON DEFICIENCY ANEMIA SECONDARY TO INADEQUATE DIETARY IRON INTAKE: Primary | ICD-10-CM

## 2025-09-05 DIAGNOSIS — D50.9 MICROCYTIC ANEMIA: ICD-10-CM

## 2025-09-05 PROCEDURE — 96365 THER/PROPH/DIAG IV INF INIT: CPT

## 2025-09-05 PROCEDURE — 2580000003 HC RX 258: Performed by: INTERNAL MEDICINE

## 2025-09-05 PROCEDURE — 6360000002 HC RX W HCPCS: Performed by: INTERNAL MEDICINE

## 2025-09-05 RX ORDER — ALBUTEROL SULFATE 90 UG/1
4 INHALANT RESPIRATORY (INHALATION) PRN
OUTPATIENT
Start: 2025-09-12

## 2025-09-05 RX ORDER — DIPHENHYDRAMINE HYDROCHLORIDE 50 MG/ML
50 INJECTION, SOLUTION INTRAMUSCULAR; INTRAVENOUS
OUTPATIENT
Start: 2025-09-12

## 2025-09-05 RX ORDER — SODIUM CHLORIDE 9 MG/ML
5-250 INJECTION, SOLUTION INTRAVENOUS PRN
Status: DISCONTINUED | OUTPATIENT
Start: 2025-09-05 | End: 2025-09-06 | Stop reason: HOSPADM

## 2025-09-05 RX ORDER — HYDROCORTISONE SODIUM SUCCINATE 100 MG/2ML
100 INJECTION INTRAMUSCULAR; INTRAVENOUS
OUTPATIENT
Start: 2025-09-12

## 2025-09-05 RX ORDER — SODIUM CHLORIDE 9 MG/ML
5-250 INJECTION, SOLUTION INTRAVENOUS PRN
OUTPATIENT
Start: 2025-09-12

## 2025-09-05 RX ORDER — SODIUM CHLORIDE 0.9 % (FLUSH) 0.9 %
5-40 SYRINGE (ML) INJECTION PRN
OUTPATIENT
Start: 2025-09-12

## 2025-09-05 RX ORDER — SODIUM CHLORIDE 9 MG/ML
INJECTION, SOLUTION INTRAVENOUS PRN
OUTPATIENT
Start: 2025-09-12

## 2025-09-05 RX ORDER — ACETAMINOPHEN 325 MG/1
650 TABLET ORAL
OUTPATIENT
Start: 2025-09-12

## 2025-09-05 RX ORDER — ONDANSETRON 2 MG/ML
8 INJECTION INTRAMUSCULAR; INTRAVENOUS
OUTPATIENT
Start: 2025-09-12

## 2025-09-05 RX ORDER — EPINEPHRINE 1 MG/ML
0.3 INJECTION, SOLUTION, CONCENTRATE INTRAVENOUS PRN
OUTPATIENT
Start: 2025-09-12

## 2025-09-05 RX ORDER — HEPARIN 100 UNIT/ML
500 SYRINGE INTRAVENOUS PRN
OUTPATIENT
Start: 2025-09-12

## 2025-09-05 RX ADMIN — SODIUM CHLORIDE 250 ML/HR: 0.9 INJECTION, SOLUTION INTRAVENOUS at 14:43

## 2025-09-05 RX ADMIN — SODIUM CHLORIDE 125 MG: 9 INJECTION, SOLUTION INTRAVENOUS at 14:44

## (undated) DEVICE — PAD, GROUNDING, ELECTROSURGICAL, W/9 FT CABLE, POLYHESIVE II, ADULT, LF

## (undated) DEVICE — ELEVATOR NEUROSTIMULATOR SPNL PASS FOR SPNL CRD STIM SYS

## (undated) DEVICE — SUTURE, SILK, 2-0, TIES, 12-30 IN, BLACK

## (undated) DEVICE — SUTURE VCRL + SZ 2 L27IN ABSRB UD L40MM CP 1/2 CIR REV CUT VCP195H

## (undated) DEVICE — COUNTER, NEEDLE, FOAM BLOCK, POP-N-COUNT, W/BLADEGUARD, W/ADHESIVE 40 COUNT, RED

## (undated) DEVICE — SYRINGE EPI 7ML LUERLOCK RESISTANCE LOSS EPILOR

## (undated) DEVICE — GLOVE ORTHO 8   MSG9480

## (undated) DEVICE — NEEDLE FLTR 18GA L1.5IN MEM THK5UM BLNT DISP

## (undated) DEVICE — SUTURE VCRL + SZ 2-0 L27IN ABSRB UD CP-1 1/2 CIR REV CUT VCP266H

## (undated) DEVICE — Device

## (undated) DEVICE — DRESSING TRNSPAR W4XL10IN FLM MIC POR SURESITE 123

## (undated) DEVICE — DRAPE,REIN 53X77,STERILE: Brand: MEDLINE

## (undated) DEVICE — APPLICATOR, PREP, CHLORAPREP, W/ORANGE TINT, 10.5ML

## (undated) DEVICE — BASIN EMSIS 700ML GRAPHITE PLAS KID SHP GRAD

## (undated) DEVICE — BINDER ABD 2XL W9IN CIRC 62 73IN 3 PNL E HK AND LOOP CLSR W

## (undated) DEVICE — REMOTE CONTROL KIT: Brand: FREELINK™

## (undated) DEVICE — SOLUTION IRRIG 1000ML 0.9% SOD CHL USP POUR PLAS BTL

## (undated) DEVICE — CO2 CANNULA,SUPERSOFT, ADLT,7'O2,7'CO2: Brand: MEDLINE

## (undated) DEVICE — SYRINGE, LUER LOCK, 10ML: Brand: MEDLINE

## (undated) DEVICE — FORCEPS BX L240CM WRK CHN 2.8MM STD CAP W/ NDL MIC MESH

## (undated) DEVICE — CANNULA NSL AD L7FT O2 PRSS CRUSH RESIST TBNG M CONN AIRLFE

## (undated) DEVICE — GOWN,AURORA,NONREINFORCED,LARGE: Brand: MEDLINE

## (undated) DEVICE — BONE TAMP KIT KEX152EB FF E2 15/2 OI: Brand: KYPHON EXPRESS II KYPHOPAK TRAY

## (undated) DEVICE — BLOCK BITE 60FR RUBBER ADLT DENTAL

## (undated) DEVICE — GLOVE SURG SZ 8 THK118MIL BLK LTX FREE POLYISOPRENE BEAD CUF

## (undated) DEVICE — CEMENT GUN AND BONE FILLER CDS3A SIZE 3: Brand: MEDTRONIC REUSABLE INSTRUMENTS

## (undated) DEVICE — REMOTE CONTROL

## (undated) DEVICE — NEEDLE SFTY RETRCT ST 25GAX1IN INTEGRA

## (undated) DEVICE — CONTAINER,SPECIMEN,4OZ,OR STRL: Brand: MEDLINE

## (undated) DEVICE — SINGLE PORT MANIFOLD: Brand: NEPTUNE 2

## (undated) DEVICE — GLOVE SURG SZ 8 L12IN FNGR THK79MIL GRN LTX FREE

## (undated) DEVICE — REMOTE CONTROL: Brand: FREELINK™

## (undated) DEVICE — NEEDLE SPNL L3.5IN DIA25GA QNCKE TYP BVL SPINOCAN

## (undated) DEVICE — SNARE ENDOSCP M L240CM LOOP W27MM SHTH DIA2.4MM OVL FLX

## (undated) DEVICE — TRAY NRV BLK SUPP CUST

## (undated) DEVICE — NEEDLE SPNL 25GA L3.5IN BLU HUB S STL REG WALL FIT STYL W/

## (undated) DEVICE — TRAP SURG QUAD PARABOLA SLOT DSGN SFTY SCRN TRAPEASE

## (undated) DEVICE — SOLUTION IV IRRIG POUR BRL 0.9% SODIUM CHL 2F7124

## (undated) DEVICE — DRESSING, GAUZE, 16 PLY, 4 X 4 IN, STERILE

## (undated) DEVICE — APPLICATOR, CHLORAPREP, W/ORANGE TINT, 26ML

## (undated) DEVICE — MARKER,SKIN,WI/RULER AND LABELS: Brand: MEDLINE

## (undated) DEVICE — EXTERNAL TRIAL STIMULATOR (REFURBISHED): Brand: SPECTRA WAVEWRITER™

## (undated) DEVICE — 3M™ IOBAN™ 2 ANTIMICROBIAL INCISE DRAPE 6650EZ: Brand: IOBAN™ 2

## (undated) DEVICE — CONMED DISPOSABLE GASTROINTESTINAL CYTOLOGY BRUSH, STRAIGHT HANDLE, 2.5 MM X 160 CM: Brand: CONMED

## (undated) DEVICE — GAUZE,SPONGE,4"X4",16PLY,STRL,LF,10/TRAY: Brand: MEDLINE

## (undated) DEVICE — GOWN,SIRUS,POLYRNF,BRTHSLV,2XL,18/CS: Brand: MEDLINE

## (undated) DEVICE — CUP MED 1OZ CLR POLYPR FEED GRAD W/O LID

## (undated) DEVICE — STRIP, SKIN CLOSURE, STERI STRIP, REINFORCED, 0.5 X 4 IN

## (undated) DEVICE — CUSHION PRONEVIEW L HD NK FOAM

## (undated) DEVICE — STRIP,CLOSURE,WOUND,MEDI-STRIP,1/2X4: Brand: MEDLINE

## (undated) DEVICE — DRAPE, TOWEL, STERI DRAPE, 17 X 11 IN, PLASTIC, STERILE

## (undated) DEVICE — INTENDED FOR TISSUE SEPARATION, AND OTHER PROCEDURES THAT REQUIRE A SHARP SURGICAL BLADE TO PUNCTURE OR CUT.: Brand: BARD-PARKER ® CARBON RIB-BACK BLADES

## (undated) DEVICE — SYRINGE MED 10ML LUERLOCK TIP W/O SFTY DISP

## (undated) DEVICE — SUTURE, POLYSORB, 0, 36 IN, GS21, VIOLET

## (undated) DEVICE — COVER,C-ARM,41X74: Brand: MEDLINE

## (undated) DEVICE — TOWEL,OR,DSP,ST,NATURAL,DLX,4/PK,20PK/CS: Brand: MEDLINE

## (undated) DEVICE — SPONGE GZ W4XL4IN 16 PLY DATA MSTR TAGGED DBL RADPQ

## (undated) DEVICE — Z DISCONTINUED USE 2423037 APPLICATOR MEDICATED 3 CC CHLORHEXIDINE GLUC 2% CHLORAPREP

## (undated) DEVICE — JACKSON / MODULAR SPINAL TABLE STYLE POSITIONING KIT - STANDARD: Brand: SOULE MEDICAL

## (undated) DEVICE — Z DISCONTINUED APPLICATOR SURG PREP 0.35OZ 2% CHG 70% ISO ALC W/ HI LT

## (undated) DEVICE — GLOVE SURG 8 11.7IN BEAD CUF LIGHT BRN SENSICARE LTX FREE

## (undated) DEVICE — COVER, CART, 45 X 27 X 48 IN, CLEAR

## (undated) DEVICE — MASTISOL ADHESIVE AMPULE

## (undated) DEVICE — SYRINGE, LUER LOCK, 12ML

## (undated) DEVICE — JELLY,LUBE,STERILE,FLIP TOP,TUBE,2-OZ: Brand: MEDLINE

## (undated) DEVICE — GLOVE ORANGE PI 8   MSG9080

## (undated) DEVICE — ADHESIVE, SKIN, MASTISOL, 2/3 CC VIAL

## (undated) DEVICE — SVMMC KYPHOPLASTY PK

## (undated) DEVICE — DRESSING HYDROCOLLOID BORDER 35X10 IN ALUM PRIMASEAL

## (undated) DEVICE — MANIFOLD, 4 PORT NEPTUNE STANDARD

## (undated) DEVICE — ADAPTER TBNG LUER STUB 15 GA INTMED

## (undated) DEVICE — SPONGE GZ W4XL4IN RAYON POLY FILL CVR W/ NONWOVEN FAB

## (undated) DEVICE — MEDICINE CUP, GRADUATED, STER: Brand: MEDLINE

## (undated) DEVICE — O.R. CABLE 1X16, 61CM AND EXTENSION

## (undated) DEVICE — MASTISOL ADHESIVE LIQ 2/3ML

## (undated) DEVICE — DRESSING TRNSPAR W8XL12IN FLM SURESITE 123

## (undated) DEVICE — KIT PT TRL HANDHELD COMB THER WAVEFORM AUTOMATION FOR SPNL

## (undated) DEVICE — CHLORAPREP 26ML ORANGE

## (undated) DEVICE — TOWEL,OR,DSP,ST,BLUE,STD,6/PK,12PK/CS: Brand: MEDLINE

## (undated) DEVICE — TIP, SUCTION, YANKAUER, W/O VENT, FLEXIBLE, OPEN TIP, HIGH CAPACITY

## (undated) DEVICE — DRAPE, SHEET, UTILITY, NON ABSORBENT, 18 X 26 IN, LF

## (undated) DEVICE — Device: Brand: DEFENDO VALVE AND CONNECTOR KIT

## (undated) DEVICE — KIT CHARGING CHRG BASE STN PWR SUPL CHRG BELT PRECIS SPECTR

## (undated) DEVICE — DRESSING, TRANSPARENT, TEGADERM, 4 X 4-3/4 IN

## (undated) DEVICE — FORCEPS BX L240CM JAW DIA2.4MM ORNG L CAP W/ NDL DISP RAD

## (undated) DEVICE — SUTURE ETHLN SZ 3-0 L18IN NONABSORBABLE BLK FS-1 L24MM 3/8 663H

## (undated) DEVICE — STRAP,POSITIONING,KNEE/BODY,FOAM,4X60": Brand: MEDLINE

## (undated) DEVICE — YANKAUER,FLEXIBLE HANDLE,REGLR CAPACITY: Brand: MEDLINE INDUSTRIES, INC.

## (undated) DEVICE — LEAD BLANK: Brand: PRECISION ™

## (undated) DEVICE — SUTURE, NUROLON, 0, 18 IN, CT1, DETACHABLE, MULTIPACK, BLACK

## (undated) DEVICE — CURETTE A13A SIZE 2 T-TIP: Brand: KYPHON® EXPRESS ™ CURETTE

## (undated) DEVICE — BONE CEMENT CT01B KYPHON VUE W MIXER: Brand: KYPHON VUE BONE CEMENT AND KYPHON MIXER

## (undated) DEVICE — BANDAGE ADH W0.75XL3IN NAT PLAS CURAD

## (undated) DEVICE — 1010 S-DRAPE TOWEL DRAPE 10/BX: Brand: STERI-DRAPE™

## (undated) DEVICE — GOWN,AURORA,NONRNF,XL,30/CS: Brand: MEDLINE

## (undated) DEVICE — DRAPE ADOLESCENT  LAPAROTOMY

## (undated) DEVICE — DRESSING POSTOP AG PRISMASEAL 3.5X6IN

## (undated) DEVICE — SYRINGE MED 50ML LUERLOCK TIP

## (undated) DEVICE — SUTURE PERMAHAND SZ 0 L30IN NONABSORBABLE BLK L26MM SH 1/2 K834H

## (undated) DEVICE — FLOSEAL HEMOSTATIC MATRIX, 5 ML: Brand: FLOSEAL

## (undated) DEVICE — ELECTRODE PT RET AD L9FT HI MOIST COND ADH HYDRGEL CORDED

## (undated) DEVICE — CLEANER, ELECTROSURGICAL, TIP, 5 X 5 CM, LF

## (undated) DEVICE — CUP, SOLUTION

## (undated) DEVICE — TUNNELER NEUROSTIMULATOR L28CM FOR SPNL CRD STIM SYS

## (undated) DEVICE — TUBING, SUCTION, 1/4" X 12', STRAIGHT: Brand: MEDLINE

## (undated) DEVICE — COVER,TABLE,HEAVY DUTY,50"X90",STRL: Brand: MEDLINE

## (undated) DEVICE — DRAPE COVER, C ARM, FLOUROSCAN IMAGING SYS

## (undated) DEVICE — CEMENT CARTRIDGES CC02A CDS: Brand: KYPHON® CEMENT DELIVERY SYSTEM